# Patient Record
Sex: FEMALE | Race: WHITE | NOT HISPANIC OR LATINO | Employment: OTHER | URBAN - METROPOLITAN AREA
[De-identification: names, ages, dates, MRNs, and addresses within clinical notes are randomized per-mention and may not be internally consistent; named-entity substitution may affect disease eponyms.]

---

## 2017-01-10 ENCOUNTER — OFFICE VISIT (OUTPATIENT)
Dept: UROLOGY | Facility: CLINIC | Age: 77
End: 2017-01-10
Payer: MEDICARE

## 2017-01-10 VITALS
HEIGHT: 68 IN | HEART RATE: 59 BPM | SYSTOLIC BLOOD PRESSURE: 133 MMHG | BODY MASS INDEX: 22.86 KG/M2 | WEIGHT: 150.81 LBS | DIASTOLIC BLOOD PRESSURE: 64 MMHG

## 2017-01-10 DIAGNOSIS — I49.3 PVC'S (PREMATURE VENTRICULAR CONTRACTIONS): ICD-10-CM

## 2017-01-10 DIAGNOSIS — I34.0 MITRAL VALVE INSUFFICIENCY, UNSPECIFIED ETIOLOGY: ICD-10-CM

## 2017-01-10 DIAGNOSIS — N95.2 ATROPHIC VAGINITIS: ICD-10-CM

## 2017-01-10 DIAGNOSIS — N39.0 RECURRENT UTI: Primary | ICD-10-CM

## 2017-01-10 PROCEDURE — 3075F SYST BP GE 130 - 139MM HG: CPT | Mod: S$GLB,,, | Performed by: UROLOGY

## 2017-01-10 PROCEDURE — 87186 SC STD MICRODIL/AGAR DIL: CPT

## 2017-01-10 PROCEDURE — 1159F MED LIST DOCD IN RCRD: CPT | Mod: S$GLB,,, | Performed by: UROLOGY

## 2017-01-10 PROCEDURE — 1126F AMNT PAIN NOTED NONE PRSNT: CPT | Mod: S$GLB,,, | Performed by: UROLOGY

## 2017-01-10 PROCEDURE — 87086 URINE CULTURE/COLONY COUNT: CPT

## 2017-01-10 PROCEDURE — 99999 PR PBB SHADOW E&M-EST. PATIENT-LVL III: CPT | Mod: PBBFAC,,, | Performed by: UROLOGY

## 2017-01-10 PROCEDURE — 87088 URINE BACTERIA CULTURE: CPT

## 2017-01-10 PROCEDURE — 1157F ADVNC CARE PLAN IN RCRD: CPT | Mod: S$GLB,,, | Performed by: UROLOGY

## 2017-01-10 PROCEDURE — 1160F RVW MEDS BY RX/DR IN RCRD: CPT | Mod: S$GLB,,, | Performed by: UROLOGY

## 2017-01-10 PROCEDURE — 99204 OFFICE O/P NEW MOD 45 MIN: CPT | Mod: S$GLB,,, | Performed by: UROLOGY

## 2017-01-10 PROCEDURE — 87077 CULTURE AEROBIC IDENTIFY: CPT

## 2017-01-10 PROCEDURE — 3078F DIAST BP <80 MM HG: CPT | Mod: S$GLB,,, | Performed by: UROLOGY

## 2017-01-10 RX ORDER — ESTRADIOL 0.1 MG/G
CREAM VAGINAL
Qty: 42.5 G | Refills: 6 | Status: SHIPPED | OUTPATIENT
Start: 2017-01-10 | End: 2018-01-30 | Stop reason: SDUPTHER

## 2017-01-10 NOTE — PROGRESS NOTES
CHIEF COMPLAINT:    Mrs. Pepe Bell is a 76 y.o. female presenting for a consultation at the request of Dr. Flower. Patient presents with recurrent UTI.    PRESENTING ILLNESS:    Madan Bell is a 76 y.o. female  by  s/p laparoscopic hysterectomy and BSO  with HTN, paroxysmal V tach, s/p thyroidectomy for multinodular goiter 2013, chronic low back pain, who presents following recent UTI.      Patient states that she had a positive clean catch urine culture in 2016, although she had no symptoms at that time-- no fevers, chills, dysuria, hematuria, flank pain.    Prior to this, she had not had a UTI in years.    She saw her PCP again in 2016, complaining of dysuria, but no fevers, hematuria.  She states that she had flank pain slightly worse than usual at that time, although it is hard to tell the difference from her chronic back pain.  UCx  grew klebsiella resistant to nitrofurantoin, otherwise sensitive.  She was treated with bactrim.  Her symptoms never resolved completely, and UCx  grew E. Coli resistant to unasyn, ampicillin, ciprofloxacin, gent, levofloxacin, tetracycline, tobramycin, and bactrim.  She was treated, and has since had resolution of her symptoms.  Today she denies fever, chills, N/V, dysuria, hematuria, flank pain.      She endorses nocturia x 2, daytime frequency q 2 hours, occasional urgency.  She states that she empties well.  No obstructive symptoms.    Patient endorses leaking urine with cough, laugh, sneeze, and activity.  She notes occasional smaller volume leakage with urgency, but this is rare.  She wears 6-7 pads per day that are not soaked through.       REVIEW OF SYSTEMS:        PATIENT HISTORY:    Past Medical History   Diagnosis Date    Anxiety     Arrhythmia     Arthritis      knees    Heart murmur     Hyperlipidemia     Hypertension     Mitral valve disorder     Mitral valve prolapse     Thyroid cancer 2013     Thyroid disease     Vasovagal near syncope 11/12/2012       Past Surgical History   Procedure Laterality Date    Breast surgery      Thyroid surgery      Hysterectomy       tahbso       Family History   Problem Relation Age of Onset    Arrhythmia Son     Heart disease Mother     Hyperlipidemia Maternal Grandmother        Social History     Social History    Marital status:      Spouse name: N/A    Number of children: N/A    Years of education: N/A     Occupational History    Not on file.     Social History Main Topics    Smoking status: Never Smoker    Smokeless tobacco: Never Used    Alcohol use No    Drug use: No    Sexual activity: Not on file     Other Topics Concern    Not on file     Social History Narrative       Allergies:  Fluoride preparations and Fluoride    Medications:  Outpatient Encounter Prescriptions as of 1/10/2017   Medication Sig Dispense Refill    ascorbic acid (VITAMIN C) 500 MG tablet Take 500 mg by mouth once daily.      aspirin 81 mg Tab Take 1 tablet by mouth Daily.      atorvastatin (LIPITOR) 20 MG tablet TAKE 1 TABLET ONE TIME DAILY 90 tablet 3    bisoprolol-hydrochlorothiazide 2.5-6.25 mg (ZIAC) 2.5-6.25 mg Tab Half tablet daily 45 tablet 3    cholecalciferol, vitamin D3, (VITAMIN D3) 2,000 unit Cap Take by mouth once daily.        levothyroxine (SYNTHROID) 75 MCG tablet TAKE 1 TABLET ONE TIME DAILY 90 tablet 1    omega-3 fatty acids-vitamin E (FISH OIL) 1,000 mg Cap Take 1 capsule by mouth once daily.       FLUZONE HIGH-DOSE 2016-17, PF, 180 mcg/0.5 mL Syrg       naproxen sodium (ANAPROX) 220 MG tablet Take 220 mg by mouth 2 (two) times daily with meals.       No facility-administered encounter medications on file as of 1/10/2017.          PHYSICAL EXAMINATION:    The patient generally appears in good health, is appropriately interactive, and is in no apparent distress.    Skin: No lesions.    Mental: Cooperative with normal affect.    Neuro: Grossly  intact.    HEENT: Normal. No evidence of lymphadenopathy.    Chest:  normal inspiratory effort.    Heart: Regular rhythm.  No murmurs    Abdomen: BS active. Soft, non-tender. No masses or organomegaly. Bladder is not palpable. No evidence of flank discomfort.  No CVAT.     Extremities: No clubbing, cyanosis, or edema    : atrophic vaginitis.   The external genitalia were normal. No rash. Atrophic vaginitis was present. The urethral showed no evidence of a urethral diverticulum.  And in and out cath was performed under sterile conditions immediately after voiding.  The PVR was 30 ml.  Normal perineum.  No external hemorrhoids noted.    LABS:    Urine dipstick today positive for trace leukocytes, trace RBC    IMPRESSION:    Encounter Diagnoses   Name Primary?    Recurrent UTI Yes       PLAN:    1.   Probiotic daily  2.   Topical estrogen every day x 2 weeks, then 3x week. Pea sized amount  3.   Cysto, renal ultrasound  4.   Avoid constipation, stay well hydrated.    I would like to thank Sheila Mcgee MD for referral of this patient.

## 2017-01-10 NOTE — LETTER
January 10, 2017      Sheila Mcgee MD  [address]  [facility] [address]           [facility] - Urology 4th Floor  [address]  [facility] [address]  Phone: [phone]          Patient: Madan Bell   MR Number: [id number]   YOB: 1940   Date of Visit: 1/10/2017       Dear Dr. Sheila Mcgee:    Thank you for referring Madan Bell to me for evaluation. Attached you will find relevant portions of my assessment and plan of care.    If you have questions, please do not hesitate to call me. I look forward to following Madan Bell along with you.    Sincerely,    Makayla Bello MD    Enclosure  CC:  No Recipients    If you would like to receive this communication electronically, please contact [email or web address] or [phone] to request more information on [email or web address] Link access.    For providers and/or their staff who would like to refer a patient to [facility], please contact us through our one-stop-shop provider referral line, [facility], at [phone].    If you feel you have received this communication in error or would no longer like to receive these types of communications, please e-mail [email or web address]

## 2017-01-12 ENCOUNTER — PATIENT MESSAGE (OUTPATIENT)
Dept: UROLOGY | Facility: CLINIC | Age: 77
End: 2017-01-12

## 2017-01-12 LAB — BACTERIA UR CULT: NORMAL

## 2017-01-12 RX ORDER — AMOXICILLIN AND CLAVULANATE POTASSIUM 875; 125 MG/1; MG/1
1 TABLET, FILM COATED ORAL 2 TIMES DAILY
Qty: 20 TABLET | Refills: 0 | Status: SHIPPED | OUTPATIENT
Start: 2017-01-12 | End: 2017-01-22

## 2017-01-13 ENCOUNTER — TELEPHONE (OUTPATIENT)
Dept: UROLOGY | Facility: CLINIC | Age: 77
End: 2017-01-13

## 2017-01-13 NOTE — TELEPHONE ENCOUNTER
----- Message from Ignacia Yo MA sent at 1/13/2017  1:42 PM CST -----  Contact: self  [phone]  States she is still waiting for you to call the antibiotic to her local pharmacy.    Call when done.

## 2017-01-13 NOTE — TELEPHONE ENCOUNTER
Pt notified that her antibiotic was called into [facility] per her request and Dilcai Barrett NP verbal order .

## 2017-01-18 ENCOUNTER — PATIENT MESSAGE (OUTPATIENT)
Dept: UROLOGY | Facility: CLINIC | Age: 77
End: 2017-01-18

## 2017-01-22 RX ORDER — LEVOTHYROXINE SODIUM 75 UG/1
TABLET ORAL
Qty: 90 TABLET | Refills: 3 | Status: SHIPPED | OUTPATIENT
Start: 2017-01-22 | End: 2018-03-23 | Stop reason: SDUPTHER

## 2017-01-25 ENCOUNTER — TELEPHONE (OUTPATIENT)
Dept: UROLOGY | Facility: CLINIC | Age: 77
End: 2017-01-25

## 2017-01-25 ENCOUNTER — HOSPITAL ENCOUNTER (OUTPATIENT)
Dept: RADIOLOGY | Facility: HOSPITAL | Age: 77
Discharge: HOME | End: 2017-01-25
Attending: UROLOGY
Payer: MEDICARE

## 2017-01-25 DIAGNOSIS — N39.0 RECURRENT UTI: ICD-10-CM

## 2017-01-25 DIAGNOSIS — N20.0 KIDNEY STONES: Primary | ICD-10-CM

## 2017-01-25 PROCEDURE — 76770 US EXAM ABDO BACK WALL COMP: CPT | Mod: TC

## 2017-01-25 PROCEDURE — 76770 US EXAM ABDO BACK WALL COMP: CPT | Mod: 26,,, | Performed by: RADIOLOGY

## 2017-01-27 ENCOUNTER — HOSPITAL ENCOUNTER (OUTPATIENT)
Dept: UROLOGY | Facility: HOSPITAL | Age: 77
Discharge: HOME | End: 2017-01-27
Attending: UROLOGY
Payer: MEDICARE

## 2017-01-27 ENCOUNTER — HOSPITAL ENCOUNTER (OUTPATIENT)
Dept: RADIOLOGY | Facility: HOSPITAL | Age: 77
Discharge: HOME | End: 2017-01-27
Attending: UROLOGY
Payer: MEDICARE

## 2017-01-27 DIAGNOSIS — N20.0 KIDNEY STONES: ICD-10-CM

## 2017-01-27 DIAGNOSIS — N39.0 RECURRENT UTI: ICD-10-CM

## 2017-01-27 PROCEDURE — 74000 XR ABDOMEN AP 1 VIEW: CPT | Mod: 26,,, | Performed by: RADIOLOGY

## 2017-01-27 PROCEDURE — 74000 XR ABDOMEN AP 1 VIEW: CPT | Mod: TC

## 2017-01-27 RX ORDER — CEPHALEXIN 500 MG/1
500 CAPSULE ORAL EVERY 12 HOURS
Qty: 20 CAPSULE | Refills: 0 | Status: SHIPPED | OUTPATIENT
Start: 2017-01-27 | End: 2017-02-06

## 2017-01-27 RX ORDER — CIPROFLOXACIN 500 MG/1
500 TABLET ORAL ONCE
Status: DISCONTINUED | OUTPATIENT
Start: 2017-01-27 | End: 2017-03-08

## 2017-01-27 RX ORDER — LIDOCAINE HYDROCHLORIDE 20 MG/ML
10 JELLY TOPICAL
Status: COMPLETED | OUTPATIENT
Start: 2017-01-27 | End: 2017-02-01

## 2017-01-29 ENCOUNTER — PATIENT MESSAGE (OUTPATIENT)
Dept: UROLOGY | Facility: CLINIC | Age: 77
End: 2017-01-29

## 2017-02-01 ENCOUNTER — HOSPITAL ENCOUNTER (OUTPATIENT)
Dept: RADIOLOGY | Facility: HOSPITAL | Age: 77
Discharge: HOME | End: 2017-02-01
Attending: UROLOGY
Payer: MEDICARE

## 2017-02-01 ENCOUNTER — PATIENT MESSAGE (OUTPATIENT)
Dept: UROLOGY | Facility: CLINIC | Age: 77
End: 2017-02-01

## 2017-02-01 ENCOUNTER — PATIENT MESSAGE (OUTPATIENT)
Dept: INTERNAL MEDICINE | Facility: CLINIC | Age: 77
End: 2017-02-01

## 2017-02-01 ENCOUNTER — HOSPITAL ENCOUNTER (OUTPATIENT)
Dept: UROLOGY | Facility: HOSPITAL | Age: 77
Discharge: HOME | End: 2017-02-01
Attending: UROLOGY
Payer: MEDICARE

## 2017-02-01 VITALS
BODY MASS INDEX: 21.91 KG/M2 | HEART RATE: 79 BPM | WEIGHT: 147.94 LBS | DIASTOLIC BLOOD PRESSURE: 77 MMHG | TEMPERATURE: 98 F | HEIGHT: 69 IN | SYSTOLIC BLOOD PRESSURE: 171 MMHG

## 2017-02-01 DIAGNOSIS — N39.0 RECURRENT UTI: ICD-10-CM

## 2017-02-01 DIAGNOSIS — N20.0 KIDNEY STONES: Primary | ICD-10-CM

## 2017-02-01 PROCEDURE — 74176 CT ABD & PELVIS W/O CONTRAST: CPT | Mod: 26,,, | Performed by: RADIOLOGY

## 2017-02-01 PROCEDURE — 52000 CYSTOURETHROSCOPY: CPT

## 2017-02-01 PROCEDURE — 52000 CYSTOURETHROSCOPY: CPT | Mod: ,,, | Performed by: UROLOGY

## 2017-02-01 PROCEDURE — 74176 CT ABD & PELVIS W/O CONTRAST: CPT | Mod: TC

## 2017-02-01 RX ORDER — LIDOCAINE HYDROCHLORIDE 20 MG/ML
10 JELLY TOPICAL
Status: DISCONTINUED | OUTPATIENT
Start: 2017-02-01 | End: 2018-08-06 | Stop reason: HOSPADM

## 2017-02-01 RX ADMIN — LIDOCAINE HYDROCHLORIDE 10 ML: 20 JELLY TOPICAL at 10:02

## 2017-02-01 NOTE — PATIENT INSTRUCTIONS
What to Expect After a Cystoscopy  For the next 24-48 hours, you may feel a mild burning when you urinate. This burning is normal and expected. Drink plenty of water to dilute the urine to help relieve the burning sensation. You may also see a small amount of blood in your urine after the procedure.    Unless you are already taking antibiotics, you may be given an antibiotic after the test to prevent infection.    Signs and Symptoms to Report  Call the [facility] at [phone] if you develop any of the following:  · Fever of 101 degrees or higher  · Chills or persistent bleeding  · Inability to urinate

## 2017-02-01 NOTE — IP AVS SNAPSHOT
[facility]  [address]  [facility] [address]  Phone: [phone]  Fax: [phone]                  Madan Bell   2017 10:30 AM   Cystoscopy    Description:  Female : 1940   Provider:  [facility]   Department:  [facility]           Visit Information     Date & Time Provider Department    2017 10:30 AM [facility]      Recent Lab Values        2016 2016 1/10/2017 2017                  7:45 PM  9:35 AM 11:30 AM 10:39 AM        Urine Culture ESCHERICHIA COLI  &gt;100,000 cfu/ml   No growth ESCHERICHIA COLI  &gt;100,000 cfu/ml   KLEBSIELLA PNEUMONIAE  &gt;100,000 cfu/ml          Color - Yellow - -        Specific Gravity - 1.005 - -        pH - 6.0 - -        Nitrite - Negative - -        Ketones - Negative - -        Urobilinogen - Negative - -                 Reason for Visit     Urinary Tract Infection           Diagnoses this Visit        Comments    Recurrent UTI                To Do List           Follow-Up and Disposition     Return in about 3 months (around 2017) for FOLLOW UP IN CLINIC.      Goals (5 Years of Data)     None           Medications                ** Verify the list of medication(s) below is accurate and up to date. Carry this with you in case of emergency. If your medications have changed, please notify your healthcare provider.             Medication List      TAKE these medications        Additional Info                      aspirin 81 mg Tab   Refills:  0   Dose:  1 tablet    Instructions:  Take 1 tablet by mouth Daily.     Begin Date    AM    Noon    PM    Bedtime       atorvastatin 20 MG tablet   Commonly known as:  LIPITOR   Quantity:  90 tablet   Refills:  3    Instructions:  TAKE 1 TABLET ONE TIME DAILY     Begin Date    AM    Noon    PM    Bedtime       bisoprolol-hydrochlorothiazide 2.5-6.25 mg 2.5-6.25 mg Tab   Commonly known as:  ZIAC  "  Quantity:  45 tablet   Refills:  3    Instructions:  Half tablet daily     Begin Date    AM    Noon    PM    Bedtime       cephALEXin 500 MG capsule   Commonly known as:  KEFLEX   Quantity:  20 capsule   Refills:  0   Dose:  500 mg    Instructions:  Take 1 capsule (500 mg total) by mouth every 12 (twelve) hours.     Begin Date    AM    Noon    PM    Bedtime       estradiol 0.01 % (0.1 mg/gram) vaginal cream   Commonly known as:  ESTRACE   Quantity:  42.5 g   Refills:  6    Instructions:  Apply a pea sized amount every day for 2 weeks, then 3x/week.     Begin Date    AM    Noon    PM    Bedtime       FISH OIL 1,000 mg Cap   Refills:  0   Dose:  1 capsule   Indications:  1400 mcg qd   Generic drug:  omega-3 fatty acids-vitamin E    Instructions:  Take 1 capsule by mouth once daily.     Begin Date    AM    Noon    PM    Bedtime       FLUZONE HIGH-DOSE 2016-17 (PF) 180 mcg/0.5 mL Syrg   Refills:  0   Generic drug:  flu vacc [id number] 65yr up(PF)      Begin Date    AM    Noon    PM    Bedtime       levothyroxine 75 MCG tablet   Commonly known as:  SYNTHROID   Quantity:  90 tablet   Refills:  3    Instructions:  TAKE 1 TABLET ONE TIME DAILY     Begin Date    AM    Noon    PM    Bedtime       naproxen sodium 220 MG tablet   Commonly known as:  ANAPROX   Refills:  0   Dose:  220 mg    Instructions:  Take 220 mg by mouth 2 (two) times daily with meals.     Begin Date    AM    Noon    PM    Bedtime       VITAMIN C 500 MG tablet   Refills:  0   Dose:  500 mg   Generic drug:  ascorbic acid (vitamin C)    Instructions:  Take 500 mg by mouth once daily.     Begin Date    AM    Noon    PM    Bedtime       VITAMIN D3 2,000 unit Cap   Refills:  0   Generic drug:  cholecalciferol (vitamin D3)    Instructions:  Take by mouth once daily.     Begin Date    AM    Noon    PM    Bedtime               Your Vitals Were     Temp Height Weight BMI       97.7 °F (36.5 °C) (Oral) 5' 8.5" (1.74 m) 67.1 kg (147 lb 14.9 oz) 22.17 kg/m2     "   Allergies as of 2/1/2017     Fluoride Preparations    Fluoride      Immunizations Administered on Date of Encounter - 2/1/2017     None      Orders Placed During Today's Visit      Normal Orders This Visit    Cystoscopy       Administrations This Visit     lidocaine HCl 2% urojet 10 mL     Admin Date Action Dose Route Administered By             02/01/2017 Given 10 mL Urethral Adrian Back LPN                      Instructions    What to Expect After a Cystoscopy  For the next 24-48 hours, you may feel a mild burning when you urinate. This burning is normal and expected. Drink plenty of water to dilute the urine to help relieve the burning sensation. You may also see a small amount of blood in your urine after the procedure.    Unless you are already taking antibiotics, you may be given an antibiotic after the test to prevent infection.    Signs and Symptoms to Report  Call the [facility] at [phone] if you develop any of the following:  · Fever of 101 degrees or higher  · Chills or persistent bleeding  · Inability to urinate       Advance Directives     An advance directive is a document which, in the event you are no longer able to make decisions for yourself, tells your healthcare team what kind of treatment you do or do not want to receive, or who you would like to make those decisions for you.  If you do not currently have an advance directive, [facility] encourages you to create one.  For more information call:  [phone]), [phone] ([phone]),  or log on to [email or web address].        [facility] On Call     [facility] On Call Nurse Care Line - 24/7 Assistance  Registered nurses in the [facility] Center provide clinical advisement, health education, appointment booking, and other advisory services.  Call for this free service at [phone].        Language Assistance Services     ATTENTION: Language assistance services are available, free of charge. Please  call [phone].      ATENCIÓN: Si habla español, tiene a barker disposición servicios gratuitos de asistencia lingüística. Llame al [phone].     CHÚ Ý: N?u b?n nói Ti?ng Vi?t, có các d?ch v? h? tr? ngôn ng? mi?n phí dành cho b?n. G?i s? [phone].         [facility] complies with applicable Federal civil rights laws and does not discriminate on the basis of race, color, national origin, age, disability, or sex.

## 2017-02-01 NOTE — PROCEDURES
Procedure: Flexible cysto-uretheroscopy    Pre Procedure Diagnosis:recurrent UTI    Post Procedure Diagnosis:cystitis cystica    Surgeon: Makayla Bello MD    Anesthesia: 2% uro-jet lidocaine jelly for local analgesia    Flexible cysto-urethroscopy was performed after consent was obtained.  The risks and benefits were explained.    2% lidocaine urojet was used for local analgesia.  The genitalia was prepped and draped in the sterile fashion with betadine.    The flexible scope was advanced into the urethra and into the bladder.  Bilateral ureteral orifice were evaluated and noted to be normal with clear efflux.  The bladder was completely surveyed in a systematic fashion.   No bladder tumors or lesions were seen.  No strictures were noted.    The patient tolerated the procedure well without complication.    They will follow up in 3 months in clinic.  Continue vaginal estrogen.  Urine culture for future UTI.

## 2017-02-16 ENCOUNTER — TELEPHONE (OUTPATIENT)
Dept: INTERNAL MEDICINE | Facility: CLINIC | Age: 77
End: 2017-02-16

## 2017-02-16 NOTE — TELEPHONE ENCOUNTER
----- Message from Lita Connolly sent at 2/16/2017 10:23 AM CST -----  Contact: Daughter/Cris/[phone]  Type: Sooner appointment than  is able to schedule    When is the first available appointment? 05/04/2017    What is the nature of the appointment?Follow Up - Per Urology     What appointment type:EP    Comments:Patient's daughter is calling to request access to an earlier appointment. Please call and advise.    Thank you!

## 2017-03-02 ENCOUNTER — LAB VISIT (OUTPATIENT)
Dept: LAB | Facility: HOSPITAL | Age: 77
End: 2017-03-02
Attending: UROLOGY
Payer: MEDICARE

## 2017-03-02 DIAGNOSIS — N39.0 RECURRENT UTI: ICD-10-CM

## 2017-03-02 PROCEDURE — 87077 CULTURE AEROBIC IDENTIFY: CPT

## 2017-03-02 PROCEDURE — 87086 URINE CULTURE/COLONY COUNT: CPT

## 2017-03-02 PROCEDURE — 87186 SC STD MICRODIL/AGAR DIL: CPT

## 2017-03-02 PROCEDURE — 87088 URINE BACTERIA CULTURE: CPT

## 2017-03-05 LAB — BACTERIA UR CULT: NORMAL

## 2017-03-07 ENCOUNTER — PATIENT MESSAGE (OUTPATIENT)
Dept: UROLOGY | Facility: CLINIC | Age: 77
End: 2017-03-07

## 2017-03-08 ENCOUNTER — TELEPHONE (OUTPATIENT)
Dept: UROLOGY | Facility: CLINIC | Age: 77
End: 2017-03-08

## 2017-03-08 ENCOUNTER — PATIENT MESSAGE (OUTPATIENT)
Dept: UROLOGY | Facility: CLINIC | Age: 77
End: 2017-03-08

## 2017-03-08 RX ORDER — NITROFURANTOIN (MACROCRYSTALS) 100 MG/1
100 CAPSULE ORAL 2 TIMES DAILY
Qty: 14 CAPSULE | Refills: 0 | Status: SHIPPED | OUTPATIENT
Start: 2017-03-08 | End: 2017-03-15

## 2017-03-13 ENCOUNTER — OFFICE VISIT (OUTPATIENT)
Dept: INTERNAL MEDICINE | Facility: CLINIC | Age: 77
End: 2017-03-13
Payer: MEDICARE

## 2017-03-13 VITALS
DIASTOLIC BLOOD PRESSURE: 63 MMHG | HEART RATE: 49 BPM | WEIGHT: 151 LBS | HEIGHT: 68 IN | SYSTOLIC BLOOD PRESSURE: 132 MMHG | BODY MASS INDEX: 22.88 KG/M2

## 2017-03-13 DIAGNOSIS — Z12.31 ENCOUNTER FOR SCREENING MAMMOGRAM FOR MALIGNANT NEOPLASM OF BREAST: ICD-10-CM

## 2017-03-13 DIAGNOSIS — I34.1 MVP (MITRAL VALVE PROLAPSE): Primary | ICD-10-CM

## 2017-03-13 DIAGNOSIS — E78.2 HYPERLIPIDEMIA, MIXED: ICD-10-CM

## 2017-03-13 DIAGNOSIS — R73.9 HYPERGLYCEMIA: ICD-10-CM

## 2017-03-13 DIAGNOSIS — I10 HTN (HYPERTENSION), BENIGN: ICD-10-CM

## 2017-03-13 DIAGNOSIS — E06.3 HASHIMOTO'S DISEASE: ICD-10-CM

## 2017-03-13 DIAGNOSIS — Z92.89 HX OF SCREENING MAMMOGRAPHY: ICD-10-CM

## 2017-03-13 DIAGNOSIS — E55.9 MILD VITAMIN D DEFICIENCY: ICD-10-CM

## 2017-03-13 DIAGNOSIS — I70.1 RENAL ARTERY ARTERIOSCLEROSIS: ICD-10-CM

## 2017-03-13 DIAGNOSIS — Z13.9 SCREENING: ICD-10-CM

## 2017-03-13 DIAGNOSIS — M81.0 AGE-RELATED OSTEOPOROSIS WITHOUT CURRENT PATHOLOGICAL FRACTURE: ICD-10-CM

## 2017-03-13 PROCEDURE — 3078F DIAST BP <80 MM HG: CPT | Mod: S$GLB,,, | Performed by: INTERNAL MEDICINE

## 2017-03-13 PROCEDURE — 3075F SYST BP GE 130 - 139MM HG: CPT | Mod: S$GLB,,, | Performed by: INTERNAL MEDICINE

## 2017-03-13 PROCEDURE — 1159F MED LIST DOCD IN RCRD: CPT | Mod: S$GLB,,, | Performed by: INTERNAL MEDICINE

## 2017-03-13 PROCEDURE — 99999 PR PBB SHADOW E&M-EST. PATIENT-LVL IV: CPT | Mod: PBBFAC,,, | Performed by: INTERNAL MEDICINE

## 2017-03-13 PROCEDURE — 1157F ADVNC CARE PLAN IN RCRD: CPT | Mod: S$GLB,,, | Performed by: INTERNAL MEDICINE

## 2017-03-13 PROCEDURE — 1160F RVW MEDS BY RX/DR IN RCRD: CPT | Mod: S$GLB,,, | Performed by: INTERNAL MEDICINE

## 2017-03-13 PROCEDURE — 99214 OFFICE O/P EST MOD 30 MIN: CPT | Mod: S$GLB,,, | Performed by: INTERNAL MEDICINE

## 2017-03-13 PROCEDURE — 99499 UNLISTED E&M SERVICE: CPT | Mod: S$GLB,,, | Performed by: INTERNAL MEDICINE

## 2017-03-13 PROCEDURE — 1126F AMNT PAIN NOTED NONE PRSNT: CPT | Mod: S$GLB,,, | Performed by: INTERNAL MEDICINE

## 2017-03-13 NOTE — MR AVS SNAPSHOT
[facility] [address]  [address]  [address]  [facility] [address]  Phone: [phone]                  Madan Strangeaut   3/13/2017 1:00 PM   Office Visit    Description:  Female : 1940   Provider:  Sheila Mcgee MD   Department:  [facility] [address]           Reason for Visit     Follow-up           Diagnoses this Visit        Comments    MVP (mitral valve prolapse)    -  Primary     HTN (hypertension), benign         Hashimoto's disease         Renal artery arteriosclerosis         Hyperlipidemia, mixed         Hyperglycemia         Mild vitamin D deficiency         Hx of screening mammography         Screening         Encounter for screening mammogram for malignant neoplasm of breast         Age-related osteoporosis without current pathological fracture                To Do List           Future Appointments        Provider Department Dept Phone    3/23/2017 7:10 AM LAB, APPOINTMENT [place] [facility] [phone]    3/23/2017 7:40 AM NOMC, DEXA1 [facility] [phone]    3/23/2017 8:00 AM ECHO, [facility] - Echo/Stress Lab [phone]    3/23/2017 9:00 AM VASCULAR, CARDIOLOGY [facility] - Vascular Cardiology [phone]    2017 9:00 AM NOM MAMMO2 SCREEN [facility] [phone]      Goals (5 Years of Data)     None      Follow-Up and Disposition     Return in about 6 months (around 2017).       These Medications        Disp Refills Start End    Bifidobacterium infantis (ALIGN) 4 mg Cap 30 capsule 6 3/13/2017     One daily    Pharmacy: [facility] Mail Delivery - [place] [address] [facility] Ph #: [phone]         [facility] On Call     [facility] On Call Nurse Care Line -  Assistance  Registered nurses in the [facility] provide clinical advisement, health education, appointment booking, and other advisory services.  Call for  "this free service at [phone].             Medications           Message regarding Medications     Verify the changes and/or additions to your medication regime listed below are the same as discussed with your clinician today.  If any of these changes or additions are incorrect, please notify your healthcare provider.        START taking these NEW medications        Refills    Bifidobacterium infantis (ALIGN) 4 mg Cap 6    Sig: One daily    Class: No Print           Verify that the below list of medications is an accurate representation of the medications you are currently taking.  If none reported, the list may be blank. If incorrect, please contact your healthcare provider. Carry this list with you in case of emergency.           Current Medications     ascorbic acid (VITAMIN C) 500 MG tablet Take 500 mg by mouth once daily.    aspirin 81 mg Tab Take 1 tablet by mouth Daily.    atorvastatin (LIPITOR) 20 MG tablet TAKE 1 TABLET ONE TIME DAILY    Bifidobacterium infantis (ALIGN) 4 mg Cap One daily    bisoprolol-hydrochlorothiazide 2.5-6.25 mg (ZIAC) 2.5-6.25 mg Tab Half tablet daily    cholecalciferol, vitamin D3, (VITAMIN D3) 2,000 unit Cap Take by mouth once daily.      estradiol (ESTRACE) 0.01 % (0.1 mg/gram) vaginal cream Apply a pea sized amount every day for 2 weeks, then 3x/week.    FLUZONE HIGH-DOSE 2016-17, PF, 180 mcg/0.5 mL Syrg     levothyroxine (SYNTHROID) 75 MCG tablet TAKE 1 TABLET ONE TIME DAILY    naproxen sodium (ANAPROX) 220 MG tablet Take 220 mg by mouth 2 (two) times daily with meals.    nitrofurantoin (MACRODANTIN) 100 MG capsule Take 1 capsule (100 mg total) by mouth 2 (two) times daily.    omega-3 fatty acids-vitamin E (FISH OIL) 1,000 mg Cap Take 1 capsule by mouth once daily.            Clinical Reference Information           Your Vitals Were     BP Pulse Height Weight BMI    132/63 49 5' 8" (1.727 m) 68.5 kg (151 lb) 22.96 kg/m2      Blood Pressure          Most Recent Value    BP  " 132/63      Allergies as of 3/13/2017     Fluoride Preparations    Fluoride      Immunizations Administered on Date of Encounter - 3/13/2017     Name Date Dose VIS Date Route    Zoster  Incomplete 0.65 mL 10/6/2009 Subcutaneous      Orders Placed During Today's Visit      Normal Orders This Visit    Assign HDMP Onboarding Questionnaire Series     Hypertension Digital Medicine (HDMP)  Enrollment Order     Zoster Vaccine - Live     Future Labs/Procedures Expected by Expires    CBC auto differential  3/13/2017 5/12/2018    Comprehensive metabolic panel  3/13/2017 3/13/2018    DXA Bone Density Spine And Hip_Axial Skeleton  3/13/2017 6/11/2017    Hemoglobin A1c  3/13/2017 3/13/2018    Lipid panel  3/13/2017 3/13/2018    Mammo Digital Screening Bilat with CAD  3/13/2017 5/14/2018    TSH  3/13/2017 3/13/2018    Vitamin D  3/13/2017 3/13/2018    2D echo with color flow doppler  As directed 6/11/2017    Cardiology Lab US Renal Artery Scan Bilateral  As directed 3/13/2018      Hypertension Digital Medicine Program Information              As discussed, you could benefit from enrolling in the Hypertension Digital Medicine Program. The goal of the program is to help you effectively manage your high blood pressure through an appropriate balance of medication and lifestyle changes, all from the comfort of your own home. Effectively managed blood pressure reduces your risk of having a heart attack or a stroke in the future, so you can enjoy a long and healthy life. We want to make blood pressure control your goal.        What is the Hypertension Digital Medicine Program?  Finding the right balance in managing high blood pressure is different for every person, and we will tailor our treatment approach based on your individual needs using the most current evidence-based guidelines.  As a participant, you will be able to send home blood pressure readings, on your schedule, directly into your medical record at [facility]. I, along with a  "team of pharmacists, will monitor this data, help you adjust your medication(s) and/or make lifestyle recommendations to better manage your hypertension.       What are the requirements of the program?   Participating in the program is as easy as 1 - 2 - 3.   1. Smartphone - You must have your own smartphone to participate (either an iPhone or an Android phone such as Ortho-tag, Solar Notion, HTC, virtual tweens ltd, Sparkfly, or Click Security).    2. [facility] account - [facility] offers a great way to connect through the online patient portal, [facility], which is free and provides you access to your [facility] medical record.  3. Digital blood pressure cuff - Using this blood pressure cuff that hooks up to your smartphone, you will be able to send in your home blood pressure readings to the Hypertension Digital Medicine Team. We have made arrangements to offer blood pressure cuffs at a discount for our programs participants.           What can I do to get started?   Complete the Hypertension Digital Medicine Patient Consent questionnaire already available in your [facility] account. To access and complete this questionnaire, either  - Use the [facility] website on a computer and select My Medical Record, then Questionnaires.  - Use the Drive bronwyn on your smartphone and select "Questionnaires".    Once you have given consent, additional onboarding questionnaires will be assigned to you to complete prior to starting the program. You must return to the "Questionnaires" page of your [facility] account to start the additional questionnaires.     How do I purchase a digital blood pressure cuff and obtain a discount?  [facility] has negotiated a discounted price from industry leading healthcare technology companies. Patients using an Apple iPhone can purchase an iHealth Ease Blood Pressure cuff for $33 (originally $39.99). While supplies last, Android users can purchase the eJamming Blood Pressure Monitor for $45 (originally $129.95).  Purchase " locations will be sent once all onboarding questionnaires have been completed (see above).    If you have any questions regarding this program or would like more information, please visit our Hypertension Digital Medicine website ([email or web address]) or call Digital Medicine Patient Support at [phone].          Language Assistance Services     ATTENTION: Language assistance services are available, free of charge. Please call [phone].      ATENCIÓN: Si habla scott, tiene a barker disposición servicios gratuitos de asistencia lingüística. Llame al [phone].     CHÚ Ý: N?u b?n nói Ti?ng Vi?t, có các d?ch v? h? tr? ngôn ng? mi?n phí dành cho b?n. G?i s? [phone].         [facility] [address] complies with applicable Federal civil rights laws and does not discriminate on the basis of race, color, national origin, age, disability, or sex.

## 2017-03-14 ENCOUNTER — PATIENT MESSAGE (OUTPATIENT)
Dept: ADMINISTRATIVE | Facility: OTHER | Age: 77
End: 2017-03-14

## 2017-03-15 ENCOUNTER — PATIENT MESSAGE (OUTPATIENT)
Dept: ADMINISTRATIVE | Facility: OTHER | Age: 77
End: 2017-03-15

## 2017-03-23 ENCOUNTER — HOSPITAL ENCOUNTER (OUTPATIENT)
Dept: CARDIOLOGY | Facility: CLINIC | Age: 77
Discharge: HOME | End: 2017-03-23
Payer: MEDICARE

## 2017-03-23 ENCOUNTER — HOSPITAL ENCOUNTER (OUTPATIENT)
Dept: RADIOLOGY | Facility: CLINIC | Age: 77
Discharge: HOME | End: 2017-03-23
Attending: INTERNAL MEDICINE
Payer: MEDICARE

## 2017-03-23 ENCOUNTER — CLINICAL SUPPORT (OUTPATIENT)
Dept: CARDIOLOGY | Facility: CLINIC | Age: 77
End: 2017-03-23
Payer: MEDICARE

## 2017-03-23 ENCOUNTER — PATIENT MESSAGE (OUTPATIENT)
Dept: ADMINISTRATIVE | Facility: OTHER | Age: 77
End: 2017-03-23

## 2017-03-23 DIAGNOSIS — M81.0 AGE-RELATED OSTEOPOROSIS WITHOUT CURRENT PATHOLOGICAL FRACTURE: ICD-10-CM

## 2017-03-23 DIAGNOSIS — I34.1 MVP (MITRAL VALVE PROLAPSE): ICD-10-CM

## 2017-03-23 DIAGNOSIS — I70.1 RENAL ARTERY ARTERIOSCLEROSIS: ICD-10-CM

## 2017-03-23 DIAGNOSIS — Z13.9 SCREENING: ICD-10-CM

## 2017-03-23 LAB
DIASTOLIC DYSFUNCTION: YES
ESTIMATED PA SYSTOLIC PRESSURE: 27.04
MITRAL VALVE MOBILITY: ABNORMAL
MITRAL VALVE REGURGITATION: ABNORMAL
RETIRED EF AND QEF - SEE NOTES: 55 (ref 55–65)
TRICUSPID VALVE REGURGITATION: ABNORMAL

## 2017-03-23 PROCEDURE — 93306 TTE W/DOPPLER COMPLETE: CPT | Mod: S$GLB,,, | Performed by: INTERNAL MEDICINE

## 2017-03-23 PROCEDURE — 93975 VASCULAR STUDY: CPT | Mod: S$GLB,,, | Performed by: INTERNAL MEDICINE

## 2017-03-23 PROCEDURE — 77080 DXA BONE DENSITY AXIAL: CPT | Mod: 26,,, | Performed by: INTERNAL MEDICINE

## 2017-03-26 NOTE — PROGRESS NOTES
Subjective:       Patient ID: Madan Bell is a 77 y.o. female.    Chief Complaint: Follow-up    HPIPt had CT that showed atherosclerosis of renal arteries - pt has no sx - BP easily controlled.  No hx of pulmonary edema.  Pt with asymptomatic recurrent UTI - discussed not treating as can create very resistant bacteria.  Feels well and has a birthday soon.  Review of Systems   Respiratory: Negative for shortness of breath (PND or orthopnea).    Cardiovascular: Negative for chest pain (arm pain or jaw pain).   Gastrointestinal: Negative for abdominal pain, diarrhea, nausea and vomiting.   Genitourinary: Negative for dysuria.   Neurological: Negative for seizures, syncope and headaches.       Objective:      Physical Exam   Constitutional: She is oriented to person, place, and time. She appears well-developed and well-nourished. No distress.   HENT:   Head: Normocephalic.   Mouth/Throat: Oropharynx is clear and moist.   Neck: Neck supple. No JVD present. No thyromegaly present.   Cardiovascular: Normal rate, regular rhythm, normal heart sounds and intact distal pulses.  Exam reveals no gallop and no friction rub.    No murmur heard.  Pulmonary/Chest: Effort normal and breath sounds normal. She has no wheezes. She has no rales.   Abdominal: Soft. Bowel sounds are normal. She exhibits no distension and no mass. There is no tenderness. There is no rebound and no guarding.   Musculoskeletal: She exhibits no edema.   Lymphadenopathy:     She has no cervical adenopathy.   Neurological: She is alert and oriented to person, place, and time.   Skin: Skin is warm and dry.   Psychiatric: She has a normal mood and affect. Her behavior is normal. Judgment and thought content normal.       Assessment:       1. MVP (mitral valve prolapse)    2. HTN (hypertension), benign    3. Hashimoto's disease    4. Renal artery arteriosclerosis    5. Hyperlipidemia, mixed    6. Hyperglycemia    7. Mild vitamin D deficiency    8. Hx of  screening mammography    9. Screening    10. Encounter for screening mammogram for malignant neoplasm of breast     11. Age-related osteoporosis without current pathological fracture         Plan:   MVP (mitral valve prolapse)  -     2D echo with color flow doppler; Future    HTN (hypertension), benign  -     CBC auto differential; Future; Expected date: 3/13/17  -     Comprehensive metabolic panel; Future; Expected date: 3/13/17  -     TSH; Future; Expected date: 3/13/17  Controlled - continue current meds    Hashimoto's disease  S/p thyroidectomy  Renal artery arteriosclerosis  -     Cardiology Lab US Renal Artery Scan Bilateral; Future  Probably not significant  Hyperlipidemia, mixed  -     Lipid panel; Future; Expected date: 3/13/17    Hyperglycemia  -     Hemoglobin A1c; Future; Expected date: 3/13/17    Mild vitamin D deficiency  -     Vitamin D; Future; Expected date: 3/13/17    Hx of screening mammography  -     Mammo Digital Screening Bilat with CAD; Future; Expected date: 3/13/17    Screening  -     DXA Bone Density Spine And Hip_Axial Skeleton; Future; Expected date: 3/13/17    Encounter for screening mammogram for malignant neoplasm of breast   -     Mammo Digital Screening Bilat with CAD; Future; Expected date: 3/13/17    Age-related osteoporosis without current pathological fracture   -     DXA Bone Density Spine And Hip_Axial Skeleton; Future; Expected date: 3/13/17    Other orders  -     Bifidobacterium infantis (ALIGN) 4 mg Cap; One daily  Dispense: 30 capsule; Refill: 6  -     Hypertension Digital Medicine (HDMP)  Enrollment Order  -     Assign HDMP Onboarding Questionnaire Series  -     Zoster Vaccine - Live

## 2017-04-04 ENCOUNTER — PATIENT MESSAGE (OUTPATIENT)
Dept: CARDIOLOGY | Facility: CLINIC | Age: 77
End: 2017-04-04

## 2017-04-10 ENCOUNTER — PATIENT MESSAGE (OUTPATIENT)
Dept: INTERNAL MEDICINE | Facility: CLINIC | Age: 77
End: 2017-04-10

## 2017-04-11 ENCOUNTER — PATIENT MESSAGE (OUTPATIENT)
Dept: CARDIOLOGY | Facility: CLINIC | Age: 77
End: 2017-04-11

## 2017-04-12 ENCOUNTER — PATIENT MESSAGE (OUTPATIENT)
Dept: CARDIOLOGY | Facility: CLINIC | Age: 77
End: 2017-04-12

## 2017-04-12 DIAGNOSIS — R00.2 PALPITATIONS: Primary | ICD-10-CM

## 2017-04-26 ENCOUNTER — OFFICE VISIT (OUTPATIENT)
Dept: UROLOGY | Facility: CLINIC | Age: 77
End: 2017-04-26
Payer: MEDICARE

## 2017-04-26 VITALS
WEIGHT: 149.25 LBS | BODY MASS INDEX: 22.11 KG/M2 | SYSTOLIC BLOOD PRESSURE: 140 MMHG | HEART RATE: 58 BPM | DIASTOLIC BLOOD PRESSURE: 64 MMHG | HEIGHT: 69 IN

## 2017-04-26 DIAGNOSIS — N39.0 RECURRENT UTI: Primary | ICD-10-CM

## 2017-04-26 DIAGNOSIS — N39.46 MIXED STRESS AND URGE URINARY INCONTINENCE: ICD-10-CM

## 2017-04-26 LAB
BILIRUB SERPL-MCNC: NORMAL MG/DL
BLOOD URINE, POC: NORMAL
COLOR, POC UA: NORMAL
GLUCOSE UR QL STRIP: NORMAL
KETONES UR QL STRIP: NORMAL
LEUKOCYTE ESTERASE URINE, POC: NORMAL
NITRITE, POC UA: NORMAL
PH, POC UA: 5
PROTEIN, POC: NORMAL
SPECIFIC GRAVITY, POC UA: 1.01
UROBILINOGEN, POC UA: NORMAL

## 2017-04-26 PROCEDURE — 3077F SYST BP >= 140 MM HG: CPT | Mod: S$GLB,,, | Performed by: UROLOGY

## 2017-04-26 PROCEDURE — 87086 URINE CULTURE/COLONY COUNT: CPT

## 2017-04-26 PROCEDURE — 99213 OFFICE O/P EST LOW 20 MIN: CPT | Mod: 25,S$GLB,, | Performed by: UROLOGY

## 2017-04-26 PROCEDURE — 1157F ADVNC CARE PLAN IN RCRD: CPT | Mod: S$GLB,,, | Performed by: UROLOGY

## 2017-04-26 PROCEDURE — 1126F AMNT PAIN NOTED NONE PRSNT: CPT | Mod: S$GLB,,, | Performed by: UROLOGY

## 2017-04-26 PROCEDURE — 81001 URINALYSIS AUTO W/SCOPE: CPT | Mod: S$GLB,,, | Performed by: UROLOGY

## 2017-04-26 PROCEDURE — 1159F MED LIST DOCD IN RCRD: CPT | Mod: S$GLB,,, | Performed by: UROLOGY

## 2017-04-26 PROCEDURE — 3078F DIAST BP <80 MM HG: CPT | Mod: S$GLB,,, | Performed by: UROLOGY

## 2017-04-26 PROCEDURE — 1160F RVW MEDS BY RX/DR IN RCRD: CPT | Mod: S$GLB,,, | Performed by: UROLOGY

## 2017-04-26 PROCEDURE — 99999 PR PBB SHADOW E&M-EST. PATIENT-LVL IV: CPT | Mod: PBBFAC,,, | Performed by: UROLOGY

## 2017-04-26 NOTE — MR AVS SNAPSHOT
[facility] - Urology 4th Floor  [address]  [facility] [address]  Phone: [phone]                  Madan Bell   2017 8:30 AM   Office Visit    Description:  Female : 1940   Provider:  Makayla Bello MD   Department:  [facility] - Urology 4th Floor           Reason for Visit     Follow-up           Diagnoses this Visit        Comments    Recurrent UTI    -  Primary     Mixed stress and urge urinary incontinence                To Do List           Future Appointments        Provider Department Dept Phone    2017 12:30 PM EKG, APPT [facility] [phone]    2017 1:00 PM Sudhir Daly MD [facility] Cardiology [phone]    2017 9:00 AM [facility] MAMMO2 SCREEN [facility] [phone]      Goals (5 Years of Data)     None      [facility] On Call     [facility] Nurse Care Line -  Assistance  Unless otherwise directed by your provider, please contact [facility], our nurse care line that is available for  assistance.     Registered nurses in the [facility] provide: appointment scheduling, clinical advisement, health education, and other advisory services.  Call: [phone] (toll free)               Medications           Message regarding Medications     Verify the changes and/or additions to your medication regime listed below are the same as discussed with your clinician today.  If any of these changes or additions are incorrect, please notify your healthcare provider.             Verify that the below list of medications is an accurate representation of the medications you are currently taking.  If none reported, the list may be blank. If incorrect, please contact your healthcare provider. Carry this list with you in case of emergency.           Current Medications     ascorbic acid (VITAMIN C) 500 MG tablet Take 500 mg by mouth once daily.    aspirin 81 mg Tab Take 1 tablet by mouth Daily.    atorvastatin  "(LIPITOR) 20 MG tablet TAKE 1 TABLET ONE TIME DAILY    Bifidobacterium infantis (ALIGN) 4 mg Cap One daily    bisoprolol-hydrochlorothiazide 2.5-6.25 mg (ZIAC) 2.5-6.25 mg Tab Half tablet daily    cholecalciferol, vitamin D3, (VITAMIN D3) 2,000 unit Cap Take by mouth once daily.      estradiol (ESTRACE) 0.01 % (0.1 mg/gram) vaginal cream Apply a pea sized amount every day for 2 weeks, then 3x/week.    levothyroxine (SYNTHROID) 75 MCG tablet TAKE 1 TABLET ONE TIME DAILY    naproxen sodium (ANAPROX) 220 MG tablet Take 220 mg by mouth 2 (two) times daily with meals.    omega-3 fatty acids-vitamin E (FISH OIL) 1,000 mg Cap Take 1 capsule by mouth once daily.     FLUZONE HIGH-DOSE 2016-17, PF, 180 mcg/0.5 mL Syrg            Clinical Reference Information           Your Vitals Were     BP Pulse Height Weight BMI    140/64 58 5' 9" (1.753 m) 67.7 kg (149 lb 4 oz) 22.04 kg/m2      Blood Pressure          Most Recent Value    BP  (!)  140/64      Allergies as of 4/26/2017     Fluoride Preparations    Fluoride      Immunizations Administered on Date of Encounter - 4/26/2017     None      Orders Placed During Today's Visit      Normal Orders This Visit    Ambulatory consult to Physical Therapy     POCT urinalysis, dipstick or tablet reag     Urine culture     Future Labs/Procedures Expected by Expires    Urine culture  4/26/2017 6/25/2018    Urine culture  4/26/2017 6/25/2018 4/26/2017  8:46 AM - Heather Carmona LPN      Component Results     Component    Color, UA    Spec Grav UA    1.015    pH, UA    5    WBC, UA    n    Nitrite, UA    n    Protein    n    Glucose, UA    n    Ketones, UA    n    Urobilinogen, UA    n    Bilirubin    n    Blood, UA    n            Instructions    Malena Love - referral to PT       Language Assistance Services     ATTENTION: Language assistance services are available, free of charge. Please call [phone].      ATENCIÓN: Si paulette chavez, tiene a barker disposición servicios " veronicaos de asistencia lingüística. Davian bolaños [phone].     LILLY Ý: N?u b?n nói Ti?ng Vi?t, có các d?ch v? h? tr? ngôn ng? mi?n phí dành cho b?n. G?i s? [phone].         [facility] - Urology 4th Floor complies with applicable Federal civil rights laws and does not discriminate on the basis of race, color, national origin, age, disability, or sex.

## 2017-04-26 NOTE — PROGRESS NOTES
Subjective:       Patient ID: Madan Bell is a 77 y.o. female.    Chief Complaint: Follow-up    HPI Comments: Madan Bell is a 77 y.o. female  by  s/p laparoscopic hysterectomy and BSO  with HTN, paroxysmal V tach, s/p thyroidectomy for multinodular goiter 2013, chronic low back pain, who presents following recent UTI.    She is on vaginal estrogen since .     3 culture proven UTI this year. Last UTI was e.coli, MDR to cipro, bactrim, amp, gent and doxy. She is on Align in February.   Cysto  showed no abnormalities  CT RSS  showed a punctate right renal stone. No other stones. No hydro. Films personally reviewed.     Nocturia x 1 (down from 3 times) since treating UTI, daytime frequency still every 2 hours. She does drink a lot of water.   She states that she empties well.  No obstructive symptoms.    Patient endorses leaking urine with cough, laugh, sneeze, and activity.  She notes occasional smaller volume leakage with urgency, but this is rare.  She wears 6-7 pads per day that are not soaked through.     She denies constipation.       Past Surgical History:   Procedure Laterality Date    BREAST SURGERY      HYSTERECTOMY      tahbso    THYROID SURGERY         Past Medical History:   Diagnosis Date    Anxiety     Arrhythmia     Arthritis     knees    Heart murmur     Hyperlipidemia     Hypertension     Mitral valve disorder     Mitral valve prolapse     Thyroid cancer 2013    Thyroid disease     Vasovagal near syncope 2012       Social History     Social History    Marital status:      Spouse name: N/A    Number of children: N/A    Years of education: N/A     Occupational History    Not on file.     Social History Main Topics    Smoking status: Never Smoker    Smokeless tobacco: Never Used    Alcohol use No    Drug use: No    Sexual activity: Not on file     Other Topics Concern    Not on file     Social History Narrative        Family History   Problem Relation Age of Onset    Arrhythmia Son     Heart disease Mother     Hyperlipidemia Maternal Grandmother        Current Outpatient Prescriptions   Medication Sig Dispense Refill    ascorbic acid (VITAMIN C) 500 MG tablet Take 500 mg by mouth once daily.      aspirin 81 mg Tab Take 1 tablet by mouth Daily.      atorvastatin (LIPITOR) 20 MG tablet TAKE 1 TABLET ONE TIME DAILY 90 tablet 3    Bifidobacterium infantis (ALIGN) 4 mg Cap One daily 30 capsule 6    bisoprolol-hydrochlorothiazide 2.5-6.25 mg (ZIAC) 2.5-6.25 mg Tab Half tablet daily 45 tablet 3    cholecalciferol, vitamin D3, (VITAMIN D3) 2,000 unit Cap Take by mouth once daily.        estradiol (ESTRACE) 0.01 % (0.1 mg/gram) vaginal cream Apply a pea sized amount every day for 2 weeks, then 3x/week. 42.5 g 6    levothyroxine (SYNTHROID) 75 MCG tablet TAKE 1 TABLET ONE TIME DAILY 90 tablet 3    naproxen sodium (ANAPROX) 220 MG tablet Take 220 mg by mouth 2 (two) times daily with meals.      omega-3 fatty acids-vitamin E (FISH OIL) 1,000 mg Cap Take 1 capsule by mouth once daily.       FLUZONE HIGH-DOSE 2016-17, PF, 180 mcg/0.5 mL Syrg        Current Facility-Administered Medications   Medication Dose Route Frequency Provider Last Rate Last Dose    lidocaine HCl 2% urojet 10 mL  10 mL Urethral 1 time in Clinic/HOD Makayla Bello MD           Review of patient's allergies indicates:   Allergen Reactions    Fluoride preparations Hives    Fluoride      Other reaction(s): Hives       Review of Systems   Constitutional: Negative for chills, fever and unexpected weight change.   HENT: Negative for nosebleeds.    Eyes: Negative for visual disturbance.   Respiratory: Negative for chest tightness.    Cardiovascular: Negative for chest pain.   Gastrointestinal: Negative for diarrhea.   Musculoskeletal: Negative for myalgias.   Skin: Negative for rash.   Neurological: Negative for seizures.   Hematological: Does not  bruise/bleed easily.   Psychiatric/Behavioral: Negative for behavioral problems.       Objective:      Physical Exam   Constitutional: She is oriented to person, place, and time. She appears well-developed and well-nourished.   HENT:   Head: Normocephalic and atraumatic.   Eyes: No scleral icterus.   Cardiovascular: Normal rate.    Pulmonary/Chest: Effort normal. No stridor.   Neurological: She is alert and oriented to person, place, and time.   Skin: Skin is warm and dry.     Psychiatric: She has a normal mood and affect.     urine dip clear  Assessment:       1. Recurrent UTI    2. Mixed stress and urge urinary incontinence        Plan:     urine culture clear.   Home collect urine culture for future UTI.  Continue vaginal estrogen.  Referral to PT to improve pelvic floor to hopefully stop using pads. (Malena Love).  Continue probiotic daily .  F/u 6 months.     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

## 2017-04-27 LAB
BACTERIA UR CULT: NORMAL
BACTERIA UR CULT: NORMAL

## 2017-04-29 PROBLEM — N39.46 MIXED STRESS AND URGE URINARY INCONTINENCE: Status: ACTIVE | Noted: 2017-04-29

## 2017-05-01 ENCOUNTER — PATIENT MESSAGE (OUTPATIENT)
Dept: UROLOGY | Facility: CLINIC | Age: 77
End: 2017-05-01

## 2017-05-15 ENCOUNTER — PATIENT MESSAGE (OUTPATIENT)
Dept: INTERNAL MEDICINE | Facility: CLINIC | Age: 77
End: 2017-05-15

## 2017-05-19 ENCOUNTER — PATIENT MESSAGE (OUTPATIENT)
Dept: INTERNAL MEDICINE | Facility: CLINIC | Age: 77
End: 2017-05-19

## 2017-05-19 ENCOUNTER — TELEPHONE (OUTPATIENT)
Dept: INTERNAL MEDICINE | Facility: CLINIC | Age: 77
End: 2017-05-19

## 2017-05-19 NOTE — TELEPHONE ENCOUNTER
----- Message from Chet Murray sent at 5/19/2017 10:45 AM CDT -----  Contact: Cris  [phone]  This message is being sent by Cris Khan on behalf of Madan Bell     Good evening,     Would like to request results from renal artery ultrasound performed in Cardiology department on 3/23/17. We are still unable to locate and review in test results section.     Thank you,   Cris Khan (Daughter)

## 2017-06-07 ENCOUNTER — CLINICAL SUPPORT (OUTPATIENT)
Dept: REHABILITATION | Facility: HOSPITAL | Age: 77
End: 2017-06-07
Attending: UROLOGY
Payer: MEDICARE

## 2017-06-07 ENCOUNTER — PATIENT MESSAGE (OUTPATIENT)
Dept: UROLOGY | Facility: CLINIC | Age: 77
End: 2017-06-07

## 2017-06-07 DIAGNOSIS — N39.46 MIXED STRESS AND URGE URINARY INCONTINENCE: Primary | ICD-10-CM

## 2017-06-07 PROCEDURE — G8990 OTHER PT/OT CURRENT STATUS: HCPCS | Mod: CJ,PO

## 2017-06-07 PROCEDURE — 97110 THERAPEUTIC EXERCISES: CPT | Mod: PO

## 2017-06-07 PROCEDURE — G8991 OTHER PT/OT GOAL STATUS: HCPCS | Mod: CI,PO

## 2017-06-07 PROCEDURE — 97161 PT EVAL LOW COMPLEX 20 MIN: CPT | Mod: PO

## 2017-06-07 NOTE — PATIENT INSTRUCTIONS
Home Program: 2017    Kegels in lying or sittin. Sit or lie down comfortably.  2. Squeeze and LIFT the muscles that stop the flow of urine and passage of gas.    3. Hold for 10 sec without holding breath.  4. Release and DROP for 10 sec.  5. Repeat 10 times, 2 sets, twice per day.      No Kegels while urinating!

## 2017-06-07 NOTE — PROGRESS NOTES
Patient: Madan Bell   [facility] #:  [id number]    Date of treatment: 06/07/2017   Time in: 11:06  Time out: 12:00    Madan is a 77 y.o. female evaluated on 6/7/2017    Physician:  Makayla Bello,*   Diagnosis:   Encounter Diagnosis   Name Primary?    Mixed stress and urge urinary incontinence Yes        Treatment ordered: PT    Medical History:   Past Medical History:   Diagnosis Date    Anxiety     Arrhythmia     Arthritis     knees    Heart murmur     Hyperlipidemia     Hypertension     Mitral valve disorder     Mitral valve prolapse     Thyroid cancer 1/29/2013    Thyroid disease     Vasovagal near syncope 11/12/2012        Surgical History:   Past Surgical History:   Procedure Laterality Date    BREAST SURGERY      HYSTERECTOMY      tahbso    THYROID SURGERY          Medications:   Current Outpatient Prescriptions   Medication Sig    ascorbic acid (VITAMIN C) 500 MG tablet Take 500 mg by mouth once daily.    aspirin 81 mg Tab Take 1 tablet by mouth Daily.    atorvastatin (LIPITOR) 20 MG tablet TAKE 1 TABLET ONE TIME DAILY    Bifidobacterium infantis (ALIGN) 4 mg Cap One daily    bisoprolol-hydrochlorothiazide 2.5-6.25 mg (ZIAC) 2.5-6.25 mg Tab Half tablet daily    cholecalciferol, vitamin D3, (VITAMIN D3) 2,000 unit Cap Take by mouth once daily.      estradiol (ESTRACE) 0.01 % (0.1 mg/gram) vaginal cream Apply a pea sized amount every day for 2 weeks, then 3x/week.    FLUZONE HIGH-DOSE 2016-17, PF, 180 mcg/0.5 mL Syrg     levothyroxine (SYNTHROID) 75 MCG tablet TAKE 1 TABLET ONE TIME DAILY    naproxen sodium (ANAPROX) 220 MG tablet Take 220 mg by mouth 2 (two) times daily with meals.    omega-3 fatty acids-vitamin E (FISH OIL) 1,000 mg Cap Take 1 capsule by mouth once daily.      Current Facility-Administered Medications   Medication    lidocaine HCl 2% urojet 10 mL       Allergies:   Review of patient's allergies indicates:   Allergen Reactions    Fluoride preparations Hives     Fluoride      Other reaction(s): Hives      Precautions: universal  OB:GYN History:childbirth vaginal delivery; ; hysterectomy      Bladder/Bowel History: recurrent bladder infections and urinary incontinence       SUBJECTIVE:   History of current complaint: pt reports stress leaking with sneeze.  She reports that she is not urge leaking- is home most of the time and can make it to the bathroom, but she does report bathroom mapping and limitation of fluid intake when out in public.  She reports nocturia x 1-2.  She denies constipation.      Date of Onset: years ago- worse in the past year  Symptoms are: increasing    Frequency of Urination: Daytime: 8-10 times           Nighttime: 1-2 times     Urine Stream: strong    Frequency of Bowel Movements: once a day    Types of Fluid Intake: water, pomegranate juice, small mug of coffee      Bladder Leakage: Yes  Frequency of incidents: daily  Amount Leaked: drops    Colon Leakage: No    Form of Protection: pantyliner  Number of Pads required in 24 hours: several times per day.      Activities that cause symptoms:with cough/sneeze/laugh/yell    Current Exercise: walks every day 30 minutes.     Pain:  Patient reports 0/10 with 0 being the lowest and 10 being the highest        OBJECTIVE:  Patient received 50 minutes of treatment which consisted of evaluation and 10 minutes of ther ex.    ORTHO SCREEN  Posture: WNL      Pelvic Alignment: no deviations noted in supine       ABDOMINALS  Scarring:  none      Diastasis: none   Abdominal strength: rectus 3/5     Transverse : good, well isolated TA contraction noted       VAGINAL PELVIC FLOOR EXAM  Introitus:  WNL  Skin Condition: WNL  Contraction Response:   visible lift  Valsalva Response bulge    External Clock  Scarring: episiotomy scar noted  Sensation:WNL  Pain:none  Prolapse Check: none       Internal Clock  Pain: none  Sensation:able to localize pressure appropriately    Vaginal Vault : WNL  Muscle  Bulk:atrophy  Muscle Power: 3    Muscle Endurance: 10 sec      Quality of Contraction:  decreased hold and slow relaxation  Specificity:WNL   Coordination: tends to hold breath during PFM contraction    SEMG Evaluation: deferred due to time constraints    Treatment Given: instructed on general anatomy/physiology of urinary/bowel system; discussed plan of care with patient; instructed in benefits/risks of treatment; instructed in alternative methods of treatment; instructed in risks of refusing treatment; patient agreed to treatment plan; instructed in Kegels per handout issued- pt verbalized understanding of all instruction.    ASSESSMENT:  History  Co-morbidities and personal factors that may impact the plan of care Examination  Body Structures and Functions, activity limitations and participation restrictions that may impact the plan of care Clinical Presentation   Decision Making/ Complexity Score   Co-morbidities:       History of multiple UTI's          Personal Factors:    Body Regions: weak pelvic floor muscles; weak abdominal muscles    Body Systems:           Activity limitations:   Pt reports bathroom mapping for social outings and when running errands    Participation Restrictions:          Stable and uncomplicated   low           Problem List:    poor knowledge of body mechanics and posture, poor trunk stability, decreased pelvic muscle strength, decreased endurance of the pelvic muscles, increased frequency of urination and poor coordination of pelvic floor muscles during ADL's leading to urinary or fecal leakage    Barriers to Learning: none    Educational/Spiritual/Cultural needs:  None  Environmental Barriers: none noted  Abuse/Neglect: no signs  Nutritional Status: WDWN WF  Fall Risk: none    FUNCTIONAL GOALS: 3 months  1. Patient able to participate in exercise with less leakage of urine.,   2. Patient able to perform basic ADL with less leaking.,   3. Cough, sneeze, or laugh with less  incontinence.,   4. Decreased need for pad use for incontinence. ,   5. Able to maintain normal breathing pattern during pelvic floor muscle contraction.   6. Pt to be I with HEP.    PATIENTS GOALS: to be able to go out without wearing a pad, and to avoid UTI's  ().  The current impairment level is 21 percent impaired (CJ) based on the (PFDI-Urinary) score of 21%.  He/she is expected to achieve a score of 19 percent impaired (-PM) after 10 therapy visits.    PLAN:  therapeutic exercises and neuromuscular re-ed    Physical Therapy Education: anatomy/physiology of pelvic floor, posture/body mechanices and diaphragmatic breathing    Frequency/Duration: once per 2 weeks for 3 months

## 2017-06-08 ENCOUNTER — LAB VISIT (OUTPATIENT)
Dept: LAB | Facility: HOSPITAL | Age: 77
End: 2017-06-08
Attending: UROLOGY
Payer: MEDICARE

## 2017-06-08 DIAGNOSIS — N39.0 RECURRENT UTI: ICD-10-CM

## 2017-06-08 PROCEDURE — 87086 URINE CULTURE/COLONY COUNT: CPT

## 2017-06-08 PROCEDURE — 87088 URINE BACTERIA CULTURE: CPT

## 2017-06-08 PROCEDURE — 87077 CULTURE AEROBIC IDENTIFY: CPT

## 2017-06-08 PROCEDURE — 87186 SC STD MICRODIL/AGAR DIL: CPT

## 2017-06-10 LAB — BACTERIA UR CULT: NORMAL

## 2017-06-12 ENCOUNTER — PATIENT OUTREACH (OUTPATIENT)
Dept: OTHER | Facility: OTHER | Age: 77
End: 2017-06-12
Payer: MEDICARE

## 2017-06-12 ENCOUNTER — PATIENT MESSAGE (OUTPATIENT)
Dept: UROLOGY | Facility: CLINIC | Age: 77
End: 2017-06-12

## 2017-06-12 ENCOUNTER — TELEPHONE (OUTPATIENT)
Dept: UROLOGY | Facility: CLINIC | Age: 77
End: 2017-06-12

## 2017-06-12 RX ORDER — SULFAMETHOXAZOLE AND TRIMETHOPRIM 800; 160 MG/1; MG/1
1 TABLET ORAL 2 TIMES DAILY
Qty: 6 TABLET | Refills: 0 | Status: SHIPPED | OUTPATIENT
Start: 2017-06-12 | End: 2017-06-15

## 2017-06-12 NOTE — PROGRESS NOTES
Last 5 Patient Entered Readings                                                               Current 30 Day Average: 120/64     Recent Readings 6/5/2017 6/2/2017 5/9/2017 5/4/2017 4/24/2017    Systolic BP (mmHg) 111 128 - - -    Diastolic BP (mmHg) 63 64 - - -    Pulse 56 55 56 58 65        Initial introduction completed with the Mrs. Madan Bell and the role of the health  was explained.   We discussed the following information:  Exercise - Patient walks every day for 30 minutes each day in her neighborhood.  Diet - patient tries to avoid salt and not add it to meals etc. She lives with her son and daughter in law and her daughter in law cooks for her. She eats fruit and veggies and we spoke about some other alternatives to help limit the sodium intake with some of the other items she mentioned. We spoke in depth about this and she will let me know if she has any other questions.     Resources on diet and exercise were sent.     Patient is aware that I am not available for emergencies and to call 911 or [facility] if one arises.  Patient is aware of the importance of medication adherence.  Patient is aware of the importance of diet and exercise.  Patient is aware that his sodium intake should be no more than 2000mg per day.  Patient is aware that the recommended physical activity each week should be about 30 minutes per day at least 5 times per week.   Patient is aware of the importance of taking BP readings at least weekly if not more and during different times each day.  Patient is aware that the health  can be used as a resource for lifestyle modifications to help reduce or maintain a healthy BP

## 2017-06-13 ENCOUNTER — HOSPITAL ENCOUNTER (OUTPATIENT)
Dept: CARDIOLOGY | Facility: CLINIC | Age: 77
Discharge: HOME | End: 2017-06-13
Payer: MEDICARE

## 2017-06-13 ENCOUNTER — OFFICE VISIT (OUTPATIENT)
Dept: CARDIOLOGY | Facility: CLINIC | Age: 77
End: 2017-06-13
Payer: MEDICARE

## 2017-06-13 VITALS
SYSTOLIC BLOOD PRESSURE: 143 MMHG | WEIGHT: 151.88 LBS | DIASTOLIC BLOOD PRESSURE: 63 MMHG | HEART RATE: 57 BPM | BODY MASS INDEX: 22.49 KG/M2 | HEIGHT: 69 IN

## 2017-06-13 DIAGNOSIS — I34.0 NON-RHEUMATIC MITRAL REGURGITATION: ICD-10-CM

## 2017-06-13 DIAGNOSIS — R55 VASOVAGAL NEAR SYNCOPE: ICD-10-CM

## 2017-06-13 DIAGNOSIS — I49.3 PVC'S (PREMATURE VENTRICULAR CONTRACTIONS): ICD-10-CM

## 2017-06-13 DIAGNOSIS — I47.29 PAROXYSMAL VENTRICULAR TACHYCARDIA: ICD-10-CM

## 2017-06-13 DIAGNOSIS — R00.2 PALPITATIONS: ICD-10-CM

## 2017-06-13 DIAGNOSIS — E78.00 HYPERCHOLESTEROLEMIA: ICD-10-CM

## 2017-06-13 DIAGNOSIS — E89.0 POST-OPERATIVE HYPOTHYROIDISM: ICD-10-CM

## 2017-06-13 DIAGNOSIS — I34.1 MVP (MITRAL VALVE PROLAPSE): Primary | ICD-10-CM

## 2017-06-13 DIAGNOSIS — E06.3 HASHIMOTO'S DISEASE: ICD-10-CM

## 2017-06-13 DIAGNOSIS — I10 HTN (HYPERTENSION), BENIGN: ICD-10-CM

## 2017-06-13 DIAGNOSIS — R93.1 AGATSTON CAC SCORE, <100: ICD-10-CM

## 2017-06-13 PROCEDURE — 93000 ELECTROCARDIOGRAM COMPLETE: CPT | Mod: S$GLB,,, | Performed by: INTERNAL MEDICINE

## 2017-06-13 PROCEDURE — 99214 OFFICE O/P EST MOD 30 MIN: CPT | Mod: S$GLB,,, | Performed by: INTERNAL MEDICINE

## 2017-06-13 PROCEDURE — 99499 UNLISTED E&M SERVICE: CPT | Mod: S$GLB,,, | Performed by: INTERNAL MEDICINE

## 2017-06-13 PROCEDURE — 1159F MED LIST DOCD IN RCRD: CPT | Mod: S$GLB,,, | Performed by: INTERNAL MEDICINE

## 2017-06-13 PROCEDURE — 1157F ADVNC CARE PLAN IN RCRD: CPT | Mod: S$GLB,,, | Performed by: INTERNAL MEDICINE

## 2017-06-13 PROCEDURE — 99999 PR PBB SHADOW E&M-EST. PATIENT-LVL IV: CPT | Mod: PBBFAC,,, | Performed by: INTERNAL MEDICINE

## 2017-06-13 NOTE — PATIENT INSTRUCTIONS
Discussed diet , achieving and maintaining ideal body weight, and exercise.   We reviewed meds in detail.  Reassured  Discussed goals  Will increase statin if CAC high and get stress if really high

## 2017-06-13 NOTE — PROGRESS NOTES
Subjective:   Patient ID:  Madan Bell is a 77 y.o. female who presents for follow-up of CVD    HPI:  Patient is here for valvular heart disease.Patient is here for evaluation and treatment of hypertension.The patient is here for CAD risk factors.  The patient has no chest pain, SOB, TIA, palpitations, syncope or pre-syncope.Patient currently exercises many times per week.BP usually 120s.        Review of Systems   Constitution: Negative for chills, decreased appetite, diaphoresis, fever, weakness, malaise/fatigue, night sweats, weight gain and weight loss.   HENT: Negative for congestion, headaches, hoarse voice, nosebleeds, sore throat and tinnitus.    Eyes: Negative for blurred vision, double vision, vision loss in left eye, vision loss in right eye, visual disturbance and visual halos.   Cardiovascular: Negative for chest pain, claudication, cyanosis, dyspnea on exertion, irregular heartbeat, leg swelling, near-syncope, orthopnea, palpitations, paroxysmal nocturnal dyspnea and syncope.   Respiratory: Negative for cough, hemoptysis, shortness of breath, sleep disturbances due to breathing, snoring, sputum production and wheezing.    Endocrine: Negative for cold intolerance, heat intolerance, polydipsia, polyphagia and polyuria.   Hematologic/Lymphatic: Negative for adenopathy and bleeding problem. Does not bruise/bleed easily.   Skin: Negative for color change, dry skin, flushing, itching, nail changes, poor wound healing, rash, skin cancer, suspicious lesions and unusual hair distribution.   Musculoskeletal: Negative for arthritis, back pain, falls, gout, joint pain, joint swelling, muscle cramps, muscle weakness, myalgias and stiffness.   Gastrointestinal: Negative for abdominal pain, anorexia, change in bowel habit, constipation, diarrhea, dysphagia, heartburn, hematemesis, hematochezia, melena and vomiting.   Genitourinary: Negative for decreased libido, dysuria, hematuria, hesitancy and urgency.    Neurological: Negative for excessive daytime sleepiness, dizziness, focal weakness, light-headedness, loss of balance, numbness, paresthesias, seizures, sensory change, tremors and vertigo.   Psychiatric/Behavioral: Negative for altered mental status, depression, hallucinations, memory loss, substance abuse and suicidal ideas. The patient does not have insomnia and is not nervous/anxious.    Allergic/Immunologic: Negative for environmental allergies and hives.       Objective: There were no vitals taken for this visit.     Physical Exam   Constitutional: She is oriented to person, place, and time. She appears well-developed and well-nourished.   HENT:   Head: Normocephalic.   Eyes: EOM are normal. Pupils are equal, round, and reactive to light.   Neck: Normal range of motion. Normal carotid pulses, no hepatojugular reflux and no JVD present. Carotid bruit is not present. No thyromegaly present.   Cardiovascular: Normal rate, regular rhythm and intact distal pulses.  Exam reveals no gallop and no friction rub.    Murmur heard.  High-pitched blowing holosystolic murmur is present with a grade of 2/6  at the apex  Pulmonary/Chest: Effort normal and breath sounds normal. No tachypnea. No respiratory distress. She has no wheezes. She has no rales. She exhibits no tenderness.   Abdominal: Soft. Bowel sounds are normal. She exhibits no distension and no mass. There is no tenderness. There is no rebound and no guarding.   Musculoskeletal: Normal range of motion. She exhibits no edema or tenderness.   Lymphadenopathy:     She has no cervical adenopathy.   Neurological: She is alert and oriented to person, place, and time. No cranial nerve deficit. Coordination normal.   Skin: Skin is warm. No rash noted. No erythema.   Psychiatric: She has a normal mood and affect. Her behavior is normal. Judgment and thought content normal.       Assessment:     1. MVP (mitral valve prolapse)    2. Paroxysmal ventricular tachycardia    3.  HTN (hypertension), benign    4. Agatston CAC score, <100    5. Hashimoto's disease    6. Post-operative hypothyroidism    7. PVC's (premature ventricular contractions)    8. Vasovagal near syncope    9. Non-rheumatic mitral regurgitation        Plan:   Discussed diet , achieving and maintaining ideal body weight, and exercise.   We reviewed meds in detail.  Reassured  Discussed goals  Will increase statin if CAC high and get stress if really high    Diagnoses and all orders for this visit:    MVP (mitral valve prolapse)    Paroxysmal ventricular tachycardia    HTN (hypertension), benign    Agatston CAC score, <100    Hashimoto's disease    Post-operative hypothyroidism    PVC's (premature ventricular contractions)    Vasovagal near syncope    Non-rheumatic mitral regurgitation            No Follow-up on file.

## 2017-06-20 ENCOUNTER — PATIENT OUTREACH (OUTPATIENT)
Dept: OTHER | Facility: OTHER | Age: 77
End: 2017-06-20
Payer: MEDICARE

## 2017-06-20 RX ORDER — NAPROXEN SODIUM 220 MG
220 TABLET ORAL 2 TIMES DAILY PRN
Status: ON HOLD | COMMUNITY
End: 2018-08-13

## 2017-06-20 NOTE — TELEPHONE ENCOUNTER
HTN Digital Medicine Program Medication Reconciliation Outreach    Called patient to introduce him/her into the HDMP. Reviewed cuff placement, body positioning, medication compliance and daily home regimen.    *Pt would like us to be aware that she has been struggling with recurrent UTIs lately. Is not currently on antibiotics but does wear panty liners and changes them regularly.  Going to PT to try to learn Kegel exercises to assist with urinary issues.    Verified the following information with the patient:  1. Medication list  Current Outpatient Prescriptions on File Prior to Visit   Medication Sig Dispense Refill    ascorbic acid (VITAMIN C) 500 MG tablet Take 500 mg by mouth once daily.      aspirin 81 mg Tab Take 1 tablet by mouth Daily.      atorvastatin (LIPITOR) 20 MG tablet TAKE 1 TABLET ONE TIME DAILY 90 tablet 3    Bifidobacterium infantis (ALIGN) 4 mg Cap One daily 30 capsule 6    bisoprolol-hydrochlorothiazide 2.5-6.25 mg (ZIAC) 2.5-6.25 mg Tab Half tablet daily 45 tablet 3    cholecalciferol, vitamin D3, (VITAMIN D3) 2,000 unit Cap Take by mouth once daily.        estradiol (ESTRACE) 0.01 % (0.1 mg/gram) vaginal cream Apply a pea sized amount every day for 2 weeks, then 3x/week. 42.5 g 6    levothyroxine (SYNTHROID) 75 MCG tablet TAKE 1 TABLET ONE TIME DAILY 90 tablet 3    FLUZONE HIGH-DOSE 2016-17, PF, 180 mcg/0.5 mL Syrg       omega-3 fatty acids-vitamin E (FISH OIL) 1,000 mg Cap Take 1 capsule by mouth once daily.       [DISCONTINUED] naproxen sodium (ANAPROX) 220 MG tablet Take 220 mg by mouth 2 (two) times daily with meals.       Current Facility-Administered Medications on File Prior to Visit   Medication Dose Route Frequency Provider Last Rate Last Dose    lidocaine HCl 2% urojet 10 mL  10 mL Urethral 1 time in Clinic/HOD Makayla Bello MD         Pt has been taking her BP first thing in AM but does not take her BP med until around 11 am. Will start taking readings at  different times of the day to assist in getting a good range of BP.    Previous ADRs    2. Medication compliance: has been compliant with the medicaiton regimen  3. Medication Allergies   Review of patient's allergies indicates:   Allergen Reactions    Fluoride preparations Hives    Fluoride      Other reaction(s): Hives       Screening Questionnaire Review:  1. Depression- not indicated  2. Sleep apnea- yes     IF RIZWANA screen positive    RIZWANA screening results for this patient suggest a high likelihood of sleep apnea, which can contribute to hypertension. Patient has not been previously diagnosed with sleep apnea and is not interested in referral at this time.  Feels that her sleep inconsistencies began when she was caring for her  with Alzheimer's who has since passed away. States that she got in the habit of waking up in the night to check on him and has continued to do this even after his passing. Also wakes in the middle of the night at least 2x to use restroom. Does not have any trouble falling back to sleep after waking. No periods of apnea to her knowledge, but does endorse snoring.  Does not feel that she needs referral to sleep clinic at this time. Will follow up with PCP if symptoms worsen or any new cause for concern arises.    Mrs. Madan Bell  Reports never having used CPAP in past or been through sleep study.      Referring physician would like to see patient / have patient  referred to a specialist for further evaluation.      Explained that our goal is to get her BP consistently below 140/90mmHg.  Patient denies having further questions, concerns. BP is at goal.         Last 5 Patient Entered Readings                                                               Current 30 Day Average: 106/60     Recent Readings 6/17/2017 6/17/2017 6/17/2017 6/16/2017 6/14/2017    Systolic BP (mmHg) 103 96 98 112 106    Diastolic BP (mmHg) 56 57 63 61 61    Pulse 84 82 86 52 57

## 2017-06-20 NOTE — LETTER
"   Daniella Helton PharmD  [address] [place] [address]     Dear Madan Bell,    Welcome to the [facility]!         My name is Daniella Helton PharmD and I am your dedicated Digital Medicine clinician.  As an expert in medication management, I will help ensure that the medications you are taking continue to provide you with the intended benefits.      This is Daniella Quiroz and she will be your health  for the duration of the program.  Her job is to help you identify lifestyle changes to improve your blood pressure control.  You will talk about nutrition, exercise, and other ways that you may be able to adjust your current habits to better your health. Together, we will work to improve your overall health and encourage you to meet your goals for a healthier lifestyle.    What we expect from YOU:    You will need to take blood pressure readings multiple times a week and no less than one reading per week.   It is important that you take your measurements at different times during the day, when possible.     What you should expect from your Digital Medicine Care Team:   We will provide you with education about high blood pressure, including lifestyle changes that could help you to control your blood pressure.   We will review your weekly readings and provide you with monthly blood pressure progress reports after you have been in the program for more than 30 days.   We will send monthly progress reports on your blood pressure control to your physician so they can follow along with your progress as well.    You will be able to reach me by phone at [phone] or through your [facility] account by clicking "Send a message to your doctor's office" on the home screen then selecting my name in the "To the office of:" field.     I look forward to working with you to achieve your blood pressure goals!    Sincerely,    Daniella Helton PharmD  Your personal " clinician    Please visit [email or web address] to learn more about high blood pressure and what you can do lower your blood pressure.                                                                                         Madan Bell  [address] [place] [address]

## 2017-06-21 ENCOUNTER — CLINICAL SUPPORT (OUTPATIENT)
Dept: REHABILITATION | Facility: HOSPITAL | Age: 77
End: 2017-06-21
Attending: UROLOGY
Payer: MEDICARE

## 2017-06-21 ENCOUNTER — LAB VISIT (OUTPATIENT)
Dept: LAB | Facility: HOSPITAL | Age: 77
End: 2017-06-21
Attending: UROLOGY
Payer: MEDICARE

## 2017-06-21 DIAGNOSIS — N39.46 MIXED STRESS AND URGE URINARY INCONTINENCE: ICD-10-CM

## 2017-06-21 DIAGNOSIS — N39.0 RECURRENT UTI: ICD-10-CM

## 2017-06-21 PROCEDURE — 97110 THERAPEUTIC EXERCISES: CPT | Mod: PO

## 2017-06-21 PROCEDURE — 87086 URINE CULTURE/COLONY COUNT: CPT

## 2017-06-21 NOTE — PROGRESS NOTES
Patient: Madan HODGSON [facility] #: [id number]   Diagnosis:   Encounter Diagnosis   Name Primary?    Mixed stress and urge urinary incontinence       Date of Start of care: 6/7/2017  Date of treatment: 06/21/2017   Time in: 11:05  Time out: 12:00  Total Treatment time: 55 minutes    Subjective: pt reports that she just finished a 3 day Bactrim series.  She finished last week and dropped off another sample for culture prior to coming here.  She has trouble breathing and holding Kegels in sitting; she has been doing them in supine and having a much easier time.  She has some abdominal soreness.      Madan reported 0/10 pain today.    Objective:      Treatment:    Patient was instructed in and performed therapeutic exercises to develop strength and coordination for 55 minutes including: Kegels with assist of SEMG and abdominal sets. We worked in supine and sitting with external lead wires.  She demonstrated normal baseline resting, fair WR rise, and fair holding in 10 sec Kegels.  Her derecruitment was delayed in supine, but improved in sitting.  She also recruited gluts and quads initially with Kegels but corrected this with verbal cues. In TA sets, she demonstrated good holding and WR rise.  In seated Kegels, she demonstrated low WR rise and fair/poor holding ability.  Please refer to pt. Instructions for revised home program.   Madan verbalized understanding of all instruction and  was provided with a handout.      Assessment: pt performed well- will hopefully start reporting less leakage and no more UTI's fairly soon- will continue to pay attn to both lift and drop of the pelvic floor.       Will the patient continue to benefit from skilled PT intervention? Yes  Medical Necessity is demonstrated by:  Requires skilled supervision to complete and progress HEP  Progress towards goals: fair    New/Revised Goals:  none    Plan:  Continue with established Plan of Care toward PT goals.

## 2017-06-21 NOTE — PATIENT INSTRUCTIONS
Home Program: 2017    Kegels in sittin. Sit comfortably.  2. Squeeze and LIFT the muscles that stop the flow of urine and passage of gas.    3. Hold for 10 sec without holding breath.  4. Release and DROP for 10 sec.  5. Repeat 10 times, 2 sets, twice per day.      No Kegels while urinating!    Remember to pay attn to maintaining lift, and dropping completely!

## 2017-06-22 LAB
BACTERIA UR CULT: NORMAL
BACTERIA UR CULT: NORMAL

## 2017-06-23 ENCOUNTER — PATIENT MESSAGE (OUTPATIENT)
Dept: CARDIOLOGY | Facility: CLINIC | Age: 77
End: 2017-06-23

## 2017-06-23 ENCOUNTER — TELEPHONE (OUTPATIENT)
Dept: CARDIOLOGY | Facility: CLINIC | Age: 77
End: 2017-06-23

## 2017-06-23 ENCOUNTER — HOSPITAL ENCOUNTER (OUTPATIENT)
Dept: RADIOLOGY | Facility: HOSPITAL | Age: 77
Discharge: HOME | End: 2017-06-23
Attending: INTERNAL MEDICINE
Payer: MEDICARE

## 2017-06-23 VITALS — BODY MASS INDEX: 22.49 KG/M2 | HEIGHT: 69 IN | WEIGHT: 151.88 LBS

## 2017-06-23 DIAGNOSIS — Z92.89 HX OF SCREENING MAMMOGRAPHY: ICD-10-CM

## 2017-06-23 DIAGNOSIS — Z12.31 ENCOUNTER FOR SCREENING MAMMOGRAM FOR MALIGNANT NEOPLASM OF BREAST: ICD-10-CM

## 2017-06-23 DIAGNOSIS — I10 HTN (HYPERTENSION), BENIGN: ICD-10-CM

## 2017-06-23 DIAGNOSIS — E78.00 HYPERCHOLESTEROLEMIA: ICD-10-CM

## 2017-06-23 PROCEDURE — 77063 BREAST TOMOSYNTHESIS BI: CPT | Mod: 26,,, | Performed by: RADIOLOGY

## 2017-06-23 PROCEDURE — 77067 SCR MAMMO BI INCL CAD: CPT | Mod: 26,,, | Performed by: RADIOLOGY

## 2017-06-23 PROCEDURE — 75571 CT HRT W/O DYE W/CA TEST: CPT | Mod: TC

## 2017-06-23 PROCEDURE — 77067 SCR MAMMO BI INCL CAD: CPT | Mod: TC

## 2017-06-23 PROCEDURE — 75571 CT HRT W/O DYE W/CA TEST: CPT | Mod: 26,,, | Performed by: RADIOLOGY

## 2017-06-24 NOTE — TELEPHONE ENCOUNTER
Results given to patient and she verbalized understanding. She will let us know if she wants to be set up for Pulmonary appt. at a later time.      Notes Recorded by Sudihr Daly MD on 6/23/2017 at 1:43 PM CDT  CAC is not so high but far from 0 at 96.So best to double dose of her statin and repeat Comp and lipids in 4 months. They also report emphysema , which would be unusual in non-smoker. They brought up doing a CT scan to further evaluate , but without being SOB or wheezing etc, we normally do not traet this so hate to order a CT scan if  We are not otherwise addressing this. Probably best to see Pulmonary, but make sure she knows this is nothing at all urgent-it can be done months from now if she wanted to wait.

## 2017-06-26 ENCOUNTER — PATIENT MESSAGE (OUTPATIENT)
Dept: UROLOGY | Facility: CLINIC | Age: 77
End: 2017-06-26

## 2017-06-27 ENCOUNTER — PATIENT MESSAGE (OUTPATIENT)
Dept: CARDIOLOGY | Facility: CLINIC | Age: 77
End: 2017-06-27

## 2017-07-06 ENCOUNTER — CLINICAL SUPPORT (OUTPATIENT)
Dept: REHABILITATION | Facility: HOSPITAL | Age: 77
End: 2017-07-06
Attending: UROLOGY
Payer: MEDICARE

## 2017-07-06 DIAGNOSIS — N39.46 MIXED STRESS AND URGE URINARY INCONTINENCE: ICD-10-CM

## 2017-07-06 PROCEDURE — 97110 THERAPEUTIC EXERCISES: CPT | Mod: PO

## 2017-07-06 NOTE — PROGRESS NOTES
Patient: Madan HODGSON [facility] #: [id number]   Diagnosis:   Encounter Diagnosis   Name Primary?    Mixed stress and urge urinary incontinence       Date of Start of care: 6/7/2017  Date of treatment: 07/06/2017   Time in: 11:05  Time out:   Total Treatment time:  minutes    Subjective: pt reports that she has been compliant with her HEP-     Madan reported 0/10 pain today.    Objective:      Treatment:    Patient was instructed in and performed therapeutic exercises to develop strength and coordination for 55 minutes including: Kegels with assist of SEMG and abdominal sets. We worked in supine and standing with external lead wires.  She demonstrated normal baseline resting, fair WR rise, and fair holding in 10 sec Kegels.  Her holding ability and derecruitment improved with reps.  In standing, we worked with her leaning back on the treatment table- this played into her slouched posture.  We then adjusted her support (placed a table in front of her for her to steady herself on) and this improved her ribcage on pelvis alignment and her WR rise and holding improved.  She was also allowed to activate her TA with Kegel (pelvic bracing).    Please refer to pt. Instructions for revised home program.   Madan verbalized understanding of all instruction and  was provided with a handout.      Assessment: active core=improved pelvic floor recruitment.  Will reassess leakage next session.      Will the patient continue to benefit from skilled PT intervention? Yes  Medical Necessity is demonstrated by:  Requires skilled supervision to complete and progress HEP  Progress towards goals: fair/good    New/Revised Goals:  none    Plan:  Continue with established Plan of Care toward PT goals.

## 2017-07-06 NOTE — PATIENT INSTRUCTIONS
Home Program: 2017    Kegels in standin. Stand, with good posture and a sturdy object in front of you.    2. Squeeze and LIFT the muscles that stop the flow of urine and passage of gas, while drawing in your lower belly.    3. Hold for 10 sec without holding breath.  4. Release and DROP for 10 sec.  5. Repeat 10 times, 2 sets, twice per day.      * 3 breaths for lift, 4 breaths for drop    No Kegels while urinating!    Remember to pay attn to maintaining lift, and dropping completely!

## 2017-07-10 ENCOUNTER — OFFICE VISIT (OUTPATIENT)
Dept: DERMATOLOGY | Facility: CLINIC | Age: 77
End: 2017-07-10
Payer: MEDICARE

## 2017-07-10 DIAGNOSIS — D48.9 NEOPLASM OF UNCERTAIN BEHAVIOR: Primary | ICD-10-CM

## 2017-07-10 DIAGNOSIS — L72.0 EPIDERMAL CYST: ICD-10-CM

## 2017-07-10 DIAGNOSIS — L21.9 SEBORRHEIC DERMATITIS, UNSPECIFIED: ICD-10-CM

## 2017-07-10 DIAGNOSIS — D22.9 NEVUS: ICD-10-CM

## 2017-07-10 PROCEDURE — 1159F MED LIST DOCD IN RCRD: CPT | Mod: S$GLB,,, | Performed by: DERMATOLOGY

## 2017-07-10 PROCEDURE — 11100 PR BIOPSY OF SKIN LESION: CPT | Mod: S$GLB,,, | Performed by: DERMATOLOGY

## 2017-07-10 PROCEDURE — 88305 TISSUE EXAM BY PATHOLOGIST: CPT | Performed by: PATHOLOGY

## 2017-07-10 PROCEDURE — 99213 OFFICE O/P EST LOW 20 MIN: CPT | Mod: 25,S$GLB,, | Performed by: DERMATOLOGY

## 2017-07-10 PROCEDURE — 99999 PR PBB SHADOW E&M-EST. PATIENT-LVL II: CPT | Mod: PBBFAC,,, | Performed by: DERMATOLOGY

## 2017-07-10 PROCEDURE — 1157F ADVNC CARE PLAN IN RCRD: CPT | Mod: S$GLB,,, | Performed by: DERMATOLOGY

## 2017-07-10 NOTE — PROGRESS NOTES
Subjective:       Patient ID:  Madan Bell is a 77 y.o. female who presents for   Chief Complaint   Patient presents with    Lesion     Pt c/o bump on neck x a few years. No bleeding or pain. No prev tx.   Pt c/o small red lesion upper right forehead x 1 year. No prev tx.         Review of Systems   Skin: Positive for daily sunscreen use.   Hematologic/Lymphatic: Bruises/bleeds easily.        Objective:    Physical Exam   Constitutional: She appears well-developed and well-nourished. No distress.   Neurological: She is alert and oriented to person, place, and time. She is not disoriented.   Psychiatric: She has a normal mood and affect.   Skin:   Areas Examined (abnormalities noted in diagram):   Head / Face Inspection Performed  Neck Inspection Performed  Chest / Axilla Inspection Performed                  Diagram Legend     Erythematous scaling macule/papule c/w actinic keratosis       Vascular papule c/w angioma      Pigmented verrucoid papule/plaque c/w seborrheic keratosis      Yellow umbilicated papule c/w sebaceous hyperplasia      Irregularly shaped tan macule c/w lentigo     1-2 mm smooth white papules consistent with Milia      Movable subcutaneous cyst with punctum c/w epidermal inclusion cyst      Subcutaneous movable cyst c/w pilar cyst      Firm pink to brown papule c/w dermatofibroma      Pedunculated fleshy papule(s) c/w skin tag(s)      Evenly pigmented macule c/w junctional nevus     Mildly variegated pigmented, slightly irregular-bordered macule c/w mildly atypical nevus      Flesh colored to evenly pigmented papule c/w intradermal nevus       Pink pearly papule/plaque c/w basal cell carcinoma      Erythematous hyperkeratotic cursted plaque c/w SCC      Surgical scar with no sign of skin cancer recurrence      Open and closed comedones      Inflammatory papules and pustules      Verrucoid papule consistent consistent with wart     Erythematous eczematous patches and plaques     Dystrophic  onycholytic nail with subungual debris c/w onychomycosis     Umbilicated papule    Erythematous-base heme-crusted tan verrucoid plaque consistent with inflamed seborrheic keratosis     Erythematous Silvery Scaling Plaque c/w Psoriasis     See annotation      Assessment / Plan:      Pathology Orders:      Normal Orders This Visit    Tissue Specimen To Pathology, Dermatology     Questions:    Directional Terms:  Other(comment)    Clinical information:  r/o BCC    Specific Site:  right upper forehead        Neoplasm of uncertain behavior  Shave biopsy procedure note:    Shave biopsy performed after verbal consent including risk of infection, scar, recurrence, need for additional treatment of site. Area prepped with alcohol, anesthetized with approximately 1.0cc of 1% lidocaine with epinephrine. Lesional tissue shaved with razor blade. Hemostasis achieved with application of aluminum chloride followed by hyfrecation. No complications. Dressing applied. Wound care explained.    If biopsy positive, schedule procedure for electrodessication and curretage v aldara v mohs    -     Tissue Specimen To Pathology, Dermatology    Epidermal cyst  Reassurance given to patient. No treatment is necessary.   Treatment of benign, asymptomatic lesions may be considered cosmetic.      Nevus  Discussed ABCDE's of nevi.  Monitor for new mole or moles that are becoming bigger, darker, irritated, or developing irregular borders. Brochure provided.    Seborrheic dermatitis, unspecified  otc hydrocortisone cream then vaseline prn flare           Return for prn bx report.

## 2017-07-17 ENCOUNTER — PATIENT OUTREACH (OUTPATIENT)
Dept: OTHER | Facility: OTHER | Age: 77
End: 2017-07-17

## 2017-07-17 NOTE — PROGRESS NOTES
Last 5 Patient Entered Redings Current 30 Day Average: 112/61     Recent Readings 7/17/2017 7/16/2017 7/16/2017 7/15/2017 7/14/2017    Systolic BP (mmHg) 104 107 123 113 118    Diastolic BP (mmHg) 60 64 61 55 57    Pulse 57 78 86 59 61        Called Ms. Bell to introduce her into the Hypertension Digital Medicine Program.     She has minimal LH with low BP and also has fatigue. It took multiple different trials of medications to determine a dose that she can tolerate and that is effective. Will not make any changes today, but will determine if she can take bisoprolol without the HCTZ component.     Reviewed patient's medications and verified allergies on file.   Hypertension Medications             bisoprolol-hydrochlorothiazide 2.5-6.25 mg (ZIAC) 2.5-6.25 mg Tab Half tablet daily          Explained that we expect her to obtain several blood pressures/week at random times of day. Also asked that the BP be taken at least 1 hour after taking BP medications.     Explained that our goal is to get her BP to consistently below 140/90mmHg.     Patient and I agreed that the patient will take her BP daily to every other day at varying times of the day.     Emailed patient link to [facility] HTN webpage as well as my direct phone number in case she has in questions.

## 2017-07-20 ENCOUNTER — LAB VISIT (OUTPATIENT)
Dept: LAB | Facility: HOSPITAL | Age: 77
End: 2017-07-20
Attending: UROLOGY
Payer: MEDICARE

## 2017-07-20 DIAGNOSIS — R30.0 DYSURIA: Primary | ICD-10-CM

## 2017-07-20 DIAGNOSIS — R30.0 DYSURIA: ICD-10-CM

## 2017-07-20 PROCEDURE — 87186 SC STD MICRODIL/AGAR DIL: CPT

## 2017-07-20 PROCEDURE — 87088 URINE BACTERIA CULTURE: CPT

## 2017-07-20 PROCEDURE — 87077 CULTURE AEROBIC IDENTIFY: CPT

## 2017-07-20 PROCEDURE — 87086 URINE CULTURE/COLONY COUNT: CPT

## 2017-07-21 ENCOUNTER — CLINICAL SUPPORT (OUTPATIENT)
Dept: REHABILITATION | Facility: HOSPITAL | Age: 77
End: 2017-07-21
Attending: UROLOGY
Payer: MEDICARE

## 2017-07-21 DIAGNOSIS — N39.46 MIXED STRESS AND URGE URINARY INCONTINENCE: ICD-10-CM

## 2017-07-21 PROCEDURE — 97110 THERAPEUTIC EXERCISES: CPT | Mod: PO

## 2017-07-21 NOTE — PROGRESS NOTES
Patient: Madan HODGSON [facility] #: [id number]   Diagnosis:   Encounter Diagnosis   Name Primary?    Mixed stress and urge urinary incontinence       Date of Start of care: 6/7/2017  Date of treatment: 07/21/2017   Time in: 12:27  Time out: 1:20  Total Treatment time: 45 minutes    Subjective: pt reports that she is having dysuria, mainly at night.  Nocturia x 3.  Only changes her pantyliner 2-3 times, compared with 5-6 times (she tends to change for cleanliness.)  Feels like she can activate her pelvic floor and core better at times, not so well at others.      Madan reported 0/10 pain today.    Objective:      Treatment:    Patient was instructed in and performed therapeutic exercises to develop strength and coordination for 45 minutes including: Kegels with assist of SEMG and abdominal sets. (pelvic bracing).  We worked in supine and standing with external lead wires.  She demonstrated normal baseline resting, fair WR rise, and good holding in 10 sec Kegels.  She tended to breathe deeply in efforts to refrain from breath holding.    In standing, we worked with her leaning forward on the treatment table.  She required tactile cues to fully activate her TA in pelvic bracing.   Please refer to pt. Instructions for revised home program.   Madan verbalized understanding of all instruction and  was provided with a handout.      Assessment: leakage is improving- I asked her to try going a couple of hours without a pad to assess her leakage amount.   Still not consistently engaging her brace fully.      Will the patient continue to benefit from skilled PT intervention? Yes  Medical Necessity is demonstrated by:  Requires skilled supervision to complete and progress HEP  Progress towards goals: fair/good    New/Revised Goals:  none    Plan:  Continue with established Plan of Care toward PT goals.

## 2017-07-21 NOTE — PATIENT INSTRUCTIONS
Home Program: 2017    Kenneth in standing while walkin. Stand with one hand on your lower belly to feel the abdominals contract    2. Walk slowly forward, and squeeze and LIFT the muscles that stop the flow of urine and passage of gas, while drawing in your lower belly.    3. Hold for 10 sec without holding breath.  4. Release and DROP for 10 sec.  5. Repeat 10 times, 2 sets, twice per day.      * 3 breaths for lift, 4 breaths for drop    Try going for a limited period of time (2-3 hours) at home without a pad to assess your amount of leakage and need for pad.      Remember to pay attn to maintaining lift, and dropping completely!

## 2017-07-24 ENCOUNTER — PATIENT MESSAGE (OUTPATIENT)
Dept: UROLOGY | Facility: CLINIC | Age: 77
End: 2017-07-24

## 2017-07-24 ENCOUNTER — PATIENT OUTREACH (OUTPATIENT)
Dept: OTHER | Facility: OTHER | Age: 77
End: 2017-07-24

## 2017-07-24 LAB — BACTERIA UR CULT: NORMAL

## 2017-07-24 RX ORDER — DOXYCYCLINE 100 MG/1
100 CAPSULE ORAL 2 TIMES DAILY
Qty: 14 CAPSULE | Refills: 0 | Status: SHIPPED | OUTPATIENT
Start: 2017-07-24 | End: 2017-07-24

## 2017-07-24 RX ORDER — DOXYCYCLINE 100 MG/1
100 CAPSULE ORAL 2 TIMES DAILY
Qty: 14 CAPSULE | Refills: 0 | Status: SHIPPED | OUTPATIENT
Start: 2017-07-24 | End: 2017-07-31

## 2017-07-24 NOTE — PROGRESS NOTES
Last 5 Patient Entered Redings Current 30 Day Average: 112/61     Recent Readings 7/25/2017 7/24/2017 7/24/2017 7/23/2017 7/22/2017    Systolic BP (mmHg) 116 123 125 108 116    Diastolic BP (mmHg) 60 65 63 57 66    Pulse 53 50 49 51 57          Ms. Bell's BP is at goal. She does have LH and fatigue with low BP and low HR. Discussed discontinuing HCTZ component of Ziac and have her take only bisoprolol. There is no formulation available to give her only the 1.25 mg of bisoprolol that she is currently taking, so will leave medications as is for now and she will discuss this with Dr. Disla at next appointment. The smallest available table to bisoprolol is 5 mg.     Current HTN regimen:  Hypertension Medications             bisoprolol-hydrochlorothiazide 2.5-6.25 mg (ZIAC) 2.5-6.25 mg Tab Half tablet daily        Will continue to monitor regularly. Will follow up in 1-2 months, sooner if BP begins to trend upward or downward.    Patient has my contact information and knows to call with any concerns or clinical changes.

## 2017-07-25 DIAGNOSIS — J43.2 CENTRILOBULAR EMPHYSEMA: Primary | ICD-10-CM

## 2017-07-26 ENCOUNTER — OFFICE VISIT (OUTPATIENT)
Dept: PULMONOLOGY | Facility: CLINIC | Age: 77
End: 2017-07-26
Payer: MEDICARE

## 2017-07-26 ENCOUNTER — HOSPITAL ENCOUNTER (OUTPATIENT)
Dept: RADIOLOGY | Facility: HOSPITAL | Age: 77
Discharge: HOME | End: 2017-07-26
Attending: UROLOGY
Payer: MEDICARE

## 2017-07-26 ENCOUNTER — OFFICE VISIT (OUTPATIENT)
Dept: UROLOGY | Facility: CLINIC | Age: 77
End: 2017-07-26
Payer: MEDICARE

## 2017-07-26 ENCOUNTER — HOSPITAL ENCOUNTER (OUTPATIENT)
Dept: PULMONOLOGY | Facility: CLINIC | Age: 77
Discharge: HOME | End: 2017-07-26
Payer: MEDICARE

## 2017-07-26 VITALS
DIASTOLIC BLOOD PRESSURE: 61 MMHG | HEIGHT: 69 IN | BODY MASS INDEX: 22.04 KG/M2 | HEART RATE: 67 BPM | SYSTOLIC BLOOD PRESSURE: 123 MMHG | WEIGHT: 148.81 LBS

## 2017-07-26 VITALS
HEART RATE: 62 BPM | BODY MASS INDEX: 22.08 KG/M2 | OXYGEN SATURATION: 98 % | WEIGHT: 149.06 LBS | HEIGHT: 69 IN | SYSTOLIC BLOOD PRESSURE: 124 MMHG | DIASTOLIC BLOOD PRESSURE: 66 MMHG

## 2017-07-26 DIAGNOSIS — R93.89 ABNORMAL CXR: Primary | ICD-10-CM

## 2017-07-26 DIAGNOSIS — I10 HTN (HYPERTENSION), BENIGN: ICD-10-CM

## 2017-07-26 DIAGNOSIS — J43.2 CENTRILOBULAR EMPHYSEMA: ICD-10-CM

## 2017-07-26 DIAGNOSIS — N39.0 RECURRENT UTI: Primary | ICD-10-CM

## 2017-07-26 DIAGNOSIS — N20.0 KIDNEY STONES: ICD-10-CM

## 2017-07-26 LAB
BILIRUB SERPL-MCNC: NORMAL MG/DL
BLOOD URINE, POC: NORMAL
COLOR, POC UA: NORMAL
GLUCOSE UR QL STRIP: NORMAL
KETONES UR QL STRIP: NORMAL
LEUKOCYTE ESTERASE URINE, POC: NORMAL
NITRITE, POC UA: NORMAL
PH, POC UA: 5
PRE FEV1 FVC: 73
PRE FEV1: 2.49
PRE FVC: 3.39
PREDICTED FEV1 FVC: 75
PREDICTED FEV1: 2.35
PREDICTED FVC: 3.09
PROTEIN, POC: NORMAL
SPECIFIC GRAVITY, POC UA: 1.01
UROBILINOGEN, POC UA: NORMAL

## 2017-07-26 PROCEDURE — 99499 UNLISTED E&M SERVICE: CPT | Mod: S$GLB,,, | Performed by: UROLOGY

## 2017-07-26 PROCEDURE — 94729 DIFFUSING CAPACITY: CPT | Mod: S$GLB,,, | Performed by: INTERNAL MEDICINE

## 2017-07-26 PROCEDURE — 99999 PR PBB SHADOW E&M-EST. PATIENT-LVL III: CPT | Mod: PBBFAC,,, | Performed by: INTERNAL MEDICINE

## 2017-07-26 PROCEDURE — 1159F MED LIST DOCD IN RCRD: CPT | Mod: S$GLB,,, | Performed by: UROLOGY

## 2017-07-26 PROCEDURE — 1159F MED LIST DOCD IN RCRD: CPT | Mod: S$GLB,,, | Performed by: INTERNAL MEDICINE

## 2017-07-26 PROCEDURE — 1157F ADVNC CARE PLAN IN RCRD: CPT | Mod: S$GLB,,, | Performed by: INTERNAL MEDICINE

## 2017-07-26 PROCEDURE — 99204 OFFICE O/P NEW MOD 45 MIN: CPT | Mod: 25,S$GLB,, | Performed by: INTERNAL MEDICINE

## 2017-07-26 PROCEDURE — 94010 BREATHING CAPACITY TEST: CPT | Mod: S$GLB,,, | Performed by: INTERNAL MEDICINE

## 2017-07-26 PROCEDURE — 1157F ADVNC CARE PLAN IN RCRD: CPT | Mod: S$GLB,,, | Performed by: UROLOGY

## 2017-07-26 PROCEDURE — 76770 US EXAM ABDO BACK WALL COMP: CPT | Mod: 26,,, | Performed by: RADIOLOGY

## 2017-07-26 PROCEDURE — 81001 URINALYSIS AUTO W/SCOPE: CPT | Mod: S$GLB,,, | Performed by: UROLOGY

## 2017-07-26 PROCEDURE — 1126F AMNT PAIN NOTED NONE PRSNT: CPT | Mod: S$GLB,,, | Performed by: UROLOGY

## 2017-07-26 PROCEDURE — 99999 PR PBB SHADOW E&M-EST. PATIENT-LVL III: CPT | Mod: PBBFAC,,, | Performed by: UROLOGY

## 2017-07-26 PROCEDURE — 99214 OFFICE O/P EST MOD 30 MIN: CPT | Mod: 25,S$GLB,, | Performed by: UROLOGY

## 2017-07-26 RX ORDER — ATORVASTATIN CALCIUM 20 MG/1
TABLET, FILM COATED ORAL
Qty: 90 TABLET | Refills: 3 | Status: CANCELLED | OUTPATIENT
Start: 2017-07-26

## 2017-07-26 RX ORDER — ATORVASTATIN CALCIUM 20 MG/1
TABLET, FILM COATED ORAL
Qty: 180 TABLET | Refills: 3 | Status: SHIPPED | OUTPATIENT
Start: 2017-07-26 | End: 2017-07-28 | Stop reason: SDUPTHER

## 2017-07-26 NOTE — PROGRESS NOTES
"Subjective:       Patient ID: Madan Bell is a 77 y.o. female.    Chief Complaint: Emphysema    HPI   Madna Bell 77 y.o. female    has a past medical history of Anxiety; Arrhythmia; Arthritis; Breast cyst; Fibrocystic breast (1980 - ??); Heart murmur; Hyperlipidemia; Hypertension; Mitral valve disorder; Mitral valve prolapse; Thyroid cancer (1/29/2013); Thyroid disease; and Vasovagal near syncope (11/12/2012).    has a past surgical history that includes Thyroid surgery; Hysterectomy; Breast cyst aspiration (1980's - ??); Oophorectomy (hysterectomy - 2000 - ??); Breast cyst excision (2008 - ??); Breast biopsy (Left); and Breast biopsy (Left).   reports that she has never smoked. She has never used smokeless tobacco. She reports that she does not drink alcohol or use drugs.  Referred by: Aaareferral Self  Who had concerns including Emphysema.  The patient's last visit with me was on Visit date not found.    Patient referred for evaluation of possible emphysema  ASX, LTNS, had cardiac ct   "Although the study was not optimized for evaluation of fine parenchymal detail, there appears to be bilateral centrilobular emphysematous change. If warranted, further evaluation could be pursued with dedicated CT chest noncontrast."  Patient is asx, has no complaints, walks 2 miles per day  No fever chills, ns, wt changes, nausea, vomiting, diarrhea, constipation, chest pain, tightness, pressure    Review of Systems   All other systems reviewed and are negative.      Objective:      Physical Exam   Constitutional: She is oriented to person, place, and time. She appears well-developed and well-nourished. She appears not cachectic. No distress. She is not obese.   HENT:   Head: Normocephalic.   Nose: Nose normal. No mucosal edema.   Mouth/Throat: Normal dentition. No oropharyngeal exudate.   Neck: Normal range of motion. Neck supple. No tracheal deviation present.   Cardiovascular: Normal rate, regular rhythm, normal " heart sounds and intact distal pulses.  Exam reveals no gallop and no friction rub.    No murmur heard.  Pulmonary/Chest: Normal expansion, symmetric chest wall expansion, effort normal and breath sounds normal. No stridor.   Abdominal: Soft. Bowel sounds are normal.   Musculoskeletal: Normal range of motion. She exhibits no edema, tenderness or deformity.   Lymphadenopathy: No supraclavicular adenopathy is present.     She has no cervical adenopathy.   Neurological: She is alert and oriented to person, place, and time. No cranial nerve deficit. Gait normal.   Skin: Skin is warm and dry. No rash noted. She is not diaphoretic. No cyanosis or erythema. No pallor. Nails show no clubbing.   Psychiatric: She has a normal mood and affect. Her behavior is normal. Judgment and thought content normal.     Personal Diagnostic Review    No flowsheet data found.      Assessment:       1. Abnormal CXR        Outpatient Encounter Prescriptions as of 7/26/2017   Medication Sig Dispense Refill    ascorbic acid (VITAMIN C) 500 MG tablet Take 500 mg by mouth once daily.      aspirin 81 mg Tab Take 1 tablet by mouth Daily.      atorvastatin (LIPITOR) 20 MG tablet TAKE 1 TABLET ONE TIME DAILY (Patient taking differently: TAKE 2 TABLET ONE TIME DAILY -- pt taking 2 tabs for a total of 40mg) 90 tablet 3    Bifidobacterium infantis (ALIGN) 4 mg Cap One daily 30 capsule 6    bisoprolol-hydrochlorothiazide 2.5-6.25 mg (ZIAC) 2.5-6.25 mg Tab Half tablet daily 45 tablet 3    cholecalciferol, vitamin D3, (VITAMIN D3) 2,000 unit Cap Take by mouth once daily.        doxycycline (MONODOX) 100 MG capsule Take 1 capsule (100 mg total) by mouth 2 (two) times daily. 14 capsule 0    estradiol (ESTRACE) 0.01 % (0.1 mg/gram) vaginal cream Apply a pea sized amount every day for 2 weeks, then 3x/week. 42.5 g 6    FLUZONE HIGH-DOSE 2016-17, PF, 180 mcg/0.5 mL Syrg       levothyroxine (SYNTHROID) 75 MCG tablet TAKE 1 TABLET ONE TIME DAILY 90  tablet 3    naproxen sodium (ANAPROX) 220 MG tablet Take 220 mg by mouth 2 (two) times daily as needed.      omega-3 fatty acids-vitamin E (FISH OIL) 1,000 mg Cap Take 1 capsule by mouth once daily.        Facility-Administered Encounter Medications as of 7/26/2017   Medication Dose Route Frequency Provider Last Rate Last Dose    lidocaine HCl 2% urojet 10 mL  10 mL Urethral 1 time in Clinic/HOD Makayla Bello MD         No orders of the defined types were placed in this encounter.    Plan:             I personally reviewed the      1. Echo report   2. PFT pending  3. CXR   4. CXR report   5. CT chest   6. CT chest report   Assessment:  Madan was seen today for emphysema.    Diagnoses and all orders for this visit:    Abnormal CXR        Plan:  Reviewed with patient  No evidence of emphysema  Pfts pending  Call/message patient with results    Return if symptoms worsen or fail to improve.    There are no Patient Instructions on file for this visit.    Immunization History   Administered Date(s) Administered    Influenza - High Dose 10/15/2013, 10/13/2016    Pneumococcal Conjugate - 13 Valent 07/15/2016    Zoster 03/23/2017

## 2017-07-27 ENCOUNTER — PATIENT MESSAGE (OUTPATIENT)
Dept: PULMONOLOGY | Facility: CLINIC | Age: 77
End: 2017-07-27

## 2017-07-27 NOTE — PROGRESS NOTES
Subjective:       Patient ID: Madan Bell is a 77 y.o. female.    Chief Complaint: Recurrent UTI    Madan Bell is a 77 y.o. female  by  s/p laparoscopic hysterectomy and BSO  with HTN, paroxysmal V tach, s/p thyroidectomy for multinodular goiter 2013, chronic low back pain, who presents for follow up of recurrent UTI.    She is on vaginal estrogen since .     She is on day 3 of doxycycline for UTI. Symptoms were cloudy urine and dysuria.      Klebsiella  3/17 E. Coli   Klebsiella   Klebsiella.     3 culture proven UTI this year. Last UTI was e.coli, MDR to cipro, bactrim, amp, gent and doxy. She is on Align in February.   Cysto  showed no abnormalities  CT RSS  showed a punctate right renal stone. No other stones. No hydro. Films personally reviewed.   Ultrasound 17 showed stable 3mm right renal stone.     Nocturia x 1. Some frequency and urgency. She does drink a lot of water.   She states that she empties well.  No obstructive symptoms.    Patient endorses leaking urine with cough, laugh, sneeze, and activity.  She notes occasional smaller volume leakage with urgency, but this is rare.  She wears 6-7 pads per day that are not soaked through.   No urge incontinence.   She denies constipation.         Past Surgical History:   Procedure Laterality Date    BREAST BIOPSY Left     excisional    BREAST BIOPSY Left     core needle biopsy    BREAST CYST ASPIRATION   - ??    BREAST CYST EXCISION   - ??    HYSTERECTOMY      tahbso    OOPHORECTOMY  hysterectomy -  - ??    THYROID SURGERY         Past Medical History:   Diagnosis Date    Anxiety     Arrhythmia     Arthritis     knees    Breast cyst     Fibrocystic breast  - ??    Heart murmur     Hyperlipidemia     Hypertension     Mitral valve disorder     Mitral valve prolapse     Thyroid cancer 2013    Thyroid disease     Vasovagal near syncope 2012       Social  History     Social History    Marital status:      Spouse name: N/A    Number of children: N/A    Years of education: N/A     Occupational History    Not on file.     Social History Main Topics    Smoking status: Never Smoker    Smokeless tobacco: Never Used    Alcohol use No    Drug use: No    Sexual activity: Not on file     Other Topics Concern    Not on file     Social History Narrative    No narrative on file       Family History   Problem Relation Age of Onset    Arrhythmia Son     Heart disease Mother     Hyperlipidemia Maternal Grandmother     Breast cancer Maternal Uncle        Current Outpatient Prescriptions   Medication Sig Dispense Refill    ascorbic acid (VITAMIN C) 500 MG tablet Take 500 mg by mouth once daily.      aspirin 81 mg Tab Take 1 tablet by mouth Daily.      Bifidobacterium infantis (ALIGN) 4 mg Cap One daily 30 capsule 6    bisoprolol-hydrochlorothiazide 2.5-6.25 mg (ZIAC) 2.5-6.25 mg Tab Half tablet daily 45 tablet 3    cholecalciferol, vitamin D3, (VITAMIN D3) 2,000 unit Cap Take by mouth once daily.        doxycycline (MONODOX) 100 MG capsule Take 1 capsule (100 mg total) by mouth 2 (two) times daily. 14 capsule 0    estradiol (ESTRACE) 0.01 % (0.1 mg/gram) vaginal cream Apply a pea sized amount every day for 2 weeks, then 3x/week. 42.5 g 6    FLUZONE HIGH-DOSE 2016-17, PF, 180 mcg/0.5 mL Syrg       levothyroxine (SYNTHROID) 75 MCG tablet TAKE 1 TABLET ONE TIME DAILY 90 tablet 3    naproxen sodium (ANAPROX) 220 MG tablet Take 220 mg by mouth 2 (two) times daily as needed.      omega-3 fatty acids-vitamin E (FISH OIL) 1,000 mg Cap Take 1 capsule by mouth once daily.       atorvastatin (LIPITOR) 20 MG tablet TAKE 2 TABLET ONE TIME DAILY -- pt taking 2 tabs for a total of 40mg 180 tablet 3     Current Facility-Administered Medications   Medication Dose Route Frequency Provider Last Rate Last Dose    lidocaine HCl 2% urojet 10 mL  10 mL Urethral 1 time in  Clinic/HOD Makayla Bello MD           Review of patient's allergies indicates:   Allergen Reactions    Fluoride preparations Hives    Fluoride      Other reaction(s): Hives       Review of Systems   Constitutional: Negative for chills, fever and unexpected weight change.   HENT: Negative for nosebleeds.    Eyes: Negative for visual disturbance.   Respiratory: Negative for chest tightness.    Cardiovascular: Negative for chest pain.   Gastrointestinal: Negative for diarrhea.   Musculoskeletal: Negative for myalgias.   Skin: Negative for rash.   Neurological: Negative for seizures.   Hematological: Does not bruise/bleed easily.   Psychiatric/Behavioral: Negative for behavioral problems.       Objective:      Physical Exam   Constitutional: She is oriented to person, place, and time. She appears well-developed and well-nourished.   HENT:   Head: Normocephalic and atraumatic.   Eyes: No scleral icterus.   Cardiovascular: Normal rate.    Pulmonary/Chest: Effort normal. No stridor.   Neurological: She is alert and oriented to person, place, and time.   Skin: Skin is warm and dry.     Psychiatric: She has a normal mood and affect.     urine dip clear  Assessment:       1. Recurrent UTI    2. HTN (hypertension), benign    3. Kidney stones        Plan:   Home collect urine culture for future UTI.  Continue vaginal estrogen.  Continue PT to improve pelvic floor to hopefully stop using pads. (Malena Love).  Continue probiotic daily .  Try to stop wearing pads.   D-Mannose and Cranberry if e.coli UTI  F/u 6 months with renal ultrasound/kub for follow up of right kidney stone.     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     I spent 25 minutes with the patient of which more than half was spent in direct consultation with the patient in regards to our treatment and plan.

## 2017-07-28 RX ORDER — ATORVASTATIN CALCIUM 40 MG/1
TABLET, FILM COATED ORAL
Qty: 90 TABLET | Refills: 3 | Status: SHIPPED | OUTPATIENT
Start: 2017-07-28 | End: 2018-05-14 | Stop reason: SDUPTHER

## 2017-08-01 ENCOUNTER — PATIENT MESSAGE (OUTPATIENT)
Dept: CARDIOLOGY | Facility: CLINIC | Age: 77
End: 2017-08-01

## 2017-08-09 ENCOUNTER — CLINICAL SUPPORT (OUTPATIENT)
Dept: REHABILITATION | Facility: HOSPITAL | Age: 77
End: 2017-08-09
Attending: UROLOGY
Payer: MEDICARE

## 2017-08-09 ENCOUNTER — OFFICE VISIT (OUTPATIENT)
Dept: INTERNAL MEDICINE | Facility: CLINIC | Age: 77
End: 2017-08-09
Payer: MEDICARE

## 2017-08-09 ENCOUNTER — TELEPHONE (OUTPATIENT)
Dept: INTERNAL MEDICINE | Facility: CLINIC | Age: 77
End: 2017-08-09

## 2017-08-09 VITALS
SYSTOLIC BLOOD PRESSURE: 134 MMHG | BODY MASS INDEX: 22.3 KG/M2 | TEMPERATURE: 98 F | WEIGHT: 150.56 LBS | HEART RATE: 73 BPM | DIASTOLIC BLOOD PRESSURE: 73 MMHG | HEIGHT: 69 IN

## 2017-08-09 DIAGNOSIS — N39.46 MIXED STRESS AND URGE URINARY INCONTINENCE: ICD-10-CM

## 2017-08-09 DIAGNOSIS — L08.9 INFECTED CYST OF SKIN: Primary | ICD-10-CM

## 2017-08-09 DIAGNOSIS — L72.9 INFECTED CYST OF SKIN: Primary | ICD-10-CM

## 2017-08-09 PROCEDURE — 97110 THERAPEUTIC EXERCISES: CPT | Mod: PO

## 2017-08-09 PROCEDURE — 1159F MED LIST DOCD IN RCRD: CPT | Mod: S$GLB,,, | Performed by: INTERNAL MEDICINE

## 2017-08-09 PROCEDURE — 3078F DIAST BP <80 MM HG: CPT | Mod: S$GLB,,, | Performed by: INTERNAL MEDICINE

## 2017-08-09 PROCEDURE — 3008F BODY MASS INDEX DOCD: CPT | Mod: S$GLB,,, | Performed by: INTERNAL MEDICINE

## 2017-08-09 PROCEDURE — 99214 OFFICE O/P EST MOD 30 MIN: CPT | Mod: S$GLB,,, | Performed by: INTERNAL MEDICINE

## 2017-08-09 PROCEDURE — 99999 PR PBB SHADOW E&M-EST. PATIENT-LVL III: CPT | Mod: PBBFAC,,, | Performed by: INTERNAL MEDICINE

## 2017-08-09 PROCEDURE — 1157F ADVNC CARE PLAN IN RCRD: CPT | Mod: S$GLB,,, | Performed by: INTERNAL MEDICINE

## 2017-08-09 PROCEDURE — 3075F SYST BP GE 130 - 139MM HG: CPT | Mod: S$GLB,,, | Performed by: INTERNAL MEDICINE

## 2017-08-09 PROCEDURE — G8991 OTHER PT/OT GOAL STATUS: HCPCS | Mod: CI,PO

## 2017-08-09 PROCEDURE — 1125F AMNT PAIN NOTED PAIN PRSNT: CPT | Mod: S$GLB,,, | Performed by: INTERNAL MEDICINE

## 2017-08-09 PROCEDURE — G8992 OTHER PT/OT  D/C STATUS: HCPCS | Mod: CH,PO

## 2017-08-09 RX ORDER — CLINDAMYCIN HYDROCHLORIDE 300 MG/1
300 CAPSULE ORAL 3 TIMES DAILY
Qty: 30 CAPSULE | Refills: 0 | Status: SHIPPED | OUTPATIENT
Start: 2017-08-09 | End: 2017-08-19

## 2017-08-09 NOTE — PROGRESS NOTES
Patient: Madan HODGSON [facility] #: [id number]   Diagnosis:   Encounter Diagnosis   Name Primary?    Mixed stress and urge urinary incontinence       Date of Start of care: 6/7/2017  Date of treatment: 08/09/2017   Time in: 2:02  Time out: 3:00  Total Treatment time: 55 minutes    Subjective: pt reports that she is not as compliant with her home program as she would like.  She has been amazed at how long she can go without a pad.  Nocturia x 1.  Voids every 2 hours or more.  Happy with her progress.    Madan reported 0/10 pain today.    Objective:      Treatment:    Patient was instructed in and performed therapeutic exercises to develop strength and coordination for 55 minutes including: Kegels with assist of SEMG and abdominal sets. (pelvic bracing).  We worked in supine and standing with external lead wires.  She demonstrated normal baseline resting, fair/good WR rise, and good holding in 10 sec Kegels.  She tended to breathe deeply in efforts to refrain from breath holding.    In standing, we worked with her leaning forward on the treatment table.  She required tactile cues to fully activate her TA in pelvic bracing.   Please refer to pt. Instructions for revised home program.   Madan verbalized understanding of all instruction and  was provided with a handout.      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  I with HEP- no further need for PT services at this time.       Goals:  1. Patient able to participate in exercise with less leakage of urine.,   2. Patient able to perform basic ADL with less leaking.,   3. Cough, sneeze, or laugh with less incontinence.,   4. Decreased need for pad use for incontinence. ,   5. Able to maintain normal breathing pattern during pelvic floor muscle contraction.   6. Pt to be I with HEP  ALL MET    Patient reports that he/she is much more dry().  The current impairment level is 0 percent  impaired (CH) based on the (PFDI-Urinary) score of 0%.  Patients therapy goal has been achieved and is 0 percent impaired () at discharge.      Plan:  D/C PT

## 2017-08-09 NOTE — TELEPHONE ENCOUNTER
----- Message from Tray Alvarado sent at 8/8/2017  7:23 PM CDT -----  Contact: Patient  Patient is calling requesting a new patient appointment with Dr. Templeton. Patient has a rising cyst that needs attention. Patient is looking to schedule an appointment soon with Dr. Templeton. Please give patient a call at [phone].

## 2017-08-09 NOTE — PROGRESS NOTES
Subjective:       Patient ID: Madan Bell is a 77 y.o. female.    Chief Complaint: Cyst (back of the neck)    HPI     77-year-old female here for evaluation of a cyst.  It is better today.  She noticed this over the last 2-3 days.  It has been present for 2-3 yrs.  In the last 2-3 days, it was swelling and becoming red.  It is a little painful.  It is not continuous pain and does not keep her awake at night.    Review of Systems    Objective:      Physical Exam   Constitutional: She is oriented to person, place, and time. She appears well-developed and well-nourished.   HENT:   Head: Normocephalic and atraumatic.   Mouth/Throat: No oropharyngeal exudate.   Eyes: EOM are normal. Pupils are equal, round, and reactive to light. Right eye exhibits no discharge. Left eye exhibits no discharge. No scleral icterus.   Neck: Normal range of motion. Neck supple. No tracheal deviation present. No thyromegaly present.   Cardiovascular: Normal rate, regular rhythm and normal heart sounds.  Exam reveals no gallop and no friction rub.    No murmur heard.  Pulmonary/Chest: Effort normal and breath sounds normal. No respiratory distress. She has no wheezes. She has no rales. She exhibits no tenderness.   Abdominal: Soft. Bowel sounds are normal. She exhibits no distension and no mass. There is no tenderness. There is no rebound and no guarding.   Musculoskeletal: Normal range of motion. She exhibits no edema or tenderness.   Neurological: She is alert and oriented to person, place, and time.   Skin: Skin is warm and dry. No rash noted. No erythema. No pallor.   Psychiatric: She has a normal mood and affect. Her behavior is normal.   Vitals reviewed.      Assessment:       1. Infected cyst of skin        Plan:       1.  Clindamycin 300 mg 3 times a day ×10 days.  Advised patient to return to clinic should this worsen or fail to improve with antibiotic.  Recommend probiotics to go along with antibiotics.

## 2017-08-09 NOTE — PATIENT INSTRUCTIONS
Home Program: 2017    Kenneth in standing while walkin. Stand with one hand on your lower belly to feel the abdominals contract    2. Walk slowly forward, and squeeze and LIFT the muscles that stop the flow of urine and passage of gas, while drawing in your lower belly.    3. Hold for 10 sec without holding breath.  4. Release and DROP for 10 sec.  5. Repeat 10 times, 2 sets, twice per day.      * 3 breaths for lift, 4 breaths for drop    Try going for a limited period of time (2-3 hours) at home without a pad to assess your amount of leakage and need for pad.      Remember to pay attn to maintaining lift, and dropping completely!    Do the above for another month or so, then decrease to 1 set of 10 per day for maintenance.

## 2017-08-11 ENCOUNTER — PATIENT OUTREACH (OUTPATIENT)
Dept: OTHER | Facility: OTHER | Age: 77
End: 2017-08-11

## 2017-08-11 NOTE — PROGRESS NOTES
Last 5 Patient Entered Redings Current 30 Day Average: 116/62     Recent Readings 8/11/2017 8/10/2017 8/9/2017 8/8/2017 8/7/2017    Systolic BP (mmHg) 131 119 105 112 119    Diastolic BP (mmHg) 73 62 58 64 61    Pulse 47 55 68 50 58          LVM to follow up with Mrs. Madan Bell. Inquired about how her low sodium diet and exercise is going. Advised pt to call or message back if she has any questions or concerns.    Patients BP readings are overall controlled? yes    Patient takes weekly BP readings? yes    Reminded patient about what to do incase of a medical emergency (call 911, call [facility] on call or go to the ER).     Provided patient with my contact information.

## 2017-08-14 ENCOUNTER — PATIENT MESSAGE (OUTPATIENT)
Dept: INTERNAL MEDICINE | Facility: CLINIC | Age: 77
End: 2017-08-14

## 2017-08-14 DIAGNOSIS — L08.9 INFECTED CYST OF SKIN: Primary | ICD-10-CM

## 2017-08-14 DIAGNOSIS — L72.9 INFECTED CYST OF SKIN: Primary | ICD-10-CM

## 2017-08-19 RX ORDER — BISOPROLOL FUMARATE AND HYDROCHLOROTHIAZIDE 2.5; 6.25 MG/1; MG/1
TABLET ORAL
Qty: 45 TABLET | Refills: 3 | Status: SHIPPED | OUTPATIENT
Start: 2017-08-19 | End: 2018-06-04 | Stop reason: SDUPTHER

## 2017-08-23 ENCOUNTER — OFFICE VISIT (OUTPATIENT)
Dept: SURGERY | Facility: CLINIC | Age: 77
End: 2017-08-23
Payer: MEDICARE

## 2017-08-23 ENCOUNTER — LAB VISIT (OUTPATIENT)
Dept: LAB | Facility: HOSPITAL | Age: 77
End: 2017-08-23
Attending: UROLOGY
Payer: MEDICARE

## 2017-08-23 ENCOUNTER — TELEPHONE (OUTPATIENT)
Dept: ELECTROPHYSIOLOGY | Facility: CLINIC | Age: 77
End: 2017-08-23

## 2017-08-23 VITALS
DIASTOLIC BLOOD PRESSURE: 72 MMHG | HEART RATE: 62 BPM | TEMPERATURE: 98 F | BODY MASS INDEX: 22.4 KG/M2 | SYSTOLIC BLOOD PRESSURE: 142 MMHG | WEIGHT: 151.25 LBS | HEIGHT: 69 IN

## 2017-08-23 DIAGNOSIS — L08.9 INFECTED CYST OF SKIN: ICD-10-CM

## 2017-08-23 DIAGNOSIS — N39.0 RECURRENT UTI: ICD-10-CM

## 2017-08-23 DIAGNOSIS — L72.9 INFECTED CYST OF SKIN: ICD-10-CM

## 2017-08-23 DIAGNOSIS — L02.11 NECK ABSCESS: Primary | ICD-10-CM

## 2017-08-23 PROCEDURE — 1159F MED LIST DOCD IN RCRD: CPT | Mod: S$GLB,,, | Performed by: SURGERY

## 2017-08-23 PROCEDURE — 10061 I&D ABSCESS COMP/MULTIPLE: CPT | Mod: S$GLB,,, | Performed by: SURGERY

## 2017-08-23 PROCEDURE — 1126F AMNT PAIN NOTED NONE PRSNT: CPT | Mod: S$GLB,,, | Performed by: SURGERY

## 2017-08-23 PROCEDURE — 99204 OFFICE O/P NEW MOD 45 MIN: CPT | Mod: 25,S$GLB,, | Performed by: SURGERY

## 2017-08-23 PROCEDURE — 87088 URINE BACTERIA CULTURE: CPT

## 2017-08-23 PROCEDURE — 3077F SYST BP >= 140 MM HG: CPT | Mod: S$GLB,,, | Performed by: SURGERY

## 2017-08-23 PROCEDURE — 3078F DIAST BP <80 MM HG: CPT | Mod: S$GLB,,, | Performed by: SURGERY

## 2017-08-23 PROCEDURE — 87077 CULTURE AEROBIC IDENTIFY: CPT

## 2017-08-23 PROCEDURE — 87186 SC STD MICRODIL/AGAR DIL: CPT

## 2017-08-23 PROCEDURE — 3008F BODY MASS INDEX DOCD: CPT | Mod: S$GLB,,, | Performed by: SURGERY

## 2017-08-23 PROCEDURE — 1157F ADVNC CARE PLAN IN RCRD: CPT | Mod: S$GLB,,, | Performed by: SURGERY

## 2017-08-23 PROCEDURE — 99999 PR PBB SHADOW E&M-EST. PATIENT-LVL III: CPT | Mod: PBBFAC,,, | Performed by: SURGERY

## 2017-08-23 PROCEDURE — 87086 URINE CULTURE/COLONY COUNT: CPT

## 2017-08-23 NOTE — PROGRESS NOTES
[facility] - General Surgery  General Surgery  History & Physical    Patient Name: Madan Bell  MRN: [id number]  Admission Date: (Not on file)  Primary Care Provider: Sheila Mcgee MD    Patient information was obtained from patient and past medical records.     Subjective:     Chief Complaint/Reason for Admission: infected skin cyst    History of Present Illness:  Patient is a 77 y.o. female with a PMH of mitral valve prolapse, paroxysmal ventricular tachycardia, HTN, HLD, recurrent UTIs, and multinodular goiter s/p total thyroidectomy who presents with an infected skin cyst. She states that she has had the skin nodule for over 2 years and has never had any issues with it until recently. She saw her PCP around 8/9/17 when she noted several days prior to presenting to clinic. She was started on oral clindamycin during which time she noted significant decrease in size, tenderness and increase drainage of purulent material. She has since completed her antibiotics. She presents for evaluation for surgical intervention.    Current Outpatient Prescriptions on File Prior to Visit   Medication Sig    ascorbic acid (VITAMIN C) 500 MG tablet Take 500 mg by mouth once daily.    aspirin 81 mg Tab Take 1 tablet by mouth Daily.    atorvastatin (LIPITOR) 40 MG tablet Take one 40 mg tablet daily.    Bifidobacterium infantis (ALIGN) 4 mg Cap One daily    bisoprolol-hydrochlorothiazide 2.5-6.25 mg (ZIAC) 2.5-6.25 mg Tab TAKE 1/2 TABLET EVERY DAY    cholecalciferol, vitamin D3, (VITAMIN D3) 2,000 unit Cap Take by mouth once daily.      estradiol (ESTRACE) 0.01 % (0.1 mg/gram) vaginal cream Apply a pea sized amount every day for 2 weeks, then 3x/week.    levothyroxine (SYNTHROID) 75 MCG tablet TAKE 1 TABLET ONE TIME DAILY    naproxen sodium (ANAPROX) 220 MG tablet Take 220 mg by mouth 2 (two) times daily as needed.    omega-3 fatty acids-vitamin E (FISH OIL) 1,000 mg Cap Take 1 capsule by mouth once daily.     FLUZONE  HIGH-DOSE 2016-17, PF, 180 mcg/0.5 mL Syrg      Current Facility-Administered Medications on File Prior to Visit   Medication    lidocaine HCl 2% urojet 10 mL       Review of patient's allergies indicates:   Allergen Reactions    Fluoride preparations Hives    Fluoride      Other reaction(s): Hives       Past Medical History:   Diagnosis Date    Anxiety     Arrhythmia     Arthritis     knees    Breast cyst     Fibrocystic breast 1980 - ??    Heart murmur     Hyperlipidemia     Hypertension     Mitral valve disorder     Mitral valve prolapse     Thyroid cancer 1/29/2013    Thyroid disease     Vasovagal near syncope 11/12/2012     Past Surgical History:   Procedure Laterality Date    BREAST BIOPSY Left     excisional    BREAST BIOPSY Left     core needle biopsy    BREAST CYST ASPIRATION  1980's - ??    BREAST CYST EXCISION  2008 - ??    HYSTERECTOMY      tahbso    OOPHORECTOMY  hysterectomy - 2000 - ??    THYROID SURGERY       Family History     Problem Relation (Age of Onset)    Arrhythmia Son    Breast cancer Maternal Uncle    Heart disease Mother    Hyperlipidemia Maternal Grandmother        Social History Main Topics    Smoking status: Never Smoker    Smokeless tobacco: Never Used    Alcohol use No    Drug use: No    Sexual activity: Not on file     Review of Systems   Constitutional: Negative for activity change, chills and fever.   HENT: Negative for sore throat, trouble swallowing and voice change.    Respiratory: Negative for apnea, cough and shortness of breath.    Cardiovascular: Negative for chest pain and palpitations.        History of arrythmias   Gastrointestinal: Negative for abdominal distention and abdominal pain.   Genitourinary:        Recurrent UTIs   Musculoskeletal: Negative for arthralgias and back pain.   Skin: Positive for wound.   Neurological: Negative for syncope and weakness.   Hematological: Negative for adenopathy.     Objective:     Vital Signs (Most  Recent):  Temp: 97.9 °F (36.6 °C) (08/23/17 1004)  Pulse: 62 (08/23/17 1004)  BP: (!) 142/72 (08/23/17 1004) Vital Signs (24h Range):  [unfilled]     Weight: 68.6 kg (151 lb 3.8 oz)  Body mass index is 22.33 kg/m².    Physical Exam   Constitutional: She is oriented to person, place, and time. She appears well-developed and well-nourished.   HENT:   Head: Normocephalic and atraumatic.   Eyes: EOM are normal.   Neck: Normal range of motion. Neck supple.   Cardiovascular: Normal rate and regular rhythm.    Pulmonary/Chest: Effort normal and breath sounds normal.           Abdominal: Soft. Bowel sounds are normal.   Neurological: She is alert and oriented to person, place, and time.   Skin: Skin is warm and dry. There is erythema.   Psychiatric: She has a normal mood and affect. Her behavior is normal.       Significant Labs:  None    Significant Diagnostics:  None    Procedure:  After consent was obtained, the patient was positioned appropriately. The posterior neck/upper back was prepped with chlorhexidine. The skin surrounding the lesion was then anesthestized with 1% lidocaine with epinephrine. A #11 scalpel was then used to make a cruciate incision. A wound culture was obtained and then sent for analysis. The cavity was then debrided with a hemostat and gauze. It was then gently irrigated with 20mL of normal saline. Then we loosely packed it with a corner of gauze. It was then dressed. The patient tolerated the procedure well.    Assessment/Plan:   76 yo W presenting with an infected left posterior neck cyst    -Incision and drainage performed in clinic; ok to take OTC pain meds for analgesia  -Follow up on wound culture; will initiate antibiotics based on results  -Follow up in one week to re-assess area; will need definitive excision of cyst once infection has cleared    Sandi Kim MD  General Surgery  [facility] - General Surgery

## 2017-08-23 NOTE — TELEPHONE ENCOUNTER
Returned pt's call to change her appt from an afternoon to a morning appt.  Lm for pt to call me back.

## 2017-08-23 NOTE — LETTER
August 28, 2017      Dangelo Templeton MD  [address] [facility] [place] [address]           [place] - General Surgery  [address] [facility] [address]  Phone: [phone]          Patient: Madan Bell   MR Number: [id number]   YOB: 1940   Date of Visit: 8/23/2017       Dear Dr. Dangelo Templeton:    Thank you for referring Madan Bell to me for evaluation. Attached you will find relevant portions of my assessment and plan of care.    If you have questions, please do not hesitate to call me. I look forward to following Madan Bell along with you.    Sincerely,    Paula Lozano  CC:  No Recipients    If you would like to receive this communication electronically, please contact [email or web address] or [phone] to request more information on [employer] Link access.    For providers and/or their staff who would like to refer a patient to [facility], please contact us through our one-stop-shop provider referral line, David Bray, at [phone].    If you feel you have received this communication in error or would no longer like to receive these types of communications, please e-mail [email or web address]

## 2017-08-24 LAB
GRAM STN SPEC: NORMAL
GRAM STN SPEC: NORMAL

## 2017-08-25 LAB — BACTERIA SPEC AEROBE CULT: NORMAL

## 2017-08-26 LAB — BACTERIA UR CULT: NORMAL

## 2017-08-27 ENCOUNTER — PATIENT MESSAGE (OUTPATIENT)
Dept: UROLOGY | Facility: CLINIC | Age: 77
End: 2017-08-27

## 2017-08-27 RX ORDER — CEPHALEXIN 500 MG/1
500 CAPSULE ORAL EVERY 12 HOURS
Qty: 20 CAPSULE | Refills: 0 | Status: SHIPPED | OUTPATIENT
Start: 2017-08-27 | End: 2017-08-27 | Stop reason: SDUPTHER

## 2017-08-27 RX ORDER — CEPHALEXIN 500 MG/1
500 CAPSULE ORAL EVERY 12 HOURS
Qty: 20 CAPSULE | Refills: 0 | Status: SHIPPED | OUTPATIENT
Start: 2017-08-27 | End: 2017-09-06 | Stop reason: SDUPTHER

## 2017-08-28 LAB — BACTERIA SPEC ANAEROBE CULT: NORMAL

## 2017-08-29 ENCOUNTER — PATIENT MESSAGE (OUTPATIENT)
Dept: UROLOGY | Facility: CLINIC | Age: 77
End: 2017-08-29

## 2017-08-29 ENCOUNTER — OFFICE VISIT (OUTPATIENT)
Dept: SURGERY | Facility: CLINIC | Age: 77
End: 2017-08-29
Payer: MEDICARE

## 2017-08-29 VITALS
TEMPERATURE: 97 F | WEIGHT: 150.88 LBS | SYSTOLIC BLOOD PRESSURE: 121 MMHG | BODY MASS INDEX: 22.35 KG/M2 | DIASTOLIC BLOOD PRESSURE: 77 MMHG | HEART RATE: 75 BPM | HEIGHT: 69 IN

## 2017-08-29 DIAGNOSIS — L08.9 INFECTED CYST OF SKIN: Primary | ICD-10-CM

## 2017-08-29 DIAGNOSIS — L72.9 INFECTED CYST OF SKIN: Primary | ICD-10-CM

## 2017-08-29 PROCEDURE — 99024 POSTOP FOLLOW-UP VISIT: CPT | Mod: S$GLB,,, | Performed by: SURGERY

## 2017-08-29 PROCEDURE — 99999 PR PBB SHADOW E&M-EST. PATIENT-LVL III: CPT | Mod: PBBFAC,,, | Performed by: SURGERY

## 2017-08-29 NOTE — PROGRESS NOTES
"HPI:  The patient is status post  I&d neck abscess  Just started keflex yesterday  Noted some purulent drainage yesterday minimal  No pain         /77 (BP Location: Left arm, Patient Position: Sitting)   Pulse 75   Temp 97.1 °F (36.2 °C) (Oral)   Ht 5' 9" (1.753 m)   Wt 68.5 kg (150 lb 14.5 oz)   LMP  (LMP Unknown)   BMI 22.28 kg/m²       Physical Exam  Induration present no purulence noted      ASSESSMENT:  The patient is doing well after surgery.         PLAN:  We discussed cyst excision  She is on abx for uti that will cover our wound  Risks of surgery discussed including bleeding, infection, pain, scarring, wound complications, injury to local structures, and need for further surgery.  The patient demonstrated understanding of risks and consent signed.       "

## 2017-08-30 RX ORDER — CEPHALEXIN 250 MG/1
250 CAPSULE ORAL DAILY
Qty: 30 CAPSULE | Refills: 2 | Status: SHIPPED | OUTPATIENT
Start: 2017-08-30 | End: 2017-09-29

## 2017-09-06 ENCOUNTER — OFFICE VISIT (OUTPATIENT)
Dept: SURGERY | Facility: CLINIC | Age: 77
End: 2017-09-06
Payer: MEDICARE

## 2017-09-06 ENCOUNTER — TELEPHONE (OUTPATIENT)
Dept: UROLOGY | Facility: CLINIC | Age: 77
End: 2017-09-06

## 2017-09-06 ENCOUNTER — PATIENT MESSAGE (OUTPATIENT)
Dept: UROLOGY | Facility: CLINIC | Age: 77
End: 2017-09-06

## 2017-09-06 ENCOUNTER — APPOINTMENT (OUTPATIENT)
Dept: LAB | Facility: HOSPITAL | Age: 77
End: 2017-09-06
Attending: SURGERY
Payer: MEDICARE

## 2017-09-06 VITALS
WEIGHT: 152.88 LBS | HEIGHT: 69 IN | DIASTOLIC BLOOD PRESSURE: 69 MMHG | HEART RATE: 53 BPM | TEMPERATURE: 97 F | SYSTOLIC BLOOD PRESSURE: 124 MMHG | BODY MASS INDEX: 22.64 KG/M2

## 2017-09-06 DIAGNOSIS — L08.9 INFECTED CYST OF SKIN: Primary | ICD-10-CM

## 2017-09-06 DIAGNOSIS — L72.9 INFECTED CYST OF SKIN: Primary | ICD-10-CM

## 2017-09-06 PROCEDURE — 99499 UNLISTED E&M SERVICE: CPT | Mod: S$GLB,,, | Performed by: SURGERY

## 2017-09-06 PROCEDURE — 99999 PR PBB SHADOW E&M-EST. PATIENT-LVL III: CPT | Mod: PBBFAC,,, | Performed by: SURGERY

## 2017-09-06 PROCEDURE — 88304 TISSUE EXAM BY PATHOLOGIST: CPT | Mod: 26,,, | Performed by: PATHOLOGY

## 2017-09-06 PROCEDURE — 88304 TISSUE EXAM BY PATHOLOGIST: CPT | Performed by: PATHOLOGY

## 2017-09-06 PROCEDURE — 11422 EXC H-F-NK-SP B9+MARG 1.1-2: CPT | Mod: S$GLB,,, | Performed by: SURGERY

## 2017-09-06 RX ORDER — CEPHALEXIN 500 MG/1
CAPSULE ORAL
Qty: 20 CAPSULE | Refills: 0 | Status: SHIPPED | OUTPATIENT
Start: 2017-09-06 | End: 2017-09-13 | Stop reason: ALTCHOICE

## 2017-09-06 NOTE — PROCEDURES
"Exc, Tumor Soft Tissue  Date/Time: 9/6/2017 4:06 PM  Performed by: YOLA SIMPSON  Authorized by: YOLA SIMPSON     Consent Done?:  Yes (Written)  Time out: Immediately prior to procedure a "time out" was called to verify the correct patient, procedure, equipment, support staff and site/side marked as required.      STAFF:  Assistants?: No    I was present for the entire procedure.  INDICATIONS:cyst  LOCATION:  Body area:  Neck / anterior thorax    PREP:  Patient was prepped and draped in the normal sterile fashion.    ANESTHESIA:  Anesthesia:  Local infiltration  Local anesthetic:  Lidocaine 1% with epinephrine    PROCEDURE DETAILS:  Excision type:  Tumor  Excision depth:  Subcutaneous  Excision size (cm):  1.5  Scalpel size:  15  Incision type:  Elliptical  Specimens?: Yes    Specimens submitted to pathology.  Hemostasis was obtained.  Estimated blood loss (cc):  1  Wound closure:  Simple  Wound repair size (cm):  1.5  Sterile dressings:  Gauze and Tegaderm  Post-op diagnosis: Same as pre-op diagnosis.  Needle, instrument, and sponge counts were correct.  Patient tolerated the procedure well with no immediate complications.  Post-operative instructions were provided for the patient.  Patient was discharged and will follow up for wound check and pathology results.      "

## 2017-09-08 ENCOUNTER — PATIENT OUTREACH (OUTPATIENT)
Dept: OTHER | Facility: OTHER | Age: 77
End: 2017-09-08

## 2017-09-08 NOTE — PROGRESS NOTES
Last 5 Patient Entered Redings Current 30 Day Average: 117/64     Recent Readings 9/8/2017 9/7/2017 9/6/2017 9/5/2017 9/4/2017    Systolic BP (mmHg) 107 109 113 110 112    Diastolic BP (mmHg) 67 61 62 64 69    Pulse 52 48 53 60 52        Hypertension Digital Medicine Program (HDMP): Health  Follow Up    Lifestyle Modifications:    1. Low sodium diet: yes    2.Physical activity: yes     3.Hypotension/Hypertension symptoms: no   Frequency/Alleviating factors/Precipitating factors, etc.     4. Patient has been compliant with the medicaiton regimen     Patient informed me that she has been struggling with chronic UTI's and has been in and out of appointments trying to get it all straightened out. She has had 14 UTI's since July. She just finished a 10 day regime of Keflex 500mg and will be start Keflex 250mg daily for the next 3 months.     Follow up with  Mavinayakjoanie GOKUL Bell completed. No further questions or concerns. I will follow up in a few weeks to assess progress.

## 2017-09-13 ENCOUNTER — LAB VISIT (OUTPATIENT)
Dept: LAB | Facility: HOSPITAL | Age: 77
End: 2017-09-13
Attending: UROLOGY
Payer: MEDICARE

## 2017-09-13 ENCOUNTER — OFFICE VISIT (OUTPATIENT)
Dept: DERMATOLOGY | Facility: CLINIC | Age: 77
End: 2017-09-13
Payer: MEDICARE

## 2017-09-13 DIAGNOSIS — L21.9 SEBORRHEIC DERMATITIS, UNSPECIFIED: Primary | ICD-10-CM

## 2017-09-13 DIAGNOSIS — N39.0 RECURRENT UTI: ICD-10-CM

## 2017-09-13 DIAGNOSIS — D04.30 SQUAMOUS CELL CARCINOMA IN SITU OF SKIN OF FACE: ICD-10-CM

## 2017-09-13 PROCEDURE — 17281 DSTR MAL LS F/E/E/N/L/M .6-1: CPT | Mod: S$GLB,,, | Performed by: DERMATOLOGY

## 2017-09-13 PROCEDURE — 87086 URINE CULTURE/COLONY COUNT: CPT

## 2017-09-13 PROCEDURE — 99213 OFFICE O/P EST LOW 20 MIN: CPT | Mod: 25,S$GLB,, | Performed by: DERMATOLOGY

## 2017-09-13 PROCEDURE — 1157F ADVNC CARE PLAN IN RCRD: CPT | Mod: S$GLB,,, | Performed by: DERMATOLOGY

## 2017-09-13 PROCEDURE — 99999 PR PBB SHADOW E&M-EST. PATIENT-LVL I: CPT | Mod: PBBFAC,,, | Performed by: DERMATOLOGY

## 2017-09-13 PROCEDURE — 3008F BODY MASS INDEX DOCD: CPT | Mod: S$GLB,,, | Performed by: DERMATOLOGY

## 2017-09-13 PROCEDURE — 1159F MED LIST DOCD IN RCRD: CPT | Mod: S$GLB,,, | Performed by: DERMATOLOGY

## 2017-09-13 RX ORDER — KETOCONAZOLE 20 MG/G
CREAM TOPICAL
Qty: 30 G | Refills: 3 | Status: ON HOLD | OUTPATIENT
Start: 2017-09-13 | End: 2018-08-13

## 2017-09-13 NOTE — PROGRESS NOTES
Subjective:       Patient ID:  Madan Bell is a 77 y.o. female who presents for   Chief Complaint   Patient presents with    Squamous Cell Carcinoma     Pt here for tx options for scc in situ rigvht upper forehead.  Lesion is well healed. bx 7/10/2017  Also complains of scaling in left brow for years.  Uses vaseline and cortisone cream . This helps  But recurs.          Review of Systems   Skin: Negative for tendency to form keloidal scars.   Hematologic/Lymphatic: Bruises/bleeds easily.        Objective:    Physical Exam   Constitutional: She appears well-developed and well-nourished. No distress.   Neurological: She is alert and oriented to person, place, and time. She is not disoriented.   Psychiatric: She has a normal mood and affect.   Skin:   Areas Examined (abnormalities noted in diagram):   Head / Face Inspection Performed              Diagram Legend     Erythematous scaling macule/papule c/w actinic keratosis       Vascular papule c/w angioma      Pigmented verrucoid papule/plaque c/w seborrheic keratosis      Yellow umbilicated papule c/w sebaceous hyperplasia      Irregularly shaped tan macule c/w lentigo     1-2 mm smooth white papules consistent with Milia      Movable subcutaneous cyst with punctum c/w epidermal inclusion cyst      Subcutaneous movable cyst c/w pilar cyst      Firm pink to brown papule c/w dermatofibroma      Pedunculated fleshy papule(s) c/w skin tag(s)      Evenly pigmented macule c/w junctional nevus     Mildly variegated pigmented, slightly irregular-bordered macule c/w mildly atypical nevus      Flesh colored to evenly pigmented papule c/w intradermal nevus       Pink pearly papule/plaque c/w basal cell carcinoma      Erythematous hyperkeratotic cursted plaque c/w SCC      Surgical scar with no sign of skin cancer recurrence      Open and closed comedones      Inflammatory papules and pustules      Verrucoid papule consistent consistent with wart     Erythematous  eczematous patches and plaques     Dystrophic onycholytic nail with subungual debris c/w onychomycosis     Umbilicated papule    Erythematous-base heme-crusted tan verrucoid plaque consistent with inflamed seborrheic keratosis     Erythematous Silvery Scaling Plaque c/w Psoriasis     See annotation      Assessment / Plan:        Seborrheic dermatitis, unspecified  -     ketoconazole (NIZORAL) 2 % cream; AAA qd- bid for eyebrow prn flare  Dispense: 30 g; Refill: 3    Squamous cell carcinoma in situ of skin of face  Here for electrodesiccation and curettage of Superficial SCC in situ  on the right  Upper forehead. bx done on 7/10/17:      Electrodessication and Curettage Procedure note:    Verbal consent obtained. Lesional tissue marked and prepped with alcohol. Lesion anesthetized with 1% lidocaine with epinephrine. Curettage and Desiccation x 3 cycles to base. Aluminum chloride for hemostasis. Lesion size after primary curettage: 0.7 cm    Area bandaged and wound care explained.    F/u 6 months             Return in about 6 months (around 3/13/2018).

## 2017-09-14 LAB — BACTERIA UR CULT: NO GROWTH

## 2017-09-19 ENCOUNTER — PATIENT OUTREACH (OUTPATIENT)
Dept: OTHER | Facility: OTHER | Age: 77
End: 2017-09-19

## 2017-09-19 ENCOUNTER — PATIENT MESSAGE (OUTPATIENT)
Dept: INTERNAL MEDICINE | Facility: CLINIC | Age: 77
End: 2017-09-19

## 2017-09-19 NOTE — PROGRESS NOTES
Last 5 Patient Entered Redings Current 30 Day Average: 117/65     Recent Readings 9/18/2017 9/17/2017 9/16/2017 9/15/2017 9/13/2017    Systolic BP (mmHg) 116 131 132 103 114    Diastolic BP (mmHg) 67 71 70 57 65    Pulse 48 55 51 51 45        Ms. Sanders BP remains well controlled. She does have drops in her HR that she will discuss with Dr. Disla at her next appointment in 2 weeks. She is also still experiencing occasional LH/dizziness which could be due to the ziac she is taking. She is on the 1/2 of the lowest available dose. She will also discuss this at her clinic visit. Will review clinic notes from her appointment with Dr. Disla.     Current HTN regimen:  Hypertension Medications             bisoprolol-hydrochlorothiazide 2.5-6.25 mg (ZIAC) 2.5-6.25 mg Tab TAKE 1/2 TABLET EVERY DAY          Will continue to monitor regularly. Will follow up in 3 months, sooner if BP begins to trend upward or downward.    Patient has my contact information and knows to call with any concerns or clinical changes.

## 2017-09-20 ENCOUNTER — OFFICE VISIT (OUTPATIENT)
Dept: SURGERY | Facility: CLINIC | Age: 77
End: 2017-09-20
Payer: MEDICARE

## 2017-09-20 VITALS
HEART RATE: 60 BPM | TEMPERATURE: 98 F | WEIGHT: 152.13 LBS | DIASTOLIC BLOOD PRESSURE: 69 MMHG | SYSTOLIC BLOOD PRESSURE: 144 MMHG | HEIGHT: 69 IN | BODY MASS INDEX: 22.53 KG/M2

## 2017-09-20 DIAGNOSIS — L08.9 INFECTED CYST OF SKIN: Primary | ICD-10-CM

## 2017-09-20 DIAGNOSIS — L72.9 INFECTED CYST OF SKIN: Primary | ICD-10-CM

## 2017-09-20 PROCEDURE — 99999 PR PBB SHADOW E&M-EST. PATIENT-LVL III: CPT | Mod: PBBFAC,,, | Performed by: SURGERY

## 2017-09-20 PROCEDURE — 99024 POSTOP FOLLOW-UP VISIT: CPT | Mod: S$GLB,,, | Performed by: SURGERY

## 2017-09-21 PROBLEM — L72.9 INFECTED CYST OF SKIN: Status: RESOLVED | Noted: 2017-08-29 | Resolved: 2017-09-21

## 2017-09-21 PROBLEM — L08.9 INFECTED CYST OF SKIN: Status: RESOLVED | Noted: 2017-08-29 | Resolved: 2017-09-21

## 2017-10-03 ENCOUNTER — PATIENT MESSAGE (OUTPATIENT)
Dept: ELECTROPHYSIOLOGY | Facility: CLINIC | Age: 77
End: 2017-10-03

## 2017-10-04 ENCOUNTER — PATIENT MESSAGE (OUTPATIENT)
Dept: DERMATOLOGY | Facility: CLINIC | Age: 77
End: 2017-10-04

## 2017-10-05 ENCOUNTER — HOSPITAL ENCOUNTER (OUTPATIENT)
Dept: CARDIOLOGY | Facility: CLINIC | Age: 77
Discharge: HOME | End: 2017-10-05
Payer: MEDICARE

## 2017-10-05 ENCOUNTER — OFFICE VISIT (OUTPATIENT)
Dept: ELECTROPHYSIOLOGY | Facility: CLINIC | Age: 77
End: 2017-10-05
Payer: MEDICARE

## 2017-10-05 VITALS
BODY MASS INDEX: 22.49 KG/M2 | HEART RATE: 64 BPM | DIASTOLIC BLOOD PRESSURE: 84 MMHG | HEIGHT: 69 IN | SYSTOLIC BLOOD PRESSURE: 136 MMHG | WEIGHT: 151.88 LBS

## 2017-10-05 DIAGNOSIS — I47.29 PAROXYSMAL VENTRICULAR TACHYCARDIA: ICD-10-CM

## 2017-10-05 DIAGNOSIS — I10 HTN (HYPERTENSION), BENIGN: ICD-10-CM

## 2017-10-05 DIAGNOSIS — I34.0 NON-RHEUMATIC MITRAL REGURGITATION: ICD-10-CM

## 2017-10-05 DIAGNOSIS — R55 VASOVAGAL NEAR SYNCOPE: ICD-10-CM

## 2017-10-05 DIAGNOSIS — J43.2 CENTRILOBULAR EMPHYSEMA: ICD-10-CM

## 2017-10-05 DIAGNOSIS — E78.00 HYPERCHOLESTEROLEMIA: ICD-10-CM

## 2017-10-05 DIAGNOSIS — E06.3 HASHIMOTO'S DISEASE: ICD-10-CM

## 2017-10-05 DIAGNOSIS — I49.3 PVC'S (PREMATURE VENTRICULAR CONTRACTIONS): Primary | ICD-10-CM

## 2017-10-05 DIAGNOSIS — I34.1 MVP (MITRAL VALVE PROLAPSE): ICD-10-CM

## 2017-10-05 DIAGNOSIS — I49.3 PVC'S (PREMATURE VENTRICULAR CONTRACTIONS): ICD-10-CM

## 2017-10-05 PROCEDURE — 93000 ELECTROCARDIOGRAM COMPLETE: CPT | Mod: S$GLB,,, | Performed by: INTERNAL MEDICINE

## 2017-10-05 PROCEDURE — 99999 PR PBB SHADOW E&M-EST. PATIENT-LVL III: CPT | Mod: PBBFAC,,, | Performed by: NURSE PRACTITIONER

## 2017-10-05 PROCEDURE — 99499 UNLISTED E&M SERVICE: CPT | Mod: S$GLB,,, | Performed by: NURSE PRACTITIONER

## 2017-10-05 PROCEDURE — 99214 OFFICE O/P EST MOD 30 MIN: CPT | Mod: S$GLB,,, | Performed by: NURSE PRACTITIONER

## 2017-10-05 RX ORDER — CEPHALEXIN 250 MG/1
250 CAPSULE ORAL 4 TIMES DAILY
COMMUNITY
End: 2018-01-30

## 2017-10-05 NOTE — PROGRESS NOTES
"Subjective:    Patient ID:  Madan Bell is a 77 y.o. female who presents for follow-up of PVCs.     Madan Bell is a patient of Dr. Yola Sigala.    HPI     Ms. Bell is a 77 year old female with frequent monomorphic PVCs, a hx of non-sustained VT, moderate MR and MVP (normal LVEF).   In January of 2013, she reported experiencing dizzy-spells, which had increased in frequency, occurring with positional changes (standing up from lying position, bending over, lifting head, etc.) Fleeting duration ("less than 2 seconds"). She denied symptoms at rest. Toprol-XL was changed to Ziac and she was referred to [place] for positional/lower body training.   Ziac was later decreased to a half tab, w/symptomatic improvement. She did not start her exercises at [place]. Later that month she underwent a thyroidectomy; she did have some VTNS post op  >>. BB > suppressed. At her last office visit (06/05/15) she reported feeling well without symptoms. Her only issue at the time was sleep deprivation 2/2 caring for her  w/dementia.     Since her last office visit, Ms. Bell reports feeling well overall with occasional, mild, positional dizziness. She denies chest pain, SOB/MCKINLEY, palpitations, or syncope. Energy mostly stable; occasional fatigue 2/2 broken sleep; her son and his 5 children are currently living w/her, and her sleep schedule has changed.     Recent cardiac studies:  Echo (03/23/17) CONCLUSIONS:   CONCLUSIONS     1 - Normal left ventricular systolic function (EF 55-60%).     2 - Normal right ventricular systolic function .     3 - Left ventricular diastolic dysfunction.     4 - Moderate left atrial enlargement.     5 - The estimated PA systolic pressure is greater than 27 mmHg.     6 - The mitral valve is moderately sclerotic with evidence of bileaflet prolapse.     7 - Mild mitral regurgitation.     8 - Trivial to mild tricuspid regurgitation.     I reviewed today's ECG which demonstrated SR w/occasional " PVCs at 64 bpm; , , and QTc 408.    Review of Systems   Constitution: Negative for diaphoresis and malaise/fatigue.   HENT: Negative for nosebleeds.    Eyes: Negative for double vision.   Cardiovascular: Negative for chest pain, dyspnea on exertion, irregular heartbeat, near-syncope, palpitations and syncope.   Respiratory: Negative for shortness of breath.    Skin: Negative.    Musculoskeletal: Negative.    Gastrointestinal: Negative for hematemesis and hematochezia.   Genitourinary: Negative for hematuria.   Neurological: Negative for dizziness and light-headedness.   Psychiatric/Behavioral: Negative for altered mental status.        Objective:    Physical Exam   Constitutional: She is oriented to person, place, and time. She appears well-developed and well-nourished.   HENT:   Head: Normocephalic and atraumatic.   Eyes: Pupils are equal, round, and reactive to light.   Cardiovascular: Normal rate, regular rhythm and normal heart sounds.    Pulmonary/Chest: Effort normal and breath sounds normal.   Musculoskeletal: Normal range of motion.   Neurological: She is alert and oriented to person, place, and time.   Skin: She is not diaphoretic.   Vitals reviewed.        Assessment:       1. PVC's (premature ventricular contractions)    2. Paroxysmal ventricular tachycardia    3. Vasovagal near syncope    4. Non-rheumatic mitral regurgitation    5. Hypercholesterolemia    6. HTN (hypertension), benign    7. Centrilobular emphysema    8. Hashimoto's disease         Plan:       Ms. Bell is doing well from a rhythm perspective.    Continue current medication regimen.   Follow up in EP clinic in 1 year, sooner as needed.     Goldie Soriano, [place], APRN, FNP-C          Case discussed w/Dr. Yola Sigala who agrees with the aforementioned plan.   (A copy of today's note was sent to Dr. Yola Sigala.)

## 2017-10-11 ENCOUNTER — PATIENT OUTREACH (OUTPATIENT)
Dept: OTHER | Facility: OTHER | Age: 77
End: 2017-10-11

## 2017-10-11 NOTE — PROGRESS NOTES
Last 5 Patient Entered Redings Current 30 Day Average: 120/66     Recent Readings 10/11/2017 10/11/2017 10/10/2017 10/9/2017 10/8/2017    Systolic BP (mmHg) 119 134 124 110 115    Diastolic BP (mmHg) 62 73 67 62 58    Pulse 62 52 56 51 49          Hypertension Digital Medicine Program (HDMP): Health  Follow Up    Lifestyle Modifications:    1.Low sodium diet: yes : Patient has been watching her sodium intake even more, recently, then before. She is reading the food labels and finding lower sodium options.     2.Physical activity: Deferred    3.Hypotension/Hypertension symptoms: no   Frequency/Alleviating factors/Precipitating factors, etc.     4.Patient has been compliant with the medication regimen.     Patient informed me that Dr. Sigala decided to keep her current BP medication regimen the same. PharmD MARIE Helton was informed of this.   Patient also mentioned that she started taking her BP medication in the morning instead of at lunch time so since she is doing this, I recommended that she wait at least one hour after taking her BP medication before taking her BP reading. She was taking her reading first. This is likely why she had some higher readings here and there.     Follow up with Mrs. Madan HODGSON Arabella completed. No further questions or concerns. I will follow up in a few weeks to assess progress.

## 2017-10-26 ENCOUNTER — TELEPHONE (OUTPATIENT)
Dept: UROLOGY | Facility: CLINIC | Age: 77
End: 2017-10-26

## 2017-10-26 ENCOUNTER — LAB VISIT (OUTPATIENT)
Dept: LAB | Facility: HOSPITAL | Age: 77
End: 2017-10-26
Attending: UROLOGY
Payer: MEDICARE

## 2017-10-26 DIAGNOSIS — N39.0 RECURRENT UTI: Primary | ICD-10-CM

## 2017-10-26 DIAGNOSIS — N39.0 RECURRENT UTI: ICD-10-CM

## 2017-10-26 PROCEDURE — 87086 URINE CULTURE/COLONY COUNT: CPT

## 2017-10-26 NOTE — TELEPHONE ENCOUNTER
----- Message from Donna Loza sent at 10/26/2017 11:27 AM CDT -----  Contact: Pt [phone]  Pt would like a call back from the nurse regarding being able to drop off a urine sample to the lab.

## 2017-10-27 LAB
BACTERIA UR CULT: NORMAL
BACTERIA UR CULT: NORMAL

## 2017-10-30 ENCOUNTER — OFFICE VISIT (OUTPATIENT)
Dept: INTERNAL MEDICINE | Facility: CLINIC | Age: 77
End: 2017-10-30
Payer: MEDICARE

## 2017-10-30 ENCOUNTER — PATIENT MESSAGE (OUTPATIENT)
Dept: UROLOGY | Facility: CLINIC | Age: 77
End: 2017-10-30

## 2017-10-30 VITALS
HEIGHT: 69 IN | HEART RATE: 54 BPM | DIASTOLIC BLOOD PRESSURE: 62 MMHG | RESPIRATION RATE: 15 BRPM | TEMPERATURE: 97 F | OXYGEN SATURATION: 98 % | WEIGHT: 152.13 LBS | BODY MASS INDEX: 22.53 KG/M2 | SYSTOLIC BLOOD PRESSURE: 130 MMHG

## 2017-10-30 DIAGNOSIS — F41.9 ANXIETY: ICD-10-CM

## 2017-10-30 DIAGNOSIS — R55 VASOVAGAL NEAR SYNCOPE: ICD-10-CM

## 2017-10-30 DIAGNOSIS — N39.0 RECURRENT UTI: ICD-10-CM

## 2017-10-30 DIAGNOSIS — I34.1 MVP (MITRAL VALVE PROLAPSE): ICD-10-CM

## 2017-10-30 DIAGNOSIS — E78.00 HYPERCHOLESTEROLEMIA: ICD-10-CM

## 2017-10-30 DIAGNOSIS — I49.3 PVC'S (PREMATURE VENTRICULAR CONTRACTIONS): ICD-10-CM

## 2017-10-30 DIAGNOSIS — E06.3 HASHIMOTO'S DISEASE: ICD-10-CM

## 2017-10-30 DIAGNOSIS — I10 HTN (HYPERTENSION), BENIGN: ICD-10-CM

## 2017-10-30 DIAGNOSIS — Z00.00 ENCOUNTER FOR PREVENTIVE HEALTH EXAMINATION: Primary | ICD-10-CM

## 2017-10-30 DIAGNOSIS — N39.46 MIXED STRESS AND URGE URINARY INCONTINENCE: ICD-10-CM

## 2017-10-30 DIAGNOSIS — I70.0 ATHEROSCLEROSIS OF AORTA: ICD-10-CM

## 2017-10-30 DIAGNOSIS — I47.29 PAROXYSMAL VENTRICULAR TACHYCARDIA: ICD-10-CM

## 2017-10-30 PROCEDURE — 99999 PR PBB SHADOW E&M-EST. PATIENT-LVL V: CPT | Mod: PBBFAC,,, | Performed by: NURSE PRACTITIONER

## 2017-10-30 PROCEDURE — 99499 UNLISTED E&M SERVICE: CPT | Mod: S$GLB,,, | Performed by: NURSE PRACTITIONER

## 2017-10-30 PROCEDURE — G0439 PPPS, SUBSEQ VISIT: HCPCS | Mod: S$GLB,,, | Performed by: NURSE PRACTITIONER

## 2017-10-30 NOTE — Clinical Note
Primary Care Providers: Sheila Mcgee MD, MD (General)  Your patient was seen today for a HRA visit. No Gap(s) in care (HEDIS gaps) have been identified during this visit that require additional testing and possible follow up.  No orders of the defined types were placed in this encounter.    I have included a copy of my visit note; please review the note and feel free to contact me with any questions.   Thank you for allowing me to participate in the care of your patients. Sadie Figueroa NP

## 2017-10-30 NOTE — PROGRESS NOTES
"Madan Bell presented for a  Medicare AWV and comprehensive Health Risk Assessment today. The following components were reviewed and updated:    · Medical history  · Family History  · Social history  · Allergies and Current Medications  · Health Risk Assessment  · Health Maintenance  · Care Team     ** See Completed Assessments for Annual Wellness Visit within the encounter summary.**       The following assessments were completed:  · Living Situation  · CAGE  · Depression Screening  · Timed Get Up and Go  · Whisper Test  · Cognitive Function Screening  · Nutrition Screening  · ADL Screening  · PAQ Screening    Vitals:    10/30/17 1201   BP: 130/62   Pulse: (!) 54   Resp: 15   Temp: 97.4 °F (36.3 °C)   TempSrc: Oral   SpO2: 98%   Weight: 69 kg (152 lb 1.9 oz)   Height: 5' 8.5" (1.74 m)     Body mass index is 22.79 kg/m².  Physical Exam   Constitutional: She is oriented to person, place, and time. She appears well-developed and well-nourished.   HENT:   Head: Normocephalic and atraumatic.   Cardiovascular: Normal rate, regular rhythm and normal heart sounds.    No murmur heard.  Pulmonary/Chest: Effort normal and breath sounds normal.   Abdominal: Soft. Bowel sounds are normal. There is no tenderness.   Musculoskeletal: She exhibits no edema.   Neurological: She is alert and oriented to person, place, and time.   Skin: Skin is warm and dry.   Psychiatric: She has a normal mood and affect. Her behavior is normal. Judgment and thought content normal.   Nursing note and vitals reviewed.        Diagnoses and health risks identified today and associated recommendations/orders:    1. PVC's (premature ventricular contractions)  Stable- followed by cardiology    2. MVP (mitral valve prolapse)  Stable- followed by cardiology      3. Anxiety  Stable- followed by PCP    4. Vasovagal near syncope  Stable- followed by cardiology      5. Paroxysmal ventricular tachycardia  Stable- followed by cardiology      6. Atherosclerosis " of aorta  Stable- followed by cardiology      7. Hashimoto's disease  Stable- followed by PCP    8. HTN (hypertension), benign  Stable- followed by cardiology      9. Mixed stress and urge urinary incontinence  Stable- followed by urology      10. Hypercholesterolemia  Stable- followed by cardiology      11. Encounter for preventive health examination  Assessments completed  Preventative health recommendations reviewed     12. Recurrent UTI  Stable- followed by urology    Provided Madan with a 5-10 year written screening schedule and personal prevention plan. Recommendations were developed using the USPSTF age appropriate recommendations. Education, counseling, and referrals were provided as needed. After Visit Summary printed and given to patient which includes a list of additional screenings\tests needed.    Return in about 1 year (around 10/30/2018) for HRA.    Sadie Figueroa NP

## 2017-10-30 NOTE — PATIENT INSTRUCTIONS
Counseling and Referral of Other Preventative  (Italic type indicates deductible and co-insurance are waived)    Patient Name: Madan Bell  Today's Date: 10/30/2017      SERVICE LIMITATIONS RECOMMENDATION    Vaccines    · Pneumococcal (once after 65)    · Influenza (annually)    · Hepatitis B (if medium/high risk)    · Prevnar 13      Hepatitis B medium/high risk factors:       - End-stage renal disease       - Hemophiliacs who received Factor VII or         IX concentrates       - Clients of institutions for the mentally             retarded       - Persons who live in the same house as          a HepB carrier       - Homosexual men       - Illicit injectable drug abusers     Pneumococcal: current     Influenza: current     Hepatitis B: n/a     Prevnar 13: current    Mammogram (biennial age 50-74)  Annually (age 40 or over)  6/23/17    Pap (up to age 70 and after 70 if unknown history or abnormal study last 10 years)      n/a       Colorectal cancer screening (to age 75)    · Fecal occult blood test (annual)  · Flexible sigmoidoscopy (5y)  · Screening colonoscopy (10y)  · Barium enema   9/18/09- due in 2019    Diabetes self-management training (no USPSTF recommendations)  Requires referral by treating physician for patient with diabetes or renal disease. 10 hours of initial DSMT sessions of no less than 30 minutes each in a continuous 12-month period. 2 hours of follow-up DSMT in subsequent years.  n/a    Bone mass measurements (age 65 & older, biennial)  Requires diagnosis related to osteoporosis or estrogen deficiency. Biennial benefit unless patient has history of long-term glucocorticoid  3/23/17- due in 2019    Glaucoma screening (no USPSTF recommendation)  Diabetes mellitus, family history   , age 50 or over    American, age 65 or over  current    Medical nutrition therapy for diabetes or renal disease (no recommended schedule)  Requires referral by treating physician for patient  with diabetes or renal disease or kidney transplant within the past 3 years.  Can be provided in same year as diabetes self-management training (DSMT), and CMS recommends medical nutrition therapy take place after DSMT. Up to 3 hours for initial year and 2 hours in subsequent years.  n/a    Cardiovascular screening blood tests (every 5 years)  · Fasting lipid panel  Order as a panel if possible  current    Diabetes screening tests (at least every 3 years, Medicare covers annually or at 6-month intervals for prediabetic patients)  · Fasting blood sugar (FBS) or glucose tolerance test (GTT)  Patient must be diagnosed with one of the following:       - Hypertension       - Dyslipidemia       - Obesity (BMI 30kg/m2)       - Previous elevated impaired FBS or GTT       ... or any two of the following:       - Overweight (BMI 25 but <30)       - Family history of diabetes       - Age 65 or older       - History of gestational diabetes or birth of baby weighing more than 9 pounds  current    HIV screening (annually for increased risk patients)  · HIV-1 and HIV-2 by EIA, or AMANUEL, rapid antibody test or oral mucosa transudate  Patients must be at increased risk for HIV infection per USPSTF guidelines or pregnant. Tests covered annually for patient at increased risk or as requested by the patient. Pregnant patients may receive up to 3 tests during pregnancy.  Risks discussed, screening is declined    Smoking cessation counseling (up to 8 sessions per year)  Patients must be asymptomatic of tobacco-related conditions to receive as a preventative service.  n/a    Subsequent annual wellness visit  At least 12 months since last AWV  Return in one year     The following information is provided to all patients.  This information is to help you find resources for any of the problems found today that may be affecting your health:                Living healthy guide: www.[facility].[place].gov      Understanding Diabetes: www.diabetes.org       Eating healthy: www.cdc.gov/healthyweight      [facility] home safety checklist: www.cdc.gov/steadi/patient.html      Agency on Aging: www.goea.[place].gov      Alcoholics anonymous (AA): www.aa.org      Physical Activity: www.richa.nih.gov/sz3joer      Tobacco use: www.quitwithusla.org

## 2017-11-10 ENCOUNTER — PATIENT OUTREACH (OUTPATIENT)
Dept: OTHER | Facility: OTHER | Age: 77
End: 2017-11-10

## 2017-11-10 NOTE — PROGRESS NOTES
Last 5 Patient Entered Readings Current 30 Day Average: 117/63     Recent Readings 11/9/2017 11/8/2017 11/7/2017 11/6/2017 11/5/2017    Systolic BP (mmHg) 132 102 103 101 121    Diastolic BP (mmHg) 62 67 55 65 69    Pulse 68 57 49 62 56          Hypertension Digital Medicine Program (HDMP): Health  Follow Up    Lifestyle Modifications:    1.Low sodium diet: yes : Patient continues monitoring her sodium intake. She reads food labels and does not add salt to meals. She will continue making small changes to keep her sodium intake controlled.    2.Physical activity: yes : Patient continues walking daily. She is very active during the day and is never sitting around except for the evening times when she is relaxing before bed.     3.Hypotension/Hypertension symptoms: no   Frequency/Alleviating factors/Precipitating factors, etc.     4.Patient has been compliant with the medication regimen.     Follow up with Mrs. Madan Bell completed. No further questions or concerns. I will follow up in a few weeks to assess progress.

## 2017-12-07 ENCOUNTER — PATIENT OUTREACH (OUTPATIENT)
Dept: OTHER | Facility: OTHER | Age: 77
End: 2017-12-07

## 2017-12-07 NOTE — PROGRESS NOTES
Last 5 Patient Entered Readings Current 30 Day Average: 120/65     Recent Readings 12/6/2017 12/5/2017 12/4/2017 12/3/2017 11/30/2017    Systolic BP (mmHg) 131 116 122 110 121    Diastolic BP (mmHg) 67 63 65 64 62    Pulse 56 73 53 58 60          Hypertension Digital Medicine (HDMP) Health  Follow Up    LVM to follow up with  Madan GOKUL Bell.    Per 30 day average, blood pressure is well controlled 120/65 mmHg. Encouraged adherence to low sodium diet and physical activity guidelines. Advised patient to call or message with questions or concerns. Informed her of the new BP guidelines.

## 2017-12-14 RX ORDER — LEVOTHYROXINE SODIUM 75 UG/1
TABLET ORAL
Qty: 90 TABLET | Refills: 3 | OUTPATIENT
Start: 2017-12-14

## 2018-01-09 ENCOUNTER — PATIENT MESSAGE (OUTPATIENT)
Dept: UROLOGY | Facility: CLINIC | Age: 78
End: 2018-01-09

## 2018-01-09 ENCOUNTER — PATIENT OUTREACH (OUTPATIENT)
Dept: OTHER | Facility: OTHER | Age: 78
End: 2018-01-09

## 2018-01-09 DIAGNOSIS — N39.0 URINARY TRACT INFECTION WITHOUT HEMATURIA, SITE UNSPECIFIED: Primary | ICD-10-CM

## 2018-01-09 NOTE — PROGRESS NOTES
Last 5 Patient Entered Readings Current 30 Day Average: 122/65     Recent Readings 1/8/2018 1/7/2018 1/6/2018 1/5/2018 1/4/2018    SBP (mmHg) 127 110 126 129 121    DBP (mmHg) 66 63 63 70 70    Pulse 57 56 62 61 60        Patient's BP average is controlled based on 2017 ACC/AHA HTN guidelines of goal BP <130/80.      Patient denies s/s of hypotension (lightheadedness, dizziness, nausea, fatigue) associated with low readings. Instructed patient to inform me if this occurs, patient confirms understanding.      Ms. Bell's BP is well controlled. She is concerned that her average is above 120 currently. She has not been walking as often as she normally does, so she will try to start this again soon. Explained that her BP average is meeting new guideline goal and encouraged her to keep up the good work.     Patient's health , Daniella Quiroz, will be following up every 3-4 weeks. I will continue to monitor regularly and will follow up in 3-4 months, sooner if BP begins to trend upward or downward.    Patient has my contact information and knows to call with any concerns or clinical changes.      Current HTN regimen:  Hypertension Medications             bisoprolol-hydrochlorothiazide 2.5-6.25 mg (ZIAC) 2.5-6.25 mg Tab TAKE 1/2 TABLET EVERY DAY

## 2018-01-11 ENCOUNTER — LAB VISIT (OUTPATIENT)
Dept: LAB | Facility: HOSPITAL | Age: 78
End: 2018-01-11
Attending: UROLOGY
Payer: MEDICARE

## 2018-01-11 DIAGNOSIS — N39.0 URINARY TRACT INFECTION WITHOUT HEMATURIA, SITE UNSPECIFIED: ICD-10-CM

## 2018-01-11 PROCEDURE — 87086 URINE CULTURE/COLONY COUNT: CPT

## 2018-01-13 LAB — BACTERIA UR CULT: NO GROWTH

## 2018-01-16 ENCOUNTER — PATIENT MESSAGE (OUTPATIENT)
Dept: UROLOGY | Facility: CLINIC | Age: 78
End: 2018-01-16

## 2018-01-22 ENCOUNTER — TELEPHONE (OUTPATIENT)
Dept: INTERNAL MEDICINE | Facility: CLINIC | Age: 78
End: 2018-01-22

## 2018-01-22 NOTE — TELEPHONE ENCOUNTER
----- Message from Shannan Cottrell sent at 1/22/2018  8:25 AM CST -----  Contact: patient [phone]  Patient is scheduled to come in tomorrow for her annual physical and is concerned about being exposed to flu. Please call her asap to advise whether you think she should cx and reschedule .

## 2018-01-30 ENCOUNTER — OFFICE VISIT (OUTPATIENT)
Dept: UROLOGY | Facility: CLINIC | Age: 78
End: 2018-01-30
Payer: MEDICARE

## 2018-01-30 ENCOUNTER — HOSPITAL ENCOUNTER (OUTPATIENT)
Dept: RADIOLOGY | Facility: HOSPITAL | Age: 78
Discharge: HOME | End: 2018-01-30
Attending: UROLOGY
Payer: MEDICARE

## 2018-01-30 VITALS
HEART RATE: 61 BPM | WEIGHT: 150.38 LBS | SYSTOLIC BLOOD PRESSURE: 140 MMHG | BODY MASS INDEX: 22.27 KG/M2 | HEIGHT: 69 IN | DIASTOLIC BLOOD PRESSURE: 62 MMHG

## 2018-01-30 DIAGNOSIS — N39.46 MIXED STRESS AND URGE URINARY INCONTINENCE: ICD-10-CM

## 2018-01-30 DIAGNOSIS — N20.0 KIDNEY STONES: ICD-10-CM

## 2018-01-30 DIAGNOSIS — N39.0 RECURRENT UTI: Primary | ICD-10-CM

## 2018-01-30 DIAGNOSIS — I10 HTN (HYPERTENSION), BENIGN: ICD-10-CM

## 2018-01-30 DIAGNOSIS — N95.2 ATROPHIC VAGINITIS: ICD-10-CM

## 2018-01-30 LAB
BILIRUB SERPL-MCNC: NORMAL MG/DL
BLOOD URINE, POC: NORMAL
COLOR, POC UA: NORMAL
GLUCOSE UR QL STRIP: NORMAL
KETONES UR QL STRIP: NORMAL
LEUKOCYTE ESTERASE URINE, POC: NORMAL
NITRITE, POC UA: NORMAL
PH, POC UA: 6
PROTEIN, POC: NORMAL
SPECIFIC GRAVITY, POC UA: 1
UROBILINOGEN, POC UA: NORMAL

## 2018-01-30 PROCEDURE — 74018 RADEX ABDOMEN 1 VIEW: CPT | Mod: TC

## 2018-01-30 PROCEDURE — 1159F MED LIST DOCD IN RCRD: CPT | Mod: S$GLB,,, | Performed by: UROLOGY

## 2018-01-30 PROCEDURE — 99214 OFFICE O/P EST MOD 30 MIN: CPT | Mod: 25,S$GLB,, | Performed by: UROLOGY

## 2018-01-30 PROCEDURE — 81001 URINALYSIS AUTO W/SCOPE: CPT | Mod: S$GLB,,, | Performed by: UROLOGY

## 2018-01-30 PROCEDURE — 99999 PR PBB SHADOW E&M-EST. PATIENT-LVL III: CPT | Mod: PBBFAC,,, | Performed by: UROLOGY

## 2018-01-30 PROCEDURE — 1126F AMNT PAIN NOTED NONE PRSNT: CPT | Mod: S$GLB,,, | Performed by: UROLOGY

## 2018-01-30 PROCEDURE — 76770 US EXAM ABDO BACK WALL COMP: CPT | Mod: 26,,, | Performed by: RADIOLOGY

## 2018-01-30 PROCEDURE — 74018 RADEX ABDOMEN 1 VIEW: CPT | Mod: 26,,, | Performed by: RADIOLOGY

## 2018-01-30 PROCEDURE — 76770 US EXAM ABDO BACK WALL COMP: CPT | Mod: TC

## 2018-01-30 PROCEDURE — 3008F BODY MASS INDEX DOCD: CPT | Mod: S$GLB,,, | Performed by: UROLOGY

## 2018-01-30 PROCEDURE — 99499 UNLISTED E&M SERVICE: CPT | Mod: S$GLB,,, | Performed by: UROLOGY

## 2018-01-30 RX ORDER — ESTRADIOL 0.1 MG/G
CREAM VAGINAL
Qty: 42.5 G | Refills: 4 | Status: SHIPPED | OUTPATIENT
Start: 2018-01-30 | End: 2019-08-23 | Stop reason: SDUPTHER

## 2018-01-30 NOTE — PROGRESS NOTES
Subjective:       Patient ID: Madan Bell is a 77 y.o. female.    Chief Complaint: Follow-up    Madan Bell is a 77 y.o. female  by  s/p laparoscopic hysterectomy and BSO  with HTN, paroxysmal V tach, s/p thyroidectomy for multinodular goiter 2013, chronic low back pain, who presents for follow up of recurrent UTI.    She is on vaginal estrogen since  twice a week.      Klebsiella  3/17 E. Coli   Klebsiella   Klebsiella.    E.coli    , 10/17,  culture negative.      Last UTI was e.coli, MDR to cipro, bactrim, amp, gent and doxy. She is on Align in February.   Cysto  showed no abnormalities  CT RSS  showed a punctate right renal stone. No other stones. No hydro. Films personally reviewed.   Ultrasound 17 showed stable 3mm right renal stone.     Nocturia x 1.  She does drink a lot of water.   She states that she empties well.  No obstructive symptoms.    Patient endorses leaking urine with cough, laugh, sneeze, and activity.  She notes occasional smaller volume leakage with urgency, but this is rare.  2 liners a day (previous 6-7 times a day).   No urge incontinence.   She denies constipation.     She is on Align probiotic daily.  Drinking cranberry juice.   No D-mannose.   Eats yogurt daily 4 times a week.         Past Surgical History:   Procedure Laterality Date    BREAST BIOPSY Left     excisional    BREAST BIOPSY Left     core needle biopsy    BREAST CYST ASPIRATION   - ??    BREAST CYST EXCISION   - ??    HYSTERECTOMY      tahbso    OOPHORECTOMY  hysterectomy -  - ??    THYROID SURGERY         Past Medical History:   Diagnosis Date    Anxiety     Arrhythmia     Arthritis     knees    Breast cyst     Fibrocystic breast  - ??    Heart murmur     Hyperlipidemia     Hypertension     Mitral valve disorder     Mitral valve prolapse     Squamous cell carcinoma bx -    right upper forehead    Thyroid cancer  1/29/2013    Thyroid disease     Vasovagal near syncope 11/12/2012       Social History     Social History    Marital status:      Spouse name: N/A    Number of children: N/A    Years of education: N/A     Occupational History    Not on file.     Social History Main Topics    Smoking status: Never Smoker    Smokeless tobacco: Never Used    Alcohol use No    Drug use: No    Sexual activity: Not on file     Other Topics Concern    Not on file     Social History Narrative    No narrative on file       Family History   Problem Relation Age of Onset    Arrhythmia Son     Mitral valve prolapse Son     Heart disease Mother     Hyperlipidemia Maternal Grandmother     Breast cancer Maternal Uncle     No Known Problems Daughter     No Known Problems Son     No Known Problems Son        Current Outpatient Prescriptions   Medication Sig Dispense Refill    ascorbic acid (VITAMIN C) 500 MG tablet Take 500 mg by mouth once daily.      aspirin 81 mg Tab Take 1 tablet by mouth Daily.      atorvastatin (LIPITOR) 40 MG tablet Take one 40 mg tablet daily. 90 tablet 3    Bifidobacterium infantis (ALIGN) 4 mg Cap One daily 30 capsule 6    bisoprolol-hydrochlorothiazide 2.5-6.25 mg (ZIAC) 2.5-6.25 mg Tab TAKE 1/2 TABLET EVERY DAY 45 tablet 3    cephALEXin (KEFLEX) 250 MG capsule Take 250 mg by mouth 4 (four) times daily.      cholecalciferol, vitamin D3, (VITAMIN D3) 2,000 unit Cap Take by mouth once daily.        estradiol (ESTRACE) 0.01 % (0.1 mg/gram) vaginal cream Apply a pea sized amount every day for 2 weeks, then 3x/week. 42.5 g 6    ketoconazole (NIZORAL) 2 % cream AAA qd- bid for eyebrow prn flare 30 g 3    levothyroxine (SYNTHROID) 75 MCG tablet TAKE 1 TABLET ONE TIME DAILY 90 tablet 3    naproxen sodium (ANAPROX) 220 MG tablet Take 220 mg by mouth 2 (two) times daily as needed.      omega-3 fatty acids-vitamin E (FISH OIL) 1,000 mg Cap Take 1 capsule by mouth once daily.        Current  Facility-Administered Medications   Medication Dose Route Frequency Provider Last Rate Last Dose    lidocaine HCl 2% urojet 10 mL  10 mL Urethral 1 time in Clinic/HOD Makayla Bello MD           Review of patient's allergies indicates:   Allergen Reactions    Fluoride preparations Hives    Fluoride      Other reaction(s): Hives       Review of Systems   Constitutional: Negative for chills, fever and unexpected weight change.   HENT: Negative for nosebleeds.    Eyes: Negative for visual disturbance.   Respiratory: Negative for chest tightness.    Cardiovascular: Negative for chest pain.   Gastrointestinal: Negative for diarrhea.   Musculoskeletal: Negative for myalgias.   Skin: Negative for rash.   Neurological: Negative for seizures.   Hematological: Does not bruise/bleed easily.   Psychiatric/Behavioral: Negative for behavioral problems.       Objective:      Physical Exam   Constitutional: She is oriented to person, place, and time. She appears well-developed and well-nourished.   HENT:   Head: Normocephalic and atraumatic.   Eyes: No scleral icterus.   Cardiovascular: Normal rate.    Pulmonary/Chest: Effort normal. No stridor.   Neurological: She is alert and oriented to person, place, and time.   Skin: Skin is warm and dry.     Psychiatric: She has a normal mood and affect.     urine dip clear  Assessment:       1. Recurrent UTI    2. Mixed stress and urge urinary incontinence    3. HTN (hypertension), benign        Plan:   Home collect urine culture for future UTI.  Continue vaginal estrogen.  She did do PT. Patient found it stressful. Some improvement.  Continue probiotic daily .  Try to stop wearing pads.     F/u 1 year with renal ultrasound for follow up of kidney stone.    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   Films personally reviewed with patient.   I spent 25 minutes with the patient of which more than half was spent in direct consultation  with the patient in regards to our treatment and plan.

## 2018-01-31 ENCOUNTER — PATIENT OUTREACH (OUTPATIENT)
Dept: OTHER | Facility: OTHER | Age: 78
End: 2018-01-31

## 2018-01-31 NOTE — PROGRESS NOTES
Last 5 Patient Entered Readings                                      Current 30 Day Average: 120/65     Recent Readings 1/30/2018 1/29/2018 1/28/2018 1/27/2018 1/26/2018    SBP (mmHg) 129 117 136 122 128    DBP (mmHg) 65 67 66 59 63    Pulse 54 59 54 60 60          Hypertension Digital Medicine (HDMP) Health  Follow Up    LVM to follow up with Rafael Madan HODGSON Arabella.    Per 30 day average, blood pressure is well controlled 120/65 mmHg. Encouraged adherence to low sodium diet and physical activity guidelines. Advised patient to call or message with questions or concerns.

## 2018-02-05 ENCOUNTER — PATIENT MESSAGE (OUTPATIENT)
Dept: UROLOGY | Facility: CLINIC | Age: 78
End: 2018-02-05

## 2018-02-27 ENCOUNTER — PATIENT OUTREACH (OUTPATIENT)
Dept: OTHER | Facility: OTHER | Age: 78
End: 2018-02-27

## 2018-02-27 NOTE — PROGRESS NOTES
Last 5 Patient Entered Readings                                      Current 30 Day Average: 120/65     Recent Readings 2/26/2018 2/25/2018 2/24/2018 2/24/2018 2/23/2018    SBP (mmHg) 117 117 117 153 125    DBP (mmHg) 70 62 64 121 61    Pulse 56 63 61 34 76          Hypertension Digital Medicine Program (HDMP): Health  Follow Up    Lifestyle Modifications:    1.Low sodium diet: yes : Patient continues maintaining a low sodium diet by reading food labels (she was in the store when I called and she was working on finding lower sodium options) and avoiding highly salted foods.     2.Physical activity: yes : Patient continues walking 30 minutes a day a few times per week.     3.Hypotension/Hypertension symptoms: no   Frequency/Alleviating factors/Precipitating factors, etc.     4.Patient has been compliant with the medication regimen.     She attributes her higher reading on 2/24 to a very stressful situation which ended up going down later when she retook it.     Follow up with  Mavinayakjoanie GOKUL Bell completed. No further questions or concerns. I will follow up in a few weeks to assess progress.

## 2018-03-23 ENCOUNTER — PATIENT OUTREACH (OUTPATIENT)
Dept: OTHER | Facility: OTHER | Age: 78
End: 2018-03-23

## 2018-03-23 NOTE — PROGRESS NOTES
Last 5 Patient Entered Readings                                      Current 30 Day Average: 118/64     Recent Readings 3/22/2018 3/22/2018 3/21/2018 3/20/2018 3/19/2018    SBP (mmHg) 105 106 124 109 114    DBP (mmHg) 57 70 67 65 65    Pulse 56 55 55 54 55          Hypertension Digital Medicine Program (HDMP): Health  Follow Up    Lifestyle Modifications:    1.Low sodium diet: yes : She stated that she has been doing well with maintaining a low sodium diet. She is in the habit of reading all food labels and brand swapping. She believes she is doing well with this.     2.Physical activity: yes : Patient continues walking a few days a week and is staying very active.     3.Hypotension/Hypertension symptoms: no   Frequency/Alleviating factors/Precipitating factors, etc.     4.Patient has been compliant with the medication regimen.     Follow up with Mrs. Madan Bell completed. No further questions or concerns. I will follow up in a few weeks to assess progress.

## 2018-03-24 RX ORDER — LEVOTHYROXINE SODIUM 75 UG/1
TABLET ORAL
Qty: 90 TABLET | Refills: 0 | Status: SHIPPED | OUTPATIENT
Start: 2018-03-24 | End: 2018-06-11 | Stop reason: SDUPTHER

## 2018-04-24 ENCOUNTER — PATIENT OUTREACH (OUTPATIENT)
Dept: OTHER | Facility: OTHER | Age: 78
End: 2018-04-24

## 2018-04-24 NOTE — PROGRESS NOTES
Last 5 Patient Entered Readings                                      Current 30 Day Average: 119/64     Recent Readings 4/23/2018 4/23/2018 4/22/2018 4/21/2018 4/21/2018    SBP (mmHg) 127 146 129 111 121    DBP (mmHg) 69 72 70 63 60    Pulse 61 56 56 57 53        Patient's BP average is controlled based on 2017 ACC/AHA HTN guidelines of goal BP <130/80.      Ms. Bell is doing well. She is pleased with her BP readings. She enjoys the follow up from her [facility] care team.     Patient denies s/s of hypotension (lightheadedness, dizziness, nausea, fatigue) associated with low readings. Instructed patient to inform me if this occurs, patient confirms understanding.      Patient's health , Daniella Quiroz, will be following up every 3-4 weeks. I will continue to monitor regularly and will follow up in 3-4 months, sooner if BP begins to trend upward or downward.    Patient has my contact information and knows to call with any concerns or clinical changes.     Current HTN regimen:  Hypertension Medications             bisoprolol-hydrochlorothiazide 2.5-6.25 mg (ZIAC) 2.5-6.25 mg Tab TAKE 1/2 TABLET EVERY DAY

## 2018-05-11 ENCOUNTER — PATIENT MESSAGE (OUTPATIENT)
Dept: UROLOGY | Facility: CLINIC | Age: 78
End: 2018-05-11

## 2018-05-11 ENCOUNTER — LAB VISIT (OUTPATIENT)
Dept: LAB | Facility: HOSPITAL | Age: 78
End: 2018-05-11
Attending: UROLOGY
Payer: MEDICARE

## 2018-05-11 DIAGNOSIS — N39.0 RECURRENT UTI: ICD-10-CM

## 2018-05-11 PROCEDURE — 87186 SC STD MICRODIL/AGAR DIL: CPT

## 2018-05-11 PROCEDURE — 87088 URINE BACTERIA CULTURE: CPT

## 2018-05-11 PROCEDURE — 87077 CULTURE AEROBIC IDENTIFY: CPT

## 2018-05-11 PROCEDURE — 87086 URINE CULTURE/COLONY COUNT: CPT

## 2018-05-14 ENCOUNTER — PATIENT MESSAGE (OUTPATIENT)
Dept: UROLOGY | Facility: CLINIC | Age: 78
End: 2018-05-14

## 2018-05-14 LAB — BACTERIA UR CULT: NORMAL

## 2018-05-14 RX ORDER — SULFAMETHOXAZOLE AND TRIMETHOPRIM 800; 160 MG/1; MG/1
1 TABLET ORAL 2 TIMES DAILY
Qty: 6 TABLET | Refills: 0 | Status: SHIPPED | OUTPATIENT
Start: 2018-05-14 | End: 2018-05-17

## 2018-05-14 RX ORDER — ATORVASTATIN CALCIUM 40 MG/1
TABLET, FILM COATED ORAL
Qty: 90 TABLET | Refills: 3 | Status: SHIPPED | OUTPATIENT
Start: 2018-05-14 | End: 2019-04-02 | Stop reason: SDUPTHER

## 2018-05-23 ENCOUNTER — LAB VISIT (OUTPATIENT)
Dept: LAB | Facility: HOSPITAL | Age: 78
End: 2018-05-23
Attending: UROLOGY
Payer: MEDICARE

## 2018-05-23 ENCOUNTER — TELEPHONE (OUTPATIENT)
Dept: UROLOGY | Facility: CLINIC | Age: 78
End: 2018-05-23

## 2018-05-23 DIAGNOSIS — N39.0 RECURRENT UTI: ICD-10-CM

## 2018-05-23 DIAGNOSIS — N39.0 RECURRENT UTI: Primary | ICD-10-CM

## 2018-05-23 PROCEDURE — 87086 URINE CULTURE/COLONY COUNT: CPT

## 2018-05-23 NOTE — TELEPHONE ENCOUNTER
----- Message from Ignacia Yo MA sent at 5/23/2018 10:46 AM CDT -----  Contact: self  [phone]  States she needs to bring an urine to the [place] lab around 4:00pm due to completing the antibiotic.      Please put an order in epic and call patient.

## 2018-05-24 ENCOUNTER — PES CALL (OUTPATIENT)
Dept: ADMINISTRATIVE | Facility: CLINIC | Age: 78
End: 2018-05-24

## 2018-05-24 LAB
BACTERIA UR CULT: NORMAL
BACTERIA UR CULT: NORMAL

## 2018-05-28 ENCOUNTER — PATIENT MESSAGE (OUTPATIENT)
Dept: UROLOGY | Facility: CLINIC | Age: 78
End: 2018-05-28

## 2018-05-28 DIAGNOSIS — I10 HYPERTENSION, UNSPECIFIED TYPE: Primary | ICD-10-CM

## 2018-05-29 ENCOUNTER — OFFICE VISIT (OUTPATIENT)
Dept: ORTHOPEDICS | Facility: CLINIC | Age: 78
End: 2018-05-29
Payer: MEDICARE

## 2018-05-29 ENCOUNTER — PATIENT OUTREACH (OUTPATIENT)
Dept: OTHER | Facility: OTHER | Age: 78
End: 2018-05-29

## 2018-05-29 VITALS
BODY MASS INDEX: 22.79 KG/M2 | HEIGHT: 68 IN | DIASTOLIC BLOOD PRESSURE: 72 MMHG | HEART RATE: 73 BPM | WEIGHT: 150.38 LBS | SYSTOLIC BLOOD PRESSURE: 127 MMHG

## 2018-05-29 DIAGNOSIS — M17.0 PRIMARY OSTEOARTHRITIS OF BOTH KNEES: Primary | ICD-10-CM

## 2018-05-29 PROCEDURE — 99499 UNLISTED E&M SERVICE: CPT | Mod: S$GLB,,, | Performed by: NURSE PRACTITIONER

## 2018-05-29 PROCEDURE — 99999 PR PBB SHADOW E&M-EST. PATIENT-LVL III: CPT | Mod: PBBFAC,,, | Performed by: NURSE PRACTITIONER

## 2018-05-29 PROCEDURE — 20610 DRAIN/INJ JOINT/BURSA W/O US: CPT | Mod: 50,S$GLB,, | Performed by: NURSE PRACTITIONER

## 2018-05-29 RX ADMIN — TRIAMCINOLONE ACETONIDE 80 MG: 40 INJECTION, SUSPENSION INTRA-ARTICULAR; INTRAMUSCULAR at 03:05

## 2018-05-29 NOTE — PROGRESS NOTES
Last 5 Patient Entered Readings                                      Current 30 Day Average: 111/66     Recent Readings 5/29/2018 5/28/2018 5/27/2018 5/26/2018 5/25/2018    SBP (mmHg) 117 114 115 101 100    DBP (mmHg) 75 71 71 61 58    Pulse 62 70 58 53 73          Digital Medicine: Health  Follow Up    Lifestyle Modifications:    1.Dietary Modifications (Sodium intake <2,000mg/day, food labels, dining out): Deferred    2.Physical Activity: Deferred    3.Medication Therapy: Patient has been compliant with the medication regimen.    4.Patient has the following medication side effects/concerns:   (Frequency/Alleviating factors/Precipitating factors, etc.)     Patient is doing well and feeling good for the most part. She did mentioned a few episodes of dizziness and also a few days when her heart rate was in the 40s. She was a little concerned about this but denies other symptoms related to that. She is also not sure if the days she was dizzy were during the days when her BP was low. She is going to start taking a log of this so we can see if her dizziness is on days her BP is lower. She stated that she is not interested in adjusting her medication at this time but I explained that if her BP continues to remain low and she is having symptoms that maybe the dosage will have to be adjusted a little. She expressed understanding and will call or message me in the meantime if her symptoms worsen or start happening more often.      Follow up with Mrs. Madan HODGSON Arabella completed. No further questions or concerns. Will continue follow up to achieve health goals.

## 2018-05-30 RX ORDER — TRIAMCINOLONE ACETONIDE 40 MG/ML
80 INJECTION, SUSPENSION INTRA-ARTICULAR; INTRAMUSCULAR
Status: COMPLETED | OUTPATIENT
Start: 2018-05-29 | End: 2018-05-29

## 2018-05-31 NOTE — PROGRESS NOTES
Pt with known djd of bilateral knees. She returns today for cortisone injections for pain relief. Her last injections were in December 2016.     Knee Injection Procedure Note    Pre-operative Diagnosis: bilateral knee degenerative arthritis    Post-operative Diagnosis: same    Indications: bilateral knee pain    Anesthesia: none    Procedure Details     Verbal consent was obtained for the procedure. The injection site was identified and the skin was prepared with alcohol. The bilateral knee was injected from an anterolateral approach with 1 ml of Kenalog and 5 ml Lidocaine under sterile technique using a 22 gauge needle. The needle was removed and the area cleansed and dressed.    Complications:  None; patient tolerated the procedure well.    she was advised to rest the knee today, using ice and elevation as needed for comfort and swelling. she did receive immediate relief of the knee pain. she was told this would be short lived and is secondary to the lidocaine. she may have an increase in discomfort tonight followed by steady improvement over the next several days. It may take 1-3 weeks following the injection to get the full benefit of the medication.

## 2018-06-05 RX ORDER — BISOPROLOL FUMARATE AND HYDROCHLOROTHIAZIDE 2.5; 6.25 MG/1; MG/1
TABLET ORAL
Qty: 45 TABLET | Refills: 3 | Status: SHIPPED | OUTPATIENT
Start: 2018-06-05 | End: 2019-06-24 | Stop reason: SDUPTHER

## 2018-06-12 RX ORDER — LEVOTHYROXINE SODIUM 75 UG/1
TABLET ORAL
Qty: 90 TABLET | Refills: 0 | Status: SHIPPED | OUTPATIENT
Start: 2018-06-12 | End: 2018-08-15 | Stop reason: SDUPTHER

## 2018-06-25 ENCOUNTER — TELEPHONE (OUTPATIENT)
Dept: INTERNAL MEDICINE | Facility: CLINIC | Age: 78
End: 2018-06-25

## 2018-06-25 NOTE — TELEPHONE ENCOUNTER
----- Message from Viky Soto sent at 6/25/2018 10:06 AM CDT -----  Contact: Self/[phone]  Caller is requesting to schedule their annual screening mammogram appointment. Order is not listed in Epic.  Please enter order and contact patient to schedule.    Where would they like the mammogram performed?:  [facility]   Additional information:  Not making apt until August

## 2018-06-27 ENCOUNTER — PATIENT OUTREACH (OUTPATIENT)
Dept: OTHER | Facility: OTHER | Age: 78
End: 2018-06-27

## 2018-06-27 NOTE — PROGRESS NOTES
Last 5 Patient Entered Readings                                      Current 30 Day Average: 116/70     Recent Readings 6/26/2018 6/25/2018 6/24/2018 6/22/2018 6/21/2018    SBP (mmHg) 110 128 121 108 115    DBP (mmHg) 67 67 75 69 71    Pulse 77 71 77 74 75          Digital Medicine: Health  Follow Up    Lifestyle Modifications:    1.Dietary Modifications (Sodium intake <2,000mg/day, food labels, dining out): Patient continues maintaining a low sodium diet. No major changes in her diet that would cause an increase in salt intake.     2.Physical Activity: Deferred    3.Medication Therapy: Patient has been compliant with the medication regimen.    4.Patient has the following medication side effects/concerns:   (Frequency/Alleviating factors/Precipitating factors, etc.)     Followed up with patient about her dizzy spells. She has been keeping a log and stated that it only happened twice in the past 3-4 weeks. Once was while she was at [employer] helping with an event and another was one night while she was at home. Her readings were normal during these events so she is unsure what is causing it. She drinks a lot of water (about 95 oz each day) so she does not believe she is dehydrated. She has had less dizzy spells since the last time we spoke but she will continue to keep a log and she will let us know if symptoms worsen.     Follow up with . Madan HODGSON Arabella completed. No further questions or concerns. Will continue follow up to achieve health goals.

## 2018-07-03 ENCOUNTER — TELEPHONE (OUTPATIENT)
Dept: INTERNAL MEDICINE | Facility: CLINIC | Age: 78
End: 2018-07-03

## 2018-07-03 NOTE — TELEPHONE ENCOUNTER
----- Message from Ese Verduzco sent at 7/3/2018 10:54 AM CDT -----  Contact: PT  PT is calling regarding the need of a mammogram order to be placed.   There is a recall available however it shows no radiology  order attached.       Callback: [phone]

## 2018-07-11 ENCOUNTER — TELEPHONE (OUTPATIENT)
Dept: INTERNAL MEDICINE | Facility: CLINIC | Age: 78
End: 2018-07-11

## 2018-07-11 DIAGNOSIS — Z12.31 SCREENING MAMMOGRAM, ENCOUNTER FOR: ICD-10-CM

## 2018-07-11 DIAGNOSIS — I10 HTN (HYPERTENSION), BENIGN: ICD-10-CM

## 2018-07-11 DIAGNOSIS — E06.3 HASHIMOTO'S DISEASE: ICD-10-CM

## 2018-07-11 DIAGNOSIS — E55.9 MILD VITAMIN D DEFICIENCY: ICD-10-CM

## 2018-07-11 DIAGNOSIS — I47.29 PAROXYSMAL VENTRICULAR TACHYCARDIA: Primary | ICD-10-CM

## 2018-07-11 DIAGNOSIS — R73.9 HYPERGLYCEMIA: ICD-10-CM

## 2018-07-11 DIAGNOSIS — E78.00 HYPERCHOLESTEROLEMIA: ICD-10-CM

## 2018-07-11 NOTE — TELEPHONE ENCOUNTER
----- Message from Chet Murray sent at 7/11/2018  9:26 AM CDT -----  Contact: self [phone]  Patient is calling to check status of mammogram orders . Please debbie dayna advise, Thanks

## 2018-07-17 ENCOUNTER — OFFICE VISIT (OUTPATIENT)
Dept: DERMATOLOGY | Facility: CLINIC | Age: 78
End: 2018-07-17
Payer: MEDICARE

## 2018-07-17 DIAGNOSIS — L82.1 SEBORRHEIC KERATOSES: ICD-10-CM

## 2018-07-17 DIAGNOSIS — L90.5 SCAR: Primary | ICD-10-CM

## 2018-07-17 DIAGNOSIS — L81.4 LENTIGO: ICD-10-CM

## 2018-07-17 DIAGNOSIS — D18.00 ANGIOMA: ICD-10-CM

## 2018-07-17 DIAGNOSIS — Z85.828 PERSONAL HISTORY OF SKIN CANCER: ICD-10-CM

## 2018-07-17 DIAGNOSIS — L57.0 AK (ACTINIC KERATOSIS): ICD-10-CM

## 2018-07-17 PROCEDURE — 99999 PR PBB SHADOW E&M-EST. PATIENT-LVL III: CPT | Mod: PBBFAC,,, | Performed by: DERMATOLOGY

## 2018-07-17 PROCEDURE — 17000 DESTRUCT PREMALG LESION: CPT | Mod: S$GLB,,, | Performed by: DERMATOLOGY

## 2018-07-17 PROCEDURE — 17003 DESTRUCT PREMALG LES 2-14: CPT | Mod: S$GLB,,, | Performed by: DERMATOLOGY

## 2018-07-17 PROCEDURE — 99213 OFFICE O/P EST LOW 20 MIN: CPT | Mod: 25,S$GLB,, | Performed by: DERMATOLOGY

## 2018-07-17 NOTE — PROGRESS NOTES
Subjective:       Patient ID:  Madan Bell is a 78 y.o. female who presents for   Chief Complaint   Patient presents with    Skin Check    Lesion     Pt here today for a UBSE. Pt c/o lesion on left upper lip x several months. No bleeding, pain or prev tx.  This is a high risk patient here to check for the development of new lesions.        Lesion         Review of Systems   Skin: Positive for daily sunscreen use and activity-related sunscreen use. Negative for tendency to form keloidal scars and recent sunburn.   Hematologic/Lymphatic: Does not bruise/bleed easily.        Objective:    Physical Exam   Constitutional: She appears well-developed and well-nourished. No distress.   Neurological: She is alert and oriented to person, place, and time. She is not disoriented.   Psychiatric: She has a normal mood and affect.   Skin:   Areas Examined (abnormalities noted in diagram):   Scalp / Hair Palpated and Inspected  Head / Face Inspection Performed  Neck Inspection Performed  Chest / Axilla Inspection Performed  Abdomen Inspection Performed  Back Inspection Performed  RUE Inspected  LUE Inspection Performed  Nails and Digits Inspection Performed                   Diagram Legend     Erythematous scaling macule/papule c/w actinic keratosis       Vascular papule c/w angioma      Pigmented verrucoid papule/plaque c/w seborrheic keratosis      Yellow umbilicated papule c/w sebaceous hyperplasia      Irregularly shaped tan macule c/w lentigo     1-2 mm smooth white papules consistent with Milia      Movable subcutaneous cyst with punctum c/w epidermal inclusion cyst      Subcutaneous movable cyst c/w pilar cyst      Firm pink to brown papule c/w dermatofibroma      Pedunculated fleshy papule(s) c/w skin tag(s)      Evenly pigmented macule c/w junctional nevus     Mildly variegated pigmented, slightly irregular-bordered macule c/w mildly atypical nevus      Flesh colored to evenly pigmented papule c/w intradermal  nevus       Pink pearly papule/plaque c/w basal cell carcinoma      Erythematous hyperkeratotic cursted plaque c/w SCC      Surgical scar with no sign of skin cancer recurrence      Open and closed comedones      Inflammatory papules and pustules      Verrucoid papule consistent consistent with wart     Erythematous eczematous patches and plaques     Dystrophic onycholytic nail with subungual debris c/w onychomycosis     Umbilicated papule    Erythematous-base heme-crusted tan verrucoid plaque consistent with inflamed seborrheic keratosis     Erythematous Silvery Scaling Plaque c/w Psoriasis     See annotation      Assessment / Plan:        Scar  Personal history of skin cancer  Area(s) of previous NMSC evaluated with no signs of recurrence.    Upper body skin examination performed today including at least 6 points as noted in physical examination. No lesions suspicious for malignancy noted.    AK (actinic keratosis)  Cryosurgery Procedure Note    Verbal consent from the patient is obtained including, but not limited to, risk of hypopigmentation/hyperpigmentation, scar, recurrence of lesion. The patient is aware of the precancerous quality and need for treatment of these lesions. Liquid nitrogen cryosurgery is applied to the 2 actinic keratoses, as detailed in the physical exam, to produce a freeze injury. The patient is aware that blisters may form and is instructed on wound care with gentle cleansing and use of vaseline ointment to keep moist until healed. The patient is supplied a handout on cryosurgery and is instructed to call if lesions do not completely resolve.    Lentigo  This is a benign hyperpigmented sun induced lesion. Daily sun protection will reduce the number of new lesions. Treatment of these benign lesions are considered cosmetic.  The nature of sun-induced photo-aging and skin cancers is discussed.  Sun avoidance, protective clothing, and the use of 30-SPF sunscreens is advised. Observe closely  for skin damage/changes, and call if such occurs.    Seborrheic keratoses  These are benign inherited growths without a malignant potential. Reassurance given to patient. No treatment is necessary.     Angioma  These are benign vascular lesions that are inherited.  Treatment is not necessary.             Follow-up in about 1 year (around 7/17/2019).

## 2018-07-23 ENCOUNTER — PATIENT MESSAGE (OUTPATIENT)
Dept: UROLOGY | Facility: CLINIC | Age: 78
End: 2018-07-23

## 2018-07-23 DIAGNOSIS — R35.0 URINARY FREQUENCY: ICD-10-CM

## 2018-07-23 DIAGNOSIS — R30.0 DYSURIA: Primary | ICD-10-CM

## 2018-07-24 ENCOUNTER — LAB VISIT (OUTPATIENT)
Dept: LAB | Facility: HOSPITAL | Age: 78
End: 2018-07-24
Attending: INTERNAL MEDICINE
Payer: MEDICARE

## 2018-07-24 DIAGNOSIS — R35.0 URINARY FREQUENCY: ICD-10-CM

## 2018-07-24 DIAGNOSIS — R30.0 DYSURIA: ICD-10-CM

## 2018-07-24 PROCEDURE — 87086 URINE CULTURE/COLONY COUNT: CPT

## 2018-07-24 PROCEDURE — 87186 SC STD MICRODIL/AGAR DIL: CPT

## 2018-07-24 PROCEDURE — 87077 CULTURE AEROBIC IDENTIFY: CPT

## 2018-07-24 PROCEDURE — 87088 URINE BACTERIA CULTURE: CPT

## 2018-07-27 ENCOUNTER — PATIENT MESSAGE (OUTPATIENT)
Dept: UROLOGY | Facility: CLINIC | Age: 78
End: 2018-07-27

## 2018-07-27 DIAGNOSIS — N39.0 BACTERIAL UTI: Primary | ICD-10-CM

## 2018-07-27 DIAGNOSIS — A49.9 BACTERIAL UTI: Primary | ICD-10-CM

## 2018-07-27 LAB — BACTERIA UR CULT: NORMAL

## 2018-07-27 RX ORDER — CEPHALEXIN 500 MG/1
500 CAPSULE ORAL EVERY 12 HOURS
Qty: 14 CAPSULE | Refills: 0 | Status: SHIPPED | OUTPATIENT
Start: 2018-07-27 | End: 2018-08-03

## 2018-07-27 NOTE — PROGRESS NOTES
Complained of uti  Home collect urine showing bacterial uti  Urine Culture, Routine KLEBSIELLA PNEUMONIAE   >100,000 cfu/ml        Keflex 500mg bid sent to local pharmacy

## 2018-08-02 ENCOUNTER — PES CALL (OUTPATIENT)
Dept: ADMINISTRATIVE | Facility: CLINIC | Age: 78
End: 2018-08-02

## 2018-08-02 ENCOUNTER — PATIENT OUTREACH (OUTPATIENT)
Dept: OTHER | Facility: OTHER | Age: 78
End: 2018-08-02

## 2018-08-02 NOTE — PROGRESS NOTES
Last 5 Patient Entered Readings                                      Current 30 Day Average: 114/69     Recent Readings 8/1/2018 7/31/2018 7/30/2018 7/29/2018 7/28/2018    SBP (mmHg) 115 118 107 131 115    DBP (mmHg) 73 81 69 80 66    Pulse 56 102 71 80 73            Digital Medicine: Health  Follow Up    Left voicemail to follow up with Mrs. Madan Bell.  Current BP average 114/69 mmHg is at goal, <130/80.  Want to see how her dizzy spells have been.     Patient called back and LVM to let me know she is doing well. She was busy when I called. She does not have any questions or concerns but will reach out again if any issues arise.

## 2018-08-05 ENCOUNTER — HOSPITAL ENCOUNTER (OUTPATIENT)
Facility: HOSPITAL | Age: 78
Discharge: HOME | End: 2018-08-06
Attending: EMERGENCY MEDICINE | Admitting: HOSPITALIST
Payer: MEDICARE

## 2018-08-05 DIAGNOSIS — I48.91 ATRIAL FIBRILLATION, NEW ONSET: Primary | ICD-10-CM

## 2018-08-05 DIAGNOSIS — I49.3 PVC'S (PREMATURE VENTRICULAR CONTRACTIONS): ICD-10-CM

## 2018-08-05 DIAGNOSIS — E89.0 POST-OPERATIVE HYPOTHYROIDISM: ICD-10-CM

## 2018-08-05 DIAGNOSIS — E78.00 HYPERCHOLESTEROLEMIA: ICD-10-CM

## 2018-08-05 DIAGNOSIS — R42 DIZZY: ICD-10-CM

## 2018-08-05 DIAGNOSIS — R42 LIGHTHEADEDNESS: ICD-10-CM

## 2018-08-05 DIAGNOSIS — I34.1 MVP (MITRAL VALVE PROLAPSE): ICD-10-CM

## 2018-08-05 DIAGNOSIS — I47.29 PAROXYSMAL VENTRICULAR TACHYCARDIA: ICD-10-CM

## 2018-08-05 DIAGNOSIS — I10 HTN (HYPERTENSION), BENIGN: ICD-10-CM

## 2018-08-05 DIAGNOSIS — I48.91 NEW ONSET ATRIAL FIBRILLATION: ICD-10-CM

## 2018-08-05 DIAGNOSIS — F41.9 ANXIETY: ICD-10-CM

## 2018-08-05 LAB
ALBUMIN SERPL BCP-MCNC: 4.2 G/DL
ALP SERPL-CCNC: 64 U/L
ALT SERPL W/O P-5'-P-CCNC: 25 U/L
ANION GAP SERPL CALC-SCNC: 7 MMOL/L
AST SERPL-CCNC: 23 U/L
BACTERIA #/AREA URNS AUTO: ABNORMAL /HPF
BASOPHILS # BLD AUTO: 0.04 K/UL
BASOPHILS NFR BLD: 0.5 %
BILIRUB SERPL-MCNC: 1.5 MG/DL
BILIRUB UR QL STRIP: NEGATIVE
BNP SERPL-MCNC: 313 PG/ML
BUN SERPL-MCNC: 8 MG/DL
CALCIUM SERPL-MCNC: 9.8 MG/DL
CHLORIDE SERPL-SCNC: 102 MMOL/L
CLARITY UR REFRACT.AUTO: CLEAR
CO2 SERPL-SCNC: 27 MMOL/L
COLOR UR AUTO: ABNORMAL
CREAT SERPL-MCNC: 0.8 MG/DL
DIFFERENTIAL METHOD: ABNORMAL
EOSINOPHIL # BLD AUTO: 0 K/UL
EOSINOPHIL NFR BLD: 0.2 %
ERYTHROCYTE [DISTWIDTH] IN BLOOD BY AUTOMATED COUNT: 13 %
EST. GFR  (AFRICAN AMERICAN): >60 ML/MIN/1.73 M^2
EST. GFR  (NON AFRICAN AMERICAN): >60 ML/MIN/1.73 M^2
GLUCOSE SERPL-MCNC: 106 MG/DL
GLUCOSE UR QL STRIP: NEGATIVE
HCT VFR BLD AUTO: 43.1 %
HGB BLD-MCNC: 14.5 G/DL
HGB UR QL STRIP: ABNORMAL
IMM GRANULOCYTES # BLD AUTO: 0.04 K/UL
IMM GRANULOCYTES NFR BLD AUTO: 0.5 %
KETONES UR QL STRIP: NEGATIVE
LEUKOCYTE ESTERASE UR QL STRIP: NEGATIVE
LYMPHOCYTES # BLD AUTO: 1.6 K/UL
LYMPHOCYTES NFR BLD: 19 %
MAGNESIUM SERPL-MCNC: 2.3 MG/DL
MCH RBC QN AUTO: 32 PG
MCHC RBC AUTO-ENTMCNC: 33.6 G/DL
MCV RBC AUTO: 95 FL
MICROSCOPIC COMMENT: ABNORMAL
MONOCYTES # BLD AUTO: 0.6 K/UL
MONOCYTES NFR BLD: 7.1 %
NEUTROPHILS # BLD AUTO: 6.1 K/UL
NEUTROPHILS NFR BLD: 72.7 %
NITRITE UR QL STRIP: NEGATIVE
NON-SQ EPI CELLS #/AREA URNS AUTO: 1 /HPF
NRBC BLD-RTO: 0 /100 WBC
PH UR STRIP: 8 [PH] (ref 5–8)
PLATELET # BLD AUTO: 176 K/UL
PMV BLD AUTO: 10.5 FL
POCT GLUCOSE: 102 MG/DL (ref 70–110)
POTASSIUM SERPL-SCNC: 4 MMOL/L
PROT SERPL-MCNC: 7.1 G/DL
PROT UR QL STRIP: NEGATIVE
RBC # BLD AUTO: 4.53 M/UL
RBC #/AREA URNS AUTO: 1 /HPF (ref 0–4)
SODIUM SERPL-SCNC: 136 MMOL/L
SP GR UR STRIP: 1 (ref 1–1.03)
SQUAMOUS #/AREA URNS AUTO: 0 /HPF
TROPONIN I SERPL DL<=0.01 NG/ML-MCNC: 0.01 NG/ML
TSH SERPL DL<=0.005 MIU/L-ACNC: 2.37 UIU/ML
URN SPEC COLLECT METH UR: ABNORMAL
UROBILINOGEN UR STRIP-ACNC: NEGATIVE EU/DL
WBC # BLD AUTO: 8.43 K/UL
WBC #/AREA URNS AUTO: 1 /HPF (ref 0–5)

## 2018-08-05 PROCEDURE — 99285 EMERGENCY DEPT VISIT HI MDM: CPT | Mod: ,,, | Performed by: EMERGENCY MEDICINE

## 2018-08-05 PROCEDURE — 99219 PR INITIAL OBSERVATION CARE,LEVL II: CPT | Mod: ,,, | Performed by: HOSPITALIST

## 2018-08-05 PROCEDURE — G0378 HOSPITAL OBSERVATION PER HR: HCPCS

## 2018-08-05 PROCEDURE — 84443 ASSAY THYROID STIM HORMONE: CPT

## 2018-08-05 PROCEDURE — 85025 COMPLETE CBC W/AUTO DIFF WBC: CPT

## 2018-08-05 PROCEDURE — 84484 ASSAY OF TROPONIN QUANT: CPT

## 2018-08-05 PROCEDURE — 82962 GLUCOSE BLOOD TEST: CPT

## 2018-08-05 PROCEDURE — 81001 URINALYSIS AUTO W/SCOPE: CPT

## 2018-08-05 PROCEDURE — 80053 COMPREHEN METABOLIC PANEL: CPT

## 2018-08-05 PROCEDURE — 25000003 PHARM REV CODE 250: Performed by: HOSPITALIST

## 2018-08-05 PROCEDURE — 99285 EMERGENCY DEPT VISIT HI MDM: CPT | Mod: LT

## 2018-08-05 PROCEDURE — 83880 ASSAY OF NATRIURETIC PEPTIDE: CPT

## 2018-08-05 PROCEDURE — 93010 ELECTROCARDIOGRAM REPORT: CPT | Mod: ,,, | Performed by: INTERNAL MEDICINE

## 2018-08-05 PROCEDURE — 93005 ELECTROCARDIOGRAM TRACING: CPT

## 2018-08-05 PROCEDURE — 93010 ELECTROCARDIOGRAM REPORT: CPT | Mod: 76,,, | Performed by: INTERNAL MEDICINE

## 2018-08-05 PROCEDURE — 83735 ASSAY OF MAGNESIUM: CPT

## 2018-08-05 RX ORDER — PANTOPRAZOLE SODIUM 40 MG/1
40 TABLET, DELAYED RELEASE ORAL DAILY
Status: DISCONTINUED | OUTPATIENT
Start: 2018-08-05 | End: 2018-08-06 | Stop reason: HOSPADM

## 2018-08-05 RX ORDER — ATORVASTATIN CALCIUM 20 MG/1
40 TABLET, FILM COATED ORAL DAILY
Status: DISCONTINUED | OUTPATIENT
Start: 2018-08-05 | End: 2018-08-06 | Stop reason: HOSPADM

## 2018-08-05 RX ORDER — LEVOTHYROXINE SODIUM 75 UG/1
75 TABLET ORAL
Status: DISCONTINUED | OUTPATIENT
Start: 2018-08-06 | End: 2018-08-06 | Stop reason: HOSPADM

## 2018-08-05 RX ORDER — IBUPROFEN 200 MG
24 TABLET ORAL
Status: DISCONTINUED | OUTPATIENT
Start: 2018-08-05 | End: 2018-08-06 | Stop reason: HOSPADM

## 2018-08-05 RX ORDER — METOPROLOL TARTRATE 1 MG/ML
5 INJECTION, SOLUTION INTRAVENOUS EVERY 8 HOURS PRN
Status: DISCONTINUED | OUTPATIENT
Start: 2018-08-05 | End: 2018-08-06 | Stop reason: HOSPADM

## 2018-08-05 RX ORDER — NAPROXEN SODIUM 220 MG/1
81 TABLET, FILM COATED ORAL DAILY
Status: DISCONTINUED | OUTPATIENT
Start: 2018-08-05 | End: 2018-08-06 | Stop reason: HOSPADM

## 2018-08-05 RX ORDER — ACETAMINOPHEN 325 MG/1
650 TABLET ORAL EVERY 4 HOURS PRN
Status: DISCONTINUED | OUTPATIENT
Start: 2018-08-05 | End: 2018-08-06 | Stop reason: HOSPADM

## 2018-08-05 RX ORDER — RAMELTEON 8 MG/1
8 TABLET ORAL NIGHTLY PRN
Status: DISCONTINUED | OUTPATIENT
Start: 2018-08-05 | End: 2018-08-06 | Stop reason: HOSPADM

## 2018-08-05 RX ORDER — SODIUM CHLORIDE 0.9 % (FLUSH) 0.9 %
5 SYRINGE (ML) INJECTION
Status: DISCONTINUED | OUTPATIENT
Start: 2018-08-05 | End: 2018-08-06 | Stop reason: HOSPADM

## 2018-08-05 RX ORDER — GLUCAGON 1 MG
1 KIT INJECTION
Status: DISCONTINUED | OUTPATIENT
Start: 2018-08-05 | End: 2018-08-06 | Stop reason: HOSPADM

## 2018-08-05 RX ORDER — IBUPROFEN 200 MG
16 TABLET ORAL
Status: DISCONTINUED | OUTPATIENT
Start: 2018-08-05 | End: 2018-08-06 | Stop reason: HOSPADM

## 2018-08-05 RX ADMIN — ASPIRIN 81 MG CHEWABLE TABLET 81 MG: 81 TABLET CHEWABLE at 04:08

## 2018-08-05 RX ADMIN — ATORVASTATIN CALCIUM 40 MG: 20 TABLET, FILM COATED ORAL at 04:08

## 2018-08-05 RX ADMIN — PANTOPRAZOLE SODIUM 40 MG: 40 TABLET, DELAYED RELEASE ORAL at 04:08

## 2018-08-05 RX ADMIN — APIXABAN 5 MG: 5 TABLET, FILM COATED ORAL at 03:08

## 2018-08-05 NOTE — H&P
History and Physical   Hospital Medicine       Patient Name: Madan Bell  MRN:  [id number]  Hospital Medicine Team: [facility]OS[facility] MED D Yue Marrufo MD  Date of Admission:  8/5/2018     Principal Problem:  Atrial fibrillation, new onset   Primary Care Physician: Sheila Mcgee MD      History of Present Illness:     Ms. Madan Bell is a 78 y.o. female with PMH Of  HFpEF (55% EF), moderate MVP/ mitral sclerosis, PVCs/ non-sustained VT (sees Dr Yola Sigala in cards clinic), prior hx of thyroid cancer s/p thyroidectomy, now with hypothyroidism, presented to ED on 8/5 complaining of lightheadedness for the past 2 days, associated with worsening fatigue. She stated she had similar episodes intermittently over the past couple of days and woke up on the day of admit at 5:00am with dizziness and lightheadedness that has been constant. Her diastolic BP was 98-99 this morning that is generally 130-140s/70s. She is currently on Ziac (very low dose) for HTN and arrhythmia control. Compliant with at home meds. Patient denied chest pain, palpitations, dyspnea, headaches, syncope, vertigo, cough, nausea, vomiting. No hx of CAD, PE. Had a VTE 50 years ago post partum (unsure if superficial or DVT, but did not received anticoag). No peripheral deficits, falls, facial drool. No bleeding. Denied dysuria, suprapubic pain, or urinary changes.     In the ED, pt was anxious with HR of 130-120s, and BP of 160-200s. Resolved once pt calmed down with no intervention. EKG showed Coarse Afib with likely atypical flutter with PVCs.     Review of Systems   Constitutional: Negative for chills, fever. +fatigue  HENT: Negative for sore throat, trouble swallowing.    Eyes: Negative for visual disturbance.   Respiratory: Negative for cough, shortness of breath.    Cardiovascular: Negative for chest pain, leg swelling. as per HPI  Gastrointestinal: Negative for abdominal pain, constipation, diarrhea, nausea, vomiting.   Endocrine:  Negative for cold intolerance, heat intolerance. on synthroid, post thyroidectomy   Genitourinary: Negative for dysuria, frequency.   Musculoskeletal: Negative for arthralgias, myalgias.   Skin: Negative for rash, wound, erythema   Neurological: Negative for dizziness, syncope, weakness. +  light-headedness.   Psychiatric/Behavioral: Negative for confusion  All other systems reviewed and are negative.      Past Medical History:   Past Medical History:   Diagnosis Date    Anxiety     Arrhythmia     Arthritis     knees    Breast cyst     Fibrocystic breast 1980 - ??    Heart murmur     Hyperlipidemia     Hypertension     Mitral valve disorder     Mitral valve prolapse     PVC's (premature ventricular contractions)     Squamous cell carcinoma bx 7-2017    right upper forehead    Thyroid cancer 1/29/2013    Thyroid disease     Vasovagal near syncope 11/12/2012    VTE (venous thromboembolism)     in 1960s, post partum, pt unsure of details        Past Surgical History:   Past Surgical History:   Procedure Laterality Date    BREAST BIOPSY Left     excisional    BREAST BIOPSY Left     core needle biopsy    BREAST CYST ASPIRATION  1980's - ??    BREAST CYST EXCISION  2008 - ??    HYSTERECTOMY      tahbso    OOPHORECTOMY  hysterectomy - 2000 - ??    THYROID SURGERY         Social History:   Social History   Substance Use Topics    Smoking status: Never Smoker    Smokeless tobacco: Never Used    Alcohol use No       Family History:   Family History   Problem Relation Age of Onset    Arrhythmia Son     Mitral valve prolapse Son     Sudden death Son         sudden cardiac death    Heart disease Mother     Hyperlipidemia Maternal Grandmother     Breast cancer Maternal Uncle     No Known Problems Daughter     No Known Problems Son     No Known Problems Son          Medications: Scheduled Meds:   apixaban  5 mg Oral BID    aspirin  81 mg Oral Daily    atorvastatin  40 mg Oral Daily    [START  ON 8/6/2018] bisoprolol  2.5 mg Oral Daily    [START ON 8/6/2018] levothyroxine  75 mcg Oral Before breakfast    pantoprazole  40 mg Oral Daily     Continuous Infusions:  PRN Meds:.acetaminophen, dextrose 50%, dextrose 50%, glucagon (human recombinant), glucose, glucose, metoprolol, ramelteon, sodium chloride 0.9%    Allergies: Patient is allergic to fluoride preparations and fluoride.    Physical Exam:     Vital Signs (Most Recent):  Temp: 98 °F (36.7 °C) (08/05/18 1538)  Pulse: 68 (08/05/18 1538)  Resp: 18 (08/05/18 1538)  BP: 139/75 (08/05/18 1538)  SpO2: (!) 94 % (08/05/18 1502) Vital Signs Range (Last 24H):  Temp:  [98 °F (36.7 °C)-98.2 °F (36.8 °C)]   Pulse:  []   Resp:  [14-26]   BP: (139-204)/()   SpO2:  [94 %-100 %]    Body mass index is 22.56 kg/m².     Physical Exam:  Constitutional: Appears well-developed and well-nourished. NAD  Head: Normocephalic and atraumatic.   Mouth/Throat: Oropharynx is clear and moist.   Eyes: EOM are normal. Pupils are equal, round, and reactive to light. No scleral icterus.   Neck: Normal range of motion. Neck supple. no bruits appreciated   Cardiovascular: irreg iregular, normal rate, garde 2/6 systolic mild murmur heard at apex  Pulmonary/Chest: Effort normal and breath sounds normal. No respiratory distress. No wheezes, rales, or rhonchi  Abdominal: Soft. Bowel sounds are normal.  No distension or tenderness  Musculoskeletal: Normal range of motion. No edema.   Neurological: Alert and oriented to person, place, and time.   Skin: Skin is warm and dry.   Vitals reviewed.    Labs:    Cardiac Enzymes: Ejection Fractions:    Recent Labs      08/05/18   1133   TROPONINI  0.007    EF   Date Value Ref Range Status   03/23/2017 55 55 - 65    02/24/2015 65 55 - 65           Chemistries:     Recent Labs  Lab 08/05/18  1133      K 4.0      CO2 27   BUN 8   CREATININE 0.8   CALCIUM 9.8   PROT 7.1   BILITOT 1.5*   ALKPHOS 64   ALT 25   AST 23   MG 2.3         CBC/Anemia Labs: Coags:      Recent Labs  Lab 08/05/18  1133   WBC 8.43   HGB 14.5   HCT 43.1      MCV 95   RDW 13.0    No results for input(s): PT, INR, APTT in the last 168 hours.     Diagnostic Results:    EKG 8/5 - personally review  Vent rate , no discrete P waves , , QTc 440, PVCs noted, no TWI or ST elevation   Coarse Afib with likely atypical flutter    Echo 3/2017  CONCLUSIONS     1 - Normal left ventricular systolic function (EF 55-60%).     2 - Normal right ventricular systolic function .     3 - Left ventricular diastolic dysfunction.     4 - Moderate left atrial enlargement.     5 - The estimated PA systolic pressure is greater than 27 mmHg.     6 - The mitral valve is moderately sclerotic with evidence of bileaflet prolapse.     7 - Mild mitral regurgitation.     8 - Trivial to mild tricuspid regurgitation.       Assessment and Plan:      78 y.o. female with PMH Of  HFpEF (55% EF), moderate MVP/ mitral sclerosis, PVCs/ non-sustained VT (sees Dr Yola Sigala in cards clinic), prior hx of thyroid cancer s/p thyroidectomy, now with hypothyroidism, presented to ED on 8/5 complaining of lightheadedness for the past 2 days, associated with worsening fatigue, who [facility] with new onset Afib/ Aflutter, likely related to her Moderate left atrial enlargement and HFpEF with EF of 55%. Rate controlled on admit, started on apixaban.     Active Hospital Problems    Diagnosis  POA    *Atrial fibrillation, new onset [I48.91]  Yes    Hypercholesterolemia [E78.00]  Yes    HTN (hypertension), benign [I10]  Yes    Post-operative hypothyroidism [E89.0]  Yes    PVC's (premature ventricular contractions) [I49.3]  Yes    MVP (mitral valve prolapse) [I34.1]  Yes    Anxiety [F41.9]  Yes      Resolved Hospital Problems    Diagnosis Date Resolved POA   No resolved problems to display.         New Onset AFib  · Likely related to underlying moderate MVP and associated Left  ventricular diastolic dysfunction and Moderate left atrial enlargement, as per echo. Do not believe that is valvular Afib at this time, but related to dilated L atrium   · [id number] score: 4  · Continue tele  · Start apixaban  · Pt counseled on anticoagulation precautions  · Start PPI given ASA and apixaban use  · Continue home bisoprolol, as is rate controlled  · Prn IV metoprolol for HR >130  · Check 2D echo      HTN  PVCs  · Cont home bisoprolol . Hold HCTZ component  · tele    MVP  · Check 2D echo    Post operative hypothyroidism  · Cont home dose synthroid, TSH wnl    HLD  · Cont home statin, ASA 81    Anxiety   · Chronic issue, self resolved      Diet:  cardiac  GI PPx:  PPI  DVT PPx:  On apixaban  Goals of Care:  Full code    High Risk Conditions:  Patient has a condition that poses threat to life and bodily function: arrhythmia/ new onset Afib     Disposition:  Admitted to IM D. Monitor on tele. Will check with phar for DOAC coverage, plan for apixaban now. Plan for d/c on 8/6 with cardiology clinic follow up    Signing Physician:     Yue Marrufo MD  Hospital Medicine Department  [facility]  Pager [phone]  8/5/2018

## 2018-08-05 NOTE — ED PROVIDER NOTES
EKG timed 11:06 a.m.  Rate 90 beats per minute  AFib with rate controlled  Intermittent PVCs  Normal axis  Normal intervals  Nonspecific changes    This is a change compared to her prior EKG which was sinus rhythm in October 2017     Clare Muse MD  08/05/18 [id number]

## 2018-08-05 NOTE — ED NOTES
Rounding on the patient has been done. The patient has been updated on the plan of care and current status. Pain was assessed and is currently a  010. Comfort positioning and restroom needs were addressed. Necessary items were placed with in reach and was advised when a reassessment would take place. The call bell remains at the bedside for any additional patient needs. The patient is resting comfortably on the stretcher, respirations are even and unlabored, skin warm and dry. Will continue to monitor.Family at bedside.

## 2018-08-05 NOTE — ED NOTES
Patient identifiers for Madan Bell 78 y.o. female checked and correct.  Chief Complaint   Patient presents with    Multiple Complaints     hx cardia arrhythmia, dizzy, chronic uti, just finished antibiotic     Past Medical History:   Diagnosis Date    Anxiety     Arrhythmia     Arthritis     knees    Breast cyst     Fibrocystic breast 1980 - ??    Heart murmur     Hyperlipidemia     Hypertension     Mitral valve disorder     Mitral valve prolapse     Squamous cell carcinoma bx 7-2017    right upper forehead    Thyroid cancer 1/29/2013    Thyroid disease     Vasovagal near syncope 11/12/2012     Allergies reported:   Review of patient's allergies indicates:   Allergen Reactions    Fluoride preparations Hives    Fluoride      Other reaction(s): Hives         LOC: Patient is awake, alert, and aware of environment with an appropriate affect. Patient is oriented x 3 and speaking appropriately.  APPEARANCE: Patient resting comfortably and in no acute distress. Patient is clean and well groomed, patient's clothing is properly fastened.  HEENT: WNL  SKIN: The skin is warm and dry. Patient has normal skin turgor and moist mucus membranes. Skin is intact; no bruising or breakdown noted.  MUSKULOSKELETAL: Patient is moving all extremities well, no obvious deformities noted. Pulses intact.   RESPIRATORY: Airway is open and patent. Respirations are spontaneous and non-labored with normal effort and rate, BBS=clear  CARDIAC: Patient has a normal rate and rhythm. Afib on cardiac monitor,No peripheral edema noted.   ABDOMEN: No distention noted. Soft and non-tender upon palpation.  NEUROLOGICAL: Facial expression is symmetrical. Hand grasps are equal bilaterally. Normal sensation in all extremities when touched with finger.

## 2018-08-05 NOTE — ED PROVIDER NOTES
Encounter Date: 8/5/2018       History     Chief Complaint   Patient presents with    Multiple Complaints     hx cardia arrhythmia, dizzy, chronic uti, just finished antibiotic     79 y/o female with significant pmhx of MVP, HTN, thyroid cancer with thyroidectomy presents to ED complaining of dizziness and lightheadedness. She states she had similar episodes intermittently over the past couple of days and woke up this morning at 5:00am with dizziness and lightheadedness that has been constant. Her diastolic BP was 98-99 this morning that is generally 130-140s/70s. Patient denies chest pain, palpitations, dyspnea, headaches, syncope, vertigo, cough, nausea, vomiting. No hx of cardiac stents, DVT, PE. She is currently on bystolic for HTN and arrhythmia control. Compliant with at home meds. Recently treated for UTI with Keflex that patient completed yesterday. Denies dysuria, suprapubic pain, or urinary changes.      The history is provided by the patient and a relative.     Review of patient's allergies indicates:   Allergen Reactions    Fluoride preparations Hives    Fluoride      Other reaction(s): Hives     Past Medical History:   Diagnosis Date    Anxiety     Arrhythmia     Arthritis     knees    Breast cyst     Fibrocystic breast 1980 - ??    Heart murmur     Hyperlipidemia     Hypertension     Mitral valve disorder     Mitral valve prolapse     Squamous cell carcinoma bx 7-2017    right upper forehead    Thyroid cancer 1/29/2013    Thyroid disease     Vasovagal near syncope 11/12/2012     Past Surgical History:   Procedure Laterality Date    BREAST BIOPSY Left     excisional    BREAST BIOPSY Left     core needle biopsy    BREAST CYST ASPIRATION  1980's - ??    BREAST CYST EXCISION  2008 - ??    HYSTERECTOMY      tahbso    OOPHORECTOMY  hysterectomy - 2000 - ??    THYROID SURGERY       Family History   Problem Relation Age of Onset    Arrhythmia Son     Mitral valve prolapse Son      Heart disease Mother     Hyperlipidemia Maternal Grandmother     Breast cancer Maternal Uncle     No Known Problems Daughter     No Known Problems Son     No Known Problems Son      Social History   Substance Use Topics    Smoking status: Never Smoker    Smokeless tobacco: Never Used    Alcohol use No     Review of Systems   Constitutional: Negative for chills and fever.   HENT: Negative for ear pain, postnasal drip, rhinorrhea, sinus pain, sinus pressure and trouble swallowing.    Eyes: Negative for photophobia, pain and visual disturbance.   Respiratory: Negative for cough, shortness of breath and wheezing.    Cardiovascular: Negative for chest pain, palpitations and leg swelling.   Gastrointestinal: Negative for abdominal distention, abdominal pain, diarrhea, nausea and vomiting.   Genitourinary: Negative for difficulty urinating, dysuria, frequency, hematuria and pelvic pain.   Musculoskeletal: Negative for myalgias.        Chronic hx   Skin: Negative for color change and wound.   Allergic/Immunologic: Negative for immunocompromised state.   Neurological: Positive for dizziness and light-headedness. Negative for syncope, weakness, numbness and headaches.   Hematological: Does not bruise/bleed easily.   Psychiatric/Behavioral: Negative for confusion.       Physical Exam     Initial Vitals [08/05/18 1056]   BP Pulse Resp Temp SpO2   (!) 157/98 80 18 98.2 °F (36.8 °C) 99 %      MAP       --         Physical Exam    Constitutional: She appears well-developed and well-nourished. She is not diaphoretic. No distress.   HENT:   Head: Normocephalic and atraumatic.   Right Ear: Tympanic membrane and ear canal normal.   Left Ear: Tympanic membrane and ear canal normal.   Nose: Nose normal.   Mouth/Throat: Uvula is midline, oropharynx is clear and moist and mucous membranes are normal.   Eyes: Conjunctivae and EOM are normal. Pupils are equal, round, and reactive to light.   Neck: Normal range of motion. Neck  supple.   Cardiovascular: Normal heart sounds, intact distal pulses and normal pulses. An irregularly irregular rhythm present. Tachycardia present.    Hx of MVP   Pulmonary/Chest: Breath sounds normal. No respiratory distress. She has no wheezes. She has no rhonchi. She has no rales.   Abdominal: Soft. Bowel sounds are normal. She exhibits no distension. There is no tenderness.   Musculoskeletal: She exhibits no edema or tenderness.   Neurological: She is alert and oriented to person, place, and time. She has normal strength. Gait normal. GCS eye subscore is 4. GCS verbal subscore is 5. GCS motor subscore is 6.   FNF normal  HKS normal  No pronator drift   Skin: Skin is warm and dry.         ED Course   Procedures  Labs Reviewed   CBC W/ AUTO DIFFERENTIAL - Abnormal; Notable for the following:        Result Value    MCH 32.0 (*)     All other components within normal limits   COMPREHENSIVE METABOLIC PANEL - Abnormal; Notable for the following:     Total Bilirubin 1.5 (*)     Anion Gap 7 (*)     All other components within normal limits   URINALYSIS, REFLEX TO URINE CULTURE - Abnormal; Notable for the following:     Occult Blood UA 1+ (*)     All other components within normal limits    Narrative:     Preferred Collection Type->Urine, Clean Catch   B-TYPE NATRIURETIC PEPTIDE - Abnormal; Notable for the following:      (*)     All other components within normal limits   URINALYSIS MICROSCOPIC - Abnormal; Notable for the following:     Non-Squam Epith 1 (*)     All other components within normal limits    Narrative:     Preferred Collection Type->Urine, Clean Catch   MAGNESIUM   TROPONIN I   TSH          Imaging Results          X-Ray Chest PA And Lateral (Final result)  Result time 08/05/18 11:58:32    Final result by Felipe Villaseñor MD (08/05/18 11:58:32)                 Impression:      See above      Electronically signed by: Felipe Villaseñor MD  Date:    08/05/2018  Time:    11:58             Narrative:     EXAMINATION:  XR CHEST PA AND LATERAL    CLINICAL HISTORY:  Dizziness and giddiness    TECHNIQUE:  PA and lateral views of the chest were performed.    COMPARISON:  None    FINDINGS:  Mild cardiomegaly.  The lungs are clear.  No pleural effusion.  Thoracic scoliosis.                                       APC / Resident Notes:   Patient was seen in the ER promptly upon arrival.  She is afebrile, no acute distress.  EKG shows atrial fibrillation at a rate of 98 beats per minute. She is placed on the cardiac monitor.  IV access was established, labs ordered.    Laboratory studies show normal white count of 8.4.  Hemoglobin is stable. Chemistries were found to be unremarkable. Cardiac workup unremarkable.  BNP was slightly elevated at 3 1 3.  Thyroid functions within normal limits.  X-ray of the chest reveals mild cardiomegaly otherwise unremarkable.    During stay in the ED patient did have intermittent episodes of atrial fibrillation RVR up to 120 to 130s.  She continued to stay stable during these episodes.  Upon reassessment she is resting comfortably.  Heart rate has improved without medication administration.  This does appear to be new onset atrial fibrillation.  Will admit to Hospital Medicine for further workup.     The care of this patient was overseen by attending physician who agrees with treatment, plan, and disposition.                   Clinical Impression:   The primary encounter diagnosis was Atrial fibrillation, new onset. Diagnoses of Dizzy and Lightheadedness were also pertinent to this visit.      Disposition:   Disposition: Placed in Observation  Condition: Stable                        Christel Henderson PA-C  08/05/18 1326       Christel Henderson PA-C  08/05/18 1342

## 2018-08-05 NOTE — ED TRIAGE NOTES
C/O dizziness and generalized weakness x few days, denies urinary at this time, hx of frequent UTI, pt completed Keflex yesterday.

## 2018-08-05 NOTE — ED NOTES
Placed on tele box # [id number] from 3rd floor- monitoring confirmed w/ Tara, tele tech rate 76 rhythm afib/a flutter.

## 2018-08-06 ENCOUNTER — PATIENT MESSAGE (OUTPATIENT)
Dept: ELECTROPHYSIOLOGY | Facility: CLINIC | Age: 78
End: 2018-08-06

## 2018-08-06 ENCOUNTER — PATIENT MESSAGE (OUTPATIENT)
Dept: CARDIOLOGY | Facility: CLINIC | Age: 78
End: 2018-08-06

## 2018-08-06 VITALS
RESPIRATION RATE: 18 BRPM | HEART RATE: 81 BPM | WEIGHT: 151.44 LBS | DIASTOLIC BLOOD PRESSURE: 72 MMHG | SYSTOLIC BLOOD PRESSURE: 158 MMHG | HEIGHT: 68 IN | OXYGEN SATURATION: 95 % | BODY MASS INDEX: 22.95 KG/M2 | TEMPERATURE: 98 F

## 2018-08-06 LAB
ESTIMATED PA SYSTOLIC PRESSURE: 28.81
MITRAL VALVE MOBILITY: NORMAL
MITRAL VALVE REGURGITATION: NORMAL
RETIRED EF AND QEF - SEE NOTES: 58 (ref 55–65)
TRICUSPID VALVE REGURGITATION: NORMAL

## 2018-08-06 PROCEDURE — 93306 TTE W/DOPPLER COMPLETE: CPT | Mod: 26,,, | Performed by: INTERNAL MEDICINE

## 2018-08-06 PROCEDURE — 99217 PR OBSERVATION CARE DISCHARGE: CPT | Mod: ,,, | Performed by: HOSPITALIST

## 2018-08-06 PROCEDURE — 25000003 PHARM REV CODE 250: Performed by: HOSPITALIST

## 2018-08-06 PROCEDURE — G0378 HOSPITAL OBSERVATION PER HR: HCPCS

## 2018-08-06 PROCEDURE — 93306 TTE W/DOPPLER COMPLETE: CPT

## 2018-08-06 RX ORDER — PANTOPRAZOLE SODIUM 40 MG/1
40 TABLET, DELAYED RELEASE ORAL DAILY
Qty: 30 TABLET | Refills: 3 | Status: SHIPPED | OUTPATIENT
Start: 2018-08-06 | End: 2018-08-27 | Stop reason: SDUPTHER

## 2018-08-06 RX ORDER — FLUCONAZOLE 200 MG/1
400 TABLET ORAL ONCE
Status: DISCONTINUED | OUTPATIENT
Start: 2018-08-06 | End: 2018-08-06

## 2018-08-06 RX ORDER — FLUCONAZOLE 150 MG/1
150 TABLET ORAL ONCE
Status: COMPLETED | OUTPATIENT
Start: 2018-08-06 | End: 2018-08-06

## 2018-08-06 RX ADMIN — FLUCONAZOLE 150 MG: 150 TABLET ORAL at 11:08

## 2018-08-06 RX ADMIN — APIXABAN 5 MG: 5 TABLET, FILM COATED ORAL at 08:08

## 2018-08-06 RX ADMIN — ASPIRIN 81 MG CHEWABLE TABLET 81 MG: 81 TABLET CHEWABLE at 08:08

## 2018-08-06 RX ADMIN — BISOPROLOL FUMARATE 2.5 MG: 5 TABLET ORAL at 08:08

## 2018-08-06 RX ADMIN — PANTOPRAZOLE SODIUM 40 MG: 40 TABLET, DELAYED RELEASE ORAL at 08:08

## 2018-08-06 RX ADMIN — ATORVASTATIN CALCIUM 40 MG: 20 TABLET, FILM COATED ORAL at 08:08

## 2018-08-06 RX ADMIN — LEVOTHYROXINE SODIUM 75 MCG: 75 TABLET ORAL at 06:08

## 2018-08-06 NOTE — PLAN OF CARE
Problem: Patient Care Overview  Goal: Plan of Care Review  Outcome: Revised  Plan of care discussed with patient. Patient is free of fall/trauma/injury. Denies CP, SOB, or pain/discomfort. Monitor remains AFIb with controlled rate. 21 beat VT noted. Pt asymptomatic. MD notified. All questions addressed. Will continue to monitor

## 2018-08-06 NOTE — CONSULTS
Hospital Medicine Pharmacy Consult Note     Madan Bell is a 77 y/o F who has been consulted to determine medication affordability. Medication, apixaban (Eliquis) 5 mg BID, was evaluated for cost effectiveness and the copay for a 30-day supply is $47.00. The patient is eligible for a free 30-day trial which will be provided. Please let us know if you have any questions.      Thank you for the consult,  Zuleyma Rolle, PharmD, BCPS  [phone]

## 2018-08-06 NOTE — DISCHARGE SUMMARY
DISCHARGE SUMMARY  Hospital Medicine    [place] [facility]    Patient Name: Madan Bell  YOB: 1940    Admit Date: 8/5/2018    Discharge Date: 08/06/2018    Discharge Attending Physician: OTILIA Marrufo MD    Chief Complaint: Atrial fibrillation, new onset     Princilpal Diagnoses:  Active Hospital Problems    Diagnosis  POA    *Atrial fibrillation, new onset [I48.91]  Yes    Hypercholesterolemia [E78.00]  Yes    HTN (hypertension), benign [I10]  Yes    Post-operative hypothyroidism [E89.0]  Yes    PVC's (premature ventricular contractions) [I49.3]  Yes    MVP (mitral valve prolapse) [I34.1]  Yes    Anxiety [F41.9]  Yes      Resolved Hospital Problems    Diagnosis Date Resolved POA   No resolved problems to display.       Discharged Condition: Admit problems have stabilized     HOSPITAL COURSE:      Initial Presentation:    78 y.o. female with PMH Of  HFpEF (55% EF), moderate MVP/ mitral sclerosis, PVCs/ non-sustained VT (sees Dr Yola Sigala in cards clinic), prior hx of thyroid cancer s/p thyroidectomy, now with hypothyroidism, presented to ED on 8/5 complaining of lightheadedness for the past 2 days, associated with worsening fatigue. She stated she had similar episodes intermittently over the past couple of days and woke up on the day of admit at 5:00am with dizziness and lightheadedness that has been constant. Her diastolic BP was 98-99 this morning that is generally 130-140s/70s. She is currently on Ziac (very low dose) for HTN and arrhythmia control. Compliant with at home meds. Patient denied chest pain, palpitations, dyspnea, headaches, syncope, vertigo, cough, nausea, vomiting. No hx of CAD, PE. Had a VTE 50 years ago post partum (unsure if superficial or DVT, but did not received anticoag). No peripheral deficits, falls, facial drool. No bleeding. Denied dysuria, suprapubic pain, or urinary changes.      In the ED, pt was anxious with HR of 130-120s, and BP of 160-200s. Resolved once  "pt calmed down with no intervention. EKG showed Coarse Afib with likely atypical flutter with PVCs.     Course of Principle Problem for Admission:    Hospital course   78 y.o. female with PMH Of  HFpEF (55% EF), moderate MVP/ mitral sclerosis, PVCs/ non-sustained VT (sees Dr Yola Sigala in cards clinic), prior hx of thyroid cancer s/p thyroidectomy, now with hypothyroidism, presented to ED on 8/5 complaining of lightheadedness for the past 2 days, associated with worsening fatigue, who presented to [facility] on 8/5/2018 with new onset Afib/ Aflutter, likely related to her Moderate left atrial enlargement (as per prior echo). EKG with no TWI or ST elevation Coarse Afib with likely atypical flutter. Troponin negative. No chest pain, no exertional chest pain, lightheadedness resolved. Telemetry with Aflutter with intermittent PVCs. Heart was Rate controlled, with her low dose of home bisoprolil . started on apixaban given CHADsVASC of 4. Echo showed EF of 55% with  biatrial enlargement and Mild-moderate to moderate (2+) mitral regurgitation.     On the day of discharge, patient was in good condition. Apixaban/ DOAC precautions were discharged. Patient advised to follow up with cardiology clinic in 5-7 days    Physical Exam: 8/6  BP (!) 158/72   Pulse 81   Temp 97.5 °F (36.4 °C)   Resp 18   Ht 5' 8" (1.727 m)   Wt 68.7 kg (151 lb 7.3 oz)   LMP  (LMP Unknown)   SpO2 95%   Breastfeeding? No   BMI 23.03 kg/m²     Constitutional: Appears well-developed and well-nourished. NAD  Head: Normocephalic and atraumatic.   Mouth/Throat: Oropharynx is clear and moist.   Eyes: EOM are normal. Pupils are equal, round, and reactive to light. No scleral icterus.   Neck: Normal range of motion. Neck supple. no bruits appreciated   Cardiovascular: irreg iregular, normal rate, garde 2/6 systolic mild murmur heard at apex  Pulmonary/Chest: Effort normal and breath sounds normal. No respiratory distress. No wheezes, rales, or " rhonchi  Abdominal: Soft. Bowel sounds are normal.  No distension or tenderness  Musculoskeletal: Normal range of motion. No edema.   Neurological: Alert and oriented to person, place, and time.   Skin: Skin is warm and dry.   Vitals reviewed.    CONSULTS: none    PROCEDURES: none    Labs:    Cardiac Enzymes: Ejection Fractions:    Recent Labs      08/05/18   1133   TROPONINI  0.007    EF   Date Value Ref Range Status   08/06/2018 58 55 - 65    03/23/2017 55 55 - 65         Chemistries:     Recent Labs  Lab 08/05/18  1133      K 4.0      CO2 27   BUN 8   CREATININE 0.8   CALCIUM 9.8   PROT 7.1   BILITOT 1.5*   ALKPHOS 64   ALT 25   AST 23   MG 2.3        CBC/Anemia Labs: Coags:      Recent Labs  Lab 08/05/18  1133   WBC 8.43   HGB 14.5   HCT 43.1      MCV 95   RDW 13.0    No results for input(s): PT, INR, APTT in the last 168 hours.     POCT Glucose: HbA1c:      Recent Labs  Lab 08/05/18  1353   POCTGLUCOSE 102    Hemoglobin A1C   Date Value Ref Range Status   03/23/2017 6.0 4.5 - 6.2 % Final     Comment:   06/21/2016 6.0 4.5 - 6.2 % Final   03/16/2012 5.9 4.0 - 6.2 % Final        EKG showed Coarse Afib with likely atypical flutter with PVCs.     Diagnostic Results:    2D echo 8/6  CONCLUSIONS     1 - Upper limit of normal left ventricular enlargement.     2 - Normal left ventricular systolic function (EF 55-60%).     3 - Wall motion abnormalities.     4 - Indeterminate LV diastolic function.     5 - Right ventricular enlargement with normal systolic function.     6 - Biatrial enlargement.     7 - Mild-moderate to moderate (2+) mitral regurgitation.     8 - Mild tricuspid regurgitation.     9 - Trivial to mild pulmonic regurgitation.     10 - The estimated PA systolic pressure is 29 mmHg.       Disposition:  Home       Future Scheduled Appointments:  Future Appointments  Date Time Provider Department Center   8/15/2018 10:30 AM [facility]   8/15/2018  11:00 AM [id number]   8/16/2018 1:00 PM Sheila Mcgee MD [facility] JOSE YISEL Gomes   8/22/2018 1:00 PM EKG, APPT [facility] EKG Roberto Holm   8/22/2018 1:30 PM Sudhir Daly MD [facility] CARDIO Roberto reynold       Follow-up Plans from This Hospitalization:  Cardiology clinic     Discharge Medication List:       Arabella Mamaddi HODGSON   Home Medication Instructions JASE:[id number]    Printed on:08/06/18 5519   Medication Information                      apixaban 5 mg Tab  Take 1 tablet (5 mg total) by mouth 2 (two) times daily.             ascorbic acid (VITAMIN C) 500 MG tablet  Take 500 mg by mouth once daily.             aspirin 81 mg Tab  Take 1 tablet by mouth Daily.             atorvastatin (LIPITOR) 40 MG tablet  TAKE 1 TABLET EVERY DAY             Bifidobacterium infantis (ALIGN) 4 mg Cap  One daily             bisoprolol-hydrochlorothiazide 2.5-6.25 mg (ZIAC) 2.5-6.25 mg Tab  TAKE 1/2 TABLET EVERY DAY             cholecalciferol, vitamin D3, (VITAMIN D3) 2,000 unit Cap  Take by mouth once daily.               estradiol (ESTRACE) 0.01 % (0.1 mg/gram) vaginal cream  Apply a pea sized amount every day for 2 weeks, then 3x/week. 90 day supply             ketoconazole (NIZORAL) 2 % cream  AAA qd- bid for eyebrow prn flare             levothyroxine (SYNTHROID) 75 MCG tablet  TAKE 1 TABLET EVERY DAY - MAKE APPT             naproxen sodium (ANAPROX) 220 MG tablet  Take 220 mg by mouth 2 (two) times daily as needed.             omega-3 fatty acids-vitamin E (FISH OIL) 1,000 mg Cap  Take 1 capsule by mouth once daily.              pantoprazole (PROTONIX) 40 MG tablet  Take 1 tablet (40 mg total) by mouth once daily.                 Patient Instructions:    Discharge Procedure Orders  Diet Cardiac     Notify your health care provider if you experience any of the following:  temperature >100.4     Notify your health care provider if you experience any of the following:  persistent nausea and vomiting or  diarrhea     Notify your health care provider if you experience any of the following:  severe uncontrolled pain     Notify your health care provider if you experience any of the following:  redness, tenderness, or signs of infection (pain, swelling, redness, odor or green/yellow discharge around incision site)     Notify your health care provider if you experience any of the following:  difficulty breathing or increased cough     Notify your health care provider if you experience any of the following:  severe persistent headache     Notify your health care provider if you experience any of the following:  worsening rash     Notify your health care provider if you experience any of the following:  persistent dizziness, light-headedness, or visual disturbances     Notify your health care provider if you experience any of the following:  increased confusion or weakness     Notify your health care provider if you experience any of the following:   Scheduling Instructions: Worsening / persistent palpitations     Activity as tolerated         At the time of discharge patient was told to take all medications as prescribed, to keep all followup appointments, and to call their primary care physician or return to the emergency room if they have any worsening or concerning symptoms.    Signing Physician:  Yue Marrufo MD

## 2018-08-06 NOTE — NURSING
Pt d/c home per MD order. IV removed x1. Tele removed. Pt received printed copy of AVS. Follow up appointments reviewed, medication reviewed. Pt verbalizes understanding.

## 2018-08-07 ENCOUNTER — PATIENT MESSAGE (OUTPATIENT)
Dept: UROLOGY | Facility: CLINIC | Age: 78
End: 2018-08-07

## 2018-08-08 ENCOUNTER — OFFICE VISIT (OUTPATIENT)
Dept: ELECTROPHYSIOLOGY | Facility: CLINIC | Age: 78
End: 2018-08-08
Payer: MEDICARE

## 2018-08-08 ENCOUNTER — HOSPITAL ENCOUNTER (OUTPATIENT)
Dept: CARDIOLOGY | Facility: CLINIC | Age: 78
Discharge: HOME | End: 2018-08-08
Payer: MEDICARE

## 2018-08-08 ENCOUNTER — PATIENT MESSAGE (OUTPATIENT)
Dept: ELECTROPHYSIOLOGY | Facility: CLINIC | Age: 78
End: 2018-08-08

## 2018-08-08 ENCOUNTER — PATIENT MESSAGE (OUTPATIENT)
Dept: CARDIOLOGY | Facility: CLINIC | Age: 78
End: 2018-08-08

## 2018-08-08 ENCOUNTER — TELEPHONE (OUTPATIENT)
Dept: ELECTROPHYSIOLOGY | Facility: CLINIC | Age: 78
End: 2018-08-08

## 2018-08-08 VITALS
HEIGHT: 69 IN | DIASTOLIC BLOOD PRESSURE: 62 MMHG | BODY MASS INDEX: 22.43 KG/M2 | SYSTOLIC BLOOD PRESSURE: 118 MMHG | WEIGHT: 151.44 LBS | HEART RATE: 70 BPM

## 2018-08-08 DIAGNOSIS — I48.91 ATRIAL FIBRILLATION, NEW ONSET: Primary | ICD-10-CM

## 2018-08-08 DIAGNOSIS — I47.29 PVT (PAROXYSMAL VENTRICULAR TACHYCARDIA): ICD-10-CM

## 2018-08-08 DIAGNOSIS — E06.3 HASHIMOTO'S DISEASE: ICD-10-CM

## 2018-08-08 DIAGNOSIS — Z85.828 PERSONAL HISTORY OF SKIN CANCER: ICD-10-CM

## 2018-08-08 DIAGNOSIS — I47.29 PAROXYSMAL VENTRICULAR TACHYCARDIA: ICD-10-CM

## 2018-08-08 DIAGNOSIS — N39.0 RECURRENT UTI: ICD-10-CM

## 2018-08-08 DIAGNOSIS — N39.46 MIXED STRESS AND URGE URINARY INCONTINENCE: ICD-10-CM

## 2018-08-08 DIAGNOSIS — I34.1 MVP (MITRAL VALVE PROLAPSE): ICD-10-CM

## 2018-08-08 DIAGNOSIS — I49.3 PVC'S (PREMATURE VENTRICULAR CONTRACTIONS): ICD-10-CM

## 2018-08-08 DIAGNOSIS — I10 HTN (HYPERTENSION), BENIGN: ICD-10-CM

## 2018-08-08 DIAGNOSIS — E89.0 POST-OPERATIVE HYPOTHYROIDISM: ICD-10-CM

## 2018-08-08 DIAGNOSIS — R55 VASOVAGAL NEAR SYNCOPE: ICD-10-CM

## 2018-08-08 DIAGNOSIS — I47.29 PVT (PAROXYSMAL VENTRICULAR TACHYCARDIA): Primary | ICD-10-CM

## 2018-08-08 DIAGNOSIS — I34.0 NON-RHEUMATIC MITRAL REGURGITATION: ICD-10-CM

## 2018-08-08 PROCEDURE — 99205 OFFICE O/P NEW HI 60 MIN: CPT | Mod: S$GLB,,, | Performed by: INTERNAL MEDICINE

## 2018-08-08 PROCEDURE — 99999 PR PBB SHADOW E&M-EST. PATIENT-LVL III: CPT | Mod: PBBFAC,,, | Performed by: INTERNAL MEDICINE

## 2018-08-08 PROCEDURE — 99499 UNLISTED E&M SERVICE: CPT | Mod: HCNC,S$GLB,, | Performed by: INTERNAL MEDICINE

## 2018-08-08 PROCEDURE — 93000 ELECTROCARDIOGRAM COMPLETE: CPT | Mod: S$GLB,,, | Performed by: INTERNAL MEDICINE

## 2018-08-08 PROCEDURE — 3078F DIAST BP <80 MM HG: CPT | Mod: CPTII,S$GLB,, | Performed by: INTERNAL MEDICINE

## 2018-08-08 PROCEDURE — 3074F SYST BP LT 130 MM HG: CPT | Mod: CPTII,S$GLB,, | Performed by: INTERNAL MEDICINE

## 2018-08-08 NOTE — TELEPHONE ENCOUNTER
Patient, called patient she's schedule today with Dr. Yola Sigala at 1:00 with a ekg. adw        Call patient no answer, left message on voicemail that Dr. Yola Sigala would be able to   see her or the NP Ewa Farr today in the afternoon and if she would be able to come in to give me a call to put her on the schedule. adw

## 2018-08-08 NOTE — PROGRESS NOTES
Subjective:   Patient ID:  Madan Bell is a 78 y.o. female     Chief complaint:PVCs      HPI  Background :  78 y.o. female with PMH Of  HFpEF (55% EF), moderate MVP/ mitral sclerosis, PVCs/ non-sustained VT,prior hx of thyroid cancer s/p thyroidectomy, now with hypothyroidism.  Presented to ED on 8/5 complaining of lightheadedness for the preceding 2 days, associated with worsening fatigue. She stated she had similar episodes intermittently over the prior few days and woke up on the day of ER admit at 5:00am with constant dizziness and lightheadedness.  Her associated SBP was 98-99 (BP generally 130-140s/70s). She was on Ziac (very low dose) for HTN and arrhythmia control. Compliant with at home meds.  In the ED, she was noted to be in AF with RVR.  n the ED, pt was anxious with HR of 130-120s, and BP of 160-200s. Resolved once pt calmed down with no intervention.  Patient denied chest pain, palpitations, dyspnea, headaches, syncope, vertigo, cough, nausea, vomiting.   No hx of CAD, PE. Had a VTE 50 years ago post partum (unsure if superficial or DVT, but did not received anticoag). No peripheral deficits, falls, facial drool. No bleeding. Denied dysuria, suprapubic pain, or urinary changes.      She was admitted and had anACS w/up with neg troponins, EKG with no TWI or ST elevation.   Heart was Rate controlled, with her low dose of home bisoprolo. She was started on apixaban given CHADsVASC of 4. Echo showed EF of 55% with  biatrial enlargement and Mild-moderate to moderate (2+) mitral regurgitation.      On the day of discharge, patient was in good condition ut in persistent AF.     Update since then:  She is now here with her DTR to see me a day after DC. Still feels fatigued and with some L/H.  I have reviewed the actual image of the ECG tracing obtained today and it shows AF with calculated average VR of 70  bpm, measured RR of 400- 1500  msec with o/w normal intervals.    Current Outpatient  Prescriptions   Medication Sig    apixaban 5 mg Tab Take 1 tablet (5 mg total) by mouth 2 (two) times daily.    ascorbic acid (VITAMIN C) 500 MG tablet Take 500 mg by mouth once daily.    aspirin 81 mg Tab Take 1 tablet by mouth Daily.    atorvastatin (LIPITOR) 40 MG tablet TAKE 1 TABLET EVERY DAY    Bifidobacterium infantis (ALIGN) 4 mg Cap One daily    bisoprolol-hydrochlorothiazide 2.5-6.25 mg (ZIAC) 2.5-6.25 mg Tab TAKE 1/2 TABLET EVERY DAY    cholecalciferol, vitamin D3, (VITAMIN D3) 2,000 unit Cap Take by mouth once daily.      estradiol (ESTRACE) 0.01 % (0.1 mg/gram) vaginal cream Apply a pea sized amount every day for 2 weeks, then 3x/week. 90 day supply    ketoconazole (NIZORAL) 2 % cream AAA qd- bid for eyebrow prn flare    levothyroxine (SYNTHROID) 75 MCG tablet TAKE 1 TABLET EVERY DAY - MAKE APPT    naproxen sodium (ANAPROX) 220 MG tablet Take 220 mg by mouth 2 (two) times daily as needed.    omega-3 fatty acids-vitamin E (FISH OIL) 1,000 mg Cap Take 1 capsule by mouth once daily.     pantoprazole (PROTONIX) 40 MG tablet Take 1 tablet (40 mg total) by mouth once daily.     No current facility-administered medications for this visit.      Review of Systems   Constitution: Positive for malaise/fatigue. Negative for decreased appetite, weakness, weight gain and weight loss.   HENT: Negative for nosebleeds.    Eyes: Negative for blurred vision and visual disturbance.   Cardiovascular: Negative for chest pain, claudication, cyanosis, dyspnea on exertion, irregular heartbeat, leg swelling, near-syncope, orthopnea, palpitations, paroxysmal nocturnal dyspnea and syncope.   Respiratory: Negative for cough, shortness of breath and wheezing.    Endocrine: Negative for heat intolerance.   Skin: Negative for rash.   Musculoskeletal: Positive for muscle weakness. Negative for myalgias.   Gastrointestinal: Negative for abdominal pain, anorexia, melena, nausea and vomiting.   Genitourinary: Negative for  menorrhagia.   Neurological: Positive for dizziness, focal weakness and loss of balance. Negative for excessive daytime sleepiness, headaches, seizures and vertigo.   Psychiatric/Behavioral: Negative for altered mental status and depression. The patient is not nervous/anxious.    All other systems reviewed and are negative.      Objective:   Physical Exam   Constitutional: She is oriented to person, place, and time. She appears well-developed and well-nourished.   HENT:   Head: Normocephalic and atraumatic.   Right Ear: External ear normal.   Left Ear: External ear normal.   Neck: Normal range of motion. Neck supple. No thyromegaly present.   Cardiovascular: Normal rate and intact distal pulses.  An irregularly irregular rhythm present. Exam reveals no gallop, no S3, no S4, no friction rub, no midsystolic click and no opening snap.    Murmur heard.  High-pitched blowing holosystolic murmur is present with a grade of 2/6  at the apex  Pulses:       Carotid pulses are 2+ on the right side, and 2+ on the left side.       Radial pulses are 2+ on the right side, and 2+ on the left side.        Dorsalis pedis pulses are 2+ on the right side, and 2+ on the left side.        Posterior tibial pulses are 2+ on the right side, and 2+ on the left side.   Pulmonary/Chest: Effort normal and breath sounds normal.   Abdominal: Soft. She exhibits no distension. There is no tenderness.   Musculoskeletal:        Right ankle: She exhibits no swelling.        Left ankle: She exhibits no swelling.        Right lower leg: She exhibits no swelling.        Left lower leg: She exhibits no swelling.   Neurological: She is alert and oriented to person, place, and time. She has normal strength. No cranial nerve deficit. Gait normal.   Skin: Skin is warm. No rash noted. No cyanosis. Nails show no clubbing.   Psychiatric: She has a normal mood and affect. Her speech is normal and behavior is normal. Thought content normal. Cognition and memory  "are normal.   Nursing note and vitals reviewed.    /62   Pulse 70   Ht 5' 9" (1.753 m)   Wt 68.7 kg (151 lb 7.3 oz)   LMP  (LMP Unknown)   BMI 22.37 kg/m²      Assessment:      1. Atrial fibrillation, new onset    2. HTN (hypertension), benign    3. MVP (mitral valve prolapse)    4. PVC's (premature ventricular contractions)    5. Paroxysmal ventricular tachycardia    6. Vasovagal near syncope    7. Non-rheumatic mitral regurgitation    8. Recurrent UTI    9. Mixed stress and urge urinary incontinence    10. Personal history of skin cancer    11. Hashimoto's disease    12. Post-operative hypothyroidism        Plan:      The patient and I have had a long talk about A Fib, its causes, its clinical manifestations, the electrical and hemodynamic reasons underlying the symptoms it causes, its prognostic implications, its prevalence, its temporal manifestations and its currently available treatment options.     As to causes,         we talked about the triad of obesity, sleep apnea and hypertension as being the most common underpinnings of non valvular AF. To this one should add alcohol consumption, hyperthyroidism and major stressors such as acute illnesses and post operative states as potential significant but usually reversible triggers.    Other important associated factors include aging, SSS and at times a familial/genetic tendency (the latter is invoked in younger people).  Of course, patients with reduced heart function and/or valvular disease (especially mitral regurgitation) are more prone to developing atrial fibrillation. Furthermore, atrial fibrillation and these conditions seem to promote each other in a vicious cycle like pattern (AFib worsens heart failure and MR which in turn promote more A Fib etc).     As to clinical manifestations,        we discussed the fact that some patients can present with severe symptoms including palpitations (fast and/or irregular), SOB, MCKINLEY and tami heart failure, " dizziness, near syncope and rarely syncope. Others may have milder Sx such as mild intermittent palpitations, fatigue, generalized malaise, sleep disturbances, and simply a poorly defined decrease in well being (including a depressed mood, a decrease in mental agility and maybe an increase in irritability etc). Others yet are apparently completely asymptomatic and are often surprised to note that their pulse is irregular when they self check it or to be told that their ECG shows A Fib. This group is said to comprise approximately 27% of all patients with A Fib ([place] study data), but upon closer scrutiny many so called asymptomatic patients find that indeed they had some A Fib related ill defined Sx that were affecting their lifestyles a lot more than they had suspected (generally, these Sx would be ascribed to being deconditioned, getting old etc but they seem to be reversed once AFib is reverted to normal sinus rhythm (NSR)).    As to why A Fib cause symptoms,       we discussed how the normal heart beat is engineered. To wit, the heart is a contractile pump that cycles through a filling (rest) period followed by a squeezing (active) period. These two consecutive periods form one cardiac cycle that gets continuously repeated. To start a cycle, the pump needs to be electrically supplied/charged. This happens with the help of the sinus node that sets the heart rhythm that is designed to coordinate the smooth transfer of blood flow between the various heart chambers and their attached vessels.  The heart has 4 cardiac chambers: two upper chambers called the right and left atria and two lower chambers called the right and left ventricles. The upper chambers are connected to large veins that carry blood back for cardiac filling. The right upper chamber (right atrium) is connected to two veins called vena cava. They carry the de-oxygenated blood back from the lower and upper body to the right atrium (the superior  "vena cava receives blood back from the head and neck and upper torso including the arms; the inferior vena cava receives blood from the lower body). The L atrium receives re-oxygenated blood from the lungs via four pulmonary veins(PV)  (two R and two L). As we will see later, these PVs are integrally responsible for the development of most A fib events.   The sinus node is located in the area near the attachment of the superior vena cava (SVC) to the right atrium (RA). It is an electrically automatic structure that builds up a charge and releases it after reaching a certain voltage (not unlike a capacitor). This electrical impulse will then be carried by electrical wires to the R and L atria. The signal will then be distributed through a myriad of small connections to the individual muscle cells within the atria. This is a rapid process that will cause the muscles of the upper chambers to contract and therefore squeeze down their internal volumes. As the size of the chambers gets reduced, the internal pressure within their cavity is increased. This is in turn will open the valves that separate the atria from the ventricles (the AV valves: tricuspid valve on the right, mitral valve on the left). Thus starts the filling of the ventricles. This is mostly a passive "rushing" process. When filling of the ventricles becomes substantial enough the pressure jesús within their cavities and the atria then actively squeeze to empty the rest of the blood they contain (this active "atrial kick" is generally responsible for 15 to 25% of ventricular filling but could become a much more important contributor in certain pathological conditions such as diastolic heart failure etc). Once the atrial kick is complete and the atria are virtually empty, the pressure in the ventricles becomes high enough that the AV valves close up thus sealing the atria from the subsequent ventricular contraction.  As one can suspect, all of this " mechanical activity takes a much longer time than the electrical impulse that set it off. Since the ventricular contraction also requires similar electrical activation there needs to be a system of electro-mechanical coupling. This is insured by the AV node which acts as a relay station, a sort of filter that holds on to the electrical impulse just long enough for the atrial contraction to be complete. The electrical impulse is then released into electrical cables (the R and L bundle branches) that in turn deliver the signals to the individual myocardial cells. This sets up the ventricular squeeze and the R ventricle (RV) pushes the blood through the pulmonary artery (PA) into the lungs for re-oxygenation while the L ventricle (LV) pushes the blood into the aorta to be distributed to the rest of the body where the oxygen gets used by the various organs. Once the ventricular systole is complete, the ventricles then start the process of relaxation and their internal pressure drops setting the stage  For a new full cardiac cycle.  This orderly process of filling and emptying, relaxation and contraction is intimately related to the function of the sinus node. In turn the sinus node is controlled by nerve impulses that allow it to speed up or slow down its repetition rate depending on needs (faster heart rates when a person is active, slower rates when sleeping etc).       When A fib occurs, it shuts off the function of the sinus node. Unfortunately, in the presence of A Fib, there are very many (up to 400) small irregular impulse that flow around, bump and compete for conduction across the AV node. Fortunately, only some of these impulse will make through to the ventricles. Nevertheless, this results in an irregular ventricular heart beat that is at times very fast. The fast heart beat does not allow the heart enough time to relax and fill and therefore the blood distribution to the body is impaired and the patient may  "feel palpitations, dizziness, weakness and SOB among other symptoms. The irregular heart beat further impairs the mechanics of the heart squeeze and may increase leaks across the AV valves. Finally, because the atrial electrical signals are many, there is no organized atrial contraction so that filling of the lower chambers is only a passive "sucking" process as the atrial kick cannot occur anymore. As A Fib sets in, the atrial muscle tires, and eventually weakens causing the atrial chambers to gradually enlarge and the blood within them to become more and more stagnant especially in some dependent areas (such as the left atrial appendage- CELINA).     The stagnation of blood tends to form blood clots in the CELINA (a finger like extension of the LA that has muscular areas and is located in a dependent position). If and when these blood clots get detached from the CELINA, they travel into the circulation and may plug up some important arterial blood vessel resulting in organ damage (such as strokes, heart attacks, limb clots etc).    As to prognosis,       due to excess stroke risk and cardiovascular stresses,AF  has been implicated in an overall reduction of expected life span by an estimated average two to three years.  Nevertheless, many individuals can live long, happy and productive lives while in persistent atrial fibrillation. There are in fact an estimated five million people in the [place] alone who have some form of atrial fibrillation.    As to its types and temporal manifestations,       A Fib, is labeled as either paroxysmal (PAF) or persistent Pers AF) or permanent (perm AF).          PAF starts suddenly and stops on its own, usually within less than a week.          Pers AF stays longer than a week but is reversible by medical intervention.             Of course both PAF and Pers AF can be recurrent and may require repetitive medical interventions.         Perm AF is AF that cannot be converted back to NSR for any " meaningful period of time despite any and all medical interventions.             Of course, this definition changes with time as medical advances keep adding newer and more powerful tools designed to keep AF at bay.    As to treatment, there are two related but somewhat independent pathways that need to be followed simultaneously.      First and foremost, one needs to manage and attenuate the risks of stroke that are attendant to AF. This is done largely through the use of blood thinning medications (anticoagulants) namely warfarin (coumadin) or the newer, easier to use, generally more effective and safer (but more expensive) agents. These are Dabigatran (Pradaxa), Rivaroxiban (Xarelto), Apixaban (Eliquis) and Edoxaban (Savaysa). Unfortunately Aspirin alone is ineffective while the combination of Aspirin and Clopidogrel (Plavix) provides at most mild protection (less than warfarin) and does not significantly decrease the risk of associated bleeding.  There are also mechanical solutions that involve occlusion or ligation of the CELINA but these are reserved to special cases.   The anticoagulation decisions are made as soon as one is diagnosed with AFib whether it reverts back to NSR or not. In general,, once a decision to give long term anticoagulation is made it is not modified by the specifics of the rhythm treatment of AFib.  The decisions re: risk benefit of oral anticoagulation are made based on a system of classification whereby a risk score is calculated. The higher the risk score the higher the annual risk of stroke. The scoring system is called the [id number] score ( a point is attributed to each of age>65, age>75, F sex, presence of hypertension, diabetes, heart failure and/or vascular disease. Two points are attributed to a history of prior stroke or TIA). The maximum score is 9. The lowest would be zero. No chronic oral anticoagulation is recommended for patients with a score of zero. Patients with scores  of 2 or more are generally advised to start anticoagulants. Patients with a score of 1  a gray zone as to risk/benefit ratio for anticoagulants.         Second, one needs to minimize the impact of AF on cardiovascular hemodynamics and function.      There are two approaches to doing that:          One approach, called the rate control strategy, is designed to simply increasing the filtering across the AV node thus slowing the heart rate. This strategy, when combined with effective anticoagulation, is statistically effective in preserving patients' overall health and longevity but is not satisfactory to everyone. Generally, the following medications are used: Beta Blockers (there are many), Diltiazem and Verapamil. In addition , Digoxin (Lanoxin) can sometimes be useful.   With this strategy, the patient will remain in AF and although the heart rate is slower it remains irregular (so palpitations may persist although more serious symptoms such as SOB, MCKINLEY and CHF may kely).  When rate control is not effective despite optimal medical treatment, one can resort to placing a pacemaker and following up with ablation of the AV node. This effectively stops the flow of all electrical activity from the atria to the ventricles.         The second approach, called the rhythm control strategy is designed to restore the NSR. This is theoretically ideal. Unfortunately it can at times be a more difficult task and often relies on more intensive medical and procedural interventions including more powerful (and potentially toxic) medications than the ones used for rate control alone.              The most benign and helpful immediate intervention to terminate AF is electrical cardioversion (DCCV). When well prepared for (appropriate anticoagulation) and well executed (appropriate sedation) this is a very easy, safe and effective procedure that restores sinus rhythm in more than 90% of instances. The weakness of DCCV is that  it provides temporary relief and other factors and or treatment measures have to be conjured to keep AF at bay for long periods of time. On the positive side, DCCV can be used repeatedly with seemingly little downside except for the practical disruptions to one's schedule and perhaps eventual futility.             To maintain NSR once initial DCCV fails to provide long term relief, antiarrhythmic drugs (AA Rx) are prescribed. These include type 1A drugs (Quinidine and disopyramide), type 1Cdrugs (flecainide and propafenone) and type III drugs (amiodarone, sotalol, amiodarone, dronedarone and dofetilide). In addition, Ranozalin, a drug that is approved for angina is sometimes used as an adjunct with amiodarone or dronedarone. Usually, the type 1A agents as well as dofetilide and perhaps sotalol require in hospital initiation to monitor their effects and/or side effects. The other agents are generally started on an outpatient basis. These medications are powerful and require follow up by a cardiology specialist, preferably an electrophysiologist.              When drugs are not effective, poorly tolerated or simply not wanted, ablative procedures can be used. The general goal of ablation is to electrically isolate the pulmonary veins (PVs). In A Fib, the PVs harbor abnormal triggering signals that are responsible for initiation of AF. PV isolation silences or isolates these triggers.                    This can be done either surgically (the MAZE procedure) or via catheter ablation whereby catheters are placed through the veins in the groin and across the interatrial membrane. These are then guided to the pulmonary veins and cauterizing lesions are placed circumferentially outside the PV orifices. Two types of energy are commonly used: radiofrequency energy (hot cautery)  is generally applied point by point via a 3.5 mm tipped catheter while cryoablation (cold cautery) is generally applied by insuflating cold nitrogen  into a  balloon expanded against the PV orifice.                    Both catheter mediated techniques have similar risks and success rates. The complication rate is rather low but the potential hazards can lead to serious injury (including but not limited to death, cardiac or great vessel perforation possibly requiring chest surgery, esophageal injury, PV narrowing, injury to the phrenic nerves - the nerves that innervate the diaphragms- , complications anesthesia, kidney injury, heart attack, heart failure and stroke).                   The MAZE procedure has similar complication and success profiles. Of course, one obvious downside is the fact that chest surgery is required (somtimes a minimal thoracotomy or thoracoscopic approach can be used). Accordingly, we recommend a stand alone MAZE procedure rather infrequently and/or as a late resort procedure. The MAZE is however frequently performed as an adjunct procedure when other open heart surgery is required (especially mitral valve repair). One upside of the surgical approach is that the CELINA can be easily resected/sutured shut, thus potentially reducing the risk of future strokes and perhaps also helping increase the long term effectiveness of the PVI (it is thought that the CELINA is at times a major contributor to the persistence of AF and ligating it causes it to shrink and become it electrically silent).                    Attempts at reproducing the MAZE procedure using transcutaneous techniques are currently underway under FDA human experimentation guidance. The aMAZE protocol is a randomized FDA approved/monitored randomized study that is evaluating the value of CELINA ligation when added to PVI in the control of persistent AF. The CELINA ligation technique under study is a transcutaneous technique using the Lariat tool. The Lariat is a suture delivery system that allows us to cinch the CELINA at its base once properly positioned. To position the Lariat, one needs to  "access the pericardial space (the double membrane that surrounds the heart) with a fine needle introduced from just below the sternum. A thin wire is then advanced over which gradually larger dilators and sheaths are advanced. Eventually a magnet tipped guidewire is advanced in the pericardial space and placed just in front of the tip of the CELINA. Next, another magnet tipped guidewire is introduced through the femoral vein, across the interatrial septal membrane and placed into the tip of the CELINA. The external (pericardial) and internal (trans-septal) wires are then maneuvered to stick together with tip of the CELINA between the two magnets. This creates a ramp over which the Lariat "noose" is advanced and maneuvered over the tip and body of the CELINA to reach its base where the noose is then closed and after a series of confirmatory steps revealing appropriate closure, the permanent suture is delivered thus walling off the CELINA from the L atrium and thus from the arterial circulation. This has similar effects as the surgical CELINA resection (cavity closure and electrical silence). The Lariat and the femoral catheters are then removed.                   The aMAZE protocol randomizes patients with long standing persistent AF in a 2:1 fashion to PVI (using RFA) alone vs Lariat followed by PVI.     In addition to these downstream (reactive) treatment regimens, one needs to also offer "upstream"(preventive) treatments.        These include steps taken to achieve strict control of the conditions that we listed above as contributory causes to AF.       Thus, weight control (including bariatric surgery), sleep apnea management (CPAP etc), abstinence from alcohol intake, BP control, treatment of hyperthyroidism (tapazole, radio-iodine ablation, thyroidectomy), management of heart failure and valvular disease (including surgical correction) are all essential to enhance the effectiveness of AF treatment and control of recurrences.  "       At this point, we will simply proceed with DCCV after ANAHI survey. She understands that this may not be a long lasting solution but it'll be helpful to relieve Sx quickly and perhaps establish a pattern of clinical progress of her AF.  No orders of the defined types were placed in this encounter.    Follow-up post DCCV.  There are no discontinued medications.  New Prescriptions    No medications on file     Modified Medications    No medications on file

## 2018-08-08 NOTE — PROGRESS NOTES
TRANSESOPHAGEAL ECHO (ANAHI) INSTRUCTIONS    You have been scheduled for a procedure in the echo lab on 8-13-18.      Please report to the Cardiology Waiting Area on the Third floor of the hospital and check in at @ 8 AM.     You will then be taken to the SSCU (Short Stay Cardiac Unit) and prepared for your procedure. Please be aware that this is not the time of your procedure but the time you are to arrive. The procedures are scheduled on an hourly basis; however, emergency cases take precedence over all other cases.      You may not have anything to eat or drink after midnight the night before your test.     Medications:  You may take your regular morning medications with water.     The procedure will take 1-2 hours to perform. After the procedure, you will return to SSCU on the third floor of the hospital. Your family may remain in the room with you during this time.      You will be discharged home that same afternoon. You must have someone to drive you home.  Your doctor will determine, based on your progress, the time of your discharge. The results of your procedure will be discussed with you before you are discharged.      You will be contacted by the echo department prior to your procedure for a  pre-procedure questionnaire.     Any need to reschedule or cancel procedures, or any questions regarding your procedures should be addressed directly with the Arrhythmia Department Nurses at the following phone number: [phone].    Directions to Cardiology Waiting Area:  If you park in the Parking Garage:  Take elevators to the 2nd floor  Walk up ramp and turn right by Gold Elevators  Take elevator to the 3rd floor  Upon exiting the elevator, turn away from the clinic areas  Walk long marie around to front of hospital to area with windows overlooking [facility]  Check in at Reception Desk  OR  If family is dropping you off:  Have them drop you off at the front of the Hospital  (Near the ER, where all the  flags are hung).  Take the E elevators to the 3rd floor.  Check in at the Reception Desk in the waiting room.

## 2018-08-09 ENCOUNTER — PATIENT MESSAGE (OUTPATIENT)
Dept: INTERNAL MEDICINE | Facility: CLINIC | Age: 78
End: 2018-08-09

## 2018-08-09 ENCOUNTER — TELEPHONE (OUTPATIENT)
Dept: INTERNAL MEDICINE | Facility: CLINIC | Age: 78
End: 2018-08-09

## 2018-08-09 ENCOUNTER — PATIENT MESSAGE (OUTPATIENT)
Dept: MEDSURG UNIT | Facility: HOSPITAL | Age: 78
End: 2018-08-09

## 2018-08-09 NOTE — TELEPHONE ENCOUNTER
Madan Mcgee MD 8 minutes ago (11:28 AM)         Good morning Sade,     My mom was hospitalized this past weekend for new onset afib and saw Dr Suárez in Electrophysiology yesterday. He is planning to do a ANAHI on Monday, 8/13 , and if no clots, will immediately proceed with Cardioversion. She is experiencing dizziness and fatigue.     She is scheduled to see Dr Mcgee  on Thurs 8/16 at 100pm for yearly wellness. We would like to reschedule please, but were hoping to still get to see her  within the next month- late morning or afternoon, if possible.     Please call me with any questions or concerns. We appreciate your help.   Thank you,   Cris Khan (daughter) [phone]

## 2018-08-10 ENCOUNTER — TELEPHONE (OUTPATIENT)
Dept: ELECTROPHYSIOLOGY | Facility: CLINIC | Age: 78
End: 2018-08-10

## 2018-08-10 NOTE — TELEPHONE ENCOUNTER
Spoke with patient, went over pre-procedure instructions, to include: Pre-Op Labs, How to take medications prior to procedure, Procedure date/time, Where to report to on day of procedure, Expected time frame of procedure, Expected Discharge date. Patient verbalizes understanding and is in agreement to adhere to above.   Chrissy BOYKIN

## 2018-08-12 ENCOUNTER — ANESTHESIA EVENT (OUTPATIENT)
Dept: MEDSURG UNIT | Facility: HOSPITAL | Age: 78
End: 2018-08-12
Payer: MEDICARE

## 2018-08-13 ENCOUNTER — PATIENT MESSAGE (OUTPATIENT)
Dept: CARDIOLOGY | Facility: CLINIC | Age: 78
End: 2018-08-13

## 2018-08-13 ENCOUNTER — HOSPITAL ENCOUNTER (OUTPATIENT)
Dept: CARDIOLOGY | Facility: CLINIC | Age: 78
Discharge: HOME | End: 2018-08-13
Attending: INTERNAL MEDICINE | Admitting: INTERNAL MEDICINE
Payer: MEDICARE

## 2018-08-13 ENCOUNTER — ANESTHESIA (OUTPATIENT)
Dept: MEDSURG UNIT | Facility: HOSPITAL | Age: 78
End: 2018-08-13
Payer: MEDICARE

## 2018-08-13 ENCOUNTER — HOSPITAL ENCOUNTER (OUTPATIENT)
Facility: HOSPITAL | Age: 78
Discharge: HOME | End: 2018-08-13
Attending: INTERNAL MEDICINE | Admitting: INTERNAL MEDICINE
Payer: MEDICARE

## 2018-08-13 VITALS
RESPIRATION RATE: 18 BRPM | SYSTOLIC BLOOD PRESSURE: 120 MMHG | OXYGEN SATURATION: 100 % | HEIGHT: 69 IN | TEMPERATURE: 98 F | HEART RATE: 49 BPM | WEIGHT: 152 LBS | BODY MASS INDEX: 22.51 KG/M2 | DIASTOLIC BLOOD PRESSURE: 56 MMHG

## 2018-08-13 DIAGNOSIS — I48.91 AF (ATRIAL FIBRILLATION): ICD-10-CM

## 2018-08-13 DIAGNOSIS — I48.91 ATRIAL FIBRILLATION, NEW ONSET: Primary | ICD-10-CM

## 2018-08-13 LAB
MITRAL VALVE MOBILITY: ABNORMAL
MITRAL VALVE REGURGITATION: ABNORMAL
RETIRED EF AND QEF - SEE NOTES: 60 (ref 55–65)
TRICUSPID VALVE REGURGITATION: ABNORMAL

## 2018-08-13 PROCEDURE — 93320 DOPPLER ECHO COMPLETE: CPT | Mod: 26,,, | Performed by: INTERNAL MEDICINE

## 2018-08-13 PROCEDURE — 99499 UNLISTED E&M SERVICE: CPT | Mod: GC,,, | Performed by: INTERNAL MEDICINE

## 2018-08-13 PROCEDURE — 93312 ECHO TRANSESOPHAGEAL: CPT | Mod: 26,,, | Performed by: INTERNAL MEDICINE

## 2018-08-13 PROCEDURE — D9220A PRA ANESTHESIA: Mod: CRNA,,, | Performed by: NURSE ANESTHETIST, CERTIFIED REGISTERED

## 2018-08-13 PROCEDURE — 93010 ELECTROCARDIOGRAM REPORT: CPT | Mod: ,,, | Performed by: INTERNAL MEDICINE

## 2018-08-13 PROCEDURE — 37000008 HC ANESTHESIA 1ST 15 MINUTES: Performed by: INTERNAL MEDICINE

## 2018-08-13 PROCEDURE — 63600175 PHARM REV CODE 636 W HCPCS: Performed by: NURSE ANESTHETIST, CERTIFIED REGISTERED

## 2018-08-13 PROCEDURE — 93325 DOPPLER ECHO COLOR FLOW MAPG: CPT | Mod: 26,,, | Performed by: INTERNAL MEDICINE

## 2018-08-13 PROCEDURE — 93312 ECHO TRANSESOPHAGEAL: CPT

## 2018-08-13 PROCEDURE — D9220A PRA ANESTHESIA: Mod: ANES,,, | Performed by: ANESTHESIOLOGY

## 2018-08-13 PROCEDURE — 92960 CARDIOVERSION ELECTRIC EXT: CPT | Mod: ,,, | Performed by: INTERNAL MEDICINE

## 2018-08-13 PROCEDURE — 25000003 PHARM REV CODE 250: Performed by: NURSE PRACTITIONER

## 2018-08-13 PROCEDURE — 93005 ELECTROCARDIOGRAM TRACING: CPT

## 2018-08-13 PROCEDURE — 25000003 PHARM REV CODE 250

## 2018-08-13 PROCEDURE — 92960 CARDIOVERSION ELECTRIC EXT: CPT

## 2018-08-13 PROCEDURE — 37000009 HC ANESTHESIA EA ADD 15 MINS: Performed by: INTERNAL MEDICINE

## 2018-08-13 RX ORDER — PROPOFOL 10 MG/ML
VIAL (ML) INTRAVENOUS
Status: DISCONTINUED | OUTPATIENT
Start: 2018-08-13 | End: 2018-08-13

## 2018-08-13 RX ORDER — MIDAZOLAM HYDROCHLORIDE 1 MG/ML
INJECTION, SOLUTION INTRAMUSCULAR; INTRAVENOUS
Status: DISCONTINUED | OUTPATIENT
Start: 2018-08-13 | End: 2018-08-13

## 2018-08-13 RX ORDER — FENTANYL CITRATE 50 UG/ML
25 INJECTION, SOLUTION INTRAMUSCULAR; INTRAVENOUS EVERY 5 MIN PRN
Status: DISCONTINUED | OUTPATIENT
Start: 2018-08-13 | End: 2018-08-13 | Stop reason: HOSPADM

## 2018-08-13 RX ORDER — PROPOFOL 10 MG/ML
VIAL (ML) INTRAVENOUS CONTINUOUS PRN
Status: DISCONTINUED | OUTPATIENT
Start: 2018-08-13 | End: 2018-08-13

## 2018-08-13 RX ORDER — LIDOCAINE HCL/PF 100 MG/5ML
SYRINGE (ML) INTRAVENOUS
Status: DISCONTINUED | OUTPATIENT
Start: 2018-08-13 | End: 2018-08-13

## 2018-08-13 RX ORDER — SODIUM CHLORIDE 0.9 % (FLUSH) 0.9 %
3 SYRINGE (ML) INJECTION
Status: DISCONTINUED | OUTPATIENT
Start: 2018-08-13 | End: 2018-08-13 | Stop reason: HOSPADM

## 2018-08-13 RX ORDER — SODIUM CHLORIDE 9 MG/ML
INJECTION, SOLUTION INTRAVENOUS CONTINUOUS
Status: DISCONTINUED | OUTPATIENT
Start: 2018-08-13 | End: 2018-08-13

## 2018-08-13 RX ORDER — ONDANSETRON 2 MG/ML
4 INJECTION INTRAMUSCULAR; INTRAVENOUS ONCE AS NEEDED
Status: DISCONTINUED | OUTPATIENT
Start: 2018-08-13 | End: 2018-08-13 | Stop reason: HOSPADM

## 2018-08-13 RX ORDER — ONDANSETRON 2 MG/ML
INJECTION INTRAMUSCULAR; INTRAVENOUS
Status: DISCONTINUED | OUTPATIENT
Start: 2018-08-13 | End: 2018-08-13

## 2018-08-13 RX ADMIN — SODIUM CHLORIDE: 0.9 INJECTION, SOLUTION INTRAVENOUS at 10:08

## 2018-08-13 RX ADMIN — ONDANSETRON 4 MG: 2 INJECTION INTRAMUSCULAR; INTRAVENOUS at 10:08

## 2018-08-13 RX ADMIN — PROPOFOL 125 MCG/KG/MIN: 10 INJECTION, EMULSION INTRAVENOUS at 10:08

## 2018-08-13 RX ADMIN — MIDAZOLAM 2 MG: 1 INJECTION INTRAMUSCULAR; INTRAVENOUS at 10:08

## 2018-08-13 RX ADMIN — PROPOFOL 30 MG: 10 INJECTION, EMULSION INTRAVENOUS at 10:08

## 2018-08-13 RX ADMIN — LIDOCAINE HYDROCHLORIDE 100 MG: 20 INJECTION, SOLUTION INTRAVENOUS at 10:08

## 2018-08-13 NOTE — CONSULTS
TRANSESOPHAGEAL ECHOCARDIOGRAPHY   PRE-PROCEDURE NOTE    08/13/2018    HPI:     Madan Bell is a 78 y.o. woman with PMHx of HFpEF, NSVT/PVCs, thyroid cancer s/p thyroidectomy, AF since 8/5, and post-partum VTE 50 years ago (did not receive AC) who presents for ANAHI prior to DCCV to r/o CELINA thrombus. The patient is on apixaban and ASA.     TTE    1 - Upper limit of normal left ventricular enlargement.     2 - Normal left ventricular systolic function (EF 55-60%).     3 - Wall motion abnormalities.     4 - Indeterminate LV diastolic function.     5 - Right ventricular enlargement with normal systolic function.     6 - Biatrial enlargement.     7 - Mild-moderate to moderate (2+) mitral regurgitation.     8 - Mild tricuspid regurgitation.     9 - Trivial to mild pulmonic regurgitation.     10 - The estimated PA systolic pressure is 29 mmHg.     Dysphagia or odynophagia:  No  Liver Disease, esophageal disease, or known varices:  No  Upper GI Bleeding: No  Snoring:  Yes  Sleep Apnea:  No  Prior neck surgery or radiation:  Yes  History of anesthetic difficulties:  No  Family history of anesthetic difficulties:  No  Last oral intake:  12 hours ago  Able to move neck in all directions:  Yes      Meds:     Scheduled Meds:  PRN Meds:  Continuous Infusions:   sodium chloride 0.9%         Physical Exam:     Vitals:  Temp:  [96.6 °F (35.9 °C)]   Pulse:  [64]   Resp:  [18]   BP: (131-138)/(71-79)   SpO2:  [98 %]        Constitutional: NAD, conversant  HEENT:   Sclera anicteric, Uvula midline, EOMI, OP clear  Neck:               No JVD, moves to all direction without any limitations  CV:               Irregularly, irregular, no murmurs / rubs / gallops, normal S1/S2  Pulm:               CTAB, no wheezes, rales, or ronchi  GI:               Abdomen soft, NTND, +BS  Extremities:              No LE edema, warm with palpable pulses  Neuro:   AAOX3, no focal motor deficits    Mallampati: 3  ASA: 2      Labs:     Recent Results  (from the past 336 hour(s))   CBC auto differential    Collection Time: 18  2:30 PM   Result Value Ref Range    WBC 7.04 3.90 - 12.70 K/uL    Hemoglobin 14.6 12.0 - 16.0 g/dL    Hematocrit 44.4 37.0 - 48.5 %    Platelets 188 150 - 350 K/uL   CBC auto differential    Collection Time: 18 11:33 AM   Result Value Ref Range    WBC 8.43 3.90 - 12.70 K/uL    Hemoglobin 14.5 12.0 - 16.0 g/dL    Hematocrit 43.1 37.0 - 48.5 %    Platelets 176 150 - 350 K/uL       Recent Results (from the past 336 hour(s))   Basic metabolic panel    Collection Time: 18  2:30 PM   Result Value Ref Range    Sodium 132 (L) 136 - 145 mmol/L    Potassium 4.2 3.5 - 5.1 mmol/L    Chloride 97 95 - 110 mmol/L    CO2 27 23 - 29 mmol/L    BUN, Bld 8 8 - 23 mg/dL    Creatinine 0.7 0.5 - 1.4 mg/dL    Calcium 9.9 8.7 - 10.5 mg/dL    Anion Gap 8 8 - 16 mmol/L       Estimated Creatinine Clearance: 70.3 mL/min (based on SCr of 0.7 mg/dL).    Imaging:      [id number]  AF with HR 67 BPM          Assessment & Plan:     PLAN:  1. ANAHI for evaluation of CELINA thrombus.    -The risks, benefits & alternatives of the procedure were explained to the patient.    -The risks of transesophageal echo include but are not limited to:  Dental trauma, esophageal trauma/perforation, bleeding, laryngospasm/brochospasm, aspiration, sore throat/hoarseness, & dislodgement of the endotracheal tube/nasogastric tube (where applicable).    -The risks of moderate sedation include hypotension, respiratory depression, arrhythmias, bronchospasm, & death.    -Informed consent was obtained & the patient is agreeable to proceed with the procedure.    I will discuss with the attending physician. Attending addendum is to follow.     Further recommendations per attending addendum    Scottie Cross MD

## 2018-08-13 NOTE — DISCHARGE SUMMARY
[facility]  Cardiac Electrophysiology  Discharge Summary      Patient Name: Madan Bell  MRN: [id number]  Admission Date: 8/13/2018  Hospital Length of Stay: 0 days  Discharge Date and Time:  08/13/2018 11:23 AM  Attending Physician: Fernando Disla MD    Discharging Provider: Giancarlo Trejo MD  Primary Care Physician: Sheila Mcgee MD    HPI:   Madan Bell is a 78 y.o. woman with PMHx of HFpEF, NSVT/PVCs, thyroid cancer s/p thyroidectomy, AF since 8/5, and post-partum VTE 50 years ago (did not receive AC) who presents for ANAHI prior to DCCV to r/o CELINA thrombus. The patient is on apixaban and ASA.      TTE    1 - Upper limit of normal left ventricular enlargement.     2 - Normal left ventricular systolic function (EF 55-60%).     3 - Wall motion abnormalities.     4 - Indeterminate LV diastolic function.     5 - Right ventricular enlargement with normal systolic function.     6 - Biatrial enlargement.     7 - Mild-moderate to moderate (2+) mitral regurgitation.     8 - Mild tricuspid regurgitation.     9 - Trivial to mild pulmonic regurgitation.     10 - The estimated PA systolic pressure is 29 mmHg.         Procedure(s) (LRB):  CARDIOVERSION (N/A)  ECHOCARDIOGRAM,TRANSESOPHAGEAL (N/A)     Indwelling Lines/Drains at time of discharge:  Lines/Drains/Airways          None          Hospital Course:  Presented in atrial fibrillation. ANAHI negative for thrombus. DCCV x1 @ 200J with restoration of NSR. Tolerated procedure well, no complications. Discharged home in stable condition. EKG in 1 week. Follow up in 1 month with Dr. Disla    Consults:     Significant Diagnostic Studies: Labs:   CMP No results for input(s): NA, K, CL, CO2, GLU, BUN, CREATININE, CALCIUM, PROT, ALBUMIN, BILITOT, ALKPHOS, AST, ALT, ANIONGAP, ESTGFRAFRICA, EGFRNONAA in the last 48 hours., CBC No results for input(s): WBC, HGB, HCT, PLT in the last 48 hours. and INR   Lab Results   Component Value Date    INR  1.1 08/08/2018    INR 1.1 04/10/2013    INR 1.1 06/11/2008     Radiology: X-Ray: CXR: X-Ray Chest 1 View (CXR): No results found for this visit on 08/13/18.  Cardiac Graphics: ECG: atrial fibrillation --> NSR and Echocardiogram:   2D echo with color flow doppler:   Results for orders placed or performed during the hospital encounter of 08/05/18   2D echo with color flow doppler   Result Value Ref Range    EF 58 55 - 65    Mitral Valve Regurgitation MILD TO MODERATE     Est. PA Systolic Pressure 28.81     Mitral Valve Mobility CLASSIC PROLAPSE     Tricuspid Valve Regurgitation MILD        Pending Diagnostic Studies:     Procedure Component Value Units Date/Time    EKG 12-LEAD [[id number]]     Order Status:  Sent Lab Status:  No result           Final Active Diagnoses:    Diagnosis Date Noted POA    PRINCIPAL PROBLEM:  AF (atrial fibrillation) [I48.91] 08/13/2018 Yes      Problems Resolved During this Admission:     No new Assessment & Plan notes have been filed under this hospital service since the last note was generated.  Service: Arrhythmia      Discharged Condition: good    Disposition: Home or Self Care    Follow Up:  Follow-up Information     Fernando Disla MD In 1 month.    Specialties:  Electrophysiology, Cardiology  Contact information:  [address] [place] [address] [id number] [phone]                 Patient Instructions:      Notify your health care provider if you experience any of the following:  increased confusion or weakness     Notify your health care provider if you experience any of the following:  persistent dizziness, light-headedness, or visual disturbances     Notify your health care provider if you experience any of the following:  worsening rash     Notify your health care provider if you experience any of the following:  severe persistent headache     Notify your health care provider if you experience any of the following:  difficulty breathing or increased cough     Notify your  health care provider if you experience any of the following:  redness, tenderness, or signs of infection (pain, swelling, redness, odor or green/yellow discharge around incision site)     Notify your health care provider if you experience any of the following:  severe uncontrolled pain     Notify your health care provider if you experience any of the following:  persistent nausea and vomiting or diarrhea     Notify your health care provider if you experience any of the following:  temperature >100.4     No dressing needed     EKG 12-lead   Standing Status: Future Standing Exp. Date: 08/13/19     Order Specific Question Answer Comments   Diagnosis Atrial fibrillation [427.31.ICD-9-CM]      Activity as tolerated     Medications:  Reconciled Home Medications:      Medication List      CONTINUE taking these medications    aspirin 81 mg Tab  Take 1 tablet by mouth Daily.     atorvastatin 40 MG tablet  Commonly known as:  LIPITOR  TAKE 1 TABLET EVERY DAY     Bifidobacterium infantis 4 mg Cap  Commonly known as:  ALIGN  One daily     bisoprolol-hydrochlorothiazide 2.5-6.25 mg 2.5-6.25 mg Tab  Commonly known as:  ZIAC  TAKE 1/2 TABLET EVERY DAY     ELIQUIS 5 mg Tab  Generic drug:  apixaban  Take 1 tablet (5 mg total) by mouth 2 (two) times daily.     estradiol 0.01 % (0.1 mg/gram) vaginal cream  Commonly known as:  ESTRACE  Apply a pea sized amount every day for 2 weeks, then 3x/week. 90 day supply     FISH OIL 1,000 mg Cap  Generic drug:  omega-3 fatty acids-vitamin E  Take 1 capsule by mouth once daily.     levothyroxine 75 MCG tablet  Commonly known as:  SYNTHROID  TAKE 1 TABLET EVERY DAY - MAKE APPT     pantoprazole 40 MG tablet  Commonly known as:  PROTONIX  Take 1 tablet (40 mg total) by mouth once daily.     VITAMIN C 500 MG tablet  Generic drug:  ascorbic acid (vitamin C)  Take 500 mg by mouth once daily.     VITAMIN D3 2,000 unit Cap  Generic drug:  cholecalciferol (vitamin D3)  Take by mouth once daily.         STOP taking these medications    ketoconazole 2 % cream  Commonly known as:  NIZORAL     naproxen sodium 220 MG tablet  Commonly known as:  ANAPROX            Time spent on the discharge of patient: 5 minutes  EKG in 1 week  3 months with Dr. Naif Trejo MD  Cardiac Electrophysiology  [facility]

## 2018-08-13 NOTE — ANESTHESIA PREPROCEDURE EVALUATION
08/12/2018  Madan Bell is a 78 y.o., female with atrial fibrillation here for ANAHI and possible cardioversion.    Pre-operative evaluation for Procedure(s) (LRB):  CARDIOVERSION (N/A)  ECHOCARDIOGRAM,TRANSESOPHAGEAL (N/A)    Madan Bell is a 78 y.o. female     Patient Active Problem List   Diagnosis    PVC's (premature ventricular contractions)    MVP (mitral valve prolapse)    Non-rheumatic mitral regurgitation    Anxiety    Vasovagal near syncope    Paroxysmal ventricular tachycardia    Atherosclerosis of aorta    Hashimoto's disease    Post-operative hypothyroidism    HTN (hypertension), benign    Recurrent UTI    Mixed stress and urge urinary incontinence    Hypercholesterolemia    Personal history of skin cancer    Atrial fibrillation, new onset       Review of patient's allergies indicates:   Allergen Reactions    Fluoride preparations Hives    Fluoride      Other reaction(s): Hives       No current facility-administered medications on file prior to encounter.      Current Outpatient Medications on File Prior to Encounter   Medication Sig Dispense Refill    apixaban 5 mg Tab Take 1 tablet (5 mg total) by mouth 2 (two) times daily. 60 tablet 3    ascorbic acid (VITAMIN C) 500 MG tablet Take 500 mg by mouth once daily.      aspirin 81 mg Tab Take 1 tablet by mouth Daily.      atorvastatin (LIPITOR) 40 MG tablet TAKE 1 TABLET EVERY DAY 90 tablet 3    Bifidobacterium infantis (ALIGN) 4 mg Cap One daily 30 capsule 6    bisoprolol-hydrochlorothiazide 2.5-6.25 mg (ZIAC) 2.5-6.25 mg Tab TAKE 1/2 TABLET EVERY DAY 45 tablet 3    cholecalciferol, vitamin D3, (VITAMIN D3) 2,000 unit Cap Take by mouth once daily.        estradiol (ESTRACE) 0.01 % (0.1 mg/gram) vaginal cream Apply a pea sized amount every day for 2 weeks, then 3x/week. 90 day supply 42.5 g 4    ketoconazole  (NIZORAL) 2 % cream AAA qd- bid for eyebrow prn flare 30 g 3    levothyroxine (SYNTHROID) 75 MCG tablet TAKE 1 TABLET EVERY DAY - MAKE APPT 90 tablet 0    naproxen sodium (ANAPROX) 220 MG tablet Take 220 mg by mouth 2 (two) times daily as needed.      omega-3 fatty acids-vitamin E (FISH OIL) 1,000 mg Cap Take 1 capsule by mouth once daily.       pantoprazole (PROTONIX) 40 MG tablet Take 1 tablet (40 mg total) by mouth once daily. 30 tablet 3       Past Surgical History:   Procedure Laterality Date    BREAST BIOPSY Left     excisional    BREAST BIOPSY Left     core needle biopsy    BREAST CYST ASPIRATION   - ??    BREAST CYST EXCISION   - ??    HYSTERECTOMY      tahbso    OOPHORECTOMY  hysterectomy -  - ??    THYROID SURGERY         Social History     Socioeconomic History    Marital status:      Spouse name: Not on file    Number of children: Not on file    Years of education: Not on file    Highest education level: Not on file   Social Needs    Financial resource strain: Not on file    Food insecurity - worry: Not on file    Food insecurity - inability: Not on file    Transportation needs - medical: Not on file    Transportation needs - non-medical: Not on file   Occupational History    Not on file   Tobacco Use    Smoking status: Never Smoker    Smokeless tobacco: Never Used   Substance and Sexual Activity    Alcohol use: No    Drug use: No    Sexual activity: Not on file   Other Topics Concern    Are you pregnant or think you may be? Not Asked    Breast-feeding Not Asked   Social History Narrative    Not on file         CBC: No results for input(s): WBC, RBC, HGB, HCT, PLT, MCV, MCH, MCHC in the last 72 hours.    CMP: No results for input(s): NA, K, CL, CO2, BUN, CREATININE, GLU, MG, PHOS, CALCIUM, ALBUMIN, PROT, ALKPHOS, ALT, AST, BILITOT in the last 72 hours.    INR  No results for input(s): PT, INR, PROTIME, APTT in the last 72 hours.            EKD  Echo:  Results for orders placed or performed during the hospital encounter of 08/05/18   2D echo with color flow doppler   Result Value Ref Range    EF 58 55 - 65    Mitral Valve Regurgitation MILD TO MODERATE     Est. PA Systolic Pressure 28.81     Mitral Valve Mobility CLASSIC PROLAPSE     Tricuspid Valve Regurgitation MILD          Anesthesia Evaluation    I have reviewed the Patient Summary Reports.    I have reviewed the Nursing Notes.   I have reviewed the Medications.     Review of Systems  Anesthesia Hx:  Hx of Anesthetic complications PONV   Cardiovascular:   Hypertension Dysrhythmias atrial fibrillation    Pulmonary:  Pulmonary Normal    Renal/:  Renal/ Normal     Hepatic/GI:  Hepatic/GI Normal    Neurological:  Neurology Normal    Endocrine:   Hypothyroidism        Physical Exam  General:  Well nourished    Airway/Jaw/Neck:  Airway Findings: Mouth Opening: Normal Tongue: Normal  General Airway Assessment: Adult  Mallampati: II  TM Distance: Normal, at least 6 cm       Chest/Lungs:  Chest/Lungs Findings: Clear to auscultation, Normal Respiratory Rate     Heart/Vascular:  Heart Findings: Rhythm: Irregularly Irregular             Anesthesia Plan  Type of Anesthesia, risks & benefits discussed:  Anesthesia Type:  general, MAC  Patient's Preference:   Intra-op Monitoring Plan: standard ASA monitors  Intra-op Monitoring Plan Comments:   Post Op Pain Control Plan: multimodal analgesia and IV/PO Opioids PRN  Post Op Pain Control Plan Comments:   Induction:   IV  Beta Blocker:  Patient is not currently on a Beta-Blocker (No further documentation required).       Informed Consent: Patient understands risks and agrees with Anesthesia plan.  Questions answered. Anesthesia consent signed with patient.  ASA Score: 3     Day of Surgery Review of History & Physical:            Ready For Surgery From Anesthesia Perspective.

## 2018-08-13 NOTE — HPI
Madan Bell is a 78 y.o. woman with PMHx of HFpEF, NSVT/PVCs, thyroid cancer s/p thyroidectomy, AF since 8/5, and post-partum VTE 50 years ago (did not receive AC) who presents for ANAHI prior to DCCV to r/o CELINA thrombus. The patient is on apixaban and ASA.      TTE    1 - Upper limit of normal left ventricular enlargement.     2 - Normal left ventricular systolic function (EF 55-60%).     3 - Wall motion abnormalities.     4 - Indeterminate LV diastolic function.     5 - Right ventricular enlargement with normal systolic function.     6 - Biatrial enlargement.     7 - Mild-moderate to moderate (2+) mitral regurgitation.     8 - Mild tricuspid regurgitation.     9 - Trivial to mild pulmonic regurgitation.     10 - The estimated PA systolic pressure is 29 mmHg.

## 2018-08-13 NOTE — NURSING TRANSFER
Nursing Transfer Note      8/13/2018     Transfer To: short stay 11    Transfer via stretcher    Transfer with nurse    Transported by karel ybarra    Medicines sent: silvadene cream    Chart send with patient: Yes    Notified: reported to jovan ybarra and enrico to ride in patient's chart.    Patient reassessed at: see epic (date, time)    Upon arrival to floor: to room no complaints no distress noted.

## 2018-08-13 NOTE — PLAN OF CARE
Received report from ASHUTOSH Jerez. Patient s/p DCCV, AAOx3. VSS, no c/o pain or discomfort at this time, resp even and unlabored. Post procedure protocol reviewed with patient and patient's family. Understanding verbalized. Family members at bedside. Nurse call bell within reach. Will continue to monitor per post procedure protocol.

## 2018-08-13 NOTE — PROGRESS NOTES
Patient admitted to recovery see [facility] for complete assessment pacu bcg's maintained safety measures verified patient instructed on pain scale patient is very sleepy sedated at this time notified patient's daughter of patient location and called for patient's ekg.

## 2018-08-13 NOTE — DISCHARGE INSTRUCTIONS
Medications:  -Take your medications as listed on your medication list after you  are discharged.  -If you have problems or side effects caused by your medications, call your  Physician.    Side effects:  -You may be drowsy for the remainder of the day.    Diet  -You may resume oral intake 2 hours after you are discharged, as long you  have no swallowing difficulties.    Because you have received sedation for this procedure:  -Limit activity for the remainder of the day.  -Do not drive or operate any equipment for the remainder of the day.  -Do not smoke for at least 6 hours and until you are fully awake and alert.  -Do not drink alcoholic beverage for 24 hours.  -Defer important decision making until the following day.    Go to the Emergency Department if you develop:  -Bleeding  -Weakness or numbness  -Visual, gait or speech disturbance  -New chest pain, palpitations, shortness of breath, or fainting  -Fever    Follow up:  -EKG in 1 week   -Dr. Fernando Disla  in 1 month.

## 2018-08-13 NOTE — PROGRESS NOTES
Called dr. Trejo to let him know patient is out of cardioversion and he stated that he will put in orders.

## 2018-08-13 NOTE — PLAN OF CARE
Patient discharged per MD orders. Instructions given on medications, wound care, activity, signs of infection, when to call MD, and follow up appointments. Pt verbalized understanding. PIV removed. Patient and family used wc off unit.

## 2018-08-13 NOTE — HOSPITAL COURSE
Presented in atrial fibrillation. ANAHI negative for thrombus. DCCV x1 @ 200J with restoration of NSR. Tolerated procedure well, no complications. Discharged home in stable condition. EKG in 1 week. Follow up in 1 month with Dr. Disla

## 2018-08-13 NOTE — TRANSFER OF CARE
"Anesthesia Transfer of Care Note    Patient: Madan Bell    Procedure(s) Performed: Procedure(s) (LRB):  CARDIOVERSION (N/A)  ECHOCARDIOGRAM,TRANSESOPHAGEAL (N/A)    Patient location: PACU    Anesthesia Type: general    Transport from OR: Transported from OR on 2-3 L/min O2 by NC with adequate spontaneous ventilation    Post pain: adequate analgesia    Post assessment: no apparent anesthetic complications    Post vital signs: stable    Level of consciousness: sedated    Nausea/Vomiting: no nausea/vomiting    Complications: none    Transfer of care protocol was followed      Last vitals:   Visit Vitals  /71 (BP Location: Left arm, Patient Position: Lying)   Pulse 64   Temp 35.9 °C (96.6 °F) (Oral)   Resp 18   Ht 5' 8.5" (1.74 m)   Wt 68.9 kg (152 lb)   LMP  (LMP Unknown)   SpO2 98%   Breastfeeding? No   BMI 22.78 kg/m²     "

## 2018-08-15 RX ORDER — LEVOTHYROXINE SODIUM 75 UG/1
TABLET ORAL
Qty: 90 TABLET | Refills: 0 | Status: SHIPPED | OUTPATIENT
Start: 2018-08-15 | End: 2018-10-18 | Stop reason: SDUPTHER

## 2018-08-17 DIAGNOSIS — I49.3 PVC (PREMATURE VENTRICULAR CONTRACTION): Primary | ICD-10-CM

## 2018-08-20 ENCOUNTER — PATIENT OUTREACH (OUTPATIENT)
Dept: OTHER | Facility: OTHER | Age: 78
End: 2018-08-20

## 2018-08-20 NOTE — PROGRESS NOTES
Subjective:   Patient ID:  Madan Bell is a 78 y.o. female who presents for follow-up of CVD    HPI: The patient is here for valvular heart disease, CVD,CAD risk and recent AF and DCCV with FAS.   The patient has no chest pain, SOB, TIA, palpitations, syncope or pre-syncope.Patient does not exercise a lot.        Review of Systems   Constitution: Negative for chills, decreased appetite, diaphoresis, fever, weakness, malaise/fatigue, night sweats, weight gain and weight loss.   HENT: Negative for congestion, hoarse voice, nosebleeds, sore throat and tinnitus.    Eyes: Negative for blurred vision, double vision, vision loss in left eye, vision loss in right eye, visual disturbance and visual halos.   Cardiovascular: Negative for chest pain, claudication, cyanosis, dyspnea on exertion, irregular heartbeat, leg swelling, near-syncope, orthopnea, palpitations, paroxysmal nocturnal dyspnea and syncope.   Respiratory: Negative for cough, hemoptysis, shortness of breath, sleep disturbances due to breathing, snoring, sputum production and wheezing.    Endocrine: Negative for cold intolerance, heat intolerance, polydipsia, polyphagia and polyuria.   Hematologic/Lymphatic: Negative for adenopathy and bleeding problem. Does not bruise/bleed easily.   Skin: Negative for color change, dry skin, flushing, itching, nail changes, poor wound healing, rash, skin cancer, suspicious lesions and unusual hair distribution.   Musculoskeletal: Negative for arthritis, back pain, falls, gout, joint pain, joint swelling, muscle cramps, muscle weakness, myalgias and stiffness.   Gastrointestinal: Negative for abdominal pain, anorexia, change in bowel habit, constipation, diarrhea, dysphagia, heartburn, hematemesis, hematochezia, melena and vomiting.   Genitourinary: Negative for decreased libido, dysuria, hematuria, hesitancy and urgency.   Neurological: Negative for excessive daytime sleepiness, dizziness, focal weakness, headaches,  "light-headedness, loss of balance, numbness, paresthesias, seizures, sensory change, tremors and vertigo.   Psychiatric/Behavioral: Negative for altered mental status, depression, hallucinations, memory loss, substance abuse and suicidal ideas. The patient does not have insomnia and is not nervous/anxious.    Allergic/Immunologic: Negative for environmental allergies and hives.       Objective: BP (!) 159/70 (BP Location: Left arm, Patient Position: Sitting, BP Method: Medium (Automatic))   Pulse 64   Ht 5' 8.5" (1.74 m)   Wt 69.1 kg (152 lb 5.4 oz)   LMP  (LMP Unknown)   BMI 22.83 kg/m²      Physical Exam   Constitutional: She is oriented to person, place, and time. She appears well-developed and well-nourished.   HENT:   Head: Normocephalic.   Eyes: EOM are normal. Pupils are equal, round, and reactive to light.   Neck: Normal range of motion. Normal carotid pulses, no hepatojugular reflux and no JVD present. Carotid bruit is not present. No thyromegaly present.   Cardiovascular: Normal rate, regular rhythm and intact distal pulses. Exam reveals no gallop and no friction rub.   Murmur heard.   Systolic murmur is present with a grade of 2/6.  Pulmonary/Chest: Effort normal and breath sounds normal. No tachypnea. No respiratory distress. She has no wheezes. She has no rales. She exhibits no tenderness.   Abdominal: Soft. Bowel sounds are normal. She exhibits no distension and no mass. There is no tenderness. There is no rebound and no guarding.   Musculoskeletal: Normal range of motion. She exhibits no edema or tenderness.   Lymphadenopathy:     She has no cervical adenopathy.   Neurological: She is alert and oriented to person, place, and time. No cranial nerve deficit. Coordination normal.   Skin: Skin is warm. No rash noted. No erythema.   Psychiatric: She has a normal mood and affect. Her behavior is normal. Judgment and thought content normal.       Assessment:     1. Atrial fibrillation, new onset    2. " HTN (hypertension), benign    3. Hypercholesterolemia    4. MVP (mitral valve prolapse)    5. Non-rheumatic mitral regurgitation    6. PVC's (premature ventricular contractions)    7. Atherosclerosis of aorta    8. Hashimoto's disease        Plan:   Discussed diet , achieving and maintaining ideal body weight, and exercise.   We reviewed meds in detail.  Reassured-discussed goals, options , plan  Reduce ASA to every other day  Metoprolol half of 50 for acute palpitations    Madan was seen today for atrial fibrillation, dizziness, fatigue and mitral valve prolapse.    Diagnoses and all orders for this visit:    Atrial fibrillation, new onset  -     metoprolol tartrate (LOPRESSOR) 50 MG tablet; Take 1 tablet (50 mg total) by mouth 2 (two) times daily. Use half to one prn  -     Lipid panel; Future; Expected date: 07/22/2019  -     Comprehensive metabolic panel; Future; Expected date: 07/22/2019  -     TSH; Future; Expected date: 07/22/2019  -     EKG 12-lead; Future; Expected date: 07/22/2019  -     2D echo with color flow doppler; Future; Expected date: 07/22/2019    HTN (hypertension), benign  -     metoprolol tartrate (LOPRESSOR) 50 MG tablet; Take 1 tablet (50 mg total) by mouth 2 (two) times daily. Use half to one prn  -     EKG 12-lead; Future; Expected date: 07/22/2019  -     2D echo with color flow doppler; Future; Expected date: 07/22/2019    Hypercholesterolemia  -     Lipid panel; Future; Expected date: 07/22/2019  -     Comprehensive metabolic panel; Future; Expected date: 07/22/2019    MVP (mitral valve prolapse)  -     Comprehensive metabolic panel; Future; Expected date: 07/22/2019  -     EKG 12-lead; Future; Expected date: 07/22/2019  -     2D echo with color flow doppler; Future; Expected date: 07/22/2019    Non-rheumatic mitral regurgitation  -     EKG 12-lead; Future; Expected date: 07/22/2019  -     2D echo with color flow doppler; Future; Expected date: 07/22/2019    PVC's (premature ventricular  contractions)  -     Comprehensive metabolic panel; Future; Expected date: 07/22/2019  -     EKG 12-lead; Future; Expected date: 07/22/2019  -     2D echo with color flow doppler; Future; Expected date: 07/22/2019    Atherosclerosis of aorta    Hashimoto's disease  -     Comprehensive metabolic panel; Future; Expected date: 07/22/2019  -     TSH; Future; Expected date: 07/22/2019  -     T4, free; Future; Expected date: 07/22/2019            Follow-up in about 1 year (around 8/22/2019) for with ECG and CFD and labs.

## 2018-08-20 NOTE — PROGRESS NOTES
Last 5 Patient Entered Readings                                      Current 30 Day Average: 119/72     Recent Readings 8/20/2018 8/20/2018 8/19/2018 8/18/2018 8/17/2018    SBP (mmHg) 125 114 103 132 129    DBP (mmHg) 66 52 73 75 67    Pulse 59 59 70 79 56        Ms. Bell was recently hospitalized for new onset A Fib. She had a ANAHI and successful cardioversion. She has follow up with Dr. Daly on Wednesday. BP is actively monitored.     Patient's BP average is controlled based on 2017 ACC/AHA HTN guidelines of goal BP <130/80.      Patient's health , Daniella Quiroz, will be following up every 3-4 weeks. I will continue to monitor regularly and will follow up in 6 months, sooner if BP begins to trend upward or downward.    Patient has my contact information and knows to call with any concerns or clinical changes.     Current HTN regimen:  Hypertension Medications             bisoprolol-hydrochlorothiazide 2.5-6.25 mg (ZIAC) 2.5-6.25 mg Tab TAKE 1/2 TABLET EVERY DAY

## 2018-08-22 ENCOUNTER — HOSPITAL ENCOUNTER (OUTPATIENT)
Dept: CARDIOLOGY | Facility: CLINIC | Age: 78
Discharge: HOME | End: 2018-08-22
Payer: MEDICARE

## 2018-08-22 ENCOUNTER — OFFICE VISIT (OUTPATIENT)
Dept: CARDIOLOGY | Facility: CLINIC | Age: 78
End: 2018-08-22
Payer: MEDICARE

## 2018-08-22 VITALS
HEIGHT: 69 IN | BODY MASS INDEX: 22.56 KG/M2 | DIASTOLIC BLOOD PRESSURE: 70 MMHG | WEIGHT: 152.31 LBS | SYSTOLIC BLOOD PRESSURE: 159 MMHG | HEART RATE: 64 BPM

## 2018-08-22 DIAGNOSIS — I70.0 ATHEROSCLEROSIS OF AORTA: ICD-10-CM

## 2018-08-22 DIAGNOSIS — I49.3 PVC'S (PREMATURE VENTRICULAR CONTRACTIONS): ICD-10-CM

## 2018-08-22 DIAGNOSIS — I10 HYPERTENSION, UNSPECIFIED TYPE: ICD-10-CM

## 2018-08-22 DIAGNOSIS — I34.0 NON-RHEUMATIC MITRAL REGURGITATION: ICD-10-CM

## 2018-08-22 DIAGNOSIS — I34.1 MVP (MITRAL VALVE PROLAPSE): ICD-10-CM

## 2018-08-22 DIAGNOSIS — E06.3 HASHIMOTO'S DISEASE: ICD-10-CM

## 2018-08-22 DIAGNOSIS — I49.3 PVC (PREMATURE VENTRICULAR CONTRACTION): ICD-10-CM

## 2018-08-22 DIAGNOSIS — I10 HTN (HYPERTENSION), BENIGN: ICD-10-CM

## 2018-08-22 DIAGNOSIS — E78.00 HYPERCHOLESTEROLEMIA: ICD-10-CM

## 2018-08-22 DIAGNOSIS — I48.91 ATRIAL FIBRILLATION, NEW ONSET: Primary | ICD-10-CM

## 2018-08-22 PROCEDURE — 99499 UNLISTED E&M SERVICE: CPT | Mod: HCNC,S$GLB,, | Performed by: INTERNAL MEDICINE

## 2018-08-22 PROCEDURE — 93000 ELECTROCARDIOGRAM COMPLETE: CPT | Mod: S$GLB,,, | Performed by: INTERNAL MEDICINE

## 2018-08-22 PROCEDURE — 99215 OFFICE O/P EST HI 40 MIN: CPT | Mod: S$GLB,,, | Performed by: INTERNAL MEDICINE

## 2018-08-22 PROCEDURE — 99999 PR PBB SHADOW E&M-EST. PATIENT-LVL IV: CPT | Mod: PBBFAC,,, | Performed by: INTERNAL MEDICINE

## 2018-08-22 PROCEDURE — 3077F SYST BP >= 140 MM HG: CPT | Mod: CPTII,S$GLB,, | Performed by: INTERNAL MEDICINE

## 2018-08-22 PROCEDURE — 3078F DIAST BP <80 MM HG: CPT | Mod: CPTII,S$GLB,, | Performed by: INTERNAL MEDICINE

## 2018-08-22 RX ORDER — METOPROLOL TARTRATE 50 MG/1
50 TABLET ORAL 2 TIMES DAILY
Qty: 30 TABLET | Refills: 1 | Status: SHIPPED | OUTPATIENT
Start: 2018-08-22 | End: 2018-08-24 | Stop reason: SDUPTHER

## 2018-08-22 NOTE — PATIENT INSTRUCTIONS
Discussed diet , achieving and maintaining ideal body weight, and exercise.   We reviewed meds in detail.  Reassured-discussed goals, options , plan  Reduce ASA to every other day  Metoprolol half of 50 for acute palpitations

## 2018-08-24 ENCOUNTER — TELEPHONE (OUTPATIENT)
Dept: UROLOGY | Facility: CLINIC | Age: 78
End: 2018-08-24

## 2018-08-24 ENCOUNTER — LAB VISIT (OUTPATIENT)
Dept: LAB | Facility: HOSPITAL | Age: 78
End: 2018-08-24
Attending: NURSE PRACTITIONER
Payer: MEDICARE

## 2018-08-24 DIAGNOSIS — R39.9 UTI SYMPTOMS: ICD-10-CM

## 2018-08-24 DIAGNOSIS — A49.9 BACTERIAL UTI: Primary | ICD-10-CM

## 2018-08-24 DIAGNOSIS — N39.0 BACTERIAL UTI: Primary | ICD-10-CM

## 2018-08-24 DIAGNOSIS — I10 HTN (HYPERTENSION), BENIGN: ICD-10-CM

## 2018-08-24 DIAGNOSIS — I48.91 ATRIAL FIBRILLATION, NEW ONSET: ICD-10-CM

## 2018-08-24 DIAGNOSIS — R39.9 UTI SYMPTOMS: Primary | ICD-10-CM

## 2018-08-24 LAB
BACTERIA #/AREA URNS AUTO: ABNORMAL /HPF
BILIRUB UR QL STRIP: NEGATIVE
CLARITY UR REFRACT.AUTO: CLEAR
COLOR UR AUTO: YELLOW
GLUCOSE UR QL STRIP: NEGATIVE
HGB UR QL STRIP: NEGATIVE
KETONES UR QL STRIP: NEGATIVE
LEUKOCYTE ESTERASE UR QL STRIP: ABNORMAL
MICROSCOPIC COMMENT: ABNORMAL
NITRITE UR QL STRIP: NEGATIVE
PH UR STRIP: 5 [PH] (ref 5–8)
PROT UR QL STRIP: NEGATIVE
RBC #/AREA URNS AUTO: 2 /HPF (ref 0–4)
SP GR UR STRIP: 1 (ref 1–1.03)
SQUAMOUS #/AREA URNS AUTO: 0 /HPF
URN SPEC COLLECT METH UR: ABNORMAL
UROBILINOGEN UR STRIP-ACNC: NEGATIVE EU/DL
WBC #/AREA URNS AUTO: 86 /HPF (ref 0–5)
WBC CLUMPS UR QL AUTO: ABNORMAL

## 2018-08-24 PROCEDURE — 81001 URINALYSIS AUTO W/SCOPE: CPT

## 2018-08-24 PROCEDURE — 87086 URINE CULTURE/COLONY COUNT: CPT

## 2018-08-24 PROCEDURE — 87088 URINE BACTERIA CULTURE: CPT

## 2018-08-24 PROCEDURE — 87186 SC STD MICRODIL/AGAR DIL: CPT

## 2018-08-24 PROCEDURE — 87077 CULTURE AEROBIC IDENTIFY: CPT

## 2018-08-24 NOTE — PROGRESS NOTES
Reporting UTI symptoms  Home collect urine; drop.  This last one; needs to come in for next uti symptoms

## 2018-08-24 NOTE — TELEPHONE ENCOUNTER
----- Message from Ignacia Yo MA sent at 8/24/2018  3:42 PM CDT -----  Contact: self  [phone]  Needs Advice    Reason for call:    I am at the [facility] now trying to drop off an urine specimen and no order is in epic.  Please put an order in asap  Communication Preference:  Phone# above  Additional Information:  Call when done

## 2018-08-26 RX ORDER — METOPROLOL TARTRATE 50 MG/1
TABLET ORAL
Qty: 90 TABLET | Refills: 1 | Status: SHIPPED | OUTPATIENT
Start: 2018-08-26 | End: 2018-08-29 | Stop reason: SDUPTHER

## 2018-08-27 ENCOUNTER — PATIENT MESSAGE (OUTPATIENT)
Dept: UROLOGY | Facility: CLINIC | Age: 78
End: 2018-08-27

## 2018-08-27 DIAGNOSIS — N39.0 RECURRENT UTI: Primary | ICD-10-CM

## 2018-08-27 LAB — BACTERIA UR CULT: NORMAL

## 2018-08-27 RX ORDER — CEPHALEXIN 500 MG/1
500 CAPSULE ORAL EVERY 12 HOURS
Qty: 14 CAPSULE | Refills: 0 | Status: SHIPPED | OUTPATIENT
Start: 2018-08-27 | End: 2018-09-04

## 2018-08-27 RX ORDER — PANTOPRAZOLE SODIUM 40 MG/1
40 TABLET, DELAYED RELEASE ORAL DAILY
Qty: 30 TABLET | Refills: 3 | Status: SHIPPED | OUTPATIENT
Start: 2018-08-27 | End: 2018-10-29 | Stop reason: SDUPTHER

## 2018-08-27 NOTE — PROGRESS NOTES
Urine Culture, Routine KLEBSIELLA PNEUMONIAE   >100,000 cfu/ml        Pansentive (Intermediate with nitrofurantoin)   Keflex 500mg bid sent to pharmacy

## 2018-08-27 NOTE — TELEPHONE ENCOUNTER
"----- Message from Bre Scott sent at 8/27/2018  1:04 PM CDT -----  Contact: call pt at  [phone]  RX request - refill or new RX.  Is this a refill or new RX:  refill  RX name and strength: apixaban 5 mg Tab  Directions:   Is this a 30 day or 90 day RX:  90 day   Local pharmacy or mail order pharmacy:    Pharmacy name and phone # (DON'T enter "on file" or "in chart"): [facility] mail order pharmacy  Comments:  Needs prior authorization  Pt is requesting a phone call     RX request - refill or new RX.  Is this a refill or new RX:  refill  RX name and strength: pantoprazole (PROTONIX) 40 MG tablet  Directions:   Is this a 30 day or 90 day RX:  90 day  Local pharmacy or mail order pharmacy:    Pharmacy name and phone # (DON'T enter "on file" or "in chart"): [facility] mail order pharmacy  Comments:          "

## 2018-08-28 ENCOUNTER — TELEPHONE (OUTPATIENT)
Dept: CARDIOLOGY | Facility: CLINIC | Age: 78
End: 2018-08-28

## 2018-08-28 NOTE — TELEPHONE ENCOUNTER
,          LOV:8/22/18.The pt 's [facility] pharmacy said that the pt is also taking bisoprolol/HCTZ 2.5-6.25 mg ( Rx'd by  in May) along with the Metoprolol Tartrate 50 mg take 1 tab QD.Please advise.Thanks,Shannan

## 2018-08-29 DIAGNOSIS — I10 HTN (HYPERTENSION), BENIGN: ICD-10-CM

## 2018-08-29 DIAGNOSIS — I48.91 ATRIAL FIBRILLATION, NEW ONSET: ICD-10-CM

## 2018-08-29 RX ORDER — METOPROLOL TARTRATE 50 MG/1
TABLET ORAL
Qty: 90 TABLET | Refills: 1 | Status: SHIPPED | OUTPATIENT
Start: 2018-08-29 | End: 2021-01-29

## 2018-08-31 ENCOUNTER — PATIENT OUTREACH (OUTPATIENT)
Dept: OTHER | Facility: OTHER | Age: 78
End: 2018-08-31

## 2018-08-31 NOTE — PROGRESS NOTES
Last 5 Patient Entered Readings                                      Current 30 Day Average: 119/70     Recent Readings 8/31/2018 8/31/2018 8/30/2018 8/30/2018 8/29/2018    SBP (mmHg) 118 100 119 122 112    DBP (mmHg) 82 58 69 60 62    Pulse 63 65 56 56 61          Digital Medicine: Health  Follow Up    Lifestyle Modifications:    1.Dietary Modifications (Sodium intake <2,000mg/day, food labels, dining out): Deferred    2.Physical Activity: Deferred    3.Medication Therapy: Patient has been compliant with the medication regimen. Patient is doing well on her current BP medication.     4.Patient has the following medication side effects/concerns:   (Frequency/Alleviating factors/Precipitating factors, etc.)     Patient and I discussed her new diagnosis of Afib. She told me that she had a cardio version a few weeks ago and so far, she has not had the symptoms of Afib so far.   We discuss cuff placement and the best way to sit, etc when it comes to taking her BP readings.   Patient did have symptoms of lightheaded/dizziness this morning when her BP was lower then normal (100/58). She felt better after eating lunch. I instructed her to eat a small salty snack whenever she feels this way and has lower readings to help bring her BP up and she expressed understanding.  She will let us know if these symptoms worsen or start happening more often.   She has an appointment with Dr. Mcgee on 9/4.     Follow up with Rafael Madan GOKUL Bell completed. No further questions or concerns. Will continue follow up to achieve health goals.

## 2018-08-31 NOTE — PROGRESS NOTES
Last 5 Patient Entered Readings                                      Current 30 Day Average: 119/70     Recent Readings 8/31/2018 8/31/2018 8/30/2018 8/30/2018 8/29/2018    SBP (mmHg) 118 100 119 122 112    DBP (mmHg) 82 58 69 60 62    Pulse 63 65 56 56 61            Digital Medicine: Health  Follow Up    Left voicemail to follow up with Mrs. Madan Bell.  Current BP average 119/70 mmHg is at goal, <130/80.

## 2018-09-03 ENCOUNTER — PATIENT MESSAGE (OUTPATIENT)
Dept: CARDIOLOGY | Facility: CLINIC | Age: 78
End: 2018-09-03

## 2018-09-04 ENCOUNTER — OFFICE VISIT (OUTPATIENT)
Dept: INTERNAL MEDICINE | Facility: CLINIC | Age: 78
End: 2018-09-04
Payer: MEDICARE

## 2018-09-04 ENCOUNTER — CLINICAL SUPPORT (OUTPATIENT)
Dept: ELECTROPHYSIOLOGY | Facility: CLINIC | Age: 78
End: 2018-09-04
Attending: INTERNAL MEDICINE
Payer: MEDICARE

## 2018-09-04 ENCOUNTER — HOSPITAL ENCOUNTER (OUTPATIENT)
Dept: RADIOLOGY | Facility: HOSPITAL | Age: 78
Discharge: HOME | End: 2018-09-04
Attending: INTERNAL MEDICINE
Payer: MEDICARE

## 2018-09-04 VITALS
SYSTOLIC BLOOD PRESSURE: 136 MMHG | WEIGHT: 152.13 LBS | HEIGHT: 68 IN | BODY MASS INDEX: 23.05 KG/M2 | OXYGEN SATURATION: 99 % | TEMPERATURE: 98 F | HEART RATE: 63 BPM | DIASTOLIC BLOOD PRESSURE: 86 MMHG

## 2018-09-04 DIAGNOSIS — I10 HTN (HYPERTENSION), BENIGN: ICD-10-CM

## 2018-09-04 DIAGNOSIS — I49.9 CARDIAC ARRHYTHMIA, UNSPECIFIED CARDIAC ARRHYTHMIA TYPE: ICD-10-CM

## 2018-09-04 DIAGNOSIS — E78.00 HYPERCHOLESTEROLEMIA: ICD-10-CM

## 2018-09-04 DIAGNOSIS — R42 VERTIGO: Primary | ICD-10-CM

## 2018-09-04 DIAGNOSIS — R00.1 BRADYCARDIA: ICD-10-CM

## 2018-09-04 DIAGNOSIS — R42 VERTIGO: ICD-10-CM

## 2018-09-04 DIAGNOSIS — I48.0 PAROXYSMAL ATRIAL FIBRILLATION: ICD-10-CM

## 2018-09-04 DIAGNOSIS — I34.0 NON-RHEUMATIC MITRAL REGURGITATION: ICD-10-CM

## 2018-09-04 DIAGNOSIS — E89.0 POST-OPERATIVE HYPOTHYROIDISM: ICD-10-CM

## 2018-09-04 DIAGNOSIS — I49.3 PVC'S (PREMATURE VENTRICULAR CONTRACTIONS): ICD-10-CM

## 2018-09-04 PROCEDURE — 3079F DIAST BP 80-89 MM HG: CPT | Mod: CPTII,,, | Performed by: INTERNAL MEDICINE

## 2018-09-04 PROCEDURE — 93271 ECG/MONITORING AND ANALYSIS: CPT | Mod: PBBFAC | Performed by: INTERNAL MEDICINE

## 2018-09-04 PROCEDURE — 93005 ELECTROCARDIOGRAM TRACING: CPT | Mod: PBBFAC,PO | Performed by: INTERNAL MEDICINE

## 2018-09-04 PROCEDURE — 70450 CT HEAD/BRAIN W/O DYE: CPT | Mod: TC

## 2018-09-04 PROCEDURE — 93010 ELECTROCARDIOGRAM REPORT: CPT | Mod: ,,, | Performed by: INTERNAL MEDICINE

## 2018-09-04 PROCEDURE — 99999 PR PBB SHADOW E&M-EST. PATIENT-LVL IV: CPT | Mod: PBBFAC,,, | Performed by: INTERNAL MEDICINE

## 2018-09-04 PROCEDURE — 99214 OFFICE O/P EST MOD 30 MIN: CPT | Mod: PBBFAC,25,PO | Performed by: INTERNAL MEDICINE

## 2018-09-04 PROCEDURE — 70450 CT HEAD/BRAIN W/O DYE: CPT | Mod: 26,,, | Performed by: RADIOLOGY

## 2018-09-04 PROCEDURE — 3075F SYST BP GE 130 - 139MM HG: CPT | Mod: CPTII,,, | Performed by: INTERNAL MEDICINE

## 2018-09-04 PROCEDURE — 99214 OFFICE O/P EST MOD 30 MIN: CPT | Mod: S$PBB,,, | Performed by: INTERNAL MEDICINE

## 2018-09-04 PROCEDURE — 1101F PT FALLS ASSESS-DOCD LE1/YR: CPT | Mod: CPTII,,, | Performed by: INTERNAL MEDICINE

## 2018-09-04 RX ORDER — MECLIZINE HCL 12.5 MG 12.5 MG/1
TABLET ORAL
Qty: 30 TABLET | Refills: 0 | Status: ON HOLD | OUTPATIENT
Start: 2018-09-04 | End: 2018-12-12

## 2018-09-05 NOTE — PROGRESS NOTES
Subjective:       Patient ID: Madan Bell is a 78 y.o. female.    Chief Complaint: Annual Exam    HPIPt not feeling well for about one month.  Has a hx of vertigo - cannot lie flat for long without dizziness.  Started having worsening dizziness (spinning sensation) and light headedness that prompted ED eval in which Afib was seen.  She was started on apixaban then was cardioverted by Dr. Suárez 8/13/18.  She has continued to have dizziness but today has been the worst.  Despite these symptoms she walks on her block for about 20 minutes (as long as she keeps her head straight she feels okay).  She denies HA.  She denies double vision or  slurred speech. No N/V.  Just got over a recent UTI.  HTN monitor shows some HRs in the 40's but no specific symptoms to correlate.  Has not used metoprolol.    Review of Systems   Constitutional: Positive for activity change. Negative for unexpected weight change.   HENT: Negative for hearing loss, rhinorrhea and trouble swallowing.    Eyes: Negative for discharge and visual disturbance.   Respiratory: Negative for chest tightness, shortness of breath (PND or orthopnea) and wheezing.    Cardiovascular: Positive for palpitations. Negative for chest pain.   Gastrointestinal: Negative for abdominal pain, blood in stool, constipation, diarrhea, nausea and vomiting.   Endocrine: Negative for polydipsia and polyuria.   Genitourinary: Negative for difficulty urinating, dysuria, hematuria and menstrual problem.   Musculoskeletal: Positive for arthralgias. Negative for joint swelling and neck pain.   Neurological: Positive for dizziness and light-headedness. Negative for seizures, syncope, weakness and headaches.   Psychiatric/Behavioral: Negative for confusion and dysphoric mood.       Objective:    BP sitting 126/60 HR 60, standing 136/60 HR 60 - no orthostasis  Physical Exam   Constitutional: She is oriented to person, place, and time. She appears well-developed and well-nourished.  No distress.   HENT:   Head: Normocephalic.   Mouth/Throat: Oropharynx is clear and moist.   Eyes: EOM are normal. Pupils are equal, round, and reactive to light.   No nystagmus seen on exam   Neck: Neck supple. No JVD present. No thyromegaly present.   Cardiovascular: Normal rate, normal heart sounds and intact distal pulses. Exam reveals no gallop and no friction rub.   No murmur heard.  Some ectopy   Pulmonary/Chest: Effort normal and breath sounds normal. She has no wheezes. She has no rales.   Abdominal: Soft. Bowel sounds are normal. She exhibits no distension and no mass. There is no tenderness. There is no rebound and no guarding.   Musculoskeletal: She exhibits no edema.   Lymphadenopathy:     She has no cervical adenopathy.   Neurological: She is alert and oriented to person, place, and time. She has normal reflexes. No cranial nerve deficit.   Equal strength and symmetric strength in UE and LE robinson.  Lower extremities limited by knee pain.  CNII-XII intact, normal finger to nose, able to perform Romberg,  Due to DJD difficult to do heel-toe.  Normal RICA.    Skin: Skin is warm and dry.   Psychiatric: She has a normal mood and affect. Her behavior is normal. Judgment and thought content normal.     EKG shows sinus with PACs and PVCs similar to 2 weeks ago when she saw Dr. Daly  Holds onto table when has to turn her head quickly  Assessment:       1. Vertigo    2. Bradycardia    3. Cardiac arrhythmia, unspecified cardiac arrhythmia type    4. Paroxysmal atrial fibrillation    5. HTN (hypertension), benign    6. Hypercholesterolemia    7. Non-rheumatic mitral regurgitation    8. Post-operative hypothyroidism    9. PVC's (premature ventricular contractions)        Plan:   Vertigo  -     CBC auto differential; Future; Expected date: 09/04/2018  -     Comprehensive metabolic panel; Future; Expected date: 09/04/2018  -     C-reactive protein; Future; Expected date: 09/04/2018  -     Sedimentation rate; Future;  Expected date: 09/04/2018  -     CT Head Without Contrast; Future; Expected date: 09/04/2018  Offered ED for MRI/MRA brain pt declined - will do CT to rule out any bleeding, check lab and place on cardiobeeper (she can have it placed today) so she can activate when feeling poorly, consider Holter at later date  Pt lives with son, daughter - in law and 5 grandchildren - they will help her today and bring to ED for worsening sx.  Bradycardia  -     Cardiac event monitor (30 day); Future    Cardiac arrhythmia, unspecified cardiac arrhythmia type  -     EKG 12-lead    Paroxysmal atrial fibrillation  No afib seen - reassured her she is protected against stroke   HTN (hypertension), benign  Controlled - continue current meds    Hypercholesterolemia  Controlled - continue current meds    Non-rheumatic mitral regurgitation  Per cardiology  Post-operative hypothyroidism  Controlled - continue current meds    PVC's (premature ventricular contractions)    Other orders  -     meclizine (ANTIVERT) 12.5 mg tablet; Half- one tablet three times daily as needed for vertigo  Dispense: 30 tablet; Refill: 0

## 2018-09-07 ENCOUNTER — LAB VISIT (OUTPATIENT)
Dept: LAB | Facility: HOSPITAL | Age: 78
End: 2018-09-07
Attending: UROLOGY
Payer: MEDICARE

## 2018-09-07 DIAGNOSIS — N39.0 RECURRENT UTI: ICD-10-CM

## 2018-09-07 PROCEDURE — 87086 URINE CULTURE/COLONY COUNT: CPT

## 2018-09-07 PROCEDURE — 87088 URINE BACTERIA CULTURE: CPT

## 2018-09-07 PROCEDURE — 87186 SC STD MICRODIL/AGAR DIL: CPT

## 2018-09-07 PROCEDURE — 87077 CULTURE AEROBIC IDENTIFY: CPT

## 2018-09-10 ENCOUNTER — PATIENT MESSAGE (OUTPATIENT)
Dept: UROLOGY | Facility: CLINIC | Age: 78
End: 2018-09-10

## 2018-09-10 LAB — BACTERIA UR CULT: NORMAL

## 2018-09-11 ENCOUNTER — PATIENT MESSAGE (OUTPATIENT)
Dept: UROLOGY | Facility: CLINIC | Age: 78
End: 2018-09-11

## 2018-09-11 RX ORDER — NITROFURANTOIN (MACROCRYSTALS) 100 MG/1
100 CAPSULE ORAL 2 TIMES DAILY
Qty: 10 CAPSULE | Refills: 0 | Status: SHIPPED | OUTPATIENT
Start: 2018-09-11 | End: 2018-09-16

## 2018-09-13 ENCOUNTER — HOSPITAL ENCOUNTER (OUTPATIENT)
Dept: RADIOLOGY | Facility: HOSPITAL | Age: 78
Discharge: HOME | End: 2018-09-13
Attending: INTERNAL MEDICINE
Payer: MEDICARE

## 2018-09-13 VITALS — WEIGHT: 152 LBS | HEIGHT: 68 IN | BODY MASS INDEX: 23.04 KG/M2

## 2018-09-13 DIAGNOSIS — Z12.31 SCREENING MAMMOGRAM, ENCOUNTER FOR: ICD-10-CM

## 2018-09-13 PROCEDURE — 77067 SCR MAMMO BI INCL CAD: CPT | Mod: 26,,, | Performed by: RADIOLOGY

## 2018-09-13 PROCEDURE — 77063 BREAST TOMOSYNTHESIS BI: CPT | Mod: TC,PO

## 2018-09-13 PROCEDURE — 77063 BREAST TOMOSYNTHESIS BI: CPT | Mod: 26,,, | Performed by: RADIOLOGY

## 2018-09-16 ENCOUNTER — PATIENT MESSAGE (OUTPATIENT)
Dept: ELECTROPHYSIOLOGY | Facility: CLINIC | Age: 78
End: 2018-09-16

## 2018-09-21 ENCOUNTER — DOCUMENTATION ONLY (OUTPATIENT)
Dept: ELECTROPHYSIOLOGY | Facility: CLINIC | Age: 78
End: 2018-09-21

## 2018-09-21 NOTE — PROGRESS NOTES
9-19-18: Discussed case with , with regards to message from patient's daughter reviewing patients current S/S: MD would like to wait to review pateint's 30 day monitor results and then he will meet to discuss next steps/treatment options. Appt set for 10-11-18.      Chrissy BOYKIN [facility]      9-21-18: Message sent to patient with MDs response.      Chrissy BOYKIN CCM

## 2018-09-24 ENCOUNTER — PATIENT MESSAGE (OUTPATIENT)
Dept: ADMINISTRATIVE | Facility: OTHER | Age: 78
End: 2018-09-24

## 2018-09-24 ENCOUNTER — LAB VISIT (OUTPATIENT)
Dept: LAB | Facility: HOSPITAL | Age: 78
End: 2018-09-24
Attending: UROLOGY
Payer: MEDICARE

## 2018-09-24 DIAGNOSIS — N39.0 RECURRENT UTI: ICD-10-CM

## 2018-09-24 PROCEDURE — 87088 URINE BACTERIA CULTURE: CPT

## 2018-09-24 PROCEDURE — 87077 CULTURE AEROBIC IDENTIFY: CPT

## 2018-09-24 PROCEDURE — 87186 SC STD MICRODIL/AGAR DIL: CPT

## 2018-09-24 PROCEDURE — 87086 URINE CULTURE/COLONY COUNT: CPT

## 2018-09-27 ENCOUNTER — TELEPHONE (OUTPATIENT)
Dept: UROLOGY | Facility: CLINIC | Age: 78
End: 2018-09-27

## 2018-09-27 ENCOUNTER — PATIENT MESSAGE (OUTPATIENT)
Dept: UROLOGY | Facility: CLINIC | Age: 78
End: 2018-09-27

## 2018-09-27 LAB — BACTERIA UR CULT: NORMAL

## 2018-09-27 RX ORDER — DOXYCYCLINE 100 MG/1
100 CAPSULE ORAL 2 TIMES DAILY
Qty: 14 CAPSULE | Refills: 0 | Status: SHIPPED | OUTPATIENT
Start: 2018-09-27 | End: 2018-09-27

## 2018-09-27 RX ORDER — DOXYCYCLINE 100 MG/1
100 CAPSULE ORAL 2 TIMES DAILY
Qty: 14 CAPSULE | Refills: 0 | Status: SHIPPED | OUTPATIENT
Start: 2018-09-27 | End: 2018-10-04

## 2018-09-27 RX ORDER — AMOXICILLIN AND CLAVULANATE POTASSIUM 875; 125 MG/1; MG/1
1 TABLET, FILM COATED ORAL 2 TIMES DAILY
Qty: 10 TABLET | Refills: 0 | Status: SHIPPED | OUTPATIENT
Start: 2018-09-27 | End: 2018-10-02

## 2018-10-09 DIAGNOSIS — I48.0 PAF (PAROXYSMAL ATRIAL FIBRILLATION): Primary | ICD-10-CM

## 2018-10-11 ENCOUNTER — OFFICE VISIT (OUTPATIENT)
Dept: ELECTROPHYSIOLOGY | Facility: CLINIC | Age: 78
End: 2018-10-11
Payer: MEDICARE

## 2018-10-11 ENCOUNTER — HOSPITAL ENCOUNTER (OUTPATIENT)
Dept: CARDIOLOGY | Facility: CLINIC | Age: 78
Discharge: HOME | End: 2018-10-11
Payer: MEDICARE

## 2018-10-11 VITALS
HEART RATE: 56 BPM | HEIGHT: 69 IN | SYSTOLIC BLOOD PRESSURE: 128 MMHG | BODY MASS INDEX: 22.7 KG/M2 | WEIGHT: 153.25 LBS | DIASTOLIC BLOOD PRESSURE: 74 MMHG

## 2018-10-11 DIAGNOSIS — N39.46 MIXED STRESS AND URGE URINARY INCONTINENCE: ICD-10-CM

## 2018-10-11 DIAGNOSIS — I10 HTN (HYPERTENSION), BENIGN: ICD-10-CM

## 2018-10-11 DIAGNOSIS — E89.0 POST-OPERATIVE HYPOTHYROIDISM: ICD-10-CM

## 2018-10-11 DIAGNOSIS — I34.1 MVP (MITRAL VALVE PROLAPSE): ICD-10-CM

## 2018-10-11 DIAGNOSIS — E78.00 HYPERCHOLESTEROLEMIA: ICD-10-CM

## 2018-10-11 DIAGNOSIS — E06.3 HASHIMOTO'S DISEASE: ICD-10-CM

## 2018-10-11 DIAGNOSIS — F41.9 ANXIETY: ICD-10-CM

## 2018-10-11 DIAGNOSIS — I48.0 PAROXYSMAL ATRIAL FIBRILLATION: Primary | ICD-10-CM

## 2018-10-11 DIAGNOSIS — I49.3 PVC'S (PREMATURE VENTRICULAR CONTRACTIONS): ICD-10-CM

## 2018-10-11 DIAGNOSIS — N39.0 RECURRENT UTI: ICD-10-CM

## 2018-10-11 DIAGNOSIS — R55 VASOVAGAL NEAR SYNCOPE: ICD-10-CM

## 2018-10-11 DIAGNOSIS — I48.0 PAF (PAROXYSMAL ATRIAL FIBRILLATION): ICD-10-CM

## 2018-10-11 DIAGNOSIS — Z85.828 PERSONAL HISTORY OF SKIN CANCER: ICD-10-CM

## 2018-10-11 DIAGNOSIS — I34.0 NON-RHEUMATIC MITRAL REGURGITATION: ICD-10-CM

## 2018-10-11 DIAGNOSIS — I47.29 PAROXYSMAL VENTRICULAR TACHYCARDIA: ICD-10-CM

## 2018-10-11 PROCEDURE — 3074F SYST BP LT 130 MM HG: CPT | Mod: CPTII,,, | Performed by: INTERNAL MEDICINE

## 2018-10-11 PROCEDURE — 1101F PT FALLS ASSESS-DOCD LE1/YR: CPT | Mod: CPTII,,, | Performed by: INTERNAL MEDICINE

## 2018-10-11 PROCEDURE — 99215 OFFICE O/P EST HI 40 MIN: CPT | Mod: S$PBB,,, | Performed by: INTERNAL MEDICINE

## 2018-10-11 PROCEDURE — 99213 OFFICE O/P EST LOW 20 MIN: CPT | Mod: PBBFAC,25 | Performed by: INTERNAL MEDICINE

## 2018-10-11 PROCEDURE — 3078F DIAST BP <80 MM HG: CPT | Mod: CPTII,,, | Performed by: INTERNAL MEDICINE

## 2018-10-11 PROCEDURE — 93010 ELECTROCARDIOGRAM REPORT: CPT | Mod: S$PBB,,, | Performed by: INTERNAL MEDICINE

## 2018-10-11 PROCEDURE — 99999 PR PBB SHADOW E&M-EST. PATIENT-LVL III: CPT | Mod: PBBFAC,,, | Performed by: INTERNAL MEDICINE

## 2018-10-11 PROCEDURE — 93005 ELECTROCARDIOGRAM TRACING: CPT | Mod: PBBFAC | Performed by: INTERNAL MEDICINE

## 2018-10-11 NOTE — PROGRESS NOTES
Subjective:   Patient ID:  Madan Bell is a 78 y.o. female     Chief complaint:Atrial Fibrillation      HPI    Background :  78 y.o. female with PMH Of  HFpEF (55% EF), moderate MVP/ mitral sclerosis, PVCs/ non-sustained VT,prior hx of thyroid cancer s/p thyroidectomy, now with hypothyroidism.  Presented to ED on 8/5 complaining of lightheadedness for the preceding 2 days, associated with worsening fatigue. She stated she had similar episodes intermittently over the prior few days and woke up on the day of ER admit at 5:00am with constant dizziness and lightheadedness.  Her associated SBP was 98-99 (BP generally 130-140s/70s). She was on Ziac (very low dose) for HTN and arrhythmia control. Compliant with at home meds.  In the ED, she was noted to be in AF with RVR.  n the ED, pt was anxious with HR of 130-120s, and BP of 160-200s. Resolved once pt calmed down with no intervention.  Patient denied chest pain, palpitations, dyspnea, headaches, syncope, vertigo, cough, nausea, vomiting.   No hx of CAD, PE. Had a VTE 50 years ago post partum (unsure if superficial or DVT, but did not received anticoag). No peripheral deficits, falls, facial drool. No bleeding. Denied dysuria, suprapubic pain, or urinary changes.      She was admitted and had anACS w/up with neg troponins, EKG with no TWI or ST elevation.   Heart was Rate controlled, with her low dose of home bisoprolo. She was started on apixaban given CHADsVASC of 4. Echo showed EF of 55% with  biatrial enlargement and Mild-moderate to moderate (2+) mitral regurgitation.      On the day of discharge, patient was in good condition ut in persistent AF.     Update 8/8/18:  She is now here with her DTR to see me a day after DC. Still feels fatigued and with some L/H.  I have reviewed the actual image of the ECG tracing obtained today and it shows AF with calculated average VR of 70  bpm, measured RR of 400- 1500  msec with o/w normal intervals.     Update since  then:  My A and P at the time were as follows:  At this point, we will simply proceed with DCCV after ANAHI survey. She understands that this may not be a long lasting solution but it'll be helpful to relieve Sx quickly and perhaps establish a pattern of clinical progress of her AF.  DCCV was done on 8/13 and she has remained in NSR   For a while thereafter she felt poorly as she was struggling with UTI Rx-- she felt dizzy a lot   She was seen by Dr Flower who gave her an event monitor  >> Many transmissions due to dizziness >> SR with PVCs but also same findings when no Sx reported  I have reviewed the actual image of the ECG tracing obtained today and it shows NSR with frequent PVCs and o/w normal intervals       Current Outpatient Medications   Medication Sig    apixaban 5 mg Tab Take 1 tablet (5 mg total) by mouth 2 (two) times daily.    ascorbic acid (VITAMIN C) 500 MG tablet Take 500 mg by mouth once daily.    aspirin 81 mg Tab Take 1 tablet by mouth Daily.    atorvastatin (LIPITOR) 40 MG tablet TAKE 1 TABLET EVERY DAY    Bifidobacterium infantis (ALIGN) 4 mg Cap One daily    bisoprolol-hydrochlorothiazide 2.5-6.25 mg (ZIAC) 2.5-6.25 mg Tab TAKE 1/2 TABLET EVERY DAY    cholecalciferol, vitamin D3, (VITAMIN D3) 2,000 unit Cap Take by mouth once daily.      D-MANNOSE ORAL Take by mouth.    estradiol (ESTRACE) 0.01 % (0.1 mg/gram) vaginal cream Apply a pea sized amount every day for 2 weeks, then 3x/week. 90 day supply    levothyroxine (SYNTHROID) 75 MCG tablet TAKE 1 TABLET EVERY DAY (NEED MD APPOINTMENT)    meclizine (ANTIVERT) 12.5 mg tablet Half- one tablet three times daily as needed for vertigo    omega-3 fatty acids-vitamin E (FISH OIL) 1,000 mg Cap Take 1 capsule by mouth once daily. 1400mg    pantoprazole (PROTONIX) 40 MG tablet Take 1 tablet (40 mg total) by mouth once daily.    metoprolol tartrate (LOPRESSOR) 50 MG tablet Take one half to one tablet daily as needed for palpitations.      No current facility-administered medications for this visit.      Review of Systems   Constitution: Negative for decreased appetite, weakness, weight gain and weight loss.   HENT: Negative for nosebleeds.    Eyes: Negative for blurred vision and visual disturbance.   Cardiovascular: Negative for chest pain, claudication, cyanosis, dyspnea on exertion, irregular heartbeat, leg swelling, near-syncope, orthopnea, palpitations, paroxysmal nocturnal dyspnea and syncope.   Respiratory: Negative for cough, shortness of breath and wheezing.    Endocrine: Negative for heat intolerance.   Skin: Negative for rash.   Musculoskeletal: Negative for muscle weakness and myalgias.   Gastrointestinal: Negative for abdominal pain, anorexia, melena, nausea and vomiting.   Genitourinary: Negative for menorrhagia.   Neurological: Negative for excessive daytime sleepiness, dizziness, headaches, loss of balance, seizures and vertigo.   Psychiatric/Behavioral: Negative for altered mental status and depression. The patient is not nervous/anxious.    All other systems reviewed and are negative.      Objective:   Physical Exam   Constitutional: She is oriented to person, place, and time. She appears well-developed and well-nourished.   HENT:   Head: Normocephalic and atraumatic.   Right Ear: External ear normal.   Left Ear: External ear normal.   Neck: Normal range of motion. Neck supple. No thyromegaly present.   Cardiovascular: Normal rate, normal heart sounds and intact distal pulses. An irregular rhythm present. Exam reveals no gallop, no S3, no S4, no friction rub, no midsystolic click and no opening snap.   No murmur heard.  Pulses:       Carotid pulses are 2+ on the right side, and 2+ on the left side.       Radial pulses are 2+ on the right side, and 2+ on the left side.        Dorsalis pedis pulses are 2+ on the right side, and 2+ on the left side.        Posterior tibial pulses are 2+ on the right side, and 2+ on the left side.  "  PVCs   Pulmonary/Chest: Effort normal and breath sounds normal.   Abdominal: Soft. She exhibits no distension. There is no tenderness.   Musculoskeletal:        Right ankle: She exhibits no swelling.        Left ankle: She exhibits no swelling.        Right lower leg: She exhibits no swelling.        Left lower leg: She exhibits no swelling.   Neurological: She is alert and oriented to person, place, and time. She has normal strength. No cranial nerve deficit. Gait normal.   Skin: Skin is warm. No rash noted. No cyanosis. Nails show no clubbing.   Psychiatric: She has a normal mood and affect. Her speech is normal and behavior is normal. Thought content normal. Cognition and memory are normal.   Nursing note and vitals reviewed.    /74   Pulse (!) 56   Ht 5' 8.5" (1.74 m)   Wt 69.5 kg (153 lb 3.5 oz)   LMP  (LMP Unknown)   BMI 22.96 kg/m²      Assessment:         Annual stroke risk as predicted by patient's CHADS2 and the [id number] scores:  CHADS2 score       Annual Risk (%)           [id number] score            Annual risk (%)              2                4.0                                        4                                       4.0    At this point, she seems to be doing well and I do not see a clinical need to Rx/suppress her PVCs         1. Paroxysmal atrial fibrillation    2. HTN (hypertension), benign    3. Hypercholesterolemia    4. MVP (mitral valve prolapse)    5. Non-rheumatic mitral regurgitation    6. Paroxysmal ventricular tachycardia    7. PVC's (premature ventricular contractions)    8. Anxiety    9. Hashimoto's disease    10. Vasovagal near syncope    11. Recurrent UTI    12. Post-operative hypothyroidism    13. Personal history of skin cancer    14. Mixed stress and urge urinary incontinence        Plan:      No orders of the defined types were placed in this encounter.    Follow-up in about 3 months (around 1/11/2019).  There are no discontinued medications.  This SmartLink " is deprecated. Use AVSMEDLIST instead to display the medication list for a patient.  This SmartLink is deprecated. Use AVSMEDLIST instead to display the medication list for a patient.

## 2018-10-18 RX ORDER — LEVOTHYROXINE SODIUM 75 UG/1
TABLET ORAL
Qty: 90 TABLET | Refills: 0 | Status: SHIPPED | OUTPATIENT
Start: 2018-10-18 | End: 2018-12-28 | Stop reason: SDUPTHER

## 2018-10-24 ENCOUNTER — PES CALL (OUTPATIENT)
Dept: ADMINISTRATIVE | Facility: CLINIC | Age: 78
End: 2018-10-24

## 2018-10-29 PROCEDURE — 93272 ECG/REVIEW INTERPRET ONLY: CPT | Mod: ,,, | Performed by: INTERNAL MEDICINE

## 2018-10-29 RX ORDER — PANTOPRAZOLE SODIUM 40 MG/1
TABLET, DELAYED RELEASE ORAL
Qty: 90 TABLET | Refills: 3 | Status: SHIPPED | OUTPATIENT
Start: 2018-10-29 | End: 2019-10-02 | Stop reason: SDUPTHER

## 2018-10-29 RX ORDER — APIXABAN 5 MG/1
TABLET, FILM COATED ORAL
Qty: 180 TABLET | Refills: 3 | Status: SHIPPED | OUTPATIENT
Start: 2018-10-29 | End: 2019-08-26 | Stop reason: SDUPTHER

## 2018-11-01 ENCOUNTER — PATIENT OUTREACH (OUTPATIENT)
Dept: OTHER | Facility: OTHER | Age: 78
End: 2018-11-01

## 2018-11-01 NOTE — PROGRESS NOTES
Last 5 Patient Entered Readings                                      Current 30 Day Average: 112/62     Recent Readings 10/31/2018 10/30/2018 10/29/2018 10/28/2018 10/27/2018    SBP (mmHg) 109 111 103 119 100    DBP (mmHg) 65 60 53 59 63    Pulse 66 59 59 57 56          Digital Medicine: Health  Follow Up    Lifestyle Modifications:    1.Dietary Modifications (Sodium intake <2,000mg/day, food labels, dining out): Deferred    2.Physical Activity: Deferred    3.Medication Therapy: Patient has been compliant with the medication regimen. Patient is doing well on her current regimen. She denies symptoms/side effects.     4.Patient has the following medication side effects/concerns:   (Frequency/Alleviating factors/Precipitating factors, etc.)     Patient was at a [employer] function when I called so she could not talk long but she stated that she is feeling well and is very pleased with her BP readings.     Follow up with Mrs. Madan Bell completed. No further questions or concerns. Will continue to follow up to achieve health goals.

## 2018-11-05 ENCOUNTER — TELEPHONE (OUTPATIENT)
Dept: INTERNAL MEDICINE | Facility: CLINIC | Age: 78
End: 2018-11-05

## 2018-11-05 ENCOUNTER — OFFICE VISIT (OUTPATIENT)
Dept: INTERNAL MEDICINE | Facility: CLINIC | Age: 78
End: 2018-11-05
Payer: MEDICARE

## 2018-11-05 ENCOUNTER — HOSPITAL ENCOUNTER (OUTPATIENT)
Dept: RADIOLOGY | Facility: HOSPITAL | Age: 78
Discharge: HOME | End: 2018-11-05
Attending: INTERNAL MEDICINE
Payer: MEDICARE

## 2018-11-05 VITALS
RESPIRATION RATE: 18 BRPM | HEART RATE: 82 BPM | SYSTOLIC BLOOD PRESSURE: 110 MMHG | BODY MASS INDEX: 22.21 KG/M2 | WEIGHT: 149.94 LBS | OXYGEN SATURATION: 95 % | DIASTOLIC BLOOD PRESSURE: 62 MMHG | TEMPERATURE: 99 F | HEIGHT: 69 IN

## 2018-11-05 DIAGNOSIS — R06.2 WHEEZING SYMPTOM: ICD-10-CM

## 2018-11-05 DIAGNOSIS — R05.9 COUGH: ICD-10-CM

## 2018-11-05 DIAGNOSIS — R09.81 SINUS CONGESTION: ICD-10-CM

## 2018-11-05 DIAGNOSIS — J06.9 UPPER RESPIRATORY TRACT INFECTION, UNSPECIFIED TYPE: Primary | ICD-10-CM

## 2018-11-05 PROCEDURE — 71046 X-RAY EXAM CHEST 2 VIEWS: CPT | Mod: 26,HCNC,, | Performed by: RADIOLOGY

## 2018-11-05 PROCEDURE — 3078F DIAST BP <80 MM HG: CPT | Mod: CPTII,HCNC,S$GLB, | Performed by: INTERNAL MEDICINE

## 2018-11-05 PROCEDURE — 3074F SYST BP LT 130 MM HG: CPT | Mod: CPTII,HCNC,S$GLB, | Performed by: INTERNAL MEDICINE

## 2018-11-05 PROCEDURE — 1101F PT FALLS ASSESS-DOCD LE1/YR: CPT | Mod: CPTII,HCNC,S$GLB, | Performed by: INTERNAL MEDICINE

## 2018-11-05 PROCEDURE — 99999 PR PBB SHADOW E&M-EST. PATIENT-LVL III: CPT | Mod: PBBFAC,HCNC,, | Performed by: INTERNAL MEDICINE

## 2018-11-05 PROCEDURE — 71046 X-RAY EXAM CHEST 2 VIEWS: CPT | Mod: TC,HCNC,PO

## 2018-11-05 PROCEDURE — 99213 OFFICE O/P EST LOW 20 MIN: CPT | Mod: HCNC,S$GLB,, | Performed by: INTERNAL MEDICINE

## 2018-11-05 RX ORDER — BENZONATATE 200 MG/1
200 CAPSULE ORAL 3 TIMES DAILY PRN
Qty: 30 CAPSULE | Refills: 0 | Status: SHIPPED | OUTPATIENT
Start: 2018-11-05 | End: 2018-11-15

## 2018-11-05 NOTE — TELEPHONE ENCOUNTER
----- Message from Mariana Daniels sent at 11/5/2018  2:38 PM CST -----  Contact: [phone]  Pt states she was seen today and spoke with doctor about coughing up pinkish mucus. Pt states she now think she is coughing up blood the size of a dime,  since the mucus is now darker. Pt would like some advice.

## 2018-11-05 NOTE — TELEPHONE ENCOUNTER
If coughing up blood may need to go to ED  Rec she try the Tessalon Perles to calm down the cough  and keep well hydrated

## 2018-11-05 NOTE — TELEPHONE ENCOUNTER
----- Message from Mary Lou Shaver MD sent at 11/5/2018 12:14 PM CST -----  Please note that your chest x-ray did not reveal any worrisome findings.  Please advise if your symptoms persist or exacerbate over the next 4-5 days.  Mary Lou Lundy

## 2018-11-05 NOTE — PROGRESS NOTES
CC: sinus congesion    78 y.o. female presents today for sinus congestion  Tx:Robitussin DM,  Assoc:No ear pain, or sore throat  Cough worse over past few days  Frequency and intensity, waking up @ night  Spent most of the day yesterday in her recliner @ home  MEDCARD: Reviewed  ROS:  + fever, 100  No shaking chills, or night sweats  No HA or dizziness  No tinnitus or hearing loss  Answers for HPI/ROS submitted by the patient on 11/5/2018   Cough  Onset: in the past 7 days  Progression since onset: gradually worsening  Frequency: hourly  Cough characteristics: productive of sputum  Some had slight pink tinge, o/w clear  chest pain: No  chills: No  ear congestion: No  ear pain: No  fever: Yes- up to 100  headaches: No  heartburn: No  hemoptysis: No  myalgias: No  nasal congestion: No  postnasal drip: Yes-   rash: No  rhinorrhea: No  shortness of breath: No  sore throat: No  sweats: No  weight loss: No  wheezing: Yes-slight , finds it hard to take a deep breath  asthma: No  bronchiectasis: No  bronchitis: No  COPD: No  emphysema: No  environmental allergies: No  pneumonia: No  Treatments tried: OTC cough suppressant  Improvement on treatment: mild    Remainder of review negative except as previously noted    PMHX: Reviewed  SHX: Reviewed  FHS: Reviewed    PE:  General: WDWN, A&O, NAD, conversant and co-operative  EYES: Conjunctiva and lids are normal, sclera anicteric, PERRL  ENT: Hearing intact, canals w/o cerumen, TM's are unremarkable;   nasal mucosa/turbinates are injected w/o exudate  o/p is injected w/o exudate; sinus are nontender  NECK: Supple w/o lymphadenopathy or thyromegaly  RESPIRATORY: Efforts are unlabored. Lungs are CTAP  CARDIOVASCULAR: Heart RRR  MSK: Gait nl, no CCE  NEURO:PERKINS, no tremor noted  SKIN: Warm and dry    IMPRESSION:   URI  Sinus congestion  Cough  Wheezing reported    PLAN:  CXR- no infection or fluid noted  Mucinex  Saline nasal spray  Rx Tessalon Perles  Hydration, water best  Call  prn

## 2018-11-13 ENCOUNTER — TELEPHONE (OUTPATIENT)
Dept: INTERNAL MEDICINE | Facility: CLINIC | Age: 78
End: 2018-11-13

## 2018-11-13 ENCOUNTER — LAB VISIT (OUTPATIENT)
Dept: LAB | Facility: HOSPITAL | Age: 78
End: 2018-11-13
Attending: UROLOGY
Payer: MEDICARE

## 2018-11-13 DIAGNOSIS — N39.0 RECURRENT UTI: ICD-10-CM

## 2018-11-13 PROCEDURE — 87077 CULTURE AEROBIC IDENTIFY: CPT | Mod: HCNC

## 2018-11-13 PROCEDURE — 87088 URINE BACTERIA CULTURE: CPT | Mod: HCNC

## 2018-11-13 PROCEDURE — 87086 URINE CULTURE/COLONY COUNT: CPT | Mod: HCNC

## 2018-11-13 PROCEDURE — 87186 SC STD MICRODIL/AGAR DIL: CPT | Mod: HCNC

## 2018-11-13 NOTE — TELEPHONE ENCOUNTER
----- Message from Mary Kaur sent at 11/13/2018  9:08 AM CST -----  Contact: Pt Mobile/Home [phone]   Patient would like a call back in regards to her wanting to come in to drop off a urine sample please.

## 2018-11-14 ENCOUNTER — PATIENT MESSAGE (OUTPATIENT)
Dept: UROLOGY | Facility: CLINIC | Age: 78
End: 2018-11-14

## 2018-11-14 ENCOUNTER — PATIENT MESSAGE (OUTPATIENT)
Dept: INTERNAL MEDICINE | Facility: CLINIC | Age: 78
End: 2018-11-14

## 2018-11-15 ENCOUNTER — TELEPHONE (OUTPATIENT)
Dept: INTERNAL MEDICINE | Facility: CLINIC | Age: 78
End: 2018-11-15

## 2018-11-15 ENCOUNTER — PES CALL (OUTPATIENT)
Dept: ADMINISTRATIVE | Facility: CLINIC | Age: 78
End: 2018-11-15

## 2018-11-15 LAB — BACTERIA UR CULT: NORMAL

## 2018-11-15 RX ORDER — LEVOFLOXACIN 500 MG/1
500 TABLET, FILM COATED ORAL DAILY
Qty: 7 TABLET | Refills: 0 | Status: ON HOLD | OUTPATIENT
Start: 2018-11-15 | End: 2018-12-12

## 2018-11-15 RX ORDER — PHENAZOPYRIDINE HYDROCHLORIDE 200 MG/1
200 TABLET, FILM COATED ORAL 3 TIMES DAILY PRN
Qty: 30 TABLET | Refills: 1 | Status: SHIPPED | OUTPATIENT
Start: 2018-11-15 | End: 2018-11-25

## 2018-11-15 NOTE — TELEPHONE ENCOUNTER
Called patient to schedule an appointment. Patient stated she spoke with Dr. Mcgee this morning and got everything taken care of.

## 2018-11-15 NOTE — TELEPHONE ENCOUNTER
----- Message from Lulu Villafana sent at 11/15/2018  9:05 AM CST -----  Contact: Pt request via [facility]    More Detail >>  Appointment Request  Maadn Bell  MRN: [id number] : 1940  Pt Home: [phone]    Entered: [phone]      Sent:   4:30 PM  To: [facility]  Message     ----- Message from [facility], System Message sent at 2018  4:30 PM CST -----    Appointment Request From: Madan Bell    [facility] Provider[facility]    Preferred Date Range: 11/15/2018 - 2018    Preferred Times: Any time    Reason for visit: persistent cough- f/u visit recommended by Dr Lundy    Comments:  This message is being sent by Cris Khan on behalf of Madan Bell.  Saw Dr Lundy on  for productive, persistent cough. Still with symptoms. Dr Lundy recommends f/u appt please.

## 2018-12-06 ENCOUNTER — PATIENT OUTREACH (OUTPATIENT)
Dept: OTHER | Facility: OTHER | Age: 78
End: 2018-12-06

## 2018-12-06 NOTE — PROGRESS NOTES
Last 5 Patient Entered Readings                                      Current 30 Day Average: 115/63     Recent Readings 12/5/2018 12/4/2018 12/3/2018 12/2/2018 12/1/2018    SBP (mmHg) 102 115 128 130 116    DBP (mmHg) 59 55 66 62 80    Pulse 52 55 56 61 62            Digital Medicine: Health  Follow Up    Left voicemail to follow up with Mrs. Madan Bell.  Current BP average 115/63 mmHg is at goal, <130/80.    Patient called back to let me know that she is doing well and feeling great. She denies symptoms/side effects. She will reach out if she has any questions or concerns.

## 2018-12-10 ENCOUNTER — TELEPHONE (OUTPATIENT)
Dept: INTERNAL MEDICINE | Facility: CLINIC | Age: 78
End: 2018-12-10

## 2018-12-10 RX ORDER — AZITHROMYCIN 500 MG/1
500 TABLET, FILM COATED ORAL DAILY
Qty: 10 TABLET | Refills: 0 | Status: ON HOLD | OUTPATIENT
Start: 2018-12-10 | End: 2018-12-14 | Stop reason: HOSPADM

## 2018-12-10 RX ORDER — BENZONATATE 100 MG/1
100 CAPSULE ORAL 3 TIMES DAILY PRN
Qty: 30 CAPSULE | Refills: 0 | Status: ON HOLD | OUTPATIENT
Start: 2018-12-10 | End: 2018-12-12

## 2018-12-10 NOTE — TELEPHONE ENCOUNTER
No fever, no sore throat, no aches. Just a cough with some light yellowish phlem. Patient is requesting an Rx sent in to her [facility]

## 2018-12-10 NOTE — TELEPHONE ENCOUNTER
"----- Message from Mariana Daniels sent at 12/10/2018  8:15 AM CST -----  Contact: [phone]  Patient would like to get medical advice.    Symptoms (please be specific):  Constant cough with phlegm    How long has patient had these symptoms:  3 days    Pharmacy name and phone # (DON'T enter "on file" or "in chart"):  [facility] ([place]) - [address] [phone] (Phone)  [phone] (Fax)    Any drug allergies:  Yes    Would you prefer a response via [facility]?:  No    Comments:    "

## 2018-12-11 ENCOUNTER — HOSPITAL ENCOUNTER (INPATIENT)
Facility: HOSPITAL | Age: 78
LOS: 3 days | Discharge: HOME | DRG: 193 | End: 2018-12-14
Attending: EMERGENCY MEDICINE | Admitting: EMERGENCY MEDICINE
Payer: MEDICARE

## 2018-12-11 DIAGNOSIS — J18.9 COMMUNITY ACQUIRED PNEUMONIA OF RIGHT LOWER LOBE OF LUNG: Primary | ICD-10-CM

## 2018-12-11 DIAGNOSIS — R53.1 WEAKNESS: ICD-10-CM

## 2018-12-11 DIAGNOSIS — E87.1 HYPONATREMIA: ICD-10-CM

## 2018-12-11 DIAGNOSIS — R50.9 FEVER, UNSPECIFIED FEVER CAUSE: ICD-10-CM

## 2018-12-11 LAB
ALBUMIN SERPL BCP-MCNC: 3.5 G/DL
ALP SERPL-CCNC: 60 U/L
ALT SERPL W/O P-5'-P-CCNC: 8 U/L
ANION GAP SERPL CALC-SCNC: 6 MMOL/L
ANION GAP SERPL CALC-SCNC: 8 MMOL/L
AST SERPL-CCNC: 15 U/L
BASOPHILS # BLD AUTO: 0.03 K/UL
BASOPHILS NFR BLD: 0.2 %
BILIRUB SERPL-MCNC: 2.7 MG/DL
BILIRUB UR QL STRIP: NEGATIVE
BNP SERPL-MCNC: 148 PG/ML
BUN SERPL-MCNC: 11 MG/DL
BUN SERPL-MCNC: 9 MG/DL
CALCIUM SERPL-MCNC: 8.6 MG/DL
CALCIUM SERPL-MCNC: 9.5 MG/DL
CHLORIDE SERPL-SCNC: 100 MMOL/L
CHLORIDE SERPL-SCNC: 93 MMOL/L
CLARITY UR REFRACT.AUTO: CLEAR
CO2 SERPL-SCNC: 24 MMOL/L
CO2 SERPL-SCNC: 25 MMOL/L
COLOR UR AUTO: YELLOW
CREAT SERPL-MCNC: 0.7 MG/DL
CREAT SERPL-MCNC: 0.8 MG/DL
CTP QC/QA: YES
DIFFERENTIAL METHOD: ABNORMAL
EOSINOPHIL # BLD AUTO: 0 K/UL
EOSINOPHIL NFR BLD: 0 %
ERYTHROCYTE [DISTWIDTH] IN BLOOD BY AUTOMATED COUNT: 12.8 %
EST. GFR  (AFRICAN AMERICAN): >60 ML/MIN/1.73 M^2
EST. GFR  (AFRICAN AMERICAN): >60 ML/MIN/1.73 M^2
EST. GFR  (NON AFRICAN AMERICAN): >60 ML/MIN/1.73 M^2
EST. GFR  (NON AFRICAN AMERICAN): >60 ML/MIN/1.73 M^2
GLUCOSE SERPL-MCNC: 117 MG/DL
GLUCOSE SERPL-MCNC: 129 MG/DL
GLUCOSE UR QL STRIP: NEGATIVE
HCT VFR BLD AUTO: 36 %
HGB BLD-MCNC: 12.1 G/DL
HGB UR QL STRIP: ABNORMAL
IMM GRANULOCYTES # BLD AUTO: 0.06 K/UL
IMM GRANULOCYTES NFR BLD AUTO: 0.4 %
INR PPP: 1.2
KETONES UR QL STRIP: ABNORMAL
LACTATE SERPL-SCNC: 0.6 MMOL/L
LACTATE SERPL-SCNC: 1 MMOL/L
LEUKOCYTE ESTERASE UR QL STRIP: NEGATIVE
LYMPHOCYTES # BLD AUTO: 1.2 K/UL
LYMPHOCYTES NFR BLD: 8.4 %
MAGNESIUM SERPL-MCNC: 2.1 MG/DL
MCH RBC QN AUTO: 31.7 PG
MCHC RBC AUTO-ENTMCNC: 33.6 G/DL
MCV RBC AUTO: 94 FL
MICROSCOPIC COMMENT: ABNORMAL
MONOCYTES # BLD AUTO: 1.1 K/UL
MONOCYTES NFR BLD: 8 %
NEUTROPHILS # BLD AUTO: 11.8 K/UL
NEUTROPHILS NFR BLD: 83 %
NITRITE UR QL STRIP: NEGATIVE
NRBC BLD-RTO: 0 /100 WBC
PH UR STRIP: 5 [PH] (ref 5–8)
PHOSPHATE SERPL-MCNC: 1.8 MG/DL
PLATELET # BLD AUTO: 165 K/UL
PMV BLD AUTO: 10.6 FL
POC MOLECULAR INFLUENZA A AGN: NEGATIVE
POC MOLECULAR INFLUENZA B AGN: NEGATIVE
POCT GLUCOSE: 127 MG/DL (ref 70–110)
POTASSIUM SERPL-SCNC: 3.5 MMOL/L
POTASSIUM SERPL-SCNC: 3.5 MMOL/L
PROT SERPL-MCNC: 6.9 G/DL
PROT UR QL STRIP: NEGATIVE
PROTHROMBIN TIME: 12.6 SEC
RBC # BLD AUTO: 3.82 M/UL
RBC #/AREA URNS AUTO: 9 /HPF (ref 0–4)
SODIUM SERPL-SCNC: 125 MMOL/L
SODIUM SERPL-SCNC: 131 MMOL/L
SP GR UR STRIP: 1.01 (ref 1–1.03)
SQUAMOUS #/AREA URNS AUTO: 0 /HPF
TROPONIN I SERPL DL<=0.01 NG/ML-MCNC: 0.01 NG/ML
TSH SERPL DL<=0.005 MIU/L-ACNC: 0.97 UIU/ML
URN SPEC COLLECT METH UR: ABNORMAL
WBC # BLD AUTO: 14.21 K/UL
WBC #/AREA URNS AUTO: 0 /HPF (ref 0–5)

## 2018-12-11 PROCEDURE — 63600175 PHARM REV CODE 636 W HCPCS: Mod: HCNC | Performed by: STUDENT IN AN ORGANIZED HEALTH CARE EDUCATION/TRAINING PROGRAM

## 2018-12-11 PROCEDURE — 99285 PR EMERGENCY DEPT VISIT,LEVEL V: ICD-10-PCS | Mod: ,,, | Performed by: EMERGENCY MEDICINE

## 2018-12-11 PROCEDURE — 84100 ASSAY OF PHOSPHORUS: CPT | Mod: HCNC

## 2018-12-11 PROCEDURE — 25000003 PHARM REV CODE 250: Mod: HCNC | Performed by: STUDENT IN AN ORGANIZED HEALTH CARE EDUCATION/TRAINING PROGRAM

## 2018-12-11 PROCEDURE — 81001 URINALYSIS AUTO W/SCOPE: CPT | Mod: HCNC

## 2018-12-11 PROCEDURE — 84484 ASSAY OF TROPONIN QUANT: CPT | Mod: HCNC

## 2018-12-11 PROCEDURE — 99285 EMERGENCY DEPT VISIT HI MDM: CPT | Mod: 25,HCNC

## 2018-12-11 PROCEDURE — 84443 ASSAY THYROID STIM HORMONE: CPT | Mod: HCNC

## 2018-12-11 PROCEDURE — 25000003 PHARM REV CODE 250: Mod: HCNC | Performed by: EMERGENCY MEDICINE

## 2018-12-11 PROCEDURE — 96365 THER/PROPH/DIAG IV INF INIT: CPT | Mod: HCNC

## 2018-12-11 PROCEDURE — 93010 EKG 12-LEAD: ICD-10-PCS | Mod: HCNC,,, | Performed by: INTERNAL MEDICINE

## 2018-12-11 PROCEDURE — 83880 ASSAY OF NATRIURETIC PEPTIDE: CPT | Mod: HCNC

## 2018-12-11 PROCEDURE — 80048 BASIC METABOLIC PNL TOTAL CA: CPT | Mod: HCNC

## 2018-12-11 PROCEDURE — 99285 EMERGENCY DEPT VISIT HI MDM: CPT | Mod: ,,, | Performed by: EMERGENCY MEDICINE

## 2018-12-11 PROCEDURE — 87449 NOS EACH ORGANISM AG IA: CPT | Mod: HCNC

## 2018-12-11 PROCEDURE — 83605 ASSAY OF LACTIC ACID: CPT | Mod: 91,HCNC

## 2018-12-11 PROCEDURE — 96361 HYDRATE IV INFUSION ADD-ON: CPT | Mod: HCNC

## 2018-12-11 PROCEDURE — 85610 PROTHROMBIN TIME: CPT | Mod: HCNC

## 2018-12-11 PROCEDURE — 80053 COMPREHEN METABOLIC PANEL: CPT | Mod: HCNC

## 2018-12-11 PROCEDURE — 83605 ASSAY OF LACTIC ACID: CPT | Mod: HCNC

## 2018-12-11 PROCEDURE — 82962 GLUCOSE BLOOD TEST: CPT | Mod: HCNC

## 2018-12-11 PROCEDURE — 87040 BLOOD CULTURE FOR BACTERIA: CPT | Mod: 59,HCNC

## 2018-12-11 PROCEDURE — 93005 ELECTROCARDIOGRAM TRACING: CPT | Mod: HCNC

## 2018-12-11 PROCEDURE — 93010 ELECTROCARDIOGRAM REPORT: CPT | Mod: HCNC,,, | Performed by: INTERNAL MEDICINE

## 2018-12-11 PROCEDURE — 85025 COMPLETE CBC W/AUTO DIFF WBC: CPT | Mod: HCNC

## 2018-12-11 PROCEDURE — 83735 ASSAY OF MAGNESIUM: CPT | Mod: HCNC

## 2018-12-11 PROCEDURE — 20600001 HC STEP DOWN PRIVATE ROOM: Mod: HCNC

## 2018-12-11 RX ORDER — CEFTRIAXONE 1 G/1
1 INJECTION, POWDER, FOR SOLUTION INTRAMUSCULAR; INTRAVENOUS
Status: DISCONTINUED | OUTPATIENT
Start: 2018-12-12 | End: 2018-12-14 | Stop reason: HOSPADM

## 2018-12-11 RX ORDER — ASCORBIC ACID 500 MG
500 TABLET ORAL DAILY
Status: DISCONTINUED | OUTPATIENT
Start: 2018-12-12 | End: 2018-12-14 | Stop reason: HOSPADM

## 2018-12-11 RX ORDER — LEVOTHYROXINE SODIUM 75 UG/1
75 TABLET ORAL
Status: DISCONTINUED | OUTPATIENT
Start: 2018-12-12 | End: 2018-12-14 | Stop reason: HOSPADM

## 2018-12-11 RX ORDER — OSELTAMIVIR PHOSPHATE 75 MG/1
75 CAPSULE ORAL
Status: COMPLETED | OUTPATIENT
Start: 2018-12-11 | End: 2018-12-11

## 2018-12-11 RX ORDER — IBUPROFEN 200 MG
16 TABLET ORAL
Status: DISCONTINUED | OUTPATIENT
Start: 2018-12-11 | End: 2018-12-14 | Stop reason: HOSPADM

## 2018-12-11 RX ORDER — PANTOPRAZOLE SODIUM 40 MG/1
40 TABLET, DELAYED RELEASE ORAL DAILY
Status: DISCONTINUED | OUTPATIENT
Start: 2018-12-12 | End: 2018-12-14 | Stop reason: HOSPADM

## 2018-12-11 RX ORDER — SODIUM CHLORIDE 0.9 % (FLUSH) 0.9 %
5 SYRINGE (ML) INJECTION
Status: DISCONTINUED | OUTPATIENT
Start: 2018-12-11 | End: 2018-12-14 | Stop reason: HOSPADM

## 2018-12-11 RX ORDER — AZITHROMYCIN 250 MG/1
250 TABLET, FILM COATED ORAL DAILY
Status: DISCONTINUED | OUTPATIENT
Start: 2018-12-12 | End: 2018-12-11

## 2018-12-11 RX ORDER — IBUPROFEN 200 MG
24 TABLET ORAL
Status: DISCONTINUED | OUTPATIENT
Start: 2018-12-11 | End: 2018-12-14 | Stop reason: HOSPADM

## 2018-12-11 RX ORDER — CHOLECALCIFEROL (VITAMIN D3) 25 MCG
2000 TABLET ORAL DAILY
Status: DISCONTINUED | OUTPATIENT
Start: 2018-12-12 | End: 2018-12-14 | Stop reason: HOSPADM

## 2018-12-11 RX ORDER — IPRATROPIUM BROMIDE AND ALBUTEROL SULFATE 2.5; .5 MG/3ML; MG/3ML
3 SOLUTION RESPIRATORY (INHALATION) EVERY 6 HOURS PRN
Status: DISCONTINUED | OUTPATIENT
Start: 2018-12-11 | End: 2018-12-14 | Stop reason: HOSPADM

## 2018-12-11 RX ORDER — AZITHROMYCIN 250 MG/1
250 TABLET, FILM COATED ORAL DAILY
Status: DISCONTINUED | OUTPATIENT
Start: 2018-12-12 | End: 2018-12-14 | Stop reason: HOSPADM

## 2018-12-11 RX ORDER — NAPROXEN SODIUM 220 MG/1
81 TABLET, FILM COATED ORAL DAILY
Status: DISCONTINUED | OUTPATIENT
Start: 2018-12-12 | End: 2018-12-14 | Stop reason: HOSPADM

## 2018-12-11 RX ORDER — GLUCAGON 1 MG
1 KIT INJECTION
Status: DISCONTINUED | OUTPATIENT
Start: 2018-12-11 | End: 2018-12-14 | Stop reason: HOSPADM

## 2018-12-11 RX ORDER — ACETAMINOPHEN 325 MG/1
650 TABLET ORAL
Status: COMPLETED | OUTPATIENT
Start: 2018-12-11 | End: 2018-12-11

## 2018-12-11 RX ORDER — AZITHROMYCIN 250 MG/1
500 TABLET, FILM COATED ORAL DAILY
Status: DISCONTINUED | OUTPATIENT
Start: 2018-12-12 | End: 2018-12-11

## 2018-12-11 RX ORDER — HYDROCHLOROTHIAZIDE 12.5 MG/1
12.5 TABLET ORAL DAILY
Status: DISCONTINUED | OUTPATIENT
Start: 2018-12-12 | End: 2018-12-12

## 2018-12-11 RX ORDER — ATORVASTATIN CALCIUM 20 MG/1
40 TABLET, FILM COATED ORAL DAILY
Status: DISCONTINUED | OUTPATIENT
Start: 2018-12-11 | End: 2018-12-14 | Stop reason: HOSPADM

## 2018-12-11 RX ADMIN — SODIUM CHLORIDE 1000 ML: 0.9 INJECTION, SOLUTION INTRAVENOUS at 06:12

## 2018-12-11 RX ADMIN — ATORVASTATIN CALCIUM 40 MG: 10 TABLET, FILM COATED ORAL at 10:12

## 2018-12-11 RX ADMIN — CEFTRIAXONE 2 G: 2 INJECTION, SOLUTION INTRAVENOUS at 08:12

## 2018-12-11 RX ADMIN — ACETAMINOPHEN 650 MG: 325 TABLET, FILM COATED ORAL at 05:12

## 2018-12-11 RX ADMIN — OSELTAMIVIR PHOSPHATE 75 MG: 75 CAPSULE ORAL at 10:12

## 2018-12-11 RX ADMIN — Medication 1 CAPSULE: at 11:12

## 2018-12-11 RX ADMIN — APIXABAN 5 MG: 5 TABLET, FILM COATED ORAL at 10:12

## 2018-12-11 NOTE — ED NOTES
Patient received; symptoms onset with a productive cough 3 days ago and have escalated to fatigue, confusion, and lethargy.     No LDA's in place on arrival to department.    Daughter is present.    Pain:  denies    Psychosocial:  Patient is calm and cooperative.  Patients insight and judgement are appropriate to situation.  Appears clean, well maintained, with clothing appropriate to environment.  No evidence of hallucinations, delusions, or psychosis.    Neuro:  Eyes open spontaneously.  Awake, alert.  Oriented x 4.  Speech clear and appropriate.  Tolerating saliva secretions well.  Able to follow commands, demonstrating ability to actively and appropriately communicate within context of current conversation.  Symmetrical facial muscles.  Adequate muscle tone present.  Movement in purposeful.  No evidence of impaired sensation.  Pupils equally round and reactive to light.  No noted drifts.    Airway:  Bilateral chest rise and fall.  RR regular and non labored.  Air entry patent and lung sounds diminished and coarse to right lower lobe; lung sounds clear to left upper and lower lobes and right upper lobe.  No crepitus or subcutaneous emphysema noted on palpation.    Circulatory:  Skin warm, dry, and pink.  Apical and radial pulses strong and regular.  Capillary refill less than 3 seconds to distal of 4 extremities.    Abdomen:  Abdomen soft and non distended.      Urinary:  Patient reports routine urination without pain.

## 2018-12-11 NOTE — ED PROVIDER NOTES
Encounter Date: 12/11/2018       History     Chief Complaint   Patient presents with    Fatigue     mildly confused per daughter , prod cough on zithromaz     Madan Bell is a 78 year old female with a medical history significant for afib on chronic anticoagulation, HLD, HTN, chronic UTI and thyroid cancer s/p ablation who presents to the ED with complaints of generalized weakness and fatigue. Pt is accompanied by family. Family states that the pt has been experiencing progressive weakness, fatigue and confusion. Pt is noted to been having progressive difficulty finding words and generalized weakness. Family states that at baseline, pt is independent and mentally intact. Family states that pt has had decreased appetite over the past week and approximately 3 days ago developed upper respiratory symptoms. Pt endorses a cough productive of clear to yellow sputum. Pt was started on a course of azithromycin by PCP and given cough suppressants. Family becaome concerned and brought pt to ED for evaluation.     Pt endorses fever, chills, productive cough, weakness and fatigue. Pt denies CP, SOB, nausea/vomiting, constipation, dysuria or other changes in urinary habits.           Review of patient's allergies indicates:   Allergen Reactions    Fluoride preparations Hives    Fluoride      Other reaction(s): Hives     Past Medical History:   Diagnosis Date    Anticoagulant long-term use     Anxiety     Arrhythmia     Arthritis     knees    Breast cyst     Fibrocystic breast 1980 - ??    Heart murmur     Hyperlipidemia     Hypertension     Mitral valve disorder     Mitral valve prolapse     PVC's (premature ventricular contractions)     Squamous cell carcinoma bx 7-2017    right upper forehead    Thyroid cancer 1/29/2013    Thyroid disease     Vasovagal near syncope 11/12/2012    VTE (venous thromboembolism)     in 1960s, post partum, pt unsure of details      Past Surgical History:   Procedure Laterality  Date    BREAST BIOPSY Left     excisional    BREAST BIOPSY Left     core needle biopsy    BREAST CYST ASPIRATION  1980's - ??    BREAST CYST EXCISION  2008 - ??    CARDIOVERSION N/A 8/13/2018    Procedure: CARDIOVERSION;  Surgeon: Fernando Disla MD;  Location: [facility] CATH LAB;  Service: Cardiology;  Laterality: N/A;  AF,DCCV,ANAHI,MAC,FAS,3 PREP    CARDIOVERSION N/A 8/13/2018    Performed by Fernando Disla MD at [facility] CATH LAB    ECHOCARDIOGRAM,TRANSESOPHAGEAL N/A 8/13/2018    Performed by Fernando Disla MD at [facility] CATH LAB    HYSTERECTOMY      tahbso    OOPHORECTOMY  hysterectomy - 2000 - ??    THYROID SURGERY      THYROIDECTOMY Bilateral 1/9/2013    Performed by Connor Santos MD at [facility] OR [address] [place] [address]     Family History   Problem Relation Age of Onset    Arrhythmia Son     Mitral valve prolapse Son     Sudden death Son         sudden cardiac death    Heart disease Mother     Hyperlipidemia Maternal Grandmother     Breast cancer Maternal Uncle     No Known Problems Daughter     No Known Problems Son     No Known Problems Son      Social History     Tobacco Use    Smoking status: Never Smoker    Smokeless tobacco: Never Used   Substance Use Topics    Alcohol use: No    Drug use: No     Review of Systems   Constitutional: Positive for activity change, appetite change, chills, fatigue and fever.   HENT: Negative for sinus pressure, sinus pain, sneezing and sore throat.    Eyes: Negative for photophobia and visual disturbance.   Respiratory: Positive for cough. Negative for chest tightness, shortness of breath and wheezing.    Cardiovascular: Negative for chest pain and leg swelling.   Gastrointestinal: Positive for diarrhea. Negative for abdominal distention, abdominal pain, constipation, nausea and vomiting.   Genitourinary: Negative for difficulty urinating, dysuria, frequency and urgency.   Musculoskeletal: Negative for neck pain and neck stiffness.   Skin: Positive for pallor.    Neurological: Positive for weakness. Negative for dizziness, tremors, light-headedness and headaches.   Psychiatric/Behavioral: Positive for confusion. Negative for agitation.       Physical Exam     Initial Vitals [12/11/18 1641]   BP Pulse Resp Temp SpO2   (!) 141/62 77 20 (!) 103 °F (39.4 °C) (!) 94 %      MAP       --         Physical Exam    Nursing note and vitals reviewed.  Constitutional: She appears well-developed and well-nourished. She is not diaphoretic. No distress.   HENT:   Head: Normocephalic and atraumatic.   Eyes: EOM are normal. Pupils are equal, round, and reactive to light.   Neck: Normal range of motion. Neck supple.   Cardiovascular: Normal rate, regular rhythm and intact distal pulses.   Pulmonary/Chest: Breath sounds normal. No respiratory distress.   Abdominal: Soft. Bowel sounds are normal. There is no tenderness.   Musculoskeletal: Normal range of motion.   Neurological: She is alert and oriented to person, place, and time. No cranial nerve deficit or sensory deficit. GCS eye subscore is 4. GCS verbal subscore is 5. GCS motor subscore is 6.   Slowed speech  No focal neurologic deficit on exam   Generalized weakness    Skin: Skin is warm and dry.   Warm to touch         ED Course   Procedures  Labs Reviewed   CBC W/ AUTO DIFFERENTIAL - Abnormal; Notable for the following components:       Result Value    WBC 14.21 (*)     RBC 3.82 (*)     Hematocrit 36.0 (*)     MCH 31.7 (*)     Gran # (ANC) 11.8 (*)     Immature Grans (Abs) 0.06 (*)     Mono # 1.1 (*)     Gran% 83.0 (*)     Lymph% 8.4 (*)     All other components within normal limits   COMPREHENSIVE METABOLIC PANEL - Abnormal; Notable for the following components:    Sodium 125 (*)     Chloride 93 (*)     Glucose 129 (*)     Total Bilirubin 2.7 (*)     ALT 8 (*)     All other components within normal limits    Narrative:     ADD ON BNP PER: CHRISTY GILMAN MD  12/11/2018  17:50   ADD-ON MG #[id number]; PHOS #[id number]; TROP  #[id number] PER CHRISTY GILMAN MD 18:01  12/11/2018    URINALYSIS, REFLEX TO URINE CULTURE - Abnormal; Notable for the following components:    Ketones, UA Trace (*)     Occult Blood UA 2+ (*)     All other components within normal limits    Narrative:     Preferred Collection Type->Urine, Clean Catch   PROTIME-INR - Abnormal; Notable for the following components:    Prothrombin Time 12.6 (*)     All other components within normal limits   B-TYPE NATRIURETIC PEPTIDE - Abnormal; Notable for the following components:     (*)     All other components within normal limits    Narrative:     ADD ON BNP PER: CHRISTY GILMAN MD  12/11/2018  17:50   ADD-ON MG #[id number]; PHOS #[id number]; TROP #[id number] PER CHRISTY GILMAN MD 18:01 12/11/2018    URINALYSIS MICROSCOPIC - Abnormal; Notable for the following components:    RBC, UA 9 (*)     All other components within normal limits    Narrative:     Preferred Collection Type->Urine, Clean Catch   PHOSPHORUS - Abnormal; Notable for the following components:    Phosphorus 1.8 (*)     All other components within normal limits    Narrative:     ADD ON BNP PER: CHRISTY GILMAN MD  12/11/2018  17:50   ADD-ON MG #[id number]; PHOS #[id number]; TROP #[id number] PER CHRISTY GILMAN MD 18:01  12/11/2018    BASIC METABOLIC PANEL - Abnormal; Notable for the following components:    Sodium 131 (*)     Glucose 117 (*)     Calcium 8.6 (*)     Anion Gap 6 (*)     All other components within normal limits   POCT GLUCOSE - Abnormal; Notable for the following components:    POCT Glucose 127 (*)     All other components within normal limits   LACTIC ACID, PLASMA    Narrative:     ADD ON BNP PER: CHRISTY GILMAN MD  12/11/2018  17:50    TSH    Narrative:     ADD ON BNP PER: CHRISTY GILMAN MD  12/11/2018  17:50   ADD-ON MG #[id number]; PHOS #[id number]; TROP #[id number] PER CHRISTY GILMAN MD 18:01  12/11/2018    B-TYPE NATRIURETIC  PEPTIDE   MAGNESIUM   PHOSPHORUS   TROPONIN I   LACTIC ACID, PLASMA   MAGNESIUM    Narrative:     ADD ON BNP PER: CHRISTY GILMAN MD  12/11/2018  17:50   ADD-ON MG #[id number]; PHOS #[id number]; TROP #[id number] PER CHRISTY GILMAN MD 18:01  12/11/2018    TROPONIN I    Narrative:     ADD ON BNP PER: CHRISTY GILMAN MD  12/11/2018  17:50   ADD-ON MG #[id number]; PHOS #[id number]; TROP #[id number] PER CHRISTY GILMAN MD 18:01  12/11/2018    LEGIONELLA ANTIGEN, URINE RANDOM   LACTIC ACID, PLASMA   LEGIONELLA ANTIGEN, URINE RANDOM   POCT INFLUENZA A/B MOLECULAR     EKG Readings: (Independently Interpreted)   Sinus rhythm with occasional Premature ventricular complexes     ECG Results          EKG 12-lead (Final result)  Result time 12/12/18 14:56:13    Final result by Interface, Lab In [facility] (12/12/18 14:56:13)                 Narrative:    Test Reason : R53.1,  Blood Pressure : 145/064 mmHG  Vent. Rate : 082 BPM     Atrial Rate : 082 BPM     P-R Int : 172 ms          QRS Dur : 114 ms      QT Int : 354 ms       P-R-T Axes : 054 014 052 degrees     QTc Int : 413 ms    Sinus rhythm with occasional Premature ventricular complexes  Septal infarct ,age undetermined  Abnormal ECG  When compared with ECG of 11-OCT-2018 13:45,  Septal infarct is now Present  T wave inversion no longer evident in Inferior leads  Confirmed by Judah GOLDMAN MD, Kane EMERSON ([id number]) on 12/12/2018 2:56:01 PM    Referred By: [email or web address]   SELF           Confirmed By:Kane So III, MD                            Imaging Results          X-Ray Chest AP Portable (Final result)  Result time 12/11/18 18:19:52    Final result by Baldev Arreguin MD (12/11/18 18:19:52)                 Impression:      No acute abnormality.      Electronically signed by: Baldev Arreguin MD  Date:    12/11/2018  Time:    18:19             Narrative:    EXAMINATION:  XR CHEST AP PORTABLE    CLINICAL HISTORY:  Sepsis;    TECHNIQUE:  Single frontal  view of the chest was performed.    COMPARISON:  11/05/2018    FINDINGS:  Prominence of interstitial markings.  Bibasilar subsegmental atelectasis.The lungs are clearof focal consolidation, with normal appearance of pulmonary vasculature and no pleural effusion or pneumothorax.    The cardiac silhouette is normal in size. The hilar and mediastinal contours are unremarkable.    Bones are intact.                                 Medical Decision Making:   History:   Old Medical Records: I decided to obtain old medical records.  Old Records Summarized: records from clinic visits, other records and records from previous admission(s).       <> Summary of Records: Pt contacted PCP two days ago with upper respiratory symptoms, was started on a course of azithromycin and given cough suppressants.   Initial Assessment:   Madan Bell is a 78 year old female with a medical history significant for afib on chronic anticoagulation, HLD, HTN, chronic UTI and thyroid cancer s/p ablation who presents to the ED with complaints of generalized weakness, confusion and fatigue.   Differential Diagnosis:   Differential diagnosis includes but is not limited to pneumonia, influenza, urinary tract infection and hypothyroidism, hypoglycemia and CVA. The pt has a fever of 103 on admission and complains of upper respiratory symptoms. Will get CXR to assess for potential pneumonia. Will also get flu swab to rule out influenza although pt has endorses receiving a flu shot this year. Pt has a history of chronic multi organism UTI however pt vazquez snot currently complain of urinary symptoms and family has noted no change in urinary habits, will get UA to assess for potential urosepsis. Pt also has a history of thyroid cancer s/p ablation, will check TSH. Blood glucose was 126, making hypoglycemia unlikely as a cause of pts symptoms. Pt is known to have atrial fibrillation and is on chronic anticoagulation, will check INR but in absence of  localizing neurologic deficits, CVA is unlikely.   Clinical Tests:   Lab Tests: Ordered and Reviewed       <> Summary of Lab: Lactic acid and UA not concerning for active infection. CBC shows elevated white count. Glucose 126. TSH within normal limits. CMP shows hyponatremia with Na of 125.  Radiological Study: Ordered  Medical Tests: Ordered  ED Management:  Pt was noted to be 103 in ED, was given PO tylenol while sepsis work up was completed with decrease in fever to 99. Lab work up was mostly unremarkable except for a sodium of 125 and mildly elevated white count. CXR showed increased interstitial markings but was read as normal. Ongoing concern for pneumonia and the combination of acute mental status change, hyponatremia and a high fever raised suspicion for ongoing infectious process, critical care medicine was consulted who felt that the patient was stable for the floor on IV antibiotics that cover for CAP and for legionella. Flu swab was negative. Pt was given IV fluids and admitted to internal medicine for further evaluation and treatment.                 Attending Attestation:   Physician Attestation Statement for Resident:  As the supervising MD  I agree with the above history. -:   As the supervising MD I agree with the above PE.  I have reviewed and agree with the residents interpretation of the following: lab data, x-rays and EKG.  I have reviewed the following: old records at this facility.                       Clinical Impression:   The primary encounter diagnosis was Weakness. Diagnoses of Hyponatremia and Fever, unspecified fever cause were also pertinent to this visit.      Disposition:   Disposition: Admitted  Condition: Serious                        Avni Calle MD  Resident  12/11/18 2613       Reji Boswell MD  12/12/18 6625

## 2018-12-12 LAB
ALBUMIN SERPL BCP-MCNC: 2.9 G/DL
ALP SERPL-CCNC: 58 U/L
ALT SERPL W/O P-5'-P-CCNC: 7 U/L
ANION GAP SERPL CALC-SCNC: 10 MMOL/L
AST SERPL-CCNC: 14 U/L
BASOPHILS # BLD AUTO: 0.03 K/UL
BASOPHILS NFR BLD: 0.2 %
BILIRUB SERPL-MCNC: 1.6 MG/DL
BUN SERPL-MCNC: 9 MG/DL
CALCIUM SERPL-MCNC: 9.1 MG/DL
CHLORIDE SERPL-SCNC: 101 MMOL/L
CO2 SERPL-SCNC: 21 MMOL/L
CREAT SERPL-MCNC: 0.7 MG/DL
DIFFERENTIAL METHOD: ABNORMAL
EOSINOPHIL # BLD AUTO: 0 K/UL
EOSINOPHIL NFR BLD: 0.2 %
ERYTHROCYTE [DISTWIDTH] IN BLOOD BY AUTOMATED COUNT: 12.9 %
EST. GFR  (AFRICAN AMERICAN): >60 ML/MIN/1.73 M^2
EST. GFR  (NON AFRICAN AMERICAN): >60 ML/MIN/1.73 M^2
FLUAV AG SPEC QL IA: NEGATIVE
FLUBV AG SPEC QL IA: NEGATIVE
GLUCOSE SERPL-MCNC: 91 MG/DL
HCT VFR BLD AUTO: 33 %
HGB BLD-MCNC: 10.7 G/DL
IMM GRANULOCYTES # BLD AUTO: 0.04 K/UL
IMM GRANULOCYTES NFR BLD AUTO: 0.3 %
LYMPHOCYTES # BLD AUTO: 1.8 K/UL
LYMPHOCYTES NFR BLD: 14.6 %
MCH RBC QN AUTO: 31.1 PG
MCHC RBC AUTO-ENTMCNC: 32.4 G/DL
MCV RBC AUTO: 96 FL
MONOCYTES # BLD AUTO: 1 K/UL
MONOCYTES NFR BLD: 8.2 %
NEUTROPHILS # BLD AUTO: 9.5 K/UL
NEUTROPHILS NFR BLD: 76.5 %
NRBC BLD-RTO: 0 /100 WBC
PLATELET # BLD AUTO: 156 K/UL
PMV BLD AUTO: 11.1 FL
POTASSIUM SERPL-SCNC: 3.5 MMOL/L
PROT SERPL-MCNC: 6 G/DL
RBC # BLD AUTO: 3.44 M/UL
SODIUM SERPL-SCNC: 132 MMOL/L
SPECIMEN SOURCE: NORMAL
WBC # BLD AUTO: 12.5 K/UL

## 2018-12-12 PROCEDURE — 99223 PR INITIAL HOSPITAL CARE,LEVL III: ICD-10-PCS | Mod: HCNC,AI,, | Performed by: HOSPITALIST

## 2018-12-12 PROCEDURE — 25000003 PHARM REV CODE 250: Mod: HCNC | Performed by: STUDENT IN AN ORGANIZED HEALTH CARE EDUCATION/TRAINING PROGRAM

## 2018-12-12 PROCEDURE — 87070 CULTURE OTHR SPECIMN AEROBIC: CPT | Mod: HCNC

## 2018-12-12 PROCEDURE — 99223 1ST HOSP IP/OBS HIGH 75: CPT | Mod: HCNC,AI,, | Performed by: HOSPITALIST

## 2018-12-12 PROCEDURE — 97165 OT EVAL LOW COMPLEX 30 MIN: CPT | Mod: HCNC

## 2018-12-12 PROCEDURE — 20600001 HC STEP DOWN PRIVATE ROOM: Mod: HCNC

## 2018-12-12 PROCEDURE — 36415 COLL VENOUS BLD VENIPUNCTURE: CPT | Mod: HCNC

## 2018-12-12 PROCEDURE — 87400 INFLUENZA A/B EACH AG IA: CPT | Mod: HCNC

## 2018-12-12 PROCEDURE — 87205 SMEAR GRAM STAIN: CPT | Mod: HCNC

## 2018-12-12 PROCEDURE — 80053 COMPREHEN METABOLIC PANEL: CPT | Mod: HCNC

## 2018-12-12 PROCEDURE — 85025 COMPLETE CBC W/AUTO DIFF WBC: CPT | Mod: HCNC

## 2018-12-12 PROCEDURE — 63600175 PHARM REV CODE 636 W HCPCS: Mod: HCNC | Performed by: STUDENT IN AN ORGANIZED HEALTH CARE EDUCATION/TRAINING PROGRAM

## 2018-12-12 RX ADMIN — APIXABAN 5 MG: 5 TABLET, FILM COATED ORAL at 08:12

## 2018-12-12 RX ADMIN — Medication 1 CAPSULE: at 09:12

## 2018-12-12 RX ADMIN — PANTOPRAZOLE SODIUM 40 MG: 40 TABLET, DELAYED RELEASE ORAL at 08:12

## 2018-12-12 RX ADMIN — Medication 1 CAPSULE: at 08:12

## 2018-12-12 RX ADMIN — ASPIRIN 81 MG CHEWABLE TABLET 81 MG: 81 TABLET CHEWABLE at 08:12

## 2018-12-12 RX ADMIN — AZITHROMYCIN MONOHYDRATE 250 MG: 250 TABLET ORAL at 11:12

## 2018-12-12 RX ADMIN — ATORVASTATIN CALCIUM 40 MG: 10 TABLET, FILM COATED ORAL at 09:12

## 2018-12-12 RX ADMIN — OXYCODONE HYDROCHLORIDE AND ACETAMINOPHEN 500 MG: 500 TABLET ORAL at 08:12

## 2018-12-12 RX ADMIN — APIXABAN 5 MG: 5 TABLET, FILM COATED ORAL at 09:12

## 2018-12-12 RX ADMIN — LEVOTHYROXINE SODIUM 75 MCG: 75 TABLET ORAL at 06:12

## 2018-12-12 RX ADMIN — CEFTRIAXONE SODIUM 1 G: 1 INJECTION, POWDER, FOR SOLUTION INTRAMUSCULAR; INTRAVENOUS at 09:12

## 2018-12-12 RX ADMIN — VITAMIN D, TAB 1000IU (100/BT) 2000 UNITS: 25 TAB at 08:12

## 2018-12-12 NOTE — H&P
[facility] Hospital Medicine  History & Physical    Patient Name: Madan Bell  MRN: [id number]  Admission Date: 12/11/2018  Attending Physician: Clare Hernandez MD   Primary Care Provider: Sheila Mcgee MD    [facility] Medicine Team: [facility] HOSP MED 5 Iftikhar Maciel MD     Patient information was obtained from patient, relative(s) and ER records.     Subjective:     Principal Problem:Community acquired pneumonia of right lower lobe of lung    Chief Complaint:   Chief Complaint   Patient presents with    Fatigue     mildly confused per daughter , prod cough on zithromaz        HPI: 79 yo F with Afib on Apixiban, HLD, HTN, chronic UTIs and thyroid CA s/p ablation presenting for fatigue, malaise, altered mental status. Pt began to feel unwell Thursday night into Friday with a fever and productive cough with brownish yellow sputum. She had company over for dinner and fell asleep at the table multiple times and the next day (12/9) she was confused, per daughter (at bedside), not knowing where she was or the year. She called into her PCP and got a prescription for azithromycin on 12/10 and had taken 1 dose before presenting to ED today with continued confusion. Pt baseline mentally intact and she is independent with all her ADLs. Pt has chronic UTIs, and has a standing abx prescription with her PCP, for which she also often gets chronic diarrhea, likely C Diff. Pt has fevers, chills, productive cough, weakness, fatigue.     Pt found to have hyponatremia, and reports decreased PO intake and diarrhea. Given 1g rocephin and 500mg azithromycin in ED as well as 1L NS.     Past Medical History:   Diagnosis Date    Anticoagulant long-term use     Anxiety     Arrhythmia     Arthritis     knees    Breast cyst     Fibrocystic breast 1980 - ??    Heart murmur     Hyperlipidemia     Hypertension     Mitral valve disorder     Mitral valve prolapse     PVC's (premature ventricular  contractions)     Squamous cell carcinoma bx 7-2017    right upper forehead    Thyroid cancer 1/29/2013    Thyroid disease     Vasovagal near syncope 11/12/2012    VTE (venous thromboembolism)     in 1960s, post partum, pt unsure of details        Past Surgical History:   Procedure Laterality Date    BREAST BIOPSY Left     excisional    BREAST BIOPSY Left     core needle biopsy    BREAST CYST ASPIRATION  1980's - ??    BREAST CYST EXCISION  2008 - ??    CARDIOVERSION N/A 8/13/2018    Procedure: CARDIOVERSION;  Surgeon: Fernando Disla MD;  Location: [facility] CATH LAB;  Service: Cardiology;  Laterality: N/A;  AF,DCCV,ANAHI,MAC,FAS,3 PREP    CARDIOVERSION N/A 8/13/2018    Performed by Fernando Disla MD at [facility] CATH LAB    ECHOCARDIOGRAM,TRANSESOPHAGEAL N/A 8/13/2018    Performed by Fernando Disla MD at [facility] CATH LAB    HYSTERECTOMY      tahbso    OOPHORECTOMY  hysterectomy - 2000 - ??    THYROID SURGERY      THYROIDECTOMY Bilateral 1/9/2013    Performed by Connor Santos MD at [facility] OR [address] [place] [address]       Review of patient's allergies indicates:   Allergen Reactions    Fluoride preparations Hives    Fluoride      Other reaction(s): Hives       No current facility-administered medications on file prior to encounter.      Current Outpatient Medications on File Prior to Encounter   Medication Sig    ascorbic acid (VITAMIN C) 500 MG tablet Take 500 mg by mouth once daily.    aspirin 81 mg Tab Take 1 tablet by mouth Daily.    atorvastatin (LIPITOR) 40 MG tablet TAKE 1 TABLET EVERY DAY    azithromycin (ZITHROMAX) 500 MG tablet Take 1 tablet (500 mg total) by mouth once daily. for 10 days    benzonatate (TESSALON) 100 MG capsule Take 1 capsule (100 mg total) by mouth 3 (three) times daily as needed.    Bifidobacterium infantis (ALIGN) 4 mg Cap One daily    bisoprolol-hydrochlorothiazide 2.5-6.25 mg (ZIAC) 2.5-6.25 mg Tab TAKE 1/2 TABLET EVERY DAY    cholecalciferol, vitamin D3, (VITAMIN D3)  2,000 unit Cap Take by mouth once daily.      D-MANNOSE ORAL Take by mouth.    ELIQUIS 5 mg Tab TAKE 1 TABLET TWICE DAILY    estradiol (ESTRACE) 0.01 % (0.1 mg/gram) vaginal cream Apply a pea sized amount every day for 2 weeks, then 3x/week. 90 day supply    levothyroxine (SYNTHROID) 75 MCG tablet TAKE 1 TABLET EVERY DAY (NEED MD APPOINTMENT)    omega-3 fatty acids-vitamin E (FISH OIL) 1,000 mg Cap Take 1 capsule by mouth once daily. 1400mg    pantoprazole (PROTONIX) 40 MG tablet TAKE 1 TABLET EVERY DAY    levoFLOXacin (LEVAQUIN) 500 MG tablet Take 1 tablet (500 mg total) by mouth once daily.    meclizine (ANTIVERT) 12.5 mg tablet Half- one tablet three times daily as needed for vertigo    metoprolol tartrate (LOPRESSOR) 50 MG tablet Take one half to one tablet daily as needed for palpitations.     Family History     Problem Relation (Age of Onset)    Arrhythmia Son    Breast cancer Maternal Uncle    Heart disease Mother    Hyperlipidemia Maternal Grandmother    Mitral valve prolapse Son    No Known Problems Daughter, Son, Son    Sudden death Son        Tobacco Use    Smoking status: Never Smoker    Smokeless tobacco: Never Used   Substance and Sexual Activity    Alcohol use: No    Drug use: No    Sexual activity: Not on file     Review of Systems   Constitutional: Positive for activity change, appetite change, chills, fatigue and fever.   HENT: Negative.    Eyes: Negative for visual disturbance.   Respiratory: Positive for cough and shortness of breath.    Cardiovascular: Negative for chest pain, palpitations and leg swelling.   Gastrointestinal: Positive for diarrhea. Negative for abdominal distention, abdominal pain, nausea and vomiting.   Genitourinary: Negative.    Musculoskeletal: Negative for arthralgias, back pain and gait problem.   Skin: Negative for pallor, rash and wound.   Psychiatric/Behavioral: Positive for confusion. Negative for agitation.     Objective:     Vital Signs (Most  Recent):  Temp: 97.9 °F (36.6 °C)(pt just drank cold water) (12/11/18 2226)  Pulse: 65 (12/11/18 2202)  Resp: (!) 21 (12/11/18 2202)  BP: (!) 118/55 (12/11/18 2202)  SpO2: 95 % (12/11/18 2202) Vital Signs (24h Range):  Temp:  [97.9 °F (36.6 °C)-103 °F (39.4 °C)] 97.9 °F (36.6 °C)  Pulse:  [64-82] 65  Resp:  [19-21] 21  SpO2:  [92 %-96 %] 95 %  BP: (117-145)/(55-64) 118/55     Weight: 65.8 kg (145 lb)  Body mass index is 21.41 kg/m².    Physical Exam   Constitutional: She is oriented to person, place, and time. She appears well-developed and well-nourished. No distress.   HENT:   Head: Normocephalic and atraumatic.   Eyes: Pupils are equal, round, and reactive to light. No scleral icterus.   Neck: Normal range of motion.   Cardiovascular: Normal rate, regular rhythm and normal heart sounds.   Pulmonary/Chest: Effort normal. No respiratory distress.   On nasal cannula, rhonchi R sided   Abdominal: Soft. She exhibits no distension. There is no tenderness.   Musculoskeletal: She exhibits no edema.   Neurological: She is alert and oriented to person, place, and time.   Skin: Capillary refill takes less than 2 seconds. She is not diaphoretic.   Psychiatric: She has a normal mood and affect. Her behavior is normal. Judgment and thought content normal.   Nursing note and vitals reviewed.        CRANIAL NERVES     CN III, IV, VI   Pupils are equal, round, and reactive to light.       Significant Labs:   CBC:   Recent Labs   Lab 12/11/18  1714   WBC 14.21*   HGB 12.1   HCT 36.0*        CMP:   Recent Labs   Lab 12/11/18  1714   *   K 3.5   CL 93*   CO2 24   *   BUN 11   CREATININE 0.8   CALCIUM 9.5   PROT 6.9   ALBUMIN 3.5   BILITOT 2.7*   ALKPHOS 60   AST 15   ALT 8*   ANIONGAP 8   EGFRNONAA >60.0     Lactic Acid:   Recent Labs   Lab 12/11/18  1714 12/11/18  2042   LACTATE 1.0 0.6       Significant Imaging: CXR: I have reviewed all pertinent results/findings within the past 24 hours and my personal  findings are:  Prominence of interstitial markings.  Bibasilar subsegmental atelectasis.The lungs are clearof focal consolidation, with normal appearance of pulmonary vasculature and no pleural effusion or pneumothorax.    Assessment/Plan:     * Community acquired pneumonia of right lower lobe of lung    Temp 103, productive cough, confusion, CURB-65: 2 (in-patient), WBC 14  Received 1L NS in ED, along with 500mg Azithromycin and 1g ceftriaxone  Rapid Flu negative, Legionella antigen PENDING  Blood cultures PENDING  Lactic was 1.0 and 0.6 on repeat  On 2L NC, duonebs PRN  Continue on azithromycin for 3 more days and rochephin        Hyponatremia    Na 125 at admission, baseline at low 130s  Hypochloremia as well  Suspected from decreased PO intake and diarrhea  Given 1 L NS  Repeat BMP and reassess fluid status       AF (atrial fibrillation)    Home apixaban 5mg BD  Bisoprolol, home dose.   Has metoprolol PRN at home, never used       HTN (hypertension), benign    Home combination bisoprolol-hctz non-formulary, giving separately        Hypercholesterolemia    Continue home atorvastatin         VTE Risk Mitigation (From admission, onward)        Ordered     IP VTE HIGH RISK PATIENT  Once      12/12/18 0000     apixaban tablet 5 mg  2 times daily      12/11/18 4593             Iftikhar Maciel MD  Department of Hospital Medicine   [facility]

## 2018-12-12 NOTE — PLAN OF CARE
Problem: Occupational Therapy Goal  Goal: Occupational Therapy Goal  Pt is not currently displaying a need for acute OT services. D/C acute OT services and recommend pt D/C Home  D/C acute OT services     Comments: Ulices Valenzuela OTR/L  12/12/2018

## 2018-12-12 NOTE — CONSULTS
[facility]  Critical Care Medicine  Consult Note    Patient Name: Madan Bell  MRN: [id number]  Admission Date: 12/11/2018  Hospital Length of Stay: 0 days  Code Status: Prior  Attending Physician: Clare Hernandez MD   Primary Care Provider: Sheila Mcgee MD   Principal Problem: Community acquired pneumonia of right lower lobe of lung    Inpatient consult to Critical Care Medicine  Consult performed by: Behram Khan, MD  Consult ordered by: Avni Calle MD  Reason for consult: Hyponatremia and acute hypoxic respiratory failure        Subjective:     HPI:  Ms Bell is a 79 yo F with PMHx significant for recurrent UTI's, HFpEF (EF 55%), moderate MVP/mitral sclerosis, AFib on AC, HLD, HTN, and SCC of the thyroid s/p ablation now hypothyroid.     She presents with three day history of worsening fatigue and malaise with one day history of accompanying cough productive of thick, brownish-yellow sputum. She has recently had out-of-town company visit, some of whom have been dealing with upper respiratory viral illness. She also has a very close friend being treated for urosepsis, whom she has been visiting over the past week. She contacted her PCP who prescribed azithromycin (pt took one dose yesterday and one today). Prior to coming into the emergency department she also complained of some shortness of breath. She also recently finished a one week course of levaquin for UTI. Family brought her in to the ED because of new-onset confusion that is very different from her baseline.     Since coming to the ED, her sputum has become clearer. She has no history of underlying lung issues, no radiation for treatment of her thyroid cancer. Does report some diarrhea when receiving antibiotic treatment.     She has received her influenza shot this year.     Hospital/ICU Course:  No notes on file    Past Medical History:   Diagnosis Date    Anticoagulant long-term use     Anxiety     Arrhythmia      Arthritis     knees    Breast cyst     Fibrocystic breast 1980 - ??    Heart murmur     Hyperlipidemia     Hypertension     Mitral valve disorder     Mitral valve prolapse     PVC's (premature ventricular contractions)     Squamous cell carcinoma bx 7-2017    right upper forehead    Thyroid cancer 1/29/2013    Thyroid disease     Vasovagal near syncope 11/12/2012    VTE (venous thromboembolism)     in 1960s, post partum, pt unsure of details        Past Surgical History:   Procedure Laterality Date    BREAST BIOPSY Left     excisional    BREAST BIOPSY Left     core needle biopsy    BREAST CYST ASPIRATION  1980's - ??    BREAST CYST EXCISION  2008 - ??    CARDIOVERSION N/A 8/13/2018    Procedure: CARDIOVERSION;  Surgeon: Fernando Disla MD;  Location: [facility] CATH LAB;  Service: Cardiology;  Laterality: N/A;  AF,DCCV,ANAHI,MAC,FAS,3 PREP    CARDIOVERSION N/A 8/13/2018    Performed by [facility]la MD at [facility] CATH LAB    ECHOCARDIOGRAM,TRANSESOPHAGEAL N/A 8/13/2018    Performed by [facility]la MD at [facility] CATH LAB    HYSTERECTOMY      tahbso    OOPHORECTOMY  hysterectomy - 2000 - ??    THYROID SURGERY      THYROIDECTOMY Bilateral 1/9/2013    Performed by Connor Santos MD at [facility] OR [address] [place] [address]       Review of patient's allergies indicates:   Allergen Reactions    Fluoride preparations Hives    Fluoride      Other reaction(s): Hives       Family History     Problem Relation (Age of Onset)    Arrhythmia Son    Breast cancer Maternal Uncle    Heart disease Mother    Hyperlipidemia Maternal Grandmother    Mitral valve prolapse Son    No Known Problems Daughter, Son, Son    Sudden death Son        Tobacco Use    Smoking status: Never Smoker    Smokeless tobacco: Never Used   Substance and Sexual Activity    Alcohol use: No    Drug use: No    Sexual activity: Not on file      Review of Systems   Constitutional: Positive for chills and fever. Negative for unexpected weight  change.   HENT: Negative for ear pain, sinus pressure, sneezing and sore throat.    Eyes: Negative for pain and visual disturbance.   Respiratory: Positive for cough and shortness of breath. Negative for chest tightness and wheezing.    Cardiovascular: Negative for chest pain, palpitations and leg swelling.   Gastrointestinal: Positive for diarrhea. Negative for abdominal pain, constipation, nausea and vomiting.   Endocrine: Negative for polydipsia and polyuria.   Genitourinary: Negative for decreased urine volume, difficulty urinating, dysuria and urgency.   Musculoskeletal: Negative for arthralgias and myalgias.   Skin: Negative for color change and rash.   Allergic/Immunologic: Negative for environmental allergies and food allergies.   Neurological: Negative for dizziness, syncope, weakness, light-headedness and headaches.   Psychiatric/Behavioral: Negative for confusion and dysphoric mood. The patient is not nervous/anxious.      Objective:     Vital Signs (Most Recent):  Temp: 98.1 °F (36.7 °C) (12/11/18 2059)  Pulse: 64 (12/11/18 1902)  Resp: 19 (12/11/18 1902)  BP: (!) 117/56 (12/11/18 1902)  SpO2: 96 % (12/11/18 1902) Vital Signs (24h Range):  Temp:  [98.1 °F (36.7 °C)-103 °F (39.4 °C)] 98.1 °F (36.7 °C)  Pulse:  [64-82] 64  Resp:  [19-21] 19  SpO2:  [92 %-96 %] 96 %  BP: (117-145)/(56-64) 117/56   Weight: 65.8 kg (145 lb)  Body mass index is 21.41 kg/m².      Intake/Output Summary (Last 24 hours) at 12/11/2018 2140  Last data filed at 12/11/2018 1944  Gross per 24 hour   Intake 1000 ml   Output --   Net 1000 ml       Physical Exam   Constitutional: She is oriented to person, place, and time. She appears well-developed and well-nourished.   HENT:   Head: Normocephalic and atraumatic.   Eyes: EOM are normal. Pupils are equal, round, and reactive to light. No scleral icterus.   Neck: Normal range of motion. Neck supple.   Cardiovascular: Normal rate, regular rhythm, normal heart sounds and intact distal  pulses.   No murmur heard.  Pulmonary/Chest: Effort normal. No respiratory distress. She has no wheezes. She has rhonchi in the right middle field and the right lower field.   Abdominal: Soft. Bowel sounds are normal. She exhibits no distension. There is no tenderness.   Neurological: She is alert and oriented to person, place, and time.   Skin: Skin is warm and dry.   Psychiatric: She has a normal mood and affect. Her behavior is normal.   Vitals reviewed.      Vents:     Lines/Drains/Airways     Peripheral Intravenous Line                 Peripheral IV - Single Lumen 08/13/18 0834 Anterior;Right Wrist 120 days              Significant Labs:    CBC/Anemia Profile:  Recent Labs   Lab 12/11/18  1714   WBC 14.21*   HGB 12.1   HCT 36.0*      MCV 94   RDW 12.8        Chemistries:  Recent Labs   Lab 12/11/18  1714   *   K 3.5   CL 93*   CO2 24   BUN 11   CREATININE 0.8   CALCIUM 9.5   ALBUMIN 3.5   PROT 6.9   BILITOT 2.7*   ALKPHOS 60   ALT 8*   AST 15   MG 2.1   PHOS 1.8*       All pertinent labs within the past 24 hours have been reviewed.    Significant Imaging: I have reviewed all pertinent imaging results/findings within the past 24 hours.    Assessment/Plan:     Pulmonary   * Community acquired pneumonia of right lower lobe of lung    79 yo F presents with confusion, productive cough, and shortness of breath. She has had recent sick contacts from different parts of the country and has spent time with an ill friend at [facility]. She also has new fine infiltrates in her RLL as well as rhonchi noted in her RLF on physical exam.     - Guideline-based sepsis therapy: lactate (with possible trend) and fluid resuscitation (judicious given heart failure history)  - Sputum cultures  - Blood cultures x2   - Azithromycin 500 mg (starting tomorrow as she received a dose earlier today) and ceftriaxone 1g once daily  - UAT for legionella (given low sodium)   - Respiratory viral panel on sputum  - Empiric  oseltamivir 75 mg BID d/t exposure from other relatives; respiratory droplet precautions  - CURB-65 of 2; inpatient treatment warranted  - Do not feel she has ICU needs at this time as confusion is resolving, she does not require pressors, she is not hemodynamically unstable and she is requiring minimal oxygen support with nasal cannula.         Thank you for your consult. I will sign off. Please contact us if you have any additional questions.     Behram Khan, MD  Critical Care Medicine  [facility]

## 2018-12-12 NOTE — PLAN OF CARE
12/12/18 1224   Discharge Assessment   Assessment Type Discharge Planning Assessment   Confirmed/corrected address and phone number on facesheet? Yes   Assessment information obtained from? Patient   Expected Length of Stay (days) 3   Communicated expected length of stay with patient/caregiver yes   Prior to hospitilization cognitive status: Alert/Oriented   Prior to hospitalization functional status: Independent   Current Functional Status: Independent  (pt drives, very independent)   Facility Arrived From: [facility]   Lives With (pt states that her son and his wife with 5 children, live with her)   Able to Return to Prior Arrangements yes   Is patient able to care for self after discharge? Yes   Who are your caregiver(s) and their phone number(s)? pt's daughterMichelle, [phone], cell, home [phone]   Patient's perception of discharge disposition home or selfcare   Readmission Within the Last 30 Days no previous admission in last 30 days   Patient currently being followed by outpatient case management? No   Patient currently receives any other outside agency services? No   Equipment Currently Used at Home none   Do you have any problems affording any of your prescribed medications? No   Is the patient taking medications as prescribed? yes   Does the patient have transportation home? Yes   Transportation Anticipated family or friend will provide   Does the patient receive services at the [facility]? No   Discharge Plan A Home   Discharge Plan B Home;Home Health   Patient/Family in Agreement with Plan yes   Does the patient have transportation to healthcare appointments? Yes

## 2018-12-12 NOTE — ASSESSMENT & PLAN NOTE
Na 125 at admission, baseline at low 130s  Hypochloremia as well  Suspected from decreased PO intake and diarrhea  Given 1 L NS  Repeat BMP and reassess fluid status

## 2018-12-12 NOTE — ED NOTES
Pt. Resting in stretcher; no acute distress noted; respirations even and unlabored; family at bedside.

## 2018-12-12 NOTE — PT/OT/SLP EVAL
Occupational Therapy   Evaluation and Discharge Note    Name: Madan Bell  MRN: [id number]  Admitting Diagnosis:  Community acquired pneumonia of right lower lobe of lung      Recommendations:     Discharge Recommendations: (Home)  Discharge Equipment Recommendations:  none  Barriers to discharge:  None    History:     Occupational Profile:  Living Environment: Pt lives w/ family in a 2sh but lives on 1st floor.   Previous level of function: Indep  Roles and Routines: N/A   Equipment Used at home:  none  Assistance upon Discharge: Pt has assistance upon D/C.     Past Medical History:   Diagnosis Date    Anticoagulant long-term use     Anxiety     Arrhythmia     Arthritis     knees    Breast cyst     Fibrocystic breast 1980 - ??    Heart murmur     Hyperlipidemia     Hypertension     Mitral valve disorder     Mitral valve prolapse     PVC's (premature ventricular contractions)     Squamous cell carcinoma bx 7-2017    right upper forehead    Thyroid cancer 1/29/2013    Thyroid disease     Vasovagal near syncope 11/12/2012    VTE (venous thromboembolism)     in 1960s, post partum, pt unsure of details        Past Surgical History:   Procedure Laterality Date    BREAST BIOPSY Left     excisional    BREAST BIOPSY Left     core needle biopsy    BREAST CYST ASPIRATION  1980's - ??    BREAST CYST EXCISION  2008 - ??    CARDIOVERSION N/A 8/13/2018    Procedure: CARDIOVERSION;  Surgeon: Fernando Disla MD;  Location: [facility] CATH LAB;  Service: Cardiology;  Laterality: N/A;  AF,DCCV,ANAHI,MAC,FAS,3 PREP    CARDIOVERSION N/A 8/13/2018    Performed by [facility]la MD at [facility] CATH LAB    ECHOCARDIOGRAM,TRANSESOPHAGEAL N/A 8/13/2018    Performed by [facility] MD at [facility] CATH LAB    HYSTERECTOMY      tahbso    OOPHORECTOMY  hysterectomy - 2000 - ??    THYROID SURGERY      THYROIDECTOMY Bilateral 1/9/2013    Performed by Connor Santos MD at [facility] OR [address] [place] [address]       Subjective      Chief Complaint: No complaints   Patient/Family Comments/goals: return home    Pain/Comfort:  · Pain Rating 1: 0/10  · Pain Rating Post-Intervention 1: 0/10    Patients cultural, spiritual, Jehovah's witness conflicts given the current situation:      Objective:     Communicated with: RN prior to session.  Patient found dtr present and   upon OT entry to room.    General Precautions: Standard, fall   Orthopedic Precautions:N/A   Braces: N/A     Occupational Performance:    Pt received UIC.    Functional Mobility/Transfers:  · Patient completed Sit <> Stand Transfer with supervision  with  no assistive device   · Patient completed Toilet Transfer Step Transfer technique with modified independence and grab bar with  no AD and grab bars  · Functional Mobility: Pt ambulated in hallway indep.    Activities of Daily Living:  · Lower Body Dressing: independence donned/doffed socks seated EOB    Cognitive/Visual Perceptual:  Cognitive/Psychosocial Skills:     -       Oriented to: Person, Place, Time and Situation   -       Follows Commands/attention:Follows multistep  commands  -       Communication: clear/fluent  -       Memory: No Deficits noted  -       Safety awareness/insight to disability: intact   -       Mood/Affect/Coping skills/emotional control: Appropriate to situation  Visual/Perceptual:      -Intact      Physical Exam:  Balance:    -       No noted deficits   Postural examination/scapula alignment:    -       Rounded shoulders  Skin integrity: Visible skin intact  Upper Extremity Range of Motion:     -       Right Upper Extremity: WFL  -       Left Upper Extremity: WFL  Upper Extremity Strength:    -       Right Upper Extremity: WFL  -       Left Upper Extremity: WFL   Strength:    -       Right Upper Extremity: WFL  -       Left Upper Extremity: WFL  Fine Motor Coordination:    -       Intact  Gross motor coordination:   WFL    AMPAC 6 Click ADL:  AMPAC Total Score: 24    Treatment & Education:  Pt educated  "on POC.   Education:    Patient left up in chair with call button in reach    Assessment:     Madan Bell is a 78 y.o. female with a medical diagnosis of Community acquired pneumonia of right lower lobe of lung. At this time, patient is functioning at their prior level of function and does not require further acute OT services.     Clinical Decision Makin.  OT Low:  "Pt evaluation falls under low complexity for evaluation coding due to performance deficits noted in 1-3 areas as stated above and 0 co-morbities affecting current functional status. Data obtained from problem focused assessments. No modifications or assistance was required for completion of evaluation. Only brief occupational profile and history review completed."     Plan:     During this hospitalization, patient does not require further acute OT services.  Please re-consult if situation changes.    · Plan of Care Reviewed with: patient    This Plan of care has been discussed with the patient who was involved in its development and understands and is in agreement with the identified goals and treatment plan    GOALS:   Multidisciplinary Problems     Occupational Therapy Goals     Not on file          Multidisciplinary Problems (Resolved)        Problem: Occupational Therapy Goal    Goal Priority Disciplines Outcome Interventions   Occupational Therapy Goal   (Resolved)     OT, PT/OT Outcome(s) achieved    Description:  Pt is not currently displaying a need for acute OT services. D/C acute OT services and recommend pt D/C Home                    Time Tracking:     OT Date of Treatment: 18  OT Start Time: 942  OT Stop Time: 953  OT Total Time (min): 11 min    Billable Minutes:Evaluation 11 minutes    Ulices Valenzuela OT  2018    "

## 2018-12-12 NOTE — HPI
Ms Bell is a 79 yo F with PMHx significant for recurrent UTI's, HFpEF (EF 55%), moderate MVP/mitral sclerosis, AFib on AC, HLD, HTN, and SCC of the thyroid s/p ablation now hypothyroid.     She presents with three day history of worsening fatigue and malaise with one day history of accompanying cough productive of thick, brownish-yellow sputum. She has recently had out-of-town company visit, some of whom have been dealing with upper respiratory viral illness. She also has a very close friend being treated for urosepsis, whom she has been visiting over the past week. She contacted her PCP who prescribed azithromycin (pt took one dose yesterday and one today). Prior to coming into the emergency department she also complained of some shortness of breath. She also recently finished a one week course of levaquin for UTI. Family brought her in to the ED because of new-onset confusion that is very different from her baseline.     Since coming to the ED, her sputum has become clearer. She has no history of underlying lung issues, no radiation for treatment of her thyroid cancer. Does report some diarrhea when receiving antibiotic treatment.     She has received her influenza shot this year.

## 2018-12-12 NOTE — PHARMACY MED REC
"Admission Medication Reconciliation - Pharmacy Consult Note    The home medication history was taken by Letty Amin, Pharmacy Tech.  Based on information gathered and subsequent review by the clinical pharmacist, the items below may need attention.     You may go to "Admission" then "Reconcile Home Medications" tabs to review and/or act upon these items.     Potentially problematic discrepancies with current MAR  o Patient is taking a drug DIFFERENTLY than how ordered upon admit  o ASA 81 mg PO M/W/F (please address with patient how she will take this upon discharge)    Please address this information as you see fit.  Feel free to contact us if you have any questions or require assistance.    Brenda Hidalgo, PharmD  [id number]                    .    .            "

## 2018-12-12 NOTE — ED NOTES
Pt. Returned to room from ambulating to rest room; no acute distress noted; respirations even and unlabored; family at bedside.

## 2018-12-12 NOTE — SUBJECTIVE & OBJECTIVE
Past Medical History:   Diagnosis Date    Anticoagulant long-term use     Anxiety     Arrhythmia     Arthritis     knees    Breast cyst     Fibrocystic breast 1980 - ??    Heart murmur     Hyperlipidemia     Hypertension     Mitral valve disorder     Mitral valve prolapse     PVC's (premature ventricular contractions)     Squamous cell carcinoma bx 7-2017    right upper forehead    Thyroid cancer 1/29/2013    Thyroid disease     Vasovagal near syncope 11/12/2012    VTE (venous thromboembolism)     in 1960s, post partum, pt unsure of details        Past Surgical History:   Procedure Laterality Date    BREAST BIOPSY Left     excisional    BREAST BIOPSY Left     core needle biopsy    BREAST CYST ASPIRATION  1980's - ??    BREAST CYST EXCISION  2008 - ??    CARDIOVERSION N/A 8/13/2018    Procedure: CARDIOVERSION;  Surgeon: Fernando Disla MD;  Location: [facility] CATH LAB;  Service: Cardiology;  Laterality: N/A;  AF,DCCV,ANAHI,MAC,FAS,3 PREP    CARDIOVERSION N/A 8/13/2018    Performed by Fernando [facility]la MD at [facility] CATH LAB    ECHOCARDIOGRAM,TRANSESOPHAGEAL N/A 8/13/2018    Performed by Fernando Disla MD at [facility] CATH LAB    HYSTERECTOMY      tahbso    OOPHORECTOMY  hysterectomy - 2000 - ??    THYROID SURGERY      THYROIDECTOMY Bilateral 1/9/2013    Performed by Connor Santos MD at [facility] OR [address] [place] [address]       Review of patient's allergies indicates:   Allergen Reactions    Fluoride preparations Hives    Fluoride      Other reaction(s): Hives       Family History     Problem Relation (Age of Onset)    Arrhythmia Son    Breast cancer Maternal Uncle    Heart disease Mother    Hyperlipidemia Maternal Grandmother    Mitral valve prolapse Son    No Known Problems Daughter, Son, Son    Sudden death Son        Tobacco Use    Smoking status: Never Smoker    Smokeless tobacco: Never Used   Substance and Sexual Activity    Alcohol use: No    Drug use: No    Sexual activity: Not on file       Review of Systems   Constitutional: Positive for chills and fever. Negative for unexpected weight change.   HENT: Negative for ear pain, sinus pressure, sneezing and sore throat.    Eyes: Negative for pain and visual disturbance.   Respiratory: Positive for cough and shortness of breath. Negative for chest tightness and wheezing.    Cardiovascular: Negative for chest pain, palpitations and leg swelling.   Gastrointestinal: Positive for diarrhea. Negative for abdominal pain, constipation, nausea and vomiting.   Endocrine: Negative for polydipsia and polyuria.   Genitourinary: Negative for decreased urine volume, difficulty urinating, dysuria and urgency.   Musculoskeletal: Negative for arthralgias and myalgias.   Skin: Negative for color change and rash.   Allergic/Immunologic: Negative for environmental allergies and food allergies.   Neurological: Negative for dizziness, syncope, weakness, light-headedness and headaches.   Psychiatric/Behavioral: Negative for confusion and dysphoric mood. The patient is not nervous/anxious.      Objective:     Vital Signs (Most Recent):  Temp: 98.1 °F (36.7 °C) (12/11/18 2059)  Pulse: 64 (12/11/18 1902)  Resp: 19 (12/11/18 1902)  BP: (!) 117/56 (12/11/18 1902)  SpO2: 96 % (12/11/18 1902) Vital Signs (24h Range):  Temp:  [98.1 °F (36.7 °C)-103 °F (39.4 °C)] 98.1 °F (36.7 °C)  Pulse:  [64-82] 64  Resp:  [19-21] 19  SpO2:  [92 %-96 %] 96 %  BP: (117-145)/(56-64) 117/56   Weight: 65.8 kg (145 lb)  Body mass index is 21.41 kg/m².      Intake/Output Summary (Last 24 hours) at 12/11/2018 2140  Last data filed at 12/11/2018 1944  Gross per 24 hour   Intake 1000 ml   Output --   Net 1000 ml       Physical Exam   Constitutional: She is oriented to person, place, and time. She appears well-developed and well-nourished.   HENT:   Head: Normocephalic and atraumatic.   Eyes: EOM are normal. Pupils are equal, round, and reactive to light. No scleral icterus.   Neck: Normal range of motion. Neck  supple.   Cardiovascular: Normal rate, regular rhythm, normal heart sounds and intact distal pulses.   No murmur heard.  Pulmonary/Chest: Effort normal. No respiratory distress. She has no wheezes. She has rhonchi in the right middle field and the right lower field.   Abdominal: Soft. Bowel sounds are normal. She exhibits no distension. There is no tenderness.   Neurological: She is alert and oriented to person, place, and time.   Skin: Skin is warm and dry.   Psychiatric: She has a normal mood and affect. Her behavior is normal.   Vitals reviewed.      Vents:     Lines/Drains/Airways     Peripheral Intravenous Line                 Peripheral IV - Single Lumen 08/13/18 0834 Anterior;Right Wrist 120 days              Significant Labs:    CBC/Anemia Profile:  Recent Labs   Lab 12/11/18  1714   WBC 14.21*   HGB 12.1   HCT 36.0*      MCV 94   RDW 12.8        Chemistries:  Recent Labs   Lab 12/11/18  1714   *   K 3.5   CL 93*   CO2 24   BUN 11   CREATININE 0.8   CALCIUM 9.5   ALBUMIN 3.5   PROT 6.9   BILITOT 2.7*   ALKPHOS 60   ALT 8*   AST 15   MG 2.1   PHOS 1.8*       All pertinent labs within the past 24 hours have been reviewed.    Significant Imaging: I have reviewed all pertinent imaging results/findings within the past 24 hours.

## 2018-12-12 NOTE — PLAN OF CARE
Problem: Adult Inpatient Plan of Care  Goal: Plan of Care Review  Outcome: Ongoing (interventions implemented as appropriate)  No significant events or changes overnight, has had minimal needs expressed, vitals have been stable, no changes in mentation or orientation since arrival to the floor, no prn meds or breathing treatments required at this time, has slept intermittently throughout the night, will continue to monitor

## 2018-12-12 NOTE — ASSESSMENT & PLAN NOTE
77 yo F presents with confusion, productive cough, and shortness of breath. She has had recent sick contacts from different parts of the country and has spent time with an ill friend at [facility]. She also has new fine infiltrates in her RLL as well as rhonchi noted in her RLF on physical exam.     - Guideline-based sepsis therapy: lactate (with possible trend) and fluid resuscitation (judicious given heart failure history)  - Sputum cultures  - Blood cultures x2   - Azithromycin 500 mg (starting tomorrow as she received a dose earlier today) and ceftriaxone 1g once daily  - UAT for legionella (given low sodium)   - Respiratory viral panel on sputum  - Empiric oseltamivir 75 mg BID d/t exposure from other relatives; respiratory droplet precautions  - CURB-65 of 2; inpatient treatment warranted  - Do not feel she has ICU needs at this time as confusion is resolving, she does not require pressors, she is not hemodynamically unstable and she is requiring minimal oxygen support with nasal cannula.

## 2018-12-12 NOTE — NURSING
Bisprolol and HCTZ dosing discussed with IM 5 resident per pt request. HCTZ not given due to hyponatremia (currently 132), and bisprolol held, but HR, BP monitored closely. Pt on tele. BP q4. Medication regimen discussed with pt and son, both agreeable to current course of treatment. WCTM bp and hr.

## 2018-12-12 NOTE — SUBJECTIVE & OBJECTIVE
Past Medical History:   Diagnosis Date    Anticoagulant long-term use     Anxiety     Arrhythmia     Arthritis     knees    Breast cyst     Fibrocystic breast 1980 - ??    Heart murmur     Hyperlipidemia     Hypertension     Mitral valve disorder     Mitral valve prolapse     PVC's (premature ventricular contractions)     Squamous cell carcinoma bx 7-2017    right upper forehead    Thyroid cancer 1/29/2013    Thyroid disease     Vasovagal near syncope 11/12/2012    VTE (venous thromboembolism)     in 1960s, post partum, pt unsure of details        Past Surgical History:   Procedure Laterality Date    BREAST BIOPSY Left     excisional    BREAST BIOPSY Left     core needle biopsy    BREAST CYST ASPIRATION  1980's - ??    BREAST CYST EXCISION  2008 - ??    CARDIOVERSION N/A 8/13/2018    Procedure: CARDIOVERSION;  Surgeon: Fernando Disla MD;  Location: [facility] CATH LAB;  Service: Cardiology;  Laterality: N/A;  AF,DCCV,ANAHI,MAC,FAS,3 PREP    CARDIOVERSION N/A 8/13/2018    Performed by Fernando Disla MD at [facility] CATH LAB    ECHOCARDIOGRAM,TRANSESOPHAGEAL N/A 8/13/2018    Performed by Fernando Disla MD at [facility] CATH LAB    HYSTERECTOMY      tahbso    OOPHORECTOMY  hysterectomy - 2000 - ??    THYROID SURGERY      THYROIDECTOMY Bilateral 1/9/2013    Performed by Connor Santos MD at [facility] OR [address] [place] [address]       Review of patient's allergies indicates:   Allergen Reactions    Fluoride preparations Hives    Fluoride      Other reaction(s): Hives       No current facility-administered medications on file prior to encounter.      Current Outpatient Medications on File Prior to Encounter   Medication Sig    ascorbic acid (VITAMIN C) 500 MG tablet Take 500 mg by mouth once daily.    aspirin 81 mg Tab Take 1 tablet by mouth Daily.    atorvastatin (LIPITOR) 40 MG tablet TAKE 1 TABLET EVERY DAY    azithromycin (ZITHROMAX) 500 MG tablet Take 1 tablet (500 mg total) by mouth once daily. for 10  days    benzonatate (TESSALON) 100 MG capsule Take 1 capsule (100 mg total) by mouth 3 (three) times daily as needed.    Bifidobacterium infantis (ALIGN) 4 mg Cap One daily    bisoprolol-hydrochlorothiazide 2.5-6.25 mg (ZIAC) 2.5-6.25 mg Tab TAKE 1/2 TABLET EVERY DAY    cholecalciferol, vitamin D3, (VITAMIN D3) 2,000 unit Cap Take by mouth once daily.      D-MANNOSE ORAL Take by mouth.    ELIQUIS 5 mg Tab TAKE 1 TABLET TWICE DAILY    estradiol (ESTRACE) 0.01 % (0.1 mg/gram) vaginal cream Apply a pea sized amount every day for 2 weeks, then 3x/week. 90 day supply    levothyroxine (SYNTHROID) 75 MCG tablet TAKE 1 TABLET EVERY DAY (NEED MD APPOINTMENT)    omega-3 fatty acids-vitamin E (FISH OIL) 1,000 mg Cap Take 1 capsule by mouth once daily. 1400mg    pantoprazole (PROTONIX) 40 MG tablet TAKE 1 TABLET EVERY DAY    levoFLOXacin (LEVAQUIN) 500 MG tablet Take 1 tablet (500 mg total) by mouth once daily.    meclizine (ANTIVERT) 12.5 mg tablet Half- one tablet three times daily as needed for vertigo    metoprolol tartrate (LOPRESSOR) 50 MG tablet Take one half to one tablet daily as needed for palpitations.     Family History     Problem Relation (Age of Onset)    Arrhythmia Son    Breast cancer Maternal Uncle    Heart disease Mother    Hyperlipidemia Maternal Grandmother    Mitral valve prolapse Son    No Known Problems Daughter, Son, Son    Sudden death Son        Tobacco Use    Smoking status: Never Smoker    Smokeless tobacco: Never Used   Substance and Sexual Activity    Alcohol use: No    Drug use: No    Sexual activity: Not on file     Review of Systems   Constitutional: Positive for activity change, appetite change, chills, fatigue and fever.   HENT: Negative.    Eyes: Negative for visual disturbance.   Respiratory: Positive for cough and shortness of breath.    Cardiovascular: Negative for chest pain, palpitations and leg swelling.   Gastrointestinal: Positive for diarrhea. Negative for  abdominal distention, abdominal pain, nausea and vomiting.   Genitourinary: Negative.    Musculoskeletal: Negative for arthralgias, back pain and gait problem.   Skin: Negative for pallor, rash and wound.   Psychiatric/Behavioral: Positive for confusion. Negative for agitation.     Objective:     Vital Signs (Most Recent):  Temp: 97.9 °F (36.6 °C)(pt just drank cold water) (12/11/18 2226)  Pulse: 65 (12/11/18 2202)  Resp: (!) 21 (12/11/18 2202)  BP: (!) 118/55 (12/11/18 2202)  SpO2: 95 % (12/11/18 2202) Vital Signs (24h Range):  Temp:  [97.9 °F (36.6 °C)-103 °F (39.4 °C)] 97.9 °F (36.6 °C)  Pulse:  [64-82] 65  Resp:  [19-21] 21  SpO2:  [92 %-96 %] 95 %  BP: (117-145)/(55-64) 118/55     Weight: 65.8 kg (145 lb)  Body mass index is 21.41 kg/m².    Physical Exam   Constitutional: She is oriented to person, place, and time. She appears well-developed and well-nourished. No distress.   HENT:   Head: Normocephalic and atraumatic.   Eyes: Pupils are equal, round, and reactive to light. No scleral icterus.   Neck: Normal range of motion.   Cardiovascular: Normal rate, regular rhythm and normal heart sounds.   Pulmonary/Chest: Effort normal. No respiratory distress.   On nasal cannula, rhonchi R sided   Abdominal: Soft. She exhibits no distension. There is no tenderness.   Musculoskeletal: She exhibits no edema.   Neurological: She is alert and oriented to person, place, and time.   Skin: Capillary refill takes less than 2 seconds. She is not diaphoretic.   Psychiatric: She has a normal mood and affect. Her behavior is normal. Judgment and thought content normal.   Nursing note and vitals reviewed.        CRANIAL NERVES     CN III, IV, VI   Pupils are equal, round, and reactive to light.       Significant Labs:   CBC:   Recent Labs   Lab 12/11/18  1714   WBC 14.21*   HGB 12.1   HCT 36.0*        CMP:   Recent Labs   Lab 12/11/18  1714   *   K 3.5   CL 93*   CO2 24   *   BUN 11   CREATININE 0.8   CALCIUM  9.5   PROT 6.9   ALBUMIN 3.5   BILITOT 2.7*   ALKPHOS 60   AST 15   ALT 8*   ANIONGAP 8   EGFRNONAA >60.0     Lactic Acid:   Recent Labs   Lab 12/11/18  1714 12/11/18  2042   LACTATE 1.0 0.6       Significant Imaging: CXR: I have reviewed all pertinent results/findings within the past 24 hours and my personal findings are:  Prominence of interstitial markings.  Bibasilar subsegmental atelectasis.The lungs are clearof focal consolidation, with normal appearance of pulmonary vasculature and no pleural effusion or pneumothorax.

## 2018-12-12 NOTE — HPI
This is a 78 year old female with PMH of Afib (on Apixiban), HLD, HTN, chronic UTIs (secondary to pan-sensitive Klebsiella pneumoniae) and thyroid CA s/p ablation presenting for fatigue, malaise, and altered mental status. She began to feel unwell Thursday night into Friday with a fever and productive cough with brownish yellow sputum. She had company over for dinner and fell asleep at the table multiple times and the next day (12/9) she was confused, per daughter (at bedside), not knowing where she was or the year. She called and reported symptoms to her PCP and got a prescription for Azithromycin on 12/10 and had taken 1 dose before presenting to ED today with continued confusion. At baseline mentally intact and she is independent with all her ADLs. She has chronic UTIs, and has a standing antibiotic prescription with her PCP, for which she also often gets chronic diarrhea, likely C Diff. She has fevers, chills, productive cough, weakness, fatigue. Denies CP, SOB, nausea/vomiting, constipation, dysuria or other changes in urinary habits.     ED course: On arrival, vital signs significant for fever 103. Exam notable for slowed speech and generalized weakness. Labs notable for leukocytosis (14) with left shift and hyponatremia (125). Flu negative. UA unremarkable. CXR without consolidation. Given 1L NS, Azithromycin, and Ceftriaxone; received one dose Tamiflu. ICU was consulted for hyponatremia with mental status changes, but deemed stable enough for floor since AMS resolved after interventions in ED. Admitted to 5.

## 2018-12-13 LAB
ALBUMIN SERPL BCP-MCNC: 2.8 G/DL
ALP SERPL-CCNC: 59 U/L
ALT SERPL W/O P-5'-P-CCNC: 7 U/L
ANION GAP SERPL CALC-SCNC: 9 MMOL/L
AST SERPL-CCNC: 16 U/L
BASOPHILS # BLD AUTO: 0.04 K/UL
BASOPHILS NFR BLD: 0.4 %
BILIRUB SERPL-MCNC: 1 MG/DL
BUN SERPL-MCNC: 7 MG/DL
CALCIUM SERPL-MCNC: 8.8 MG/DL
CHLORIDE SERPL-SCNC: 97 MMOL/L
CO2 SERPL-SCNC: 24 MMOL/L
CREAT SERPL-MCNC: 0.7 MG/DL
DIFFERENTIAL METHOD: ABNORMAL
EOSINOPHIL # BLD AUTO: 0.1 K/UL
EOSINOPHIL NFR BLD: 0.7 %
ERYTHROCYTE [DISTWIDTH] IN BLOOD BY AUTOMATED COUNT: 12.6 %
EST. GFR  (AFRICAN AMERICAN): >60 ML/MIN/1.73 M^2
EST. GFR  (NON AFRICAN AMERICAN): >60 ML/MIN/1.73 M^2
GLUCOSE SERPL-MCNC: 111 MG/DL
HCT VFR BLD AUTO: 32.2 %
HGB BLD-MCNC: 10.7 G/DL
IMM GRANULOCYTES # BLD AUTO: 0.04 K/UL
IMM GRANULOCYTES NFR BLD AUTO: 0.4 %
LYMPHOCYTES # BLD AUTO: 1.8 K/UL
LYMPHOCYTES NFR BLD: 16.8 %
MCH RBC QN AUTO: 31.2 PG
MCHC RBC AUTO-ENTMCNC: 33.2 G/DL
MCV RBC AUTO: 94 FL
MONOCYTES # BLD AUTO: 1.1 K/UL
MONOCYTES NFR BLD: 10.3 %
NEUTROPHILS # BLD AUTO: 7.6 K/UL
NEUTROPHILS NFR BLD: 71.4 %
NRBC BLD-RTO: 0 /100 WBC
PLATELET # BLD AUTO: 157 K/UL
PMV BLD AUTO: 11.2 FL
POTASSIUM SERPL-SCNC: 3.6 MMOL/L
PROT SERPL-MCNC: 6 G/DL
RBC # BLD AUTO: 3.43 M/UL
SODIUM SERPL-SCNC: 130 MMOL/L
WBC # BLD AUTO: 10.67 K/UL

## 2018-12-13 PROCEDURE — 97161 PT EVAL LOW COMPLEX 20 MIN: CPT | Mod: HCNC

## 2018-12-13 PROCEDURE — 36415 COLL VENOUS BLD VENIPUNCTURE: CPT | Mod: HCNC

## 2018-12-13 PROCEDURE — 99232 PR SUBSEQUENT HOSPITAL CARE,LEVL II: ICD-10-PCS | Mod: HCNC,GC,, | Performed by: HOSPITALIST

## 2018-12-13 PROCEDURE — 80053 COMPREHEN METABOLIC PANEL: CPT | Mod: HCNC

## 2018-12-13 PROCEDURE — G8978 MOBILITY CURRENT STATUS: HCPCS | Mod: CH,HCNC

## 2018-12-13 PROCEDURE — 63600175 PHARM REV CODE 636 W HCPCS: Mod: HCNC | Performed by: STUDENT IN AN ORGANIZED HEALTH CARE EDUCATION/TRAINING PROGRAM

## 2018-12-13 PROCEDURE — 20600001 HC STEP DOWN PRIVATE ROOM: Mod: HCNC

## 2018-12-13 PROCEDURE — 99232 SBSQ HOSP IP/OBS MODERATE 35: CPT | Mod: HCNC,GC,, | Performed by: HOSPITALIST

## 2018-12-13 PROCEDURE — G8979 MOBILITY GOAL STATUS: HCPCS | Mod: CH,HCNC

## 2018-12-13 PROCEDURE — 25000003 PHARM REV CODE 250: Mod: HCNC | Performed by: STUDENT IN AN ORGANIZED HEALTH CARE EDUCATION/TRAINING PROGRAM

## 2018-12-13 PROCEDURE — 85025 COMPLETE CBC W/AUTO DIFF WBC: CPT | Mod: HCNC

## 2018-12-13 PROCEDURE — G8980 MOBILITY D/C STATUS: HCPCS | Mod: CH,HCNC

## 2018-12-13 RX ORDER — GUAIFENESIN 600 MG/1
600 TABLET, EXTENDED RELEASE ORAL 2 TIMES DAILY
Status: DISCONTINUED | OUTPATIENT
Start: 2018-12-13 | End: 2018-12-14 | Stop reason: HOSPADM

## 2018-12-13 RX ORDER — ACETAMINOPHEN 325 MG/1
650 TABLET ORAL EVERY 6 HOURS PRN
Status: DISCONTINUED | OUTPATIENT
Start: 2018-12-13 | End: 2018-12-14 | Stop reason: HOSPADM

## 2018-12-13 RX ADMIN — ATORVASTATIN CALCIUM 40 MG: 10 TABLET, FILM COATED ORAL at 10:12

## 2018-12-13 RX ADMIN — GUAIFENESIN 600 MG: 600 TABLET, EXTENDED RELEASE ORAL at 09:12

## 2018-12-13 RX ADMIN — CEFTRIAXONE SODIUM 1 G: 1 INJECTION, POWDER, FOR SOLUTION INTRAMUSCULAR; INTRAVENOUS at 09:12

## 2018-12-13 RX ADMIN — OXYCODONE HYDROCHLORIDE AND ACETAMINOPHEN 500 MG: 500 TABLET ORAL at 10:12

## 2018-12-13 RX ADMIN — Medication 1 CAPSULE: at 10:12

## 2018-12-13 RX ADMIN — AZITHROMYCIN MONOHYDRATE 250 MG: 250 TABLET ORAL at 10:12

## 2018-12-13 RX ADMIN — ASPIRIN 81 MG CHEWABLE TABLET 81 MG: 81 TABLET CHEWABLE at 10:12

## 2018-12-13 RX ADMIN — Medication 1 CAPSULE: at 09:12

## 2018-12-13 RX ADMIN — PANTOPRAZOLE SODIUM 40 MG: 40 TABLET, DELAYED RELEASE ORAL at 10:12

## 2018-12-13 RX ADMIN — ACETAMINOPHEN 650 MG: 325 TABLET ORAL at 11:12

## 2018-12-13 RX ADMIN — VITAMIN D, TAB 1000IU (100/BT) 2000 UNITS: 25 TAB at 10:12

## 2018-12-13 RX ADMIN — APIXABAN 5 MG: 5 TABLET, FILM COATED ORAL at 10:12

## 2018-12-13 RX ADMIN — APIXABAN 5 MG: 5 TABLET, FILM COATED ORAL at 09:12

## 2018-12-13 RX ADMIN — GUAIFENESIN 600 MG: 600 TABLET, EXTENDED RELEASE ORAL at 10:12

## 2018-12-13 RX ADMIN — LEVOTHYROXINE SODIUM 75 MCG: 75 TABLET ORAL at 05:12

## 2018-12-13 NOTE — ASSESSMENT & PLAN NOTE
· Improved.   · Presentation notable for fever (103), productive cough, and increasing confusion, with leukocytosis to 14, and hyponatremia (125).   · PSI risk class IV on admission.   · Status post 1L NS in ED, along with 500 mg Azithromycin and 1g Ceftriaxone; continuing.   · Rapid Flu negative, Legionella antigen PENDING. Blood cultures NGTD.   · Lactic was 1.0, and 0.6 on repeat  · Initially on 2L NC, but saturating >93% on room air now without tachypnea.   · Exam without diminished breath sounds.   · Day #3 of Azithromycin and Ceftriaxone; anticipating coverage for 2 more days.   · Added Mucinex BID today for attempted cough relief to help address fatigue from diminished sleep.   · Anticipating watching patient for one more night as she continues to improve clinically.

## 2018-12-13 NOTE — ASSESSMENT & PLAN NOTE
· Improved.   · Possibly secondary to decreased PO intake and few days of diarrhea.   · Na 125 at admission with Cl of 93, but baseline at low 130s; daughter reports patient drinks approximately 3L fluid daily to diminish recurrent UTI risk.   · Status post 1 L NS in ED. Hydrating well PO.   · BMP ordered daily.   · Na 132 today.

## 2018-12-13 NOTE — ASSESSMENT & PLAN NOTE
· S/P DCCV done on 8/13 and she has remained in NSR.   · Currently rate controlled with regular rhythm.   · CHADVASC score of 4.   · Continuing home Apixaban 5mg BD  · Has metoprolol PRN at home, but never used.

## 2018-12-13 NOTE — ASSESSMENT & PLAN NOTE
· Based on ANAHI from 08/2018: The mitral valve is normal in structure with evidence of bileaflet prolapse. There is moderate to severe mitral regurgitation. Following cardioversion, there is still significant but slightly less mitral regurgitation.   · No murmur on exam.   · No chest pain, palpitations, or SOB.

## 2018-12-13 NOTE — PT/OT/SLP EVAL
Physical Therapy Evaluation and Discharge Note    Patient Name:  Madan Bell   MRN:  [id number]    Recommendations:     Discharge Recommendations:  (home)   Discharge Equipment Recommendations: none   Barriers to discharge: None    Assessment:     Madan Bell is a 78 y.o. female admitted with a medical diagnosis of Community acquired pneumonia of right lower lobe of lung. .  At this time, patient is functioning at their prior level of function and does not require further acute PT services.     Recent Surgery: * No surgery found *      Plan:     During this hospitalization, patient does not require further acute PT services.  Please re-consult if situation changes.      Subjective     Chief Complaint: pt states she does not want to get anyone sick ( pt washed hands prior to session and twice in marie )   Patient/Family Comments/goals: to return home  Pain/Comfort:  · Pain Rating 1: 0/10  · Pain Rating Post-Intervention 1: 0/10    Patients cultural, spiritual, Worship conflicts given the current situation: no    Living Environment:  Obtained from OT chart:   Living Environment: Pt lives w/ family in a 2sh but lives on 1st floor.   Previous level of function: Indep  Roles and Routines: N/A   Equipment Used at home:  none  Assistance upon Discharge: Pt has assistance upon D/C.         Objective:     Communicated with RN  prior to session.  Patient found supine upon PT entry to room found with: (no active lines )     General Precautions: Standard, fall   Orthopedic Precautions:N/A   Braces: N/A     Exams:  · Cognitive Exam:  Patient is oriented to Person, Place, Time and Situation  · Fine Motor Coordination: -       Intact  · Gross Motor Coordination:  WFL  · Postural Exam:  Patient presented with the following abnormalities: -       No postural abnormalities identified  · Sensation: -       Intact  · Skin Integrity/Edema:  -       Skin integrity: Visible skin intact  · RLE ROM: WFL  · RLE Strength: WFL  · LLE  ROM: WFL  · LLE Strength: WFL    Functional Mobility:  · Bed Mobility:  Rolling Left:  modified independence  · Supine to Sit: modified independence  · Sit to Supine: modified independence  · Transfers:  Sit to Stand:  modified independence with no AD  · Gait: x 160 feet with no AD   · Balance: ( I) with all gait and EOB balance    AM-PAC 6 CLICK MOBILITY  Total Score:24       Therapeutic Activities and Exercises:       Patient education  · Patient educated on the role of PT and POC  · Patient educated on importance  activity while in the hosptial per tolerance for improved endurance and to limit deconditioning   · Patient educated on safe transfers with nursing as appropriate  · Patient educated on energy conservation, pursed lip breathing  · Patient educated on proper transfer mechanics and safety  · All of patients questions were answered within the scope of PT        AM-PAC 6 CLICK MOBILITY  Total Score:24     Patient left HOB elevated with all lines intact, call button in reach and family  present.    GOALS:   Multidisciplinary Problems     Physical Therapy Goals     Not on file                History:     Past Medical History:   Diagnosis Date    Anticoagulant long-term use     Anxiety     Arrhythmia     Arthritis     knees    Breast cyst     Fibrocystic breast 1980 - ??    Heart murmur     Hyperlipidemia     Hypertension     Mitral valve disorder     Mitral valve prolapse     PVC's (premature ventricular contractions)     Squamous cell carcinoma bx 7-2017    right upper forehead    Thyroid cancer 1/29/2013    Thyroid disease     Vasovagal near syncope 11/12/2012    VTE (venous thromboembolism)     in 1960s, post partum, pt unsure of details        Past Surgical History:   Procedure Laterality Date    BREAST BIOPSY Left     excisional    BREAST BIOPSY Left     core needle biopsy    BREAST CYST ASPIRATION  1980's - ??    BREAST CYST EXCISION  2008 - ??    CARDIOVERSION N/A 8/13/2018     Procedure: CARDIOVERSION;  Surgeon: Fernando Disla MD;  Location: [facility] CATH LAB;  Service: Cardiology;  Laterality: N/A;  AF,DCCV,ANAHI,MAC,FAS,3 PREP    CARDIOVERSION N/A 8/13/2018    Performed by Fernando Disla MD at [facility] CATH LAB    ECHOCARDIOGRAM,TRANSESOPHAGEAL N/A 8/13/2018    Performed by Fernando Disla MD at [facility] CATH LAB    HYSTERECTOMY      tahbso    OOPHORECTOMY  hysterectomy - 2000 - ??    THYROID SURGERY      THYROIDECTOMY Bilateral 1/9/2013    Performed by Connor Santos MD at [facility] OR [address] [place] [address]       Clinical Decision Making:     History  Co-morbidities and personal factors that may impact the plan of care Examination  Body Structures and Functions, activity limitations and participation restrictions that may impact the plan of care Clinical Presentation   Decision Making/ Complexity Score   Co-morbidities:   [] Time since onset of injury / illness / exacerbation  [] Status of current condition  []Patient's cognitive status and safety concerns    [x] Multiple Medical Problems (see med hx)  Personal Factors:   [] Patient's age  [] Prior Level of function   [] Patient's home situation (environment and family support)  [] Patient's level of motivation  [] Expected progression of patient      HISTORY:(criteria)    [] 59445 - no personal factors/history    [x] 20330 - has 1-2 personal factor/comorbidity     [] 99968 - has >3 personal factor/comorbidity     Body Regions:  [x] Objective examination findings  [] Head     []  Neck  [] Trunk   [] Upper Extremity  [] Lower Extremity    Body Systems:  [] For communication ability, affect, cognition, language, and learning style: the assessment of the ability to make needs known, consciousness, orientation (person, place, and time), expected emotional /behavioral responses, and learning preferences (eg, learning barriers, education  needs)  [] For the neuromuscular system: a general assessment of gross coordinated movement (eg, balance,  gait, locomotion, transfers, and transitions) and motor function  (motor control and motor learning)  [] For the musculoskeletal system: the assessment of gross symmetry, gross range of motion, gross strength, height, and weight  [] For the integumentary system: the assessment of pliability(texture), presence of scar formation, skin color, and skin integrity  [] For cardiovascular/pulmonary system: the assessment of heart rate, respiratory rate, blood pressure, and edema     Activity limitations:    [] Patient's cognitive status and saf ety concerns          [] Status of current condition      [] Weight bearing restriction  [] Cardiopulmunary Restriction    Participation Restrictions:   [] Goals and goal agreement with the patient     [] Rehab potential (prognosis) and probable outcome      Examination of Body System: (criteria)    [x] 90293 - addressing 1-2 elements    [] 77653 - addressing a total of 3 or more elements     [] 09620 -  Addressing a total of 4 or more elements         Clinical Presentation: (criteria)  Stable - 83856     On examination of body system using standardized tests and measures patient presents with 1-2 elements from any of the following: body structures and functions, activity limitations, and/or participation restrictions.  Leading to a clinical presentation that is considered stable and/or uncomplicated                              Clinical Decision Making  (Eval Complexity):  Low- 43960     Time Tracking:     PT Received On: 12/13/18  PT Start Time: 0954     PT Stop Time: 1003  PT Total Time (min): 9 min     Billable Minutes: Evaluation 9 min      Edgard Lubin, PT  12/13/2018

## 2018-12-13 NOTE — PLAN OF CARE
Problem: Adult Inpatient Plan of Care  Goal: Plan of Care Review  Outcome: Ongoing (interventions implemented as appropriate)  POC reviewed with patient and family. AAOx4. VSS. No complaints of pain. Pt reports some SOB when ambulating, no difficulty breathing. Educated on use of IS, pt verbalized understanding. No acute events overnight. Safety maintained throughout shift, bed locked in lowest position and call bell within reach. Pt remains free of falls/injury. WCTM.

## 2018-12-13 NOTE — PROGRESS NOTES
[facility] Hospital Medicine  Progress Note    Patient Name: Madan Bell  MRN: [id number]  Patient Class: IP- Inpatient   Admission Date: 12/11/2018  Length of Stay: 2 days  Attending Physician: Clare Hernandez MD  Primary Care Provider: Sheila Mcgee MD    [facility] Medicine Team: [facility] MED 5 Emory Johnson MD    Subjective:     Principal Problem:Community acquired pneumonia of right lower lobe of lung    HPI:  This is a 78 year old female with PMH of Afib (on Apixiban), HLD, HTN, chronic UTIs (secondary to pan-sensitive Klebsiella pneumoniae) and thyroid CA s/p ablation presenting for fatigue, malaise, and altered mental status. She began to feel unwell Thursday night into Friday with a fever and productive cough with brownish yellow sputum. She had company over for dinner and fell asleep at the table multiple times and the next day (12/9) she was confused, per daughter (at bedside), not knowing where she was or the year. She called and reported symptoms to her PCP and got a prescription for Azithromycin on 12/10 and had taken 1 dose before presenting to ED today with continued confusion. At baseline mentally intact and she is independent with all her ADLs. She has chronic UTIs, and has a standing antibiotic prescription with her PCP, for which she also often gets chronic diarrhea, likely C Diff. She has fevers, chills, productive cough, weakness, fatigue. Denies CP, SOB, nausea/vomiting, constipation, dysuria or other changes in urinary habits.     ED course: On arrival, vital signs significant for fever 103. Exam notable for slowed speech and generalized weakness. Labs notable for leukocytosis (14) with left shift and hyponatremia (125). Flu negative. UA unremarkable. CXR without consolidation. Given 1L NS, Azithromycin, and Ceftriaxone; received one dose Tamiflu. ICU was consulted for hyponatremia with mental status changes, but deemed stable enough for floor since AMS  resolved after interventions in ED. Admitted to [facility].     Hospital Course:  12/12: Afebrile. Saturating >94% on RA. Leukocytosis resolved (12). No acute issues. Day #2 Azithromycin and Ceftriaxone.   12/13: No acute issues OVN. Afebrile. WBC count down to 10.7. Na improved to 132. Day #3 Azithromycin and Ceftriaxone. Patient reporting new onset of fatigue secondary to persistent cough hindering sleep; Mucinex added.     Interval History: Patient seen at bedside this AM. She reports new onset of fatigue secondary to persistent cough that is productive of clear to light brown sputum with intermittent specks of blood. Cough is interfering with normal sleep. She is tolerating PO, urinating normally, and ambulating without difficulty. She denies fevers and chills.     Review of Systems   Constitutional: Negative for appetite change, chills, diaphoresis, fatigue and fever.   HENT: Negative for congestion, sinus pain, sore throat and trouble swallowing.    Eyes: Negative for photophobia, pain and redness.   Respiratory: Positive for cough. Negative for chest tightness and shortness of breath.    Cardiovascular: Negative for chest pain, palpitations and leg swelling.   Gastrointestinal: Negative for abdominal distention, abdominal pain, constipation, diarrhea, nausea and vomiting.   Genitourinary: Negative for difficulty urinating, dysuria and urgency.   Musculoskeletal: Negative for back pain and neck pain.   Skin: Negative for pallor and rash.   Neurological: Negative for dizziness, weakness, light-headedness, numbness and headaches.     Objective:     Vital Signs (Most Recent):  Temp: 98.7 °F (37.1 °C) (12/13/18 0836)  Pulse: 88 (12/13/18 0836)  Resp: 16 (12/13/18 0836)  BP: 124/60 (12/13/18 0836)  SpO2: (!) 93 % (12/13/18 0836) Vital Signs (24h Range):  Temp:  [97.8 °F (36.6 °C)-99.9 °F (37.7 °C)] 98.7 °F (37.1 °C)  Pulse:  [] 88  Resp:  [16-18] 16  SpO2:  [93 %-94 %] 93 %  BP: (111-131)/(54-64) 124/60     Weight:  71.3 kg (157 lb 3.2 oz)  Body mass index is 23.21 kg/m².    Intake/Output Summary (Last 24 hours) at 12/13/2018 0837  Last data filed at 12/13/2018 0700  Gross per 24 hour   Intake 2075 ml   Output 1350 ml   Net 725 ml      Physical Exam   Constitutional: She is oriented to person, place, and time. She appears well-developed and well-nourished. No distress.   HENT:   Head: Normocephalic and atraumatic.   Mouth/Throat: Uvula is midline, oropharynx is clear and moist and mucous membranes are normal. Normal dentition.   Eyes: Conjunctivae are normal. Pupils are equal, round, and reactive to light.   Neck: No JVD present.   Cardiovascular: Normal rate, regular rhythm, normal heart sounds and normal pulses.   No murmur heard.  Pulmonary/Chest: Effort normal and breath sounds normal. No respiratory distress. She has no decreased breath sounds. She has no wheezes. She has no rales.   Abdominal: Soft. Normal appearance and bowel sounds are normal. She exhibits no distension. There is no tenderness.   Musculoskeletal:   Varicose veins. No swelling of the lower extremities.    Neurological: She is alert and oriented to person, place, and time.   Skin: Skin is warm and dry. No bruising and no rash noted. She is not diaphoretic.     Significant Labs:   Blood Culture:   Recent Labs   Lab 12/11/18  1710 12/11/18  1805   LABBLOO No Growth to date  No Growth to date No Growth to date  No Growth to date     BMP:   Recent Labs   Lab 12/11/18  1714  12/13/18  0426   *   < > 111*   *   < > 130*   K 3.5   < > 3.6   CL 93*   < > 97   CO2 24   < > 24   BUN 11   < > 7*   CREATININE 0.8   < > 0.7   CALCIUM 9.5   < > 8.8   MG 2.1  --   --     < > = values in this interval not displayed.     CBC:   Recent Labs   Lab 12/11/18  1714 12/12/18  0505 12/13/18  0426   WBC 14.21* 12.50 10.67   HGB 12.1 10.7* 10.7*   HCT 36.0* 33.0* 32.2*    156 157     Lactic Acid:   Recent Labs   Lab 12/11/18  1714 12/11/18  2042   LACTATE 1.0  0.6     Respiratory Culture:   Recent Labs   Lab 12/12/18  1113   GSRESP <10 epithelial cells per low power field.  Moderate WBC's  No organisms seen     Urine Culture: No results for input(s): LABURIN in the last 48 hours.     Urine Studies:   Recent Labs   Lab 12/11/18  1741   COLORU Yellow   APPEARANCEUA Clear   PHUR 5.0   SPECGRAV 1.010   PROTEINUA Negative   GLUCUA Negative   KETONESU Trace*   BILIRUBINUA Negative   OCCULTUA 2+*   NITRITE Negative   LEUKOCYTESUR Negative   RBCUA 9*   WBCUA 0   SQUAMEPITHEL 0       Significant Imaging: I have reviewed all pertinent imaging results/findings within the past 24 hours.    Assessment/Plan:      * Community acquired pneumonia of right lower lobe of lung    · Improved.   · Presentation notable for fever (103), productive cough, and increasing confusion, with leukocytosis to 14, and hyponatremia (125).   · PSI risk class IV on admission.   · Status post 1L NS in ED, along with 500 mg Azithromycin and 1g Ceftriaxone; continuing.   · Rapid Flu negative, Legionella antigen PENDING. Blood cultures NGTD.   · Lactic was 1.0, and 0.6 on repeat  · Initially on 2L NC, but saturating >93% on room air now without tachypnea.   · Exam without diminished breath sounds.   · Day #3 of Azithromycin and Ceftriaxone; anticipating coverage for 2 more days.   · Added Mucinex BID today for attempted cough relief to help address fatigue from diminished sleep.   · Anticipating watching patient for one more night as she continues to improve clinically.        Hyponatremia    · Improved.   · Possibly secondary to decreased PO intake and few days of diarrhea.   · Na 125 at admission with Cl of 93, but baseline at low 130s; daughter reports patient drinks approximately 3L fluid daily to diminish recurrent UTI risk.   · Status post 1 L NS in ED. Hydrating well PO.   · BMP ordered daily.   · Na 132 today.        Weakness           AF (atrial fibrillation)    · S/P DCCV done on 8/13 and she has  remained in NSR.   · Currently rate controlled with regular rhythm.   · CHADVASC score of 4.   · Continuing home Apixaban 5mg BD  · Has metoprolol PRN at home, but never used.       Hypercholesterolemia    Continuing home Atorvastatin.       HTN (hypertension), benign    · Controlled.   · Home combination Bisoprolol-HCTZ non-formulary, giving separately        Paroxysmal ventricular tachycardia    · Asymptomatic.   · Cardiac monitoring ordered.        MVP (mitral valve prolapse)    · Based on ANAHI from 08/2018: The mitral valve is normal in structure with evidence of bileaflet prolapse. There is moderate to severe mitral regurgitation. Following cardioversion, there is still significant but slightly less mitral regurgitation.   · No murmur on exam.   · No chest pain, palpitations, or SOB.          VTE Risk Mitigation (From admission, onward)        Ordered     IP VTE HIGH RISK PATIENT  Once      12/12/18 0000     apixaban tablet 5 mg  2 times daily      12/11/18 1983              Emory oJhnson MD  Department of Hospital Medicine   [facility]

## 2018-12-13 NOTE — HOSPITAL COURSE
Patient was admitted to [facility] for viral/bacterial pneumonia was started ib azithromycin and ceftriaxone, by the second day she felt great and felt comfortable going home. Leukocytosis resolved no fevers and was discharged with a completed course of azithromycin. Recommended that she stop her HCTZ for hyponatremia but patient wanted to address that with her cardiologist     Vitals:    12/14/18 0940   BP: (!) 115/59   Pulse: 74   Resp: 18   Temp: 98.1 °F (36.7 °C)       General:Not distressed,  well developed female, oriented to person, place, and time.     HEENT: Normocephalic and atraumatic.  External nose appears normal. External ear appears normal. Conjunctivae appear normal. No scleral icterus. Neck has normal range of motion. Neck supple.     Cardiovascular: RRR, no murmurs rubs or gallops. DP pulses 2+    Pulmonary: Effort normal. Improved rales  No respiratory distress. No wheezes, , no rhonchi.    Abdominal: Normoactive bowel sounds. Exhibits no distension. There is no tenderness. There is no rebound and no guarding.     Extremities: No clubbing, cyanosis, or edema    Skin: Warm and dry, No bruising, rash, ulceration, or jaundice    Neurological: No obvious focal motor or sensory defects, normal muscle tone.      Psychiatric: Normal mood and affect. behavior is normal. Thought content normal.

## 2018-12-13 NOTE — SUBJECTIVE & OBJECTIVE
Interval History: Patient seen at bedside this AM. She reports new onset of fatigue secondary to persistent cough that is productive of clear to light brown sputum with intermittent specks of blood. Cough is interfering with normal sleep. She is tolerating PO, urinating normally, and ambulating without difficulty. She denies fevers and chills.     Review of Systems   Constitutional: Negative for appetite change, chills, diaphoresis, fatigue and fever.   HENT: Negative for congestion, sinus pain, sore throat and trouble swallowing.    Eyes: Negative for photophobia, pain and redness.   Respiratory: Positive for cough. Negative for chest tightness and shortness of breath.    Cardiovascular: Negative for chest pain, palpitations and leg swelling.   Gastrointestinal: Negative for abdominal distention, abdominal pain, constipation, diarrhea, nausea and vomiting.   Genitourinary: Negative for difficulty urinating, dysuria and urgency.   Musculoskeletal: Negative for back pain and neck pain.   Skin: Negative for pallor and rash.   Neurological: Negative for dizziness, weakness, light-headedness, numbness and headaches.     Objective:     Vital Signs (Most Recent):  Temp: 98.7 °F (37.1 °C) (12/13/18 0836)  Pulse: 88 (12/13/18 0836)  Resp: 16 (12/13/18 0836)  BP: 124/60 (12/13/18 0836)  SpO2: (!) 93 % (12/13/18 0836) Vital Signs (24h Range):  Temp:  [97.8 °F (36.6 °C)-99.9 °F (37.7 °C)] 98.7 °F (37.1 °C)  Pulse:  [] 88  Resp:  [16-18] 16  SpO2:  [93 %-94 %] 93 %  BP: (111-131)/(54-64) 124/60     Weight: 71.3 kg (157 lb 3.2 oz)  Body mass index is 23.21 kg/m².    Intake/Output Summary (Last 24 hours) at 12/13/2018 0837  Last data filed at 12/13/2018 0700  Gross per 24 hour   Intake 2075 ml   Output 1350 ml   Net 725 ml      Physical Exam   Constitutional: She is oriented to person, place, and time. She appears well-developed and well-nourished. No distress.   HENT:   Head: Normocephalic and atraumatic.   Mouth/Throat:  Uvula is midline, oropharynx is clear and moist and mucous membranes are normal. Normal dentition.   Eyes: Conjunctivae are normal. Pupils are equal, round, and reactive to light.   Neck: No JVD present.   Cardiovascular: Normal rate, regular rhythm, normal heart sounds and normal pulses.   No murmur heard.  Pulmonary/Chest: Effort normal and breath sounds normal. No respiratory distress. She has no decreased breath sounds. She has no wheezes. She has no rales.   Abdominal: Soft. Normal appearance and bowel sounds are normal. She exhibits no distension. There is no tenderness.   Musculoskeletal:   Varicose veins. No swelling of the lower extremities.    Neurological: She is alert and oriented to person, place, and time.   Skin: Skin is warm and dry. No bruising and no rash noted. She is not diaphoretic.     Significant Labs:   Blood Culture:   Recent Labs   Lab 12/11/18  1710 12/11/18  1805   LABBLOO No Growth to date  No Growth to date No Growth to date  No Growth to date     BMP:   Recent Labs   Lab 12/11/18  1714  12/13/18  0426   *   < > 111*   *   < > 130*   K 3.5   < > 3.6   CL 93*   < > 97   CO2 24   < > 24   BUN 11   < > 7*   CREATININE 0.8   < > 0.7   CALCIUM 9.5   < > 8.8   MG 2.1  --   --     < > = values in this interval not displayed.     CBC:   Recent Labs   Lab 12/11/18  1714 12/12/18  0505 12/13/18  0426   WBC 14.21* 12.50 10.67   HGB 12.1 10.7* 10.7*   HCT 36.0* 33.0* 32.2*    156 157     Lactic Acid:   Recent Labs   Lab 12/11/18  1714 12/11/18  2042   LACTATE 1.0 0.6     Respiratory Culture:   Recent Labs   Lab 12/12/18  1113   GSRESP <10 epithelial cells per low power field.  Moderate WBC's  No organisms seen     Urine Culture: No results for input(s): LABURIN in the last 48 hours.     Urine Studies:   Recent Labs   Lab 12/11/18  1741   COLORU Yellow   APPEARANCEUA Clear   PHUR 5.0   SPECGRAV 1.010   PROTEINUA Negative   GLUCUA Negative   KETONESU Trace*   BILIRUBINUA  Negative   OCCULTUA 2+*   NITRITE Negative   LEUKOCYTESUR Negative   RBCUA 9*   WBCUA 0   SQUAMEPITHEL 0       Significant Imaging: I have reviewed all pertinent imaging results/findings within the past 24 hours.

## 2018-12-14 VITALS
SYSTOLIC BLOOD PRESSURE: 115 MMHG | WEIGHT: 157.19 LBS | DIASTOLIC BLOOD PRESSURE: 59 MMHG | BODY MASS INDEX: 23.28 KG/M2 | TEMPERATURE: 98 F | HEIGHT: 69 IN | RESPIRATION RATE: 18 BRPM | HEART RATE: 74 BPM | OXYGEN SATURATION: 93 %

## 2018-12-14 LAB
ALBUMIN SERPL BCP-MCNC: 2.6 G/DL
ALP SERPL-CCNC: 59 U/L
ALT SERPL W/O P-5'-P-CCNC: 10 U/L
ANION GAP SERPL CALC-SCNC: 9 MMOL/L
AST SERPL-CCNC: 15 U/L
BACTERIA SPEC AEROBE CULT: NORMAL
BASOPHILS # BLD AUTO: 0.04 K/UL
BASOPHILS NFR BLD: 0.5 %
BILIRUB SERPL-MCNC: 0.9 MG/DL
BUN SERPL-MCNC: 5 MG/DL
CALCIUM SERPL-MCNC: 8.9 MG/DL
CHLORIDE SERPL-SCNC: 99 MMOL/L
CO2 SERPL-SCNC: 24 MMOL/L
CREAT SERPL-MCNC: 0.6 MG/DL
DIFFERENTIAL METHOD: ABNORMAL
EOSINOPHIL # BLD AUTO: 0.1 K/UL
EOSINOPHIL NFR BLD: 1.4 %
ERYTHROCYTE [DISTWIDTH] IN BLOOD BY AUTOMATED COUNT: 12.6 %
EST. GFR  (AFRICAN AMERICAN): >60 ML/MIN/1.73 M^2
EST. GFR  (NON AFRICAN AMERICAN): >60 ML/MIN/1.73 M^2
GLUCOSE SERPL-MCNC: 99 MG/DL
GRAM STN SPEC: NORMAL
HCT VFR BLD AUTO: 32.1 %
HGB BLD-MCNC: 10.8 G/DL
IMM GRANULOCYTES # BLD AUTO: 0.03 K/UL
IMM GRANULOCYTES NFR BLD AUTO: 0.4 %
L PNEUMO AG UR QL IA: NOT DETECTED
LYMPHOCYTES # BLD AUTO: 1.9 K/UL
LYMPHOCYTES NFR BLD: 23.4 %
MCH RBC QN AUTO: 31.9 PG
MCHC RBC AUTO-ENTMCNC: 33.6 G/DL
MCV RBC AUTO: 95 FL
MONOCYTES # BLD AUTO: 0.8 K/UL
MONOCYTES NFR BLD: 10.4 %
NEUTROPHILS # BLD AUTO: 5.1 K/UL
NEUTROPHILS NFR BLD: 63.9 %
NRBC BLD-RTO: 0 /100 WBC
PLATELET # BLD AUTO: 175 K/UL
PMV BLD AUTO: 10.9 FL
POTASSIUM SERPL-SCNC: 3.4 MMOL/L
PROT SERPL-MCNC: 5.7 G/DL
RBC # BLD AUTO: 3.39 M/UL
SODIUM SERPL-SCNC: 132 MMOL/L
WBC # BLD AUTO: 7.9 K/UL

## 2018-12-14 PROCEDURE — 25000003 PHARM REV CODE 250: Mod: HCNC | Performed by: STUDENT IN AN ORGANIZED HEALTH CARE EDUCATION/TRAINING PROGRAM

## 2018-12-14 PROCEDURE — 99239 PR HOSPITAL DISCHARGE DAY,>30 MIN: ICD-10-PCS | Mod: HCNC,GC,, | Performed by: HOSPITALIST

## 2018-12-14 PROCEDURE — 80053 COMPREHEN METABOLIC PANEL: CPT | Mod: HCNC

## 2018-12-14 PROCEDURE — 85025 COMPLETE CBC W/AUTO DIFF WBC: CPT | Mod: HCNC

## 2018-12-14 PROCEDURE — 36415 COLL VENOUS BLD VENIPUNCTURE: CPT | Mod: HCNC

## 2018-12-14 PROCEDURE — 99239 HOSP IP/OBS DSCHRG MGMT >30: CPT | Mod: HCNC,GC,, | Performed by: HOSPITALIST

## 2018-12-14 RX ORDER — AZITHROMYCIN 250 MG/1
250 TABLET, FILM COATED ORAL DAILY
Qty: 2 TABLET | Refills: 0 | Status: SHIPPED | OUTPATIENT
Start: 2018-12-14 | End: 2018-12-14

## 2018-12-14 RX ORDER — AZITHROMYCIN 500 MG/1
500 TABLET, FILM COATED ORAL DAILY
Qty: 2 TABLET | Refills: 0 | Status: SHIPPED | OUTPATIENT
Start: 2018-12-14 | End: 2018-12-16

## 2018-12-14 RX ORDER — POTASSIUM CHLORIDE 20 MEQ/1
20 TABLET, EXTENDED RELEASE ORAL ONCE
Status: COMPLETED | OUTPATIENT
Start: 2018-12-14 | End: 2018-12-14

## 2018-12-14 RX ADMIN — VITAMIN D, TAB 1000IU (100/BT) 2000 UNITS: 25 TAB at 09:12

## 2018-12-14 RX ADMIN — GUAIFENESIN 600 MG: 600 TABLET, EXTENDED RELEASE ORAL at 09:12

## 2018-12-14 RX ADMIN — POTASSIUM CHLORIDE 20 MEQ: 1500 TABLET, EXTENDED RELEASE ORAL at 09:12

## 2018-12-14 RX ADMIN — APIXABAN 5 MG: 5 TABLET, FILM COATED ORAL at 09:12

## 2018-12-14 RX ADMIN — LEVOTHYROXINE SODIUM 75 MCG: 75 TABLET ORAL at 06:12

## 2018-12-14 RX ADMIN — OXYCODONE HYDROCHLORIDE AND ACETAMINOPHEN 500 MG: 500 TABLET ORAL at 09:12

## 2018-12-14 RX ADMIN — Medication 1 CAPSULE: at 09:12

## 2018-12-14 RX ADMIN — ATORVASTATIN CALCIUM 40 MG: 10 TABLET, FILM COATED ORAL at 09:12

## 2018-12-14 RX ADMIN — AZITHROMYCIN MONOHYDRATE 250 MG: 250 TABLET ORAL at 09:12

## 2018-12-14 RX ADMIN — ASPIRIN 81 MG CHEWABLE TABLET 81 MG: 81 TABLET CHEWABLE at 09:12

## 2018-12-14 RX ADMIN — PANTOPRAZOLE SODIUM 40 MG: 40 TABLET, DELAYED RELEASE ORAL at 09:12

## 2018-12-14 NOTE — PHYSICIAN QUERY
PT Name: Madan Bell  MR #: [id number]     CDS/: Rona Nunes RN CDI         Contact information: [email or web address]  This form is a permanent document in the medical record.     Query Date: December 14, 2018    Physician Query - Neurological Condition Clarification    By submitting this query, we are merely seeking further clarification of documentation to reflect thehospital medicine severity of illness of your patient. Please utilize your independent clinical judgment when addressing the question(s) below.    The Medical record reflects the following:     Indicators   Supporting Clinical Findings Location in Medical Record   X AMS, Confusion,  LOC, etc.  78 y.o. female who presents to the ED with family because of acute encephalopathy.  They endorse a rapid decline in function over the last 3 days - from being completely independent to having difficulty finding words and worsening malaise and lethargy.     H&P  Hospital medicine  12/12 1205 pm   X Acute / Chronic Illness Community acquired pneumonia  Hyponatremia  Atrial fibrillation  HTN  Hypercholesterolemia H&P  Hospital medicine  12/12 1205 pm    Radiology Findings     X Electrolyte Imbalance Serum Sodium  12/11 - 125  12/12 - 132  12/13 - 130  12/14 - 132 Lab results    Medication      Treatment              Other       Encephalopathy- is a general term for any diffuse disease of the brain that alters brain function or structure. Treatment of the cognitive dysfunction varies but is ultimately dependent on the treatment of the underlying condition.    Major Symptoms of Encephalopathy - Decreased level of consciousness, fluctuating alertness/concentration, confusion, agitation, lethargy, somnolence, drowsiness, obtundation, stupor, or coma.         References: National Institutes of Healths (NIH) National [place] of Neurological Disorders and Strokes;  HCPro 2016; Advisory Board     Clinical Guidelines:   These guidelines will set system  standards to assist providers in managing, documentation, and coding of encephalopathy. The intent of this document is to serve as a system guideline, not replace the providers clinical judgment:  Provider, please spe  [ X ] Metabolic Encephalopathy - Due to electrolye imbalance, metabolic derangements, or infections processes, includes Septic Encephalopathy     [   ] Other Encephalopathy - Includes uremic encephalopathy     [   ] Unspecified Encephalopathy        [   ] Other Neurological Condition-  Includes Post-ictal altered mental status. (please specify condition): _________     [  ]  Clinically Undetermined     Please document in your progress notes daily for the duration of treatment until resolved, and include in your discharge summary.

## 2018-12-14 NOTE — PHYSICIAN QUERY
PT Name: Madan Bell  MR #: [id number]    Physician Query Form - Atrial Fibrillation Specificity     CDS/: Rona Nunes RN CDI             Contact information: [email or web address]     This form is a permanent document in the medical record.     Query Date: December 14, 2018    By submitting this query, we are merely seeking further clarification of documentation. Please utilize your independent clinical judgment when addressing the question(s) below.    The medical record contains the following:   Indicators     Supporting Clinical Findings Location in Medical Record   X Atrial Fibrillation AF (atrial fibrillation)    Home apixaban 5mg BD  Bisoprolol, home dose.   Has metoprolol PRN at home, never used   H&P [facility] medicine  12/12 1205 am   X EKG results Sinus rhythm with occasional Premature ventricular complexes  Septal infarct ,age undetermined  Abnormal ECG EKG 12/11   X Medication Apaxiban 5 mg po 2 times daily 12/11 2245 Medication sheets    Treatment      Other         Provider, please further specify the Atrial Fibrillation diagnosis.    [ X ] Chronic   [  ] Paroxysmal   [  ] Permanent   [  ] Persistent   [  ] Other (please specify):   [  ] Clinically Undetermined       Please document in your progress notes daily for the duration of treatment until resolved, and include in your discharge summary.

## 2018-12-14 NOTE — ASSESSMENT & PLAN NOTE
· Improved.   · Presentation notable for fever (103), productive cough, and increasing confusion, with leukocytosis to 14, and hyponatremia (125).   · PSI risk class IV on admission.   · Status post 1L NS in ED, along with 500 mg Azithromycin and 1g Ceftriaxone; continuing.   · Rapid Flu negative, Legionella antigen PENDING. Blood cultures NGTD.   · Lactic was 1.0, and 0.6 on repeat  · Initially on 2L NC, but saturating >93% on room air now without tachypnea.   · Exam without diminished breath sounds.   · Day #4 of Azithromycin and Ceftriaxone; anticipating coverage for 1 more days.   · Added Mucinex BID

## 2018-12-14 NOTE — DISCHARGE SUMMARY
[facility]  Discharge Summary      Patient Name: Madan Bell  MRN: [id number]  Admission Date: 12/11/2018  Hospital Length of Stay: 3 days  Discharge Date and Time:  12/14/2018 2:28 PM  Attending Physician: No att. providers found   Discharging Provider: Anna Barajas MD  Primary Care Provider: Sheila Mcgee MD  Hospital Medicine Team: [facility] HOSP MED 5 Anna Barajas MD    HPI:   This is a 78 year old female with PMH of Afib (on Apixiban), HLD, HTN, chronic UTIs (secondary to pan-sensitive Klebsiella pneumoniae) and thyroid CA s/p ablation presenting for fatigue, malaise, and altered mental status. She began to feel unwell Thursday night into Friday with a fever and productive cough with brownish yellow sputum. She had company over for dinner and fell asleep at the table multiple times and the next day (12/9) she was confused, per daughter (at bedside), not knowing where she was or the year. She called and reported symptoms to her PCP and got a prescription for Azithromycin on 12/10 and had taken 1 dose before presenting to ED today with continued confusion. At baseline mentally intact and she is independent with all her ADLs. She has chronic UTIs, and has a standing antibiotic prescription with her PCP, for which she also often gets chronic diarrhea, likely C Diff. She has fevers, chills, productive cough, weakness, fatigue. Denies CP, SOB, nausea/vomiting, constipation, dysuria or other changes in urinary habits.     ED course: On arrival, vital signs significant for fever 103. Exam notable for slowed speech and generalized weakness. Labs notable for leukocytosis (14) with left shift and hyponatremia (125). Flu negative. UA unremarkable. CXR without consolidation. Given 1L NS, Azithromycin, and Ceftriaxone; received one dose Tamiflu. ICU was consulted for hyponatremia with mental status changes, but deemed stable enough for floor since AMS resolved after  interventions in ED. Admitted to [facility] the [facility].     * No surgery found *      Hospital Course:   Patient was admitted to [facility] the [facility] for viral/bacterial pneumonia was started ib azithromycin and ceftriaxone, by the second day she felt great and felt comfortable going home. Leukocytosis resolved no fevers and was discharged with a completed course of azithromycin. Recommended that she stop her HCTZ for hyponatremia but patient wanted to address that with her cardiologist     Vitals:    12/14/18 0940   BP: (!) 115/59   Pulse: 74   Resp: 18   Temp: 98.1 °F (36.7 °C)       General:Not distressed,  well developed female, oriented to person, place, and time.     HEENT: Normocephalic and atraumatic.  External nose appears normal. External ear appears normal. Conjunctivae appear normal. No scleral icterus. Neck has normal range of motion. Neck supple.     Cardiovascular: RRR, no murmurs rubs or gallops. DP pulses 2+    Pulmonary: Effort normal. Improved rales  No respiratory distress. No wheezes, , no rhonchi.    Abdominal: Normoactive bowel sounds. Exhibits no distension. There is no tenderness. There is no rebound and no guarding.     Extremities: No clubbing, cyanosis, or edema    Skin: Warm and dry, No bruising, rash, ulceration, or jaundice    Neurological: No obvious focal motor or sensory defects, normal muscle tone.      Psychiatric: Normal mood and affect. behavior is normal. Thought content normal.       Consults:   Consults (From admission, onward)        Status Ordering Provider     Inpatient consult to Critical Care Medicine  Once     Provider:  (Not yet assigned)    Completed POPEYE SONI          * Community acquired pneumonia of right lower lobe of lung    · Improved.   · Presentation notable for fever (103), productive cough, and increasing confusion, with leukocytosis to 14, and hyponatremia (125).   · PSI risk class IV on admission.   · Status post 1L NS in ED, along with 500 mg Azithromycin and 1g Ceftriaxone;  continuing.   · Rapid Flu negative, Legionella antigen PENDING. Blood cultures NGTD.   · Lactic was 1.0, and 0.6 on repeat  · Initially on 2L NC, but saturating >93% on room air now without tachypnea.   · Exam without diminished breath sounds.   · Day #4 of Azithromycin and Ceftriaxone; anticipating coverage for 1 more days.   · Added Mucinex BID          Hyponatremia    · Improved.   · Possibly secondary to decreased PO intake and few days of diarrhea.   · Na 125 at admission with Cl of 93, but baseline at low 130s; daughter reports patient drinks approximately 3L fluid daily to diminish recurrent UTI risk.   · Status post 1 L NS in ED. Hydrating well PO.   ·        Weakness           AF (atrial fibrillation)    · S/P DCCV done on 8/13 and she has remained in NSR.   · Currently rate controlled with regular rhythm.   · CHADVASC score of 4.   · Continuing home Apixaban 5mg BD  · Has metoprolol PRN at home, but never used.       Hypercholesterolemia    Continuing home Atorvastatin.       HTN (hypertension), benign    · Controlled.          Paroxysmal ventricular tachycardia    · Asymptomatic.   · Cardiac monitoring ordered.        MVP (mitral valve prolapse)    · Based on ANAHI from 08/2018: The mitral valve is normal in structure with evidence of bileaflet prolapse. There is moderate to severe mitral regurgitation. Following cardioversion, there is still significant but slightly less mitral regurgitation.   · No murmur on exam.   · No chest pain, palpitations, or SOB.          Final Active Diagnoses:    Diagnosis Date Noted POA    PRINCIPAL PROBLEM:  Community acquired pneumonia of right lower lobe of lung [J18.1] 12/11/2018 Yes    Weakness [R53.1] 12/11/2018 Yes    Hyponatremia [E87.1] 12/11/2018 Yes    AF (atrial fibrillation) [I48.91] 08/13/2018 Yes    Hypercholesterolemia [E78.00] 06/13/2017 Yes    HTN (hypertension), benign [I10] 10/14/2013 Yes    Paroxysmal ventricular tachycardia [I47.2] 02/25/2013 Yes     MVP (mitral valve prolapse) [I34.1] 07/06/2012 Yes      Problems Resolved During this Admission:       Discharged Condition: good    Disposition: Home or Self Care    Follow Up:    Patient Instructions:      Diet Adult Regular     Diet Cardiac     Notify your health care provider if you experience any of the following:  temperature >100.4     Notify your health care provider if you experience any of the following:  difficulty breathing or increased cough     Notify your health care provider if you experience any of the following:  increased confusion or weakness     Activity as tolerated       Significant Diagnostic Studies: Labs:   CMP   Recent Labs   Lab 12/13/18 0426 12/14/18  0509   * 132*   K 3.6 3.4*   CL 97 99   CO2 24 24   * 99   BUN 7* 5*   CREATININE 0.7 0.6   CALCIUM 8.8 8.9   PROT 6.0 5.7*   ALBUMIN 2.8* 2.6*   BILITOT 1.0 0.9   ALKPHOS 59 59   AST 16 15   ALT 7* 10   ANIONGAP 9 9   ESTGFRAFRICA >60.0 >60.0   EGFRNONAA >60.0 >60.0   , CBC   Recent Labs   Lab 12/13/18 0426 12/14/18  0509   WBC 10.67 7.90   HGB 10.7* 10.8*   HCT 32.2* 32.1*    175    and All labs within the past 24 hours have been reviewed    Pending Diagnostic Studies:     Procedure Component Value Units Date/Time    Lactic acid, plasma #2 [[id number]] Collected:  12/11/18 1714    Order Status:  Sent Lab Status:  In process Updated:  12/11/18 1715    Specimen:  Blood          Medications:  Reconciled Home Medications:      Medication List      CHANGE how you take these medications    Bifidobacterium infantis 4 mg Cap  Commonly known as:  ALIGN  One daily  What changed:  additional instructions        CONTINUE taking these medications    aspirin 81 mg Tab  Take 1 tablet by mouth every Mon, Wed, Fri.     atorvastatin 40 MG tablet  Commonly known as:  LIPITOR  TAKE 1 TABLET EVERY DAY     azithromycin 500 MG tablet  Commonly known as:  ZITHROMAX  Take 1 tablet (500 mg total) by mouth once daily. for 2 days      bisoprolol-hydrochlorothiazide 2.5-6.25 mg 2.5-6.25 mg Tab  Commonly known as:  ZIAC  TAKE 1/2 TABLET EVERY DAY     D-MANNOSE ORAL  Take 6 tablets by mouth once daily.     ELIQUIS 5 mg Tab  Generic drug:  apixaban  TAKE 1 TABLET TWICE DAILY     estradiol 0.01 % (0.1 mg/gram) vaginal cream  Commonly known as:  ESTRACE  Apply a pea sized amount every day for 2 weeks, then 3x/week. 90 day supply     FISH OIL 1,000 mg Cap  Generic drug:  omega-3 fatty acids-vitamin E  Take 1 capsule by mouth once daily. 1400mg     levothyroxine 75 MCG tablet  Commonly known as:  SYNTHROID  TAKE 1 TABLET EVERY DAY (NEED MD APPOINTMENT)     metoprolol tartrate 50 MG tablet  Commonly known as:  LOPRESSOR  Take one half to one tablet daily as needed for palpitations.     pantoprazole 40 MG tablet  Commonly known as:  PROTONIX  TAKE 1 TABLET EVERY DAY     ROBITUSSIN-DM ORAL  Take by mouth as needed (cough).     VITAMIN C 500 MG tablet  Generic drug:  ascorbic acid (vitamin C)  Take 500 mg by mouth once daily.     VITAMIN D3 2,000 unit Cap  Generic drug:  cholecalciferol (vitamin D3)  Take by mouth once daily.            Indwelling Lines/Drains at time of discharge:   Lines/Drains/Airways          None          Time spent on the discharge of patient: 50 minutes  Patient was seen and examined on the date of discharge and determined to be suitable for discharge.     Patient seen and examined this morning. Discussed with Dr. Hernandez  - staff attestation to follow.        Anna Barajas MD  Department of [facility] Medicine  [facility]

## 2018-12-14 NOTE — SUBJECTIVE & OBJECTIVE
Interval History:NAEON    Review of Systems  Objective:     Vital Signs (Most Recent):  Temp: 98.1 °F (36.7 °C) (12/14/18 0940)  Pulse: 74 (12/14/18 0940)  Resp: 18 (12/14/18 0940)  BP: (!) 115/59 (12/14/18 0940)  SpO2: (!) 93 % (12/14/18 0940) Vital Signs (24h Range):  Temp:  [97.4 °F (36.3 °C)-98.9 °F (37.2 °C)] 98.1 °F (36.7 °C)  Pulse:  [64-88] 74  Resp:  [16-18] 18  SpO2:  [92 %-97 %] 93 %  BP: (112-133)/(56-62) 115/59     Weight: 71.3 kg (157 lb 3.2 oz)  Body mass index is 23.21 kg/m².    Intake/Output Summary (Last 24 hours) at 12/14/2018 1417  Last data filed at 12/14/2018 0100  Gross per 24 hour   Intake 3000 ml   Output 3600 ml   Net -600 ml      Physical Exam    Significant Labs:   CBC:   Recent Labs   Lab 12/13/18  0426 12/14/18  0509   WBC 10.67 7.90   HGB 10.7* 10.8*   HCT 32.2* 32.1*    175     CMP:   Recent Labs   Lab 12/13/18  0426 12/14/18  0509   * 132*   K 3.6 3.4*   CL 97 99   CO2 24 24   * 99   BUN 7* 5*   CREATININE 0.7 0.6   CALCIUM 8.8 8.9   PROT 6.0 5.7*   ALBUMIN 2.8* 2.6*   BILITOT 1.0 0.9   ALKPHOS 59 59   AST 16 15   ALT 7* 10   ANIONGAP 9 9   EGFRNONAA >60.0 >60.0       Significant Imaging: I have reviewed all pertinent imaging results/findings within the past 24 hours.  I have reviewed and interpreted all pertinent imaging results/findings within the past 24 hours.

## 2018-12-14 NOTE — ASSESSMENT & PLAN NOTE
· Improved.   · Possibly secondary to decreased PO intake and few days of diarrhea.   · Na 125 at admission with Cl of 93, but baseline at low 130s; daughter reports patient drinks approximately 3L fluid daily to diminish recurrent UTI risk.   · Status post 1 L NS in ED. Hydrating well PO.   ·

## 2018-12-14 NOTE — PLAN OF CARE
"Problem: Adult Inpatient Plan of Care  Goal: Plan of Care Review  Outcome: Ongoing (interventions implemented as appropriate)  Pt is alert and oriented x4 and verbally responsive to care. VSS. No c/o pain or discomfort. This AM stating feeling "run down", feeling much better this evening. Cont IV and oral abx. Cough still present, infrequent, productive. Pt is resting in bed at this time, son at bedside and call light in reach. Cont to monitor.       "

## 2018-12-16 LAB
BACTERIA BLD CULT: NORMAL
BACTERIA BLD CULT: NORMAL

## 2018-12-17 ENCOUNTER — PES CALL (OUTPATIENT)
Dept: ADMINISTRATIVE | Facility: CLINIC | Age: 78
End: 2018-12-17

## 2018-12-18 ENCOUNTER — PATIENT MESSAGE (OUTPATIENT)
Dept: INTERNAL MEDICINE | Facility: CLINIC | Age: 78
End: 2018-12-18

## 2018-12-20 ENCOUNTER — TELEPHONE (OUTPATIENT)
Dept: INTERNAL MEDICINE | Facility: CLINIC | Age: 78
End: 2018-12-20

## 2018-12-20 ENCOUNTER — PATIENT MESSAGE (OUTPATIENT)
Dept: ADMINISTRATIVE | Facility: OTHER | Age: 78
End: 2018-12-20

## 2018-12-21 NOTE — TELEPHONE ENCOUNTER
----- Message from Cata Norwood sent at 12/21/2018 12:08 PM CST -----  Contact: [phone]  Patient is requesting a call from the office. Patient stated the office has called her and asked if she could change her appointment time from 11am to 2pm and patient stated she can come in for 2pm. But her appointment is still for 11am. Patient is confused about her appointment.    Please advise, thanks

## 2018-12-25 RX ORDER — LEVOTHYROXINE SODIUM 75 UG/1
TABLET ORAL
Qty: 90 TABLET | Refills: 0 | OUTPATIENT
Start: 2018-12-25

## 2018-12-28 ENCOUNTER — OFFICE VISIT (OUTPATIENT)
Dept: INTERNAL MEDICINE | Facility: CLINIC | Age: 78
End: 2018-12-28
Payer: MEDICARE

## 2018-12-28 ENCOUNTER — LAB VISIT (OUTPATIENT)
Dept: LAB | Facility: HOSPITAL | Age: 78
End: 2018-12-28
Attending: INTERNAL MEDICINE
Payer: MEDICARE

## 2018-12-28 VITALS
BODY MASS INDEX: 21.87 KG/M2 | HEART RATE: 60 BPM | SYSTOLIC BLOOD PRESSURE: 120 MMHG | OXYGEN SATURATION: 95 % | DIASTOLIC BLOOD PRESSURE: 70 MMHG | WEIGHT: 147.69 LBS | TEMPERATURE: 98 F | HEIGHT: 69 IN

## 2018-12-28 DIAGNOSIS — N39.0 URINARY TRACT INFECTION WITHOUT HEMATURIA, SITE UNSPECIFIED: ICD-10-CM

## 2018-12-28 DIAGNOSIS — J18.9 PNEUMONIA DUE TO INFECTIOUS ORGANISM, UNSPECIFIED LATERALITY, UNSPECIFIED PART OF LUNG: ICD-10-CM

## 2018-12-28 DIAGNOSIS — D64.9 ANEMIA, UNSPECIFIED TYPE: ICD-10-CM

## 2018-12-28 DIAGNOSIS — D64.9 ANEMIA, UNSPECIFIED TYPE: Primary | ICD-10-CM

## 2018-12-28 LAB
ALBUMIN SERPL BCP-MCNC: 3.7 G/DL
ALP SERPL-CCNC: 62 U/L
ALT SERPL W/O P-5'-P-CCNC: 12 U/L
ANION GAP SERPL CALC-SCNC: 7 MMOL/L
AST SERPL-CCNC: 19 U/L
BASOPHILS # BLD AUTO: 0.09 K/UL
BASOPHILS NFR BLD: 1.3 %
BILIRUB SERPL-MCNC: 0.8 MG/DL
BILIRUB UR QL STRIP: NEGATIVE
BUN SERPL-MCNC: 7 MG/DL
CALCIUM SERPL-MCNC: 9.5 MG/DL
CHLORIDE SERPL-SCNC: 101 MMOL/L
CLARITY UR REFRACT.AUTO: CLEAR
CO2 SERPL-SCNC: 29 MMOL/L
COLOR UR AUTO: ABNORMAL
CREAT SERPL-MCNC: 0.7 MG/DL
DIFFERENTIAL METHOD: ABNORMAL
EOSINOPHIL # BLD AUTO: 0.1 K/UL
EOSINOPHIL NFR BLD: 1 %
ERYTHROCYTE [DISTWIDTH] IN BLOOD BY AUTOMATED COUNT: 12.9 %
EST. GFR  (AFRICAN AMERICAN): >60 ML/MIN/1.73 M^2
EST. GFR  (NON AFRICAN AMERICAN): >60 ML/MIN/1.73 M^2
FERRITIN SERPL-MCNC: 178 NG/ML
GLUCOSE SERPL-MCNC: 101 MG/DL
GLUCOSE UR QL STRIP: NEGATIVE
HCT VFR BLD AUTO: 39.1 %
HGB BLD-MCNC: 12.8 G/DL
HGB UR QL STRIP: NEGATIVE
IMM GRANULOCYTES # BLD AUTO: 0.02 K/UL
IMM GRANULOCYTES NFR BLD AUTO: 0.3 %
IRON SERPL-MCNC: 82 UG/DL
KETONES UR QL STRIP: NEGATIVE
LEUKOCYTE ESTERASE UR QL STRIP: ABNORMAL
LYMPHOCYTES # BLD AUTO: 2.2 K/UL
LYMPHOCYTES NFR BLD: 31.8 %
MCH RBC QN AUTO: 32 PG
MCHC RBC AUTO-ENTMCNC: 32.7 G/DL
MCV RBC AUTO: 98 FL
MICROSCOPIC COMMENT: ABNORMAL
MONOCYTES # BLD AUTO: 0.5 K/UL
MONOCYTES NFR BLD: 6.8 %
NEUTROPHILS # BLD AUTO: 4.1 K/UL
NEUTROPHILS NFR BLD: 58.8 %
NITRITE UR QL STRIP: NEGATIVE
NRBC BLD-RTO: 0 /100 WBC
PH UR STRIP: 7 [PH] (ref 5–8)
PLATELET # BLD AUTO: 278 K/UL
PMV BLD AUTO: 10.1 FL
POTASSIUM SERPL-SCNC: 4.2 MMOL/L
PROT SERPL-MCNC: 7.1 G/DL
PROT UR QL STRIP: NEGATIVE
RBC # BLD AUTO: 4 M/UL
RBC #/AREA URNS AUTO: 6 /HPF (ref 0–4)
SATURATED IRON: 21 %
SODIUM SERPL-SCNC: 137 MMOL/L
SP GR UR STRIP: 1 (ref 1–1.03)
SQUAMOUS #/AREA URNS AUTO: 1 /HPF
TOTAL IRON BINDING CAPACITY: 394 UG/DL
TRANSFERRIN SERPL-MCNC: 266 MG/DL
URN SPEC COLLECT METH UR: ABNORMAL
WBC # BLD AUTO: 7.02 K/UL
WBC #/AREA URNS AUTO: 11 /HPF (ref 0–5)

## 2018-12-28 PROCEDURE — 82728 ASSAY OF FERRITIN: CPT | Mod: HCNC

## 2018-12-28 PROCEDURE — 3078F DIAST BP <80 MM HG: CPT | Mod: CPTII,HCNC,S$GLB, | Performed by: INTERNAL MEDICINE

## 2018-12-28 PROCEDURE — 36415 COLL VENOUS BLD VENIPUNCTURE: CPT | Mod: HCNC,PO

## 2018-12-28 PROCEDURE — 84466 ASSAY OF TRANSFERRIN: CPT | Mod: HCNC

## 2018-12-28 PROCEDURE — 83540 ASSAY OF IRON: CPT | Mod: HCNC

## 2018-12-28 PROCEDURE — 99214 OFFICE O/P EST MOD 30 MIN: CPT | Mod: HCNC,25,S$GLB, | Performed by: INTERNAL MEDICINE

## 2018-12-28 PROCEDURE — 87077 CULTURE AEROBIC IDENTIFY: CPT | Mod: HCNC

## 2018-12-28 PROCEDURE — G0009 ADMIN PNEUMOCOCCAL VACCINE: HCPCS | Mod: HCNC,S$GLB,, | Performed by: INTERNAL MEDICINE

## 2018-12-28 PROCEDURE — 87086 URINE CULTURE/COLONY COUNT: CPT | Mod: HCNC

## 2018-12-28 PROCEDURE — 1101F PT FALLS ASSESS-DOCD LE1/YR: CPT | Mod: CPTII,HCNC,S$GLB, | Performed by: INTERNAL MEDICINE

## 2018-12-28 PROCEDURE — 81001 URINALYSIS AUTO W/SCOPE: CPT | Mod: HCNC

## 2018-12-28 PROCEDURE — 87088 URINE BACTERIA CULTURE: CPT | Mod: HCNC

## 2018-12-28 PROCEDURE — 87186 SC STD MICRODIL/AGAR DIL: CPT | Mod: HCNC

## 2018-12-28 PROCEDURE — 85025 COMPLETE CBC W/AUTO DIFF WBC: CPT | Mod: HCNC

## 2018-12-28 PROCEDURE — 99999 PR PBB SHADOW E&M-EST. PATIENT-LVL IV: CPT | Mod: PBBFAC,HCNC,, | Performed by: INTERNAL MEDICINE

## 2018-12-28 PROCEDURE — 90732 PPSV23 VACC 2 YRS+ SUBQ/IM: CPT | Mod: HCNC,S$GLB,, | Performed by: INTERNAL MEDICINE

## 2018-12-28 PROCEDURE — 3074F SYST BP LT 130 MM HG: CPT | Mod: CPTII,HCNC,S$GLB, | Performed by: INTERNAL MEDICINE

## 2018-12-28 PROCEDURE — 80053 COMPREHEN METABOLIC PANEL: CPT | Mod: HCNC

## 2018-12-28 RX ORDER — LEVOTHYROXINE SODIUM 75 UG/1
TABLET ORAL
Qty: 90 TABLET | Refills: 3 | Status: SHIPPED | OUTPATIENT
Start: 2018-12-28 | End: 2019-11-25 | Stop reason: SDUPTHER

## 2018-12-28 NOTE — PROGRESS NOTES
Administered pneumovax 23 to the right deltoid, tolerated well, no c/o pain or redness to site, no adverse reaction noted within wait period.

## 2018-12-30 NOTE — PROGRESS NOTES
Subjective:       Patient ID: Madan Bell is a 78 y.o. female.    Chief Complaint: Hospital Follow Up and Medication Refill (synthroid)    HPIPt with pneumonia - turned around quickly and completed antibiotics.  Denies any further fever, no weakness, improved appetite,  Some cough but no SOB.    Review of Systems   Respiratory: Negative for shortness of breath (PND or orthopnea).    Cardiovascular: Negative for chest pain (arm pain or jaw pain).   Gastrointestinal: Negative for abdominal pain, diarrhea, nausea and vomiting.   Genitourinary: Negative for dysuria.   Neurological: Negative for seizures, syncope and headaches.       Objective:      Physical Exam   Constitutional: She is oriented to person, place, and time. She appears well-developed and well-nourished. No distress.   HENT:   Head: Normocephalic.   Mouth/Throat: Oropharynx is clear and moist.   Neck: Neck supple. No JVD present. No thyromegaly present.   Cardiovascular: Normal rate, regular rhythm, normal heart sounds and intact distal pulses. Exam reveals no gallop and no friction rub.   No murmur heard.  Pulmonary/Chest: Effort normal and breath sounds normal. She has no wheezes. She has no rales.   Abdominal: Soft. Bowel sounds are normal. She exhibits no distension and no mass. There is no tenderness. There is no rebound and no guarding.   Musculoskeletal: She exhibits no edema.   Lymphadenopathy:     She has no cervical adenopathy.   Neurological: She is alert and oriented to person, place, and time. She has normal reflexes.   Skin: Skin is warm and dry.   Psychiatric: She has a normal mood and affect. Her behavior is normal. Judgment and thought content normal.       Assessment:       1. Anemia, unspecified type    2. Pneumonia due to infectious organism, unspecified laterality, unspecified part of lung    3. Urinary tract infection without hematuria, site unspecified        Plan:   Anemia, unspecified type  -     CBC auto differential;  Future; Expected date: 12/28/2018  -     Comprehensive metabolic panel; Future; Expected date: 12/28/2018  -     Iron and TIBC; Future; Expected date: 12/28/2018  -     Ferritin; Future; Expected date: 12/28/2018    Pneumonia due to infectious organism, unspecified laterality, unspecified part of lung  -     X-Ray Chest PA And Lateral; Future; Expected date: 12/28/2018    Urinary tract infection without hematuria, site unspecified  -     Urinalysis  -     Urine culture    Other orders  -     levothyroxine (SYNTHROID) 75 MCG tablet; TAKE 1 TABLET EVERY DAY (NEED MD APPOINTMENT)  Dispense: 90 tablet; Refill: 3  -     Pneumococcal Polysaccharide Vaccine (23 Valent) (SQ/IM)  -     pneumococcal vaccine (PNU-IMMUNE 23) injection 0.5 mL  -     Urinalysis Microscopic    Much improved - check lab today and Xray in 4 weeks - pneumovax today

## 2018-12-31 ENCOUNTER — TELEPHONE (OUTPATIENT)
Dept: INTERNAL MEDICINE | Facility: CLINIC | Age: 78
End: 2018-12-31

## 2018-12-31 DIAGNOSIS — N39.0 RECURRENT UTI: Primary | ICD-10-CM

## 2018-12-31 LAB — BACTERIA UR CULT: NORMAL

## 2018-12-31 RX ORDER — NITROFURANTOIN 25; 75 MG/1; MG/1
100 CAPSULE ORAL 2 TIMES DAILY
Qty: 14 CAPSULE | Refills: 0 | Status: SHIPPED | OUTPATIENT
Start: 2018-12-31 | End: 2019-07-15

## 2018-12-31 NOTE — TELEPHONE ENCOUNTER
----- Message from Sheila Mcgee MD sent at 12/31/2018  7:40 AM CST -----  Please inform pt of UTI and that antibiotics were sent in.  Will schedule you in Urology again with Dr. Bello.

## 2019-01-09 ENCOUNTER — HOSPITAL ENCOUNTER (EMERGENCY)
Facility: HOSPITAL | Age: 79
Discharge: HOME | End: 2019-01-09
Attending: EMERGENCY MEDICINE
Payer: MEDICARE

## 2019-01-09 VITALS
OXYGEN SATURATION: 97 % | SYSTOLIC BLOOD PRESSURE: 134 MMHG | HEIGHT: 68 IN | HEART RATE: 57 BPM | TEMPERATURE: 99 F | WEIGHT: 146 LBS | DIASTOLIC BLOOD PRESSURE: 69 MMHG | RESPIRATION RATE: 19 BRPM | BODY MASS INDEX: 22.13 KG/M2

## 2019-01-09 DIAGNOSIS — R53.1 WEAKNESS: ICD-10-CM

## 2019-01-09 DIAGNOSIS — R55 VASOVAGAL NEAR SYNCOPE: Primary | ICD-10-CM

## 2019-01-09 LAB
ALBUMIN SERPL BCP-MCNC: 3.4 G/DL
ALP SERPL-CCNC: 153 U/L
ALT SERPL W/O P-5'-P-CCNC: 52 U/L
ANION GAP SERPL CALC-SCNC: 10 MMOL/L
AST SERPL-CCNC: 24 U/L
BASOPHILS # BLD AUTO: 0.06 K/UL
BASOPHILS NFR BLD: 0.8 %
BILIRUB SERPL-MCNC: 0.8 MG/DL
BILIRUB UR QL STRIP: NEGATIVE
BUN SERPL-MCNC: 7 MG/DL
CALCIUM SERPL-MCNC: 9.7 MG/DL
CHLORIDE SERPL-SCNC: 104 MMOL/L
CLARITY UR REFRACT.AUTO: CLEAR
CO2 SERPL-SCNC: 23 MMOL/L
COLOR UR AUTO: COLORLESS
CREAT SERPL-MCNC: 0.7 MG/DL
DIFFERENTIAL METHOD: ABNORMAL
EOSINOPHIL # BLD AUTO: 0 K/UL
EOSINOPHIL NFR BLD: 0.5 %
ERYTHROCYTE [DISTWIDTH] IN BLOOD BY AUTOMATED COUNT: 13 %
EST. GFR  (AFRICAN AMERICAN): >60 ML/MIN/1.73 M^2
EST. GFR  (NON AFRICAN AMERICAN): >60 ML/MIN/1.73 M^2
GLUCOSE SERPL-MCNC: 108 MG/DL
GLUCOSE UR QL STRIP: NEGATIVE
HCT VFR BLD AUTO: 39 %
HGB BLD-MCNC: 12.8 G/DL
HGB UR QL STRIP: ABNORMAL
IMM GRANULOCYTES # BLD AUTO: 0.02 K/UL
IMM GRANULOCYTES NFR BLD AUTO: 0.3 %
KETONES UR QL STRIP: NEGATIVE
LEUKOCYTE ESTERASE UR QL STRIP: NEGATIVE
LYMPHOCYTES # BLD AUTO: 1.9 K/UL
LYMPHOCYTES NFR BLD: 23.8 %
MCH RBC QN AUTO: 31.4 PG
MCHC RBC AUTO-ENTMCNC: 32.8 G/DL
MCV RBC AUTO: 96 FL
MICROSCOPIC COMMENT: NORMAL
MONOCYTES # BLD AUTO: 0.5 K/UL
MONOCYTES NFR BLD: 6.5 %
NEUTROPHILS # BLD AUTO: 5.4 K/UL
NEUTROPHILS NFR BLD: 68.1 %
NITRITE UR QL STRIP: NEGATIVE
NRBC BLD-RTO: 0 /100 WBC
PH UR STRIP: 8 [PH] (ref 5–8)
PLATELET # BLD AUTO: 262 K/UL
PMV BLD AUTO: 10.4 FL
POTASSIUM SERPL-SCNC: 3.9 MMOL/L
PROT SERPL-MCNC: 7.2 G/DL
PROT UR QL STRIP: NEGATIVE
RBC # BLD AUTO: 4.08 M/UL
RBC #/AREA URNS AUTO: 1 /HPF (ref 0–4)
SODIUM SERPL-SCNC: 137 MMOL/L
SP GR UR STRIP: 1 (ref 1–1.03)
SQUAMOUS #/AREA URNS AUTO: 0 /HPF
URN SPEC COLLECT METH UR: ABNORMAL
WBC # BLD AUTO: 7.94 K/UL

## 2019-01-09 PROCEDURE — 99284 EMERGENCY DEPT VISIT MOD MDM: CPT | Mod: 25,HCNC

## 2019-01-09 PROCEDURE — 99285 PR EMERGENCY DEPT VISIT,LEVEL V: ICD-10-PCS | Mod: HCNC,,, | Performed by: EMERGENCY MEDICINE

## 2019-01-09 PROCEDURE — 80053 COMPREHEN METABOLIC PANEL: CPT | Mod: HCNC

## 2019-01-09 PROCEDURE — 99285 EMERGENCY DEPT VISIT HI MDM: CPT | Mod: HCNC,,, | Performed by: EMERGENCY MEDICINE

## 2019-01-09 PROCEDURE — 25000003 PHARM REV CODE 250: Mod: HCNC | Performed by: PHYSICIAN ASSISTANT

## 2019-01-09 PROCEDURE — 96360 HYDRATION IV INFUSION INIT: CPT | Mod: HCNC

## 2019-01-09 PROCEDURE — 81001 URINALYSIS AUTO W/SCOPE: CPT | Mod: HCNC

## 2019-01-09 PROCEDURE — 85025 COMPLETE CBC W/AUTO DIFF WBC: CPT | Mod: HCNC

## 2019-01-09 RX ADMIN — SODIUM CHLORIDE 1000 ML: 0.9 INJECTION, SOLUTION INTRAVENOUS at 01:01

## 2019-01-09 NOTE — ED NOTES
Patient resting well on stretcher able to voice all needs, no acute distress noted, cardiac monitoring in progress ,will continue to monitor

## 2019-01-09 NOTE — ED NOTES
LOC: The patient is awake, alert and aware of environment with an appropriate affect, the patient is oriented x 3 and speaking appropriately.  APPEARANCE: Patient resting comfortably and in no acute distress, patient is clean and well groomed, patient's clothing is properly fastened.  SKIN: The skin is warm and dry, color consistent with ethnicity, patient has normal skin turgor and moist mucus membranes, skin intact, no breakdown or bruising noted.  MUSCULOSKELETAL: Patient moving all extremities spontaneously, no obvious swelling or deformities noted.  RESPIRATORY: Airway is open and patent, respirations are spontaneous, patient has a normal effort and rate, no accessory muscle use noted.  ABDOMEN: Soft and non tender to palpation, no distention noted  NEUROLOGIC:  facial expression is symmetrical, patient moving all extremities spontaneously, normal sensation in all extremities when touched with a finger.  Follows all commands appropriately.    Patient states she felt weak and dizzy while using the bathroom this am, states her face was very flushed, denies hitting her head, patient states her blood pressure was a little elevated right after being dizzy and states she felt her right jaw line tingle and become numb for a few seconds, has recently finished antibiotics for a uti and pneumonia before that, no acute distress noted, patient resting on stretcher able to voice all needs, family at bedside, cardiac monitoring in progress, vss, side rails up x2, call light within reach, will continue to monitor

## 2019-01-09 NOTE — ED NOTES
Patient able to ambulate to restroom without assistance and without the feeling of dizziness, made physician aware

## 2019-01-09 NOTE — DISCHARGE INSTRUCTIONS
Continue home medication regimen as directed.  Drink plenty of fluids.   Follow-up with your primary care physician.   Return to the ED for any concerning symptoms.

## 2019-01-09 NOTE — ED PROVIDER NOTES
Encounter Date: 1/9/2019       History     Chief Complaint   Patient presents with    Fatigue     Pt presents with c/o weakness after urinating.      Patient is a 78-year-old white female with a PMH of HLD, thyroid cancer S/P of lesion, the atrial fibrillation s/p cardioversion on Eliquis, chronic UTIs, and frequent PVCs who presents the ED for urgent evaluation of weakness. Patient reports at around 10:00 a.m. this morning she was sitting down on the toilet to urinate when she suddenly felt weak and dizzy with associated blurry vision and transient right facial numbness. She reports flailing her arms around in an attempt to move circulation and was able to stand to wash her hands and walk to the sofa. She called her daughter shortly thereafter and EMS was called. She reports a second episode of transient weakness and dizziness but no LOC. She denies fever/chills, chest pain, SOB, abdominal pain, nausea, vomiting, diarrhea, or focal weakness. She recently finished a course of Macrobid 3 days ago for UTI and states during the week of use she had a decreased appetite and increased frequency of soft stools. She currently follows with Dr. Disla in cardiology and Dr. Bello for chronic UTIs.       The history is provided by the patient.     Review of patient's allergies indicates:   Allergen Reactions    Fluoride Hives     Other reaction(s): Hives    Fluoride preparations Hives     Past Medical History:   Diagnosis Date    Anticoagulant long-term use     Anxiety     Arrhythmia     Arthritis     knees    Breast cyst     Fibrocystic breast 1980 - ??    Heart murmur     Hyperlipidemia     Hypertension     Mitral valve disorder     Mitral valve prolapse     PVC's (premature ventricular contractions)     Squamous cell carcinoma  7-2017    right upper forehead    Thyroid cancer 1/29/2013    Thyroid disease     Vasovagal near syncope 11/12/2012    VTE (venous thromboembolism)     in 1960s, post  partum, pt unsure of details      Past Surgical History:   Procedure Laterality Date    BREAST BIOPSY Left     excisional    BREAST BIOPSY Left     core needle biopsy    BREAST CYST ASPIRATION  1980's - ??    BREAST CYST EXCISION  2008 - ??    CARDIOVERSION N/A 8/13/2018    Performed [facility] C[facility]    ECHOCARDIOGRAM,TRANSESOPHAGEAL N/A 8/13/2018    Performed by [facility] CATH LAB    HYSTERECTOMY      tahbso    OOPHORECTOMY  hysterectomy - 2000 - ??    THYROID SURGERY      THYROIDECTOMY Bilateral 1/9/2013    Performed by Connor Santos [facility] at [facility] OR [facility]     Family History   Problem Relation Age of Onset    Arrhythmia Son     Mitral valve prolapse Son     Sudden death Son         sudden cardiac death    Heart disease Mother     Hyperlipidemia Maternal Grandmother     Breast cancer Maternal Uncle     No Known Problems Daughter     No Known Problems Son     No Known Problems Son      Social History     Tobacco Use    Smoking status: Never Smoker    Smokeless tobacco: Never Used   Substance Use Topics    Alcohol use: No    Drug use: No     Review of Systems   Constitutional: Positive for fatigue. Negative for chills and fever.   HENT: Negative for sore throat.    Eyes: Negative for pain and visual disturbance.   Respiratory: Negative for shortness of breath.    Cardiovascular: Negative for chest pain.   Gastrointestinal: Positive for nausea. Negative for abdominal pain, constipation, diarrhea and vomiting.   Genitourinary: Negative for dysuria, flank pain and frequency.   Musculoskeletal: Negative for arthralgias and myalgias.   Skin: Negative for wound.   Neurological: Positive for dizziness, weakness and numbness. Negative for syncope and headaches.   Psychiatric/Behavioral: Negative for dysphoric mood.       Physical Exam     Initial Vitals [01/09/19 1137]   BP Pulse Resp Temp SpO2   (!) 151/85 103 16 98.5 °F (36.9 °C) 99 %      MAP        --         Physical Exam    Nursing note and vitals reviewed.  Constitutional: She appears well-developed and well-nourished. She is not diaphoretic. No distress.   HENT:   Head: Normocephalic and atraumatic.   Mouth/Throat: Oropharynx is clear and moist. No oropharyngeal exudate.   Eyes: Conjunctivae and EOM are normal. Pupils are equal, round, and reactive to light.   Neck: Normal range of motion. Neck supple.   Cardiovascular: Normal rate, regular rhythm, normal heart sounds and intact distal pulses.   Pulmonary/Chest: No respiratory distress. She has rales in the right lower field and the left lower field.   Abdominal: Soft. Bowel sounds are normal. There is no tenderness.   Musculoskeletal: Normal range of motion. She exhibits no edema or tenderness.   Neurological: She is alert and oriented to person, place, and time. GCS score is 15. GCS eye subscore is 4. GCS verbal subscore is 5. GCS motor subscore is 6.   CN's grossly intact.   Slightly decreased  strength (4/5) noted to the RUE; 5/5 LUE.  No pronator drift.  Normal finger-to-nose testing.  Normal strength.   Did not assess gait 2/2 weakness.      Skin: Skin is warm and dry.   Psychiatric: She has a normal mood and affect. Thought content normal.         ED Course   Procedures  Labs Reviewed   URINALYSIS, REFLEX TO URINE CULTURE - Abnormal; Notable for the following components:       Result Value    Color, UA Colorless (*)     Specific [place], UA 1.000 (*)     Occult Blood UA 1+ (*)     All other components within normal limits    Narrative:     Preferred Collection Type->Urine, Clean Catch   COMPREHENSIVE METABOLIC PANEL - Abnormal; Notable for the following components:    BUN, Bld 7 (*)     Albumin 3.4 (*)     Alkaline Phosphatase 153 (*)     ALT 52 (*)     All other components within normal limits   CBC W/ AUTO DIFFERENTIAL - Abnormal; Notable for the following components:    MCH 31.4 (*)     All other components within normal limits   URINALYSIS  MICROSCOPIC    Narrative:     Preferred Collection Type->Urine, Clean Catch          Imaging Results          X-Ray Chest PA And Lateral (Final result)  Result time 01/09/19 13:51:41    Final result by Yayo Noble Jr., MD (01/09/19 13:51:41)                 Impression:      No acute abnormality.      Electronically signed by: Yayo Noble MD  Date:    01/09/2019  Time:    13:51             Narrative:    EXAMINATION:  XR CHEST PA AND LATERAL    CLINICAL HISTORY:  Other specified symptoms and signs involving the circulatory and respiratory systems    TECHNIQUE:  PA and lateral views of the chest were performed.    COMPARISON:  December 11, 2018.    FINDINGS:  Heart size pulmonary vessels are similar.  The lungs are fairly well aerated.  Few increased markings at the bases though improved.  No confluent consolidation.  Bones are osteopenic but intact.  No significant pleural fluid.                                 Medical Decision Making:   History:   Old Medical Records: I decided to obtain old medical records.  Old Records Summarized: records from previous admission(s).       <> Summary of Records: Patient recently admitted from 12/11-12/16 for viral/bacterial pneumonia and treated with IV Azithromycin and Ceftriaxone who was discharged after leukocytosis was resolved and clinical improvement. Given Rx of PO Azithromycin which was completed. HCTZ was discontinued because of hyponatremia.           Initial Assessment:   78WF with PMHx of Afib s/p cardioversion on Eliquis, thyroid CA s/p ablation, frequent PVCs, and chronic UTIs who presents to the ED for urgent evaluation of weakness and dizziness while sitting down to urinate around 10 AM this morning (3 hr PTA).   PE reveals a non-toxic, afebrile, elderly-appearing female in no acute distress. Slightly tachycardic at 103. /85. O2 sats 99% on RA.   Differential Diagnosis:   DDx includes but is not limited to: micturition near-syncope, anemia, electrolyte  derangement, dehydration, pneumonia.  Clinical Tests:   Lab Tests: Ordered and Reviewed  Radiological Study: Ordered and Reviewed  ED Management:  Will obtain basic labs, UA, EKG, CXR. Will give 1L IVFs and closely monitor.     Labs notable for no leukocytosis, normal H&H, normal electrolytes, BUN slightly low at 7, Cr normal, elevated AlkPhos to 153 and ALT to 52---suspect interval change secondary to increase in Tylenol use for the past week for pain relief (after receiving Pneumovac). UA remarkable for 1+ occult blood; neg nitrites/leuks. CXR without acute abnormality.    Patient reassessed by Dr. Conklin; there were no abnormal neurological findings on repeat neuro exam. Patient reports complete resolution of symptoms with ED management. I suspect weakness secondary to micturition syncope (daughter does mention patient tends to strain when urinating to completely empty bladder to prevent UTI) secondary to increase in BMs from antibiotics. EKG remarkable only for PVCs (patient's baseline). No signs of infection on labs or UA. Patient ambulated around ED without recurrence of weakness or dizziness. She is stable for discharge home. She was advised to drink plenty of fluids and follow-up with her PCP. ED return precautions given. Patient verbalized understanding and is agreeable. I have discussed patient case with my supervisory physician, who is in agreement with my assessment and plan.              Attending Attestation:     Physician Attestation Statement for NP/PA:   I have conducted a face to face encounter with this patient in addition to the NP/PA, due to NP/PA Request    Other NP/PA Attestation Additions:    History of Present Illness: Micturition/vasovagal near syncope case.  PA questioned possilbe R side weak. Asked me to examine.  Also slight bump in LFTs   Physical Exam: 5/5 bilateral motor and normal facial muscle strength.  Symmetrical   Medical Decision Making: Increased tylenol lately.  Nomal motor  exam bilaterally                    Clinical Impression:   The primary encounter diagnosis was Vasovagal near syncope. A diagnosis of Weakness was also pertinent to this visit.      Disposition:   Disposition: Discharged  Condition: Stable                        Jerri Martinez PA-C  01/09/19 2028

## 2019-01-17 ENCOUNTER — HOSPITAL ENCOUNTER (OUTPATIENT)
Dept: CARDIOLOGY | Facility: CLINIC | Age: 79
Discharge: HOME | End: 2019-01-17
Payer: MEDICARE

## 2019-01-17 ENCOUNTER — OFFICE VISIT (OUTPATIENT)
Dept: ELECTROPHYSIOLOGY | Facility: CLINIC | Age: 79
End: 2019-01-17
Payer: MEDICARE

## 2019-01-17 VITALS
WEIGHT: 147 LBS | SYSTOLIC BLOOD PRESSURE: 118 MMHG | HEART RATE: 51 BPM | DIASTOLIC BLOOD PRESSURE: 70 MMHG | BODY MASS INDEX: 22.28 KG/M2 | HEIGHT: 68 IN

## 2019-01-17 DIAGNOSIS — I34.0 NON-RHEUMATIC MITRAL REGURGITATION: ICD-10-CM

## 2019-01-17 DIAGNOSIS — I34.1 MVP (MITRAL VALVE PROLAPSE): ICD-10-CM

## 2019-01-17 DIAGNOSIS — I48.0 PAROXYSMAL ATRIAL FIBRILLATION: ICD-10-CM

## 2019-01-17 DIAGNOSIS — I10 HTN (HYPERTENSION), BENIGN: ICD-10-CM

## 2019-01-17 DIAGNOSIS — R55 VASOVAGAL NEAR SYNCOPE: Primary | ICD-10-CM

## 2019-01-17 DIAGNOSIS — I49.3 PVC'S (PREMATURE VENTRICULAR CONTRACTIONS): ICD-10-CM

## 2019-01-17 DIAGNOSIS — I48.0 PAF (PAROXYSMAL ATRIAL FIBRILLATION): ICD-10-CM

## 2019-01-17 DIAGNOSIS — E78.00 HYPERCHOLESTEROLEMIA: ICD-10-CM

## 2019-01-17 DIAGNOSIS — E06.3 HASHIMOTO'S DISEASE: ICD-10-CM

## 2019-01-17 PROCEDURE — 99999 PR PBB SHADOW E&M-EST. PATIENT-LVL III: CPT | Mod: PBBFAC,HCNC,, | Performed by: INTERNAL MEDICINE

## 2019-01-17 PROCEDURE — 93010 RHYTHM STRIP: ICD-10-PCS | Mod: HCNC,S$GLB,, | Performed by: INTERNAL MEDICINE

## 2019-01-17 PROCEDURE — 1101F PT FALLS ASSESS-DOCD LE1/YR: CPT | Mod: CPTII,HCNC,S$GLB, | Performed by: INTERNAL MEDICINE

## 2019-01-17 PROCEDURE — 99999 PR PBB SHADOW E&M-EST. PATIENT-LVL III: ICD-10-PCS | Mod: PBBFAC,HCNC,, | Performed by: INTERNAL MEDICINE

## 2019-01-17 PROCEDURE — 99214 OFFICE O/P EST MOD 30 MIN: CPT | Mod: HCNC,S$GLB,, | Performed by: INTERNAL MEDICINE

## 2019-01-17 PROCEDURE — 3078F PR MOST RECENT DIASTOLIC BLOOD PRESSURE < 80 MM HG: ICD-10-PCS | Mod: CPTII,HCNC,S$GLB, | Performed by: INTERNAL MEDICINE

## 2019-01-17 PROCEDURE — 93010 ELECTROCARDIOGRAM REPORT: CPT | Mod: HCNC,S$GLB,, | Performed by: INTERNAL MEDICINE

## 2019-01-17 PROCEDURE — 3074F SYST BP LT 130 MM HG: CPT | Mod: CPTII,HCNC,S$GLB, | Performed by: INTERNAL MEDICINE

## 2019-01-17 PROCEDURE — 3074F PR MOST RECENT SYSTOLIC BLOOD PRESSURE < 130 MM HG: ICD-10-PCS | Mod: CPTII,HCNC,S$GLB, | Performed by: INTERNAL MEDICINE

## 2019-01-17 PROCEDURE — 3078F DIAST BP <80 MM HG: CPT | Mod: CPTII,HCNC,S$GLB, | Performed by: INTERNAL MEDICINE

## 2019-01-17 PROCEDURE — 99214 PR OFFICE/OUTPT VISIT, EST, LEVL IV, 30-39 MIN: ICD-10-PCS | Mod: HCNC,S$GLB,, | Performed by: INTERNAL MEDICINE

## 2019-01-17 PROCEDURE — 93005 RHYTHM STRIP: ICD-10-PCS | Mod: HCNC,S$GLB,, | Performed by: INTERNAL MEDICINE

## 2019-01-17 PROCEDURE — 93005 ELECTROCARDIOGRAM TRACING: CPT | Mod: HCNC,S$GLB,, | Performed by: INTERNAL MEDICINE

## 2019-01-17 PROCEDURE — 1101F PR PT FALLS ASSESS DOC 0-1 FALLS W/OUT INJ PAST YR: ICD-10-PCS | Mod: CPTII,HCNC,S$GLB, | Performed by: INTERNAL MEDICINE

## 2019-01-17 PROCEDURE — 99499 UNLISTED E&M SERVICE: CPT | Mod: HCNC,S$GLB,, | Performed by: INTERNAL MEDICINE

## 2019-01-17 PROCEDURE — 99499 RISK ADDL DX/OHS AUDIT: ICD-10-PCS | Mod: HCNC,S$GLB,, | Performed by: INTERNAL MEDICINE

## 2019-01-17 NOTE — PROGRESS NOTES
Subjective:   Patient ID:  Madan Bell is a 78 y.o. female     Chief complaint:Atrial Fibrillation      HPI  Background :  78 y.o. female with PMH Of  HFpEF (55% EF), moderate MVP/ mitral sclerosis, PVCs/ non-sustained VT,prior hx of thyroid cancer s/p thyroidectomy, now with hypothyroidism.  Presented to ED on 8/5 complaining of lightheadedness for the preceding 2 days, associated with worsening fatigue. She stated she had similar episodes intermittently over the prior few days and woke up on the day of ER admit at 5:00am with constant dizziness and lightheadedness.  Her associated SBP was 98-99 (BP generally 130-140s/70s). She was on Ziac (very low dose) for HTN and arrhythmia control. Compliant with at home meds.  In the ED, she was noted to be in AF with RVR.  n the ED, pt was anxious with HR of 130-120s, and BP of 160-200s. Resolved once pt calmed down with no intervention.  Patient denied chest pain, palpitations, dyspnea, headaches, syncope, vertigo, cough, nausea, vomiting.   No hx of CAD, PE. Had a VTE 50 years ago post partum (unsure if superficial or DVT, but did not received anticoag). No peripheral deficits, falls, facial drool. No bleeding. Denied dysuria, suprapubic pain, or urinary changes.      She was admitted and had anACS w/up with neg troponins, EKG with no TWI or ST elevation.   Heart was Rate controlled, with her low dose of home bisoprolo. She was started on apixaban given CHADsVASC of 4. Echo showed EF of 55% with  biatrial enlargement and Mild-moderate to moderate (2+) mitral regurgitation.      On the day of discharge, patient was in good condition ut in persistent AF.     Update 8/8/18:  She is now here with her DTR to see me a day after DC. Still feels fatigued and with some L/H.  I have reviewed the actual image of the ECG tracing obtained today and it shows AF with calculated average VR of 70  bpm, measured RR of 400- 1500  msec with o/w normal intervals.     Update  10/11/18:  My A and P at the time were as follows:  At this point, we will simply proceed with DCCV after ANAHI survey. She understands that this may not be a long lasting solution but it'll be helpful to relieve Sx quickly and perhaps establish a pattern of clinical progress of her AF.  DCCV was done on 8/13 and she has remained in NSR   For a while thereafter she felt poorly as she was struggling with UTI Rx-- she felt dizzy a lot   She was seen by Dr Flower who gave her an event monitor  >> Many transmissions due to dizziness >> SR with PVCs but also same findings when no Sx reported  I have reviewed the actual image of the ECG tracing obtained today and it shows NSR with frequent PVCs and o/w normal intervals     Update since then:  Had DCCV 8/13   She had an episode of near syncope that started when she was urinating on 1/9 in the evening-- went to ER -- no issues   She had been on antibiotics for UTI -- she has been a lot of these -- almost every 6 weeks    I have reviewed the actual image of the ECG tracing obtained today and it shows NSR with V quadrigeminy on the RS (she did not feel the PVCs)-- o/w  normal intervals    Current Outpatient Medications   Medication Sig    ascorbic acid (VITAMIN C) 500 MG tablet Take 500 mg by mouth once daily.    aspirin 81 mg Tab Take 1 tablet by mouth every Mon, Wed, Fri.     atorvastatin (LIPITOR) 40 MG tablet TAKE 1 TABLET EVERY DAY    Bifidobacterium infantis (ALIGN) 4 mg Cap One daily (Patient taking differently: Take one capsule by mouth once daily.Take additional dose while on antibiotics)    bisoprolol-hydrochlorothiazide 2.5-6.25 mg (ZIAC) 2.5-6.25 mg Tab TAKE 1/2 TABLET EVERY DAY    cholecalciferol, vitamin D3, (VITAMIN D3) 2,000 unit Cap Take by mouth once daily.      D-MANNOSE ORAL Take 6 tablets by mouth once daily.     ELIQUIS 5 mg Tab TAKE 1 TABLET TWICE DAILY    estradiol (ESTRACE) 0.01 % (0.1 mg/gram) vaginal cream Apply a pea sized amount every  day for 2 weeks, then 3x/week. 90 day supply    guaifenesin/dextromethorphan (ROBITUSSIN-DM ORAL) Take by mouth as needed (cough).    levothyroxine (SYNTHROID) 75 MCG tablet TAKE 1 TABLET EVERY DAY (NEED MD APPOINTMENT)    metoprolol tartrate (LOPRESSOR) 50 MG tablet Take one half to one tablet daily as needed for palpitations.    nitrofurantoin, macrocrystal-monohydrate, (MACROBID) 100 MG capsule Take 1 capsule (100 mg total) by mouth 2 (two) times daily.    omega-3 fatty acids-vitamin E (FISH OIL) 1,000 mg Cap Take 1 capsule by mouth once daily. 1400mg    pantoprazole (PROTONIX) 40 MG tablet TAKE 1 TABLET EVERY DAY     No current facility-administered medications for this visit.      Review of Systems   Constitution: Negative for decreased appetite, weakness, weight gain and weight loss.   HENT: Negative for nosebleeds.    Eyes: Negative for blurred vision and visual disturbance.   Cardiovascular: Negative for chest pain, claudication, cyanosis, dyspnea on exertion, irregular heartbeat, leg swelling, near-syncope, orthopnea, palpitations, paroxysmal nocturnal dyspnea and syncope.   Respiratory: Negative for cough, shortness of breath and wheezing.    Endocrine: Negative for heat intolerance.   Skin: Negative for rash.   Musculoskeletal: Negative for muscle weakness and myalgias.   Gastrointestinal: Negative for abdominal pain, anorexia, melena, nausea and vomiting.   Genitourinary: Negative for menorrhagia.   Neurological: Negative for excessive daytime sleepiness, dizziness, headaches, loss of balance, seizures and vertigo.   Psychiatric/Behavioral: Negative for altered mental status and depression. The patient is not nervous/anxious.        Objective:   Physical Exam   Constitutional: She is oriented to person, place, and time. She appears well-developed and well-nourished.   HENT:   Head: Normocephalic and atraumatic.   Right Ear: External ear normal.   Left Ear: External ear normal.   Neck: Normal range  "of motion. Neck supple. No thyromegaly present.   Cardiovascular: Normal rate, regular rhythm, normal heart sounds and intact distal pulses. Exam reveals no gallop, no S3, no S4, no friction rub, no midsystolic click and no opening snap.   No murmur heard.  Pulses:       Carotid pulses are 2+ on the right side, and 2+ on the left side.       Radial pulses are 2+ on the right side, and 2+ on the left side.        Dorsalis pedis pulses are 2+ on the right side, and 2+ on the left side.        Posterior tibial pulses are 2+ on the right side, and 2+ on the left side.   Pulmonary/Chest: Effort normal and breath sounds normal.   Abdominal: Soft. She exhibits no distension. There is no tenderness.   Musculoskeletal:        Right ankle: She exhibits no swelling.        Left ankle: She exhibits no swelling.        Right lower leg: She exhibits no swelling.        Left lower leg: She exhibits no swelling.   Neurological: She is alert and oriented to person, place, and time. She has normal strength. No cranial nerve deficit. Gait normal.   Skin: Skin is warm. No rash noted. No cyanosis. Nails show no clubbing.   Psychiatric: She has a normal mood and affect. Her speech is normal and behavior is normal. Thought content normal. Cognition and memory are normal.   Nursing note and vitals reviewed.    /70   Pulse (!) 51   Ht 5' 8" (1.727 m)   Wt 66.7 kg (147 lb)   LMP  (LMP Unknown)   BMI 22.35 kg/m²      Assessment:      1. Vasovagal near syncope    2. Paroxysmal atrial fibrillation    3. HTN (hypertension), benign    4. MVP (mitral valve prolapse)    5. PVC's (premature ventricular contractions)    6. Non-rheumatic mitral regurgitation    7. Hypercholesterolemia    8. Hashimoto's disease        Plan:    Hold Ziac for 3 days then restart -- later resume as usual with the option to skip one day a week as needed  No orders of the defined types were placed in this encounter.    Follow-up in about 6 months (around " 7/17/2019), or if symptoms worsen or fail to improve.  There are no discontinued medications.     This SmartLink is deprecated. Use AVPeer.imEDLIST instead to display the medication list for a patient.

## 2019-01-21 ENCOUNTER — PATIENT OUTREACH (OUTPATIENT)
Dept: OTHER | Facility: OTHER | Age: 79
End: 2019-01-21

## 2019-01-21 ENCOUNTER — OFFICE VISIT (OUTPATIENT)
Dept: DERMATOLOGY | Facility: CLINIC | Age: 79
End: 2019-01-21
Payer: MEDICARE

## 2019-01-21 DIAGNOSIS — B35.3 TINEA PEDIS OF LEFT FOOT: Primary | ICD-10-CM

## 2019-01-21 DIAGNOSIS — L30.9 DERMATITIS: ICD-10-CM

## 2019-01-21 PROCEDURE — 1101F PT FALLS ASSESS-DOCD LE1/YR: CPT | Mod: CPTII,HCNC,S$GLB, | Performed by: DERMATOLOGY

## 2019-01-21 PROCEDURE — 99999 PR PBB SHADOW E&M-EST. PATIENT-LVL II: ICD-10-PCS | Mod: PBBFAC,HCNC,, | Performed by: DERMATOLOGY

## 2019-01-21 PROCEDURE — 99213 OFFICE O/P EST LOW 20 MIN: CPT | Mod: HCNC,S$GLB,, | Performed by: DERMATOLOGY

## 2019-01-21 PROCEDURE — 99213 PR OFFICE/OUTPT VISIT, EST, LEVL III, 20-29 MIN: ICD-10-PCS | Mod: HCNC,S$GLB,, | Performed by: DERMATOLOGY

## 2019-01-21 PROCEDURE — 1101F PR PT FALLS ASSESS DOC 0-1 FALLS W/OUT INJ PAST YR: ICD-10-PCS | Mod: CPTII,HCNC,S$GLB, | Performed by: DERMATOLOGY

## 2019-01-21 PROCEDURE — 99999 PR PBB SHADOW E&M-EST. PATIENT-LVL II: CPT | Mod: PBBFAC,HCNC,, | Performed by: DERMATOLOGY

## 2019-01-21 RX ORDER — KETOCONAZOLE 20 MG/G
CREAM TOPICAL
Qty: 60 G | Refills: 3 | Status: SHIPPED | OUTPATIENT
Start: 2019-01-21 | End: 2019-03-15

## 2019-01-21 NOTE — PROGRESS NOTES
Subjective:       Patient ID:  Madan Bell is a 78 y.o. female who presents for   Chief Complaint   Patient presents with    Lesion     left foot     High risk pt with hx NMSC here for new lesion left foot. X years.  Recently has changed appearace.  Denies pian or bleeding. No tx.  Defers check at this time due to not being due for one yet  C/o itchy red area on forehead for 1 week. Was small and has spread. Has only used soap and water on it.  Getting worse.         Review of Systems   Constitutional: Negative for fever, chills, fatigue and malaise.   Skin: Positive for itching, rash and activity-related sunscreen use. Negative for daily sunscreen use.   Hematologic/Lymphatic: Bruises/bleeds easily.        Objective:    Physical Exam   Constitutional: She appears well-developed and well-nourished. No distress.   Neurological: She is alert and oriented to person, place, and time. She is not disoriented.   Psychiatric: She has a normal mood and affect.   Skin:   Areas Examined (abnormalities noted in diagram):   Head / Face Inspection Performed  LLE Inspection Performed                  Diagram Legend     Erythematous scaling macule/papule c/w actinic keratosis       Vascular papule c/w angioma      Pigmented verrucoid papule/plaque c/w seborrheic keratosis      Yellow umbilicated papule c/w sebaceous hyperplasia      Irregularly shaped tan macule c/w lentigo     1-2 mm smooth white papules consistent with Milia      Movable subcutaneous cyst with punctum c/w epidermal inclusion cyst      Subcutaneous movable cyst c/w pilar cyst      Firm pink to brown papule c/w dermatofibroma      Pedunculated fleshy papule(s) c/w skin tag(s)      Evenly pigmented macule c/w junctional nevus     Mildly variegated pigmented, slightly irregular-bordered macule c/w mildly atypical nevus      Flesh colored to evenly pigmented papule c/w intradermal nevus       Pink pearly papule/plaque c/w basal cell carcinoma      Erythematous  hyperkeratotic cursted plaque c/w SCC      Surgical scar with no sign of skin cancer recurrence      Open and closed comedones      Inflammatory papules and pustules      Verrucoid papule consistent consistent with wart     Erythematous eczematous patches and plaques     Dystrophic onycholytic nail with subungual debris c/w onychomycosis     Umbilicated papule    Erythematous-base heme-crusted tan verrucoid plaque consistent with inflamed seborrheic keratosis     Erythematous Silvery Scaling Plaque c/w Psoriasis     See annotation      Assessment / Plan:        Tinea pedis of left foot  -     ketoconazole (NIZORAL) 2 % cream; AAA bid x 3 weeks to foot  Dispense: 60 g; Refill: 3  Sal acid /lactic acid lotion for maintenance    Dermatitis  Cordran lotion prn flare  Call if progresses or does not resolve           Follow-up if symptoms worsen or fail to improve.

## 2019-01-21 NOTE — PROGRESS NOTES
Last 5 Patient Entered Readings                                      Current 30 Day Average: 120/63     Recent Readings 1/20/2019 1/19/2019 1/18/2019 1/17/2019 1/16/2019    SBP (mmHg) 102 121 115 111 109    DBP (mmHg) 56 56 52 62 61    Pulse 58 43 58 52 55            Digital Medicine: Health  Follow Up    Left voicemail to follow up with Mrs. Madan Bell.  Current BP average 120/63 mmHg is at goal, <130/80.

## 2019-01-23 DIAGNOSIS — B02.9 HERPES ZOSTER WITHOUT COMPLICATION: Primary | ICD-10-CM

## 2019-01-23 RX ORDER — VALACYCLOVIR HYDROCHLORIDE 1 G/1
1000 TABLET, FILM COATED ORAL 3 TIMES DAILY
Qty: 21 TABLET | Refills: 0 | Status: SHIPPED | OUTPATIENT
Start: 2019-01-23 | End: 2019-03-15

## 2019-01-23 NOTE — PROGRESS NOTES
Last 5 Patient Entered Readings                                      Current 30 Day Average: 119/63     Recent Readings 1/22/2019 1/21/2019 1/20/2019 1/19/2019 1/18/2019    SBP (mmHg) 113 111 102 121 115    DBP (mmHg) 66 57 56 56 52    Pulse 52 53 58 43 58          Digital Medicine: Health  Follow Up    Lifestyle Modifications:    1.Dietary Modifications (Sodium intake <2,000mg/day, food labels, dining out): Patient denies any changes that would cause an increase in salt intake.     2.Physical Activity: Deferred    3.Medication Therapy: Patient has been compliant with the medication regimen. Patient is doing well on her current regimen. She did have conversations with her physician about stopping the bisoprolol-hctz 3 days out of the week in order to help prevent the lower readings and dizzy symptoms. Patient stated that she has not do that yet but she is open to doing so if need be. She stated that she does experienece dizzy symptoms from time to time but that it is not daily and nothing that she prevents her from living her day to day life. She will be sure to let us know if anything changes.     4.Patient has the following medication side effects/concerns:   (Frequency/Alleviating factors/Precipitating factors, etc.)     Follow up with . Madan GOKUL Bell completed. No further questions or concerns. Will continue to follow up to achieve health goals.

## 2019-01-24 ENCOUNTER — OFFICE VISIT (OUTPATIENT)
Dept: DERMATOLOGY | Facility: CLINIC | Age: 79
End: 2019-01-24
Payer: MEDICARE

## 2019-01-24 DIAGNOSIS — B02.9 HERPES ZOSTER WITHOUT COMPLICATION: Primary | ICD-10-CM

## 2019-01-24 PROCEDURE — 99213 OFFICE O/P EST LOW 20 MIN: CPT | Mod: HCNC,S$GLB,, | Performed by: DERMATOLOGY

## 2019-01-24 PROCEDURE — 1101F PR PT FALLS ASSESS DOC 0-1 FALLS W/OUT INJ PAST YR: ICD-10-PCS | Mod: HCNC,CPTII,S$GLB, | Performed by: DERMATOLOGY

## 2019-01-24 PROCEDURE — 99999 PR PBB SHADOW E&M-EST. PATIENT-LVL II: CPT | Mod: PBBFAC,HCNC,, | Performed by: DERMATOLOGY

## 2019-01-24 PROCEDURE — 1101F PT FALLS ASSESS-DOCD LE1/YR: CPT | Mod: HCNC,CPTII,S$GLB, | Performed by: DERMATOLOGY

## 2019-01-24 PROCEDURE — 99999 PR PBB SHADOW E&M-EST. PATIENT-LVL II: ICD-10-PCS | Mod: PBBFAC,HCNC,, | Performed by: DERMATOLOGY

## 2019-01-24 PROCEDURE — 99213 PR OFFICE/OUTPT VISIT, EST, LEVL III, 20-29 MIN: ICD-10-PCS | Mod: HCNC,S$GLB,, | Performed by: DERMATOLOGY

## 2019-01-24 RX ORDER — GABAPENTIN 100 MG/1
CAPSULE ORAL
Qty: 30 CAPSULE | Refills: 1 | Status: SHIPPED | OUTPATIENT
Start: 2019-01-24 | End: 2020-02-04

## 2019-01-24 NOTE — PROGRESS NOTES
Subjective:       Patient ID:  Madan Bell is a 78 y.o. female who presents for   Chief Complaint   Patient presents with    Rash     forehead     Patient presents today to follow up on a rash to her forehead with a duration of 1 week, area tingles and burns at times, treated with Valtrex started 1/23/19.      Rash         Review of Systems   Constitutional: Negative for fever, chills, weight loss, weight gain, fatigue, night sweats and malaise.   Skin: Positive for daily sunscreen use. Negative for rash and recent sunburn.        Objective:    Physical Exam   Constitutional: She appears well-developed and well-nourished. No distress.   Neurological: She is alert and oriented to person, place, and time. She is not disoriented.   Psychiatric: She has a normal mood and affect.   Skin:   Areas Examined (abnormalities noted in diagram):   Scalp / Hair Palpated and Inspected  Head / Face Inspection Performed              Diagram Legend     Erythematous scaling macule/papule c/w actinic keratosis       Vascular papule c/w angioma      Pigmented verrucoid papule/plaque c/w seborrheic keratosis      Yellow umbilicated papule c/w sebaceous hyperplasia      Irregularly shaped tan macule c/w lentigo     1-2 mm smooth white papules consistent with Milia      Movable subcutaneous cyst with punctum c/w epidermal inclusion cyst      Subcutaneous movable cyst c/w pilar cyst      Firm pink to brown papule c/w dermatofibroma      Pedunculated fleshy papule(s) c/w skin tag(s)      Evenly pigmented macule c/w junctional nevus     Mildly variegated pigmented, slightly irregular-bordered macule c/w mildly atypical nevus      Flesh colored to evenly pigmented papule c/w intradermal nevus       Pink pearly papule/plaque c/w basal cell carcinoma      Erythematous hyperkeratotic cursted plaque c/w SCC      Surgical scar with no sign of skin cancer recurrence      Open and closed comedones      Inflammatory papules and pustules       Verrucoid papule consistent consistent with wart     Erythematous eczematous patches and plaques     Dystrophic onycholytic nail with subungual debris c/w onychomycosis     Umbilicated papule    Erythematous-base heme-crusted tan verrucoid plaque consistent with inflamed seborrheic keratosis     Erythematous Silvery Scaling Plaque c/w Psoriasis     See annotation      Assessment / Plan:        Herpes zoster without complication  Continue valtrex 1g tid x 10 day course  Recommended she see ophtho as zoster is in distribution of V1  -     gabapentin (NEURONTIN) 100 MG capsule; Sig 1 po qhs,  Can increase to taking tid as tolerated if needed  Dispense: 30 capsule; Refill: 1  Pt minimally symptomatic, discussed need to prevent post herpetic neuralgia           Follow-up if symptoms worsen or fail to improve.

## 2019-01-29 ENCOUNTER — PATIENT MESSAGE (OUTPATIENT)
Dept: INTERNAL MEDICINE | Facility: CLINIC | Age: 79
End: 2019-01-29

## 2019-02-08 ENCOUNTER — HOSPITAL ENCOUNTER (OUTPATIENT)
Dept: RADIOLOGY | Facility: HOSPITAL | Age: 79
Discharge: HOME | End: 2019-02-08
Attending: UROLOGY
Payer: MEDICARE

## 2019-02-08 DIAGNOSIS — N20.0 KIDNEY STONES: ICD-10-CM

## 2019-02-08 PROCEDURE — 76770 US EXAM ABDO BACK WALL COMP: CPT | Mod: TC,HCNC

## 2019-02-08 PROCEDURE — 76770 US KIDNEY ONLY: ICD-10-PCS | Mod: 26,HCNC,, | Performed by: RADIOLOGY

## 2019-02-08 PROCEDURE — 76770 US EXAM ABDO BACK WALL COMP: CPT | Mod: 26,HCNC,, | Performed by: RADIOLOGY

## 2019-02-15 ENCOUNTER — LAB VISIT (OUTPATIENT)
Dept: LAB | Facility: HOSPITAL | Age: 79
End: 2019-02-15
Attending: UROLOGY
Payer: MEDICARE

## 2019-02-15 DIAGNOSIS — N39.0 RECURRENT UTI: ICD-10-CM

## 2019-02-15 PROCEDURE — 87086 URINE CULTURE/COLONY COUNT: CPT | Mod: HCNC

## 2019-02-17 LAB — BACTERIA UR CULT: NORMAL

## 2019-02-18 ENCOUNTER — PATIENT MESSAGE (OUTPATIENT)
Dept: UROLOGY | Facility: CLINIC | Age: 79
End: 2019-02-18

## 2019-02-21 ENCOUNTER — PATIENT OUTREACH (OUTPATIENT)
Dept: OTHER | Facility: OTHER | Age: 79
End: 2019-02-21

## 2019-02-21 NOTE — PROGRESS NOTES
Last 5 Patient Entered Readings                                      Current 30 Day Average: 119/65     Recent Readings 2/20/2019 2/20/2019 2/20/2019 2/19/2019 2/19/2019    SBP (mmHg) 144 137 134 123 128    DBP (mmHg) 73 73 64 60 61    Pulse 52 60 53 51 62            Digital Medicine: Health  Follow Up    Left voicemail to follow up with Mrs. Madan Bell.  Current BP average 119/65 mmHg is at goal, <130/80.

## 2019-02-22 NOTE — PROGRESS NOTES
Last 5 Patient Entered Readings                                      Current 30 Day Average: 119/65     Recent Readings 2/22/2019 2/21/2019 2/20/2019 2/20/2019 2/20/2019    SBP (mmHg) 125 102 144 137 134    DBP (mmHg) 71 60 73 73 64    Pulse 49 51 52 60 53          Digital Medicine: Health  Follow Up    Lifestyle Modifications:    1.Dietary Modifications (Sodium intake <2,000mg/day, food labels, dining out): Patient is doing well with maintaining a low sodium diet. She continues to read food labels and avoids highly salted foods. She denies any changes that would cause an increase in salt intake.     2.Physical Activity: Patient continues walking a few times per week. She has had some episodes of dizziness while walking but has been working with Dr. Disla about this.     3.Medication Therapy: Patient has been compliant with the medication regimen. Patient is doing well on her current regimen. She denies symptoms/side effects.     4.Patient has the following medication side effects/concerns:   (Frequency/Alleviating factors/Precipitating factors, etc.)     Follow up with Mrs. Madan Bell completed. No further questions or concerns. Will continue to follow up to achieve health goals.

## 2019-02-26 ENCOUNTER — OFFICE VISIT (OUTPATIENT)
Dept: UROLOGY | Facility: CLINIC | Age: 79
End: 2019-02-26
Payer: MEDICARE

## 2019-02-26 ENCOUNTER — PATIENT MESSAGE (OUTPATIENT)
Dept: UROLOGY | Facility: CLINIC | Age: 79
End: 2019-02-26

## 2019-02-26 VITALS — WEIGHT: 147.94 LBS | BODY MASS INDEX: 21.91 KG/M2 | HEIGHT: 69 IN

## 2019-02-26 DIAGNOSIS — I10 HTN (HYPERTENSION), BENIGN: ICD-10-CM

## 2019-02-26 DIAGNOSIS — N39.0 RECURRENT UTI: Primary | ICD-10-CM

## 2019-02-26 DIAGNOSIS — R79.89 ELEVATED LFTS: ICD-10-CM

## 2019-02-26 DIAGNOSIS — N39.46 MIXED STRESS AND URGE URINARY INCONTINENCE: ICD-10-CM

## 2019-02-26 PROCEDURE — 99214 OFFICE O/P EST MOD 30 MIN: CPT | Mod: HCNC,S$GLB,, | Performed by: UROLOGY

## 2019-02-26 PROCEDURE — 1101F PR PT FALLS ASSESS DOC 0-1 FALLS W/OUT INJ PAST YR: ICD-10-PCS | Mod: HCNC,CPTII,S$GLB, | Performed by: UROLOGY

## 2019-02-26 PROCEDURE — 1101F PT FALLS ASSESS-DOCD LE1/YR: CPT | Mod: HCNC,CPTII,S$GLB, | Performed by: UROLOGY

## 2019-02-26 PROCEDURE — 99999 PR PBB SHADOW E&M-EST. PATIENT-LVL III: ICD-10-PCS | Mod: PBBFAC,HCNC,, | Performed by: UROLOGY

## 2019-02-26 PROCEDURE — 99214 PR OFFICE/OUTPT VISIT, EST, LEVL IV, 30-39 MIN: ICD-10-PCS | Mod: HCNC,S$GLB,, | Performed by: UROLOGY

## 2019-02-26 PROCEDURE — 99999 PR PBB SHADOW E&M-EST. PATIENT-LVL III: CPT | Mod: PBBFAC,HCNC,, | Performed by: UROLOGY

## 2019-02-26 PROCEDURE — 99499 UNLISTED E&M SERVICE: CPT | Mod: HCNC,S$GLB,, | Performed by: UROLOGY

## 2019-02-26 PROCEDURE — 99499 RISK ADDL DX/OHS AUDIT: ICD-10-PCS | Mod: HCNC,S$GLB,, | Performed by: UROLOGY

## 2019-02-26 NOTE — LETTER
February 26, 2019      Sheila Mcgee MD  [address]  [facility] [address]           [facility] 4th Floor  [address]  [facility] [address]  Phone: [phone]          Patient: Madan Bell   MR Number: [id number]   YOB: 1940   Date of Visit: 2/26/2019       Dear Dr. Sheila Mcgee:    Thank you for referring Madan Bell to me for evaluation. Attached you will find relevant portions of my assessment and plan of care.    If you have questions, please do not hesitate to call me. I look forward to following Madan Bell along with you.    Sincerely,    Makayla Bello MD    Enclosure  CC:  No Recipients    If you would like to receive this communication electronically, please contact [email or web address] or [phone] to request more information on [employer] Link access.    For providers and/or their staff who would like to refer a patient to [facility], please contact us through our one-stop-shop provider referral line, [facility] [place], at [phone].    If you feel you have received this communication in error or would no longer like to receive these types of communications, please e-mail [email or web address]

## 2019-02-28 ENCOUNTER — PATIENT OUTREACH (OUTPATIENT)
Dept: OTHER | Facility: OTHER | Age: 79
End: 2019-02-28

## 2019-02-28 NOTE — PROGRESS NOTES
Last 5 Patient Entered Readings                                      Current 30 Day Average: 120/66     Recent Readings 2/27/2019 2/26/2019 2/25/2019 2/23/2019 2/23/2019    SBP (mmHg) 130 120 104 125 113    DBP (mmHg) 68 71 60 67 50    Pulse 49 57 54 62 60        Patient's BP average is controlled based on 2017 ACC/AHA HTN guidelines of goal BP <130/80.      Ms. Bell's BP remains at goal. She was diagnosed with A. Fib and is now on Eliquis and has metoprolol to use PRN. She has not had to use the metoprolol. She continues to have some LH/dizziness but this is stable and she follows with cardiology for it.     Patient's health , Daniella Quiroz, will be following up. I will continue to monitor regularly and will follow up in 4-6 months, sooner if BP begins to trend upward or downward.    Patient has my contact information and knows to call with any concerns or clinical changes.     Current HTN regimen:  Hypertension Medications             bisoprolol-hydrochlorothiazide 2.5-6.25 mg (ZIAC) 2.5-6.25 mg Tab TAKE 1/2 TABLET EVERY DAY    metoprolol tartrate (LOPRESSOR) 50 MG tablet Take one half to one tablet daily as needed for palpitations.

## 2019-03-15 ENCOUNTER — OFFICE VISIT (OUTPATIENT)
Dept: ORTHOPEDICS | Facility: CLINIC | Age: 79
End: 2019-03-15
Payer: MEDICARE

## 2019-03-15 VITALS — WEIGHT: 150.56 LBS | BODY MASS INDEX: 22.3 KG/M2 | HEIGHT: 69 IN

## 2019-03-15 DIAGNOSIS — M17.0 PRIMARY OSTEOARTHRITIS OF BOTH KNEES: Primary | ICD-10-CM

## 2019-03-15 PROCEDURE — 99999 PR PBB SHADOW E&M-EST. PATIENT-LVL III: CPT | Mod: PBBFAC,HCNC,, | Performed by: NURSE PRACTITIONER

## 2019-03-15 PROCEDURE — 20610 DRAIN/INJ JOINT/BURSA W/O US: CPT | Mod: HCNC,50,S$GLB, | Performed by: NURSE PRACTITIONER

## 2019-03-15 PROCEDURE — 99499 UNLISTED E&M SERVICE: CPT | Mod: HCNC,S$GLB,, | Performed by: NURSE PRACTITIONER

## 2019-03-15 PROCEDURE — 99499 NO LOS: ICD-10-PCS | Mod: HCNC,S$GLB,, | Performed by: NURSE PRACTITIONER

## 2019-03-15 PROCEDURE — 20610 PR DRAIN/INJECT LARGE JOINT/BURSA: ICD-10-PCS | Mod: HCNC,50,S$GLB, | Performed by: NURSE PRACTITIONER

## 2019-03-15 PROCEDURE — 99999 PR PBB SHADOW E&M-EST. PATIENT-LVL III: ICD-10-PCS | Mod: PBBFAC,HCNC,, | Performed by: NURSE PRACTITIONER

## 2019-03-15 RX ADMIN — TRIAMCINOLONE ACETONIDE 80 MG: 40 INJECTION, SUSPENSION INTRA-ARTICULAR; INTRAMUSCULAR at 03:03

## 2019-03-16 RX ORDER — TRIAMCINOLONE ACETONIDE 40 MG/ML
80 INJECTION, SUSPENSION INTRA-ARTICULAR; INTRAMUSCULAR
Status: COMPLETED | OUTPATIENT
Start: 2019-03-15 | End: 2019-03-15

## 2019-03-17 NOTE — PROGRESS NOTES
Pt with known bilateral knee djd for which she has had cortisone injections in the past with good relief. She comes in today to repeat those injections. Her last injections were in May 2018.     Knee Injection Procedure Note    Diagnosis: bilateral knee degenerative arthritis  Indications: bilateral knee pain  Procedure Details: Verbal consent was obtained for the procedure. The injection site was identified and the skin was prepared with alcohol. The bilateral knee was injected from an anterolateral approach with 1 ml of Kenalog and 4 ml Lidocaine each under sterile technique using a 22 gauge needle. The needle was removed and the area cleansed and dressed.  Complications:  Patient tolerated the procedure well.    she was advised to rest the knee today, using ice and elevation as needed for comfort and swelling.Immediate relief of the knee pain may be short lived and secondary to the lidocaine. she may have an increase in discomfort tonight followed by steady improvement over the next several days. It may take 1-2 weeks following the injection to get the full benefit of the medication.

## 2019-04-02 RX ORDER — ATORVASTATIN CALCIUM 40 MG/1
TABLET, FILM COATED ORAL
Qty: 90 TABLET | Refills: 3 | Status: SHIPPED | OUTPATIENT
Start: 2019-04-02 | End: 2020-03-12 | Stop reason: SDUPTHER

## 2019-04-24 ENCOUNTER — PATIENT OUTREACH (OUTPATIENT)
Dept: OTHER | Facility: OTHER | Age: 79
End: 2019-04-24

## 2019-04-24 NOTE — PROGRESS NOTES
Last 5 Patient Entered Readings                                      Current 30 Day Average: 120/64     Recent Readings 4/24/2019 4/24/2019 4/23/2019 4/22/2019 4/21/2019    SBP (mmHg) 128 147 92 126 102    DBP (mmHg) 62 61 58 70 56    Pulse 55 29 59 58 58          Digital Medicine: Health  Follow Up    Lifestyle Modifications:    1.Dietary Modifications (Sodium intake <2,000mg/day, food labels, dining out): Patient continues monitoring her sodium intake. She avoids canned foods especially things like baked beans, etc. She is reading food labels and cooking most of her meals at home.     2.Physical Activity: Patient has been walking 5 days per week for 25 minutes per day.     3.Medication Therapy: Patient has been compliant with the medication regimen. Patient is doing well on her current BP medication regimen. She denies symptoms/side effects.     4.Patient has the following medication side effects/concerns:   (Frequency/Alleviating factors/Precipitating factors, etc.)     Patient believes her fluctuating readings have to do with her afib. She denies symptoms directly related to her BP.     Follow up with  Mavinayakjoanie GOKUL Bell completed. No further questions or concerns. Will continue to follow up to achieve health goals.

## 2019-05-01 ENCOUNTER — PATIENT MESSAGE (OUTPATIENT)
Dept: CARDIOLOGY | Facility: CLINIC | Age: 79
End: 2019-05-01

## 2019-05-17 ENCOUNTER — IMMUNIZATION (OUTPATIENT)
Dept: PHARMACY | Facility: CLINIC | Age: 79
End: 2019-05-17
Payer: MEDICARE

## 2019-05-22 ENCOUNTER — PATIENT OUTREACH (OUTPATIENT)
Dept: OTHER | Facility: OTHER | Age: 79
End: 2019-05-22

## 2019-05-22 NOTE — PROGRESS NOTES
Last 5 Patient Entered Readings                                      Current 30 Day Average: 124/66     Recent Readings 5/22/2019 5/21/2019 5/20/2019 5/19/2019 5/18/2019    SBP (mmHg) 128 138 130 121 112    DBP (mmHg) 64 73 64 56 61    Pulse 49 51 48 59 70            Digital Medicine: Health  Follow Up    Left voicemail to follow up with Mrs. Madan Bell.  Current BP average 124/66 mmHg is at goal, <130/80.

## 2019-05-28 ENCOUNTER — TELEPHONE (OUTPATIENT)
Dept: DERMATOLOGY | Facility: CLINIC | Age: 79
End: 2019-05-28

## 2019-05-28 NOTE — TELEPHONE ENCOUNTER
----- Message from Jerri Mcclure sent at 5/28/2019 11:59 AM CDT -----  Contact: Patient   Patient Requesting Sooner Appointment.     Reason for sooner appt.: Patient states that she has a rash that needs to be examined. States that she has been trying to treat the rash herself but, it keeps coming back. Please contact pt to schedule sooner appt.     When is the first available appointment? 06/20    Communication Preference: [phone]

## 2019-06-20 ENCOUNTER — OFFICE VISIT (OUTPATIENT)
Dept: DERMATOLOGY | Facility: CLINIC | Age: 79
End: 2019-06-20
Payer: MEDICARE

## 2019-06-20 DIAGNOSIS — D18.00 ANGIOMA: ICD-10-CM

## 2019-06-20 DIAGNOSIS — L81.7 SCHAMBERG'S CAPILLARITIS: ICD-10-CM

## 2019-06-20 DIAGNOSIS — D48.5 NEOPLASM OF UNCERTAIN BEHAVIOR OF SKIN: Primary | ICD-10-CM

## 2019-06-20 DIAGNOSIS — D22.9 NEVUS: ICD-10-CM

## 2019-06-20 DIAGNOSIS — L81.4 LENTIGO: ICD-10-CM

## 2019-06-20 DIAGNOSIS — L82.1 SK (SEBORRHEIC KERATOSIS): ICD-10-CM

## 2019-06-20 PROCEDURE — 88305 TISSUE EXAM BY PATHOLOGIST: CPT | Mod: HCNC | Performed by: PATHOLOGY

## 2019-06-20 PROCEDURE — 1101F PT FALLS ASSESS-DOCD LE1/YR: CPT | Mod: HCNC,CPTII,S$GLB, | Performed by: DERMATOLOGY

## 2019-06-20 PROCEDURE — 99999 PR PBB SHADOW E&M-EST. PATIENT-LVL II: CPT | Mod: PBBFAC,HCNC,, | Performed by: DERMATOLOGY

## 2019-06-20 PROCEDURE — 99213 OFFICE O/P EST LOW 20 MIN: CPT | Mod: 25,HCNC,S$GLB, | Performed by: DERMATOLOGY

## 2019-06-20 PROCEDURE — 88305 TISSUE SPECIMEN TO PATHOLOGY, DERMATOLOGY: ICD-10-PCS | Mod: 26,HCNC,, | Performed by: PATHOLOGY

## 2019-06-20 PROCEDURE — 99999 PR PBB SHADOW E&M-EST. PATIENT-LVL II: ICD-10-PCS | Mod: PBBFAC,HCNC,, | Performed by: DERMATOLOGY

## 2019-06-20 PROCEDURE — 11102 PR TANGENTIAL BIOPSY, SKIN, SINGLE LESION: ICD-10-PCS | Mod: HCNC,S$GLB,, | Performed by: DERMATOLOGY

## 2019-06-20 PROCEDURE — 11102 TANGNTL BX SKIN SINGLE LES: CPT | Mod: HCNC,S$GLB,, | Performed by: DERMATOLOGY

## 2019-06-20 PROCEDURE — 1101F PR PT FALLS ASSESS DOC 0-1 FALLS W/OUT INJ PAST YR: ICD-10-PCS | Mod: HCNC,CPTII,S$GLB, | Performed by: DERMATOLOGY

## 2019-06-20 PROCEDURE — 99213 PR OFFICE/OUTPT VISIT, EST, LEVL III, 20-29 MIN: ICD-10-PCS | Mod: 25,HCNC,S$GLB, | Performed by: DERMATOLOGY

## 2019-06-20 RX ORDER — TRIAMCINOLONE ACETONIDE 1 MG/G
CREAM TOPICAL
Qty: 45 G | Refills: 2 | Status: ON HOLD | OUTPATIENT
Start: 2019-06-20 | End: 2021-04-22

## 2019-06-20 NOTE — PROGRESS NOTES
Subjective:       Patient ID:  Madan Bell is a 79 y.o. female who presents for   Chief Complaint   Patient presents with    Skin Check    Lesion     Pt here today for a UBSE. Pt c/o small lesion on upper lip x 1 year. No bleeding, pain or prev tx.   C/o rash on left ankle.  Comes and goes.  Has not resolved. Used HC cream otc which helped a little.        Review of Systems   Skin: Positive for daily sunscreen use and activity-related sunscreen use. Negative for tendency to form keloidal scars and recent sunburn.   Hematologic/Lymphatic: Bruises/bleeds easily.        Objective:    Physical Exam   Constitutional: She appears well-developed and well-nourished. No distress.   Neurological: She is alert and oriented to person, place, and time. She is not disoriented.   Psychiatric: She has a normal mood and affect.   Skin:   Areas Examined (abnormalities noted in diagram):   Scalp / Hair Palpated and Inspected  Head / Face Inspection Performed  Neck Inspection Performed  Chest / Axilla Inspection Performed  Abdomen Inspection Performed  Back Inspection Performed  RUE Inspected  LUE Inspection Performed  Nails and Digits Inspection Performed                       Diagram Legend     Erythematous scaling macule/papule c/w actinic keratosis       Vascular papule c/w angioma      Pigmented verrucoid papule/plaque c/w seborrheic keratosis      Yellow umbilicated papule c/w sebaceous hyperplasia      Irregularly shaped tan macule c/w lentigo     1-2 mm smooth white papules consistent with Milia      Movable subcutaneous cyst with punctum c/w epidermal inclusion cyst      Subcutaneous movable cyst c/w pilar cyst      Firm pink to brown papule c/w dermatofibroma      Pedunculated fleshy papule(s) c/w skin tag(s)      Evenly pigmented macule c/w junctional nevus     Mildly variegated pigmented, slightly irregular-bordered macule c/w mildly atypical nevus      Flesh colored to evenly pigmented papule c/w intradermal nevus        Pink pearly papule/plaque c/w basal cell carcinoma      Erythematous hyperkeratotic cursted plaque c/w SCC      Surgical scar with no sign of skin cancer recurrence      Open and closed comedones      Inflammatory papules and pustules      Verrucoid papule consistent consistent with wart     Erythematous eczematous patches and plaques     Dystrophic onycholytic nail with subungual debris c/w onychomycosis     Umbilicated papule    Erythematous-base heme-crusted tan verrucoid plaque consistent with inflamed seborrheic keratosis     Erythematous Silvery Scaling Plaque c/w Psoriasis     See annotation      Assessment / Plan:      Pathology Orders:     Normal Orders This Visit    Tissue Specimen To Pathology, Dermatology     Questions:    Directional Terms:  Other(comment)    Clinical Information:  r/o bcc    Specific Site:  right lower neck        Neoplasm of uncertain behavior of skin  Shave biopsy procedure note:    Shave biopsy performed after verbal consent including risk of infection, scar, recurrence, need for additional treatment of site. Area prepped with alcohol, anesthetized with approximately 1.0cc of 1% lidocaine with epinephrine. Lesional tissue shaved with razor blade. Hemostasis achieved with application of aluminum chloride followed by hyfrecation. No complications. Dressing applied. Wound care explained.    If biopsy positive for malignancy, refer to Dr. Evans for Mohs surgery consultation. V ed and c  V aldara  -     Tissue Specimen To Pathology, Dermatology    Nevus  Discussed ABCDE's of nevi.  Monitor for new mole or moles that are becoming bigger, darker, irritated, or developing irregular borders. Brochure provided.    SK (seborrheic keratosis)  These are benign inherited growths without a malignant potential. Reassurance given to patient. No treatment is necessary.     Angioma  These are benign vascular lesions that are inherited.  Treatment is not necessary.    Lentigo  This is a benign  hyperpigmented sun induced lesion. Daily sun protection will reduce the number of new lesions. Treatment of these benign lesions are considered cosmetic.  The nature of sun-induced photo-aging and skin cancers is discussed.  Sun avoidance, protective clothing, and the use of 30-SPF sunscreens is advised. Observe closely for skin damage/changes, and call if such occurs.    Schamberg's capillaritis  Secondary to swelling , being on feet, capillary inflammation   -     triamcinolone acetonide 0.1% (KENALOG) 0.1 % cream; AAA qd to leg prn flare; not more than 2 weeks straight in same location, avoid use on face and groin  Dispense: 45 g; Refill: 2             Follow up for prn bx report.

## 2019-06-24 RX ORDER — BISOPROLOL FUMARATE AND HYDROCHLOROTHIAZIDE 2.5; 6.25 MG/1; MG/1
TABLET ORAL
Qty: 45 TABLET | Refills: 3 | Status: SHIPPED | OUTPATIENT
Start: 2019-06-24 | End: 2020-05-31

## 2019-07-01 ENCOUNTER — PATIENT MESSAGE (OUTPATIENT)
Dept: DERMATOLOGY | Facility: CLINIC | Age: 79
End: 2019-07-01

## 2019-07-11 ENCOUNTER — LAB VISIT (OUTPATIENT)
Dept: LAB | Facility: HOSPITAL | Age: 79
End: 2019-07-11
Attending: UROLOGY
Payer: MEDICARE

## 2019-07-11 DIAGNOSIS — N39.0 RECURRENT UTI: ICD-10-CM

## 2019-07-11 PROCEDURE — 87186 SC STD MICRODIL/AGAR DIL: CPT | Mod: HCNC

## 2019-07-11 PROCEDURE — 87077 CULTURE AEROBIC IDENTIFY: CPT | Mod: HCNC

## 2019-07-11 PROCEDURE — 87088 URINE BACTERIA CULTURE: CPT | Mod: HCNC

## 2019-07-11 PROCEDURE — 87086 URINE CULTURE/COLONY COUNT: CPT | Mod: HCNC

## 2019-07-12 ENCOUNTER — PATIENT MESSAGE (OUTPATIENT)
Dept: UROLOGY | Facility: CLINIC | Age: 79
End: 2019-07-12

## 2019-07-14 LAB — BACTERIA UR CULT: ABNORMAL

## 2019-07-15 ENCOUNTER — PATIENT MESSAGE (OUTPATIENT)
Dept: UROLOGY | Facility: CLINIC | Age: 79
End: 2019-07-15

## 2019-07-15 ENCOUNTER — PATIENT MESSAGE (OUTPATIENT)
Dept: INTERNAL MEDICINE | Facility: CLINIC | Age: 79
End: 2019-07-15

## 2019-07-15 RX ORDER — NITROFURANTOIN (MACROCRYSTALS) 100 MG/1
100 CAPSULE ORAL 2 TIMES DAILY
Qty: 10 CAPSULE | Refills: 0 | Status: SHIPPED | OUTPATIENT
Start: 2019-07-15 | End: 2019-07-20

## 2019-07-16 ENCOUNTER — PATIENT MESSAGE (OUTPATIENT)
Dept: UROLOGY | Facility: CLINIC | Age: 79
End: 2019-07-16

## 2019-07-17 ENCOUNTER — PATIENT MESSAGE (OUTPATIENT)
Dept: UROLOGY | Facility: CLINIC | Age: 79
End: 2019-07-17

## 2019-07-17 RX ORDER — DOXYCYCLINE 100 MG/1
100 CAPSULE ORAL 2 TIMES DAILY
Qty: 10 CAPSULE | Refills: 0 | Status: SHIPPED | OUTPATIENT
Start: 2019-07-17 | End: 2019-07-22

## 2019-07-30 ENCOUNTER — TELEPHONE (OUTPATIENT)
Dept: DERMATOLOGY | Facility: CLINIC | Age: 79
End: 2019-07-30

## 2019-07-30 NOTE — TELEPHONE ENCOUNTER
Contacted Ms. Bell , she would like to have a consult with Dr. Evans and still has all of my info she will call next week to schedule her consult.

## 2019-08-01 ENCOUNTER — PATIENT OUTREACH (OUTPATIENT)
Dept: OTHER | Facility: OTHER | Age: 79
End: 2019-08-01

## 2019-08-01 NOTE — PROGRESS NOTES
Last 5 Patient Entered Readings                                      Current 30 Day Average: 116/63     Recent Readings 8/1/2019 7/31/2019 7/30/2019 7/29/2019 7/28/2019    SBP (mmHg) 115 109 111 121 116    DBP (mmHg) 52 65 56 63 74    Pulse 64 55 46 49 68            Digital Medicine: Health  Follow Up    Left voicemail to follow up with Mrs. Madan Bell.  Current BP average 116/63 mmHg is at goal, <130/80.

## 2019-08-05 NOTE — PROGRESS NOTES
Last 5 Patient Entered Readings                                      Current 30 Day Average: 117/62     Recent Readings 8/5/2019 8/4/2019 8/3/2019 8/2/2019 8/1/2019    SBP (mmHg) 139 127 118 108 115    DBP (mmHg) 65 55 60 62 52    Pulse 54 66 51 50 64        Digital Medicine: Health  Follow Up    Lifestyle Modifications:    1.Dietary Modifications (Sodium intake <2,000mg/day, food labels, dining out): Patient remains consistent with maintaining a low sodium diet. She readings food labels and avoids adding extra salt. She denies any changes in her diet over the past few weeks that would cause an increase in salt intake (not eating out, etc).     2.Physical Activity: As of two weeks ago, the patient was very consistent with her daily walking routine (6,000 - 9,000 steps per day). She did stop walking two weeks ago due to another UTI and the rain/heat. She hopes to get back on track with this soon.     3.Medication Therapy: Patient has been compliant with the medication regimen. Patient is doing well on her current BP medication regimen. She denies symptoms/side effects.     4.Patient has the following medication side effects/concerns: None  (Frequency/Alleviating factors/Precipitating factors, etc.)     Patient informed me that she will be having her second round of the shingles vaccine tomorrow. She has experienced side effects from the first round that were fairly intense but her physician believes the benefits out weigh the risks so the patient will proceed with the vaccine.     She will also being scheduling the removal of the basal cell carcinoma in the upcoming weeks.     Patient also mentioned that she has been having some slight swelling in her left ankle (a little puffiness and a sock shruti when she removes her socks). She denies added sodium to her diet and she does not believe that she has injured her ankle. This has been consistently occurring for the past 4-5 days. She will discuss this further with  Dr. Mcgee.     Follow up with  Madan HODGSON Arabella completed. No further questions or concerns. Will continue to follow up to achieve health goals.

## 2019-08-06 ENCOUNTER — IMMUNIZATION (OUTPATIENT)
Dept: PHARMACY | Facility: CLINIC | Age: 79
End: 2019-08-06
Payer: MEDICARE

## 2019-08-08 ENCOUNTER — PATIENT MESSAGE (OUTPATIENT)
Dept: ADMINISTRATIVE | Facility: OTHER | Age: 79
End: 2019-08-08

## 2019-08-08 ENCOUNTER — LAB VISIT (OUTPATIENT)
Dept: LAB | Facility: HOSPITAL | Age: 79
End: 2019-08-08
Attending: UROLOGY
Payer: MEDICARE

## 2019-08-08 DIAGNOSIS — N39.0 RECURRENT UTI: ICD-10-CM

## 2019-08-08 PROCEDURE — 87088 URINE BACTERIA CULTURE: CPT | Mod: HCNC

## 2019-08-08 PROCEDURE — 87186 SC STD MICRODIL/AGAR DIL: CPT | Mod: HCNC

## 2019-08-08 PROCEDURE — 87086 URINE CULTURE/COLONY COUNT: CPT | Mod: HCNC

## 2019-08-08 PROCEDURE — 87077 CULTURE AEROBIC IDENTIFY: CPT | Mod: HCNC

## 2019-08-10 LAB — BACTERIA UR CULT: ABNORMAL

## 2019-08-13 ENCOUNTER — PATIENT MESSAGE (OUTPATIENT)
Dept: UROLOGY | Facility: CLINIC | Age: 79
End: 2019-08-13

## 2019-08-13 ENCOUNTER — TELEPHONE (OUTPATIENT)
Dept: UROLOGY | Facility: CLINIC | Age: 79
End: 2019-08-13

## 2019-08-13 RX ORDER — AMOXICILLIN AND CLAVULANATE POTASSIUM 875; 125 MG/1; MG/1
1 TABLET, FILM COATED ORAL 2 TIMES DAILY
Qty: 10 TABLET | Refills: 0 | Status: SHIPPED | OUTPATIENT
Start: 2019-08-13 | End: 2019-08-13

## 2019-08-13 RX ORDER — DOXYCYCLINE 100 MG/1
100 CAPSULE ORAL 2 TIMES DAILY
Qty: 14 CAPSULE | Refills: 0 | Status: SHIPPED | OUTPATIENT
Start: 2019-08-13 | End: 2019-08-20

## 2019-08-14 ENCOUNTER — PATIENT MESSAGE (OUTPATIENT)
Dept: UROLOGY | Facility: CLINIC | Age: 79
End: 2019-08-14

## 2019-08-15 ENCOUNTER — TELEPHONE (OUTPATIENT)
Dept: INTERNAL MEDICINE | Facility: CLINIC | Age: 79
End: 2019-08-15

## 2019-08-15 NOTE — TELEPHONE ENCOUNTER
I spoke with patient, she is going to fax over paper work over for her to be appart of a program that helps with the cost of her Eliquis

## 2019-08-15 NOTE — TELEPHONE ENCOUNTER
----- Message from Evelyn Vuong sent at 8/15/2019  2:32 PM CDT -----  Contact: Patient/ [phone]  Pt states that she is calling to speak with someone in regards to a form that she needs filled out regarding an assistance program for her ELIQUIS 5 mg Tab. She states that it gets rather expensive and she needs this form filled out in order to qualify. Please call back and advise.      Thanks

## 2019-08-19 ENCOUNTER — TELEPHONE (OUTPATIENT)
Dept: DERMATOLOGY | Facility: CLINIC | Age: 79
End: 2019-08-19

## 2019-08-19 ENCOUNTER — PATIENT OUTREACH (OUTPATIENT)
Dept: OTHER | Facility: OTHER | Age: 79
End: 2019-08-19

## 2019-08-19 NOTE — TELEPHONE ENCOUNTER
Contacted Ms. Bell, she is a new pt who was referred over to Mohs from Dr. Molina. She would like to have same day sx. I let her know once Dr. Evans reviews path report and photo that we can get her scheduled for the procedure.

## 2019-08-19 NOTE — PROGRESS NOTES
Last 5 Patient Entered Readings                                      Current 30 Day Average: 116/61     Recent Readings 8/18/2019 8/17/2019 8/16/2019 8/15/2019 8/14/2019    SBP (mmHg) 121 127 117 117 98    DBP (mmHg) 69 67 66 65 53    Pulse 59 57 59 53 55        Ms. Bell is doing well. She is pleased with BP. She has no concerns or questions at this time. She thanks me for checking in.     Per 30 day average, 116/61 mmHg, patient's BP is at goal.     Patient's health , Daniella Quiroz, will be following up. I will continue to monitor regularly and will follow up in 4-6 months, sooner if BP begins to trend upward or downward.    Asked patient to call or message with questions or concerns.     Current HTN regimen:  Hypertension Medications             bisoprolol-hydrochlorothiazide 2.5-6.25 mg (ZIAC) 2.5-6.25 mg Tab TAKE 1/2 TABLET EVERY DAY    metoprolol tartrate (LOPRESSOR) 50 MG tablet Take one half to one tablet daily as needed for palpitations.

## 2019-08-19 NOTE — TELEPHONE ENCOUNTER
----- Message from Georgina Giordano sent at 8/19/2019 11:08 AM CDT -----  Contact: patient   Please call pt at [phone]    Patient would like to schedule a new appt on 09/17 or 09/24 (late appt time request)    Thank you

## 2019-08-23 DIAGNOSIS — N39.0 RECURRENT UTI: ICD-10-CM

## 2019-08-23 DIAGNOSIS — N95.2 ATROPHIC VAGINITIS: ICD-10-CM

## 2019-08-23 RX ORDER — ESTRADIOL 0.1 MG/G
CREAM VAGINAL
Qty: 1785 G | Refills: 6 | Status: SHIPPED | OUTPATIENT
Start: 2019-08-23 | End: 2020-04-23 | Stop reason: SDUPTHER

## 2019-08-26 ENCOUNTER — TELEPHONE (OUTPATIENT)
Dept: INTERNAL MEDICINE | Facility: CLINIC | Age: 79
End: 2019-08-26

## 2019-08-30 ENCOUNTER — TELEPHONE (OUTPATIENT)
Dept: INTERNAL MEDICINE | Facility: CLINIC | Age: 79
End: 2019-08-30

## 2019-08-30 NOTE — TELEPHONE ENCOUNTER
I let patient know she has to send in her part of [facility] paperwork, she understands and will get this to them

## 2019-09-03 ENCOUNTER — LAB VISIT (OUTPATIENT)
Dept: LAB | Facility: HOSPITAL | Age: 79
End: 2019-09-03
Attending: UROLOGY
Payer: MEDICARE

## 2019-09-03 ENCOUNTER — PATIENT MESSAGE (OUTPATIENT)
Dept: UROLOGY | Facility: CLINIC | Age: 79
End: 2019-09-03

## 2019-09-03 DIAGNOSIS — N39.0 RECURRENT UTI: ICD-10-CM

## 2019-09-03 PROCEDURE — 87086 URINE CULTURE/COLONY COUNT: CPT | Mod: HCNC

## 2019-09-03 PROCEDURE — 87088 URINE BACTERIA CULTURE: CPT | Mod: HCNC

## 2019-09-03 PROCEDURE — 87186 SC STD MICRODIL/AGAR DIL: CPT | Mod: HCNC

## 2019-09-03 PROCEDURE — 87077 CULTURE AEROBIC IDENTIFY: CPT | Mod: HCNC

## 2019-09-04 ENCOUNTER — TELEPHONE (OUTPATIENT)
Dept: ORTHOPEDICS | Facility: CLINIC | Age: 79
End: 2019-09-04

## 2019-09-04 NOTE — TELEPHONE ENCOUNTER
Spoke with Ms.Arabella and the pt was scheduled for next Thursday 9/12 at 1pm to receive injections. Ms.Arabella stated that she would call back if the time did not work for her.

## 2019-09-04 NOTE — TELEPHONE ENCOUNTER
----- Message from Dorothea Cordova sent at 9/4/2019 12:37 PM CDT -----  Contact: Patient   Patient is needing to know if she can come in for any further injections this year, as she would like to if possible    [phone]     Please contact the patient to consult her on this matter

## 2019-09-05 ENCOUNTER — PATIENT OUTREACH (OUTPATIENT)
Dept: OTHER | Facility: OTHER | Age: 79
End: 2019-09-05

## 2019-09-05 ENCOUNTER — PATIENT MESSAGE (OUTPATIENT)
Dept: UROLOGY | Facility: CLINIC | Age: 79
End: 2019-09-05

## 2019-09-05 LAB — BACTERIA UR CULT: ABNORMAL

## 2019-09-05 RX ORDER — DOXYCYCLINE 100 MG/1
100 CAPSULE ORAL EVERY 12 HOURS
Qty: 20 CAPSULE | Refills: 0 | Status: SHIPPED | OUTPATIENT
Start: 2019-09-05 | End: 2019-12-06 | Stop reason: ALTCHOICE

## 2019-09-05 NOTE — PROGRESS NOTES
Last 5 Patient Entered Readings                                      Current 30 Day Average: 115/62     Recent Readings 9/4/2019 9/3/2019 9/3/2019 9/2/2019 9/2/2019    SBP (mmHg) 123 110 110 113 114    DBP (mmHg) 59 61 62 59 50    Pulse 49 62 64 63 68          Digital Medicine: Health  Follow Up    Lifestyle Modifications:    1.Dietary Modifications (Sodium intake <2,000mg/day, food labels, dining out): Patient continues monitoring her sodium intake.  She cooks most of her meals at home and she does her best to avoid highly salted foods such as canned/processed foods.     2.Physical Activity: Patient continues walking in her neighborhood (as often as she can tolerate due to knee issues which are getting worse - she will be following up with her doctor about this soon).     3.Medication Therapy: Patient has been compliant with the medication regimen. Patient is doing well on her current BP medication regimen. She does mentioned dizzy spells (which she states are not severe) just about everyday. She is going to see her cardiologist in about month so she will talk to him about this then.    4.Patient has the following medication side effects/concerns: None  (Frequency/Alleviating factors/Precipitating factors, etc.)     Patient is dealing with a recurrent UTI right now which is causing her discomfort.     Follow up with Mrs. Madan Bell completed. No further questions or concerns. Will continue to follow up to achieve health goals.

## 2019-09-10 ENCOUNTER — TELEPHONE (OUTPATIENT)
Dept: INTERNAL MEDICINE | Facility: CLINIC | Age: 79
End: 2019-09-10

## 2019-09-10 DIAGNOSIS — Z12.31 SCREENING MAMMOGRAM, ENCOUNTER FOR: Primary | ICD-10-CM

## 2019-09-11 NOTE — TELEPHONE ENCOUNTER
I spoke with patient and scheduled Mammogram appointment   [phone] authorize sending Medication to patient home from [facility].

## 2019-09-12 ENCOUNTER — HOSPITAL ENCOUNTER (OUTPATIENT)
Dept: RADIOLOGY | Facility: HOSPITAL | Age: 79
Discharge: HOME | End: 2019-09-12
Attending: NURSE PRACTITIONER
Payer: MEDICARE

## 2019-09-12 ENCOUNTER — OFFICE VISIT (OUTPATIENT)
Dept: ORTHOPEDICS | Facility: CLINIC | Age: 79
End: 2019-09-12
Payer: MEDICARE

## 2019-09-12 VITALS
WEIGHT: 145.38 LBS | BODY MASS INDEX: 21.53 KG/M2 | HEIGHT: 69 IN | HEART RATE: 46 BPM | SYSTOLIC BLOOD PRESSURE: 104 MMHG | DIASTOLIC BLOOD PRESSURE: 61 MMHG

## 2019-09-12 DIAGNOSIS — M17.0 PRIMARY OSTEOARTHRITIS OF BOTH KNEES: Primary | ICD-10-CM

## 2019-09-12 DIAGNOSIS — M17.0 PRIMARY OSTEOARTHRITIS OF BOTH KNEES: ICD-10-CM

## 2019-09-12 PROCEDURE — 1101F PR PT FALLS ASSESS DOC 0-1 FALLS W/OUT INJ PAST YR: ICD-10-PCS | Mod: HCNC,CPTII,S$GLB, | Performed by: NURSE PRACTITIONER

## 2019-09-12 PROCEDURE — 3078F DIAST BP <80 MM HG: CPT | Mod: HCNC,CPTII,S$GLB, | Performed by: NURSE PRACTITIONER

## 2019-09-12 PROCEDURE — 20610 PR DRAIN/INJECT LARGE JOINT/BURSA: ICD-10-PCS | Mod: 50,HCNC,S$GLB, | Performed by: NURSE PRACTITIONER

## 2019-09-12 PROCEDURE — 73564 X-RAY EXAM KNEE 4 OR MORE: CPT | Mod: TC,50,HCNC

## 2019-09-12 PROCEDURE — 99213 PR OFFICE/OUTPT VISIT, EST, LEVL III, 20-29 MIN: ICD-10-PCS | Mod: HCNC,25,S$GLB, | Performed by: NURSE PRACTITIONER

## 2019-09-12 PROCEDURE — 99213 OFFICE O/P EST LOW 20 MIN: CPT | Mod: HCNC,25,S$GLB, | Performed by: NURSE PRACTITIONER

## 2019-09-12 PROCEDURE — 3078F PR MOST RECENT DIASTOLIC BLOOD PRESSURE < 80 MM HG: ICD-10-PCS | Mod: HCNC,CPTII,S$GLB, | Performed by: NURSE PRACTITIONER

## 2019-09-12 PROCEDURE — 3074F PR MOST RECENT SYSTOLIC BLOOD PRESSURE < 130 MM HG: ICD-10-PCS | Mod: HCNC,CPTII,S$GLB, | Performed by: NURSE PRACTITIONER

## 2019-09-12 PROCEDURE — 99999 PR PBB SHADOW E&M-EST. PATIENT-LVL III: CPT | Mod: PBBFAC,HCNC,, | Performed by: NURSE PRACTITIONER

## 2019-09-12 PROCEDURE — 73564 X-RAY EXAM KNEE 4 OR MORE: CPT | Mod: 26,50,HCNC, | Performed by: RADIOLOGY

## 2019-09-12 PROCEDURE — 99999 PR PBB SHADOW E&M-EST. PATIENT-LVL III: ICD-10-PCS | Mod: PBBFAC,HCNC,, | Performed by: NURSE PRACTITIONER

## 2019-09-12 PROCEDURE — 20610 DRAIN/INJ JOINT/BURSA W/O US: CPT | Mod: 50,HCNC,S$GLB, | Performed by: NURSE PRACTITIONER

## 2019-09-12 PROCEDURE — 73564 XR KNEE ORTHO BILAT WITH FLEXION: ICD-10-PCS | Mod: 26,50,HCNC, | Performed by: RADIOLOGY

## 2019-09-12 PROCEDURE — 3074F SYST BP LT 130 MM HG: CPT | Mod: HCNC,CPTII,S$GLB, | Performed by: NURSE PRACTITIONER

## 2019-09-12 PROCEDURE — 1101F PT FALLS ASSESS-DOCD LE1/YR: CPT | Mod: HCNC,CPTII,S$GLB, | Performed by: NURSE PRACTITIONER

## 2019-09-12 RX ADMIN — TRIAMCINOLONE ACETONIDE 80 MG: 40 INJECTION, SUSPENSION INTRA-ARTICULAR; INTRAMUSCULAR at 02:09

## 2019-09-13 RX ORDER — TRIAMCINOLONE ACETONIDE 40 MG/ML
80 INJECTION, SUSPENSION INTRA-ARTICULAR; INTRAMUSCULAR
Status: COMPLETED | OUTPATIENT
Start: 2019-09-12 | End: 2019-09-12

## 2019-09-13 NOTE — PROGRESS NOTES
CC: Pain of the Left Knee and Pain of the Right Knee      HPI: Pt with c/o bilateral knee pain for the past few weeks. She is known to me and has been seen in the past with knee pain. Xray showed djd of both knees and she had good relief with cortisone injections. The pain is aching and medial and worse with activity. She is unable to take nsaids due to eliquis for a-fib. She is very active and walks daily. Once she gets moving, the pain is better, but when she first stands up the pain is a 10. Her daughter accompanies her to this appt and reports that the patient does not often complain, so she knows that the knees must be hurting a lot. She has one of her granddaughters getting  in November and she wants to be able to walk for all of the upcoming events. She would like to repeat cortisone injections today. She is ambulating without assistive device and there is not a limp.    ROS  General: denies fever and chills  Resp: no c/o sob  CVS: no c/o cp  MSK: c/o bilateral knee pain worse when standing up    PE  General: AAOx3, pleasant and cooperative  Resp: respirations even and unlabored  MSK: bilateral knee exam  0 degrees extension  120 degrees flexion  No warmth or erythema   - effusion  + crepitus  5/5 quad strength  - varus and valgus instability    Xray:  Reviewed by me with the patient and her daughter: Right: No fracture or dislocation.  Small effusion.  Severe loss of tibiofemoral and moderate loss of patellofemoral cartilage space accompanied by osteophyte production.  Chondrocalcinosis noted.    Left: No fracture or dislocation.  Small effusion.  Moderate loss of patellofemoral cartilage space accompanied by osteophyte production.  Chondrocalcinosis noted.    Assessment:  Bilateral knee djd, worse since last reviewed    Plan:  Cortisone injections bilateral knees today  RICE  Tylenol prn  Consider gel injections after wedding  F/u in November for the wedding    Knee Injection Procedure  Note    Diagnosis: bilateral knee degenerative arthritis  Indications: bilateral knee pain  Procedure Details: Verbal consent was obtained for the procedure. The injection site was identified and the skin was prepared with alcohol. The bilateral knee was injected from an anterolateral approach with 1 ml of Kenalog and 4 ml Lidocaine each under sterile technique using a 22 gauge needle. The needle was removed and the area cleansed and dressed.  Complications:  Patient tolerated the procedure well.    she was advised to rest the knee today, using ice and elevation as needed for comfort and swelling.Immediate relief of the knee pain may be short lived and secondary to the lidocaine. she may have an increase in discomfort tonight followed by steady improvement over the next several days. It may take 1-2 weeks following the injection to get the full benefit of the medication.

## 2019-09-16 ENCOUNTER — TELEPHONE (OUTPATIENT)
Dept: INTERNAL MEDICINE | Facility: CLINIC | Age: 79
End: 2019-09-16

## 2019-09-16 NOTE — TELEPHONE ENCOUNTER
Left a message for the patient, letting her know her medication is ready for  at the office ([facility] office)      Please Advise  Thank You

## 2019-09-18 ENCOUNTER — TELEPHONE (OUTPATIENT)
Dept: INTERNAL MEDICINE | Facility: CLINIC | Age: 79
End: 2019-09-18

## 2019-09-18 NOTE — TELEPHONE ENCOUNTER
I let patient know her Eliquis should be delivered straight to her home the next time she needs a refill. And she will come  current medication here at office.

## 2019-09-21 ENCOUNTER — PATIENT OUTREACH (OUTPATIENT)
Dept: ADMINISTRATIVE | Facility: OTHER | Age: 79
End: 2019-09-21

## 2019-09-23 ENCOUNTER — TELEPHONE (OUTPATIENT)
Dept: DERMATOLOGY | Facility: CLINIC | Age: 79
End: 2019-09-23

## 2019-09-23 NOTE — TELEPHONE ENCOUNTER
Spoke with patient this morning and answered all of her questions regarding taking her blood thinners. Advised to stay on all normal meds. Pt d/c fish oil and vitamin e 4 days ago which I reassured her it would be fine. Pt expressed verbal understanding.

## 2019-09-24 ENCOUNTER — PROCEDURE VISIT (OUTPATIENT)
Dept: DERMATOLOGY | Facility: CLINIC | Age: 79
End: 2019-09-24
Payer: MEDICARE

## 2019-09-24 VITALS
HEIGHT: 69 IN | DIASTOLIC BLOOD PRESSURE: 72 MMHG | BODY MASS INDEX: 21.48 KG/M2 | WEIGHT: 145 LBS | SYSTOLIC BLOOD PRESSURE: 158 MMHG | HEART RATE: 64 BPM

## 2019-09-24 DIAGNOSIS — C44.41 BASAL CELL CARCINOMA, SCALP/NECK: Primary | ICD-10-CM

## 2019-09-24 PROCEDURE — 17311: ICD-10-PCS | Mod: HCNC,S$GLB,, | Performed by: DERMATOLOGY

## 2019-09-24 PROCEDURE — 17312 MOHS ADDL STAGE: CPT | Mod: HCNC,S$GLB,, | Performed by: DERMATOLOGY

## 2019-09-24 PROCEDURE — 99499 UNLISTED E&M SERVICE: CPT | Mod: HCNC,S$GLB,, | Performed by: DERMATOLOGY

## 2019-09-24 PROCEDURE — 99499 NO LOS: ICD-10-PCS | Mod: HCNC,S$GLB,, | Performed by: DERMATOLOGY

## 2019-09-24 PROCEDURE — 17311 MOHS 1 STAGE H/N/HF/G: CPT | Mod: HCNC,S$GLB,, | Performed by: DERMATOLOGY

## 2019-09-24 PROCEDURE — 13132 CMPLX RPR F/C/C/M/N/AX/G/H/F: CPT | Mod: HCNC,59,S$GLB, | Performed by: DERMATOLOGY

## 2019-09-24 PROCEDURE — 17312: ICD-10-PCS | Mod: HCNC,S$GLB,, | Performed by: DERMATOLOGY

## 2019-09-24 PROCEDURE — 13132 PR RECMPL WND HEAD,FAC,HAND 2.6-7.5 CM: ICD-10-PCS | Mod: HCNC,59,S$GLB, | Performed by: DERMATOLOGY

## 2019-09-24 NOTE — PROGRESS NOTES
PROCEDURE: Mohs' Micrographic Surgery    INDICATION: Biopsy-proven skin cancer of cosmetically and functionally important areas, including head, neck, genital, hand, foot, or areas known for having difficulty in healing, such as the lower anterior legs. Tumor with ill-defined borders.    REFERRING MD: Sindhu Molina M.D.    CASE NUMBER:     ANESTHETIC: 3 cc 0.5% Lidocaine with Epi 1:200,000 mixed 1:1 with 0.5% Bupivacaine    SURGICAL PREP: Hibiclens    SURGEON: Jada Evans MD    ASSISTANTS: Jocelin Cordova PA-C, Viky Huertas MA and Ravi Templeton, Surg Tech    PREOPERATIVE DIAGNOSIS: basal cell carcinoma    POSTOPERATIVE DIAGNOSIS: basal cell carcinoma    PATHOLOGIC DIAGNOSIS: basal cell carcinoma- superficial, nodular    HISTOLOGY OF SPECIMENS IN FIRST STAGE:   Tumor Type: Tumor seen. Superficial basal cell carcinoma: Foci of basaloid cells with peripheral palisading and focal retraction artifact arising along the dermoepidermal junction and extending into the papillary dermis.   Depth of Invasion: epidermis and dermis  Perineural Invasion: No    HISTOLOGY OF SPECIMENS IN SUBSEQUENT STAGES:  · Tumor Type: No tumor seen.    STAGES OF MOHS' SURGERY PERFORMED: 2    TUMOR-FREE PLANE ACHIEVED: Yes    HEMOSTASIS: electrocoagulation     SPECIMENS: 3 (2 in stage A and 1 in stage B)    LOCATION: right lower neck. Patient verified location with hand held mirror.    INITIAL LESION SIZE: 0.8 x 0.9 cm    FINAL DEFECT SIZE: 1.4 x 1.8 cm    WOUND REPAIR/DISPOSITION: The patient tolerated Mohs' Micrographic Surgery for a basal cell carcinoma very well. When the tumor was completely removed, a repair of the surgical defect was undertaken.      PROCEDURE: Complex Linear Repair    INDICATION: Status post Mohs' Micrographic Surgery for basal cell carcinoma.    CASE NUMBER:     SURGEON: Jada Evans MD    ASSISTANTS: Jocelin Cordova PA-C and Ravi Templeton, Surg Tech    ANESTHETIC: 1.5 cc 1% Lidocaine with Epinephrine  "1:100,000    SURGICAL PREP: Hibiclens, prepped by Sondra Tang    LOCATION: right lower neck    DEFECT SIZE: 1.4 x 1.8 cm    WOUND REPAIR/DISPOSITION:  After the patient's carcinoma had been completely removed with Mohs' Micrographic Surgery, a repair of the surgical defect was undertaken. The patient was returned to the operating suite where the area of right lower neck was prepped, draped, and anesthetized in the usual sterile fashion. The wound was widely undermined in all directions. Then, electrocoagulation was used to obtain meticulous hemostasis. 4-0 Vicryl buried vertical mattress sutures were placed into the subcutaneous and dermal plane to close the wound and vaughn the cutaneous wound edge. Bilateral dog ears were identified and were removed by a standard Burow's triangle technique. The cutaneous wound edges were closed using interrupted 4-0 Prolene suture.    The patient tolerated the procedure well.    The area was cleaned and dressed appropriately and the patient was given wound care instructions, as well as appointment for follow-up evaluation. Patient declined pain medication.    LENGTH OF REPAIR: 3.2 cm    Vitals:    09/24/19 1254 09/24/19 1510   BP:  (!) 158/72   Pulse:  64   Weight: 65.8 kg (145 lb)    Height: 5' 9" (1.753 m)          "

## 2019-09-30 ENCOUNTER — LAB VISIT (OUTPATIENT)
Dept: LAB | Facility: HOSPITAL | Age: 79
End: 2019-09-30
Attending: UROLOGY
Payer: MEDICARE

## 2019-09-30 ENCOUNTER — PATIENT MESSAGE (OUTPATIENT)
Dept: INTERNAL MEDICINE | Facility: CLINIC | Age: 79
End: 2019-09-30

## 2019-09-30 DIAGNOSIS — N39.0 RECURRENT UTI: ICD-10-CM

## 2019-09-30 PROCEDURE — 87086 URINE CULTURE/COLONY COUNT: CPT | Mod: HCNC

## 2019-09-30 PROCEDURE — 87186 SC STD MICRODIL/AGAR DIL: CPT | Mod: HCNC

## 2019-09-30 PROCEDURE — 87077 CULTURE AEROBIC IDENTIFY: CPT | Mod: HCNC

## 2019-09-30 PROCEDURE — 87088 URINE BACTERIA CULTURE: CPT | Mod: HCNC

## 2019-10-01 ENCOUNTER — OFFICE VISIT (OUTPATIENT)
Dept: DERMATOLOGY | Facility: CLINIC | Age: 79
End: 2019-10-01
Payer: MEDICARE

## 2019-10-01 ENCOUNTER — PATIENT MESSAGE (OUTPATIENT)
Dept: UROLOGY | Facility: CLINIC | Age: 79
End: 2019-10-01

## 2019-10-01 DIAGNOSIS — Z09 POSTOP CHECK: Primary | ICD-10-CM

## 2019-10-01 PROCEDURE — 99999 PR PBB SHADOW E&M-EST. PATIENT-LVL III: CPT | Mod: PBBFAC,HCNC,, | Performed by: DERMATOLOGY

## 2019-10-01 PROCEDURE — 99024 PR POST-OP FOLLOW-UP VISIT: ICD-10-PCS | Mod: HCNC,S$GLB,, | Performed by: DERMATOLOGY

## 2019-10-01 PROCEDURE — 99999 PR PBB SHADOW E&M-EST. PATIENT-LVL III: ICD-10-PCS | Mod: PBBFAC,HCNC,, | Performed by: DERMATOLOGY

## 2019-10-01 PROCEDURE — 99024 POSTOP FOLLOW-UP VISIT: CPT | Mod: HCNC,S$GLB,, | Performed by: DERMATOLOGY

## 2019-10-01 NOTE — PROGRESS NOTES
79 y.o. female patient is here for suture removal following Mohs' surgery.    Patient reports no problems r lower neck     WOUND PE:  The right lower neck sutures intact. Wound healing well. Good skin edges. No signs or symptoms of infection.    IMPRESSION:  Healing operative site from Mohs' surgery BCC, r lower neck s/p Mohs with CLC postop day # 7.    PLAN:  Sutures removed today. Steri-strips applied.  Discontinue wound care.  Keep moist with Aquaphor.    RTC:  In 3-6 months with Sindhu Molina M.D. for skin check or sooner if new concern arises.

## 2019-10-02 LAB — BACTERIA UR CULT: ABNORMAL

## 2019-10-03 DIAGNOSIS — A49.9 BACTERIAL UTI: Primary | ICD-10-CM

## 2019-10-03 DIAGNOSIS — N39.0 BACTERIAL UTI: Primary | ICD-10-CM

## 2019-10-03 RX ORDER — PANTOPRAZOLE SODIUM 40 MG/1
TABLET, DELAYED RELEASE ORAL
Qty: 90 TABLET | Refills: 3 | Status: SHIPPED | OUTPATIENT
Start: 2019-10-03 | End: 2020-07-31

## 2019-10-03 RX ORDER — DOXYCYCLINE 100 MG/1
100 CAPSULE ORAL EVERY 12 HOURS
Qty: 14 CAPSULE | Refills: 0 | Status: SHIPPED | OUTPATIENT
Start: 2019-10-03 | End: 2019-10-10

## 2019-10-08 ENCOUNTER — TELEPHONE (OUTPATIENT)
Dept: DERMATOLOGY | Facility: CLINIC | Age: 79
End: 2019-10-08

## 2019-10-08 NOTE — TELEPHONE ENCOUNTER
Contacted Ms. Bell spoke with her about dying her hair I told her that she was good to go and that she would be able to take those steri strips off now.

## 2019-10-08 NOTE — TELEPHONE ENCOUNTER
----- Message from Hanny Carey sent at 10/8/2019  9:30 AM CDT -----  Contact: pt   Type:  Needs Medical Advice    Who Called: pt called--- she want to know if she can dye her hair. She had a procedure on 9/24/19 to remove a skin cancer. She stated the  Steri strips is still there  Would the patient rather a call back or a response via [facility]?   Best Call Back Number: [phone]  Additional Information:

## 2019-10-09 ENCOUNTER — HOSPITAL ENCOUNTER (OUTPATIENT)
Dept: RADIOLOGY | Facility: HOSPITAL | Age: 79
Discharge: HOME | End: 2019-10-09
Attending: INTERNAL MEDICINE
Payer: MEDICARE

## 2019-10-09 DIAGNOSIS — Z12.31 SCREENING MAMMOGRAM, ENCOUNTER FOR: ICD-10-CM

## 2019-10-09 PROCEDURE — 77067 SCR MAMMO BI INCL CAD: CPT | Mod: TC,HCNC

## 2019-10-09 PROCEDURE — 77063 MAMMO DIGITAL SCREENING BILAT WITH TOMOSYNTHESIS_CAD: ICD-10-PCS | Mod: 26,HCNC,, | Performed by: RADIOLOGY

## 2019-10-09 PROCEDURE — 77067 SCR MAMMO BI INCL CAD: CPT | Mod: 26,HCNC,, | Performed by: RADIOLOGY

## 2019-10-09 PROCEDURE — 77067 MAMMO DIGITAL SCREENING BILAT WITH TOMOSYNTHESIS_CAD: ICD-10-PCS | Mod: 26,HCNC,, | Performed by: RADIOLOGY

## 2019-10-09 PROCEDURE — 77063 BREAST TOMOSYNTHESIS BI: CPT | Mod: 26,HCNC,, | Performed by: RADIOLOGY

## 2019-10-18 ENCOUNTER — PATIENT OUTREACH (OUTPATIENT)
Dept: OTHER | Facility: OTHER | Age: 79
End: 2019-10-18

## 2019-10-22 NOTE — PROGRESS NOTES
Digital Medicine: Health  Follow-Up    The history is provided by the patient.     Follow Up  Follow-up reason(s): routine education      Routine Education Topics: physical activity  Granddaughter getting  on November 16th. She is looking forward to this very much.           Physical Activity:   When asked if exercising, patient responded: yes5 day(s) a week.  Her level of intensity when exercising is moderate.    Patient participates in the following activities: walking    Patient walks about 5 times per week in her neighborhood or in her backyard. She averages about 7,000-8,000 steps per day.     Medication Adherence:   She misses doses: never      Patient identified the following reasons for non-compliance: none    Patient is doing well on her current BP medication regimen. She denies symptoms/side effects.       [facility]    Intervention/Plan    There are no preventive care reminders to display for this patient.    Last 5 Patient Entered Readings                                      Current 30 Day Average: 118/63     Recent Readings 10/21/2019 10/20/2019 10/19/2019 10/18/2019 10/16/2019    SBP (mmHg) 128 112 110 125 94    DBP (mmHg) 60 65 63 70 62    Pulse 58 59 59 46 58

## 2019-11-26 RX ORDER — LEVOTHYROXINE SODIUM 75 UG/1
TABLET ORAL
Qty: 90 TABLET | Refills: 0 | Status: SHIPPED | OUTPATIENT
Start: 2019-11-26 | End: 2020-02-03

## 2019-12-03 ENCOUNTER — LAB VISIT (OUTPATIENT)
Dept: LAB | Facility: HOSPITAL | Age: 79
End: 2019-12-03
Attending: UROLOGY
Payer: MEDICARE

## 2019-12-03 ENCOUNTER — TELEPHONE (OUTPATIENT)
Dept: INTERNAL MEDICINE | Facility: CLINIC | Age: 79
End: 2019-12-03

## 2019-12-03 DIAGNOSIS — N39.0 RECURRENT UTI: ICD-10-CM

## 2019-12-03 PROCEDURE — 87088 URINE BACTERIA CULTURE: CPT | Mod: 59,HCNC

## 2019-12-03 PROCEDURE — 87186 SC STD MICRODIL/AGAR DIL: CPT | Mod: HCNC

## 2019-12-03 PROCEDURE — 87077 CULTURE AEROBIC IDENTIFY: CPT | Mod: HCNC

## 2019-12-03 PROCEDURE — 87086 URINE CULTURE/COLONY COUNT: CPT | Mod: HCNC

## 2019-12-03 NOTE — TELEPHONE ENCOUNTER
----- Message from Daylin Lawrence sent at 12/3/2019 10:52 AM CST -----  Contact: Self [phone]  Patient is calling to check to see if the nurse  forms for patient assistant programs that was fax over to the office, please advise. Fax number will be [phone]

## 2019-12-03 NOTE — TELEPHONE ENCOUNTER
----- Message from Daylin Lawrence sent at 12/3/2019 10:52 AM CST -----  Contact: Self [phone]  Patient is calling to check to see if the nurse  forms for patient assistant programs that was fax over to the office, please advise. Fax number will be [phone]

## 2019-12-05 ENCOUNTER — PATIENT MESSAGE (OUTPATIENT)
Dept: ORTHOPEDICS | Facility: CLINIC | Age: 79
End: 2019-12-05

## 2019-12-06 ENCOUNTER — PATIENT OUTREACH (OUTPATIENT)
Dept: OTHER | Facility: OTHER | Age: 79
End: 2019-12-06

## 2019-12-06 ENCOUNTER — TELEPHONE (OUTPATIENT)
Dept: UROLOGY | Facility: CLINIC | Age: 79
End: 2019-12-06

## 2019-12-06 DIAGNOSIS — A49.9 BACTERIAL UTI: Primary | ICD-10-CM

## 2019-12-06 DIAGNOSIS — N39.0 BACTERIAL UTI: Primary | ICD-10-CM

## 2019-12-06 LAB
BACTERIA UR CULT: ABNORMAL
BACTERIA UR CULT: ABNORMAL

## 2019-12-06 RX ORDER — CEPHALEXIN 500 MG/1
500 CAPSULE ORAL EVERY 12 HOURS
Qty: 14 CAPSULE | Refills: 0 | Status: SHIPPED | OUTPATIENT
Start: 2019-12-06 | End: 2019-12-13

## 2019-12-06 NOTE — TELEPHONE ENCOUNTER
----- Message from Luci Felix MA sent at 12/6/2019 11:02 AM CST -----  Contact: pt  Can you please review the patient's latest urine culture? It's positive for e coli.  ----- Message -----  From: Hanny Carey  Sent: 12/6/2019  10:57 AM CST  To: [facility] Staff    Pt calling for test result from 12/03/19.  Call back # [phone]    [facility] - [place] - [address]  [address] [place] [address]  Phone: [phone] Fax: [phone]

## 2019-12-06 NOTE — PROGRESS NOTES
Digital Medicine: Health  Follow-Up        Follow Up  Follow-up reason(s): reading review    Brief follow up completed with the patient. She was grocery shopping when I called. She stated that she is doing well and pleased with her BP readings. Will continue to check in.       Intervention/Plan    There are no preventive care reminders to display for this patient.    Last 5 Patient Entered Readings                                      Current 30 Day Average: 118/66     Recent Readings 12/1/2019 11/29/2019 11/25/2019 11/19/2019 11/17/2019    SBP (mmHg) 111 115 136 121 110    DBP (mmHg) 71 61 71 59 65    Pulse 64 53 58 56 53                      Diet Screening   No change to diet.      Physical Activity Screening   No change to exercise routine.        Medication Adherence Screening   She misses doses: never      Patient identified the following reasons for non-compliance: none      SDOH

## 2019-12-06 NOTE — TELEPHONE ENCOUNTER
Patient of Dr. Bello's.  Hx of recurrent UTI    12/03/20109 Urine cx:  ESCHERICHIA COLI 50,000 - 99,999 cfu/ml   pansensitive    Keflex 500mg BID due to coverage, normal cr; not allergic

## 2019-12-11 ENCOUNTER — TELEPHONE (OUTPATIENT)
Dept: CARDIOLOGY | Facility: CLINIC | Age: 79
End: 2019-12-11

## 2019-12-11 DIAGNOSIS — I48.20 CHRONIC ATRIAL FIBRILLATION: Primary | ICD-10-CM

## 2019-12-11 NOTE — TELEPHONE ENCOUNTER
"Returned call to notify "yes" prior to visit in February    ----- Message from Jazmín Knott sent at 12/11/2019 11:56 AM CST -----  Contact: Jorge/son  Patient would like a call back at [phone], Regards to if patient needs to have a EKG done if so she need orders.    thanks  Td      "

## 2020-01-15 ENCOUNTER — PATIENT MESSAGE (OUTPATIENT)
Dept: INTERNAL MEDICINE | Facility: CLINIC | Age: 80
End: 2020-01-15

## 2020-01-27 ENCOUNTER — TELEPHONE (OUTPATIENT)
Dept: INTERNAL MEDICINE | Facility: CLINIC | Age: 80
End: 2020-01-27

## 2020-01-27 DIAGNOSIS — I10 HTN (HYPERTENSION), BENIGN: Primary | ICD-10-CM

## 2020-01-27 DIAGNOSIS — E55.9 MILD VITAMIN D DEFICIENCY: ICD-10-CM

## 2020-01-27 DIAGNOSIS — E78.5 HYPERLIPIDEMIA, UNSPECIFIED HYPERLIPIDEMIA TYPE: ICD-10-CM

## 2020-01-27 DIAGNOSIS — R73.9 HYPERGLYCEMIA: ICD-10-CM

## 2020-01-27 NOTE — TELEPHONE ENCOUNTER
----- Message from Storm Cummings sent at 1/27/2020  2:50 PM CST -----  Contact: Patient   Doctor appointment and lab have been scheduled.  Please link lab orders to the lab appointment.  Date of doctor appointment:    Physical or EP:  5/12/20  Date of lab appointment:    Comments:     Thank you

## 2020-02-03 ENCOUNTER — OFFICE VISIT (OUTPATIENT)
Dept: DERMATOLOGY | Facility: CLINIC | Age: 80
End: 2020-02-03
Payer: MEDICARE

## 2020-02-03 ENCOUNTER — PATIENT OUTREACH (OUTPATIENT)
Dept: ADMINISTRATIVE | Facility: OTHER | Age: 80
End: 2020-02-03

## 2020-02-03 DIAGNOSIS — D22.9 NEVUS: ICD-10-CM

## 2020-02-03 DIAGNOSIS — L82.1 SK (SEBORRHEIC KERATOSIS): ICD-10-CM

## 2020-02-03 DIAGNOSIS — Z85.828 PERSONAL HISTORY OF SKIN CANCER: ICD-10-CM

## 2020-02-03 DIAGNOSIS — L30.9 DERMATITIS: ICD-10-CM

## 2020-02-03 DIAGNOSIS — L90.5 SCAR: Primary | ICD-10-CM

## 2020-02-03 DIAGNOSIS — L81.4 LENTIGO: ICD-10-CM

## 2020-02-03 DIAGNOSIS — D18.01 CHERRY ANGIOMA: ICD-10-CM

## 2020-02-03 DIAGNOSIS — L57.0 AK (ACTINIC KERATOSIS): ICD-10-CM

## 2020-02-03 PROCEDURE — 1159F MED LIST DOCD IN RCRD: CPT | Mod: HCNC,S$GLB,, | Performed by: DERMATOLOGY

## 2020-02-03 PROCEDURE — 17000 PR DESTRUCTION(LASER SURGERY,CRYOSURGERY,CHEMOSURGERY),PREMALIGNANT LESIONS,FIRST LESION: ICD-10-PCS | Mod: HCNC,S$GLB,, | Performed by: DERMATOLOGY

## 2020-02-03 PROCEDURE — 1126F AMNT PAIN NOTED NONE PRSNT: CPT | Mod: HCNC,S$GLB,, | Performed by: DERMATOLOGY

## 2020-02-03 PROCEDURE — 17000 DESTRUCT PREMALG LESION: CPT | Mod: HCNC,S$GLB,, | Performed by: DERMATOLOGY

## 2020-02-03 PROCEDURE — 1101F PR PT FALLS ASSESS DOC 0-1 FALLS W/OUT INJ PAST YR: ICD-10-PCS | Mod: HCNC,CPTII,S$GLB, | Performed by: DERMATOLOGY

## 2020-02-03 PROCEDURE — 1101F PT FALLS ASSESS-DOCD LE1/YR: CPT | Mod: HCNC,CPTII,S$GLB, | Performed by: DERMATOLOGY

## 2020-02-03 PROCEDURE — 1126F PR PAIN SEVERITY QUANTIFIED, NO PAIN PRESENT: ICD-10-PCS | Mod: HCNC,S$GLB,, | Performed by: DERMATOLOGY

## 2020-02-03 PROCEDURE — 1159F PR MEDICATION LIST DOCUMENTED IN MEDICAL RECORD: ICD-10-PCS | Mod: HCNC,S$GLB,, | Performed by: DERMATOLOGY

## 2020-02-03 PROCEDURE — 99213 PR OFFICE/OUTPT VISIT, EST, LEVL III, 20-29 MIN: ICD-10-PCS | Mod: 25,HCNC,S$GLB, | Performed by: DERMATOLOGY

## 2020-02-03 PROCEDURE — 99999 PR PBB SHADOW E&M-EST. PATIENT-LVL III: CPT | Mod: PBBFAC,HCNC,, | Performed by: DERMATOLOGY

## 2020-02-03 PROCEDURE — 17003 DESTRUCT PREMALG LES 2-14: CPT | Mod: HCNC,S$GLB,, | Performed by: DERMATOLOGY

## 2020-02-03 PROCEDURE — 99213 OFFICE O/P EST LOW 20 MIN: CPT | Mod: 25,HCNC,S$GLB, | Performed by: DERMATOLOGY

## 2020-02-03 PROCEDURE — 99999 PR PBB SHADOW E&M-EST. PATIENT-LVL III: ICD-10-PCS | Mod: PBBFAC,HCNC,, | Performed by: DERMATOLOGY

## 2020-02-03 PROCEDURE — 17003 DESTRUCTION, PREMALIGNANT LESIONS; SECOND THROUGH 14 LESIONS: ICD-10-PCS | Mod: HCNC,S$GLB,, | Performed by: DERMATOLOGY

## 2020-02-03 RX ORDER — MOMETASONE FUROATE 1 MG/ML
SOLUTION TOPICAL
Qty: 60 ML | Refills: 3 | Status: ON HOLD | OUTPATIENT
Start: 2020-02-03 | End: 2021-04-22

## 2020-02-03 RX ORDER — LEVOTHYROXINE SODIUM 75 UG/1
TABLET ORAL
Qty: 90 TABLET | Refills: 0 | Status: SHIPPED | OUTPATIENT
Start: 2020-02-03 | End: 2020-03-12 | Stop reason: SDUPTHER

## 2020-02-03 NOTE — PROGRESS NOTES
Subjective:       Patient ID:  Madan Bell is a 79 y.o. female who presents for   Chief Complaint   Patient presents with    Skin Check    Rash     Pt here today for a UBSE. This is a high risk patient here to check for the development of new lesions.     Pt c/o itchy rash under both breast x a few weeks. Tx with otc cortisone cream. Tx helped. Rash comes and goes.  When it flares , it is itchy.        Review of Systems   Skin: Positive for itching, rash, daily sunscreen use and activity-related sunscreen use. Negative for recent sunburn.   Hematologic/Lymphatic: Does not bruise/bleed easily.        Objective:    Physical Exam   Constitutional: She appears well-developed and well-nourished. No distress.   Neurological: She is alert and oriented to person, place, and time. She is not disoriented.   Psychiatric: She has a normal mood and affect.   Skin:   Areas Examined (abnormalities noted in diagram):   Scalp / Hair Palpated and Inspected  Head / Face Inspection Performed  Neck Inspection Performed  Chest / Axilla Inspection Performed  Abdomen Inspection Performed  Back Inspection Performed  RUE Inspected  LUE Inspection Performed  Nails and Digits Inspection Performed                   Diagram Legend     Erythematous scaling macule/papule c/w actinic keratosis       Vascular papule c/w angioma      Pigmented verrucoid papule/plaque c/w seborrheic keratosis      Yellow umbilicated papule c/w sebaceous hyperplasia      Irregularly shaped tan macule c/w lentigo     1-2 mm smooth white papules consistent with Milia      Movable subcutaneous cyst with punctum c/w epidermal inclusion cyst      Subcutaneous movable cyst c/w pilar cyst      Firm pink to brown papule c/w dermatofibroma      Pedunculated fleshy papule(s) c/w skin tag(s)      Evenly pigmented macule c/w junctional nevus     Mildly variegated pigmented, slightly irregular-bordered macule c/w mildly atypical nevus      Flesh colored to evenly pigmented  papule c/w intradermal nevus       Pink pearly papule/plaque c/w basal cell carcinoma      Erythematous hyperkeratotic cursted plaque c/w SCC      Surgical scar with no sign of skin cancer recurrence      Open and closed comedones      Inflammatory papules and pustules      Verrucoid papule consistent consistent with wart     Erythematous eczematous patches and plaques     Dystrophic onycholytic nail with subungual debris c/w onychomycosis     Umbilicated papule    Erythematous-base heme-crusted tan verrucoid plaque consistent with inflamed seborrheic keratosis     Erythematous Silvery Scaling Plaque c/w Psoriasis     See annotation      Assessment / Plan:        Scar  Personal history of skin cancer  Area(s) of previous NMSC evaluated with no signs of recurrence.    Upper body skin examination performed today including at least 6 points as noted in physical examination. No lesions suspicious for malignancy noted.    AK (actinic keratosis)  Cryosurgery Procedure Note    Verbal consent from the patient is obtained including, but not limited to, risk of hypopigmentation/hyperpigmentation, scar, recurrence of lesion. The patient is aware of the precancerous quality and need for treatment of these lesions. Liquid nitrogen cryosurgery is applied to the 2 actinic keratoses, as detailed in the physical exam, to produce a freeze injury. The patient is aware that blisters may form and is instructed on wound care with gentle cleansing and use of vaseline ointment to keep moist until healed. The patient is supplied a handout on cryosurgery and is instructed to call if lesions do not completely resolve.    Cherry angioma  These are benign vascular lesions that are inherited.  Treatment is not necessary.    Nevus  Discussed ABCDE's of nevi.  Monitor for new mole or moles that are becoming bigger, darker, irritated, or developing irregular borders. Brochure provided.    Lentigo  This is a benign hyperpigmented sun induced lesion.  Daily sun protection will reduce the number of new lesions. Treatment of these benign lesions are considered cosmetic.  The nature of sun-induced photo-aging and skin cancers is discussed.  Sun avoidance, protective clothing, and the use of 30-SPF sunscreens is advised. Observe closely for skin damage/changes, and call if such occurs.    SK (seborrheic keratosis)  These are benign inherited growths without a malignant potential. Reassurance given to patient. No treatment is necessary.     Dermatitis  Discussed with patient good skin care regimen including avoiding fragranced products and very hot showers.  Recommended dove sensitive skin bar soap or cerave hydrating cleanser or bar.  Recommend Cerave cream  For moisturization daily -2x daily.     -     mometasone (ELOCON) 0.1 % solution; Mix entire bottle in jar of cerave cream and aaa qd- bid prn itching  Dispense: 60 mL; Refill: 3             Follow up in about 6 months (around 8/3/2020).

## 2020-02-04 ENCOUNTER — PATIENT MESSAGE (OUTPATIENT)
Dept: ADMINISTRATIVE | Facility: OTHER | Age: 80
End: 2020-02-04

## 2020-02-04 ENCOUNTER — PATIENT OUTREACH (OUTPATIENT)
Dept: OTHER | Facility: OTHER | Age: 80
End: 2020-02-04

## 2020-02-04 NOTE — PROGRESS NOTES
Digital Medicine: Clinician Introduction    Madan Bell is a 79 y.o. female who is newly enrolled in the Digital Medicine Clinic.    The following information was reviewed and updated:  Preferred pharmacy   [facility] - [place], [place] - [address]  [address] [facility] [address]  Phone: [phone] Fax: [phone]    [facility] - [place], [place] - [address] [place] [address]  [address] [place] [address]  [place] [address]  Phone: [phone] Fax: [phone]      Patient prefers a 90 days supply.     Review of patient's allergies indicates:   Allergen Reactions    Fluoride Hives     Other reaction(s): Hives    Fluoride preparations Hives       I spoke with the patient today. She was previous managed by Clinician Daniella Helton.   She reports occasional lightheadedness, but  Says that her cardiologist does not want to d/c her HCTZ        The history is provided by the patient. No  was used.     Follow Up  Follow-up reason(s): reading review                INTERVENTION(S)  reviewed appropriate dose schedule and encouragement/support    PLAN  patient verbalizes understanding, additional monitoring needed and continue monitoring    Avr BP at goal 120/63    No change to med. Pt Has upcoming cardiology appointment in 2 week      There are no preventive care reminders to display for this patient.    Current Medication Regimen:  Hypertension Medications             bisoprolol-hydrochlorothiazide 2.5-6.25 mg (ZIAC) 2.5-6.25 mg Tab TAKE 1/2 TABLET EVERY DAY    metoprolol tartrate (LOPRESSOR) 50 MG tablet Take one half to one tablet daily as needed for palpitations.            Reviewed the importance of self-monitoring, medication adherence, and that the health  can be used as a resource for lifestyle modifications to help reduce or maintain a healthy lifestyle.    Sent link to [facility] webpages and my contact information via [employer] for future  questions. Follow up scheduled.                     Medication Adherence Screening   She misses doses: never

## 2020-02-11 ENCOUNTER — PATIENT MESSAGE (OUTPATIENT)
Dept: ADMINISTRATIVE | Facility: OTHER | Age: 80
End: 2020-02-11

## 2020-03-01 NOTE — PROGRESS NOTES
Last 5 Patient Entered Readings                                      Current 30 Day Average: 124/66     Recent Readings 5/22/2019 5/21/2019 5/20/2019 5/19/2019 5/18/2019    SBP (mmHg) 128 138 130 121 112    DBP (mmHg) 64 73 64 56 61    Pulse 49 51 48 59 70          Digital Medicine: Health  Follow Up    Lifestyle Modifications:    1.Dietary Modifications (Sodium intake <2,000mg/day, food labels, dining out): Deferred    2.Physical Activity: Patient is walking 4-5 days per week in her backyard (has a large backyard) or around her neighborhood. She averages 8,000 - 11,000 per day.    3.Medication Therapy: Patient has been compliant with the medication regimen. Patient is doing well on her current BP medication regimen. She denies symptoms/side effects.     4.Patient has the following medication side effects/concerns: None  (Frequency/Alleviating factors/Precipitating factors, etc.)     Follow up with Ms. Madan Bell completed. No further questions or concerns. Will continue to follow up to achieve health goals.   01-Mar-2020 22:37

## 2020-03-05 ENCOUNTER — PATIENT OUTREACH (OUTPATIENT)
Dept: OTHER | Facility: OTHER | Age: 80
End: 2020-03-05

## 2020-03-06 NOTE — PROGRESS NOTES
Digital Medicine: Health  Follow-Up    The history is provided by the patient.     Follow Up  Follow-up reason(s): routine education    Brief follow up completed with the patient. She stated that she is doing well and she pleased with her readings. She denies any major changes to her diet/exercise routine. She also denies issues related to her pressure or medications. She will reach out if any arise.       Intervention/Plan    There are no preventive care reminders to display for this patient.    Last 5 Patient Entered Readings                                      Current 30 Day Average: 115/67     Recent Readings 3/5/2020 3/4/2020 3/3/2020 3/2/2020 2/28/2020    SBP (mmHg) 116 122 131 110 110    DBP (mmHg) 74 64 77 65 78    Pulse 65 65 67 45 86                  Screenings    SDOH

## 2020-03-09 ENCOUNTER — PATIENT OUTREACH (OUTPATIENT)
Dept: ADMINISTRATIVE | Facility: OTHER | Age: 80
End: 2020-03-09

## 2020-03-09 ENCOUNTER — HOSPITAL ENCOUNTER (OUTPATIENT)
Dept: RADIOLOGY | Facility: HOSPITAL | Age: 80
Discharge: HOME | End: 2020-03-09
Attending: UROLOGY
Payer: MEDICARE

## 2020-03-09 DIAGNOSIS — N39.0 RECURRENT UTI: ICD-10-CM

## 2020-03-09 PROCEDURE — 76770 US EXAM ABDO BACK WALL COMP: CPT | Mod: TC,HCNC

## 2020-03-09 PROCEDURE — 76770 US EXAM ABDO BACK WALL COMP: CPT | Mod: 26,HCNC,, | Performed by: RADIOLOGY

## 2020-03-09 PROCEDURE — 76770 US KIDNEY: ICD-10-PCS | Mod: 26,HCNC,, | Performed by: RADIOLOGY

## 2020-03-10 ENCOUNTER — PATIENT MESSAGE (OUTPATIENT)
Dept: UROLOGY | Facility: CLINIC | Age: 80
End: 2020-03-10

## 2020-03-12 ENCOUNTER — PATIENT MESSAGE (OUTPATIENT)
Dept: UROLOGY | Facility: CLINIC | Age: 80
End: 2020-03-12

## 2020-03-12 RX ORDER — ATORVASTATIN CALCIUM 40 MG/1
40 TABLET, FILM COATED ORAL DAILY
Qty: 90 TABLET | Refills: 3 | Status: SHIPPED | OUTPATIENT
Start: 2020-03-12 | End: 2021-04-26

## 2020-03-12 RX ORDER — LEVOTHYROXINE SODIUM 75 UG/1
TABLET ORAL
Qty: 90 TABLET | Refills: 3 | Status: SHIPPED | OUTPATIENT
Start: 2020-03-12 | End: 2021-03-28

## 2020-03-12 NOTE — TELEPHONE ENCOUNTER
----- Message from Daylin Lawrence sent at 3/12/2020  9:45 AM CDT -----  Contact: Self [phone]  Patient will like to call in a refill for levothyroxine (SYNTHROID) 75 MCG tablet and atorvastatin (LIPITOR) 40 MG tablet due to the COVID_19? Patient will like to know can she get a sooner refill to make sure she have enough medication, please advise.

## 2020-04-23 ENCOUNTER — TELEPHONE (OUTPATIENT)
Dept: UROLOGY | Facility: CLINIC | Age: 80
End: 2020-04-23

## 2020-04-23 DIAGNOSIS — N39.0 RECURRENT UTI: ICD-10-CM

## 2020-04-23 DIAGNOSIS — N95.2 ATROPHIC VAGINITIS: ICD-10-CM

## 2020-04-23 RX ORDER — ESTRADIOL 0.1 MG/G
CREAM VAGINAL
Qty: 1785 G | Refills: 0 | Status: SHIPPED | OUTPATIENT
Start: 2020-04-23 | End: 2022-07-05

## 2020-04-23 NOTE — TELEPHONE ENCOUNTER
----- Message from Taylor Klein sent at 4/23/2020 12:07 PM CDT -----  Contact: [facility] at  [phone]//whoever answers  Rx Refill/Request     Is this a Refill or New Rx:  New rx  Rx Name and Strength:  Estradiol   0.01% vaginal cream  90 days supply  Preferred Pharmacy with phone number:   [facility] at [phone]  Communication Preference:  Additional Information: Requested on April 14. Please call with update.

## 2020-04-23 NOTE — TELEPHONE ENCOUNTER
See if patient wants to do a virtual visit. Seen over 1 year ago. Cancelled appointment b/c of covid. Refilled med x 1.

## 2020-04-27 ENCOUNTER — PATIENT OUTREACH (OUTPATIENT)
Dept: OTHER | Facility: OTHER | Age: 80
End: 2020-04-27

## 2020-04-27 ENCOUNTER — PATIENT OUTREACH (OUTPATIENT)
Dept: ADMINISTRATIVE | Facility: OTHER | Age: 80
End: 2020-04-27

## 2020-04-27 NOTE — PROGRESS NOTES
Digital Medicine: Health  Follow-Up    The history is provided by the patient.     Follow Up  Follow-up reason(s): routine education    Patient stated that she is doing well overall. She is staying home and her children are bringing her groceries so she does not have to leave her house. She is also pleased with her BP readings overall.       Intervention/Plan    There are no preventive care reminders to display for this patient.    Last 5 Patient Entered Readings                                      Current 30 Day Average: 120/71     Recent Readings 4/27/2020 4/26/2020 4/25/2020 4/23/2020 4/22/2020    SBP (mmHg) 130 128 134 115 115    DBP (mmHg) 81 77 71 75 73    Pulse 76 86 62 70 81                      Diet Screening   No change to diet.  She has the following dietary restrictions: low sodium dietShe cooks for self.    Patient does the shopping for groceries and lets family grocery shop.  She gets groceries from the grocery store.      Medication Adherence Screening   She did not miss a dose this month.    Patient identified the following reasons for non-compliance: None      SDOH

## 2020-04-28 ENCOUNTER — OFFICE VISIT (OUTPATIENT)
Dept: UROLOGY | Facility: CLINIC | Age: 80
End: 2020-04-28
Payer: MEDICARE

## 2020-04-28 DIAGNOSIS — N39.0 RECURRENT UTI: ICD-10-CM

## 2020-04-28 DIAGNOSIS — I48.0 PAROXYSMAL ATRIAL FIBRILLATION: Primary | ICD-10-CM

## 2020-04-28 DIAGNOSIS — I10 HTN (HYPERTENSION), BENIGN: ICD-10-CM

## 2020-04-28 DIAGNOSIS — N39.46 MIXED STRESS AND URGE URINARY INCONTINENCE: ICD-10-CM

## 2020-04-28 PROCEDURE — 99441 PR PHYSICIAN TELEPHONE EVALUATION 5-10 MIN: ICD-10-PCS | Mod: HCNC,95,, | Performed by: UROLOGY

## 2020-04-28 PROCEDURE — 99441 PR PHYSICIAN TELEPHONE EVALUATION 5-10 MIN: CPT | Mod: HCNC,95,, | Performed by: UROLOGY

## 2020-04-28 NOTE — PROGRESS NOTES
Established Patient - Audio Only Telehealth Visit     The patient location is: [place]  The chief complaint leading to consultation is: follow up  Visit type: Virtual visit with audio only (telephone)     The reason for the audio only service rather than synchronous audio and video virtual visit was related to technical difficulties or patient preference/necessity.     Each patient to whom I provide medical services by telemedicine is:  (1) informed of the relationship between the physician and patient and the respective role of any other health care provider with respect to management of the patient; and (2) notified that they may decline to receive medical services by telemedicine and may withdraw from such care at any time. Patient verbally consented to receive this service via voice-only telephone call.    HPI:    Madan Bell is a 80 y.o. female  by  s/p laparoscopic hysterectomy and BSO  with HTN, paroxysmal V tach, s/p thyroidectomy for multinodular goiter 2013, chronic low back pain, who presents for follow up of recurrent UTI.    Last positive urine culture was 2019.   She is on vaginal estrogen since  twice a week.   She is doing well right now.   She has a pcp.   No COVID symptoms.     3/9/20 ultrasound - films personally reviewed.     1. Both kidneys are normal in size with no evidence of nephrolithiasis or renal mass.  2. Right-sided extrarenal pelvis is noted.     Cysto  showed no abnormalities  CT RSS  showed a punctate right renal stone. No other stones. No hydro. Films personally reviewed.   Ultrasound 17 showed stable 3mm right renal stone.      Nocturia x 1-2 times.  She does drink a lot of water.   She states that she empties well.  No obstructive symptoms.    Patient endorses leaking urine with cough, laugh, sneeze, and activity.  She notes occasional smaller volume leakage with urgency, but this is rare.  2 liners a day (previous 6-7 times a day).    No urge incontinence.   She denies constipation.      She is on Align probiotic daily.  Drinking cranberry juice.   No D-mannose.   Eats yogurt daily 4 times a week.              Past Surgical History:   Procedure Laterality Date    BREAST BIOPSY Left       excisional    BREAST BIOPSY Left       core needle biopsy    BREAST CYST ASPIRATION   1980's - ??    BREAST CYST EXCISION   2008 - ??    CARDIOVERSION N/A 8/13/2018     Performed by Fernando Disla MD at [facility]    ECHOCARDIOGRAM,TRANSESOPHAGEAL N/A 8/13/2018     Performed by Fernando [facility]la MD at [facility]    HYSTERECTOMY         tahbso    OOPHORECTOMY   hysterectomy - 2000 - ??    THYROID SURGERY        THYROIDECTOMY Bilateral 1/9/2013     Performed by Connor Santos MD at [facility] OR [address] [place] [address]         Assessment and plan:     home collect urine culture for future UTI symptoms.   Can do antibiotics.   Push fluids.   Continue d-mannose, probiotics.     This service was not originating from a related E/M service provided within the previous 7 days nor will  to an E/M service or procedure within the next 24 hours or my soonest available appointment.  Prevailing standard of care was able to be met in this audio-only visit.    I spent 7 minutes on the phone with the patient.

## 2020-05-01 ENCOUNTER — PATIENT MESSAGE (OUTPATIENT)
Dept: ORTHOPEDICS | Facility: CLINIC | Age: 80
End: 2020-05-01

## 2020-05-14 ENCOUNTER — PATIENT MESSAGE (OUTPATIENT)
Dept: UROLOGY | Facility: CLINIC | Age: 80
End: 2020-05-14

## 2020-05-18 ENCOUNTER — LAB VISIT (OUTPATIENT)
Dept: LAB | Facility: HOSPITAL | Age: 80
End: 2020-05-18
Attending: UROLOGY
Payer: MEDICARE

## 2020-05-18 DIAGNOSIS — N39.0 RECURRENT UTI: ICD-10-CM

## 2020-05-18 PROCEDURE — 87186 SC STD MICRODIL/AGAR DIL: CPT | Mod: HCNC

## 2020-05-18 PROCEDURE — 87086 URINE CULTURE/COLONY COUNT: CPT | Mod: HCNC

## 2020-05-18 PROCEDURE — 87077 CULTURE AEROBIC IDENTIFY: CPT | Mod: HCNC

## 2020-05-18 PROCEDURE — 87088 URINE BACTERIA CULTURE: CPT | Mod: HCNC

## 2020-05-20 ENCOUNTER — PATIENT MESSAGE (OUTPATIENT)
Dept: UROLOGY | Facility: CLINIC | Age: 80
End: 2020-05-20

## 2020-05-20 ENCOUNTER — TELEPHONE (OUTPATIENT)
Dept: UROLOGY | Facility: CLINIC | Age: 80
End: 2020-05-20

## 2020-05-20 LAB — BACTERIA UR CULT: ABNORMAL

## 2020-05-20 RX ORDER — DOXYCYCLINE 100 MG/1
100 CAPSULE ORAL 2 TIMES DAILY
Qty: 14 CAPSULE | Refills: 0 | Status: SHIPPED | OUTPATIENT
Start: 2020-05-20 | End: 2020-05-27

## 2020-05-24 ENCOUNTER — PATIENT MESSAGE (OUTPATIENT)
Dept: DERMATOLOGY | Facility: CLINIC | Age: 80
End: 2020-05-24

## 2020-05-31 PROCEDURE — 99454 PR REMOTE MNTR, PHYS PARAM, INITIAL, EA 30 DAYS: ICD-10-PCS | Mod: S$GLB,,, | Performed by: INTERNAL MEDICINE

## 2020-05-31 PROCEDURE — 99454 REM MNTR PHYSIOL PARAM 16-30: CPT | Mod: S$GLB,,, | Performed by: INTERNAL MEDICINE

## 2020-05-31 RX ORDER — BISOPROLOL FUMARATE AND HYDROCHLOROTHIAZIDE 2.5; 6.25 MG/1; MG/1
TABLET ORAL
Qty: 45 TABLET | Refills: 3 | Status: SHIPPED | OUTPATIENT
Start: 2020-05-31 | End: 2021-03-11

## 2020-06-09 ENCOUNTER — PATIENT OUTREACH (OUTPATIENT)
Dept: OTHER | Facility: OTHER | Age: 80
End: 2020-06-09

## 2020-06-09 NOTE — PROGRESS NOTES
Digital Medicine: Health  Follow-Up    The history is provided by the patient.   Follow Up  Follow-up reason(s): routine education    Patient stated that she is doing well overall. She denies any symptoms even when her pressure is running a little lower then usual. She is pleased with her readings overall and she reach out if any questions or concerns arise.       Intervention/Plan    There are no preventive care reminders to display for this patient.    Last 5 Patient Entered Readings                                      Current 30 Day Average: 110/65     Recent Readings 6/9/2020 6/8/2020 6/7/2020 6/6/2020 6/5/2020    SBP (mmHg) 109 118 114 101 107    DBP (mmHg) 63 71 44 62 65    Pulse 72 79 65 75 62                      Medication Adherence Screening   She did not miss a dose this month.    Patient identified the following reasons for non-compliance: None      SDOH

## 2020-06-22 ENCOUNTER — TELEPHONE (OUTPATIENT)
Dept: INTERNAL MEDICINE | Facility: CLINIC | Age: 80
End: 2020-06-22

## 2020-06-22 ENCOUNTER — LAB VISIT (OUTPATIENT)
Dept: LAB | Facility: HOSPITAL | Age: 80
End: 2020-06-22
Attending: INTERNAL MEDICINE
Payer: MEDICARE

## 2020-06-22 DIAGNOSIS — N39.0 URINARY TRACT INFECTION WITHOUT HEMATURIA, SITE UNSPECIFIED: ICD-10-CM

## 2020-06-22 DIAGNOSIS — N39.0 URINARY TRACT INFECTION WITHOUT HEMATURIA, SITE UNSPECIFIED: Primary | ICD-10-CM

## 2020-06-22 LAB
BACTERIA #/AREA URNS AUTO: ABNORMAL /HPF
BILIRUB UR QL STRIP: NEGATIVE
CLARITY UR REFRACT.AUTO: ABNORMAL
COLOR UR AUTO: YELLOW
GLUCOSE UR QL STRIP: NEGATIVE
HGB UR QL STRIP: ABNORMAL
KETONES UR QL STRIP: NEGATIVE
LEUKOCYTE ESTERASE UR QL STRIP: ABNORMAL
MICROSCOPIC COMMENT: ABNORMAL
NITRITE UR QL STRIP: NEGATIVE
PH UR STRIP: 6 [PH] (ref 5–8)
PROT UR QL STRIP: NEGATIVE
RBC #/AREA URNS AUTO: 2 /HPF (ref 0–4)
SP GR UR STRIP: 1 (ref 1–1.03)
SQUAMOUS #/AREA URNS AUTO: 1 /HPF
URN SPEC COLLECT METH UR: ABNORMAL
WBC #/AREA URNS AUTO: 50 /HPF (ref 0–5)
WBC CLUMPS UR QL AUTO: ABNORMAL

## 2020-06-22 PROCEDURE — 87186 SC STD MICRODIL/AGAR DIL: CPT | Mod: HCNC

## 2020-06-22 PROCEDURE — 87086 URINE CULTURE/COLONY COUNT: CPT | Mod: HCNC

## 2020-06-22 PROCEDURE — 81001 URINALYSIS AUTO W/SCOPE: CPT | Mod: HCNC

## 2020-06-22 PROCEDURE — 87088 URINE BACTERIA CULTURE: CPT | Mod: HCNC

## 2020-06-22 PROCEDURE — 87077 CULTURE AEROBIC IDENTIFY: CPT | Mod: HCNC

## 2020-06-24 LAB — BACTERIA UR CULT: ABNORMAL

## 2020-06-26 ENCOUNTER — TELEPHONE (OUTPATIENT)
Dept: INTERNAL MEDICINE | Facility: CLINIC | Age: 80
End: 2020-06-26

## 2020-07-09 ENCOUNTER — TELEPHONE (OUTPATIENT)
Dept: INTERNAL MEDICINE | Facility: CLINIC | Age: 80
End: 2020-07-09

## 2020-07-09 DIAGNOSIS — N39.0 URINARY TRACT INFECTION WITHOUT HEMATURIA, SITE UNSPECIFIED: Primary | ICD-10-CM

## 2020-07-10 ENCOUNTER — LAB VISIT (OUTPATIENT)
Dept: LAB | Facility: HOSPITAL | Age: 80
End: 2020-07-10
Attending: INTERNAL MEDICINE
Payer: MEDICARE

## 2020-07-10 DIAGNOSIS — N39.0 URINARY TRACT INFECTION WITHOUT HEMATURIA, SITE UNSPECIFIED: ICD-10-CM

## 2020-07-10 LAB
BILIRUB UR QL STRIP: NEGATIVE
CLARITY UR REFRACT.AUTO: CLEAR
COLOR UR AUTO: YELLOW
GLUCOSE UR QL STRIP: NEGATIVE
HGB UR QL STRIP: NEGATIVE
KETONES UR QL STRIP: NEGATIVE
LEUKOCYTE ESTERASE UR QL STRIP: NEGATIVE
MICROSCOPIC COMMENT: NORMAL
NITRITE UR QL STRIP: NEGATIVE
PH UR STRIP: 6 [PH] (ref 5–8)
PROT UR QL STRIP: NEGATIVE
RBC #/AREA URNS AUTO: 1 /HPF (ref 0–4)
SP GR UR STRIP: 1 (ref 1–1.03)
SQUAMOUS #/AREA URNS AUTO: 0 /HPF
URN SPEC COLLECT METH UR: NORMAL

## 2020-07-10 PROCEDURE — 81001 URINALYSIS AUTO W/SCOPE: CPT | Mod: HCNC

## 2020-07-10 PROCEDURE — 87086 URINE CULTURE/COLONY COUNT: CPT | Mod: HCNC

## 2020-07-11 LAB — BACTERIA UR CULT: NORMAL

## 2020-07-17 ENCOUNTER — PATIENT OUTREACH (OUTPATIENT)
Dept: ADMINISTRATIVE | Facility: OTHER | Age: 80
End: 2020-07-17

## 2020-07-20 ENCOUNTER — HOSPITAL ENCOUNTER (OUTPATIENT)
Dept: CARDIOLOGY | Facility: HOSPITAL | Age: 80
Discharge: HOME | End: 2020-07-20
Attending: INTERNAL MEDICINE
Payer: MEDICARE

## 2020-07-20 ENCOUNTER — OFFICE VISIT (OUTPATIENT)
Dept: CARDIOLOGY | Facility: CLINIC | Age: 80
End: 2020-07-20
Payer: MEDICARE

## 2020-07-20 VITALS
HEIGHT: 69 IN | WEIGHT: 149 LBS | SYSTOLIC BLOOD PRESSURE: 130 MMHG | HEART RATE: 87 BPM | DIASTOLIC BLOOD PRESSURE: 74 MMHG | BODY MASS INDEX: 22.07 KG/M2

## 2020-07-20 DIAGNOSIS — F41.9 ANXIETY: ICD-10-CM

## 2020-07-20 DIAGNOSIS — E78.00 HYPERCHOLESTEROLEMIA: ICD-10-CM

## 2020-07-20 DIAGNOSIS — I48.20 CHRONIC ATRIAL FIBRILLATION: ICD-10-CM

## 2020-07-20 DIAGNOSIS — I10 HTN (HYPERTENSION), BENIGN: ICD-10-CM

## 2020-07-20 DIAGNOSIS — R55 VASOVAGAL NEAR SYNCOPE: ICD-10-CM

## 2020-07-20 DIAGNOSIS — E06.3 HASHIMOTO'S DISEASE: ICD-10-CM

## 2020-07-20 DIAGNOSIS — I48.20 CHRONIC ATRIAL FIBRILLATION: Primary | ICD-10-CM

## 2020-07-20 DIAGNOSIS — I34.1 MVP (MITRAL VALVE PROLAPSE): ICD-10-CM

## 2020-07-20 DIAGNOSIS — E89.0 POST-OPERATIVE HYPOTHYROIDISM: ICD-10-CM

## 2020-07-20 DIAGNOSIS — I49.3 PVC'S (PREMATURE VENTRICULAR CONTRACTIONS): ICD-10-CM

## 2020-07-20 DIAGNOSIS — I34.0 NONRHEUMATIC MITRAL VALVE REGURGITATION: ICD-10-CM

## 2020-07-20 DIAGNOSIS — I47.29 PAROXYSMAL VENTRICULAR TACHYCARDIA: ICD-10-CM

## 2020-07-20 PROBLEM — I48.91 ATRIAL FIBRILLATION, NEW ONSET: Status: RESOLVED | Noted: 2018-08-05 | Resolved: 2020-07-20

## 2020-07-20 PROBLEM — I48.19 OTHER PERSISTENT ATRIAL FIBRILLATION: Status: ACTIVE | Noted: 2018-08-13

## 2020-07-20 PROCEDURE — 3078F DIAST BP <80 MM HG: CPT | Mod: HCNC,CPTII,S$GLB, | Performed by: INTERNAL MEDICINE

## 2020-07-20 PROCEDURE — 1101F PR PT FALLS ASSESS DOC 0-1 FALLS W/OUT INJ PAST YR: ICD-10-PCS | Mod: HCNC,CPTII,S$GLB, | Performed by: INTERNAL MEDICINE

## 2020-07-20 PROCEDURE — 99215 PR OFFICE/OUTPT VISIT, EST, LEVL V, 40-54 MIN: ICD-10-PCS | Mod: HCNC,S$GLB,, | Performed by: INTERNAL MEDICINE

## 2020-07-20 PROCEDURE — 3078F PR MOST RECENT DIASTOLIC BLOOD PRESSURE < 80 MM HG: ICD-10-PCS | Mod: HCNC,CPTII,S$GLB, | Performed by: INTERNAL MEDICINE

## 2020-07-20 PROCEDURE — 93010 EKG 12-LEAD: ICD-10-PCS | Mod: HCNC,,, | Performed by: INTERNAL MEDICINE

## 2020-07-20 PROCEDURE — 1159F PR MEDICATION LIST DOCUMENTED IN MEDICAL RECORD: ICD-10-PCS | Mod: HCNC,S$GLB,, | Performed by: INTERNAL MEDICINE

## 2020-07-20 PROCEDURE — 3075F PR MOST RECENT SYSTOLIC BLOOD PRESS GE 130-139MM HG: ICD-10-PCS | Mod: HCNC,CPTII,S$GLB, | Performed by: INTERNAL MEDICINE

## 2020-07-20 PROCEDURE — 99499 RISK ADDL DX/OHS AUDIT: ICD-10-PCS | Mod: HCNC,S$GLB,, | Performed by: INTERNAL MEDICINE

## 2020-07-20 PROCEDURE — 1101F PT FALLS ASSESS-DOCD LE1/YR: CPT | Mod: HCNC,CPTII,S$GLB, | Performed by: INTERNAL MEDICINE

## 2020-07-20 PROCEDURE — 93005 ELECTROCARDIOGRAM TRACING: CPT | Mod: HCNC

## 2020-07-20 PROCEDURE — 99999 PR PBB SHADOW E&M-EST. PATIENT-LVL IV: ICD-10-PCS | Mod: PBBFAC,HCNC,, | Performed by: INTERNAL MEDICINE

## 2020-07-20 PROCEDURE — 93010 ELECTROCARDIOGRAM REPORT: CPT | Mod: HCNC,,, | Performed by: INTERNAL MEDICINE

## 2020-07-20 PROCEDURE — 99215 OFFICE O/P EST HI 40 MIN: CPT | Mod: HCNC,S$GLB,, | Performed by: INTERNAL MEDICINE

## 2020-07-20 PROCEDURE — 99999 PR PBB SHADOW E&M-EST. PATIENT-LVL IV: CPT | Mod: PBBFAC,HCNC,, | Performed by: INTERNAL MEDICINE

## 2020-07-20 PROCEDURE — 3075F SYST BP GE 130 - 139MM HG: CPT | Mod: HCNC,CPTII,S$GLB, | Performed by: INTERNAL MEDICINE

## 2020-07-20 PROCEDURE — 1159F MED LIST DOCD IN RCRD: CPT | Mod: HCNC,S$GLB,, | Performed by: INTERNAL MEDICINE

## 2020-07-20 PROCEDURE — 99499 UNLISTED E&M SERVICE: CPT | Mod: HCNC,S$GLB,, | Performed by: INTERNAL MEDICINE

## 2020-07-20 NOTE — PROGRESS NOTES
Subjective:   Patient ID:  Madan Bell is a 80 y.o. female     Chief complaint:Atrial Fibrillation      HPI  80 y.o. female   Background :  PMH of HFpEF (55% EF), moderate MVP/ mitral sclerosis, PVCs/ non-sustained VT,prior hx of thyroid cancer s/p thyroidectomy, now with hypothyroidism.    Presented to ED on 8/5/2018 complaining of lightheadedness for the preceding 2 days, associated with worsening fatigue. She stated she had similar episodes intermittently over the prior few days and woke up on the day of ER admit at 5:00am with constant dizziness and lightheadedness.  Her associated SBP was 98-99 (BP generally 130-140s/70s). She was on Ziac (very low dose) for HTN and arrhythmia control. Compliant with at home meds.  In the ED, she was noted to be in AF with RVR.  In the ED, pt was anxious with HR of 130-120s, and BP of 160-200s. Resolved once pt calmed down with no intervention.  Patient denied chest pain, palpitations, dyspnea, headaches, syncope, vertigo, cough, nausea, vomiting.     No hx of CAD, PE. Had a VTE 50 years ago post partum (unsure if superficial or DVT, but did not received anticoag). No peripheral deficits, falls, facial drool. No bleeding. Denied dysuria, suprapubic pain, or urinary changes.      She was admitted and had an ACS w/up with neg troponins, EKG with no TWI or ST elevation.   Heart rate was controlled, with her low dose of home bisoprolol. She was started on apixaban given CHADsVASC of 4. Echo showed EF of 55% with  biatrial enlargement and Mild-moderate to moderate (2+) mitral regurgitation.      On the day of discharge, patient was in good condition ut in persistent AF.     Update 8/8/18:  She is now here with her DTR to see me a day after DC. Still feels fatigued and with some L/H.  I have reviewed the actual image of the ECG tracing obtained today and it shows AF with calculated average VR of 70  bpm, measured RR of 400- 1500  msec with o/w normal intervals.     Update  10/11/18:  My A and P at the time were as follows:  At this point, we will simply proceed with DCCV after ANAHI survey. She understands that this may not be a long lasting solution but it'll be helpful to relieve Sx quickly and perhaps establish a pattern of clinical progress of her AF.  DCCV was done on 8/13 and she has remained in NSR   For a while thereafter she felt poorly as she was struggling with UTI Rx-- she felt dizzy a lot   She was seen by Dr Flower who gave her an event monitor  >> Many transmissions due to dizziness >> SR with PVCs but also same findings when no Sx reported  I have reviewed the actual image of the ECG tracing obtained today and it shows NSR with frequent PVCs and o/w normal intervals     Update 01/17/2019:  Had DCCV 8/13   She had an episode of near syncope that started when she was urinating on 1/9 in the evening-- went to ER -- no issues   She had been on antibiotics for UTI -- she has been a lot of these -- almost every 6 weeks    I have reviewed the actual image of the ECG tracing obtained today and it shows NSR with V quadrigeminy on the RS (she did not feel the PVCs)-- o/w  normal intervals     Update since then:  She has been doing well from a cardiovascular point of view.  She continued to walk without dyspnea on exertion but her knees now are really bothering her and she really needs to have knee surgery (TKR).  She is asking whether this is feasible from a cardiovascular point of view.  After several years of taking care of her  who had developed a dementia, she now lives with the son of hers with the whole family living in the floor above her.  This disturbs her sleep as the day starts quite early for the son's family.  She does have some nocturia.  She has occasional palpitations but only at rest not when she is active.  She is complaining of some more pronounced memory loss.  This could be due to poor sleep hygiene.    Current Outpatient Medications   Medication  Sig    apixaban (ELIQUIS) 5 mg Tab Take 1 tablet (5 mg total) by mouth 2 (two) times daily.    ascorbic acid (VITAMIN C) 500 MG tablet Take 500 mg by mouth once daily.    aspirin 81 mg Tab Take 1 tablet by mouth every Mon, Wed, Fri.     atorvastatin (LIPITOR) 40 MG tablet Take 1 tablet (40 mg total) by mouth once daily.    Bifidobacterium infantis (ALIGN) 4 mg Cap One daily (Patient taking differently: Take one capsule by mouth once daily.Take additional dose while on antibiotics)    bisoprolol-hydrochlorothiazide 2.5-6.25 mg (ZIAC) 2.5-6.25 mg Tab TAKE 1/2 TABLET EVERY DAY    cholecalciferol, vitamin D3, (VITAMIN D3) 2,000 unit Cap Take by mouth once daily.      D-MANNOSE ORAL Take 4 tablets by mouth once daily.     estradioL (ESTRACE) 0.01 % (0.1 mg/gram) vaginal cream APPLY A PEA SIZED AMOUNT DAILY FOR TWO WEEKS THEN THREE TIMES A WEEK    levothyroxine (SYNTHROID) 75 MCG tablet TAKE 1 TABLET EVERY DAY (NEED MD APPOINTMENT)    omega-3 fatty acids-vitamin E (FISH OIL) 1,000 mg Cap Take 1 capsule by mouth once daily. 1400mg    pantoprazole (PROTONIX) 40 MG tablet TAKE 1 TABLET EVERY DAY    metoprolol tartrate (LOPRESSOR) 50 MG tablet Take one half to one tablet daily as needed for palpitations.    mometasone (ELOCON) 0.1 % solution Mix entire bottle in jar of cerave cream and aaa qd- bid prn itching    triamcinolone acetonide 0.1% (KENALOG) 0.1 % cream AAA qd to leg prn flare; not more than 2 weeks straight in same location, avoid use on face and groin     No current facility-administered medications for this visit.      Review of Systems   Constitution: Negative for decreased appetite, weight gain and weight loss.   HENT: Negative for nosebleeds.    Eyes: Negative for blurred vision and visual disturbance.   Cardiovascular: Positive for palpitations. Negative for chest pain, claudication, cyanosis, dyspnea on exertion, irregular heartbeat, leg swelling, near-syncope, orthopnea, paroxysmal nocturnal  dyspnea and syncope.   Respiratory: Negative for cough, shortness of breath and wheezing.    Endocrine: Negative for heat intolerance.   Skin: Negative for rash.   Musculoskeletal: Positive for arthritis and joint pain. Negative for muscle weakness and myalgias.   Gastrointestinal: Negative for abdominal pain, anorexia, melena, nausea and vomiting.   Genitourinary: Negative for menorrhagia.   Neurological: Negative for excessive daytime sleepiness, dizziness, headaches, loss of balance, seizures, vertigo and weakness.   Psychiatric/Behavioral: Positive for memory loss. Negative for altered mental status and depression. The patient is not nervous/anxious.     Specifically, except of notes made above, negative CV ROS including lack of , chest pain, dyspnea on exertion, shortness of breath, orthopnea, PND, leg edema, syncope, near-syncope, symptoms of TIA and or peripheral emboli and claudication.    Objective:   Physical Exam   Constitutional: She is oriented to person, place, and time. She appears well-developed and well-nourished.   HENT:   Head: Normocephalic and atraumatic.   Right Ear: External ear normal.   Left Ear: External ear normal.   Neck: Normal range of motion. Neck supple. No thyromegaly present.   Cardiovascular: Normal rate and intact distal pulses. An irregularly irregular rhythm present. Exam reveals no gallop, no S3, no S4, no friction rub, no midsystolic click and no opening snap.   Murmur heard.  High-pitched blowing holosystolic murmur is present with a grade of 3/6 at the apex.  Pulses:       Carotid pulses are 2+ on the right side and 2+ on the left side.       Radial pulses are 2+ on the right side and 2+ on the left side.        Dorsalis pedis pulses are 2+ on the right side and 2+ on the left side.        Posterior tibial pulses are 2+ on the right side and 2+ on the left side.   Pulmonary/Chest: Effort normal and breath sounds normal.   Abdominal: Soft. She exhibits no distension. There is  "no abdominal tenderness.   Musculoskeletal:      Right ankle: She exhibits no swelling.      Left ankle: She exhibits no swelling.      Right lower leg: She exhibits no swelling.      Left lower leg: She exhibits no swelling.   Neurological: She is alert and oriented to person, place, and time. She has normal strength. No cranial nerve deficit. Gait normal.   Skin: Skin is warm. No rash noted. No cyanosis. Nails show no clubbing.   Psychiatric: She has a normal mood and affect. Her speech is normal and behavior is normal. Thought content normal. Cognition and memory are normal.   Nursing note and vitals reviewed.    /74 (BP Location: Left arm, Patient Position: Sitting)   Pulse 87   Ht 5' 9" (1.753 m)   Wt 67.6 kg (149 lb)   LMP  (LMP Unknown)   BMI 22.00 kg/m²      Assessment:    Her MR murmur seems louder than usual.  However, there was no exam evidence of severe MR.  1. Chronic atrial fibrillation    2. Nonrheumatic mitral valve regurgitation    3. MVP (mitral valve prolapse)    4. HTN (hypertension), benign    5. PVC's (premature ventricular contractions)    6. Paroxysmal ventricular tachycardia    7. Hypercholesterolemia    8. Anxiety    9. Hashimoto's disease    10. Post-operative hypothyroidism    11. Vasovagal near syncope        Plan:      Orders Placed This Encounter   Procedures    Brain Natriuretic Peptide     Standing Status:   Future     Number of Occurrences:   1     Standing Expiration Date:   9/18/2021    Basic metabolic panel     Standing Status:   Future     Number of Occurrences:   1     Standing Expiration Date:   9/18/2021    Holter monitor - 48 hour     Standing Status:   Future     Standing Expiration Date:   7/20/2021    Echo Color Flow Doppler? Yes     Standing Status:   Future     Standing Expiration Date:   7/20/2021     Order Specific Question:   Color Flow Doppler?     Answer:   Yes     Follow up in about 6 months (around 1/20/2021), or if symptoms worsen or fail to " improve.  There are no discontinued medications.     Medication List with Changes/Refills   Current Medications    APIXABAN (ELIQUIS) 5 MG TAB    Take 1 tablet (5 mg total) by mouth 2 (two) times daily.    ASCORBIC ACID (VITAMIN C) 500 MG TABLET    Take 500 mg by mouth once daily.    ASPIRIN 81 MG TAB    Take 1 tablet by mouth every Mon, Wed, Fri.     ATORVASTATIN (LIPITOR) 40 MG TABLET    Take 1 tablet (40 mg total) by mouth once daily.    BIFIDOBACTERIUM INFANTIS (ALIGN) 4 MG CAP    One daily    BISOPROLOL-HYDROCHLOROTHIAZIDE 2.5-6.25 MG (ZIAC) 2.5-6.25 MG TAB    TAKE 1/2 TABLET EVERY DAY    CHOLECALCIFEROL, VITAMIN D3, (VITAMIN D3) 2,000 UNIT CAP    Take by mouth once daily.      D-MANNOSE ORAL    Take 4 tablets by mouth once daily.     ESTRADIOL (ESTRACE) 0.01 % (0.1 MG/GRAM) VAGINAL CREAM    APPLY A PEA SIZED AMOUNT DAILY FOR TWO WEEKS THEN THREE TIMES A WEEK    LEVOTHYROXINE (SYNTHROID) 75 MCG TABLET    TAKE 1 TABLET EVERY DAY (NEED MD APPOINTMENT)    METOPROLOL TARTRATE (LOPRESSOR) 50 MG TABLET    Take one half to one tablet daily as needed for palpitations.    MOMETASONE (ELOCON) 0.1 % SOLUTION    Mix entire bottle in jar of cerave cream and aaa qd- bid prn itching    OMEGA-3 FATTY ACIDS-VITAMIN E (FISH OIL) 1,000 MG CAP    Take 1 capsule by mouth once daily. 1400mg    PANTOPRAZOLE (PROTONIX) 40 MG TABLET    TAKE 1 TABLET EVERY DAY    TRIAMCINOLONE ACETONIDE 0.1% (KENALOG) 0.1 % CREAM    AAA qd to leg prn flare; not more than 2 weeks straight in same location, avoid use on face and groin

## 2020-07-29 ENCOUNTER — PATIENT OUTREACH (OUTPATIENT)
Dept: OTHER | Facility: OTHER | Age: 80
End: 2020-07-29

## 2020-07-29 NOTE — PROGRESS NOTES
Digital Medicine: Health  Follow-Up    The history is provided by the patient.             Reason for review: Blood pressure at goal      Additional Follow-up details: Brief follow up completed with the patient. She stated that she is doing well overall and she is very pleased with her recently BP average since it remains controlled. She denies any issues at this time and she will reach out if any questions arise.         Diet-no change to diet    No change to diet.      Additional diet details:    Physical Activity-Not assessed    Medication Adherence-Medication adherence was assessed.      Substance, Sleep, Stress-Not assessed    PLAN  Continue current diet/physical activity routine:    Patient did not express questions or concerns and patient has contact information if needed.    Explained the importance of self-monitoring and medication adherence. Encouraged the patient to communicate with their health  for lifestyle modifications to help improve or maintain a healthy lifestyle.        There are no preventive care reminders to display for this patient.    Last 5 Patient Entered Readings                                      Current 30 Day Average: 112/66     Recent Readings 7/29/2020 7/28/2020 7/27/2020 7/26/2020 7/25/2020    SBP (mmHg) 115 117 114 129 95    DBP (mmHg) 66 68 74 70 57    Pulse 74 71 71 73 58

## 2020-07-31 PROCEDURE — 99454 PR REMOTE MNTR, PHYS PARAM, INITIAL, EA 30 DAYS: ICD-10-PCS | Mod: S$GLB,,, | Performed by: INTERNAL MEDICINE

## 2020-07-31 PROCEDURE — 99454 REM MNTR PHYSIOL PARAM 16-30: CPT | Mod: S$GLB,,, | Performed by: INTERNAL MEDICINE

## 2020-08-03 ENCOUNTER — CLINICAL SUPPORT (OUTPATIENT)
Dept: CARDIOLOGY | Facility: HOSPITAL | Age: 80
End: 2020-08-03
Attending: INTERNAL MEDICINE
Payer: MEDICARE

## 2020-08-03 ENCOUNTER — HOSPITAL ENCOUNTER (OUTPATIENT)
Dept: CARDIOLOGY | Facility: HOSPITAL | Age: 80
Discharge: HOME | End: 2020-08-03
Attending: INTERNAL MEDICINE
Payer: MEDICARE

## 2020-08-03 VITALS
HEIGHT: 69 IN | BODY MASS INDEX: 22.07 KG/M2 | WEIGHT: 149 LBS | SYSTOLIC BLOOD PRESSURE: 136 MMHG | HEART RATE: 90 BPM | DIASTOLIC BLOOD PRESSURE: 80 MMHG

## 2020-08-03 DIAGNOSIS — I48.20 CHRONIC ATRIAL FIBRILLATION: ICD-10-CM

## 2020-08-03 LAB
ASCENDING AORTA: 2.28 CM
AV INDEX (PROSTH): 0.61
AV MEAN GRADIENT: 3 MMHG
AV PEAK GRADIENT: 5 MMHG
AV VALVE AREA: 1.89 CM2
AV VELOCITY RATIO: 0.61
BSA FOR ECHO PROCEDURE: 1.81 M2
CV ECHO LV RWT: 0.38 CM
DOP CALC AO PEAK VEL: 1.1 M/S
DOP CALC AO VTI: 21.57 CM
DOP CALC LVOT AREA: 3.1 CM2
DOP CALC LVOT DIAMETER: 1.99 CM
DOP CALC LVOT PEAK VEL: 0.67 M/S
DOP CALC LVOT STROKE VOLUME: 40.66 CM3
DOP CALCLVOT PEAK VEL VTI: 13.08 CM
E WAVE DECELERATION TIME: 204.13 MSEC
E/A RATIO: 1.76
E/E' RATIO: 12.13 M/S
ECHO LV POSTERIOR WALL: 0.86 CM (ref 0.6–1.1)
FRACTIONAL SHORTENING: 38 % (ref 28–44)
INTERVENTRICULAR SEPTUM: 0.85 CM (ref 0.6–1.1)
LA MAJOR: 5.39 CM
LA MINOR: 5.54 CM
LA WIDTH: 5.25 CM
LEFT ATRIUM SIZE: 4.12 CM
LEFT ATRIUM VOLUME INDEX: 55.1 ML/M2
LEFT ATRIUM VOLUME: 100.46 CM3
LEFT INTERNAL DIMENSION IN SYSTOLE: 2.79 CM (ref 2.1–4)
LEFT VENTRICLE DIASTOLIC VOLUME INDEX: 50.72 ML/M2
LEFT VENTRICLE DIASTOLIC VOLUME: 92.44 ML
LEFT VENTRICLE MASS INDEX: 68 G/M2
LEFT VENTRICLE SYSTOLIC VOLUME INDEX: 16 ML/M2
LEFT VENTRICLE SYSTOLIC VOLUME: 29.19 ML
LEFT VENTRICULAR INTERNAL DIMENSION IN DIASTOLE: 4.5 CM (ref 3.5–6)
LEFT VENTRICULAR MASS: 124.03 G
LV LATERAL E/E' RATIO: 10.78 M/S
LV SEPTAL E/E' RATIO: 13.86 M/S
MV PEAK A VEL: 0.55 M/S
MV PEAK E VEL: 0.97 M/S
MV STENOSIS PRESSURE HALF TIME: 59.2 MS
MV VALVE AREA P 1/2 METHOD: 3.72 CM2
PISA MRMAX VEL: 0.05 M/S
PISA RADIUS: 1.01 CM
PISA TR MAX VEL: 2.46 M/S
RA MAJOR: 5.32 CM
RA PRESSURE: 15 MMHG
RA WIDTH: 4.56 CM
RIGHT VENTRICULAR END-DIASTOLIC DIMENSION: 4.6 CM
RV TISSUE DOPPLER FREE WALL SYSTOLIC VELOCITY 1 (APICAL 4 CHAMBER VIEW): 12.83 CM/S
SINUS: 3.07 CM
STJ: 2.4 CM
TDI LATERAL: 0.09 M/S
TDI SEPTAL: 0.07 M/S
TDI: 0.08 M/S
TR MAX PG: 24 MMHG
TRICUSPID ANNULAR PLANE SYSTOLIC EXCURSION: 2.02 CM
TV REST PULMONARY ARTERY PRESSURE: 39 MMHG

## 2020-08-03 PROCEDURE — 93306 TTE W/DOPPLER COMPLETE: CPT | Mod: HCNC

## 2020-08-03 PROCEDURE — 93227 XTRNL ECG REC<48 HR R&I: CPT | Mod: HCNC,,, | Performed by: INTERNAL MEDICINE

## 2020-08-03 PROCEDURE — 93306 ECHO (CUPID ONLY): ICD-10-PCS | Mod: 26,HCNC,, | Performed by: INTERNAL MEDICINE

## 2020-08-03 PROCEDURE — 93226 XTRNL ECG REC<48 HR SCAN A/R: CPT | Mod: HCNC

## 2020-08-03 PROCEDURE — 93227 HOLTER MONITOR - 48 HOUR (CUPID ONLY): ICD-10-PCS | Mod: HCNC,,, | Performed by: INTERNAL MEDICINE

## 2020-08-03 PROCEDURE — 93306 TTE W/DOPPLER COMPLETE: CPT | Mod: 26,HCNC,, | Performed by: INTERNAL MEDICINE

## 2020-08-05 ENCOUNTER — PES CALL (OUTPATIENT)
Dept: ADMINISTRATIVE | Facility: CLINIC | Age: 80
End: 2020-08-05

## 2020-08-06 ENCOUNTER — PATIENT MESSAGE (OUTPATIENT)
Dept: CARDIOLOGY | Facility: CLINIC | Age: 80
End: 2020-08-06

## 2020-08-06 DIAGNOSIS — I49.3 PVC'S (PREMATURE VENTRICULAR CONTRACTIONS): Primary | ICD-10-CM

## 2020-08-06 DIAGNOSIS — I34.1 MVP (MITRAL VALVE PROLAPSE): ICD-10-CM

## 2020-08-12 LAB
OHS CV EVENT MONITOR DAY: 0
OHS CV HOLTER LENGTH DECIMAL HOURS: 48
OHS CV HOLTER LENGTH HOURS: 48
OHS CV HOLTER LENGTH MINUTES: 0

## 2020-08-14 ENCOUNTER — TELEPHONE (OUTPATIENT)
Dept: CARDIOLOGY | Facility: CLINIC | Age: 80
End: 2020-08-14

## 2020-08-14 DIAGNOSIS — I47.29 PVT (PAROXYSMAL VENTRICULAR TACHYCARDIA): Primary | ICD-10-CM

## 2020-08-14 DIAGNOSIS — I49.3 PVC'S (PREMATURE VENTRICULAR CONTRACTIONS): ICD-10-CM

## 2020-08-14 RX ORDER — MEXILETINE HYDROCHLORIDE 150 MG/1
150 CAPSULE ORAL EVERY 12 HOURS
COMMUNITY
End: 2020-08-14 | Stop reason: SDUPTHER

## 2020-08-14 NOTE — TELEPHONE ENCOUNTER
Sent patient portal message and will send new prescription to local Pharmacy for 30 day supply      ----- Message from Fernando Disla MD sent at 8/13/2020  2:07 PM CDT -----  See comments below and call patient to discuss.   Please close encounter when done -- no need to route back to me.  Thanks    This is OK in general   However, the number of PVCs and non sustained VT remains high  I cannot increase her dose of BBs due to past intolerance, tendency towards low BPs and pauses during sleep (not dangerous at this stage but limit the ability to increase AV blocker use).  I would like to start a trial of Mexitil 150 Q 12 hrs (200 Q 12 if the 150 formulation is on back order) and, if tolerated, repeat Holer in 2 weeks along with a trough blood level.  If she has questions and would like me to talk to her, her DIL or her son, I would be happy to do so.  RTC to be determined after above.  Tx

## 2020-08-15 RX ORDER — MEXILETINE HYDROCHLORIDE 150 MG/1
150 CAPSULE ORAL EVERY 12 HOURS
Qty: 60 CAPSULE | Refills: 1 | Status: SHIPPED | OUTPATIENT
Start: 2020-08-15 | End: 2021-02-23 | Stop reason: SINTOL

## 2020-08-17 ENCOUNTER — PES CALL (OUTPATIENT)
Dept: ADMINISTRATIVE | Facility: CLINIC | Age: 80
End: 2020-08-17

## 2020-08-18 ENCOUNTER — LAB VISIT (OUTPATIENT)
Dept: LAB | Facility: HOSPITAL | Age: 80
End: 2020-08-18
Attending: UROLOGY
Payer: MEDICARE

## 2020-08-18 DIAGNOSIS — N39.0 RECURRENT UTI: ICD-10-CM

## 2020-08-18 PROCEDURE — 87088 URINE BACTERIA CULTURE: CPT | Mod: HCNC

## 2020-08-18 PROCEDURE — 87086 URINE CULTURE/COLONY COUNT: CPT | Mod: HCNC

## 2020-08-18 PROCEDURE — 87077 CULTURE AEROBIC IDENTIFY: CPT | Mod: HCNC

## 2020-08-18 PROCEDURE — 87186 SC STD MICRODIL/AGAR DIL: CPT | Mod: HCNC

## 2020-08-21 ENCOUNTER — PATIENT MESSAGE (OUTPATIENT)
Dept: UROLOGY | Facility: CLINIC | Age: 80
End: 2020-08-21

## 2020-08-21 LAB — BACTERIA UR CULT: ABNORMAL

## 2020-08-21 RX ORDER — CEPHALEXIN 500 MG/1
500 CAPSULE ORAL EVERY 12 HOURS
Qty: 14 CAPSULE | Refills: 0 | Status: SHIPPED | OUTPATIENT
Start: 2020-08-21 | End: 2020-08-28

## 2020-08-31 PROCEDURE — 99454 PR REMOTE MNTR, PHYS PARAM, INITIAL, EA 30 DAYS: ICD-10-PCS | Mod: S$GLB,,, | Performed by: INTERNAL MEDICINE

## 2020-08-31 PROCEDURE — 99454 REM MNTR PHYSIOL PARAM 16-30: CPT | Mod: S$GLB,,, | Performed by: INTERNAL MEDICINE

## 2020-09-10 ENCOUNTER — LAB VISIT (OUTPATIENT)
Dept: LAB | Facility: HOSPITAL | Age: 80
End: 2020-09-10
Attending: INTERNAL MEDICINE
Payer: MEDICARE

## 2020-09-10 ENCOUNTER — TELEPHONE (OUTPATIENT)
Dept: INTERNAL MEDICINE | Facility: CLINIC | Age: 80
End: 2020-09-10

## 2020-09-10 ENCOUNTER — PATIENT OUTREACH (OUTPATIENT)
Dept: ADMINISTRATIVE | Facility: OTHER | Age: 80
End: 2020-09-10

## 2020-09-10 ENCOUNTER — TELEPHONE (OUTPATIENT)
Dept: UROLOGY | Facility: CLINIC | Age: 80
End: 2020-09-10

## 2020-09-10 DIAGNOSIS — N39.0 URINARY TRACT INFECTION WITHOUT HEMATURIA, SITE UNSPECIFIED: ICD-10-CM

## 2020-09-10 DIAGNOSIS — N39.0 URINARY TRACT INFECTION WITHOUT HEMATURIA, SITE UNSPECIFIED: Primary | ICD-10-CM

## 2020-09-10 LAB
BACTERIA #/AREA URNS AUTO: ABNORMAL /HPF
BILIRUB UR QL STRIP: NEGATIVE
CLARITY UR REFRACT.AUTO: ABNORMAL
COLOR UR AUTO: YELLOW
GLUCOSE UR QL STRIP: NEGATIVE
HGB UR QL STRIP: ABNORMAL
KETONES UR QL STRIP: NEGATIVE
LEUKOCYTE ESTERASE UR QL STRIP: ABNORMAL
MICROSCOPIC COMMENT: ABNORMAL
NITRITE UR QL STRIP: NEGATIVE
PH UR STRIP: 5 [PH] (ref 5–8)
PROT UR QL STRIP: NEGATIVE
RBC #/AREA URNS AUTO: 0 /HPF (ref 0–4)
SP GR UR STRIP: 1.01 (ref 1–1.03)
URN SPEC COLLECT METH UR: ABNORMAL
WBC #/AREA URNS AUTO: 54 /HPF (ref 0–5)
WBC CLUMPS UR QL AUTO: ABNORMAL

## 2020-09-10 PROCEDURE — 87088 URINE BACTERIA CULTURE: CPT | Mod: HCNC

## 2020-09-10 PROCEDURE — 87077 CULTURE AEROBIC IDENTIFY: CPT | Mod: HCNC

## 2020-09-10 PROCEDURE — 81001 URINALYSIS AUTO W/SCOPE: CPT | Mod: HCNC

## 2020-09-10 PROCEDURE — 87086 URINE CULTURE/COLONY COUNT: CPT | Mod: HCNC

## 2020-09-10 PROCEDURE — 87186 SC STD MICRODIL/AGAR DIL: CPT | Mod: HCNC

## 2020-09-10 NOTE — PROGRESS NOTES
LINKS immunization registry not responding  Care Everywhere updated  Health Maintenance updated  Chart reviewed for overdue Proactive [facility] Encounters (CHIKIS) health maintenance testing (CRS, Breast Ca, Diabetic Eye Exam)   Orders entered:N/A

## 2020-09-10 NOTE — TELEPHONE ENCOUNTER
Patient is coming in the office today for a vaginal swab and cath urine specimen. Today at 1:20 pm with Mariza Tolentino.    Keshawn

## 2020-09-11 ENCOUNTER — TELEPHONE (OUTPATIENT)
Dept: INTERNAL MEDICINE | Facility: CLINIC | Age: 80
End: 2020-09-11

## 2020-09-11 RX ORDER — LEVOFLOXACIN 250 MG/1
250 TABLET ORAL DAILY
Qty: 7 TABLET | Refills: 0 | Status: SHIPPED | OUTPATIENT
Start: 2020-09-11 | End: 2020-10-05

## 2020-09-12 LAB — BACTERIA UR CULT: ABNORMAL

## 2020-09-21 ENCOUNTER — PATIENT OUTREACH (OUTPATIENT)
Dept: OTHER | Facility: OTHER | Age: 80
End: 2020-09-21

## 2020-09-21 ENCOUNTER — PATIENT MESSAGE (OUTPATIENT)
Dept: INTERNAL MEDICINE | Facility: CLINIC | Age: 80
End: 2020-09-21

## 2020-09-21 NOTE — PROGRESS NOTES
Digital Medicine: Health  Follow-Up    The history is provided by the patient.             Reason for review: Blood pressure at goal        Topics Covered on Call: Diet and New Afib medication     Additional Follow-up details: Patient is doing well and feeling good. She denies any major issues at this time. She will reach out if any concerns arise.     She did start a new Afib medication recently and will continue to follow up with her physician.           Diet-no change to diet    No change to diet.        Physical Activity-no change to routine  No change to exercise routine.       Additional physical activity details: Patient is not walking as much as she once was but she continues to stay active in her home and around her house.       Medication Adherence-Medication adherence was assessed.      Substance, Sleep, Stress-Not assessed      Continue current diet/physical activity routine.       Addressed patient questions and patient has my contact information if needed prior to next outreach.   Explained the importance of self-monitoring and medication adherence. Encouraged the patient to communicate with their health  for lifestyle modifications to help improve or maintain a healthy lifestyle.            There are no preventive care reminders to display for this patient.    Last 5 Patient Entered Readings                                      Current 30 Day Average: 111/66     Recent Readings 9/21/2020 9/20/2020 9/19/2020 9/19/2020 9/18/2020    SBP (mmHg) 116 111 99 107 109    DBP (mmHg) 75 67 69 49 68    Pulse 73 65 59 30 71

## 2020-10-02 ENCOUNTER — PATIENT MESSAGE (OUTPATIENT)
Dept: CARDIOLOGY | Facility: CLINIC | Age: 80
End: 2020-10-02

## 2020-10-02 DIAGNOSIS — I10 HTN (HYPERTENSION), BENIGN: ICD-10-CM

## 2020-10-02 DIAGNOSIS — I47.29 PAROXYSMAL VENTRICULAR TACHYCARDIA: ICD-10-CM

## 2020-10-02 DIAGNOSIS — I34.1 MVP (MITRAL VALVE PROLAPSE): ICD-10-CM

## 2020-10-02 DIAGNOSIS — I49.3 PVC'S (PREMATURE VENTRICULAR CONTRACTIONS): Primary | ICD-10-CM

## 2020-10-05 ENCOUNTER — TELEPHONE (OUTPATIENT)
Dept: INTERNAL MEDICINE | Facility: CLINIC | Age: 80
End: 2020-10-05

## 2020-10-05 ENCOUNTER — PATIENT MESSAGE (OUTPATIENT)
Dept: INTERNAL MEDICINE | Facility: CLINIC | Age: 80
End: 2020-10-05

## 2020-10-05 ENCOUNTER — OFFICE VISIT (OUTPATIENT)
Dept: INTERNAL MEDICINE | Facility: CLINIC | Age: 80
End: 2020-10-05
Payer: MEDICARE

## 2020-10-05 ENCOUNTER — IMMUNIZATION (OUTPATIENT)
Dept: INTERNAL MEDICINE | Facility: CLINIC | Age: 80
End: 2020-10-05
Payer: MEDICARE

## 2020-10-05 VITALS
WEIGHT: 148.81 LBS | HEART RATE: 72 BPM | SYSTOLIC BLOOD PRESSURE: 152 MMHG | DIASTOLIC BLOOD PRESSURE: 76 MMHG | BODY MASS INDEX: 22.04 KG/M2 | HEIGHT: 69 IN | OXYGEN SATURATION: 99 %

## 2020-10-05 DIAGNOSIS — R73.9 HYPERGLYCEMIA: ICD-10-CM

## 2020-10-05 DIAGNOSIS — R42 DIZZINESS AND GIDDINESS: ICD-10-CM

## 2020-10-05 DIAGNOSIS — I10 HTN (HYPERTENSION), BENIGN: ICD-10-CM

## 2020-10-05 DIAGNOSIS — M89.9 DISORDER OF BONE: ICD-10-CM

## 2020-10-05 DIAGNOSIS — Z12.11 SCREENING FOR COLON CANCER: ICD-10-CM

## 2020-10-05 DIAGNOSIS — Z12.31 SCREENING MAMMOGRAM, ENCOUNTER FOR: ICD-10-CM

## 2020-10-05 DIAGNOSIS — E55.9 MILD VITAMIN D DEFICIENCY: ICD-10-CM

## 2020-10-05 DIAGNOSIS — I48.91 ATRIAL FIBRILLATION, UNSPECIFIED TYPE: ICD-10-CM

## 2020-10-05 DIAGNOSIS — E78.00 HYPERCHOLESTEROLEMIA: ICD-10-CM

## 2020-10-05 DIAGNOSIS — M81.0 AGE-RELATED OSTEOPOROSIS WITHOUT CURRENT PATHOLOGICAL FRACTURE: ICD-10-CM

## 2020-10-05 DIAGNOSIS — N39.0 URINARY TRACT INFECTION WITHOUT HEMATURIA, SITE UNSPECIFIED: Primary | ICD-10-CM

## 2020-10-05 LAB
BACTERIA #/AREA URNS AUTO: ABNORMAL /HPF
BILIRUB UR QL STRIP: NEGATIVE
CLARITY UR REFRACT.AUTO: CLEAR
COLOR UR AUTO: YELLOW
GLUCOSE UR QL STRIP: NEGATIVE
HGB UR QL STRIP: NEGATIVE
KETONES UR QL STRIP: NEGATIVE
LEUKOCYTE ESTERASE UR QL STRIP: NEGATIVE
MICROSCOPIC COMMENT: ABNORMAL
NITRITE UR QL STRIP: NEGATIVE
PH UR STRIP: 6 [PH] (ref 5–8)
PROT UR QL STRIP: NEGATIVE
RBC #/AREA URNS AUTO: 2 /HPF (ref 0–4)
SP GR UR STRIP: 1 (ref 1–1.03)
URN SPEC COLLECT METH UR: NORMAL
WBC #/AREA URNS AUTO: 1 /HPF (ref 0–5)

## 2020-10-05 PROCEDURE — 1159F MED LIST DOCD IN RCRD: CPT | Mod: HCNC,S$GLB,, | Performed by: INTERNAL MEDICINE

## 2020-10-05 PROCEDURE — 93005 EKG 12-LEAD: ICD-10-PCS | Mod: HCNC,S$GLB,, | Performed by: INTERNAL MEDICINE

## 2020-10-05 PROCEDURE — 93005 ELECTROCARDIOGRAM TRACING: CPT | Mod: HCNC,S$GLB,, | Performed by: INTERNAL MEDICINE

## 2020-10-05 PROCEDURE — 99214 OFFICE O/P EST MOD 30 MIN: CPT | Mod: HCNC,25,S$GLB, | Performed by: INTERNAL MEDICINE

## 2020-10-05 PROCEDURE — 87086 URINE CULTURE/COLONY COUNT: CPT | Mod: HCNC

## 2020-10-05 PROCEDURE — 3078F DIAST BP <80 MM HG: CPT | Mod: HCNC,CPTII,S$GLB, | Performed by: INTERNAL MEDICINE

## 2020-10-05 PROCEDURE — 90694 VACC AIIV4 NO PRSRV 0.5ML IM: CPT | Mod: HCNC,S$GLB,, | Performed by: INTERNAL MEDICINE

## 2020-10-05 PROCEDURE — 3077F SYST BP >= 140 MM HG: CPT | Mod: HCNC,CPTII,S$GLB, | Performed by: INTERNAL MEDICINE

## 2020-10-05 PROCEDURE — 99999 PR PBB SHADOW E&M-EST. PATIENT-LVL V: ICD-10-PCS | Mod: PBBFAC,HCNC,, | Performed by: INTERNAL MEDICINE

## 2020-10-05 PROCEDURE — 1101F PR PT FALLS ASSESS DOC 0-1 FALLS W/OUT INJ PAST YR: ICD-10-PCS | Mod: HCNC,CPTII,S$GLB, | Performed by: INTERNAL MEDICINE

## 2020-10-05 PROCEDURE — 3078F PR MOST RECENT DIASTOLIC BLOOD PRESSURE < 80 MM HG: ICD-10-PCS | Mod: HCNC,CPTII,S$GLB, | Performed by: INTERNAL MEDICINE

## 2020-10-05 PROCEDURE — 1101F PT FALLS ASSESS-DOCD LE1/YR: CPT | Mod: HCNC,CPTII,S$GLB, | Performed by: INTERNAL MEDICINE

## 2020-10-05 PROCEDURE — 81001 URINALYSIS AUTO W/SCOPE: CPT | Mod: HCNC

## 2020-10-05 PROCEDURE — 3077F PR MOST RECENT SYSTOLIC BLOOD PRESSURE >= 140 MM HG: ICD-10-PCS | Mod: HCNC,CPTII,S$GLB, | Performed by: INTERNAL MEDICINE

## 2020-10-05 PROCEDURE — 1159F PR MEDICATION LIST DOCUMENTED IN MEDICAL RECORD: ICD-10-PCS | Mod: HCNC,S$GLB,, | Performed by: INTERNAL MEDICINE

## 2020-10-05 PROCEDURE — G0008 ADMIN INFLUENZA VIRUS VAC: HCPCS | Mod: HCNC,S$GLB,, | Performed by: INTERNAL MEDICINE

## 2020-10-05 PROCEDURE — 1125F AMNT PAIN NOTED PAIN PRSNT: CPT | Mod: HCNC,S$GLB,, | Performed by: INTERNAL MEDICINE

## 2020-10-05 PROCEDURE — 93010 ELECTROCARDIOGRAM REPORT: CPT | Mod: HCNC,S$GLB,, | Performed by: INTERNAL MEDICINE

## 2020-10-05 PROCEDURE — 1125F PR PAIN SEVERITY QUANTIFIED, PAIN PRESENT: ICD-10-PCS | Mod: HCNC,S$GLB,, | Performed by: INTERNAL MEDICINE

## 2020-10-05 PROCEDURE — G0008 FLU VACCINE - QUADRIVALENT - ADJUVANTED: ICD-10-PCS | Mod: HCNC,S$GLB,, | Performed by: INTERNAL MEDICINE

## 2020-10-05 PROCEDURE — 90694 FLU VACCINE - QUADRIVALENT - ADJUVANTED: ICD-10-PCS | Mod: HCNC,S$GLB,, | Performed by: INTERNAL MEDICINE

## 2020-10-05 PROCEDURE — 99214 PR OFFICE/OUTPT VISIT, EST, LEVL IV, 30-39 MIN: ICD-10-PCS | Mod: HCNC,25,S$GLB, | Performed by: INTERNAL MEDICINE

## 2020-10-05 PROCEDURE — 93010 EKG 12-LEAD: ICD-10-PCS | Mod: HCNC,S$GLB,, | Performed by: INTERNAL MEDICINE

## 2020-10-05 PROCEDURE — 99999 PR PBB SHADOW E&M-EST. PATIENT-LVL V: CPT | Mod: PBBFAC,HCNC,, | Performed by: INTERNAL MEDICINE

## 2020-10-05 RX ORDER — ALPRAZOLAM 0.25 MG/1
TABLET ORAL
Qty: 2 TABLET | Refills: 0 | Status: SHIPPED | OUTPATIENT
Start: 2020-10-05 | End: 2021-03-09

## 2020-10-05 NOTE — PROGRESS NOTES
Subjective:       Patient ID: Madan Bell is a 80 y.o. female.    Chief Complaint: Annual Exam    HPIPt with significant balance issues and dizziness.  Needs to hold onto someone always.    Review of Systems   Constitutional: Negative for activity change and unexpected weight change.   HENT: Negative for hearing loss, rhinorrhea and trouble swallowing.    Eyes: Negative for discharge and visual disturbance.   Respiratory: Negative for chest tightness and wheezing.    Cardiovascular: Positive for palpitations. Negative for chest pain.   Gastrointestinal: Negative for blood in stool, constipation, diarrhea and vomiting.   Endocrine: Negative for polydipsia and polyuria.   Genitourinary: Negative for difficulty urinating, dysuria, hematuria and menstrual problem.   Musculoskeletal: Positive for arthralgias. Negative for joint swelling and neck pain.   Neurological: Negative for weakness and headaches.   Psychiatric/Behavioral: Negative for confusion and dysphoric mood.       Objective:      Physical Exam  Constitutional:       General: She is not in acute distress.     Appearance: She is well-developed.   Neck:      Musculoskeletal: Neck supple.      Thyroid: No thyromegaly.      Vascular: No JVD.   Cardiovascular:      Rate and Rhythm: Normal rate and regular rhythm.      Heart sounds: Normal heart sounds. No murmur. No friction rub. No gallop.    Pulmonary:      Effort: Pulmonary effort is normal.      Breath sounds: Normal breath sounds. No wheezing or rales.   Abdominal:      General: Bowel sounds are normal. There is no distension.      Palpations: Abdomen is soft. There is no mass.      Tenderness: There is no abdominal tenderness. There is no guarding or rebound.   Lymphadenopathy:      Cervical: No cervical adenopathy.   Skin:     General: Skin is warm and dry.   Neurological:      Mental Status: She is alert and oriented to person, place, and time.      Deep Tendon Reflexes: Reflexes are normal and  symmetric.   Psychiatric:         Behavior: Behavior normal.         Thought Content: Thought content normal.         Judgment: Judgment normal.         Assessment:       1. Urinary tract infection without hematuria, site unspecified    2. Dizziness and giddiness    3. HTN (hypertension), benign    4. Hypercholesterolemia    5. Hyperglycemia    6. Mild vitamin D deficiency    7. Disorder of bone    8. Screening mammogram, encounter for    9. Screening for colon cancer    10. Age-related osteoporosis without current pathological fracture     11. Atrial fibrillation, unspecified type        Plan:   Urinary tract infection without hematuria, site unspecified  -     Urinalysis  -     Urine culture    Dizziness and giddiness  -     MRI Brain W WO Contrast; Future; Expected date: 10/05/2020  -     MRA Brain without contrast; Future; Expected date: 10/05/2020  -     MRA Neck without contrast; Future; Expected date: 10/05/2020    HTN (hypertension), benign  -     CBC auto differential; Future; Expected date: 10/05/2020  -     Comprehensive Metabolic Panel; Future; Expected date: 10/05/2020  -     TSH; Future; Expected date: 10/05/2020  Controlled - continue current meds    Hypercholesterolemia  -     Lipid Panel; Future; Expected date: 10/05/2020    Hyperglycemia  -     Hemoglobin A1C; Future; Expected date: 10/05/2020    Mild vitamin D deficiency    Disorder of bone  -     Vitamin D; Future; Expected date: 10/05/2020  -     DXA Bone Density Spine And Hip; Future; Expected date: 10/05/2020    Screening mammogram, encounter for  -     Mammo Digital Screening Bilat w/ Roberto; Future; Expected date: 04/05/2021    Screening for colon cancer  -     Fecal Immunochemical Test (iFOBT); Future; Expected date: 10/05/2020    Age-related osteoporosis without current pathological fracture   -     DXA Bone Density Spine And Hip; Future; Expected date: 10/05/2020    Atrial fibrillation, unspecified type  -     EKG 12-lead    Other orders  -      Urinalysis Microscopic        Answers for HPI/ROS submitted by the patient on 10/2/2020   activity change: No  unexpected weight change: No  neck pain: No  hearing loss: No  rhinorrhea: No  trouble swallowing: No  eye discharge: No  visual disturbance: No  chest tightness: No  wheezing: No  chest pain: No  palpitations: Yes  blood in stool: No  constipation: No  vomiting: No  diarrhea: No  polydipsia: No  polyuria: No  difficulty urinating: No  hematuria: No  menstrual problem: No  dysuria: No  joint swelling: No  arthralgias: Yes  headaches: No  weakness: No  confusion: No  dysphoric mood: No

## 2020-10-06 ENCOUNTER — LAB VISIT (OUTPATIENT)
Dept: LAB | Facility: HOSPITAL | Age: 80
End: 2020-10-06
Attending: INTERNAL MEDICINE
Payer: MEDICARE

## 2020-10-06 DIAGNOSIS — E78.00 HYPERCHOLESTEROLEMIA: ICD-10-CM

## 2020-10-06 DIAGNOSIS — I10 HTN (HYPERTENSION), BENIGN: ICD-10-CM

## 2020-10-06 DIAGNOSIS — R73.9 HYPERGLYCEMIA: ICD-10-CM

## 2020-10-06 DIAGNOSIS — M89.9 DISORDER OF BONE: ICD-10-CM

## 2020-10-06 LAB
25(OH)D3+25(OH)D2 SERPL-MCNC: 39 NG/ML (ref 30–96)
ALBUMIN SERPL BCP-MCNC: 4.2 G/DL (ref 3.5–5.2)
ALP SERPL-CCNC: 63 U/L (ref 55–135)
ALT SERPL W/O P-5'-P-CCNC: 19 U/L (ref 10–44)
ANION GAP SERPL CALC-SCNC: 12 MMOL/L (ref 8–16)
AST SERPL-CCNC: 22 U/L (ref 10–40)
BASOPHILS # BLD AUTO: 0.03 K/UL (ref 0–0.2)
BASOPHILS NFR BLD: 0.5 % (ref 0–1.9)
BILIRUB SERPL-MCNC: 1.6 MG/DL (ref 0.1–1)
BUN SERPL-MCNC: 9 MG/DL (ref 8–23)
CALCIUM SERPL-MCNC: 9.8 MG/DL (ref 8.7–10.5)
CHLORIDE SERPL-SCNC: 97 MMOL/L (ref 95–110)
CHOLEST SERPL-MCNC: 121 MG/DL (ref 120–199)
CHOLEST/HDLC SERPL: 2.2 {RATIO} (ref 2–5)
CO2 SERPL-SCNC: 25 MMOL/L (ref 23–29)
CREAT SERPL-MCNC: 0.8 MG/DL (ref 0.5–1.4)
DIFFERENTIAL METHOD: ABNORMAL
EOSINOPHIL # BLD AUTO: 0.1 K/UL (ref 0–0.5)
EOSINOPHIL NFR BLD: 1 % (ref 0–8)
ERYTHROCYTE [DISTWIDTH] IN BLOOD BY AUTOMATED COUNT: 13.3 % (ref 11.5–14.5)
EST. GFR  (AFRICAN AMERICAN): >60 ML/MIN/1.73 M^2
EST. GFR  (NON AFRICAN AMERICAN): >60 ML/MIN/1.73 M^2
ESTIMATED AVG GLUCOSE: 126 MG/DL (ref 68–131)
GLUCOSE SERPL-MCNC: 99 MG/DL (ref 70–110)
HBA1C MFR BLD HPLC: 6 % (ref 4–5.6)
HCT VFR BLD AUTO: 44.5 % (ref 37–48.5)
HDLC SERPL-MCNC: 54 MG/DL (ref 40–75)
HDLC SERPL: 44.6 % (ref 20–50)
HGB BLD-MCNC: 13.9 G/DL (ref 12–16)
IMM GRANULOCYTES # BLD AUTO: 0.01 K/UL (ref 0–0.04)
IMM GRANULOCYTES NFR BLD AUTO: 0.2 % (ref 0–0.5)
LDLC SERPL CALC-MCNC: 57.8 MG/DL (ref 63–159)
LYMPHOCYTES # BLD AUTO: 1.6 K/UL (ref 1–4.8)
LYMPHOCYTES NFR BLD: 26.7 % (ref 18–48)
MCH RBC QN AUTO: 31.3 PG (ref 27–31)
MCHC RBC AUTO-ENTMCNC: 31.2 G/DL (ref 32–36)
MCV RBC AUTO: 100 FL (ref 82–98)
MONOCYTES # BLD AUTO: 0.5 K/UL (ref 0.3–1)
MONOCYTES NFR BLD: 8.4 % (ref 4–15)
NEUTROPHILS # BLD AUTO: 3.8 K/UL (ref 1.8–7.7)
NEUTROPHILS NFR BLD: 63.2 % (ref 38–73)
NONHDLC SERPL-MCNC: 67 MG/DL
NRBC BLD-RTO: 0 /100 WBC
PLATELET # BLD AUTO: 159 K/UL (ref 150–350)
PMV BLD AUTO: 11.2 FL (ref 9.2–12.9)
POTASSIUM SERPL-SCNC: 5 MMOL/L (ref 3.5–5.1)
PROT SERPL-MCNC: 7 G/DL (ref 6–8.4)
RBC # BLD AUTO: 4.44 M/UL (ref 4–5.4)
SODIUM SERPL-SCNC: 134 MMOL/L (ref 136–145)
T4 FREE SERPL-MCNC: 1.13 NG/DL (ref 0.71–1.51)
TRIGL SERPL-MCNC: 46 MG/DL (ref 30–150)
TSH SERPL DL<=0.005 MIU/L-ACNC: 6.07 UIU/ML (ref 0.4–4)
WBC # BLD AUTO: 5.95 K/UL (ref 3.9–12.7)

## 2020-10-06 PROCEDURE — 84439 ASSAY OF FREE THYROXINE: CPT | Mod: HCNC

## 2020-10-06 PROCEDURE — 82306 VITAMIN D 25 HYDROXY: CPT | Mod: HCNC

## 2020-10-06 PROCEDURE — 80053 COMPREHEN METABOLIC PANEL: CPT | Mod: HCNC

## 2020-10-06 PROCEDURE — 80061 LIPID PANEL: CPT | Mod: HCNC

## 2020-10-06 PROCEDURE — 84443 ASSAY THYROID STIM HORMONE: CPT | Mod: HCNC

## 2020-10-06 PROCEDURE — 83036 HEMOGLOBIN GLYCOSYLATED A1C: CPT | Mod: HCNC

## 2020-10-06 PROCEDURE — 85025 COMPLETE CBC W/AUTO DIFF WBC: CPT | Mod: HCNC

## 2020-10-06 PROCEDURE — 36415 COLL VENOUS BLD VENIPUNCTURE: CPT | Mod: HCNC,PO

## 2020-10-07 LAB
BACTERIA UR CULT: NORMAL
BACTERIA UR CULT: NORMAL

## 2020-10-13 ENCOUNTER — PATIENT OUTREACH (OUTPATIENT)
Dept: ADMINISTRATIVE | Facility: OTHER | Age: 80
End: 2020-10-13

## 2020-10-13 NOTE — PROGRESS NOTES
LINKS immunization registry updated  Care Everywhere updated  Health Maintenance updated  Chart reviewed for overdue Proactive [facility] Encounters (CHIKIS) health maintenance testing (CRS, Breast Ca, Diabetic Eye Exam)   Orders entered:N/A

## 2020-10-14 ENCOUNTER — CLINICAL SUPPORT (OUTPATIENT)
Dept: CARDIOLOGY | Facility: CLINIC | Age: 80
End: 2020-10-14
Attending: INTERNAL MEDICINE
Payer: MEDICARE

## 2020-10-14 ENCOUNTER — OFFICE VISIT (OUTPATIENT)
Dept: UROLOGY | Facility: CLINIC | Age: 80
End: 2020-10-14
Payer: MEDICARE

## 2020-10-14 VITALS
BODY MASS INDEX: 21.65 KG/M2 | HEIGHT: 69 IN | DIASTOLIC BLOOD PRESSURE: 83 MMHG | SYSTOLIC BLOOD PRESSURE: 135 MMHG | WEIGHT: 146.19 LBS | HEART RATE: 60 BPM

## 2020-10-14 DIAGNOSIS — I49.3 PVC'S (PREMATURE VENTRICULAR CONTRACTIONS): ICD-10-CM

## 2020-10-14 DIAGNOSIS — I10 HTN (HYPERTENSION), BENIGN: ICD-10-CM

## 2020-10-14 DIAGNOSIS — I47.29 PAROXYSMAL VENTRICULAR TACHYCARDIA: ICD-10-CM

## 2020-10-14 DIAGNOSIS — N39.46 MIXED STRESS AND URGE URINARY INCONTINENCE: ICD-10-CM

## 2020-10-14 DIAGNOSIS — N39.0 RECURRENT UTI: Primary | ICD-10-CM

## 2020-10-14 DIAGNOSIS — I34.1 MVP (MITRAL VALVE PROLAPSE): ICD-10-CM

## 2020-10-14 PROCEDURE — 1159F PR MEDICATION LIST DOCUMENTED IN MEDICAL RECORD: ICD-10-PCS | Mod: HCNC,S$GLB,, | Performed by: UROLOGY

## 2020-10-14 PROCEDURE — 99214 OFFICE O/P EST MOD 30 MIN: CPT | Mod: HCNC,S$GLB,, | Performed by: UROLOGY

## 2020-10-14 PROCEDURE — 99999 PR PBB SHADOW E&M-EST. PATIENT-LVL IV: ICD-10-PCS | Mod: PBBFAC,HCNC,, | Performed by: UROLOGY

## 2020-10-14 PROCEDURE — 1126F AMNT PAIN NOTED NONE PRSNT: CPT | Mod: HCNC,S$GLB,, | Performed by: UROLOGY

## 2020-10-14 PROCEDURE — 1126F PR PAIN SEVERITY QUANTIFIED, NO PAIN PRESENT: ICD-10-PCS | Mod: HCNC,S$GLB,, | Performed by: UROLOGY

## 2020-10-14 PROCEDURE — 1159F MED LIST DOCD IN RCRD: CPT | Mod: HCNC,S$GLB,, | Performed by: UROLOGY

## 2020-10-14 PROCEDURE — 99214 PR OFFICE/OUTPT VISIT, EST, LEVL IV, 30-39 MIN: ICD-10-PCS | Mod: HCNC,S$GLB,, | Performed by: UROLOGY

## 2020-10-14 PROCEDURE — 3075F SYST BP GE 130 - 139MM HG: CPT | Mod: HCNC,CPTII,S$GLB, | Performed by: UROLOGY

## 2020-10-14 PROCEDURE — 93224 XTRNL ECG REC UP TO 48 HRS: CPT | Mod: HCNC,S$GLB,, | Performed by: INTERNAL MEDICINE

## 2020-10-14 PROCEDURE — 1101F PR PT FALLS ASSESS DOC 0-1 FALLS W/OUT INJ PAST YR: ICD-10-PCS | Mod: HCNC,CPTII,S$GLB, | Performed by: UROLOGY

## 2020-10-14 PROCEDURE — 3075F PR MOST RECENT SYSTOLIC BLOOD PRESS GE 130-139MM HG: ICD-10-PCS | Mod: HCNC,CPTII,S$GLB, | Performed by: UROLOGY

## 2020-10-14 PROCEDURE — 3079F PR MOST RECENT DIASTOLIC BLOOD PRESSURE 80-89 MM HG: ICD-10-PCS | Mod: HCNC,CPTII,S$GLB, | Performed by: UROLOGY

## 2020-10-14 PROCEDURE — 99999 PR PBB SHADOW E&M-EST. PATIENT-LVL II: ICD-10-PCS | Mod: PBBFAC,HCNC,,

## 2020-10-14 PROCEDURE — 99999 PR PBB SHADOW E&M-EST. PATIENT-LVL II: CPT | Mod: PBBFAC,HCNC,,

## 2020-10-14 PROCEDURE — 99999 PR PBB SHADOW E&M-EST. PATIENT-LVL IV: CPT | Mod: PBBFAC,HCNC,, | Performed by: UROLOGY

## 2020-10-14 PROCEDURE — 93224 HOLTER MONITOR - 48 HOUR (CUPID ONLY): ICD-10-PCS | Mod: HCNC,S$GLB,, | Performed by: INTERNAL MEDICINE

## 2020-10-14 PROCEDURE — 3079F DIAST BP 80-89 MM HG: CPT | Mod: HCNC,CPTII,S$GLB, | Performed by: UROLOGY

## 2020-10-14 PROCEDURE — 1101F PT FALLS ASSESS-DOCD LE1/YR: CPT | Mod: HCNC,CPTII,S$GLB, | Performed by: UROLOGY

## 2020-10-14 RX ORDER — METHENAMINE HIPPURATE 1000 MG/1
1 TABLET ORAL 2 TIMES DAILY
Qty: 60 TABLET | Refills: 12 | Status: ON HOLD | OUTPATIENT
Start: 2020-10-14 | End: 2021-04-22 | Stop reason: SDDI

## 2020-10-14 NOTE — PROGRESS NOTES
Subjective:       Patient ID: Madan Bell is a 80 y.o. female.    Chief Complaint: Urinary Tract Infection (recurrent)    HPI   Madan Bell is a 80 y.o. female  by  s/p laparoscopic hysterectomy and BSO  with HTN, paroxysmal V tach, s/p thyroidectomy for multinodular goiter 2013, chronic low back pain, who presents for follow up of recurrent UTI.    Estrace vaginal cream 3x/weeks.   Last positive urine culture was 2019.   She is on vaginal estrogen since  twice a week.   She is doing well right now.   She has a pcp.     Nighttime frequency, increased lower back pain, dysuria.     3/9/20 ultrasound - films personally reviewed.     1. Both kidneys are normal in size with no evidence of nephrolithiasis or renal mass.  2. Right-sided extrarenal pelvis is noted.     Cysto  showed no abnormalities  CT RSS  showed a punctate right renal stone. No other stones. No hydro. Films personally reviewed.   Ultrasound 17 showed stable 3mm right renal stone.      Nocturia x 1-2 times.  She does drink a lot of water.   She states that she empties well.  No obstructive symptoms.    Patient endorses leaking urine with cough, laugh, sneeze, and activity.  She notes occasional smaller volume leakage with urgency, but this is rare.  2 liners a day (previous 6-7 times a day).   No urge incontinence.   She denies constipation.      She is on Align probiotic daily.  Drinking cranberry juice.   No D-mannose.   Eats yogurt daily 4 times a week.        Past Surgical History:   Procedure Laterality Date    BREAST BIOPSY Left     excisional    BREAST BIOPSY Left     core needle biopsy    BREAST CYST ASPIRATION   - ??    BREAST CYST EXCISION   - ??    CARDIOVERSION N/A 2018    Procedure: CARDIOVERSION;  Surgeon: Fernando Disla MD;  [facility] LAB;  Service: Cardiology;  Laterality: N/A;  AF,DCCV,ANAHI,MAC,FAS,3 PREP    HYSTERECTOMY      tahbso    OOPHORECTOMY   hysterectomy - 2000 - ??    THYROID SURGERY         Past Medical History:   Diagnosis Date    Anticoagulant long-term use     Anxiety     Arrhythmia     Arthritis     knees    Basal cell carcinoma     Breast cyst     Fibrocystic breast 1980 - ??    Heart murmur     Hyperlipidemia     Hypertension     Mitral valve disorder     Mitral valve prolapse     PVC's (premature ventricular contractions)     Squamous cell carcinoma bx 7-2017    right upper forehead    Thyroid cancer 1/29/2013    Thyroid disease     Vasovagal near syncope 11/12/2012    VTE (venous thromboembolism)     in 1960s, post partum, pt unsure of details        Social History     Socioeconomic History    Marital status:      Spouse name: Not on file    Number of children: Not on file    Years of education: Not on file    Highest education level: Not on file   Occupational History    Not on file   Social Needs    Financial resource strain: Not hard at all    Food insecurity     Worry: Never true     Inability: Never true    Transportation needs     Medical: No     Non-medical: Not on file   Tobacco Use    Smoking status: Never Smoker    Smokeless tobacco: Never Used   Substance and Sexual Activity    Alcohol use: No     Frequency: Never     Drinks per session: Patient refused     Binge frequency: Patient refused    Drug use: No    Sexual activity: Not on file   Lifestyle    Physical activity     Days per week: Not on file     Minutes per session: 20 min    Stress: Only a little   Relationships    Social connections     Talks on phone: More than three times a week     Gets together: More than three times a week     Attends Orthodox service: Not on file     Active member of club or organization: Yes     Attends meetings of clubs or organizations: More than 4 times per year     Relationship status:    Other Topics Concern    Are you pregnant or think you may be? Not Asked    Breast-feeding Not Asked    Social History Narrative    Not on file       Family History   Problem Relation Age of Onset    Arrhythmia Son     Mitral valve prolapse Son     Sudden death Son         sudden cardiac death    Heart disease Mother     Hyperlipidemia Maternal Grandmother     Breast cancer Maternal Uncle     No Known Problems Daughter     No Known Problems Son     No Known Problems Son     Melanoma Neg Hx        Current Outpatient Medications   Medication Sig Dispense Refill    ALPRAZolam (XANAX) 0.25 MG tablet One tablet an hour prior to MRI may repeat immediately prior to MRI 2 tablet 0    apixaban (ELIQUIS) 5 mg Tab Take 1 tablet (5 mg total) by mouth 2 (two) times daily. 180 tablet 3    ascorbic acid (VITAMIN C) 500 MG tablet Take 500 mg by mouth once daily.      aspirin 81 mg Tab Take 1 tablet by mouth every Mon, Wed, Fri.       atorvastatin (LIPITOR) 40 MG tablet Take 1 tablet (40 mg total) by mouth once daily. 90 tablet 3    Bifidobacterium infantis (ALIGN) 4 mg Cap One daily (Patient taking differently: Take one capsule by mouth once daily.Take additional dose while on antibiotics) 30 capsule 6    bisoprolol-hydrochlorothiazide 2.5-6.25 mg (ZIAC) 2.5-6.25 mg Tab TAKE 1/2 TABLET EVERY DAY 45 tablet 3    cholecalciferol, vitamin D3, (VITAMIN D3) 2,000 unit Cap Take by mouth once daily.        D-MANNOSE ORAL Take 4 tablets by mouth once daily.       estradioL (ESTRACE) 0.01 % (0.1 mg/gram) vaginal cream APPLY A PEA SIZED AMOUNT DAILY FOR TWO WEEKS THEN THREE TIMES A WEEK 1785 g 0    levothyroxine (SYNTHROID) 75 MCG tablet TAKE 1 TABLET EVERY DAY (NEED MD APPOINTMENT) 90 tablet 3    metoprolol tartrate (LOPRESSOR) 50 MG tablet Take one half to one tablet daily as needed for palpitations. 90 tablet 1    mexiletine (MEXITIL) 150 MG Cap Take 1 capsule (150 mg total) by mouth every 12 (twelve) hours. 60 capsule 1    mometasone (ELOCON) 0.1 % solution Mix entire bottle in jar of cerave cream and aaa qd- bid  prn itching 60 mL 3    omega-3 fatty acids-vitamin E (FISH OIL) 1,000 mg Cap Take 1 capsule by mouth once daily. 1400mg      pantoprazole (PROTONIX) 40 MG tablet TAKE 1 TABLET EVERY DAY 90 tablet 3    triamcinolone acetonide 0.1% (KENALOG) 0.1 % cream AAA qd to leg prn flare; not more than 2 weeks straight in same location, avoid use on face and groin 45 g 2     No current facility-administered medications for this visit.        Review of patient's allergies indicates:   Allergen Reactions    Fluoride Hives     Other reaction(s): Hives    Fluoride preparations Hives    Amoxicillin-pot clavulanate Diarrhea    Nitrofurantoin monohyd/m-cryst Nausea Only and Other (See Comments)        BMP  Lab Results   Component Value Date     (L) 10/06/2020    K 5.0 10/06/2020    CL 97 10/06/2020    CO2 25 10/06/2020    BUN 9 10/06/2020    CREATININE 0.8 10/06/2020    CALCIUM 9.8 10/06/2020    ANIONGAP 12 10/06/2020    ESTGFRAFRICA >60.0 10/06/2020    EGFRNONAA >60.0 10/06/2020       Review of Systems   Constitutional: Negative for chills, fever and unexpected weight change.   HENT: Negative for nosebleeds.    Eyes: Negative for visual disturbance.   Respiratory: Negative for chest tightness.    Cardiovascular: Negative for chest pain.   Gastrointestinal: Negative for constipation and diarrhea.   Genitourinary: Negative for dysuria, frequency, hematuria, nocturia and urgency.   Musculoskeletal: Negative for myalgias.   Skin: Negative for rash.   Neurological: Negative for seizures.   Hematological: Does not bruise/bleed easily.   Psychiatric/Behavioral: Negative for behavioral problems.     Objective:      Physical Exam   Vitals reviewed.  Constitutional: She is oriented to person, place, and time. She appears well-developed.   HENT:   Head: Normocephalic and atraumatic.   Eyes: No scleral icterus.   Cardiovascular: Normal rate.    Pulmonary/Chest: Effort normal. No respiratory distress.   Abdominal: She exhibits no  mass.   Musculoskeletal: No tenderness.   Lymphadenopathy:     She has no cervical adenopathy.   Neurological: She is alert and oriented to person, place, and time.   Skin: Skin is warm and dry. No rash noted.       urine dip clear  Patient declined pelvic examination.   A post void residual bladder scan was performed immediately after voiding. The patient's PVR was noted to be 300cc.     Assessment:       1. Recurrent UTI    2. Mixed stress and urge urinary incontinence        Plan:   Madan was seen today for urinary tract infection.    Diagnoses and all orders for this visit:    Recurrent UTI  -     Basic Metabolic Panel; Future  -     Urine culture; Standing  -     US Retroperitoneal Complete (Kidney and; Future    Mixed stress and urge urinary incontinence    Other orders  -     methenamine (HIPREX) 1 gram Tab; Take 1 tablet (1 g total) by mouth 2 (two) times daily.    double voiding.   Continue estrace, fluids decrease to 2 liters. Bmp in 6 weeks.

## 2020-10-20 ENCOUNTER — HOSPITAL ENCOUNTER (OUTPATIENT)
Dept: RADIOLOGY | Facility: HOSPITAL | Age: 80
Discharge: HOME | End: 2020-10-20
Attending: INTERNAL MEDICINE
Payer: MEDICARE

## 2020-10-20 DIAGNOSIS — R42 DIZZINESS AND GIDDINESS: ICD-10-CM

## 2020-10-20 PROCEDURE — 70547 MR ANGIOGRAPHY NECK W/O DYE: CPT | Mod: 26,HCNC,, | Performed by: RADIOLOGY

## 2020-10-20 PROCEDURE — 70544 MR ANGIOGRAPHY HEAD W/O DYE: CPT | Mod: 26,59,HCNC, | Performed by: RADIOLOGY

## 2020-10-20 PROCEDURE — 70553 MRI BRAIN W WO CONTRAST: ICD-10-PCS | Mod: 26,HCNC,, | Performed by: RADIOLOGY

## 2020-10-20 PROCEDURE — 70553 MRI BRAIN STEM W/O & W/DYE: CPT | Mod: 26,HCNC,, | Performed by: RADIOLOGY

## 2020-10-20 PROCEDURE — 70547 MRA NECK WITHOUT CONTRAST: ICD-10-PCS | Mod: 26,HCNC,, | Performed by: RADIOLOGY

## 2020-10-20 PROCEDURE — 70547 MR ANGIOGRAPHY NECK W/O DYE: CPT | Mod: TC,HCNC

## 2020-10-20 PROCEDURE — 70544 MR ANGIOGRAPHY HEAD W/O DYE: CPT | Mod: TC,HCNC

## 2020-10-20 PROCEDURE — 70553 MRI BRAIN STEM W/O & W/DYE: CPT | Mod: TC,HCNC

## 2020-10-20 PROCEDURE — 25500020 PHARM REV CODE 255: Mod: HCNC | Performed by: INTERNAL MEDICINE

## 2020-10-20 PROCEDURE — A9585 GADOBUTROL INJECTION: HCPCS | Mod: HCNC | Performed by: INTERNAL MEDICINE

## 2020-10-20 PROCEDURE — 70544 MRA BRAIN WITHOUT CONTRAST: ICD-10-PCS | Mod: 26,59,HCNC, | Performed by: RADIOLOGY

## 2020-10-20 RX ORDER — GADOBUTROL 604.72 MG/ML
10 INJECTION INTRAVENOUS
Status: COMPLETED | OUTPATIENT
Start: 2020-10-20 | End: 2020-10-20

## 2020-10-20 RX ADMIN — GADOBUTROL 10 ML: 604.72 INJECTION INTRAVENOUS at 03:10

## 2020-10-22 ENCOUNTER — PES CALL (OUTPATIENT)
Dept: ADMINISTRATIVE | Facility: CLINIC | Age: 80
End: 2020-10-22

## 2020-10-23 DIAGNOSIS — M17.0 PRIMARY OSTEOARTHRITIS OF BOTH KNEES: Primary | ICD-10-CM

## 2020-10-26 ENCOUNTER — PATIENT MESSAGE (OUTPATIENT)
Dept: CARDIOLOGY | Facility: CLINIC | Age: 80
End: 2020-10-26

## 2020-10-28 ENCOUNTER — TELEPHONE (OUTPATIENT)
Dept: CARDIOLOGY | Facility: CLINIC | Age: 80
End: 2020-10-28

## 2020-10-28 NOTE — TELEPHONE ENCOUNTER
"Telephoned patient with results and released to patient portal.  Reviewed with daughter also and only complaint question was about "tremors' she has noticed lately with fine hand activity writing, picking up objects, advised that it is a side effect from Mexitil but she also stated it's currently not unbearable.  Dr. Christiansen- please note and advise if neccesary    ----- Message from Fernando Disla MD sent at 10/22/2020  9:24 AM CDT -----  See comments below and call patient to discuss.   Please close encounter when done -- no need to route back to me.  Thanks    Continuous AF with reasonavble rate control  No VT, 6% PVCs -- all told, OK, no need to change current Rx    "

## 2020-11-03 ENCOUNTER — OFFICE VISIT (OUTPATIENT)
Dept: ORTHOPEDICS | Facility: CLINIC | Age: 80
End: 2020-11-03
Payer: MEDICARE

## 2020-11-03 ENCOUNTER — HOSPITAL ENCOUNTER (OUTPATIENT)
Dept: RADIOLOGY | Facility: HOSPITAL | Age: 80
Discharge: HOME | End: 2020-11-03
Attending: ORTHOPAEDIC SURGERY
Payer: MEDICARE

## 2020-11-03 VITALS — HEIGHT: 68 IN | BODY MASS INDEX: 22.16 KG/M2 | WEIGHT: 146.19 LBS

## 2020-11-03 DIAGNOSIS — M17.0 PRIMARY OSTEOARTHRITIS OF BOTH KNEES: Primary | ICD-10-CM

## 2020-11-03 DIAGNOSIS — M17.0 PRIMARY OSTEOARTHRITIS OF BOTH KNEES: ICD-10-CM

## 2020-11-03 PROCEDURE — 1157F ADVNC CARE PLAN IN RCRD: CPT | Mod: HCNC,S$GLB,, | Performed by: ORTHOPAEDIC SURGERY

## 2020-11-03 PROCEDURE — 1157F PR ADVANCE CARE PLAN OR EQUIV PRESENT IN MEDICAL RECORD: ICD-10-PCS | Mod: HCNC,S$GLB,, | Performed by: ORTHOPAEDIC SURGERY

## 2020-11-03 PROCEDURE — 1159F MED LIST DOCD IN RCRD: CPT | Mod: HCNC,S$GLB,, | Performed by: ORTHOPAEDIC SURGERY

## 2020-11-03 PROCEDURE — 73564 X-RAY EXAM KNEE 4 OR MORE: CPT | Mod: TC,50,HCNC

## 2020-11-03 PROCEDURE — 99999 PR PBB SHADOW E&M-EST. PATIENT-LVL III: ICD-10-PCS | Mod: PBBFAC,HCNC,, | Performed by: ORTHOPAEDIC SURGERY

## 2020-11-03 PROCEDURE — 1125F AMNT PAIN NOTED PAIN PRSNT: CPT | Mod: HCNC,S$GLB,, | Performed by: ORTHOPAEDIC SURGERY

## 2020-11-03 PROCEDURE — 1159F PR MEDICATION LIST DOCUMENTED IN MEDICAL RECORD: ICD-10-PCS | Mod: HCNC,S$GLB,, | Performed by: ORTHOPAEDIC SURGERY

## 2020-11-03 PROCEDURE — 73564 X-RAY EXAM KNEE 4 OR MORE: CPT | Mod: 26,50,HCNC, | Performed by: RADIOLOGY

## 2020-11-03 PROCEDURE — 1125F PR PAIN SEVERITY QUANTIFIED, PAIN PRESENT: ICD-10-PCS | Mod: HCNC,S$GLB,, | Performed by: ORTHOPAEDIC SURGERY

## 2020-11-03 PROCEDURE — 99214 PR OFFICE/OUTPT VISIT, EST, LEVL IV, 30-39 MIN: ICD-10-PCS | Mod: 25,HCNC,S$GLB, | Performed by: ORTHOPAEDIC SURGERY

## 2020-11-03 PROCEDURE — 99999 PR PBB SHADOW E&M-EST. PATIENT-LVL III: CPT | Mod: PBBFAC,HCNC,, | Performed by: ORTHOPAEDIC SURGERY

## 2020-11-03 PROCEDURE — 73564 XR KNEE ORTHO BILAT WITH FLEXION: ICD-10-PCS | Mod: 26,50,HCNC, | Performed by: RADIOLOGY

## 2020-11-03 PROCEDURE — 99214 OFFICE O/P EST MOD 30 MIN: CPT | Mod: 25,HCNC,S$GLB, | Performed by: ORTHOPAEDIC SURGERY

## 2020-11-03 NOTE — PROGRESS NOTES
Subjective:     HPI:   Madan Bell is a 80 y.o. 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     Social support: Currently lives at home by her self but have family in the area.      ROS:  The updated medical history is in the chart and has been reviewed. A review of systems is updated and there is no reported vision changes, ear/nose/mouth/throat complaints,  chest pain, shortness of breath, abdominal pain, urological complaints, fevers or chills, psychiatric complaints. Musculoskeletal and neurologcial symptoms are as documented. All other systems are negative.      Objective:   Exam:  There were no vitals filed for this visit.  Body mass index is 22.22 kg/m².    Physical examination included assessment of the patient's general appearance with particular attention to development, nutrition, body habitus, attention to grooming, and any evidence of distress.  Constitutional: The patient is a well-developed, well-nourished patient in no acute distress.   Cardiovascular: Vascular examination included warmth and capillary refill as well inspection for edema and assessment of pedal pulses. Pulses are palpable and regular.  Musculoskeletal: Gait was assessed as to whether it was steady, non-antalgic, and/or required the use of an assist device. The patient was also asked to walk independently and get onto the examination table.  Skin: The skin was examined for any obvious rashes or lesions in the  extremity.  Neurologic: Sensation is intact to light touch in the extremity. The patient has good coordination without hyperreflexia and is alert and oriented to person, place and time and has normal mood and affect.     All of the above were examined and found to be within normal limits except for the following pertinent clinical findings:    Antalgic gait with a limp.  Right knee 5-117 left knee 5-130 right knee 8 degree valgus left knee 6 degree valgus knees are stable anterior-posterior varus and valgus stresses with flexion contractures but no extensor lag.  She is not a moderate effusions.  Tender palpation mediolateral and patellofemoral line on the right just patellofemoral joint on the left.  She got significant quad atrophy 5-out of 5 strength      Imaging:  Indication:  Right knee pain  Exam Ordered: Radiographs of the right knee include a standing anteroposterior view, a standing posterioanterior view, a lateral view in full flexion, and a sunrise view  Details of Examination: Todays exam shows evidence of joint space narrowing, osteophyte formation, and subchondral sclerosis, all consistent with degenerative arthritis of the knee.  No other significant findings are noted.  Impression:  Degenerative Arthritis, Right Knee    Indication:  Left knee pain  Exam Ordered: Radiographs of the left knee include a standing anteroposterior view, a standing posterioanterior view, a lateral view in full flexion, and a sunrise view  Details of Examination: Todays exam shows evidence of joint space narrowing, osteophyte formation, and subchondral sclerosis, all consistent with degenerative arthritis of the knee.  No other significant findings are noted.  Impression:  Degenerative Arthritis, Left Knee    Grade 4 valgus knee arthritic changes severe patellofemoral joint on the left      Assessment:       ICD-10-CM ICD-9-CM   1. Primary osteoarthritis of both knees  M17.0 715.16    Debilitation and quad atrophy    She has  got a debilitated has been doing a lot of sitting with the pandemic and her knee arthritis getting recurrent UTIs likely related to this    Thyroid cancer  PTE in the 1960s postpartum no family history  Atrial fibrillation on Eliquis  We Diabetes A1c 6.0     Plan:       The above findings were discussed with patient length. We discussed the risks of conservative versus surgical management knee arthritis. Conservative management consisting of anti-inflammatory medications, glucosamine/chondroitin sulfate, weight loss, physical therapy, activity modification, as well as injections (lubricant versus corticosteroid) was discussed at length. At this point considering the patient's level of activity, pain, and radiographic findings I recommend aggressive pretibial a taken and then consideration of knee replacements    I explained the potentially adverse gastric, cardiac, and renal effects of NSAIDs and explained that if the patient wishes to take it for longer that the patient should discuss this with their primary care physician to determine if it is safe to do so.  Tylenol and possibly Voltaren gel shin discussed with Eliquis prescriber    Will do bilateral knee steroid injections today in a course of physical therapy for preop pre habilitation    Six week follow-up no x-rays possible surgical discussion      Orders Placed This Encounter   Procedures    Ambulatory referral/consult to Physical/Occupational Therapy     Standing Status:   Future     Standing Expiration Date:   12/3/2021     Referral Priority:   Routine     Referral Type:   Physical Medicine     Referral Reason:   Specialty Services Required     Number of Visits Requested:   1             Past Medical History:   Diagnosis Date    Anticoagulant long-term use     Anxiety     Arrhythmia     Arthritis     knees    Basal cell carcinoma     Breast cyst     Fibrocystic breast 1980 - ??    Heart murmur     Hyperlipidemia     Hypertension     Mitral  valve disorder     Mitral valve prolapse     PVC's (premature ventricular contractions)     Squamous cell carcinoma bx 7-2017    right upper forehead    Thyroid cancer 1/29/2013    Thyroid disease     Vasovagal near syncope 11/12/2012    VTE (venous thromboembolism)     in 1960s, post partum, pt unsure of details        Past Surgical History:   Procedure Laterality Date    BREAST BIOPSY Left     excisional    BREAST BIOPSY Left     core needle biopsy    BREAST CYST ASPIRATION  1980's - ??    BREAST CYST EXCISION  2008 - ??    CARDIOVERSION N/A 8/13/2018    Procedure: CARDIOVERSION;  Surgeon: Fernando Disla MD;  Location: [facility] CATH LAB;  Service: Cardiology;  Laterality: N/A;  AF,DCCV,ANAHI,MAC,FAS,3 PREP    HYSTERECTOMY      tahbso    OOPHORECTOMY  hysterectomy - 2000 - ??    THYROID SURGERY         Family History   Problem Relation Age of Onset    Arrhythmia Son     Mitral valve prolapse Son     Sudden death Son         sudden cardiac death    Heart disease Mother     Hyperlipidemia Maternal Grandmother     No Known Problems Daughter     No Known Problems Son     No Known Problems Son     Breast cancer Maternal Aunt     Melanoma Neg Hx        Social History     Socioeconomic History    Marital status:      Spouse name: Not on file    Number of children: Not on file    Years of education: Not on file    Highest education level: Not on file   Occupational History    Not on file   Social Needs    Financial resource strain: Not hard at all    Food insecurity     Worry: Never true     Inability: Never true    Transportation needs     Medical: No     Non-medical: Not on file   Tobacco Use    Smoking status: Never Smoker    Smokeless tobacco: Never Used   Substance and Sexual Activity    Alcohol use: No     Frequency: Never     Drinks per session: Patient refused     Binge frequency: Patient refused    Drug use: No    Sexual activity: Not on file   Lifestyle    Physical  activity     Days per week: Not on file     Minutes per session: 20 min    Stress: Only a little   Relationships    Social connections     Talks on phone: More than three times a week     Gets together: More than three times a week     Attends Faith service: Not on file     Active member of club or organization: Yes     Attends meetings of clubs or organizations: More than 4 times per year     Relationship status:    Other Topics Concern    Are you pregnant or think you may be? Not Asked    Breast-feeding Not Asked   Social History Narrative    Not on file

## 2020-11-04 ENCOUNTER — HOSPITAL ENCOUNTER (OUTPATIENT)
Dept: RADIOLOGY | Facility: HOSPITAL | Age: 80
Discharge: HOME | End: 2020-11-04
Attending: UROLOGY
Payer: MEDICARE

## 2020-11-04 ENCOUNTER — PATIENT MESSAGE (OUTPATIENT)
Dept: CARDIOLOGY | Facility: CLINIC | Age: 80
End: 2020-11-04

## 2020-11-04 ENCOUNTER — PATIENT MESSAGE (OUTPATIENT)
Dept: UROLOGY | Facility: CLINIC | Age: 80
End: 2020-11-04

## 2020-11-04 DIAGNOSIS — N39.0 RECURRENT UTI: ICD-10-CM

## 2020-11-04 PROCEDURE — 76770 US RETROPERITONEAL COMPLETE: ICD-10-PCS | Mod: 26,HCNC,, | Performed by: RADIOLOGY

## 2020-11-04 PROCEDURE — 76770 US EXAM ABDO BACK WALL COMP: CPT | Mod: TC,HCNC

## 2020-11-04 PROCEDURE — 76770 US EXAM ABDO BACK WALL COMP: CPT | Mod: 26,HCNC,, | Performed by: RADIOLOGY

## 2020-11-11 ENCOUNTER — OFFICE VISIT (OUTPATIENT)
Dept: OPHTHALMOLOGY | Facility: CLINIC | Age: 80
End: 2020-11-11
Payer: MEDICARE

## 2020-11-11 DIAGNOSIS — Z01.818 PRE-OP TESTING: ICD-10-CM

## 2020-11-11 DIAGNOSIS — H25.13 NUCLEAR SCLEROSIS, BILATERAL: Primary | ICD-10-CM

## 2020-11-11 PROCEDURE — 99999 PR PBB SHADOW E&M-EST. PATIENT-LVL III: ICD-10-PCS | Mod: PBBFAC,HCNC,, | Performed by: OPHTHALMOLOGY

## 2020-11-11 PROCEDURE — 92004 COMPRE OPH EXAM NEW PT 1/>: CPT | Mod: HCNC,S$GLB,, | Performed by: OPHTHALMOLOGY

## 2020-11-11 PROCEDURE — 92004 PR EYE EXAM, NEW PATIENT,COMPREHESV: ICD-10-PCS | Mod: HCNC,S$GLB,, | Performed by: OPHTHALMOLOGY

## 2020-11-11 PROCEDURE — 92136 OPHTHALMIC BIOMETRY: CPT | Mod: HCNC,LT,S$GLB, | Performed by: OPHTHALMOLOGY

## 2020-11-11 PROCEDURE — 99999 PR PBB SHADOW E&M-EST. PATIENT-LVL III: CPT | Mod: PBBFAC,HCNC,, | Performed by: OPHTHALMOLOGY

## 2020-11-11 PROCEDURE — 92136 IOL MASTER - OU - BOTH EYES: ICD-10-PCS | Mod: HCNC,LT,S$GLB, | Performed by: OPHTHALMOLOGY

## 2020-11-11 RX ORDER — MOXIFLOXACIN 5 MG/ML
1 SOLUTION/ DROPS OPHTHALMIC
Status: CANCELLED | OUTPATIENT
Start: 2020-11-11

## 2020-11-11 RX ORDER — PHENYLEPHRINE HYDROCHLORIDE 25 MG/ML
1 SOLUTION/ DROPS OPHTHALMIC
Status: CANCELLED | OUTPATIENT
Start: 2020-11-11

## 2020-11-11 RX ORDER — TETRACAINE HYDROCHLORIDE 5 MG/ML
1 SOLUTION OPHTHALMIC
Status: CANCELLED | OUTPATIENT
Start: 2020-11-11

## 2020-11-11 RX ORDER — LACTOBAC NO.30/BIFIDOBACT NO.4 30B CELL
1 CAPSULE,DELAYED RELEASE (ENTERIC COATED) ORAL EVERY MORNING
COMMUNITY
Start: 2020-11-04

## 2020-11-11 RX ORDER — SODIUM CHLORIDE 0.9 % (FLUSH) 0.9 %
10 SYRINGE (ML) INJECTION
Status: DISCONTINUED | OUTPATIENT
Start: 2020-11-11 | End: 2021-05-30 | Stop reason: CLARIF

## 2020-11-11 RX ORDER — TROPICAMIDE 10 MG/ML
1 SOLUTION/ DROPS OPHTHALMIC
Status: CANCELLED | OUTPATIENT
Start: 2020-11-11

## 2020-11-11 NOTE — PROGRESS NOTES
HPI     NSC OU    79 YO female here for cat eval. Pt reports that her vision is blurry and   she cannot drive at night due to glares. No other ocular complaints.     Last edited by Verenice Qureshi on 11/11/2020  2:57 PM. (History)            Assessment /Plan     For exam results, see Encounter Report.    Nuclear sclerosis, bilateral      Visually Significant Cataract: Patient reports decreased vision consistent with the clinical amount of lenticular opacity, which reaches the level of visual significance and affects activities of daily living. Risks, benefits, and alternatives to cataract surgery were discussed and the consent reviewed. IOL options were discussed, including ATIOLs and the associated side effects and additional patient cost associated with them.   IOL Selections:   Right eye  IOL: TFNT30 vs 40 20.5 at 50 vs dcboo 21.0     Left eye  IOL: TFNT30 21.0 at 170 vs dcboo 21.5    Pt wishes to have left eye done first.    Considering FLACS....  The patient expresses a desire to reduce spectacle dependence. I reviewed various IOL and LASER refractive surgical options and we will attempt to minimize spectacle dependence by managing astigmatism and optimizing IOL selection. Femtosecond LASER assisted cataract surgery (FLACS) technology was explained to the patient with educational videos and discussion.  The patient voices understanding and wishes to implement this technology during the cataract procedure.  I explained the increased precision of the LASER versus manual techniques, especially as it relates to astigmatism reduction with arcuate incisions.  I emphasized that although our goal is to reduce the need for refractive correction after surgery, there may still be a need for spectacle correction to achieve optimal visual acuity, and that a reasonable range of functional vision should be the expectation.  No guarantees are made about post operative refraction or visual acuity, as the eye may heal in  unpredictable ways, and the standard risks, benefits, and alternatives to cataract surgery were explained.  The patient understands that the refractive portions of this cataract procedure are not covered by insurance, and that there is an out of pocket expense of $2250 per eye. I also explained that even though our pre-operative plan is to utilize advanced refractive technologies during surgery, that I may decide to eliminate part or all of this plan if surgical challenges or complications arise, or I feel that it is not in the patient's best interest. Consent forms and an ABN form were given to the patient to review.    ORA only, scoliosis

## 2020-11-12 ENCOUNTER — HOSPITAL ENCOUNTER (OUTPATIENT)
Dept: RADIOLOGY | Facility: CLINIC | Age: 80
Discharge: HOME | End: 2020-11-12
Attending: INTERNAL MEDICINE
Payer: MEDICARE

## 2020-11-12 ENCOUNTER — HOSPITAL ENCOUNTER (OUTPATIENT)
Dept: RADIOLOGY | Facility: HOSPITAL | Age: 80
Discharge: HOME | End: 2020-11-12
Attending: INTERNAL MEDICINE
Payer: MEDICARE

## 2020-11-12 DIAGNOSIS — M81.0 AGE-RELATED OSTEOPOROSIS WITHOUT CURRENT PATHOLOGICAL FRACTURE: ICD-10-CM

## 2020-11-12 DIAGNOSIS — M89.9 DISORDER OF BONE: ICD-10-CM

## 2020-11-12 DIAGNOSIS — Z12.31 SCREENING MAMMOGRAM, ENCOUNTER FOR: ICD-10-CM

## 2020-11-12 PROCEDURE — 77063 MAMMO DIGITAL SCREENING BILAT WITH TOMO: ICD-10-PCS | Mod: 26,HCNC,, | Performed by: RADIOLOGY

## 2020-11-12 PROCEDURE — 77067 SCR MAMMO BI INCL CAD: CPT | Mod: 26,HCNC,, | Performed by: RADIOLOGY

## 2020-11-12 PROCEDURE — 77067 SCR MAMMO BI INCL CAD: CPT | Mod: TC,HCNC

## 2020-11-12 PROCEDURE — 77080 DXA BONE DENSITY AXIAL: CPT | Mod: 26,HCNC,, | Performed by: INTERNAL MEDICINE

## 2020-11-12 PROCEDURE — 77067 MAMMO DIGITAL SCREENING BILAT WITH TOMO: ICD-10-PCS | Mod: 26,HCNC,, | Performed by: RADIOLOGY

## 2020-11-12 PROCEDURE — 77063 BREAST TOMOSYNTHESIS BI: CPT | Mod: 26,HCNC,, | Performed by: RADIOLOGY

## 2020-11-12 PROCEDURE — 77080 DXA BONE DENSITY AXIAL: CPT | Mod: TC,HCNC

## 2020-11-12 PROCEDURE — 77080 DEXA BONE DENSITY SPINE HIP: ICD-10-PCS | Mod: 26,HCNC,, | Performed by: INTERNAL MEDICINE

## 2020-12-17 ENCOUNTER — PATIENT OUTREACH (OUTPATIENT)
Dept: OTHER | Facility: OTHER | Age: 80
End: 2020-12-17

## 2020-12-17 ENCOUNTER — PATIENT MESSAGE (OUTPATIENT)
Dept: ADMINISTRATIVE | Facility: OTHER | Age: 80
End: 2020-12-17

## 2020-12-30 ENCOUNTER — PATIENT MESSAGE (OUTPATIENT)
Dept: ORTHOPEDICS | Facility: CLINIC | Age: 80
End: 2020-12-30

## 2021-01-05 ENCOUNTER — PATIENT MESSAGE (OUTPATIENT)
Dept: ORTHOPEDICS | Facility: CLINIC | Age: 81
End: 2021-01-05

## 2021-01-13 ENCOUNTER — PATIENT MESSAGE (OUTPATIENT)
Dept: CARDIOLOGY | Facility: CLINIC | Age: 81
End: 2021-01-13

## 2021-01-26 ENCOUNTER — LAB VISIT (OUTPATIENT)
Dept: LAB | Facility: HOSPITAL | Age: 81
End: 2021-01-26
Attending: UROLOGY
Payer: MEDICARE

## 2021-01-26 DIAGNOSIS — N39.0 RECURRENT UTI: ICD-10-CM

## 2021-01-26 PROCEDURE — 87088 URINE BACTERIA CULTURE: CPT

## 2021-01-26 PROCEDURE — 87077 CULTURE AEROBIC IDENTIFY: CPT

## 2021-01-26 PROCEDURE — 87186 SC STD MICRODIL/AGAR DIL: CPT

## 2021-01-26 PROCEDURE — 87086 URINE CULTURE/COLONY COUNT: CPT

## 2021-01-26 NOTE — PROGRESS NOTES
CARDIOVERSION EDUCATION CHECK LIST    PRE PROCEDURE TESTING    8-8-18  Pre - procedure labs have been ordered for you at:  [facility]  · Be sure to arrive at your scheduled time. YOU DO NOT HAVE TO FAST FOR THIS LAB WORK!      DAY OF PROCEDURE     8-13-18 @ 8 AM   Report to Cardiology Waiting Room on 3rd floor of the hospital  · Do not eat or drink anything after 12 mn on the night before your procedure.  · Please do not wear makeup (especially mascara) when arriving for your procedure.    Medications:  · You may take your usual morning medications with a sip of water    Directions to the Cardiology Waiting Room  If you park in the Parking Garage:  Take elevators to the 2nd floor  Walk up ramp and turn right by Gold Elevators  Take elevator to the 3rd floor  Upon exiting the elevator, turn away from the clinic areas  Walk long marie around to front of hospital to area with windows overlooking [facility]  Check in at Reception Desk  OR  If family is dropping you off:  Have them drop you off at the front of the Hospital  (Near the ER, where all the flags are hung).  Take the E elevators to the 3rd floor.  Check in at the Reception Desk in the waiting room.        · YOU WILL BE GOING HOME THE SAME DAY AS YOUR PROCEDURE  · YOU WILL NEED SOMEONE TO DRIVE YOU HOME AFTER YOUR PROCEDURE   · YOUR PAIN DURING YOUR PROCEDURE WILL BE MANAGED BY THE ANESTHESIA TEAM    Any need to reschedule or cancel procedures, or any questions regarding your procedure should be addressed directly with the Arrhythmia Department Nurses at the following phone number: [phone]    THE ABOVE INSTRUCTIONS WERE GIVEN TO THE PATIENT VERBALLY AND THEY VERBALIZED UNDERSTANDING. THEY DO NOT REQUIRE ANY SPECIAL NEEDS AND DO NOT HAVE ANY LEARNING BARRIERS.        Patient made aware of 24/7 emergency services.

## 2021-01-29 ENCOUNTER — PATIENT MESSAGE (OUTPATIENT)
Dept: UROLOGY | Facility: CLINIC | Age: 81
End: 2021-01-29

## 2021-01-29 DIAGNOSIS — A49.9 BACTERIAL UTI: Primary | ICD-10-CM

## 2021-01-29 DIAGNOSIS — N39.0 BACTERIAL UTI: Primary | ICD-10-CM

## 2021-01-29 DIAGNOSIS — T36.95XA ANTIBIOTIC-INDUCED YEAST INFECTION: ICD-10-CM

## 2021-01-29 DIAGNOSIS — B37.9 ANTIBIOTIC-INDUCED YEAST INFECTION: ICD-10-CM

## 2021-01-29 LAB — BACTERIA UR CULT: ABNORMAL

## 2021-01-29 RX ORDER — DOXYCYCLINE 100 MG/1
100 CAPSULE ORAL 2 TIMES DAILY
Qty: 14 CAPSULE | Refills: 0 | Status: SHIPPED | OUTPATIENT
Start: 2021-01-29 | End: 2021-02-05

## 2021-02-02 DIAGNOSIS — I49.8 OTHER SPECIFIED CARDIAC ARRHYTHMIAS: Primary | ICD-10-CM

## 2021-02-09 ENCOUNTER — PATIENT MESSAGE (OUTPATIENT)
Dept: INTERNAL MEDICINE | Facility: CLINIC | Age: 81
End: 2021-02-09

## 2021-02-10 ENCOUNTER — HOSPITAL ENCOUNTER (EMERGENCY)
Facility: HOSPITAL | Age: 81
Discharge: HOME | End: 2021-02-10
Attending: EMERGENCY MEDICINE
Payer: MEDICARE

## 2021-02-10 VITALS
TEMPERATURE: 98 F | HEIGHT: 69 IN | BODY MASS INDEX: 21.48 KG/M2 | RESPIRATION RATE: 16 BRPM | DIASTOLIC BLOOD PRESSURE: 68 MMHG | OXYGEN SATURATION: 96 % | SYSTOLIC BLOOD PRESSURE: 124 MMHG | HEART RATE: 82 BPM | WEIGHT: 145 LBS

## 2021-02-10 DIAGNOSIS — R41.82 ALTERED MENTAL STATUS: ICD-10-CM

## 2021-02-10 DIAGNOSIS — R40.4 TRANSIENT ALTERATION OF AWARENESS: Primary | ICD-10-CM

## 2021-02-10 DIAGNOSIS — E87.1 HYPONATREMIA: ICD-10-CM

## 2021-02-10 PROBLEM — G93.40 ENCEPHALOPATHY: Status: ACTIVE | Noted: 2021-02-10

## 2021-02-10 LAB
ALBUMIN SERPL BCP-MCNC: 4.3 G/DL (ref 3.5–5.2)
ALP SERPL-CCNC: 71 U/L (ref 55–135)
ALT SERPL W/O P-5'-P-CCNC: 22 U/L (ref 10–44)
ANION GAP SERPL CALC-SCNC: 7 MMOL/L (ref 8–16)
AST SERPL-CCNC: 22 U/L (ref 10–40)
BASOPHILS # BLD AUTO: 0.04 K/UL (ref 0–0.2)
BASOPHILS NFR BLD: 0.6 % (ref 0–1.9)
BILIRUB SERPL-MCNC: 1.4 MG/DL (ref 0.1–1)
BUN SERPL-MCNC: 14 MG/DL (ref 8–23)
CALCIUM SERPL-MCNC: 10.3 MG/DL (ref 8.7–10.5)
CHLORIDE SERPL-SCNC: 97 MMOL/L (ref 95–110)
CHOLEST SERPL-MCNC: 145 MG/DL (ref 120–199)
CHOLEST/HDLC SERPL: 2.3 {RATIO} (ref 2–5)
CO2 SERPL-SCNC: 29 MMOL/L (ref 23–29)
CREAT SERPL-MCNC: 0.8 MG/DL (ref 0.5–1.4)
DIFFERENTIAL METHOD: ABNORMAL
EOSINOPHIL # BLD AUTO: 0.1 K/UL (ref 0–0.5)
EOSINOPHIL NFR BLD: 0.8 % (ref 0–8)
ERYTHROCYTE [DISTWIDTH] IN BLOOD BY AUTOMATED COUNT: 12.8 % (ref 11.5–14.5)
EST. GFR  (AFRICAN AMERICAN): >60 ML/MIN/1.73 M^2
EST. GFR  (NON AFRICAN AMERICAN): >60 ML/MIN/1.73 M^2
GLUCOSE SERPL-MCNC: 104 MG/DL (ref 70–110)
HCT VFR BLD AUTO: 44.1 % (ref 37–48.5)
HCV AB SERPL QL IA: NEGATIVE
HDLC SERPL-MCNC: 63 MG/DL (ref 40–75)
HDLC SERPL: 43.4 % (ref 20–50)
HGB BLD-MCNC: 14.5 G/DL (ref 12–16)
IMM GRANULOCYTES # BLD AUTO: 0.02 K/UL (ref 0–0.04)
IMM GRANULOCYTES NFR BLD AUTO: 0.3 % (ref 0–0.5)
INR PPP: 1.1 (ref 0.8–1.2)
LDLC SERPL CALC-MCNC: 69.4 MG/DL (ref 63–159)
LYMPHOCYTES # BLD AUTO: 1.9 K/UL (ref 1–4.8)
LYMPHOCYTES NFR BLD: 28.1 % (ref 18–48)
MCH RBC QN AUTO: 31.9 PG (ref 27–31)
MCHC RBC AUTO-ENTMCNC: 32.9 G/DL (ref 32–36)
MCV RBC AUTO: 97 FL (ref 82–98)
MONOCYTES # BLD AUTO: 0.5 K/UL (ref 0.3–1)
MONOCYTES NFR BLD: 7.4 % (ref 4–15)
NEUTROPHILS # BLD AUTO: 4.2 K/UL (ref 1.8–7.7)
NEUTROPHILS NFR BLD: 62.8 % (ref 38–73)
NONHDLC SERPL-MCNC: 82 MG/DL
NRBC BLD-RTO: 0 /100 WBC
PLATELET # BLD AUTO: 159 K/UL (ref 150–350)
PMV BLD AUTO: 10.3 FL (ref 9.2–12.9)
POCT GLUCOSE: 100 MG/DL (ref 70–110)
POTASSIUM SERPL-SCNC: 3.9 MMOL/L (ref 3.5–5.1)
PROT SERPL-MCNC: 7.4 G/DL (ref 6–8.4)
PROTHROMBIN TIME: 11.7 SEC (ref 9–12.5)
RBC # BLD AUTO: 4.54 M/UL (ref 4–5.4)
SODIUM SERPL-SCNC: 133 MMOL/L (ref 136–145)
T4 FREE SERPL-MCNC: 1.17 NG/DL (ref 0.71–1.51)
TRIGL SERPL-MCNC: 63 MG/DL (ref 30–150)
TSH SERPL DL<=0.005 MIU/L-ACNC: 5.65 UIU/ML (ref 0.4–4)
WBC # BLD AUTO: 6.62 K/UL (ref 3.9–12.7)

## 2021-02-10 PROCEDURE — 99285 EMERGENCY DEPT VISIT HI MDM: CPT | Mod: 25

## 2021-02-10 PROCEDURE — 93005 ELECTROCARDIOGRAM TRACING: CPT

## 2021-02-10 PROCEDURE — 86803 HEPATITIS C AB TEST: CPT

## 2021-02-10 PROCEDURE — 80053 COMPREHEN METABOLIC PANEL: CPT

## 2021-02-10 PROCEDURE — 84443 ASSAY THYROID STIM HORMONE: CPT

## 2021-02-10 PROCEDURE — 80061 LIPID PANEL: CPT

## 2021-02-10 PROCEDURE — 99285 EMERGENCY DEPT VISIT HI MDM: CPT | Mod: ,,, | Performed by: EMERGENCY MEDICINE

## 2021-02-10 PROCEDURE — 99285 PR EMERGENCY DEPT VISIT,LEVEL V: ICD-10-PCS | Mod: ,,, | Performed by: EMERGENCY MEDICINE

## 2021-02-10 PROCEDURE — 84439 ASSAY OF FREE THYROXINE: CPT

## 2021-02-10 PROCEDURE — 85025 COMPLETE CBC W/AUTO DIFF WBC: CPT

## 2021-02-10 PROCEDURE — 93010 ELECTROCARDIOGRAM REPORT: CPT | Mod: ,,, | Performed by: INTERNAL MEDICINE

## 2021-02-10 PROCEDURE — 82962 GLUCOSE BLOOD TEST: CPT

## 2021-02-10 PROCEDURE — 93010 EKG 12-LEAD: ICD-10-PCS | Mod: ,,, | Performed by: INTERNAL MEDICINE

## 2021-02-10 PROCEDURE — 99283 PR EMERGENCY DEPT VISIT,LEVEL III: ICD-10-PCS | Mod: GC,,, | Performed by: PSYCHIATRY & NEUROLOGY

## 2021-02-10 PROCEDURE — 99283 EMERGENCY DEPT VISIT LOW MDM: CPT | Mod: GC,,, | Performed by: PSYCHIATRY & NEUROLOGY

## 2021-02-10 PROCEDURE — 85610 PROTHROMBIN TIME: CPT

## 2021-02-11 ENCOUNTER — PATIENT MESSAGE (OUTPATIENT)
Dept: INTERNAL MEDICINE | Facility: CLINIC | Age: 81
End: 2021-02-11

## 2021-02-13 ENCOUNTER — IMMUNIZATION (OUTPATIENT)
Dept: PHARMACY | Facility: CLINIC | Age: 81
End: 2021-02-13
Payer: MEDICARE

## 2021-02-13 DIAGNOSIS — Z23 NEED FOR VACCINATION: Primary | ICD-10-CM

## 2021-02-21 ENCOUNTER — TELEPHONE (OUTPATIENT)
Dept: INTERNAL MEDICINE | Facility: CLINIC | Age: 81
End: 2021-02-21

## 2021-02-23 ENCOUNTER — OFFICE VISIT (OUTPATIENT)
Dept: ELECTROPHYSIOLOGY | Facility: CLINIC | Age: 81
End: 2021-02-23
Payer: MEDICARE

## 2021-02-23 VITALS
BODY MASS INDEX: 22.49 KG/M2 | WEIGHT: 148.38 LBS | HEART RATE: 76 BPM | HEIGHT: 68 IN | SYSTOLIC BLOOD PRESSURE: 141 MMHG | DIASTOLIC BLOOD PRESSURE: 76 MMHG

## 2021-02-23 DIAGNOSIS — I49.8 OTHER SPECIFIED CARDIAC ARRHYTHMIAS: ICD-10-CM

## 2021-02-23 DIAGNOSIS — I48.19 PERSISTENT ATRIAL FIBRILLATION: Primary | ICD-10-CM

## 2021-02-23 DIAGNOSIS — I34.0 NONRHEUMATIC MITRAL VALVE REGURGITATION: ICD-10-CM

## 2021-02-23 DIAGNOSIS — I34.1 MVP (MITRAL VALVE PROLAPSE): ICD-10-CM

## 2021-02-23 DIAGNOSIS — I49.3 PVC'S (PREMATURE VENTRICULAR CONTRACTIONS): ICD-10-CM

## 2021-02-23 DIAGNOSIS — I47.29 PAROXYSMAL VENTRICULAR TACHYCARDIA: ICD-10-CM

## 2021-02-23 PROCEDURE — 93005 RHYTHM STRIP: ICD-10-PCS | Mod: S$GLB,,, | Performed by: INTERNAL MEDICINE

## 2021-02-23 PROCEDURE — 99214 OFFICE O/P EST MOD 30 MIN: CPT | Mod: S$GLB,,, | Performed by: INTERNAL MEDICINE

## 2021-02-23 PROCEDURE — 3077F SYST BP >= 140 MM HG: CPT | Mod: CPTII,S$GLB,, | Performed by: INTERNAL MEDICINE

## 2021-02-23 PROCEDURE — 3288F FALL RISK ASSESSMENT DOCD: CPT | Mod: CPTII,S$GLB,, | Performed by: INTERNAL MEDICINE

## 2021-02-23 PROCEDURE — 93010 RHYTHM STRIP: ICD-10-PCS | Mod: S$GLB,,, | Performed by: INTERNAL MEDICINE

## 2021-02-23 PROCEDURE — 1157F PR ADVANCE CARE PLAN OR EQUIV PRESENT IN MEDICAL RECORD: ICD-10-PCS | Mod: S$GLB,,, | Performed by: INTERNAL MEDICINE

## 2021-02-23 PROCEDURE — 3288F PR FALLS RISK ASSESSMENT DOCUMENTED: ICD-10-PCS | Mod: CPTII,S$GLB,, | Performed by: INTERNAL MEDICINE

## 2021-02-23 PROCEDURE — 3078F PR MOST RECENT DIASTOLIC BLOOD PRESSURE < 80 MM HG: ICD-10-PCS | Mod: CPTII,S$GLB,, | Performed by: INTERNAL MEDICINE

## 2021-02-23 PROCEDURE — 99499 RISK ADDL DX/OHS AUDIT: ICD-10-PCS | Mod: S$GLB,,, | Performed by: INTERNAL MEDICINE

## 2021-02-23 PROCEDURE — 3078F DIAST BP <80 MM HG: CPT | Mod: CPTII,S$GLB,, | Performed by: INTERNAL MEDICINE

## 2021-02-23 PROCEDURE — 93010 ELECTROCARDIOGRAM REPORT: CPT | Mod: S$GLB,,, | Performed by: INTERNAL MEDICINE

## 2021-02-23 PROCEDURE — 3077F PR MOST RECENT SYSTOLIC BLOOD PRESSURE >= 140 MM HG: ICD-10-PCS | Mod: CPTII,S$GLB,, | Performed by: INTERNAL MEDICINE

## 2021-02-23 PROCEDURE — 1126F PR PAIN SEVERITY QUANTIFIED, NO PAIN PRESENT: ICD-10-PCS | Mod: S$GLB,,, | Performed by: INTERNAL MEDICINE

## 2021-02-23 PROCEDURE — 1157F ADVNC CARE PLAN IN RCRD: CPT | Mod: S$GLB,,, | Performed by: INTERNAL MEDICINE

## 2021-02-23 PROCEDURE — 99499 UNLISTED E&M SERVICE: CPT | Mod: S$GLB,,, | Performed by: INTERNAL MEDICINE

## 2021-02-23 PROCEDURE — 99999 PR PBB SHADOW E&M-EST. PATIENT-LVL III: CPT | Mod: PBBFAC,,, | Performed by: INTERNAL MEDICINE

## 2021-02-23 PROCEDURE — 93005 ELECTROCARDIOGRAM TRACING: CPT | Mod: S$GLB,,, | Performed by: INTERNAL MEDICINE

## 2021-02-23 PROCEDURE — 99999 PR PBB SHADOW E&M-EST. PATIENT-LVL III: ICD-10-PCS | Mod: PBBFAC,,, | Performed by: INTERNAL MEDICINE

## 2021-02-23 PROCEDURE — 1101F PR PT FALLS ASSESS DOC 0-1 FALLS W/OUT INJ PAST YR: ICD-10-PCS | Mod: CPTII,S$GLB,, | Performed by: INTERNAL MEDICINE

## 2021-02-23 PROCEDURE — 99214 PR OFFICE/OUTPT VISIT, EST, LEVL IV, 30-39 MIN: ICD-10-PCS | Mod: S$GLB,,, | Performed by: INTERNAL MEDICINE

## 2021-02-23 PROCEDURE — 1159F PR MEDICATION LIST DOCUMENTED IN MEDICAL RECORD: ICD-10-PCS | Mod: S$GLB,,, | Performed by: INTERNAL MEDICINE

## 2021-02-23 PROCEDURE — 1159F MED LIST DOCD IN RCRD: CPT | Mod: S$GLB,,, | Performed by: INTERNAL MEDICINE

## 2021-02-23 PROCEDURE — 1126F AMNT PAIN NOTED NONE PRSNT: CPT | Mod: S$GLB,,, | Performed by: INTERNAL MEDICINE

## 2021-02-23 PROCEDURE — 1101F PT FALLS ASSESS-DOCD LE1/YR: CPT | Mod: CPTII,S$GLB,, | Performed by: INTERNAL MEDICINE

## 2021-03-01 ENCOUNTER — CLINICAL SUPPORT (OUTPATIENT)
Dept: CARDIOLOGY | Facility: HOSPITAL | Age: 81
End: 2021-03-01
Attending: INTERNAL MEDICINE
Payer: MEDICARE

## 2021-03-01 DIAGNOSIS — I48.19 PERSISTENT ATRIAL FIBRILLATION: ICD-10-CM

## 2021-03-01 PROCEDURE — 93246 EXT ECG>7D<15D RECORDING: CPT

## 2021-03-01 PROCEDURE — 93248 CV CARDIAC MONITOR - 3-14 DAY ADULT (CUPID ONLY): ICD-10-PCS | Mod: ,,, | Performed by: INTERNAL MEDICINE

## 2021-03-01 PROCEDURE — 93248 EXT ECG>7D<15D REV&INTERPJ: CPT | Mod: ,,, | Performed by: INTERNAL MEDICINE

## 2021-03-01 PROCEDURE — 93247 EXT ECG>7D<15D SCAN A/R: CPT

## 2021-03-09 ENCOUNTER — LAB VISIT (OUTPATIENT)
Dept: LAB | Facility: HOSPITAL | Age: 81
End: 2021-03-09
Attending: INTERNAL MEDICINE
Payer: MEDICARE

## 2021-03-09 ENCOUNTER — OFFICE VISIT (OUTPATIENT)
Dept: INTERNAL MEDICINE | Facility: CLINIC | Age: 81
End: 2021-03-09
Payer: MEDICARE

## 2021-03-09 ENCOUNTER — PATIENT MESSAGE (OUTPATIENT)
Dept: INTERNAL MEDICINE | Facility: CLINIC | Age: 81
End: 2021-03-09

## 2021-03-09 VITALS
WEIGHT: 147.94 LBS | DIASTOLIC BLOOD PRESSURE: 88 MMHG | HEIGHT: 69 IN | BODY MASS INDEX: 21.91 KG/M2 | HEART RATE: 64 BPM | SYSTOLIC BLOOD PRESSURE: 138 MMHG

## 2021-03-09 DIAGNOSIS — N39.0 URINARY TRACT INFECTION WITHOUT HEMATURIA, SITE UNSPECIFIED: Primary | ICD-10-CM

## 2021-03-09 DIAGNOSIS — R41.3 MEMORY CHANGE: ICD-10-CM

## 2021-03-09 DIAGNOSIS — R53.81 DEBILITY: ICD-10-CM

## 2021-03-09 PROCEDURE — 99999 PR PBB SHADOW E&M-EST. PATIENT-LVL IV: CPT | Mod: PBBFAC,,, | Performed by: INTERNAL MEDICINE

## 2021-03-09 PROCEDURE — 99214 PR OFFICE/OUTPT VISIT, EST, LEVL IV, 30-39 MIN: ICD-10-PCS | Mod: S$GLB,,, | Performed by: INTERNAL MEDICINE

## 2021-03-09 PROCEDURE — 99214 OFFICE O/P EST MOD 30 MIN: CPT | Mod: S$GLB,,, | Performed by: INTERNAL MEDICINE

## 2021-03-09 PROCEDURE — 1157F ADVNC CARE PLAN IN RCRD: CPT | Mod: S$GLB,,, | Performed by: INTERNAL MEDICINE

## 2021-03-09 PROCEDURE — 1101F PT FALLS ASSESS-DOCD LE1/YR: CPT | Mod: CPTII,S$GLB,, | Performed by: INTERNAL MEDICINE

## 2021-03-09 PROCEDURE — 99999 PR PBB SHADOW E&M-EST. PATIENT-LVL IV: ICD-10-PCS | Mod: PBBFAC,,, | Performed by: INTERNAL MEDICINE

## 2021-03-09 PROCEDURE — 3288F PR FALLS RISK ASSESSMENT DOCUMENTED: ICD-10-PCS | Mod: CPTII,S$GLB,, | Performed by: INTERNAL MEDICINE

## 2021-03-09 PROCEDURE — 3079F DIAST BP 80-89 MM HG: CPT | Mod: CPTII,S$GLB,, | Performed by: INTERNAL MEDICINE

## 2021-03-09 PROCEDURE — 1159F PR MEDICATION LIST DOCUMENTED IN MEDICAL RECORD: ICD-10-PCS | Mod: S$GLB,,, | Performed by: INTERNAL MEDICINE

## 2021-03-09 PROCEDURE — 3079F PR MOST RECENT DIASTOLIC BLOOD PRESSURE 80-89 MM HG: ICD-10-PCS | Mod: CPTII,S$GLB,, | Performed by: INTERNAL MEDICINE

## 2021-03-09 PROCEDURE — 3075F PR MOST RECENT SYSTOLIC BLOOD PRESS GE 130-139MM HG: ICD-10-PCS | Mod: CPTII,S$GLB,, | Performed by: INTERNAL MEDICINE

## 2021-03-09 PROCEDURE — 3075F SYST BP GE 130 - 139MM HG: CPT | Mod: CPTII,S$GLB,, | Performed by: INTERNAL MEDICINE

## 2021-03-09 PROCEDURE — 81001 URINALYSIS AUTO W/SCOPE: CPT | Performed by: INTERNAL MEDICINE

## 2021-03-09 PROCEDURE — 87088 URINE BACTERIA CULTURE: CPT | Performed by: INTERNAL MEDICINE

## 2021-03-09 PROCEDURE — 1101F PR PT FALLS ASSESS DOC 0-1 FALLS W/OUT INJ PAST YR: ICD-10-PCS | Mod: CPTII,S$GLB,, | Performed by: INTERNAL MEDICINE

## 2021-03-09 PROCEDURE — 3288F FALL RISK ASSESSMENT DOCD: CPT | Mod: CPTII,S$GLB,, | Performed by: INTERNAL MEDICINE

## 2021-03-09 PROCEDURE — 87086 URINE CULTURE/COLONY COUNT: CPT | Performed by: INTERNAL MEDICINE

## 2021-03-09 PROCEDURE — 1157F PR ADVANCE CARE PLAN OR EQUIV PRESENT IN MEDICAL RECORD: ICD-10-PCS | Mod: S$GLB,,, | Performed by: INTERNAL MEDICINE

## 2021-03-09 PROCEDURE — 1159F MED LIST DOCD IN RCRD: CPT | Mod: S$GLB,,, | Performed by: INTERNAL MEDICINE

## 2021-03-10 ENCOUNTER — LAB VISIT (OUTPATIENT)
Dept: LAB | Facility: HOSPITAL | Age: 81
End: 2021-03-10
Attending: INTERNAL MEDICINE
Payer: MEDICARE

## 2021-03-10 DIAGNOSIS — R41.3 MEMORY CHANGE: ICD-10-CM

## 2021-03-10 LAB
BASOPHILS # BLD AUTO: 0.04 K/UL (ref 0–0.2)
BASOPHILS NFR BLD: 0.6 % (ref 0–1.9)
BILIRUB UR QL STRIP: NEGATIVE
CLARITY UR REFRACT.AUTO: CLEAR
COLOR UR AUTO: YELLOW
DIFFERENTIAL METHOD: ABNORMAL
EOSINOPHIL # BLD AUTO: 0 K/UL (ref 0–0.5)
EOSINOPHIL NFR BLD: 0.6 % (ref 0–8)
ERYTHROCYTE [DISTWIDTH] IN BLOOD BY AUTOMATED COUNT: 12.5 % (ref 11.5–14.5)
GLUCOSE UR QL STRIP: NEGATIVE
HCT VFR BLD AUTO: 41.5 % (ref 37–48.5)
HGB BLD-MCNC: 13.4 G/DL (ref 12–16)
HGB UR QL STRIP: NEGATIVE
IMM GRANULOCYTES # BLD AUTO: 0.02 K/UL (ref 0–0.04)
IMM GRANULOCYTES NFR BLD AUTO: 0.3 % (ref 0–0.5)
KETONES UR QL STRIP: NEGATIVE
LEUKOCYTE ESTERASE UR QL STRIP: NEGATIVE
LYMPHOCYTES # BLD AUTO: 1.6 K/UL (ref 1–4.8)
LYMPHOCYTES NFR BLD: 26 % (ref 18–48)
MCH RBC QN AUTO: 32.5 PG (ref 27–31)
MCHC RBC AUTO-ENTMCNC: 32.3 G/DL (ref 32–36)
MCV RBC AUTO: 101 FL (ref 82–98)
MICROSCOPIC COMMENT: NORMAL
MONOCYTES # BLD AUTO: 0.6 K/UL (ref 0.3–1)
MONOCYTES NFR BLD: 10.3 % (ref 4–15)
NEUTROPHILS # BLD AUTO: 3.9 K/UL (ref 1.8–7.7)
NEUTROPHILS NFR BLD: 62.2 % (ref 38–73)
NITRITE UR QL STRIP: NEGATIVE
NRBC BLD-RTO: 0 /100 WBC
PH UR STRIP: 5 [PH] (ref 5–8)
PLATELET # BLD AUTO: 155 K/UL (ref 150–350)
PMV BLD AUTO: 11.1 FL (ref 9.2–12.9)
PROT UR QL STRIP: NEGATIVE
RBC # BLD AUTO: 4.12 M/UL (ref 4–5.4)
RBC #/AREA URNS AUTO: 1 /HPF (ref 0–4)
SP GR UR STRIP: 1.01 (ref 1–1.03)
URN SPEC COLLECT METH UR: NORMAL
WBC # BLD AUTO: 6.22 K/UL (ref 3.9–12.7)

## 2021-03-10 PROCEDURE — 84439 ASSAY OF FREE THYROXINE: CPT | Performed by: INTERNAL MEDICINE

## 2021-03-10 PROCEDURE — 83921 ORGANIC ACID SINGLE QUANT: CPT | Performed by: INTERNAL MEDICINE

## 2021-03-10 PROCEDURE — 84443 ASSAY THYROID STIM HORMONE: CPT | Performed by: INTERNAL MEDICINE

## 2021-03-10 PROCEDURE — 85025 COMPLETE CBC W/AUTO DIFF WBC: CPT | Performed by: INTERNAL MEDICINE

## 2021-03-10 PROCEDURE — 82607 VITAMIN B-12: CPT | Performed by: INTERNAL MEDICINE

## 2021-03-10 PROCEDURE — 80053 COMPREHEN METABOLIC PANEL: CPT | Performed by: INTERNAL MEDICINE

## 2021-03-10 PROCEDURE — 86592 SYPHILIS TEST NON-TREP QUAL: CPT | Performed by: INTERNAL MEDICINE

## 2021-03-10 PROCEDURE — 36415 COLL VENOUS BLD VENIPUNCTURE: CPT | Mod: PO | Performed by: INTERNAL MEDICINE

## 2021-03-11 LAB
ALBUMIN SERPL BCP-MCNC: 4 G/DL (ref 3.5–5.2)
ALP SERPL-CCNC: 57 U/L (ref 55–135)
ALT SERPL W/O P-5'-P-CCNC: 14 U/L (ref 10–44)
ANION GAP SERPL CALC-SCNC: 9 MMOL/L (ref 8–16)
AST SERPL-CCNC: 21 U/L (ref 10–40)
BILIRUB SERPL-MCNC: 1.3 MG/DL (ref 0.1–1)
BUN SERPL-MCNC: 11 MG/DL (ref 8–23)
CALCIUM SERPL-MCNC: 9.4 MG/DL (ref 8.7–10.5)
CHLORIDE SERPL-SCNC: 101 MMOL/L (ref 95–110)
CO2 SERPL-SCNC: 25 MMOL/L (ref 23–29)
CREAT SERPL-MCNC: 0.8 MG/DL (ref 0.5–1.4)
EST. GFR  (AFRICAN AMERICAN): >60 ML/MIN/1.73 M^2
EST. GFR  (NON AFRICAN AMERICAN): >60 ML/MIN/1.73 M^2
GLUCOSE SERPL-MCNC: 88 MG/DL (ref 70–110)
POTASSIUM SERPL-SCNC: 4.6 MMOL/L (ref 3.5–5.1)
PROT SERPL-MCNC: 6.6 G/DL (ref 6–8.4)
RPR SER QL: NORMAL
SODIUM SERPL-SCNC: 135 MMOL/L (ref 136–145)
T4 FREE SERPL-MCNC: 1.14 NG/DL (ref 0.71–1.51)
TSH SERPL DL<=0.005 MIU/L-ACNC: 5.16 UIU/ML (ref 0.4–4)
VIT B12 SERPL-MCNC: 213 PG/ML (ref 210–950)

## 2021-03-12 ENCOUNTER — TELEPHONE (OUTPATIENT)
Dept: INTERNAL MEDICINE | Facility: CLINIC | Age: 81
End: 2021-03-12

## 2021-03-12 ENCOUNTER — TELEPHONE (OUTPATIENT)
Dept: OPHTHALMOLOGY | Facility: CLINIC | Age: 81
End: 2021-03-12

## 2021-03-12 ENCOUNTER — PATIENT MESSAGE (OUTPATIENT)
Dept: INTERNAL MEDICINE | Facility: CLINIC | Age: 81
End: 2021-03-12

## 2021-03-12 DIAGNOSIS — N39.0 URINARY TRACT INFECTION WITHOUT HEMATURIA, SITE UNSPECIFIED: Primary | ICD-10-CM

## 2021-03-12 LAB — BACTERIA UR CULT: ABNORMAL

## 2021-03-13 ENCOUNTER — IMMUNIZATION (OUTPATIENT)
Dept: PHARMACY | Facility: CLINIC | Age: 81
End: 2021-03-13
Payer: MEDICARE

## 2021-03-13 DIAGNOSIS — Z23 NEED FOR VACCINATION: Primary | ICD-10-CM

## 2021-03-15 ENCOUNTER — LAB VISIT (OUTPATIENT)
Dept: LAB | Facility: HOSPITAL | Age: 81
End: 2021-03-15
Attending: INTERNAL MEDICINE
Payer: MEDICARE

## 2021-03-15 DIAGNOSIS — N39.0 URINARY TRACT INFECTION WITHOUT HEMATURIA, SITE UNSPECIFIED: ICD-10-CM

## 2021-03-15 DIAGNOSIS — H25.12 NUCLEAR SCLEROTIC CATARACT OF LEFT EYE: Primary | ICD-10-CM

## 2021-03-15 PROCEDURE — 81001 URINALYSIS AUTO W/SCOPE: CPT | Performed by: INTERNAL MEDICINE

## 2021-03-15 PROCEDURE — 87086 URINE CULTURE/COLONY COUNT: CPT | Performed by: INTERNAL MEDICINE

## 2021-03-16 LAB
BACTERIA #/AREA URNS AUTO: NORMAL /HPF
BACTERIA UR CULT: NO GROWTH
BILIRUB UR QL STRIP: NEGATIVE
CLARITY UR REFRACT.AUTO: CLEAR
COLOR UR AUTO: YELLOW
GLUCOSE UR QL STRIP: NEGATIVE
HGB UR QL STRIP: NEGATIVE
KETONES UR QL STRIP: NEGATIVE
LEUKOCYTE ESTERASE UR QL STRIP: NEGATIVE
METHYLMALONATE SERPL-SCNC: 0.5 UMOL/L
MICROSCOPIC COMMENT: NORMAL
NITRITE UR QL STRIP: NEGATIVE
PH UR STRIP: 6 [PH] (ref 5–8)
PROT UR QL STRIP: NEGATIVE
RBC #/AREA URNS AUTO: 3 /HPF (ref 0–4)
SP GR UR STRIP: 1.01 (ref 1–1.03)
URN SPEC COLLECT METH UR: NORMAL
WBC #/AREA URNS AUTO: 0 /HPF (ref 0–5)

## 2021-03-24 ENCOUNTER — PATIENT MESSAGE (OUTPATIENT)
Dept: INTERNAL MEDICINE | Facility: CLINIC | Age: 81
End: 2021-03-24

## 2021-03-28 ENCOUNTER — TELEPHONE (OUTPATIENT)
Dept: INTERNAL MEDICINE | Facility: CLINIC | Age: 81
End: 2021-03-28

## 2021-03-28 DIAGNOSIS — E03.9 HYPOTHYROIDISM, UNSPECIFIED TYPE: Primary | ICD-10-CM

## 2021-03-28 RX ORDER — LEVOTHYROXINE SODIUM 88 UG/1
88 TABLET ORAL
Qty: 90 TABLET | Refills: 3 | Status: SHIPPED | OUTPATIENT
Start: 2021-03-28 | End: 2021-12-16

## 2021-03-30 ENCOUNTER — TELEPHONE (OUTPATIENT)
Dept: ELECTROPHYSIOLOGY | Facility: CLINIC | Age: 81
End: 2021-03-30

## 2021-04-12 ENCOUNTER — CLINICAL SUPPORT (OUTPATIENT)
Dept: REHABILITATION | Facility: HOSPITAL | Age: 81
End: 2021-04-12
Attending: INTERNAL MEDICINE
Payer: MEDICARE

## 2021-04-12 ENCOUNTER — PATIENT OUTREACH (OUTPATIENT)
Dept: ADMINISTRATIVE | Facility: OTHER | Age: 81
End: 2021-04-12

## 2021-04-12 ENCOUNTER — LAB VISIT (OUTPATIENT)
Dept: LAB | Facility: HOSPITAL | Age: 81
End: 2021-04-12
Attending: UROLOGY
Payer: MEDICARE

## 2021-04-12 DIAGNOSIS — R29.898 DECREASED STRENGTH INVOLVING KNEE JOINT: Primary | ICD-10-CM

## 2021-04-12 DIAGNOSIS — Z74.09 IMPAIRED TRANSFERS: ICD-10-CM

## 2021-04-12 DIAGNOSIS — M25.661 DECREASED RANGE OF MOTION OF RIGHT KNEE: ICD-10-CM

## 2021-04-12 DIAGNOSIS — Z74.09 IMPAIRED FUNCTIONAL MOBILITY AND ACTIVITY TOLERANCE: ICD-10-CM

## 2021-04-12 DIAGNOSIS — N39.0 RECURRENT UTI: ICD-10-CM

## 2021-04-12 PROCEDURE — 97162 PT EVAL MOD COMPLEX 30 MIN: CPT | Mod: PO

## 2021-04-12 PROCEDURE — 87088 URINE BACTERIA CULTURE: CPT | Performed by: UROLOGY

## 2021-04-12 PROCEDURE — 87077 CULTURE AEROBIC IDENTIFY: CPT | Performed by: UROLOGY

## 2021-04-12 PROCEDURE — 87086 URINE CULTURE/COLONY COUNT: CPT | Performed by: UROLOGY

## 2021-04-12 PROCEDURE — 87186 SC STD MICRODIL/AGAR DIL: CPT | Performed by: UROLOGY

## 2021-04-14 ENCOUNTER — OFFICE VISIT (OUTPATIENT)
Dept: OPHTHALMOLOGY | Facility: CLINIC | Age: 81
End: 2021-04-14
Payer: MEDICARE

## 2021-04-14 ENCOUNTER — OFFICE VISIT (OUTPATIENT)
Dept: CARDIOLOGY | Facility: CLINIC | Age: 81
End: 2021-04-14
Payer: MEDICARE

## 2021-04-14 VITALS
DIASTOLIC BLOOD PRESSURE: 73 MMHG | HEIGHT: 68 IN | BODY MASS INDEX: 22.55 KG/M2 | HEART RATE: 75 BPM | SYSTOLIC BLOOD PRESSURE: 156 MMHG | WEIGHT: 148.81 LBS

## 2021-04-14 DIAGNOSIS — I70.0 ATHEROSCLEROSIS OF AORTA: ICD-10-CM

## 2021-04-14 DIAGNOSIS — I47.29 PAROXYSMAL VENTRICULAR TACHYCARDIA: ICD-10-CM

## 2021-04-14 DIAGNOSIS — I10 HTN (HYPERTENSION), BENIGN: ICD-10-CM

## 2021-04-14 DIAGNOSIS — E78.00 HYPERCHOLESTEROLEMIA: ICD-10-CM

## 2021-04-14 DIAGNOSIS — E06.3 HASHIMOTO'S DISEASE: ICD-10-CM

## 2021-04-14 DIAGNOSIS — I34.1 MVP (MITRAL VALVE PROLAPSE): Primary | ICD-10-CM

## 2021-04-14 DIAGNOSIS — I34.0 NONRHEUMATIC MITRAL VALVE REGURGITATION: ICD-10-CM

## 2021-04-14 DIAGNOSIS — I48.19 PERSISTENT ATRIAL FIBRILLATION: ICD-10-CM

## 2021-04-14 DIAGNOSIS — E89.0 POST-OPERATIVE HYPOTHYROIDISM: ICD-10-CM

## 2021-04-14 DIAGNOSIS — H25.13 NUCLEAR SCLEROSIS, BILATERAL: Primary | ICD-10-CM

## 2021-04-14 DIAGNOSIS — Z01.818 PRE-OP TESTING: ICD-10-CM

## 2021-04-14 DIAGNOSIS — I49.3 PVC'S (PREMATURE VENTRICULAR CONTRACTIONS): ICD-10-CM

## 2021-04-14 DIAGNOSIS — R55 VASOVAGAL NEAR SYNCOPE: ICD-10-CM

## 2021-04-14 PROCEDURE — 3288F PR FALLS RISK ASSESSMENT DOCUMENTED: ICD-10-PCS | Mod: CPTII,S$GLB,, | Performed by: OPHTHALMOLOGY

## 2021-04-14 PROCEDURE — 99999 PR PBB SHADOW E&M-EST. PATIENT-LVL III: ICD-10-PCS | Mod: PBBFAC,,, | Performed by: OPHTHALMOLOGY

## 2021-04-14 PROCEDURE — 1159F MED LIST DOCD IN RCRD: CPT | Mod: S$GLB,,, | Performed by: INTERNAL MEDICINE

## 2021-04-14 PROCEDURE — 99999 PR PBB SHADOW E&M-EST. PATIENT-LVL III: CPT | Mod: PBBFAC,,, | Performed by: OPHTHALMOLOGY

## 2021-04-14 PROCEDURE — 92014 COMPRE OPH EXAM EST PT 1/>: CPT | Mod: S$GLB,,, | Performed by: OPHTHALMOLOGY

## 2021-04-14 PROCEDURE — 1101F PR PT FALLS ASSESS DOC 0-1 FALLS W/OUT INJ PAST YR: ICD-10-PCS | Mod: CPTII,S$GLB,, | Performed by: OPHTHALMOLOGY

## 2021-04-14 PROCEDURE — 99215 OFFICE O/P EST HI 40 MIN: CPT | Mod: S$GLB,,, | Performed by: INTERNAL MEDICINE

## 2021-04-14 PROCEDURE — 1126F AMNT PAIN NOTED NONE PRSNT: CPT | Mod: S$GLB,,, | Performed by: INTERNAL MEDICINE

## 2021-04-14 PROCEDURE — 99999 PR PBB SHADOW E&M-EST. PATIENT-LVL III: ICD-10-PCS | Mod: PBBFAC,,, | Performed by: INTERNAL MEDICINE

## 2021-04-14 PROCEDURE — 92014 PR EYE EXAM, EST PATIENT,COMPREHESV: ICD-10-PCS | Mod: S$GLB,,, | Performed by: OPHTHALMOLOGY

## 2021-04-14 PROCEDURE — 1101F PT FALLS ASSESS-DOCD LE1/YR: CPT | Mod: CPTII,S$GLB,, | Performed by: OPHTHALMOLOGY

## 2021-04-14 PROCEDURE — 1159F PR MEDICATION LIST DOCUMENTED IN MEDICAL RECORD: ICD-10-PCS | Mod: S$GLB,,, | Performed by: INTERNAL MEDICINE

## 2021-04-14 PROCEDURE — 1126F PR PAIN SEVERITY QUANTIFIED, NO PAIN PRESENT: ICD-10-PCS | Mod: S$GLB,,, | Performed by: INTERNAL MEDICINE

## 2021-04-14 PROCEDURE — 1157F ADVNC CARE PLAN IN RCRD: CPT | Mod: S$GLB,,, | Performed by: INTERNAL MEDICINE

## 2021-04-14 PROCEDURE — 1157F PR ADVANCE CARE PLAN OR EQUIV PRESENT IN MEDICAL RECORD: ICD-10-PCS | Mod: S$GLB,,, | Performed by: OPHTHALMOLOGY

## 2021-04-14 PROCEDURE — 3288F FALL RISK ASSESSMENT DOCD: CPT | Mod: CPTII,S$GLB,, | Performed by: OPHTHALMOLOGY

## 2021-04-14 PROCEDURE — 1126F AMNT PAIN NOTED NONE PRSNT: CPT | Mod: S$GLB,,, | Performed by: OPHTHALMOLOGY

## 2021-04-14 PROCEDURE — 1157F PR ADVANCE CARE PLAN OR EQUIV PRESENT IN MEDICAL RECORD: ICD-10-PCS | Mod: S$GLB,,, | Performed by: INTERNAL MEDICINE

## 2021-04-14 PROCEDURE — 99215 PR OFFICE/OUTPT VISIT, EST, LEVL V, 40-54 MIN: ICD-10-PCS | Mod: S$GLB,,, | Performed by: INTERNAL MEDICINE

## 2021-04-14 PROCEDURE — 1157F ADVNC CARE PLAN IN RCRD: CPT | Mod: S$GLB,,, | Performed by: OPHTHALMOLOGY

## 2021-04-14 PROCEDURE — 99999 PR PBB SHADOW E&M-EST. PATIENT-LVL III: CPT | Mod: PBBFAC,,, | Performed by: INTERNAL MEDICINE

## 2021-04-14 PROCEDURE — 1126F PR PAIN SEVERITY QUANTIFIED, NO PAIN PRESENT: ICD-10-PCS | Mod: S$GLB,,, | Performed by: OPHTHALMOLOGY

## 2021-04-14 RX ORDER — MOXIFLOXACIN 5 MG/ML
1 SOLUTION/ DROPS OPHTHALMIC
Status: CANCELLED | OUTPATIENT
Start: 2021-04-14

## 2021-04-14 RX ORDER — PREDNISOLONE ACETATE-GATIFLOXACIN-BROMFENAC .75; 5; 1 MG/ML; MG/ML; MG/ML
1 SUSPENSION/ DROPS OPHTHALMIC 3 TIMES DAILY
Qty: 5 ML | Refills: 3 | Status: SHIPPED | OUTPATIENT
Start: 2021-04-14 | End: 2021-07-27 | Stop reason: ALTCHOICE

## 2021-04-14 RX ORDER — PHENYLEPHRINE HYDROCHLORIDE 25 MG/ML
1 SOLUTION/ DROPS OPHTHALMIC
Status: CANCELLED | OUTPATIENT
Start: 2021-04-14

## 2021-04-14 RX ORDER — IBUPROFEN 200 MG
1 CAPSULE ORAL DAILY
COMMUNITY

## 2021-04-14 RX ORDER — TROPICAMIDE 10 MG/ML
1 SOLUTION/ DROPS OPHTHALMIC
Status: CANCELLED | OUTPATIENT
Start: 2021-04-14

## 2021-04-14 RX ORDER — TETRACAINE HYDROCHLORIDE 5 MG/ML
1 SOLUTION OPHTHALMIC
Status: CANCELLED | OUTPATIENT
Start: 2021-04-14

## 2021-04-14 RX ORDER — SODIUM CHLORIDE 0.9 % (FLUSH) 0.9 %
10 SYRINGE (ML) INJECTION
Status: DISCONTINUED | OUTPATIENT
Start: 2021-04-14 | End: 2021-05-30 | Stop reason: CLARIF

## 2021-04-15 ENCOUNTER — PATIENT MESSAGE (OUTPATIENT)
Dept: UROLOGY | Facility: CLINIC | Age: 81
End: 2021-04-15

## 2021-04-15 DIAGNOSIS — N39.0 RECURRENT UTI: Primary | ICD-10-CM

## 2021-04-15 LAB — BACTERIA UR CULT: ABNORMAL

## 2021-04-15 RX ORDER — DOXYCYCLINE HYCLATE 100 MG
100 TABLET ORAL 2 TIMES DAILY
Qty: 14 TABLET | Refills: 0 | Status: SHIPPED | OUTPATIENT
Start: 2021-04-15 | End: 2021-04-22

## 2021-04-19 ENCOUNTER — TELEPHONE (OUTPATIENT)
Dept: OPHTHALMOLOGY | Facility: CLINIC | Age: 81
End: 2021-04-19

## 2021-04-20 ENCOUNTER — LAB VISIT (OUTPATIENT)
Dept: INTERNAL MEDICINE | Facility: CLINIC | Age: 81
End: 2021-04-20
Payer: MEDICARE

## 2021-04-20 ENCOUNTER — TELEPHONE (OUTPATIENT)
Dept: OPHTHALMOLOGY | Facility: CLINIC | Age: 81
End: 2021-04-20

## 2021-04-20 ENCOUNTER — PATIENT MESSAGE (OUTPATIENT)
Dept: CARDIOLOGY | Facility: CLINIC | Age: 81
End: 2021-04-20

## 2021-04-20 ENCOUNTER — HOSPITAL ENCOUNTER (OUTPATIENT)
Dept: CARDIOLOGY | Facility: HOSPITAL | Age: 81
Discharge: HOME | End: 2021-04-20
Attending: INTERNAL MEDICINE
Payer: MEDICARE

## 2021-04-20 VITALS
SYSTOLIC BLOOD PRESSURE: 110 MMHG | HEIGHT: 68 IN | DIASTOLIC BLOOD PRESSURE: 70 MMHG | BODY MASS INDEX: 22.43 KG/M2 | WEIGHT: 148 LBS | HEART RATE: 80 BPM

## 2021-04-20 DIAGNOSIS — I49.3 PVC'S (PREMATURE VENTRICULAR CONTRACTIONS): ICD-10-CM

## 2021-04-20 DIAGNOSIS — I70.0 ATHEROSCLEROSIS OF AORTA: ICD-10-CM

## 2021-04-20 DIAGNOSIS — I48.19 PERSISTENT ATRIAL FIBRILLATION: ICD-10-CM

## 2021-04-20 DIAGNOSIS — I34.0 NONRHEUMATIC MITRAL VALVE REGURGITATION: ICD-10-CM

## 2021-04-20 DIAGNOSIS — I34.1 MVP (MITRAL VALVE PROLAPSE): ICD-10-CM

## 2021-04-20 DIAGNOSIS — Z01.818 PRE-OP TESTING: ICD-10-CM

## 2021-04-20 LAB
ASCENDING AORTA: 2.98 CM
AV INDEX (PROSTH): 0.61
AV MEAN GRADIENT: 3 MMHG
AV PEAK GRADIENT: 5 MMHG
AV VALVE AREA: 2.54 CM2
AV VELOCITY RATIO: 0.76
BSA FOR ECHO PROCEDURE: 1.79 M2
CV ECHO LV RWT: 0.28 CM
DOP CALC AO PEAK VEL: 1.08 M/S
DOP CALC AO VTI: 21.9 CM
DOP CALC LVOT AREA: 4.2 CM2
DOP CALC LVOT DIAMETER: 2.3 CM
DOP CALC LVOT PEAK VEL: 0.82 M/S
DOP CALC LVOT STROKE VOLUME: 55.73 CM3
DOP CALCLVOT PEAK VEL VTI: 13.42 CM
E/E' RATIO: 12.12 M/S
ECHO LV POSTERIOR WALL: 0.76 CM (ref 0.6–1.1)
EJECTION FRACTION: 55 %
FRACTIONAL SHORTENING: 38 % (ref 28–44)
INTERVENTRICULAR SEPTUM: 1.16 CM (ref 0.6–1.1)
LA MAJOR: 4.87 CM
LA MINOR: 4.69 CM
LA WIDTH: 4.5 CM
LEFT ATRIUM SIZE: 5.36 CM
LEFT ATRIUM VOLUME INDEX MOD: 36.6 ML/M2
LEFT ATRIUM VOLUME INDEX: 54.4 ML/M2
LEFT ATRIUM VOLUME MOD: 65.82 CM3
LEFT ATRIUM VOLUME: 97.96 CM3
LEFT INTERNAL DIMENSION IN SYSTOLE: 3.37 CM (ref 2.1–4)
LEFT VENTRICLE DIASTOLIC VOLUME INDEX: 80.16 ML/M2
LEFT VENTRICLE DIASTOLIC VOLUME: 144.29 ML
LEFT VENTRICLE MASS INDEX: 111 G/M2
LEFT VENTRICLE SYSTOLIC VOLUME INDEX: 25.7 ML/M2
LEFT VENTRICLE SYSTOLIC VOLUME: 46.31 ML
LEFT VENTRICULAR INTERNAL DIMENSION IN DIASTOLE: 5.45 CM (ref 3.5–6)
LEFT VENTRICULAR MASS: 198.98 G
LV LATERAL E/E' RATIO: 10.3 M/S
LV SEPTAL E/E' RATIO: 14.71 M/S
MV A" WAVE DURATION": 7.71 MSEC
MV MEAN GRADIENT: 1 MMHG
MV PEAK E VEL: 1.03 M/S
MV PEAK GRADIENT: 4 MMHG
PISA MRMAX VEL: 0.06 M/S
PISA RADIUS: 0.93 CM
PISA TR MAX VEL: 2.87 M/S
PISA TR VN NYQUIST: 0 M/S
PULM VEIN S/D RATIO: 0.89
PV PEAK D VEL: 0.45 M/S
PV PEAK S VEL: 0.4 M/S
RA MAJOR: 5.3 CM
RA PRESSURE: 15 MMHG
RA WIDTH: 4.5 CM
RIGHT VENTRICULAR END-DIASTOLIC DIMENSION: 3.88 CM
RV TISSUE DOPPLER FREE WALL SYSTOLIC VELOCITY 1 (APICAL 4 CHAMBER VIEW): 12.72 CM/S
SINUS: 3.14 CM
STJ: 2.6 CM
TDI LATERAL: 0.1 M/S
TDI SEPTAL: 0.07 M/S
TDI: 0.09 M/S
TR MAX PG: 33 MMHG
TRICUSPID ANNULAR PLANE SYSTOLIC EXCURSION: 2.21 CM
TV REST PULMONARY ARTERY PRESSURE: 48 MMHG

## 2021-04-20 PROCEDURE — 93306 ECHO (CUPID ONLY): ICD-10-PCS | Mod: 26,,, | Performed by: INTERNAL MEDICINE

## 2021-04-20 PROCEDURE — U0005 INFEC AGEN DETEC AMPLI PROBE: HCPCS | Performed by: OPHTHALMOLOGY

## 2021-04-20 PROCEDURE — 93306 TTE W/DOPPLER COMPLETE: CPT

## 2021-04-20 PROCEDURE — 93306 TTE W/DOPPLER COMPLETE: CPT | Mod: 26,,, | Performed by: INTERNAL MEDICINE

## 2021-04-20 PROCEDURE — U0003 INFECTIOUS AGENT DETECTION BY NUCLEIC ACID (DNA OR RNA); SEVERE ACUTE RESPIRATORY SYNDROME CORONAVIRUS 2 (SARS-COV-2) (CORONAVIRUS DISEASE [COVID-19]), AMPLIFIED PROBE TECHNIQUE, MAKING USE OF HIGH THROUGHPUT TECHNOLOGIES AS DESCRIBED BY CMS-2020-01-R: HCPCS | Performed by: OPHTHALMOLOGY

## 2021-04-21 LAB — SARS-COV-2 RNA RESP QL NAA+PROBE: NOT DETECTED

## 2021-04-22 ENCOUNTER — ANESTHESIA EVENT (OUTPATIENT)
Dept: SURGERY | Facility: OTHER | Age: 81
End: 2021-04-22
Payer: MEDICARE

## 2021-04-22 ENCOUNTER — HOSPITAL ENCOUNTER (OUTPATIENT)
Facility: OTHER | Age: 81
Discharge: HOME | End: 2021-04-22
Attending: OPHTHALMOLOGY | Admitting: OPHTHALMOLOGY
Payer: MEDICARE

## 2021-04-22 ENCOUNTER — PATIENT MESSAGE (OUTPATIENT)
Dept: CARDIOLOGY | Facility: CLINIC | Age: 81
End: 2021-04-22

## 2021-04-22 ENCOUNTER — ANESTHESIA (OUTPATIENT)
Dept: SURGERY | Facility: OTHER | Age: 81
End: 2021-04-22
Payer: MEDICARE

## 2021-04-22 VITALS
OXYGEN SATURATION: 96 % | HEIGHT: 68 IN | RESPIRATION RATE: 18 BRPM | BODY MASS INDEX: 22.43 KG/M2 | SYSTOLIC BLOOD PRESSURE: 138 MMHG | HEART RATE: 77 BPM | TEMPERATURE: 98 F | DIASTOLIC BLOOD PRESSURE: 79 MMHG | WEIGHT: 148 LBS

## 2021-04-22 DIAGNOSIS — H25.13 NUCLEAR SCLEROSIS, BILATERAL: ICD-10-CM

## 2021-04-22 PROCEDURE — 25000003 PHARM REV CODE 250: Performed by: OPHTHALMOLOGY

## 2021-04-22 PROCEDURE — 36000706: Performed by: OPHTHALMOLOGY

## 2021-04-22 PROCEDURE — V2788 PRESBYOPIA-CORRECT FUNCTION: HCPCS | Mod: WS | Performed by: OPHTHALMOLOGY

## 2021-04-22 PROCEDURE — 37000009 HC ANESTHESIA EA ADD 15 MINS: Performed by: OPHTHALMOLOGY

## 2021-04-22 PROCEDURE — 63600175 PHARM REV CODE 636 W HCPCS: Performed by: NURSE ANESTHETIST, CERTIFIED REGISTERED

## 2021-04-22 PROCEDURE — 66999 UNLISTED PX ANT SEGMENT EYE: CPT | Mod: CSM,,, | Performed by: OPHTHALMOLOGY

## 2021-04-22 PROCEDURE — 66984 PR REMOVAL, CATARACT, W/INSRT INTRAOC LENS, W/O ENDO CYCLO: ICD-10-PCS | Mod: LT,,, | Performed by: OPHTHALMOLOGY

## 2021-04-22 PROCEDURE — 36000707: Performed by: OPHTHALMOLOGY

## 2021-04-22 PROCEDURE — 66999 PR FEMTO MFIOL: ICD-10-PCS | Mod: CSM,,, | Performed by: OPHTHALMOLOGY

## 2021-04-22 PROCEDURE — 66984 XCAPSL CTRC RMVL W/O ECP: CPT | Mod: LT,,, | Performed by: OPHTHALMOLOGY

## 2021-04-22 PROCEDURE — 37000008 HC ANESTHESIA 1ST 15 MINUTES: Performed by: OPHTHALMOLOGY

## 2021-04-22 PROCEDURE — 71000015 HC POSTOP RECOV 1ST HR: Performed by: OPHTHALMOLOGY

## 2021-04-22 DEVICE — IMPLANTABLE DEVICE: Type: IMPLANTABLE DEVICE | Site: EYE | Status: FUNCTIONAL

## 2021-04-22 RX ORDER — MOXIFLOXACIN 5 MG/ML
1 SOLUTION/ DROPS OPHTHALMIC
Status: COMPLETED | OUTPATIENT
Start: 2021-04-22 | End: 2021-04-22

## 2021-04-22 RX ORDER — FUROSEMIDE 20 MG/1
20 TABLET ORAL DAILY
Qty: 90 TABLET | Refills: 3 | Status: SHIPPED | OUTPATIENT
Start: 2021-04-22 | End: 2022-05-20

## 2021-04-22 RX ORDER — MIDAZOLAM HYDROCHLORIDE 1 MG/ML
INJECTION INTRAMUSCULAR; INTRAVENOUS
Status: DISCONTINUED | OUTPATIENT
Start: 2021-04-22 | End: 2021-04-22

## 2021-04-22 RX ORDER — ACETAMINOPHEN 325 MG/1
650 TABLET ORAL EVERY 4 HOURS PRN
Status: DISCONTINUED | OUTPATIENT
Start: 2021-04-22 | End: 2021-04-22 | Stop reason: HOSPADM

## 2021-04-22 RX ORDER — MOXIFLOXACIN 5 MG/ML
SOLUTION/ DROPS OPHTHALMIC
Status: DISCONTINUED | OUTPATIENT
Start: 2021-04-22 | End: 2021-04-22 | Stop reason: HOSPADM

## 2021-04-22 RX ORDER — TROPICAMIDE 10 MG/ML
1 SOLUTION/ DROPS OPHTHALMIC
Status: COMPLETED | OUTPATIENT
Start: 2021-04-22 | End: 2021-04-22

## 2021-04-22 RX ORDER — PROPARACAINE HYDROCHLORIDE 5 MG/ML
1 SOLUTION/ DROPS OPHTHALMIC
Status: DISCONTINUED | OUTPATIENT
Start: 2021-04-22 | End: 2021-04-22 | Stop reason: HOSPADM

## 2021-04-22 RX ORDER — PHENYLEPHRINE HYDROCHLORIDE 25 MG/ML
1 SOLUTION/ DROPS OPHTHALMIC
Status: COMPLETED | OUTPATIENT
Start: 2021-04-22 | End: 2021-04-22

## 2021-04-22 RX ORDER — LIDOCAINE HYDROCHLORIDE 40 MG/ML
INJECTION, SOLUTION RETROBULBAR
Status: DISCONTINUED | OUTPATIENT
Start: 2021-04-22 | End: 2021-04-22 | Stop reason: HOSPADM

## 2021-04-22 RX ORDER — TETRACAINE HYDROCHLORIDE 5 MG/ML
1 SOLUTION OPHTHALMIC
Status: COMPLETED | OUTPATIENT
Start: 2021-04-22 | End: 2021-04-22

## 2021-04-22 RX ORDER — FUROSEMIDE 20 MG/1
20 TABLET ORAL DAILY
COMMUNITY
End: 2021-04-22 | Stop reason: SDUPTHER

## 2021-04-22 RX ADMIN — MOXIFLOXACIN 1 DROP: 5 SOLUTION/ DROPS OPHTHALMIC at 10:04

## 2021-04-22 RX ADMIN — MIDAZOLAM HYDROCHLORIDE 1 MG: 1 INJECTION, SOLUTION INTRAMUSCULAR; INTRAVENOUS at 11:04

## 2021-04-22 RX ADMIN — TETRACAINE HYDROCHLORIDE 1 DROP: 5 SOLUTION OPHTHALMIC at 11:04

## 2021-04-22 RX ADMIN — MOXIFLOXACIN 1 DROP: 5 SOLUTION/ DROPS OPHTHALMIC at 12:04

## 2021-04-22 RX ADMIN — TROPICAMIDE 1 DROP: 10 SOLUTION/ DROPS OPHTHALMIC at 11:04

## 2021-04-22 RX ADMIN — MOXIFLOXACIN 1 DROP: 5 SOLUTION/ DROPS OPHTHALMIC at 11:04

## 2021-04-22 RX ADMIN — TETRACAINE HYDROCHLORIDE 1 DROP: 5 SOLUTION OPHTHALMIC at 10:04

## 2021-04-22 RX ADMIN — TROPICAMIDE 1 DROP: 10 SOLUTION/ DROPS OPHTHALMIC at 10:04

## 2021-04-22 RX ADMIN — PHENYLEPHRINE HYDROCHLORIDE 1 DROP: 25 SOLUTION/ DROPS OPHTHALMIC at 11:04

## 2021-04-22 RX ADMIN — PHENYLEPHRINE HYDROCHLORIDE 1 DROP: 25 SOLUTION/ DROPS OPHTHALMIC at 10:04

## 2021-04-23 ENCOUNTER — OFFICE VISIT (OUTPATIENT)
Dept: OPHTHALMOLOGY | Facility: CLINIC | Age: 81
End: 2021-04-23
Attending: OPHTHALMOLOGY
Payer: MEDICARE

## 2021-04-23 DIAGNOSIS — Z98.890 POST-OPERATIVE STATE: Primary | ICD-10-CM

## 2021-04-23 DIAGNOSIS — H25.13 NUCLEAR SCLEROSIS, BILATERAL: ICD-10-CM

## 2021-04-23 PROCEDURE — 99024 PR POST-OP FOLLOW-UP VISIT: ICD-10-PCS | Mod: S$GLB,,, | Performed by: OPHTHALMOLOGY

## 2021-04-23 PROCEDURE — 1101F PT FALLS ASSESS-DOCD LE1/YR: CPT | Mod: CPTII,S$GLB,, | Performed by: OPHTHALMOLOGY

## 2021-04-23 PROCEDURE — 1157F ADVNC CARE PLAN IN RCRD: CPT | Mod: S$GLB,,, | Performed by: OPHTHALMOLOGY

## 2021-04-23 PROCEDURE — 99024 POSTOP FOLLOW-UP VISIT: CPT | Mod: S$GLB,,, | Performed by: OPHTHALMOLOGY

## 2021-04-23 PROCEDURE — 99999 PR PBB SHADOW E&M-EST. PATIENT-LVL III: ICD-10-PCS | Mod: PBBFAC,,, | Performed by: OPHTHALMOLOGY

## 2021-04-23 PROCEDURE — 1126F AMNT PAIN NOTED NONE PRSNT: CPT | Mod: S$GLB,,, | Performed by: OPHTHALMOLOGY

## 2021-04-23 PROCEDURE — 99999 PR PBB SHADOW E&M-EST. PATIENT-LVL III: CPT | Mod: PBBFAC,,, | Performed by: OPHTHALMOLOGY

## 2021-04-23 PROCEDURE — 3288F PR FALLS RISK ASSESSMENT DOCUMENTED: ICD-10-PCS | Mod: CPTII,S$GLB,, | Performed by: OPHTHALMOLOGY

## 2021-04-23 PROCEDURE — 1157F PR ADVANCE CARE PLAN OR EQUIV PRESENT IN MEDICAL RECORD: ICD-10-PCS | Mod: S$GLB,,, | Performed by: OPHTHALMOLOGY

## 2021-04-23 PROCEDURE — 1126F PR PAIN SEVERITY QUANTIFIED, NO PAIN PRESENT: ICD-10-PCS | Mod: S$GLB,,, | Performed by: OPHTHALMOLOGY

## 2021-04-23 PROCEDURE — 3288F FALL RISK ASSESSMENT DOCD: CPT | Mod: CPTII,S$GLB,, | Performed by: OPHTHALMOLOGY

## 2021-04-23 PROCEDURE — 1101F PR PT FALLS ASSESS DOC 0-1 FALLS W/OUT INJ PAST YR: ICD-10-PCS | Mod: CPTII,S$GLB,, | Performed by: OPHTHALMOLOGY

## 2021-04-27 ENCOUNTER — OFFICE VISIT (OUTPATIENT)
Dept: ORTHOPEDICS | Facility: CLINIC | Age: 81
End: 2021-04-27
Payer: MEDICARE

## 2021-04-27 VITALS — HEIGHT: 68 IN | WEIGHT: 147.69 LBS | BODY MASS INDEX: 22.38 KG/M2

## 2021-04-27 DIAGNOSIS — M17.11 PRIMARY OSTEOARTHRITIS OF RIGHT KNEE: Primary | ICD-10-CM

## 2021-04-27 DIAGNOSIS — Z01.818 PRE-OP TESTING: ICD-10-CM

## 2021-04-27 PROCEDURE — 1159F PR MEDICATION LIST DOCUMENTED IN MEDICAL RECORD: ICD-10-PCS | Mod: S$GLB,,, | Performed by: ORTHOPAEDIC SURGERY

## 2021-04-27 PROCEDURE — 99215 PR OFFICE/OUTPT VISIT, EST, LEVL V, 40-54 MIN: ICD-10-PCS | Mod: S$GLB,,, | Performed by: ORTHOPAEDIC SURGERY

## 2021-04-27 PROCEDURE — 1157F PR ADVANCE CARE PLAN OR EQUIV PRESENT IN MEDICAL RECORD: ICD-10-PCS | Mod: S$GLB,,, | Performed by: ORTHOPAEDIC SURGERY

## 2021-04-27 PROCEDURE — 3288F FALL RISK ASSESSMENT DOCD: CPT | Mod: CPTII,S$GLB,, | Performed by: ORTHOPAEDIC SURGERY

## 2021-04-27 PROCEDURE — 1159F MED LIST DOCD IN RCRD: CPT | Mod: S$GLB,,, | Performed by: ORTHOPAEDIC SURGERY

## 2021-04-27 PROCEDURE — 1157F ADVNC CARE PLAN IN RCRD: CPT | Mod: S$GLB,,, | Performed by: ORTHOPAEDIC SURGERY

## 2021-04-27 PROCEDURE — 1125F AMNT PAIN NOTED PAIN PRSNT: CPT | Mod: S$GLB,,, | Performed by: ORTHOPAEDIC SURGERY

## 2021-04-27 PROCEDURE — 3288F PR FALLS RISK ASSESSMENT DOCUMENTED: ICD-10-PCS | Mod: CPTII,S$GLB,, | Performed by: ORTHOPAEDIC SURGERY

## 2021-04-27 PROCEDURE — 1125F PR PAIN SEVERITY QUANTIFIED, PAIN PRESENT: ICD-10-PCS | Mod: S$GLB,,, | Performed by: ORTHOPAEDIC SURGERY

## 2021-04-27 PROCEDURE — 99215 OFFICE O/P EST HI 40 MIN: CPT | Mod: S$GLB,,, | Performed by: ORTHOPAEDIC SURGERY

## 2021-04-27 PROCEDURE — 1101F PT FALLS ASSESS-DOCD LE1/YR: CPT | Mod: CPTII,S$GLB,, | Performed by: ORTHOPAEDIC SURGERY

## 2021-04-27 PROCEDURE — 99999 PR PBB SHADOW E&M-EST. PATIENT-LVL III: CPT | Mod: PBBFAC,,, | Performed by: ORTHOPAEDIC SURGERY

## 2021-04-27 PROCEDURE — 1101F PR PT FALLS ASSESS DOC 0-1 FALLS W/OUT INJ PAST YR: ICD-10-PCS | Mod: CPTII,S$GLB,, | Performed by: ORTHOPAEDIC SURGERY

## 2021-04-27 PROCEDURE — 99999 PR PBB SHADOW E&M-EST. PATIENT-LVL III: ICD-10-PCS | Mod: PBBFAC,,, | Performed by: ORTHOPAEDIC SURGERY

## 2021-04-30 ENCOUNTER — OFFICE VISIT (OUTPATIENT)
Dept: OPHTHALMOLOGY | Facility: CLINIC | Age: 81
End: 2021-04-30
Attending: OPHTHALMOLOGY
Payer: MEDICARE

## 2021-04-30 ENCOUNTER — PATIENT MESSAGE (OUTPATIENT)
Dept: OPHTHALMOLOGY | Facility: CLINIC | Age: 81
End: 2021-04-30

## 2021-04-30 DIAGNOSIS — H25.13 NUCLEAR SCLEROSIS, BILATERAL: Primary | ICD-10-CM

## 2021-04-30 DIAGNOSIS — Z98.890 POST-OPERATIVE STATE: ICD-10-CM

## 2021-04-30 PROCEDURE — 99999 PR PBB SHADOW E&M-EST. PATIENT-LVL III: ICD-10-PCS | Mod: PBBFAC,,, | Performed by: OPHTHALMOLOGY

## 2021-04-30 PROCEDURE — 1157F PR ADVANCE CARE PLAN OR EQUIV PRESENT IN MEDICAL RECORD: ICD-10-PCS | Mod: S$GLB,,, | Performed by: OPHTHALMOLOGY

## 2021-04-30 PROCEDURE — 1157F ADVNC CARE PLAN IN RCRD: CPT | Mod: S$GLB,,, | Performed by: OPHTHALMOLOGY

## 2021-04-30 PROCEDURE — 99024 POSTOP FOLLOW-UP VISIT: CPT | Mod: S$GLB,,, | Performed by: OPHTHALMOLOGY

## 2021-04-30 PROCEDURE — 1126F PR PAIN SEVERITY QUANTIFIED, NO PAIN PRESENT: ICD-10-PCS | Mod: S$GLB,,, | Performed by: OPHTHALMOLOGY

## 2021-04-30 PROCEDURE — 92136 IOL MASTER - OU - BOTH EYES: ICD-10-PCS | Mod: 26,RT,S$GLB, | Performed by: OPHTHALMOLOGY

## 2021-04-30 PROCEDURE — 99024 PR POST-OP FOLLOW-UP VISIT: ICD-10-PCS | Mod: S$GLB,,, | Performed by: OPHTHALMOLOGY

## 2021-04-30 PROCEDURE — 92136 OPHTHALMIC BIOMETRY: CPT | Mod: 26,RT,S$GLB, | Performed by: OPHTHALMOLOGY

## 2021-04-30 PROCEDURE — 99999 PR PBB SHADOW E&M-EST. PATIENT-LVL III: CPT | Mod: PBBFAC,,, | Performed by: OPHTHALMOLOGY

## 2021-04-30 PROCEDURE — 1126F AMNT PAIN NOTED NONE PRSNT: CPT | Mod: S$GLB,,, | Performed by: OPHTHALMOLOGY

## 2021-04-30 RX ORDER — SODIUM CHLORIDE 0.9 % (FLUSH) 0.9 %
10 SYRINGE (ML) INJECTION
Status: DISCONTINUED | OUTPATIENT
Start: 2021-04-30 | End: 2021-05-30 | Stop reason: CLARIF

## 2021-04-30 RX ORDER — TROPICAMIDE 10 MG/ML
1 SOLUTION/ DROPS OPHTHALMIC
Status: CANCELLED | OUTPATIENT
Start: 2021-04-30

## 2021-04-30 RX ORDER — MOXIFLOXACIN 5 MG/ML
1 SOLUTION/ DROPS OPHTHALMIC
Status: CANCELLED | OUTPATIENT
Start: 2021-04-30

## 2021-04-30 RX ORDER — PHENYLEPHRINE HYDROCHLORIDE 25 MG/ML
1 SOLUTION/ DROPS OPHTHALMIC
Status: CANCELLED | OUTPATIENT
Start: 2021-04-30

## 2021-04-30 RX ORDER — TETRACAINE HYDROCHLORIDE 5 MG/ML
1 SOLUTION OPHTHALMIC
Status: CANCELLED | OUTPATIENT
Start: 2021-04-30

## 2021-05-01 ENCOUNTER — PATIENT MESSAGE (OUTPATIENT)
Dept: INTERNAL MEDICINE | Facility: CLINIC | Age: 81
End: 2021-05-01

## 2021-05-03 ENCOUNTER — TELEPHONE (OUTPATIENT)
Dept: OPHTHALMOLOGY | Facility: CLINIC | Age: 81
End: 2021-05-03

## 2021-05-03 DIAGNOSIS — H25.11 NUCLEAR SCLEROTIC CATARACT OF RIGHT EYE: Primary | ICD-10-CM

## 2021-05-04 ENCOUNTER — TELEPHONE (OUTPATIENT)
Dept: INTERNAL MEDICINE | Facility: CLINIC | Age: 81
End: 2021-05-04

## 2021-05-07 ENCOUNTER — PATIENT MESSAGE (OUTPATIENT)
Dept: ADMINISTRATIVE | Facility: OTHER | Age: 81
End: 2021-05-07

## 2021-05-10 ENCOUNTER — LAB VISIT (OUTPATIENT)
Dept: LAB | Facility: HOSPITAL | Age: 81
End: 2021-05-10
Attending: INTERNAL MEDICINE
Payer: MEDICARE

## 2021-05-10 DIAGNOSIS — E03.9 HYPOTHYROIDISM, UNSPECIFIED TYPE: ICD-10-CM

## 2021-05-10 LAB — TSH SERPL DL<=0.005 MIU/L-ACNC: 1.71 UIU/ML (ref 0.4–4)

## 2021-05-10 PROCEDURE — 36415 COLL VENOUS BLD VENIPUNCTURE: CPT | Mod: PO | Performed by: INTERNAL MEDICINE

## 2021-05-10 PROCEDURE — 84443 ASSAY THYROID STIM HORMONE: CPT | Performed by: INTERNAL MEDICINE

## 2021-05-12 ENCOUNTER — TELEPHONE (OUTPATIENT)
Dept: OPHTHALMOLOGY | Facility: CLINIC | Age: 81
End: 2021-05-12

## 2021-05-13 ENCOUNTER — TELEPHONE (OUTPATIENT)
Dept: OPHTHALMOLOGY | Facility: CLINIC | Age: 81
End: 2021-05-13

## 2021-05-13 ENCOUNTER — PATIENT MESSAGE (OUTPATIENT)
Dept: OPHTHALMOLOGY | Facility: CLINIC | Age: 81
End: 2021-05-13

## 2021-05-15 ENCOUNTER — LAB VISIT (OUTPATIENT)
Dept: INTERNAL MEDICINE | Facility: CLINIC | Age: 81
End: 2021-05-15
Payer: MEDICARE

## 2021-05-15 DIAGNOSIS — Z01.818 PRE-OP TESTING: ICD-10-CM

## 2021-05-15 PROCEDURE — U0005 INFEC AGEN DETEC AMPLI PROBE: HCPCS | Performed by: OPHTHALMOLOGY

## 2021-05-15 PROCEDURE — U0003 INFECTIOUS AGENT DETECTION BY NUCLEIC ACID (DNA OR RNA); SEVERE ACUTE RESPIRATORY SYNDROME CORONAVIRUS 2 (SARS-COV-2) (CORONAVIRUS DISEASE [COVID-19]), AMPLIFIED PROBE TECHNIQUE, MAKING USE OF HIGH THROUGHPUT TECHNOLOGIES AS DESCRIBED BY CMS-2020-01-R: HCPCS | Performed by: OPHTHALMOLOGY

## 2021-05-16 LAB — SARS-COV-2 RNA RESP QL NAA+PROBE: NOT DETECTED

## 2021-05-17 ENCOUNTER — ANESTHESIA EVENT (OUTPATIENT)
Dept: SURGERY | Facility: OTHER | Age: 81
End: 2021-05-17
Payer: MEDICARE

## 2021-05-17 ENCOUNTER — HOSPITAL ENCOUNTER (OUTPATIENT)
Facility: OTHER | Age: 81
Discharge: HOME | End: 2021-05-17
Attending: OPHTHALMOLOGY | Admitting: OPHTHALMOLOGY
Payer: MEDICARE

## 2021-05-17 ENCOUNTER — ANESTHESIA (OUTPATIENT)
Dept: SURGERY | Facility: OTHER | Age: 81
End: 2021-05-17
Payer: MEDICARE

## 2021-05-17 VITALS
DIASTOLIC BLOOD PRESSURE: 70 MMHG | RESPIRATION RATE: 18 BRPM | HEIGHT: 68 IN | BODY MASS INDEX: 22.28 KG/M2 | HEART RATE: 79 BPM | SYSTOLIC BLOOD PRESSURE: 119 MMHG | OXYGEN SATURATION: 98 % | TEMPERATURE: 98 F | WEIGHT: 147 LBS

## 2021-05-17 DIAGNOSIS — H25.13 NUCLEAR SCLEROSIS, BILATERAL: ICD-10-CM

## 2021-05-17 PROCEDURE — 37000009 HC ANESTHESIA EA ADD 15 MINS: Performed by: OPHTHALMOLOGY

## 2021-05-17 PROCEDURE — 66984 XCAPSL CTRC RMVL W/O ECP: CPT | Mod: 79,RT,, | Performed by: OPHTHALMOLOGY

## 2021-05-17 PROCEDURE — 66984 PR REMOVAL, CATARACT, W/INSRT INTRAOC LENS, W/O ENDO CYCLO: ICD-10-PCS | Mod: 79,RT,, | Performed by: OPHTHALMOLOGY

## 2021-05-17 PROCEDURE — 37000008 HC ANESTHESIA 1ST 15 MINUTES: Performed by: OPHTHALMOLOGY

## 2021-05-17 PROCEDURE — 36000707: Performed by: OPHTHALMOLOGY

## 2021-05-17 PROCEDURE — 71000015 HC POSTOP RECOV 1ST HR: Performed by: OPHTHALMOLOGY

## 2021-05-17 PROCEDURE — 36000706: Performed by: OPHTHALMOLOGY

## 2021-05-17 PROCEDURE — 25000003 PHARM REV CODE 250: Performed by: OPHTHALMOLOGY

## 2021-05-17 PROCEDURE — 25000003 PHARM REV CODE 250

## 2021-05-17 PROCEDURE — 66999 UNLISTED PX ANT SEGMENT EYE: CPT | Mod: CSM,RT,, | Performed by: OPHTHALMOLOGY

## 2021-05-17 PROCEDURE — 66999 PR FEMTO MFIOL: ICD-10-PCS | Mod: CSM,RT,, | Performed by: OPHTHALMOLOGY

## 2021-05-17 PROCEDURE — V2788 PRESBYOPIA-CORRECT FUNCTION: HCPCS | Performed by: OPHTHALMOLOGY

## 2021-05-17 PROCEDURE — 63600175 PHARM REV CODE 636 W HCPCS: Performed by: NURSE ANESTHETIST, CERTIFIED REGISTERED

## 2021-05-17 RX ORDER — MOXIFLOXACIN 5 MG/ML
1 SOLUTION/ DROPS OPHTHALMIC
Status: COMPLETED | OUTPATIENT
Start: 2021-05-17 | End: 2021-05-17

## 2021-05-17 RX ORDER — LIDOCAINE HYDROCHLORIDE 40 MG/ML
INJECTION, SOLUTION RETROBULBAR
Status: DISCONTINUED | OUTPATIENT
Start: 2021-05-17 | End: 2021-05-17 | Stop reason: HOSPADM

## 2021-05-17 RX ORDER — TROPICAMIDE 10 MG/ML
1 SOLUTION/ DROPS OPHTHALMIC
Status: COMPLETED | OUTPATIENT
Start: 2021-05-17 | End: 2021-05-17

## 2021-05-17 RX ORDER — PHENYLEPHRINE HYDROCHLORIDE 25 MG/ML
1 SOLUTION/ DROPS OPHTHALMIC
Status: COMPLETED | OUTPATIENT
Start: 2021-05-17 | End: 2021-05-17

## 2021-05-17 RX ORDER — ACETAMINOPHEN 325 MG/1
650 TABLET ORAL EVERY 4 HOURS PRN
Status: DISCONTINUED | OUTPATIENT
Start: 2021-05-17 | End: 2021-05-17 | Stop reason: HOSPADM

## 2021-05-17 RX ORDER — PROPARACAINE HYDROCHLORIDE 5 MG/ML
1 SOLUTION/ DROPS OPHTHALMIC
Status: DISCONTINUED | OUTPATIENT
Start: 2021-05-17 | End: 2021-05-17 | Stop reason: HOSPADM

## 2021-05-17 RX ORDER — MIDAZOLAM HYDROCHLORIDE 1 MG/ML
INJECTION INTRAMUSCULAR; INTRAVENOUS
Status: DISCONTINUED | OUTPATIENT
Start: 2021-05-17 | End: 2021-05-17

## 2021-05-17 RX ORDER — TETRACAINE HYDROCHLORIDE 5 MG/ML
1 SOLUTION OPHTHALMIC
Status: COMPLETED | OUTPATIENT
Start: 2021-05-17 | End: 2021-05-17

## 2021-05-17 RX ORDER — MOXIFLOXACIN 5 MG/ML
SOLUTION/ DROPS OPHTHALMIC
Status: DISCONTINUED | OUTPATIENT
Start: 2021-05-17 | End: 2021-05-17 | Stop reason: HOSPADM

## 2021-05-17 RX ADMIN — PHENYLEPHRINE HYDROCHLORIDE 1 DROP: 25 SOLUTION/ DROPS OPHTHALMIC at 07:05

## 2021-05-17 RX ADMIN — TETRACAINE HYDROCHLORIDE 1 DROP: 5 SOLUTION OPHTHALMIC at 07:05

## 2021-05-17 RX ADMIN — MOXIFLOXACIN 1 DROP: 5 SOLUTION/ DROPS OPHTHALMIC at 07:05

## 2021-05-17 RX ADMIN — MIDAZOLAM HYDROCHLORIDE 2 MG: 1 INJECTION, SOLUTION INTRAMUSCULAR; INTRAVENOUS at 08:05

## 2021-05-17 RX ADMIN — TROPICAMIDE 1 DROP: 10 SOLUTION/ DROPS OPHTHALMIC at 07:05

## 2021-05-17 RX ADMIN — MOXIFLOXACIN 1 DROP: 5 SOLUTION/ DROPS OPHTHALMIC at 09:05

## 2021-05-18 ENCOUNTER — OFFICE VISIT (OUTPATIENT)
Dept: OPHTHALMOLOGY | Facility: CLINIC | Age: 81
End: 2021-05-18
Attending: OPHTHALMOLOGY
Payer: MEDICARE

## 2021-05-18 DIAGNOSIS — Z98.890 POST-OPERATIVE STATE: Primary | ICD-10-CM

## 2021-05-18 PROCEDURE — 1157F PR ADVANCE CARE PLAN OR EQUIV PRESENT IN MEDICAL RECORD: ICD-10-PCS | Mod: S$GLB,,, | Performed by: OPHTHALMOLOGY

## 2021-05-18 PROCEDURE — 99999 PR PBB SHADOW E&M-EST. PATIENT-LVL III: CPT | Mod: PBBFAC,,, | Performed by: OPHTHALMOLOGY

## 2021-05-18 PROCEDURE — 1101F PR PT FALLS ASSESS DOC 0-1 FALLS W/OUT INJ PAST YR: ICD-10-PCS | Mod: CPTII,S$GLB,, | Performed by: OPHTHALMOLOGY

## 2021-05-18 PROCEDURE — 99024 POSTOP FOLLOW-UP VISIT: CPT | Mod: S$GLB,,, | Performed by: OPHTHALMOLOGY

## 2021-05-18 PROCEDURE — 99024 PR POST-OP FOLLOW-UP VISIT: ICD-10-PCS | Mod: S$GLB,,, | Performed by: OPHTHALMOLOGY

## 2021-05-18 PROCEDURE — 3288F FALL RISK ASSESSMENT DOCD: CPT | Mod: CPTII,S$GLB,, | Performed by: OPHTHALMOLOGY

## 2021-05-18 PROCEDURE — 99999 PR PBB SHADOW E&M-EST. PATIENT-LVL III: ICD-10-PCS | Mod: PBBFAC,,, | Performed by: OPHTHALMOLOGY

## 2021-05-18 PROCEDURE — 1157F ADVNC CARE PLAN IN RCRD: CPT | Mod: S$GLB,,, | Performed by: OPHTHALMOLOGY

## 2021-05-18 PROCEDURE — 1126F PR PAIN SEVERITY QUANTIFIED, NO PAIN PRESENT: ICD-10-PCS | Mod: S$GLB,,, | Performed by: OPHTHALMOLOGY

## 2021-05-18 PROCEDURE — 3288F PR FALLS RISK ASSESSMENT DOCUMENTED: ICD-10-PCS | Mod: CPTII,S$GLB,, | Performed by: OPHTHALMOLOGY

## 2021-05-18 PROCEDURE — 1101F PT FALLS ASSESS-DOCD LE1/YR: CPT | Mod: CPTII,S$GLB,, | Performed by: OPHTHALMOLOGY

## 2021-05-18 PROCEDURE — 1126F AMNT PAIN NOTED NONE PRSNT: CPT | Mod: S$GLB,,, | Performed by: OPHTHALMOLOGY

## 2021-05-20 ENCOUNTER — PATIENT OUTREACH (OUTPATIENT)
Dept: ADMINISTRATIVE | Facility: OTHER | Age: 81
End: 2021-05-20

## 2021-05-21 ENCOUNTER — TELEPHONE (OUTPATIENT)
Dept: INTERNAL MEDICINE | Facility: CLINIC | Age: 81
End: 2021-05-21

## 2021-05-24 ENCOUNTER — EDUCATION (OUTPATIENT)
Dept: ORTHOPEDICS | Facility: CLINIC | Age: 81
End: 2021-05-24

## 2021-05-24 ENCOUNTER — TELEPHONE (OUTPATIENT)
Dept: INTERNAL MEDICINE | Facility: CLINIC | Age: 81
End: 2021-05-24

## 2021-05-24 ENCOUNTER — PATIENT MESSAGE (OUTPATIENT)
Dept: INTERNAL MEDICINE | Facility: CLINIC | Age: 81
End: 2021-05-24

## 2021-05-25 ENCOUNTER — PATIENT MESSAGE (OUTPATIENT)
Dept: ADMINISTRATIVE | Facility: OTHER | Age: 81
End: 2021-05-25

## 2021-05-25 ENCOUNTER — OFFICE VISIT (OUTPATIENT)
Dept: ORTHOPEDICS | Facility: CLINIC | Age: 81
End: 2021-05-25
Payer: MEDICARE

## 2021-05-25 ENCOUNTER — HOSPITAL ENCOUNTER (OUTPATIENT)
Dept: RADIOLOGY | Facility: HOSPITAL | Age: 81
Discharge: HOME | End: 2021-05-25
Attending: NURSE PRACTITIONER
Payer: MEDICARE

## 2021-05-25 DIAGNOSIS — M17.11 OSTEOARTHRITIS OF RIGHT KNEE, UNSPECIFIED OSTEOARTHRITIS TYPE: Primary | ICD-10-CM

## 2021-05-25 DIAGNOSIS — M17.11 OSTEOARTHRITIS OF RIGHT KNEE, UNSPECIFIED OSTEOARTHRITIS TYPE: ICD-10-CM

## 2021-05-25 PROCEDURE — 1157F PR ADVANCE CARE PLAN OR EQUIV PRESENT IN MEDICAL RECORD: ICD-10-PCS | Mod: S$GLB,,, | Performed by: NURSE PRACTITIONER

## 2021-05-25 PROCEDURE — 73560 X-RAY EXAM OF KNEE 1 OR 2: CPT | Mod: 26,RT,, | Performed by: RADIOLOGY

## 2021-05-25 PROCEDURE — 99499 UNLISTED E&M SERVICE: CPT | Mod: S$GLB,,, | Performed by: NURSE PRACTITIONER

## 2021-05-25 PROCEDURE — 3288F PR FALLS RISK ASSESSMENT DOCUMENTED: ICD-10-PCS | Mod: CPTII,S$GLB,, | Performed by: NURSE PRACTITIONER

## 2021-05-25 PROCEDURE — 99999 PR PBB SHADOW E&M-EST. PATIENT-LVL III: CPT | Mod: PBBFAC,,, | Performed by: NURSE PRACTITIONER

## 2021-05-25 PROCEDURE — 3288F FALL RISK ASSESSMENT DOCD: CPT | Mod: CPTII,S$GLB,, | Performed by: NURSE PRACTITIONER

## 2021-05-25 PROCEDURE — 73560 XR KNEE 1 OR 2 VIEW RIGHT: ICD-10-PCS | Mod: 26,RT,, | Performed by: RADIOLOGY

## 2021-05-25 PROCEDURE — 1157F ADVNC CARE PLAN IN RCRD: CPT | Mod: S$GLB,,, | Performed by: NURSE PRACTITIONER

## 2021-05-25 PROCEDURE — 99999 PR PBB SHADOW E&M-EST. PATIENT-LVL III: ICD-10-PCS | Mod: PBBFAC,,, | Performed by: NURSE PRACTITIONER

## 2021-05-25 PROCEDURE — 73560 X-RAY EXAM OF KNEE 1 OR 2: CPT | Mod: TC,RT

## 2021-05-25 PROCEDURE — 1101F PT FALLS ASSESS-DOCD LE1/YR: CPT | Mod: CPTII,S$GLB,, | Performed by: NURSE PRACTITIONER

## 2021-05-25 PROCEDURE — 1125F PR PAIN SEVERITY QUANTIFIED, PAIN PRESENT: ICD-10-PCS | Mod: S$GLB,,, | Performed by: NURSE PRACTITIONER

## 2021-05-25 PROCEDURE — 99499 NO LOS: ICD-10-PCS | Mod: S$GLB,,, | Performed by: NURSE PRACTITIONER

## 2021-05-25 PROCEDURE — 1101F PR PT FALLS ASSESS DOC 0-1 FALLS W/OUT INJ PAST YR: ICD-10-PCS | Mod: CPTII,S$GLB,, | Performed by: NURSE PRACTITIONER

## 2021-05-25 PROCEDURE — 1125F AMNT PAIN NOTED PAIN PRSNT: CPT | Mod: S$GLB,,, | Performed by: NURSE PRACTITIONER

## 2021-05-26 ENCOUNTER — CLINICAL SUPPORT (OUTPATIENT)
Dept: REHABILITATION | Facility: HOSPITAL | Age: 81
End: 2021-05-26
Attending: ORTHOPAEDIC SURGERY
Payer: MEDICARE

## 2021-05-26 DIAGNOSIS — Z74.09 IMPAIRED FUNCTIONAL MOBILITY AND ACTIVITY TOLERANCE: ICD-10-CM

## 2021-05-26 DIAGNOSIS — Z74.09 IMPAIRED TRANSFERS: ICD-10-CM

## 2021-05-26 DIAGNOSIS — M25.661 DECREASED RANGE OF MOTION OF RIGHT KNEE: ICD-10-CM

## 2021-05-26 DIAGNOSIS — R29.898 DECREASED STRENGTH INVOLVING KNEE JOINT: ICD-10-CM

## 2021-05-26 PROCEDURE — 97110 THERAPEUTIC EXERCISES: CPT | Mod: PO

## 2021-05-26 PROCEDURE — 97530 THERAPEUTIC ACTIVITIES: CPT | Mod: PO

## 2021-05-27 RX ORDER — POLYETHYLENE GLYCOL 3350 17 G/17G
17 POWDER, FOR SOLUTION ORAL DAILY
Status: CANCELLED | OUTPATIENT
Start: 2021-05-27

## 2021-05-27 RX ORDER — BISACODYL 10 MG
10 SUPPOSITORY, RECTAL RECTAL EVERY 12 HOURS PRN
Status: CANCELLED | OUTPATIENT
Start: 2021-05-27

## 2021-05-27 RX ORDER — AMOXICILLIN 250 MG
1 CAPSULE ORAL 2 TIMES DAILY
Status: CANCELLED | OUTPATIENT
Start: 2021-05-27

## 2021-05-27 RX ORDER — PROCHLORPERAZINE EDISYLATE 5 MG/ML
5 INJECTION INTRAMUSCULAR; INTRAVENOUS EVERY 6 HOURS PRN
Status: CANCELLED | OUTPATIENT
Start: 2021-05-27

## 2021-05-27 RX ORDER — ROPIVACAINE HYDROCHLORIDE 2 MG/ML
8 INJECTION, SOLUTION EPIDURAL; INFILTRATION; PERINEURAL CONTINUOUS
Status: CANCELLED | OUTPATIENT
Start: 2021-05-27

## 2021-05-27 RX ORDER — TALC
6 POWDER (GRAM) TOPICAL NIGHTLY PRN
Status: CANCELLED | OUTPATIENT
Start: 2021-05-27

## 2021-05-27 RX ORDER — MUPIROCIN 20 MG/G
1 OINTMENT TOPICAL
Status: CANCELLED | OUTPATIENT
Start: 2021-05-27

## 2021-05-27 RX ORDER — CEFAZOLIN SODIUM 2 G/50ML
2 SOLUTION INTRAVENOUS
Status: CANCELLED | OUTPATIENT
Start: 2021-05-27

## 2021-05-27 RX ORDER — MUPIROCIN 20 MG/G
1 OINTMENT TOPICAL 2 TIMES DAILY
Status: CANCELLED | OUTPATIENT
Start: 2021-05-27 | End: 2021-06-01

## 2021-05-27 RX ORDER — SODIUM CHLORIDE 9 MG/ML
INJECTION, SOLUTION INTRAVENOUS CONTINUOUS
Status: CANCELLED | OUTPATIENT
Start: 2021-05-27 | End: 2021-05-28

## 2021-05-27 RX ORDER — LIDOCAINE HYDROCHLORIDE 10 MG/ML
1 INJECTION, SOLUTION EPIDURAL; INFILTRATION; INTRACAUDAL; PERINEURAL
Status: CANCELLED | OUTPATIENT
Start: 2021-05-27

## 2021-05-27 RX ORDER — MIDAZOLAM HYDROCHLORIDE 1 MG/ML
1 INJECTION INTRAMUSCULAR; INTRAVENOUS EVERY 5 MIN PRN
Status: CANCELLED | OUTPATIENT
Start: 2021-05-27

## 2021-05-27 RX ORDER — ONDANSETRON 2 MG/ML
4 INJECTION INTRAMUSCULAR; INTRAVENOUS EVERY 8 HOURS PRN
Status: CANCELLED | OUTPATIENT
Start: 2021-05-27

## 2021-05-27 RX ORDER — ACETAMINOPHEN 500 MG
1000 TABLET ORAL
Status: CANCELLED | OUTPATIENT
Start: 2021-05-27

## 2021-05-27 RX ORDER — PREGABALIN 75 MG/1
75 CAPSULE ORAL
Status: CANCELLED | OUTPATIENT
Start: 2021-05-27

## 2021-05-27 RX ORDER — FAMOTIDINE 20 MG/1
20 TABLET, FILM COATED ORAL 2 TIMES DAILY
Status: CANCELLED | OUTPATIENT
Start: 2021-05-27

## 2021-05-27 RX ORDER — FENTANYL CITRATE 50 UG/ML
25 INJECTION, SOLUTION INTRAMUSCULAR; INTRAVENOUS EVERY 5 MIN PRN
Status: CANCELLED | OUTPATIENT
Start: 2021-05-27

## 2021-05-27 RX ORDER — MORPHINE SULFATE 2 MG/ML
2 INJECTION, SOLUTION INTRAMUSCULAR; INTRAVENOUS
Status: CANCELLED | OUTPATIENT
Start: 2021-05-27

## 2021-05-27 RX ORDER — OXYCODONE HYDROCHLORIDE 5 MG/1
5 TABLET ORAL
Status: CANCELLED | OUTPATIENT
Start: 2021-05-27

## 2021-05-27 RX ORDER — ACETAMINOPHEN 500 MG
1000 TABLET ORAL EVERY 6 HOURS
Status: CANCELLED | OUTPATIENT
Start: 2021-05-27 | End: 2021-05-29

## 2021-05-27 RX ORDER — CELECOXIB 200 MG/1
200 CAPSULE ORAL DAILY
Status: CANCELLED | OUTPATIENT
Start: 2021-05-27

## 2021-05-27 RX ORDER — CELECOXIB 200 MG/1
400 CAPSULE ORAL
Status: CANCELLED | OUTPATIENT
Start: 2021-05-27

## 2021-05-27 RX ORDER — OXYCODONE HYDROCHLORIDE 5 MG/1
10 TABLET ORAL
Status: CANCELLED | OUTPATIENT
Start: 2021-05-27

## 2021-05-27 RX ORDER — ASPIRIN 81 MG/1
81 TABLET ORAL 2 TIMES DAILY
Status: CANCELLED | OUTPATIENT
Start: 2021-05-27

## 2021-05-27 RX ORDER — SODIUM CHLORIDE 0.9 % (FLUSH) 0.9 %
10 SYRINGE (ML) INJECTION
Status: CANCELLED | OUTPATIENT
Start: 2021-05-27

## 2021-05-27 RX ORDER — PREGABALIN 75 MG/1
75 CAPSULE ORAL NIGHTLY
Status: CANCELLED | OUTPATIENT
Start: 2021-05-27

## 2021-05-27 RX ORDER — SODIUM CHLORIDE 9 MG/ML
INJECTION, SOLUTION INTRAVENOUS
Status: CANCELLED | OUTPATIENT
Start: 2021-05-27

## 2021-05-27 RX ORDER — NALOXONE HCL 0.4 MG/ML
0.02 VIAL (ML) INJECTION
Status: CANCELLED | OUTPATIENT
Start: 2021-05-27

## 2021-05-27 RX ORDER — CEFAZOLIN SODIUM 2 G/50ML
2 SOLUTION INTRAVENOUS
Status: CANCELLED | OUTPATIENT
Start: 2021-05-27 | End: 2021-05-28

## 2021-05-29 DIAGNOSIS — M79.604 PAIN OF RIGHT LOWER EXTREMITY: Primary | ICD-10-CM

## 2021-05-30 RX ORDER — IBUPROFEN 100 MG/5ML
1000 SUSPENSION, ORAL (FINAL DOSE FORM) ORAL DAILY
COMMUNITY

## 2021-05-30 RX ORDER — ACETAMINOPHEN 325 MG/1
650 TABLET ORAL 2 TIMES DAILY
COMMUNITY
End: 2021-06-13 | Stop reason: HOSPADM

## 2021-05-30 RX ORDER — VITAMIN E 268 MG
400 CAPSULE ORAL DAILY
COMMUNITY

## 2021-05-30 RX ORDER — LANOLIN ALCOHOL/MO/W.PET/CERES
100 CREAM (GRAM) TOPICAL NIGHTLY
COMMUNITY

## 2021-05-31 ENCOUNTER — PATIENT MESSAGE (OUTPATIENT)
Dept: INTERNAL MEDICINE | Facility: CLINIC | Age: 81
End: 2021-05-31

## 2021-05-31 ENCOUNTER — TELEPHONE (OUTPATIENT)
Dept: PREADMISSION TESTING | Facility: HOSPITAL | Age: 81
End: 2021-05-31

## 2021-05-31 ENCOUNTER — TELEPHONE (OUTPATIENT)
Dept: UROLOGY | Facility: CLINIC | Age: 81
End: 2021-05-31

## 2021-05-31 ENCOUNTER — TELEPHONE (OUTPATIENT)
Dept: INTERNAL MEDICINE | Facility: CLINIC | Age: 81
End: 2021-05-31

## 2021-05-31 DIAGNOSIS — N39.0 URINARY TRACT INFECTION WITHOUT HEMATURIA, SITE UNSPECIFIED: Primary | ICD-10-CM

## 2021-06-01 ENCOUNTER — LAB VISIT (OUTPATIENT)
Dept: LAB | Facility: HOSPITAL | Age: 81
End: 2021-06-01
Attending: INTERNAL MEDICINE
Payer: MEDICARE

## 2021-06-01 ENCOUNTER — PATIENT MESSAGE (OUTPATIENT)
Dept: INTERNAL MEDICINE | Facility: CLINIC | Age: 81
End: 2021-06-01

## 2021-06-01 DIAGNOSIS — N39.0 URINARY TRACT INFECTION WITHOUT HEMATURIA, SITE UNSPECIFIED: ICD-10-CM

## 2021-06-01 LAB
BACTERIA #/AREA URNS AUTO: ABNORMAL /HPF
BILIRUB UR QL STRIP: NEGATIVE
CLARITY UR REFRACT.AUTO: ABNORMAL
COLOR UR AUTO: YELLOW
GLUCOSE UR QL STRIP: NEGATIVE
HGB UR QL STRIP: NEGATIVE
KETONES UR QL STRIP: NEGATIVE
LEUKOCYTE ESTERASE UR QL STRIP: ABNORMAL
MICROSCOPIC COMMENT: ABNORMAL
NITRITE UR QL STRIP: NEGATIVE
PH UR STRIP: 5 [PH] (ref 5–8)
PROT UR QL STRIP: NEGATIVE
RBC #/AREA URNS AUTO: 2 /HPF (ref 0–4)
SP GR UR STRIP: 1.01 (ref 1–1.03)
URN SPEC COLLECT METH UR: ABNORMAL
WBC #/AREA URNS AUTO: 94 /HPF (ref 0–5)
WBC CLUMPS UR QL AUTO: ABNORMAL

## 2021-06-01 PROCEDURE — 81001 URINALYSIS AUTO W/SCOPE: CPT | Performed by: INTERNAL MEDICINE

## 2021-06-01 PROCEDURE — 87088 URINE BACTERIA CULTURE: CPT | Performed by: INTERNAL MEDICINE

## 2021-06-01 PROCEDURE — 87077 CULTURE AEROBIC IDENTIFY: CPT | Performed by: INTERNAL MEDICINE

## 2021-06-01 PROCEDURE — 87086 URINE CULTURE/COLONY COUNT: CPT | Performed by: INTERNAL MEDICINE

## 2021-06-01 PROCEDURE — 87186 SC STD MICRODIL/AGAR DIL: CPT | Performed by: INTERNAL MEDICINE

## 2021-06-02 ENCOUNTER — TELEPHONE (OUTPATIENT)
Dept: ORTHOPEDICS | Facility: CLINIC | Age: 81
End: 2021-06-02

## 2021-06-04 ENCOUNTER — PATIENT MESSAGE (OUTPATIENT)
Dept: INTERNAL MEDICINE | Facility: CLINIC | Age: 81
End: 2021-06-04

## 2021-06-04 ENCOUNTER — TELEPHONE (OUTPATIENT)
Dept: INTERNAL MEDICINE | Facility: CLINIC | Age: 81
End: 2021-06-04

## 2021-06-04 LAB — BACTERIA UR CULT: ABNORMAL

## 2021-06-04 RX ORDER — CIPROFLOXACIN 500 MG/1
500 TABLET ORAL EVERY 12 HOURS
Qty: 14 TABLET | Refills: 0 | Status: SHIPPED | OUTPATIENT
Start: 2021-06-04 | End: 2021-07-27 | Stop reason: ALTCHOICE

## 2021-06-05 ENCOUNTER — PATIENT MESSAGE (OUTPATIENT)
Dept: ORTHOPEDICS | Facility: CLINIC | Age: 81
End: 2021-06-05

## 2021-06-05 PROBLEM — K21.9 GERD (GASTROESOPHAGEAL REFLUX DISEASE): Status: ACTIVE | Noted: 2021-06-05

## 2021-06-05 PROBLEM — Z79.890 POST-MENOPAUSE ON HRT (HORMONE REPLACEMENT THERAPY): Status: ACTIVE | Noted: 2021-06-05

## 2021-06-10 ENCOUNTER — OFFICE VISIT (OUTPATIENT)
Dept: INTERNAL MEDICINE | Facility: CLINIC | Age: 81
End: 2021-06-10
Payer: MEDICARE

## 2021-06-10 ENCOUNTER — HOSPITAL ENCOUNTER (OUTPATIENT)
Dept: RADIOLOGY | Facility: HOSPITAL | Age: 81
Discharge: HOME | End: 2021-06-10
Attending: NURSE PRACTITIONER
Payer: MEDICARE

## 2021-06-10 VITALS
OXYGEN SATURATION: 98 % | SYSTOLIC BLOOD PRESSURE: 145 MMHG | BODY MASS INDEX: 22.13 KG/M2 | WEIGHT: 146 LBS | TEMPERATURE: 98 F | HEIGHT: 68 IN | DIASTOLIC BLOOD PRESSURE: 70 MMHG | HEART RATE: 65 BPM

## 2021-06-10 DIAGNOSIS — E87.1 HYPONATREMIA: ICD-10-CM

## 2021-06-10 DIAGNOSIS — F41.9 ANXIETY: ICD-10-CM

## 2021-06-10 DIAGNOSIS — G47.33 OSA (OBSTRUCTIVE SLEEP APNEA): ICD-10-CM

## 2021-06-10 DIAGNOSIS — R09.89 BIBASILAR CRACKLES: ICD-10-CM

## 2021-06-10 DIAGNOSIS — R09.89 BIBASILAR CRACKLES: Primary | ICD-10-CM

## 2021-06-10 DIAGNOSIS — K21.9 GASTROESOPHAGEAL REFLUX DISEASE, UNSPECIFIED WHETHER ESOPHAGITIS PRESENT: ICD-10-CM

## 2021-06-10 DIAGNOSIS — I48.0 PAROXYSMAL ATRIAL FIBRILLATION: ICD-10-CM

## 2021-06-10 DIAGNOSIS — I48.19 PERSISTENT ATRIAL FIBRILLATION: ICD-10-CM

## 2021-06-10 DIAGNOSIS — E78.00 HYPERCHOLESTEROLEMIA: ICD-10-CM

## 2021-06-10 DIAGNOSIS — I34.1 MVP (MITRAL VALVE PROLAPSE): ICD-10-CM

## 2021-06-10 DIAGNOSIS — N39.0 RECURRENT UTI: ICD-10-CM

## 2021-06-10 DIAGNOSIS — I70.0 ATHEROSCLEROSIS OF AORTA: ICD-10-CM

## 2021-06-10 DIAGNOSIS — I47.29 PAROXYSMAL VENTRICULAR TACHYCARDIA: ICD-10-CM

## 2021-06-10 DIAGNOSIS — Z87.898 HISTORY OF PREDIABETES: ICD-10-CM

## 2021-06-10 DIAGNOSIS — R55 VASOVAGAL NEAR SYNCOPE: ICD-10-CM

## 2021-06-10 DIAGNOSIS — I49.3 PVC'S (PREMATURE VENTRICULAR CONTRACTIONS): ICD-10-CM

## 2021-06-10 DIAGNOSIS — I10 HTN (HYPERTENSION), BENIGN: ICD-10-CM

## 2021-06-10 DIAGNOSIS — H25.13 NUCLEAR SCLEROSIS, BILATERAL: ICD-10-CM

## 2021-06-10 DIAGNOSIS — E89.0 POST-OPERATIVE HYPOTHYROIDISM: ICD-10-CM

## 2021-06-10 DIAGNOSIS — Z79.890 POST-MENOPAUSE ON HRT (HORMONE REPLACEMENT THERAPY): ICD-10-CM

## 2021-06-10 PROBLEM — J18.9 COMMUNITY ACQUIRED PNEUMONIA OF RIGHT LOWER LOBE OF LUNG: Status: RESOLVED | Noted: 2018-12-11 | Resolved: 2021-06-10

## 2021-06-10 PROBLEM — R53.1 WEAKNESS: Status: RESOLVED | Noted: 2018-12-11 | Resolved: 2021-06-10

## 2021-06-10 PROBLEM — G93.40 ENCEPHALOPATHY: Status: RESOLVED | Noted: 2021-02-10 | Resolved: 2021-06-10

## 2021-06-10 PROCEDURE — 3077F SYST BP >= 140 MM HG: CPT | Mod: CPTII,S$GLB,, | Performed by: HOSPITALIST

## 2021-06-10 PROCEDURE — 99999 PR PBB SHADOW E&M-EST. PATIENT-LVL V: CPT | Mod: PBBFAC,,,

## 2021-06-10 PROCEDURE — 99499 UNLISTED E&M SERVICE: CPT | Mod: S$GLB,,, | Performed by: NURSE PRACTITIONER

## 2021-06-10 PROCEDURE — 99499 RISK ADDL DX/OHS AUDIT: ICD-10-PCS | Mod: S$GLB,,, | Performed by: NURSE PRACTITIONER

## 2021-06-10 PROCEDURE — 99999 PR PBB SHADOW E&M-EST. PATIENT-LVL V: ICD-10-PCS | Mod: PBBFAC,,,

## 2021-06-10 PROCEDURE — 1157F PR ADVANCE CARE PLAN OR EQUIV PRESENT IN MEDICAL RECORD: ICD-10-PCS | Mod: S$GLB,,, | Performed by: HOSPITALIST

## 2021-06-10 PROCEDURE — 71046 XR CHEST PA AND LATERAL: ICD-10-PCS | Mod: 26,,, | Performed by: RADIOLOGY

## 2021-06-10 PROCEDURE — 99214 OFFICE O/P EST MOD 30 MIN: CPT | Mod: S$GLB,,, | Performed by: HOSPITALIST

## 2021-06-10 PROCEDURE — 99214 PR OFFICE/OUTPT VISIT, EST, LEVL IV, 30-39 MIN: ICD-10-PCS | Mod: S$GLB,,, | Performed by: HOSPITALIST

## 2021-06-10 PROCEDURE — 1159F PR MEDICATION LIST DOCUMENTED IN MEDICAL RECORD: ICD-10-PCS | Mod: S$GLB,,, | Performed by: HOSPITALIST

## 2021-06-10 PROCEDURE — 71046 X-RAY EXAM CHEST 2 VIEWS: CPT | Mod: 26,,, | Performed by: RADIOLOGY

## 2021-06-10 PROCEDURE — 71046 X-RAY EXAM CHEST 2 VIEWS: CPT | Mod: TC

## 2021-06-10 PROCEDURE — 3078F DIAST BP <80 MM HG: CPT | Mod: CPTII,S$GLB,, | Performed by: HOSPITALIST

## 2021-06-10 PROCEDURE — 1159F MED LIST DOCD IN RCRD: CPT | Mod: S$GLB,,, | Performed by: HOSPITALIST

## 2021-06-10 PROCEDURE — 3077F PR MOST RECENT SYSTOLIC BLOOD PRESSURE >= 140 MM HG: ICD-10-PCS | Mod: CPTII,S$GLB,, | Performed by: HOSPITALIST

## 2021-06-10 PROCEDURE — 3078F PR MOST RECENT DIASTOLIC BLOOD PRESSURE < 80 MM HG: ICD-10-PCS | Mod: CPTII,S$GLB,, | Performed by: HOSPITALIST

## 2021-06-10 PROCEDURE — 1157F ADVNC CARE PLAN IN RCRD: CPT | Mod: S$GLB,,, | Performed by: HOSPITALIST

## 2021-06-11 ENCOUNTER — ANESTHESIA EVENT (OUTPATIENT)
Dept: SURGERY | Facility: HOSPITAL | Age: 81
End: 2021-06-11
Payer: MEDICARE

## 2021-06-11 ENCOUNTER — PATIENT MESSAGE (OUTPATIENT)
Dept: ORTHOPEDICS | Facility: CLINIC | Age: 81
End: 2021-06-11

## 2021-06-11 ENCOUNTER — TELEPHONE (OUTPATIENT)
Dept: PREADMISSION TESTING | Facility: HOSPITAL | Age: 81
End: 2021-06-11

## 2021-06-11 ENCOUNTER — TELEPHONE (OUTPATIENT)
Dept: ORTHOPEDICS | Facility: CLINIC | Age: 81
End: 2021-06-11

## 2021-06-11 PROBLEM — E78.00 HYPERCHOLESTEROLEMIA: Status: RESOLVED | Noted: 2017-06-13 | Resolved: 2021-06-11

## 2021-06-11 PROBLEM — E87.1 HYPONATREMIA: Status: RESOLVED | Noted: 2018-12-11 | Resolved: 2021-06-11

## 2021-06-11 PROBLEM — Z87.898 HISTORY OF PREDIABETES: Status: ACTIVE | Noted: 2021-06-11

## 2021-06-11 PROBLEM — K21.9 GERD (GASTROESOPHAGEAL REFLUX DISEASE): Status: RESOLVED | Noted: 2021-06-05 | Resolved: 2021-06-11

## 2021-06-13 RX ORDER — DEXTROMETHORPHAN HYDROBROMIDE, GUAIFENESIN 5; 100 MG/5ML; MG/5ML
650 LIQUID ORAL EVERY 8 HOURS PRN
Qty: 120 TABLET | Refills: 0 | Status: SHIPPED | OUTPATIENT
Start: 2021-06-13 | End: 2021-07-27 | Stop reason: ALTCHOICE

## 2021-06-13 RX ORDER — DOCUSATE SODIUM 100 MG/1
100 CAPSULE, LIQUID FILLED ORAL 2 TIMES DAILY PRN
Qty: 60 CAPSULE | Refills: 0 | Status: SHIPPED | OUTPATIENT
Start: 2021-06-13 | End: 2021-07-27 | Stop reason: ALTCHOICE

## 2021-06-13 RX ORDER — OXYCODONE HYDROCHLORIDE 5 MG/1
TABLET ORAL
Qty: 50 TABLET | Refills: 0 | Status: SHIPPED | OUTPATIENT
Start: 2021-06-13 | End: 2021-07-27 | Stop reason: ALTCHOICE

## 2021-06-13 RX ORDER — CELECOXIB 200 MG/1
200 CAPSULE ORAL DAILY
Qty: 30 CAPSULE | Refills: 0 | Status: SHIPPED | OUTPATIENT
Start: 2021-06-13 | End: 2021-07-15

## 2021-06-14 ENCOUNTER — HOSPITAL ENCOUNTER (OUTPATIENT)
Facility: HOSPITAL | Age: 81
Discharge: HOME | End: 2021-06-15
Attending: ORTHOPAEDIC SURGERY | Admitting: ORTHOPAEDIC SURGERY
Payer: MEDICARE

## 2021-06-14 ENCOUNTER — ANESTHESIA (OUTPATIENT)
Dept: SURGERY | Facility: HOSPITAL | Age: 81
End: 2021-06-14
Payer: MEDICARE

## 2021-06-14 DIAGNOSIS — M17.11 OSTEOARTHRITIS OF RIGHT KNEE, UNSPECIFIED OSTEOARTHRITIS TYPE: ICD-10-CM

## 2021-06-14 PROCEDURE — 27447 TOTAL KNEE ARTHROPLASTY: CPT | Mod: RT,,, | Performed by: ORTHOPAEDIC SURGERY

## 2021-06-14 PROCEDURE — 27201423 OPTIME MED/SURG SUP & DEVICES STERILE SUPPLY: Performed by: ORTHOPAEDIC SURGERY

## 2021-06-14 PROCEDURE — D9220A PRA ANESTHESIA: ICD-10-PCS | Mod: ANES,,, | Performed by: ANESTHESIOLOGY

## 2021-06-14 PROCEDURE — 63600175 PHARM REV CODE 636 W HCPCS: Performed by: NURSE ANESTHETIST, CERTIFIED REGISTERED

## 2021-06-14 PROCEDURE — 71000033 HC RECOVERY, INTIAL HOUR: Performed by: ORTHOPAEDIC SURGERY

## 2021-06-14 PROCEDURE — D9220A PRA ANESTHESIA: ICD-10-PCS | Mod: CRNA,,, | Performed by: NURSE ANESTHETIST, CERTIFIED REGISTERED

## 2021-06-14 PROCEDURE — 63600175 PHARM REV CODE 636 W HCPCS: Performed by: NURSE PRACTITIONER

## 2021-06-14 PROCEDURE — 25000003 PHARM REV CODE 250: Performed by: ORTHOPAEDIC SURGERY

## 2021-06-14 PROCEDURE — 36000710: Performed by: ORTHOPAEDIC SURGERY

## 2021-06-14 PROCEDURE — 25000003 PHARM REV CODE 250: Performed by: NURSE PRACTITIONER

## 2021-06-14 PROCEDURE — C1776 JOINT DEVICE (IMPLANTABLE): HCPCS | Performed by: ORTHOPAEDIC SURGERY

## 2021-06-14 PROCEDURE — D9220A PRA ANESTHESIA: Mod: ANES,,, | Performed by: ANESTHESIOLOGY

## 2021-06-14 PROCEDURE — D9220A PRA ANESTHESIA: Mod: CRNA,,, | Performed by: NURSE ANESTHETIST, CERTIFIED REGISTERED

## 2021-06-14 PROCEDURE — 25000003 PHARM REV CODE 250: Performed by: NURSE ANESTHETIST, CERTIFIED REGISTERED

## 2021-06-14 PROCEDURE — 94799 UNLISTED PULMONARY SVC/PX: CPT

## 2021-06-14 PROCEDURE — 27100025 HC TUBING, SET FLUID WARMER: Performed by: ANESTHESIOLOGY

## 2021-06-14 PROCEDURE — 27447 PR TOTAL KNEE ARTHROPLASTY: ICD-10-PCS | Mod: RT,,, | Performed by: ORTHOPAEDIC SURGERY

## 2021-06-14 PROCEDURE — 37000008 HC ANESTHESIA 1ST 15 MINUTES: Performed by: ORTHOPAEDIC SURGERY

## 2021-06-14 PROCEDURE — 37000009 HC ANESTHESIA EA ADD 15 MINS: Performed by: ORTHOPAEDIC SURGERY

## 2021-06-14 PROCEDURE — 99900035 HC TECH TIME PER 15 MIN (STAT)

## 2021-06-14 PROCEDURE — C1713 ANCHOR/SCREW BN/BN,TIS/BN: HCPCS | Performed by: ORTHOPAEDIC SURGERY

## 2021-06-14 PROCEDURE — 36000711: Performed by: ORTHOPAEDIC SURGERY

## 2021-06-14 PROCEDURE — 94761 N-INVAS EAR/PLS OXIMETRY MLT: CPT

## 2021-06-14 PROCEDURE — 63600175 PHARM REV CODE 636 W HCPCS: Performed by: ORTHOPAEDIC SURGERY

## 2021-06-14 PROCEDURE — 71000039 HC RECOVERY, EACH ADD'L HOUR: Performed by: ORTHOPAEDIC SURGERY

## 2021-06-14 DEVICE — TRAY TIBIAL CEMENTED 2.5 COBAL: Type: IMPLANTABLE DEVICE | Site: KNEE | Status: FUNCTIONAL

## 2021-06-14 DEVICE — INSERT TIBIAL SZ 2.5 10MM PFC: Type: IMPLANTABLE DEVICE | Site: KNEE | Status: FUNCTIONAL

## 2021-06-14 DEVICE — CEMENT BONE WHOLE BATCH: Type: IMPLANTABLE DEVICE | Site: KNEE | Status: FUNCTIONAL

## 2021-06-14 DEVICE — SYS KNEE EPAK PIN ATTUNE: Type: IMPLANTABLE DEVICE | Site: KNEE | Status: FUNCTIONAL

## 2021-06-14 DEVICE — PATELLA OVAL DOME 35MM: Type: IMPLANTABLE DEVICE | Site: KNEE | Status: FUNCTIONAL

## 2021-06-14 DEVICE — COMPONENT SIGMA FEM PS SZ2.5 R: Type: IMPLANTABLE DEVICE | Site: KNEE | Status: FUNCTIONAL

## 2021-06-14 RX ORDER — TRANEXAMIC ACID 100 MG/ML
1000 INJECTION, SOLUTION INTRAVENOUS
Status: COMPLETED | OUTPATIENT
Start: 2021-06-14 | End: 2021-06-14

## 2021-06-14 RX ORDER — FAMOTIDINE 20 MG/1
20 TABLET, FILM COATED ORAL 2 TIMES DAILY
Status: DISCONTINUED | OUTPATIENT
Start: 2021-06-14 | End: 2021-06-15 | Stop reason: HOSPADM

## 2021-06-14 RX ORDER — ACETAMINOPHEN 500 MG
1000 TABLET ORAL
Status: COMPLETED | OUTPATIENT
Start: 2021-06-14 | End: 2021-06-14

## 2021-06-14 RX ORDER — MIDAZOLAM HYDROCHLORIDE 1 MG/ML
INJECTION, SOLUTION INTRAMUSCULAR; INTRAVENOUS
Status: DISCONTINUED | OUTPATIENT
Start: 2021-06-14 | End: 2021-06-14

## 2021-06-14 RX ORDER — ACETAMINOPHEN 500 MG
1000 TABLET ORAL EVERY 6 HOURS
Status: DISCONTINUED | OUTPATIENT
Start: 2021-06-14 | End: 2021-06-15 | Stop reason: HOSPADM

## 2021-06-14 RX ORDER — ONDANSETRON 2 MG/ML
4 INJECTION INTRAMUSCULAR; INTRAVENOUS EVERY 8 HOURS PRN
Status: DISCONTINUED | OUTPATIENT
Start: 2021-06-14 | End: 2021-06-15 | Stop reason: HOSPADM

## 2021-06-14 RX ORDER — ROPIVACAINE HYDROCHLORIDE 2 MG/ML
8 INJECTION, SOLUTION EPIDURAL; INFILTRATION; PERINEURAL CONTINUOUS
Status: DISCONTINUED | OUTPATIENT
Start: 2021-06-14 | End: 2021-06-15 | Stop reason: HOSPADM

## 2021-06-14 RX ORDER — VANCOMYCIN HCL IN 5 % DEXTROSE 1G/250ML
1000 PLASTIC BAG, INJECTION (ML) INTRAVENOUS
Status: COMPLETED | OUTPATIENT
Start: 2021-06-14 | End: 2021-06-14

## 2021-06-14 RX ORDER — FUROSEMIDE 20 MG/1
20 TABLET ORAL
Status: DISCONTINUED | OUTPATIENT
Start: 2021-06-15 | End: 2021-06-15 | Stop reason: HOSPADM

## 2021-06-14 RX ORDER — BISACODYL 10 MG
10 SUPPOSITORY, RECTAL RECTAL EVERY 12 HOURS PRN
Status: DISCONTINUED | OUTPATIENT
Start: 2021-06-14 | End: 2021-06-15 | Stop reason: HOSPADM

## 2021-06-14 RX ORDER — SODIUM CHLORIDE 9 MG/ML
INJECTION, SOLUTION INTRAVENOUS
Status: COMPLETED | OUTPATIENT
Start: 2021-06-14 | End: 2021-06-14

## 2021-06-14 RX ORDER — POLYETHYLENE GLYCOL 3350 17 G/17G
17 POWDER, FOR SOLUTION ORAL DAILY
Status: DISCONTINUED | OUTPATIENT
Start: 2021-06-15 | End: 2021-06-15 | Stop reason: HOSPADM

## 2021-06-14 RX ORDER — ROPIVACAINE/EPI/CLONIDINE/KET 2.46-0.005
SYRINGE (ML) INJECTION
Status: DISCONTINUED | OUTPATIENT
Start: 2021-06-14 | End: 2021-06-14 | Stop reason: HOSPADM

## 2021-06-14 RX ORDER — OXYCODONE HYDROCHLORIDE 10 MG/1
10 TABLET ORAL
Status: DISCONTINUED | OUTPATIENT
Start: 2021-06-14 | End: 2021-06-15 | Stop reason: HOSPADM

## 2021-06-14 RX ORDER — ONDANSETRON 2 MG/ML
INJECTION INTRAMUSCULAR; INTRAVENOUS
Status: DISCONTINUED | OUTPATIENT
Start: 2021-06-14 | End: 2021-06-14

## 2021-06-14 RX ORDER — AMOXICILLIN 250 MG
1 CAPSULE ORAL 2 TIMES DAILY
Status: DISCONTINUED | OUTPATIENT
Start: 2021-06-14 | End: 2021-06-15 | Stop reason: HOSPADM

## 2021-06-14 RX ORDER — PREGABALIN 75 MG/1
75 CAPSULE ORAL
Status: COMPLETED | OUTPATIENT
Start: 2021-06-14 | End: 2021-06-14

## 2021-06-14 RX ORDER — CELECOXIB 200 MG/1
400 CAPSULE ORAL
Status: COMPLETED | OUTPATIENT
Start: 2021-06-14 | End: 2021-06-14

## 2021-06-14 RX ORDER — PROCHLORPERAZINE EDISYLATE 5 MG/ML
5 INJECTION INTRAMUSCULAR; INTRAVENOUS EVERY 6 HOURS PRN
Status: DISCONTINUED | OUTPATIENT
Start: 2021-06-14 | End: 2021-06-15 | Stop reason: HOSPADM

## 2021-06-14 RX ORDER — PANTOPRAZOLE SODIUM 40 MG/1
40 TABLET, DELAYED RELEASE ORAL DAILY
Status: DISCONTINUED | OUTPATIENT
Start: 2021-06-15 | End: 2021-06-15 | Stop reason: HOSPADM

## 2021-06-14 RX ORDER — OXYCODONE HYDROCHLORIDE 5 MG/1
5 TABLET ORAL
Status: DISCONTINUED | OUTPATIENT
Start: 2021-06-14 | End: 2021-06-15 | Stop reason: HOSPADM

## 2021-06-14 RX ORDER — FAMOTIDINE 10 MG/ML
INJECTION INTRAVENOUS
Status: DISCONTINUED | OUTPATIENT
Start: 2021-06-14 | End: 2021-06-14

## 2021-06-14 RX ORDER — MUPIROCIN 20 MG/G
1 OINTMENT TOPICAL
Status: COMPLETED | OUTPATIENT
Start: 2021-06-14 | End: 2021-06-14

## 2021-06-14 RX ORDER — LIDOCAINE HYDROCHLORIDE 10 MG/ML
1 INJECTION, SOLUTION EPIDURAL; INFILTRATION; INTRACAUDAL; PERINEURAL
Status: DISCONTINUED | OUTPATIENT
Start: 2021-06-14 | End: 2021-06-14 | Stop reason: HOSPADM

## 2021-06-14 RX ORDER — CEFAZOLIN SODIUM 1 G/3ML
2 INJECTION, POWDER, FOR SOLUTION INTRAMUSCULAR; INTRAVENOUS
Status: COMPLETED | OUTPATIENT
Start: 2021-06-14 | End: 2021-06-14

## 2021-06-14 RX ORDER — BISOPROLOL FUMARATE AND HYDROCHLOROTHIAZIDE 2.5; 6.25 MG/1; MG/1
0.5 TABLET ORAL DAILY
Status: DISCONTINUED | OUTPATIENT
Start: 2021-06-14 | End: 2021-06-15 | Stop reason: HOSPADM

## 2021-06-14 RX ORDER — PREGABALIN 75 MG/1
75 CAPSULE ORAL NIGHTLY
Status: DISCONTINUED | OUTPATIENT
Start: 2021-06-14 | End: 2021-06-15 | Stop reason: HOSPADM

## 2021-06-14 RX ORDER — KETAMINE HCL IN 0.9 % NACL 50 MG/5 ML
SYRINGE (ML) INTRAVENOUS
Status: DISCONTINUED | OUTPATIENT
Start: 2021-06-14 | End: 2021-06-14

## 2021-06-14 RX ORDER — LIDOCAINE HYDROCHLORIDE 20 MG/ML
INJECTION INTRAVENOUS
Status: DISCONTINUED | OUTPATIENT
Start: 2021-06-14 | End: 2021-06-14

## 2021-06-14 RX ORDER — VANCOMYCIN HYDROCHLORIDE 1 G/20ML
INJECTION, POWDER, LYOPHILIZED, FOR SOLUTION INTRAVENOUS
Status: DISCONTINUED | OUTPATIENT
Start: 2021-06-14 | End: 2021-06-14 | Stop reason: HOSPADM

## 2021-06-14 RX ORDER — ASPIRIN 81 MG/1
81 TABLET ORAL 2 TIMES DAILY
Status: DISCONTINUED | OUTPATIENT
Start: 2021-06-14 | End: 2021-06-15

## 2021-06-14 RX ORDER — CEFAZOLIN SODIUM 1 G/3ML
2 INJECTION, POWDER, FOR SOLUTION INTRAMUSCULAR; INTRAVENOUS
Status: DISCONTINUED | OUTPATIENT
Start: 2021-06-14 | End: 2021-06-15

## 2021-06-14 RX ORDER — LEVOTHYROXINE SODIUM 88 UG/1
88 TABLET ORAL
Status: DISCONTINUED | OUTPATIENT
Start: 2021-06-15 | End: 2021-06-15 | Stop reason: HOSPADM

## 2021-06-14 RX ORDER — DEXAMETHASONE SODIUM PHOSPHATE 4 MG/ML
INJECTION, SOLUTION INTRA-ARTICULAR; INTRALESIONAL; INTRAMUSCULAR; INTRAVENOUS; SOFT TISSUE
Status: DISCONTINUED | OUTPATIENT
Start: 2021-06-14 | End: 2021-06-14

## 2021-06-14 RX ORDER — SODIUM CHLORIDE 0.9 % (FLUSH) 0.9 %
10 SYRINGE (ML) INJECTION
Status: DISCONTINUED | OUTPATIENT
Start: 2021-06-14 | End: 2021-06-14 | Stop reason: HOSPADM

## 2021-06-14 RX ORDER — TRANEXAMIC ACID 100 MG/ML
1000 INJECTION, SOLUTION INTRAVENOUS
Status: DISCONTINUED | OUTPATIENT
Start: 2021-06-14 | End: 2021-06-14 | Stop reason: HOSPADM

## 2021-06-14 RX ORDER — FENTANYL CITRATE 50 UG/ML
INJECTION, SOLUTION INTRAMUSCULAR; INTRAVENOUS
Status: DISCONTINUED | OUTPATIENT
Start: 2021-06-14 | End: 2021-06-14

## 2021-06-14 RX ORDER — FENTANYL CITRATE 50 UG/ML
25 INJECTION, SOLUTION INTRAMUSCULAR; INTRAVENOUS EVERY 5 MIN PRN
Status: DISCONTINUED | OUTPATIENT
Start: 2021-06-14 | End: 2021-06-14 | Stop reason: HOSPADM

## 2021-06-14 RX ORDER — SODIUM CHLORIDE 9 MG/ML
INJECTION, SOLUTION INTRAVENOUS CONTINUOUS
Status: DISCONTINUED | OUTPATIENT
Start: 2021-06-14 | End: 2021-06-15 | Stop reason: HOSPADM

## 2021-06-14 RX ORDER — MORPHINE SULFATE 2 MG/ML
2 INJECTION, SOLUTION INTRAMUSCULAR; INTRAVENOUS
Status: DISCONTINUED | OUTPATIENT
Start: 2021-06-14 | End: 2021-06-15 | Stop reason: HOSPADM

## 2021-06-14 RX ORDER — CELECOXIB 200 MG/1
200 CAPSULE ORAL DAILY
Status: DISCONTINUED | OUTPATIENT
Start: 2021-06-15 | End: 2021-06-15 | Stop reason: HOSPADM

## 2021-06-14 RX ORDER — TALC
6 POWDER (GRAM) TOPICAL NIGHTLY PRN
Status: DISCONTINUED | OUTPATIENT
Start: 2021-06-14 | End: 2021-06-14 | Stop reason: HOSPADM

## 2021-06-14 RX ORDER — PROPOFOL 10 MG/ML
VIAL (ML) INTRAVENOUS
Status: DISCONTINUED | OUTPATIENT
Start: 2021-06-14 | End: 2021-06-14

## 2021-06-14 RX ORDER — MIDAZOLAM HYDROCHLORIDE 1 MG/ML
1 INJECTION INTRAMUSCULAR; INTRAVENOUS EVERY 5 MIN PRN
Status: DISCONTINUED | OUTPATIENT
Start: 2021-06-14 | End: 2021-06-14 | Stop reason: HOSPADM

## 2021-06-14 RX ORDER — MUPIROCIN 20 MG/G
1 OINTMENT TOPICAL 2 TIMES DAILY
Status: DISCONTINUED | OUTPATIENT
Start: 2021-06-14 | End: 2021-06-15 | Stop reason: HOSPADM

## 2021-06-14 RX ORDER — NALOXONE HCL 0.4 MG/ML
0.02 VIAL (ML) INJECTION
Status: DISCONTINUED | OUTPATIENT
Start: 2021-06-14 | End: 2021-06-15 | Stop reason: HOSPADM

## 2021-06-14 RX ADMIN — SODIUM CHLORIDE: 0.9 INJECTION, SOLUTION INTRAVENOUS at 01:06

## 2021-06-14 RX ADMIN — OXYCODONE 5 MG: 5 TABLET ORAL at 05:06

## 2021-06-14 RX ADMIN — DOCUSATE SODIUM 50MG AND SENNOSIDES 8.6MG 1 TABLET: 8.6; 5 TABLET, FILM COATED ORAL at 09:06

## 2021-06-14 RX ADMIN — Medication 10 MG: at 02:06

## 2021-06-14 RX ADMIN — MUPIROCIN 1 G: 20 OINTMENT TOPICAL at 01:06

## 2021-06-14 RX ADMIN — CEFAZOLIN 2 G: 330 INJECTION, POWDER, FOR SOLUTION INTRAMUSCULAR; INTRAVENOUS at 11:06

## 2021-06-14 RX ADMIN — PREGABALIN 75 MG: 75 CAPSULE ORAL at 01:06

## 2021-06-14 RX ADMIN — FAMOTIDINE 20 MG: 10 INJECTION, SOLUTION INTRAVENOUS at 02:06

## 2021-06-14 RX ADMIN — BISOPROLOL FUMARATE AND HYDROCHLOROTHIAZIDE 1 TABLET: 2.5; 6.25 TABLET ORAL at 05:06

## 2021-06-14 RX ADMIN — ACETAMINOPHEN 1000 MG: 500 TABLET, FILM COATED ORAL at 05:06

## 2021-06-14 RX ADMIN — CELECOXIB 400 MG: 200 CAPSULE ORAL at 01:06

## 2021-06-14 RX ADMIN — Medication 10 MG: at 03:06

## 2021-06-14 RX ADMIN — SODIUM CHLORIDE: 0.9 INJECTION, SOLUTION INTRAVENOUS at 11:06

## 2021-06-14 RX ADMIN — MIDAZOLAM HYDROCHLORIDE 1 MG: 1 INJECTION, SOLUTION INTRAMUSCULAR; INTRAVENOUS at 02:06

## 2021-06-14 RX ADMIN — TRANEXAMIC ACID 1000 MG: 100 INJECTION, SOLUTION INTRAVENOUS at 04:06

## 2021-06-14 RX ADMIN — TRANEXAMIC ACID 1000 MG: 100 INJECTION, SOLUTION INTRAVENOUS at 02:06

## 2021-06-14 RX ADMIN — SODIUM CHLORIDE: 0.9 INJECTION, SOLUTION INTRAVENOUS at 05:06

## 2021-06-14 RX ADMIN — PROPOFOL 50 MCG/KG/MIN: 10 INJECTION, EMULSION INTRAVENOUS at 02:06

## 2021-06-14 RX ADMIN — FAMOTIDINE 20 MG: 20 TABLET ORAL at 09:06

## 2021-06-14 RX ADMIN — PREGABALIN 75 MG: 75 CAPSULE ORAL at 09:06

## 2021-06-14 RX ADMIN — ASPIRIN 81 MG: 81 TABLET, COATED ORAL at 09:06

## 2021-06-14 RX ADMIN — FENTANYL CITRATE 50 MCG: 50 INJECTION, SOLUTION INTRAMUSCULAR; INTRAVENOUS at 03:06

## 2021-06-14 RX ADMIN — MUPIROCIN 1 G: 20 OINTMENT TOPICAL at 09:06

## 2021-06-14 RX ADMIN — LIDOCAINE HYDROCHLORIDE 75 MG: 20 INJECTION, SOLUTION INTRAVENOUS at 02:06

## 2021-06-14 RX ADMIN — ACETAMINOPHEN 1000 MG: 500 TABLET, FILM COATED ORAL at 11:06

## 2021-06-14 RX ADMIN — VANCOMYCIN HYDROCHLORIDE 1000 MG: 1 INJECTION, POWDER, LYOPHILIZED, FOR SOLUTION INTRAVENOUS at 01:06

## 2021-06-14 RX ADMIN — DEXAMETHASONE SODIUM PHOSPHATE 8 MG: 4 INJECTION, SOLUTION INTRAMUSCULAR; INTRAVENOUS at 02:06

## 2021-06-14 RX ADMIN — ONDANSETRON 4 MG: 2 INJECTION, SOLUTION INTRAMUSCULAR; INTRAVENOUS at 04:06

## 2021-06-14 RX ADMIN — PROPOFOL 20 MG: 10 INJECTION, EMULSION INTRAVENOUS at 03:06

## 2021-06-14 RX ADMIN — ACETAMINOPHEN 1000 MG: 500 TABLET ORAL at 01:06

## 2021-06-14 RX ADMIN — CEFAZOLIN 2 G: 330 INJECTION, POWDER, FOR SOLUTION INTRAMUSCULAR; INTRAVENOUS at 02:06

## 2021-06-15 VITALS
DIASTOLIC BLOOD PRESSURE: 62 MMHG | TEMPERATURE: 98 F | SYSTOLIC BLOOD PRESSURE: 119 MMHG | OXYGEN SATURATION: 97 % | WEIGHT: 145 LBS | BODY MASS INDEX: 21.98 KG/M2 | HEART RATE: 71 BPM | HEIGHT: 68 IN | RESPIRATION RATE: 16 BRPM

## 2021-06-15 PROCEDURE — 94761 N-INVAS EAR/PLS OXIMETRY MLT: CPT

## 2021-06-15 PROCEDURE — 97535 SELF CARE MNGMENT TRAINING: CPT

## 2021-06-15 PROCEDURE — 97161 PT EVAL LOW COMPLEX 20 MIN: CPT

## 2021-06-15 PROCEDURE — 99212 PR OFFICE/OUTPT VISIT, EST, LEVL II, 10-19 MIN: ICD-10-PCS | Mod: ,,, | Performed by: ANESTHESIOLOGY

## 2021-06-15 PROCEDURE — 97116 GAIT TRAINING THERAPY: CPT

## 2021-06-15 PROCEDURE — 99900035 HC TECH TIME PER 15 MIN (STAT)

## 2021-06-15 PROCEDURE — 25000003 PHARM REV CODE 250: Performed by: ANESTHESIOLOGY

## 2021-06-15 PROCEDURE — 63600175 PHARM REV CODE 636 W HCPCS: Performed by: ORTHOPAEDIC SURGERY

## 2021-06-15 PROCEDURE — 99212 OFFICE O/P EST SF 10 MIN: CPT | Mod: ,,, | Performed by: ANESTHESIOLOGY

## 2021-06-15 PROCEDURE — 97165 OT EVAL LOW COMPLEX 30 MIN: CPT

## 2021-06-15 PROCEDURE — 25000003 PHARM REV CODE 250: Performed by: ORTHOPAEDIC SURGERY

## 2021-06-15 PROCEDURE — 25000003 PHARM REV CODE 250: Performed by: NURSE PRACTITIONER

## 2021-06-15 PROCEDURE — 97110 THERAPEUTIC EXERCISES: CPT

## 2021-06-15 RX ORDER — CEFAZOLIN SODIUM 1 G/3ML
2 INJECTION, POWDER, FOR SOLUTION INTRAMUSCULAR; INTRAVENOUS ONCE
Status: COMPLETED | OUTPATIENT
Start: 2021-06-15 | End: 2021-06-15

## 2021-06-15 RX ORDER — ASPIRIN 81 MG/1
81 TABLET ORAL ONCE
Status: COMPLETED | OUTPATIENT
Start: 2021-06-15 | End: 2021-06-15

## 2021-06-15 RX ADMIN — LEVOTHYROXINE SODIUM 88 MCG: 88 TABLET ORAL at 05:06

## 2021-06-15 RX ADMIN — POLYETHYLENE GLYCOL 3350 17 G: 17 POWDER, FOR SOLUTION ORAL at 09:06

## 2021-06-15 RX ADMIN — ASPIRIN 81 MG: 81 TABLET, COATED ORAL at 09:06

## 2021-06-15 RX ADMIN — CEFAZOLIN 2 G: 330 INJECTION, POWDER, FOR SOLUTION INTRAMUSCULAR; INTRAVENOUS at 08:06

## 2021-06-15 RX ADMIN — ACETAMINOPHEN 1000 MG: 500 TABLET, FILM COATED ORAL at 05:06

## 2021-06-15 RX ADMIN — MUPIROCIN 1 G: 20 OINTMENT TOPICAL at 09:06

## 2021-06-15 RX ADMIN — CELECOXIB 200 MG: 200 CAPSULE ORAL at 09:06

## 2021-06-16 ENCOUNTER — CLINICAL SUPPORT (OUTPATIENT)
Dept: ORTHOPEDICS | Facility: CLINIC | Age: 81
End: 2021-06-16
Payer: MEDICARE

## 2021-06-16 ENCOUNTER — CLINICAL SUPPORT (OUTPATIENT)
Dept: REHABILITATION | Facility: HOSPITAL | Age: 81
End: 2021-06-16
Attending: ORTHOPAEDIC SURGERY
Payer: MEDICARE

## 2021-06-16 DIAGNOSIS — M17.11 PRIMARY OSTEOARTHRITIS OF RIGHT KNEE: ICD-10-CM

## 2021-06-16 DIAGNOSIS — M17.11 OSTEOARTHRITIS OF RIGHT KNEE, UNSPECIFIED OSTEOARTHRITIS TYPE: ICD-10-CM

## 2021-06-16 DIAGNOSIS — M25.661 DECREASED RANGE OF MOTION OF RIGHT KNEE: Primary | ICD-10-CM

## 2021-06-16 DIAGNOSIS — Z96.659 STATUS POST KNEE REPLACEMENT, UNSPECIFIED LATERALITY: Primary | ICD-10-CM

## 2021-06-16 DIAGNOSIS — R29.898 DECREASED STRENGTH INVOLVING KNEE JOINT: ICD-10-CM

## 2021-06-16 DIAGNOSIS — M25.561 CHRONIC PAIN OF RIGHT KNEE: ICD-10-CM

## 2021-06-16 DIAGNOSIS — G89.29 CHRONIC PAIN OF RIGHT KNEE: ICD-10-CM

## 2021-06-16 PROCEDURE — 99499 NO LOS: ICD-10-PCS | Mod: 95,,, | Performed by: ORTHOPAEDIC SURGERY

## 2021-06-16 PROCEDURE — 97110 THERAPEUTIC EXERCISES: CPT | Mod: PO

## 2021-06-16 PROCEDURE — 97161 PT EVAL LOW COMPLEX 20 MIN: CPT | Mod: PO

## 2021-06-16 PROCEDURE — 99499 UNLISTED E&M SERVICE: CPT | Mod: 95,,, | Performed by: ORTHOPAEDIC SURGERY

## 2021-06-18 ENCOUNTER — CLINICAL SUPPORT (OUTPATIENT)
Dept: REHABILITATION | Facility: HOSPITAL | Age: 81
End: 2021-06-18
Attending: ORTHOPAEDIC SURGERY
Payer: MEDICARE

## 2021-06-18 DIAGNOSIS — R29.898 DECREASED STRENGTH INVOLVING KNEE JOINT: ICD-10-CM

## 2021-06-18 DIAGNOSIS — M17.11 OSTEOARTHRITIS OF RIGHT KNEE, UNSPECIFIED OSTEOARTHRITIS TYPE: ICD-10-CM

## 2021-06-18 DIAGNOSIS — M25.561 CHRONIC PAIN OF RIGHT KNEE: Primary | ICD-10-CM

## 2021-06-18 DIAGNOSIS — G89.29 CHRONIC PAIN OF RIGHT KNEE: Primary | ICD-10-CM

## 2021-06-18 DIAGNOSIS — M25.661 DECREASED RANGE OF MOTION OF RIGHT KNEE: ICD-10-CM

## 2021-06-18 PROCEDURE — 97110 THERAPEUTIC EXERCISES: CPT | Mod: PO,CQ

## 2021-06-21 ENCOUNTER — CLINICAL SUPPORT (OUTPATIENT)
Dept: REHABILITATION | Facility: HOSPITAL | Age: 81
End: 2021-06-21
Attending: ORTHOPAEDIC SURGERY
Payer: MEDICARE

## 2021-06-21 DIAGNOSIS — M17.11 OSTEOARTHRITIS OF RIGHT KNEE, UNSPECIFIED OSTEOARTHRITIS TYPE: ICD-10-CM

## 2021-06-21 DIAGNOSIS — M25.661 DECREASED RANGE OF MOTION OF RIGHT KNEE: ICD-10-CM

## 2021-06-21 DIAGNOSIS — R29.898 DECREASED STRENGTH INVOLVING KNEE JOINT: ICD-10-CM

## 2021-06-21 DIAGNOSIS — M25.561 CHRONIC PAIN OF RIGHT KNEE: Primary | ICD-10-CM

## 2021-06-21 DIAGNOSIS — G89.29 CHRONIC PAIN OF RIGHT KNEE: Primary | ICD-10-CM

## 2021-06-21 PROCEDURE — 97110 THERAPEUTIC EXERCISES: CPT | Mod: PO,CQ

## 2021-06-21 PROCEDURE — 97140 MANUAL THERAPY 1/> REGIONS: CPT | Mod: PO,CQ

## 2021-06-23 ENCOUNTER — CLINICAL SUPPORT (OUTPATIENT)
Dept: REHABILITATION | Facility: HOSPITAL | Age: 81
End: 2021-06-23
Payer: MEDICARE

## 2021-06-23 DIAGNOSIS — M25.661 DECREASED RANGE OF MOTION OF RIGHT KNEE: ICD-10-CM

## 2021-06-23 DIAGNOSIS — M17.11 OSTEOARTHRITIS OF RIGHT KNEE, UNSPECIFIED OSTEOARTHRITIS TYPE: ICD-10-CM

## 2021-06-23 DIAGNOSIS — M25.561 CHRONIC PAIN OF RIGHT KNEE: Primary | ICD-10-CM

## 2021-06-23 DIAGNOSIS — G89.29 CHRONIC PAIN OF RIGHT KNEE: Primary | ICD-10-CM

## 2021-06-23 DIAGNOSIS — R29.898 DECREASED STRENGTH INVOLVING KNEE JOINT: ICD-10-CM

## 2021-06-23 PROCEDURE — 97110 THERAPEUTIC EXERCISES: CPT | Mod: PO,CQ

## 2021-06-25 ENCOUNTER — CLINICAL SUPPORT (OUTPATIENT)
Dept: REHABILITATION | Facility: HOSPITAL | Age: 81
End: 2021-06-25
Attending: ORTHOPAEDIC SURGERY
Payer: MEDICARE

## 2021-06-25 DIAGNOSIS — M17.11 OSTEOARTHRITIS OF RIGHT KNEE, UNSPECIFIED OSTEOARTHRITIS TYPE: Primary | ICD-10-CM

## 2021-06-25 PROCEDURE — 97110 THERAPEUTIC EXERCISES: CPT | Mod: PO,CQ

## 2021-06-28 ENCOUNTER — PATIENT MESSAGE (OUTPATIENT)
Dept: INTERNAL MEDICINE | Facility: CLINIC | Age: 81
End: 2021-06-28

## 2021-06-28 ENCOUNTER — PATIENT MESSAGE (OUTPATIENT)
Dept: OPHTHALMOLOGY | Facility: CLINIC | Age: 81
End: 2021-06-28

## 2021-06-29 ENCOUNTER — OFFICE VISIT (OUTPATIENT)
Dept: ORTHOPEDICS | Facility: CLINIC | Age: 81
End: 2021-06-29
Payer: MEDICARE

## 2021-06-29 ENCOUNTER — CLINICAL SUPPORT (OUTPATIENT)
Dept: REHABILITATION | Facility: HOSPITAL | Age: 81
End: 2021-06-29
Attending: ORTHOPAEDIC SURGERY
Payer: MEDICARE

## 2021-06-29 DIAGNOSIS — G89.29 CHRONIC PAIN OF RIGHT KNEE: ICD-10-CM

## 2021-06-29 DIAGNOSIS — M25.561 CHRONIC PAIN OF RIGHT KNEE: ICD-10-CM

## 2021-06-29 DIAGNOSIS — R29.898 DECREASED STRENGTH INVOLVING KNEE JOINT: ICD-10-CM

## 2021-06-29 DIAGNOSIS — M17.11 OSTEOARTHRITIS OF RIGHT KNEE, UNSPECIFIED OSTEOARTHRITIS TYPE: Primary | ICD-10-CM

## 2021-06-29 DIAGNOSIS — Z96.651 STATUS POST RIGHT KNEE REPLACEMENT: Primary | ICD-10-CM

## 2021-06-29 DIAGNOSIS — M25.661 DECREASED RANGE OF MOTION OF RIGHT KNEE: ICD-10-CM

## 2021-06-29 PROCEDURE — 99999 PR PBB SHADOW E&M-EST. PATIENT-LVL III: ICD-10-PCS | Mod: PBBFAC,,, | Performed by: NURSE PRACTITIONER

## 2021-06-29 PROCEDURE — 1157F ADVNC CARE PLAN IN RCRD: CPT | Mod: S$GLB,,, | Performed by: NURSE PRACTITIONER

## 2021-06-29 PROCEDURE — 97110 THERAPEUTIC EXERCISES: CPT | Mod: PO,CQ

## 2021-06-29 PROCEDURE — 1157F PR ADVANCE CARE PLAN OR EQUIV PRESENT IN MEDICAL RECORD: ICD-10-PCS | Mod: S$GLB,,, | Performed by: NURSE PRACTITIONER

## 2021-06-29 PROCEDURE — 99024 POSTOP FOLLOW-UP VISIT: CPT | Mod: S$GLB,,, | Performed by: NURSE PRACTITIONER

## 2021-06-29 PROCEDURE — 3288F FALL RISK ASSESSMENT DOCD: CPT | Mod: CPTII,S$GLB,, | Performed by: NURSE PRACTITIONER

## 2021-06-29 PROCEDURE — 1125F AMNT PAIN NOTED PAIN PRSNT: CPT | Mod: S$GLB,,, | Performed by: NURSE PRACTITIONER

## 2021-06-29 PROCEDURE — 1101F PR PT FALLS ASSESS DOC 0-1 FALLS W/OUT INJ PAST YR: ICD-10-PCS | Mod: CPTII,S$GLB,, | Performed by: NURSE PRACTITIONER

## 2021-06-29 PROCEDURE — 99024 PR POST-OP FOLLOW-UP VISIT: ICD-10-PCS | Mod: S$GLB,,, | Performed by: NURSE PRACTITIONER

## 2021-06-29 PROCEDURE — 3288F PR FALLS RISK ASSESSMENT DOCUMENTED: ICD-10-PCS | Mod: CPTII,S$GLB,, | Performed by: NURSE PRACTITIONER

## 2021-06-29 PROCEDURE — 1101F PT FALLS ASSESS-DOCD LE1/YR: CPT | Mod: CPTII,S$GLB,, | Performed by: NURSE PRACTITIONER

## 2021-06-29 PROCEDURE — 1125F PR PAIN SEVERITY QUANTIFIED, PAIN PRESENT: ICD-10-PCS | Mod: S$GLB,,, | Performed by: NURSE PRACTITIONER

## 2021-06-29 PROCEDURE — 99999 PR PBB SHADOW E&M-EST. PATIENT-LVL III: CPT | Mod: PBBFAC,,, | Performed by: NURSE PRACTITIONER

## 2021-06-30 ENCOUNTER — CLINICAL SUPPORT (OUTPATIENT)
Dept: REHABILITATION | Facility: HOSPITAL | Age: 81
End: 2021-06-30
Attending: ORTHOPAEDIC SURGERY
Payer: MEDICARE

## 2021-06-30 DIAGNOSIS — M25.661 DECREASED RANGE OF MOTION OF RIGHT KNEE: Primary | ICD-10-CM

## 2021-06-30 PROCEDURE — 97110 THERAPEUTIC EXERCISES: CPT | Mod: PO

## 2021-06-30 PROCEDURE — 97140 MANUAL THERAPY 1/> REGIONS: CPT | Mod: PO

## 2021-07-01 ENCOUNTER — TELEPHONE (OUTPATIENT)
Dept: UROLOGY | Facility: CLINIC | Age: 81
End: 2021-07-01

## 2021-07-01 ENCOUNTER — LAB VISIT (OUTPATIENT)
Dept: LAB | Facility: HOSPITAL | Age: 81
End: 2021-07-01
Attending: UROLOGY
Payer: MEDICARE

## 2021-07-01 ENCOUNTER — PATIENT MESSAGE (OUTPATIENT)
Dept: UROLOGY | Facility: CLINIC | Age: 81
End: 2021-07-01

## 2021-07-01 DIAGNOSIS — N39.0 RECURRENT UTI: Primary | ICD-10-CM

## 2021-07-01 DIAGNOSIS — N39.0 RECURRENT UTI: ICD-10-CM

## 2021-07-01 PROCEDURE — 87088 URINE BACTERIA CULTURE: CPT | Performed by: UROLOGY

## 2021-07-01 PROCEDURE — 87086 URINE CULTURE/COLONY COUNT: CPT | Performed by: UROLOGY

## 2021-07-01 PROCEDURE — 87077 CULTURE AEROBIC IDENTIFY: CPT | Performed by: UROLOGY

## 2021-07-01 PROCEDURE — 87186 SC STD MICRODIL/AGAR DIL: CPT | Performed by: UROLOGY

## 2021-07-02 ENCOUNTER — OFFICE VISIT (OUTPATIENT)
Dept: OPTOMETRY | Facility: CLINIC | Age: 81
End: 2021-07-02
Payer: MEDICARE

## 2021-07-02 ENCOUNTER — CLINICAL SUPPORT (OUTPATIENT)
Dept: REHABILITATION | Facility: HOSPITAL | Age: 81
End: 2021-07-02
Payer: MEDICARE

## 2021-07-02 DIAGNOSIS — Z98.42 S/P BILATERAL CATARACT EXTRACTION: Primary | ICD-10-CM

## 2021-07-02 DIAGNOSIS — H26.491 AFTER-CATARACT OBSCURING VISION, RIGHT: ICD-10-CM

## 2021-07-02 DIAGNOSIS — M25.661 DECREASED RANGE OF MOTION OF RIGHT KNEE: Primary | ICD-10-CM

## 2021-07-02 DIAGNOSIS — Z98.41 S/P BILATERAL CATARACT EXTRACTION: Primary | ICD-10-CM

## 2021-07-02 PROCEDURE — 1157F PR ADVANCE CARE PLAN OR EQUIV PRESENT IN MEDICAL RECORD: ICD-10-PCS | Mod: S$GLB,,, | Performed by: OPTOMETRIST

## 2021-07-02 PROCEDURE — 1126F PR PAIN SEVERITY QUANTIFIED, NO PAIN PRESENT: ICD-10-PCS | Mod: S$GLB,,, | Performed by: OPTOMETRIST

## 2021-07-02 PROCEDURE — 3288F FALL RISK ASSESSMENT DOCD: CPT | Mod: CPTII,S$GLB,, | Performed by: OPTOMETRIST

## 2021-07-02 PROCEDURE — 97110 THERAPEUTIC EXERCISES: CPT | Mod: PO

## 2021-07-02 PROCEDURE — 99024 PR POST-OP FOLLOW-UP VISIT: ICD-10-PCS | Mod: S$GLB,,, | Performed by: OPTOMETRIST

## 2021-07-02 PROCEDURE — 99999 PR PBB SHADOW E&M-EST. PATIENT-LVL III: ICD-10-PCS | Mod: PBBFAC,,, | Performed by: OPTOMETRIST

## 2021-07-02 PROCEDURE — 3288F PR FALLS RISK ASSESSMENT DOCUMENTED: ICD-10-PCS | Mod: CPTII,S$GLB,, | Performed by: OPTOMETRIST

## 2021-07-02 PROCEDURE — 1101F PR PT FALLS ASSESS DOC 0-1 FALLS W/OUT INJ PAST YR: ICD-10-PCS | Mod: CPTII,S$GLB,, | Performed by: OPTOMETRIST

## 2021-07-02 PROCEDURE — 1101F PT FALLS ASSESS-DOCD LE1/YR: CPT | Mod: CPTII,S$GLB,, | Performed by: OPTOMETRIST

## 2021-07-02 PROCEDURE — 99024 POSTOP FOLLOW-UP VISIT: CPT | Mod: S$GLB,,, | Performed by: OPTOMETRIST

## 2021-07-02 PROCEDURE — 97140 MANUAL THERAPY 1/> REGIONS: CPT | Mod: PO

## 2021-07-02 PROCEDURE — 99999 PR PBB SHADOW E&M-EST. PATIENT-LVL III: CPT | Mod: PBBFAC,,, | Performed by: OPTOMETRIST

## 2021-07-02 PROCEDURE — 1157F ADVNC CARE PLAN IN RCRD: CPT | Mod: S$GLB,,, | Performed by: OPTOMETRIST

## 2021-07-02 PROCEDURE — 1126F AMNT PAIN NOTED NONE PRSNT: CPT | Mod: S$GLB,,, | Performed by: OPTOMETRIST

## 2021-07-04 ENCOUNTER — PATIENT MESSAGE (OUTPATIENT)
Dept: UROLOGY | Facility: CLINIC | Age: 81
End: 2021-07-04

## 2021-07-04 LAB — BACTERIA UR CULT: ABNORMAL

## 2021-07-04 RX ORDER — DOXYCYCLINE 100 MG/1
100 CAPSULE ORAL 2 TIMES DAILY
Qty: 14 CAPSULE | Refills: 0 | Status: CANCELLED | OUTPATIENT
Start: 2021-07-04 | End: 2021-07-11

## 2021-07-04 RX ORDER — DOXYCYCLINE 100 MG/1
100 CAPSULE ORAL 2 TIMES DAILY
Qty: 14 CAPSULE | Refills: 0 | Status: SHIPPED | OUTPATIENT
Start: 2021-07-04 | End: 2021-07-11

## 2021-07-06 ENCOUNTER — CLINICAL SUPPORT (OUTPATIENT)
Dept: REHABILITATION | Facility: HOSPITAL | Age: 81
End: 2021-07-06
Payer: MEDICARE

## 2021-07-06 DIAGNOSIS — M25.661 KNEE STIFFNESS, RIGHT: Primary | ICD-10-CM

## 2021-07-06 PROCEDURE — 97140 MANUAL THERAPY 1/> REGIONS: CPT | Mod: PO

## 2021-07-06 PROCEDURE — 97110 THERAPEUTIC EXERCISES: CPT | Mod: PO

## 2021-07-07 ENCOUNTER — CLINICAL SUPPORT (OUTPATIENT)
Dept: REHABILITATION | Facility: HOSPITAL | Age: 81
End: 2021-07-07
Payer: MEDICARE

## 2021-07-07 DIAGNOSIS — M25.661 KNEE STIFFNESS, RIGHT: Primary | ICD-10-CM

## 2021-07-07 DIAGNOSIS — M17.11 OSTEOARTHRITIS OF RIGHT KNEE, UNSPECIFIED OSTEOARTHRITIS TYPE: ICD-10-CM

## 2021-07-07 DIAGNOSIS — M25.661 DECREASED RANGE OF MOTION OF RIGHT KNEE: ICD-10-CM

## 2021-07-07 PROCEDURE — 97110 THERAPEUTIC EXERCISES: CPT | Mod: PO,CQ

## 2021-07-09 ENCOUNTER — CLINICAL SUPPORT (OUTPATIENT)
Dept: REHABILITATION | Facility: HOSPITAL | Age: 81
End: 2021-07-09
Payer: MEDICARE

## 2021-07-09 DIAGNOSIS — R29.898 DECREASED STRENGTH INVOLVING KNEE JOINT: Primary | ICD-10-CM

## 2021-07-09 PROCEDURE — 97140 MANUAL THERAPY 1/> REGIONS: CPT | Mod: PO

## 2021-07-09 PROCEDURE — 97110 THERAPEUTIC EXERCISES: CPT | Mod: PO

## 2021-07-12 ENCOUNTER — CLINICAL SUPPORT (OUTPATIENT)
Dept: REHABILITATION | Facility: HOSPITAL | Age: 81
End: 2021-07-12
Payer: MEDICARE

## 2021-07-12 DIAGNOSIS — R29.898 DECREASED STRENGTH INVOLVING KNEE JOINT: Primary | ICD-10-CM

## 2021-07-12 DIAGNOSIS — M25.661 KNEE STIFFNESS, RIGHT: ICD-10-CM

## 2021-07-12 DIAGNOSIS — M25.661 DECREASED RANGE OF MOTION OF RIGHT KNEE: ICD-10-CM

## 2021-07-12 PROCEDURE — 97140 MANUAL THERAPY 1/> REGIONS: CPT | Mod: PO,CQ

## 2021-07-12 PROCEDURE — 97110 THERAPEUTIC EXERCISES: CPT | Mod: PO,CQ

## 2021-07-12 RX ORDER — DOXYCYCLINE 100 MG/1
100 CAPSULE ORAL 2 TIMES DAILY
Qty: 14 CAPSULE | Refills: 0 | OUTPATIENT
Start: 2021-07-12 | End: 2021-07-19

## 2021-07-13 ENCOUNTER — PATIENT MESSAGE (OUTPATIENT)
Dept: CARDIOLOGY | Facility: CLINIC | Age: 81
End: 2021-07-13

## 2021-07-14 ENCOUNTER — CLINICAL SUPPORT (OUTPATIENT)
Dept: REHABILITATION | Facility: HOSPITAL | Age: 81
End: 2021-07-14
Payer: MEDICARE

## 2021-07-14 DIAGNOSIS — R29.898 DECREASED STRENGTH INVOLVING KNEE JOINT: Primary | ICD-10-CM

## 2021-07-14 PROCEDURE — 97110 THERAPEUTIC EXERCISES: CPT | Mod: PO

## 2021-07-14 PROCEDURE — 97140 MANUAL THERAPY 1/> REGIONS: CPT | Mod: PO

## 2021-07-19 ENCOUNTER — CLINICAL SUPPORT (OUTPATIENT)
Dept: REHABILITATION | Facility: HOSPITAL | Age: 81
End: 2021-07-19
Payer: MEDICARE

## 2021-07-19 DIAGNOSIS — M25.661 KNEE STIFFNESS, RIGHT: ICD-10-CM

## 2021-07-19 DIAGNOSIS — M25.661 DECREASED RANGE OF MOTION OF RIGHT KNEE: ICD-10-CM

## 2021-07-19 DIAGNOSIS — R29.898 DECREASED STRENGTH INVOLVING KNEE JOINT: Primary | ICD-10-CM

## 2021-07-19 PROCEDURE — 97110 THERAPEUTIC EXERCISES: CPT | Mod: PO,CQ

## 2021-07-19 PROCEDURE — 97140 MANUAL THERAPY 1/> REGIONS: CPT | Mod: PO,CQ

## 2021-07-20 ENCOUNTER — PATIENT MESSAGE (OUTPATIENT)
Dept: ORTHOPEDICS | Facility: CLINIC | Age: 81
End: 2021-07-20

## 2021-07-20 DIAGNOSIS — Z96.651 STATUS POST RIGHT KNEE REPLACEMENT: Primary | ICD-10-CM

## 2021-07-21 ENCOUNTER — TELEPHONE (OUTPATIENT)
Dept: OPHTHALMOLOGY | Facility: CLINIC | Age: 81
End: 2021-07-21

## 2021-07-21 ENCOUNTER — DOCUMENTATION ONLY (OUTPATIENT)
Dept: REHABILITATION | Facility: HOSPITAL | Age: 81
End: 2021-07-21

## 2021-07-23 ENCOUNTER — CLINICAL SUPPORT (OUTPATIENT)
Dept: REHABILITATION | Facility: HOSPITAL | Age: 81
End: 2021-07-23
Payer: MEDICARE

## 2021-07-23 DIAGNOSIS — M25.661 KNEE STIFFNESS, RIGHT: Primary | ICD-10-CM

## 2021-07-23 PROCEDURE — 97140 MANUAL THERAPY 1/> REGIONS: CPT | Mod: PO

## 2021-07-23 PROCEDURE — 97110 THERAPEUTIC EXERCISES: CPT | Mod: PO

## 2021-07-26 ENCOUNTER — CLINICAL SUPPORT (OUTPATIENT)
Dept: REHABILITATION | Facility: HOSPITAL | Age: 81
End: 2021-07-26
Payer: MEDICARE

## 2021-07-26 DIAGNOSIS — M25.661 KNEE STIFFNESS, RIGHT: Primary | ICD-10-CM

## 2021-07-26 PROCEDURE — 97140 MANUAL THERAPY 1/> REGIONS: CPT | Mod: PO,CQ

## 2021-07-26 PROCEDURE — 97110 THERAPEUTIC EXERCISES: CPT | Mod: PO,CQ

## 2021-07-27 ENCOUNTER — HOSPITAL ENCOUNTER (OUTPATIENT)
Dept: RADIOLOGY | Facility: HOSPITAL | Age: 81
Discharge: HOME | End: 2021-07-27
Attending: ORTHOPAEDIC SURGERY
Payer: MEDICARE

## 2021-07-27 ENCOUNTER — OFFICE VISIT (OUTPATIENT)
Dept: ORTHOPEDICS | Facility: CLINIC | Age: 81
End: 2021-07-27
Payer: MEDICARE

## 2021-07-27 VITALS — WEIGHT: 142 LBS | BODY MASS INDEX: 21.52 KG/M2 | HEIGHT: 68 IN

## 2021-07-27 DIAGNOSIS — Z96.651 STATUS POST RIGHT KNEE REPLACEMENT: Primary | ICD-10-CM

## 2021-07-27 DIAGNOSIS — Z96.651 STATUS POST RIGHT KNEE REPLACEMENT: ICD-10-CM

## 2021-07-27 PROCEDURE — 99999 PR PBB SHADOW E&M-EST. PATIENT-LVL III: CPT | Mod: PBBFAC,,, | Performed by: ORTHOPAEDIC SURGERY

## 2021-07-27 PROCEDURE — 1101F PR PT FALLS ASSESS DOC 0-1 FALLS W/OUT INJ PAST YR: ICD-10-PCS | Mod: CPTII,S$GLB,, | Performed by: ORTHOPAEDIC SURGERY

## 2021-07-27 PROCEDURE — 73562 XR KNEE 3 VIEW RIGHT: ICD-10-PCS | Mod: 26,RT,, | Performed by: RADIOLOGY

## 2021-07-27 PROCEDURE — 73562 X-RAY EXAM OF KNEE 3: CPT | Mod: TC,RT

## 2021-07-27 PROCEDURE — 99024 POSTOP FOLLOW-UP VISIT: CPT | Mod: S$GLB,,, | Performed by: ORTHOPAEDIC SURGERY

## 2021-07-27 PROCEDURE — 1101F PT FALLS ASSESS-DOCD LE1/YR: CPT | Mod: CPTII,S$GLB,, | Performed by: ORTHOPAEDIC SURGERY

## 2021-07-27 PROCEDURE — 3288F FALL RISK ASSESSMENT DOCD: CPT | Mod: CPTII,S$GLB,, | Performed by: ORTHOPAEDIC SURGERY

## 2021-07-27 PROCEDURE — 3288F PR FALLS RISK ASSESSMENT DOCUMENTED: ICD-10-PCS | Mod: CPTII,S$GLB,, | Performed by: ORTHOPAEDIC SURGERY

## 2021-07-27 PROCEDURE — 1157F ADVNC CARE PLAN IN RCRD: CPT | Mod: CPTII,S$GLB,, | Performed by: ORTHOPAEDIC SURGERY

## 2021-07-27 PROCEDURE — 99999 PR PBB SHADOW E&M-EST. PATIENT-LVL III: ICD-10-PCS | Mod: PBBFAC,,, | Performed by: ORTHOPAEDIC SURGERY

## 2021-07-27 PROCEDURE — 1126F PR PAIN SEVERITY QUANTIFIED, NO PAIN PRESENT: ICD-10-PCS | Mod: CPTII,S$GLB,, | Performed by: ORTHOPAEDIC SURGERY

## 2021-07-27 PROCEDURE — 73562 X-RAY EXAM OF KNEE 3: CPT | Mod: 26,RT,, | Performed by: RADIOLOGY

## 2021-07-27 PROCEDURE — 1159F PR MEDICATION LIST DOCUMENTED IN MEDICAL RECORD: ICD-10-PCS | Mod: CPTII,S$GLB,, | Performed by: ORTHOPAEDIC SURGERY

## 2021-07-27 PROCEDURE — 1126F AMNT PAIN NOTED NONE PRSNT: CPT | Mod: CPTII,S$GLB,, | Performed by: ORTHOPAEDIC SURGERY

## 2021-07-27 PROCEDURE — 1159F MED LIST DOCD IN RCRD: CPT | Mod: CPTII,S$GLB,, | Performed by: ORTHOPAEDIC SURGERY

## 2021-07-27 PROCEDURE — 1157F PR ADVANCE CARE PLAN OR EQUIV PRESENT IN MEDICAL RECORD: ICD-10-PCS | Mod: CPTII,S$GLB,, | Performed by: ORTHOPAEDIC SURGERY

## 2021-07-27 PROCEDURE — 99024 PR POST-OP FOLLOW-UP VISIT: ICD-10-PCS | Mod: S$GLB,,, | Performed by: ORTHOPAEDIC SURGERY

## 2021-07-28 ENCOUNTER — CLINICAL SUPPORT (OUTPATIENT)
Dept: REHABILITATION | Facility: HOSPITAL | Age: 81
End: 2021-07-28
Payer: MEDICARE

## 2021-07-28 DIAGNOSIS — M25.661 KNEE STIFFNESS, RIGHT: Primary | ICD-10-CM

## 2021-07-28 PROCEDURE — 97110 THERAPEUTIC EXERCISES: CPT | Mod: PO,CQ

## 2021-08-01 ENCOUNTER — PATIENT MESSAGE (OUTPATIENT)
Dept: ADMINISTRATIVE | Facility: OTHER | Age: 81
End: 2021-08-01

## 2021-08-12 ENCOUNTER — TELEPHONE (OUTPATIENT)
Dept: UROLOGY | Facility: CLINIC | Age: 81
End: 2021-08-12

## 2021-08-12 ENCOUNTER — TELEPHONE (OUTPATIENT)
Dept: INTERNAL MEDICINE | Facility: CLINIC | Age: 81
End: 2021-08-12

## 2021-08-13 ENCOUNTER — PATIENT MESSAGE (OUTPATIENT)
Dept: INTERNAL MEDICINE | Facility: CLINIC | Age: 81
End: 2021-08-13

## 2021-08-13 DIAGNOSIS — R30.0 DYSURIA: Primary | ICD-10-CM

## 2021-08-14 ENCOUNTER — LAB VISIT (OUTPATIENT)
Dept: LAB | Facility: HOSPITAL | Age: 81
End: 2021-08-14
Attending: FAMILY MEDICINE
Payer: MEDICARE

## 2021-08-14 DIAGNOSIS — R30.0 DYSURIA: ICD-10-CM

## 2021-08-14 LAB
BACTERIA #/AREA URNS AUTO: ABNORMAL /HPF
BILIRUB UR QL STRIP: NEGATIVE
CLARITY UR REFRACT.AUTO: ABNORMAL
COLOR UR AUTO: ABNORMAL
GLUCOSE UR QL STRIP: NEGATIVE
HGB UR QL STRIP: NEGATIVE
HYALINE CASTS UR QL AUTO: 0 /LPF
KETONES UR QL STRIP: NEGATIVE
LEUKOCYTE ESTERASE UR QL STRIP: ABNORMAL
MICROSCOPIC COMMENT: ABNORMAL
NITRITE UR QL STRIP: POSITIVE
PH UR STRIP: 5 [PH] (ref 5–8)
PROT UR QL STRIP: ABNORMAL
RBC #/AREA URNS AUTO: 14 /HPF (ref 0–4)
SP GR UR STRIP: 1.02 (ref 1–1.03)
URN SPEC COLLECT METH UR: ABNORMAL
WBC #/AREA URNS AUTO: >100 /HPF (ref 0–5)
WBC CLUMPS UR QL AUTO: ABNORMAL

## 2021-08-14 PROCEDURE — 81001 URINALYSIS AUTO W/SCOPE: CPT | Performed by: FAMILY MEDICINE

## 2021-08-14 PROCEDURE — 87088 URINE BACTERIA CULTURE: CPT | Performed by: FAMILY MEDICINE

## 2021-08-14 PROCEDURE — 87186 SC STD MICRODIL/AGAR DIL: CPT | Performed by: FAMILY MEDICINE

## 2021-08-14 PROCEDURE — 87077 CULTURE AEROBIC IDENTIFY: CPT | Performed by: FAMILY MEDICINE

## 2021-08-14 PROCEDURE — 87086 URINE CULTURE/COLONY COUNT: CPT | Performed by: FAMILY MEDICINE

## 2021-08-16 ENCOUNTER — TELEPHONE (OUTPATIENT)
Dept: INTERNAL MEDICINE | Facility: CLINIC | Age: 81
End: 2021-08-16

## 2021-08-16 LAB — BACTERIA UR CULT: ABNORMAL

## 2021-08-16 RX ORDER — CIPROFLOXACIN 500 MG/1
500 TABLET ORAL 2 TIMES DAILY
Qty: 14 TABLET | Refills: 0 | Status: SHIPPED | OUTPATIENT
Start: 2021-08-16 | End: 2022-02-22

## 2021-08-17 ENCOUNTER — PES CALL (OUTPATIENT)
Dept: ADMINISTRATIVE | Facility: CLINIC | Age: 81
End: 2021-08-17

## 2021-08-23 ENCOUNTER — TELEPHONE (OUTPATIENT)
Dept: OPHTHALMOLOGY | Facility: CLINIC | Age: 81
End: 2021-08-23

## 2021-08-27 ENCOUNTER — OFFICE VISIT (OUTPATIENT)
Dept: OPHTHALMOLOGY | Facility: CLINIC | Age: 81
End: 2021-08-27
Attending: OPHTHALMOLOGY
Payer: MEDICARE

## 2021-08-27 DIAGNOSIS — H26.493 POSTERIOR CAPSULAR OPACIFICATION, BILATERAL: Primary | ICD-10-CM

## 2021-08-27 PROCEDURE — 1126F PR PAIN SEVERITY QUANTIFIED, NO PAIN PRESENT: ICD-10-PCS | Mod: CPTII,S$GLB,, | Performed by: OPHTHALMOLOGY

## 2021-08-27 PROCEDURE — 1159F MED LIST DOCD IN RCRD: CPT | Mod: CPTII,S$GLB,, | Performed by: OPHTHALMOLOGY

## 2021-08-27 PROCEDURE — 99999 PR PBB SHADOW E&M-EST. PATIENT-LVL III: CPT | Mod: PBBFAC,,, | Performed by: OPHTHALMOLOGY

## 2021-08-27 PROCEDURE — 92012 INTRM OPH EXAM EST PATIENT: CPT | Mod: 57,S$GLB,, | Performed by: OPHTHALMOLOGY

## 2021-08-27 PROCEDURE — 1157F PR ADVANCE CARE PLAN OR EQUIV PRESENT IN MEDICAL RECORD: ICD-10-PCS | Mod: CPTII,S$GLB,, | Performed by: OPHTHALMOLOGY

## 2021-08-27 PROCEDURE — 1126F AMNT PAIN NOTED NONE PRSNT: CPT | Mod: CPTII,S$GLB,, | Performed by: OPHTHALMOLOGY

## 2021-08-27 PROCEDURE — 92012 PR EYE EXAM, EST PATIENT,INTERMED: ICD-10-PCS | Mod: 57,S$GLB,, | Performed by: OPHTHALMOLOGY

## 2021-08-27 PROCEDURE — 99999 PR PBB SHADOW E&M-EST. PATIENT-LVL III: ICD-10-PCS | Mod: PBBFAC,,, | Performed by: OPHTHALMOLOGY

## 2021-08-27 PROCEDURE — 66821 PR DISCISSION,2ND CATARACT,LASER: ICD-10-PCS | Mod: 50,S$GLB,, | Performed by: OPHTHALMOLOGY

## 2021-08-27 PROCEDURE — 66821 AFTER CATARACT LASER SURGERY: CPT | Mod: 50,S$GLB,, | Performed by: OPHTHALMOLOGY

## 2021-08-27 PROCEDURE — 1157F ADVNC CARE PLAN IN RCRD: CPT | Mod: CPTII,S$GLB,, | Performed by: OPHTHALMOLOGY

## 2021-08-27 PROCEDURE — 1159F PR MEDICATION LIST DOCUMENTED IN MEDICAL RECORD: ICD-10-PCS | Mod: CPTII,S$GLB,, | Performed by: OPHTHALMOLOGY

## 2021-09-02 ENCOUNTER — PATIENT MESSAGE (OUTPATIENT)
Dept: ORTHOPEDICS | Facility: CLINIC | Age: 81
End: 2021-09-02

## 2021-09-02 ENCOUNTER — PATIENT MESSAGE (OUTPATIENT)
Dept: ADMINISTRATIVE | Facility: OTHER | Age: 81
End: 2021-09-02

## 2021-09-03 ENCOUNTER — TELEPHONE (OUTPATIENT)
Dept: ORTHOPEDICS | Facility: CLINIC | Age: 81
End: 2021-09-03

## 2021-09-07 ENCOUNTER — TELEPHONE (OUTPATIENT)
Dept: ORTHOPEDICS | Facility: CLINIC | Age: 81
End: 2021-09-07

## 2021-09-10 ENCOUNTER — PATIENT MESSAGE (OUTPATIENT)
Dept: ORTHOPEDICS | Facility: CLINIC | Age: 81
End: 2021-09-10

## 2021-09-14 ENCOUNTER — PATIENT MESSAGE (OUTPATIENT)
Dept: ADMINISTRATIVE | Facility: OTHER | Age: 81
End: 2021-09-14

## 2021-09-30 ENCOUNTER — OFFICE VISIT (OUTPATIENT)
Dept: ORTHOPEDICS | Facility: CLINIC | Age: 81
End: 2021-09-30
Payer: MEDICARE

## 2021-09-30 VITALS — WEIGHT: 142 LBS | BODY MASS INDEX: 21.52 KG/M2 | HEIGHT: 68 IN

## 2021-09-30 DIAGNOSIS — Z96.651 STATUS POST RIGHT KNEE REPLACEMENT: Primary | ICD-10-CM

## 2021-09-30 PROCEDURE — 99999 PR PBB SHADOW E&M-EST. PATIENT-LVL II: ICD-10-PCS | Mod: PBBFAC,HCNC,, | Performed by: ORTHOPAEDIC SURGERY

## 2021-09-30 PROCEDURE — 99024 PR POST-OP FOLLOW-UP VISIT: ICD-10-PCS | Mod: HCNC,S$GLB,, | Performed by: ORTHOPAEDIC SURGERY

## 2021-09-30 PROCEDURE — 3288F PR FALLS RISK ASSESSMENT DOCUMENTED: ICD-10-PCS | Mod: HCNC,CPTII,S$GLB, | Performed by: ORTHOPAEDIC SURGERY

## 2021-09-30 PROCEDURE — 1101F PT FALLS ASSESS-DOCD LE1/YR: CPT | Mod: HCNC,CPTII,S$GLB, | Performed by: ORTHOPAEDIC SURGERY

## 2021-09-30 PROCEDURE — 1125F AMNT PAIN NOTED PAIN PRSNT: CPT | Mod: HCNC,CPTII,S$GLB, | Performed by: ORTHOPAEDIC SURGERY

## 2021-09-30 PROCEDURE — 99999 PR PBB SHADOW E&M-EST. PATIENT-LVL II: CPT | Mod: PBBFAC,HCNC,, | Performed by: ORTHOPAEDIC SURGERY

## 2021-09-30 PROCEDURE — 1125F PR PAIN SEVERITY QUANTIFIED, PAIN PRESENT: ICD-10-PCS | Mod: HCNC,CPTII,S$GLB, | Performed by: ORTHOPAEDIC SURGERY

## 2021-09-30 PROCEDURE — 3288F FALL RISK ASSESSMENT DOCD: CPT | Mod: HCNC,CPTII,S$GLB, | Performed by: ORTHOPAEDIC SURGERY

## 2021-09-30 PROCEDURE — 1157F PR ADVANCE CARE PLAN OR EQUIV PRESENT IN MEDICAL RECORD: ICD-10-PCS | Mod: HCNC,CPTII,S$GLB, | Performed by: ORTHOPAEDIC SURGERY

## 2021-09-30 PROCEDURE — 1157F ADVNC CARE PLAN IN RCRD: CPT | Mod: HCNC,CPTII,S$GLB, | Performed by: ORTHOPAEDIC SURGERY

## 2021-09-30 PROCEDURE — 1101F PR PT FALLS ASSESS DOC 0-1 FALLS W/OUT INJ PAST YR: ICD-10-PCS | Mod: HCNC,CPTII,S$GLB, | Performed by: ORTHOPAEDIC SURGERY

## 2021-09-30 PROCEDURE — 99024 POSTOP FOLLOW-UP VISIT: CPT | Mod: HCNC,S$GLB,, | Performed by: ORTHOPAEDIC SURGERY

## 2021-10-06 ENCOUNTER — OFFICE VISIT (OUTPATIENT)
Dept: UROLOGY | Facility: CLINIC | Age: 81
End: 2021-10-06
Payer: MEDICARE

## 2021-10-06 VITALS
HEART RATE: 73 BPM | DIASTOLIC BLOOD PRESSURE: 78 MMHG | HEIGHT: 68 IN | SYSTOLIC BLOOD PRESSURE: 119 MMHG | BODY MASS INDEX: 21.75 KG/M2 | WEIGHT: 143.5 LBS

## 2021-10-06 DIAGNOSIS — N39.0 RECURRENT UTI: Primary | ICD-10-CM

## 2021-10-06 PROCEDURE — 99499 RISK ADDL DX/OHS AUDIT: ICD-10-PCS | Mod: S$PBB,,, | Performed by: UROLOGY

## 2021-10-06 PROCEDURE — 3078F DIAST BP <80 MM HG: CPT | Mod: HCNC,CPTII,S$GLB, | Performed by: UROLOGY

## 2021-10-06 PROCEDURE — 1126F PR PAIN SEVERITY QUANTIFIED, NO PAIN PRESENT: ICD-10-PCS | Mod: HCNC,CPTII,S$GLB, | Performed by: UROLOGY

## 2021-10-06 PROCEDURE — 3074F PR MOST RECENT SYSTOLIC BLOOD PRESSURE < 130 MM HG: ICD-10-PCS | Mod: HCNC,CPTII,S$GLB, | Performed by: UROLOGY

## 2021-10-06 PROCEDURE — 1126F AMNT PAIN NOTED NONE PRSNT: CPT | Mod: HCNC,CPTII,S$GLB, | Performed by: UROLOGY

## 2021-10-06 PROCEDURE — 3288F PR FALLS RISK ASSESSMENT DOCUMENTED: ICD-10-PCS | Mod: HCNC,CPTII,S$GLB, | Performed by: UROLOGY

## 2021-10-06 PROCEDURE — 1157F ADVNC CARE PLAN IN RCRD: CPT | Mod: HCNC,CPTII,S$GLB, | Performed by: UROLOGY

## 2021-10-06 PROCEDURE — 3288F FALL RISK ASSESSMENT DOCD: CPT | Mod: HCNC,CPTII,S$GLB, | Performed by: UROLOGY

## 2021-10-06 PROCEDURE — 1101F PT FALLS ASSESS-DOCD LE1/YR: CPT | Mod: HCNC,CPTII,S$GLB, | Performed by: UROLOGY

## 2021-10-06 PROCEDURE — 87186 SC STD MICRODIL/AGAR DIL: CPT | Mod: HCNC | Performed by: UROLOGY

## 2021-10-06 PROCEDURE — 99499 UNLISTED E&M SERVICE: CPT | Mod: S$PBB,,, | Performed by: UROLOGY

## 2021-10-06 PROCEDURE — 1160F RVW MEDS BY RX/DR IN RCRD: CPT | Mod: HCNC,CPTII,S$GLB, | Performed by: UROLOGY

## 2021-10-06 PROCEDURE — 1159F PR MEDICATION LIST DOCUMENTED IN MEDICAL RECORD: ICD-10-PCS | Mod: HCNC,CPTII,S$GLB, | Performed by: UROLOGY

## 2021-10-06 PROCEDURE — 1160F PR REVIEW ALL MEDS BY PRESCRIBER/CLIN PHARMACIST DOCUMENTED: ICD-10-PCS | Mod: HCNC,CPTII,S$GLB, | Performed by: UROLOGY

## 2021-10-06 PROCEDURE — 87088 URINE BACTERIA CULTURE: CPT | Mod: HCNC | Performed by: UROLOGY

## 2021-10-06 PROCEDURE — 1157F PR ADVANCE CARE PLAN OR EQUIV PRESENT IN MEDICAL RECORD: ICD-10-PCS | Mod: HCNC,CPTII,S$GLB, | Performed by: UROLOGY

## 2021-10-06 PROCEDURE — 87086 URINE CULTURE/COLONY COUNT: CPT | Mod: HCNC | Performed by: UROLOGY

## 2021-10-06 PROCEDURE — 87077 CULTURE AEROBIC IDENTIFY: CPT | Mod: HCNC | Performed by: UROLOGY

## 2021-10-06 PROCEDURE — 99999 PR PBB SHADOW E&M-EST. PATIENT-LVL IV: CPT | Mod: PBBFAC,HCNC,, | Performed by: UROLOGY

## 2021-10-06 PROCEDURE — 1101F PR PT FALLS ASSESS DOC 0-1 FALLS W/OUT INJ PAST YR: ICD-10-PCS | Mod: HCNC,CPTII,S$GLB, | Performed by: UROLOGY

## 2021-10-06 PROCEDURE — 99213 OFFICE O/P EST LOW 20 MIN: CPT | Mod: HCNC,S$GLB,, | Performed by: UROLOGY

## 2021-10-06 PROCEDURE — 3078F PR MOST RECENT DIASTOLIC BLOOD PRESSURE < 80 MM HG: ICD-10-PCS | Mod: HCNC,CPTII,S$GLB, | Performed by: UROLOGY

## 2021-10-06 PROCEDURE — 3074F SYST BP LT 130 MM HG: CPT | Mod: HCNC,CPTII,S$GLB, | Performed by: UROLOGY

## 2021-10-06 PROCEDURE — 99999 PR PBB SHADOW E&M-EST. PATIENT-LVL IV: ICD-10-PCS | Mod: PBBFAC,HCNC,, | Performed by: UROLOGY

## 2021-10-06 PROCEDURE — 99213 PR OFFICE/OUTPT VISIT, EST, LEVL III, 20-29 MIN: ICD-10-PCS | Mod: HCNC,S$GLB,, | Performed by: UROLOGY

## 2021-10-06 PROCEDURE — 1159F MED LIST DOCD IN RCRD: CPT | Mod: HCNC,CPTII,S$GLB, | Performed by: UROLOGY

## 2021-10-06 RX ORDER — GINSENG 100 MG
200 CAPSULE ORAL DAILY
Qty: 30 CAPSULE | Refills: 12 | Status: SHIPPED | OUTPATIENT
Start: 2021-10-06

## 2021-10-09 LAB — BACTERIA UR CULT: ABNORMAL

## 2021-10-11 ENCOUNTER — TELEPHONE (OUTPATIENT)
Dept: UROLOGY | Facility: CLINIC | Age: 81
End: 2021-10-11

## 2021-10-11 RX ORDER — DOXYCYCLINE 100 MG/1
100 CAPSULE ORAL 2 TIMES DAILY
Qty: 20 CAPSULE | Refills: 0 | Status: SHIPPED | OUTPATIENT
Start: 2021-10-11 | End: 2021-10-21

## 2021-10-13 ENCOUNTER — HOSPITAL ENCOUNTER (OUTPATIENT)
Dept: RADIOLOGY | Facility: HOSPITAL | Age: 81
Discharge: HOME | End: 2021-10-13
Attending: UROLOGY
Payer: MEDICARE

## 2021-10-13 DIAGNOSIS — N39.0 RECURRENT UTI: ICD-10-CM

## 2021-10-13 PROCEDURE — 76770 US EXAM ABDO BACK WALL COMP: CPT | Mod: TC,HCNC

## 2021-10-13 PROCEDURE — 76770 US EXAM ABDO BACK WALL COMP: CPT | Mod: 26,HCNC,, | Performed by: RADIOLOGY

## 2021-10-13 PROCEDURE — 76770 US RETROPERITONEAL COMPLETE: ICD-10-PCS | Mod: 26,HCNC,, | Performed by: RADIOLOGY

## 2021-10-14 ENCOUNTER — PATIENT MESSAGE (OUTPATIENT)
Dept: UROLOGY | Facility: CLINIC | Age: 81
End: 2021-10-14
Payer: MEDICARE

## 2021-10-14 ENCOUNTER — PATIENT MESSAGE (OUTPATIENT)
Dept: UROLOGY | Facility: CLINIC | Age: 81
End: 2021-10-14

## 2021-10-19 ENCOUNTER — TELEPHONE (OUTPATIENT)
Dept: OPHTHALMOLOGY | Facility: CLINIC | Age: 81
End: 2021-10-19

## 2021-10-26 ENCOUNTER — TELEPHONE (OUTPATIENT)
Dept: UROLOGY | Facility: CLINIC | Age: 81
End: 2021-10-26
Payer: MEDICARE

## 2021-10-26 ENCOUNTER — PATIENT MESSAGE (OUTPATIENT)
Dept: INTERNAL MEDICINE | Facility: CLINIC | Age: 81
End: 2021-10-26
Payer: MEDICARE

## 2021-10-27 ENCOUNTER — LAB VISIT (OUTPATIENT)
Dept: LAB | Facility: HOSPITAL | Age: 81
End: 2021-10-27
Attending: UROLOGY
Payer: MEDICARE

## 2021-10-27 ENCOUNTER — PROCEDURE VISIT (OUTPATIENT)
Dept: UROLOGY | Facility: CLINIC | Age: 81
End: 2021-10-27
Payer: MEDICARE

## 2021-10-27 VITALS
DIASTOLIC BLOOD PRESSURE: 75 MMHG | TEMPERATURE: 98 F | HEIGHT: 68 IN | BODY MASS INDEX: 21.2 KG/M2 | HEART RATE: 73 BPM | SYSTOLIC BLOOD PRESSURE: 144 MMHG | RESPIRATION RATE: 16 BRPM | WEIGHT: 139.88 LBS

## 2021-10-27 DIAGNOSIS — N39.0 RECURRENT UTI: ICD-10-CM

## 2021-10-27 PROCEDURE — 52000 CYSTOURETHROSCOPY: CPT | Mod: HCNC,S$GLB,, | Performed by: UROLOGY

## 2021-10-27 PROCEDURE — 87086 URINE CULTURE/COLONY COUNT: CPT | Mod: HCNC | Performed by: UROLOGY

## 2021-10-27 PROCEDURE — 52000 CYSTOSCOPY: ICD-10-PCS | Mod: HCNC,S$GLB,, | Performed by: UROLOGY

## 2021-10-27 RX ORDER — DOXYCYCLINE 100 MG/1
100 CAPSULE ORAL 2 TIMES DAILY
Qty: 14 CAPSULE | Refills: 0 | Status: SHIPPED | OUTPATIENT
Start: 2021-10-27 | End: 2021-11-03

## 2021-10-27 RX ORDER — DOXYCYCLINE HYCLATE 100 MG
100 TABLET ORAL
Status: COMPLETED | OUTPATIENT
Start: 2021-10-27 | End: 2021-10-27

## 2021-10-27 RX ORDER — LIDOCAINE HYDROCHLORIDE 20 MG/ML
JELLY TOPICAL
Status: COMPLETED | OUTPATIENT
Start: 2021-10-27 | End: 2021-10-27

## 2021-10-27 RX ADMIN — LIDOCAINE HYDROCHLORIDE: 20 JELLY TOPICAL at 10:10

## 2021-10-27 RX ADMIN — Medication 100 MG: at 10:10

## 2021-10-29 LAB — BACTERIA UR CULT: NO GROWTH

## 2021-11-03 ENCOUNTER — IMMUNIZATION (OUTPATIENT)
Dept: INTERNAL MEDICINE | Facility: CLINIC | Age: 81
End: 2021-11-03
Payer: MEDICARE

## 2021-11-03 ENCOUNTER — OFFICE VISIT (OUTPATIENT)
Dept: OPHTHALMOLOGY | Facility: CLINIC | Age: 81
End: 2021-11-03
Payer: MEDICARE

## 2021-11-03 ENCOUNTER — TELEPHONE (OUTPATIENT)
Dept: OPHTHALMOLOGY | Facility: CLINIC | Age: 81
End: 2021-11-03
Payer: MEDICARE

## 2021-11-03 DIAGNOSIS — H52.221 REGULAR ASTIGMATISM OF RIGHT EYE: Primary | ICD-10-CM

## 2021-11-03 DIAGNOSIS — Z23 NEED FOR VACCINATION: Primary | ICD-10-CM

## 2021-11-03 PROCEDURE — 1157F ADVNC CARE PLAN IN RCRD: CPT | Mod: HCNC,CPTII,S$GLB, | Performed by: OPHTHALMOLOGY

## 2021-11-03 PROCEDURE — 1126F AMNT PAIN NOTED NONE PRSNT: CPT | Mod: HCNC,CPTII,S$GLB, | Performed by: OPHTHALMOLOGY

## 2021-11-03 PROCEDURE — 1157F PR ADVANCE CARE PLAN OR EQUIV PRESENT IN MEDICAL RECORD: ICD-10-PCS | Mod: HCNC,CPTII,S$GLB, | Performed by: OPHTHALMOLOGY

## 2021-11-03 PROCEDURE — 99024 POSTOP FOLLOW-UP VISIT: CPT | Mod: HCNC,S$GLB,, | Performed by: OPHTHALMOLOGY

## 2021-11-03 PROCEDURE — 1159F MED LIST DOCD IN RCRD: CPT | Mod: HCNC,CPTII,S$GLB, | Performed by: OPHTHALMOLOGY

## 2021-11-03 PROCEDURE — 1160F RVW MEDS BY RX/DR IN RCRD: CPT | Mod: HCNC,CPTII,S$GLB, | Performed by: OPHTHALMOLOGY

## 2021-11-03 PROCEDURE — 99999 PR PBB SHADOW E&M-EST. PATIENT-LVL III: ICD-10-PCS | Mod: PBBFAC,HCNC,, | Performed by: OPHTHALMOLOGY

## 2021-11-03 PROCEDURE — 0064A COVID-19, MRNA, LNP-S, PF, 100 MCG/0.25 ML DOSE VACCINE (MODERNA BOOSTER): CPT | Mod: HCNC,CV19,PBBFAC | Performed by: INTERNAL MEDICINE

## 2021-11-03 PROCEDURE — 1160F PR REVIEW ALL MEDS BY PRESCRIBER/CLIN PHARMACIST DOCUMENTED: ICD-10-PCS | Mod: HCNC,CPTII,S$GLB, | Performed by: OPHTHALMOLOGY

## 2021-11-03 PROCEDURE — 3288F PR FALLS RISK ASSESSMENT DOCUMENTED: ICD-10-PCS | Mod: HCNC,CPTII,S$GLB, | Performed by: OPHTHALMOLOGY

## 2021-11-03 PROCEDURE — 99999 PR PBB SHADOW E&M-EST. PATIENT-LVL III: CPT | Mod: PBBFAC,HCNC,, | Performed by: OPHTHALMOLOGY

## 2021-11-03 PROCEDURE — 1101F PR PT FALLS ASSESS DOC 0-1 FALLS W/OUT INJ PAST YR: ICD-10-PCS | Mod: HCNC,CPTII,S$GLB, | Performed by: OPHTHALMOLOGY

## 2021-11-03 PROCEDURE — 1159F PR MEDICATION LIST DOCUMENTED IN MEDICAL RECORD: ICD-10-PCS | Mod: HCNC,CPTII,S$GLB, | Performed by: OPHTHALMOLOGY

## 2021-11-03 PROCEDURE — 99024 PR POST-OP FOLLOW-UP VISIT: ICD-10-PCS | Mod: HCNC,S$GLB,, | Performed by: OPHTHALMOLOGY

## 2021-11-03 PROCEDURE — 3288F FALL RISK ASSESSMENT DOCD: CPT | Mod: HCNC,CPTII,S$GLB, | Performed by: OPHTHALMOLOGY

## 2021-11-03 PROCEDURE — 1126F PR PAIN SEVERITY QUANTIFIED, NO PAIN PRESENT: ICD-10-PCS | Mod: HCNC,CPTII,S$GLB, | Performed by: OPHTHALMOLOGY

## 2021-11-03 PROCEDURE — 1101F PT FALLS ASSESS-DOCD LE1/YR: CPT | Mod: HCNC,CPTII,S$GLB, | Performed by: OPHTHALMOLOGY

## 2021-12-10 ENCOUNTER — TELEPHONE (OUTPATIENT)
Dept: INTERNAL MEDICINE | Facility: CLINIC | Age: 81
End: 2021-12-10
Payer: MEDICARE

## 2021-12-13 ENCOUNTER — TELEPHONE (OUTPATIENT)
Dept: INTERNAL MEDICINE | Facility: CLINIC | Age: 81
End: 2021-12-13

## 2021-12-13 NOTE — TELEPHONE ENCOUNTER
----- Message from Brenda Vuong sent at 12/13/2021 11:10 AM CST -----  Contact: Self [phone]  Patient is returning a phone call.    Who left a message for the patient: nurse     Does patient know what this is regarding:  Medication refill on bisoprolol-hydrochlorothiazide 2.5-6.25 mg (ZIAC) 2.5-6.25 mg Tab. Provider will need to sign patient assistance form ELIQUIS 5 mg Tab.    Would you like a call back, or a response through your [facility] portal?: portal    Comments:  Pt also states the patient assistance form has been mailed to the office.

## 2021-12-16 RX ORDER — BISOPROLOL FUMARATE AND HYDROCHLOROTHIAZIDE 2.5; 6.25 MG/1; MG/1
TABLET ORAL
Qty: 45 TABLET | Refills: 3 | Status: SHIPPED | OUTPATIENT
Start: 2021-12-16 | End: 2022-10-04

## 2021-12-16 RX ORDER — LEVOTHYROXINE SODIUM 88 UG/1
88 TABLET ORAL
Qty: 90 TABLET | Refills: 3 | Status: SHIPPED | OUTPATIENT
Start: 2021-12-16 | End: 2022-10-03

## 2021-12-16 RX ORDER — BISOPROLOL FUMARATE AND HYDROCHLOROTHIAZIDE 2.5; 6.25 MG/1; MG/1
0.5 TABLET ORAL DAILY
Qty: 45 TABLET | Refills: 3 | OUTPATIENT
Start: 2021-12-16

## 2021-12-19 ENCOUNTER — PATIENT MESSAGE (OUTPATIENT)
Dept: ADMINISTRATIVE | Facility: OTHER | Age: 81
End: 2021-12-19
Payer: MEDICARE

## 2021-12-20 ENCOUNTER — TELEPHONE (OUTPATIENT)
Dept: OPHTHALMOLOGY | Facility: CLINIC | Age: 81
End: 2021-12-20
Payer: MEDICARE

## 2021-12-22 ENCOUNTER — TELEPHONE (OUTPATIENT)
Dept: OPHTHALMOLOGY | Facility: CLINIC | Age: 81
End: 2021-12-22
Payer: MEDICARE

## 2021-12-27 NOTE — PROGRESS NOTES
Occupational Therapy  Visit Type: treatment  Precautions:  Medical precautions:  fall risk; standard precautions.    Lines:     Basic: IV, NG and telemetry      Lines in chart and on patient reviewed, precautions maintained throughout session.    SUBJECTIVE  Patient agreed to participate in therapy this date.  \"I am wondering where my niece is?\"  Patient / Family Goal: maximize function    Pain   RN informed on pain level    OBJECTIVE   Level of consciousness: alert    Oriented to person, place, time and situation     Arousal alertness: appropriate responses to stimuli    Affect/Behavior: alert and pleasant  Patient activity tolerance: 1 to 1 activity to rest  Functional Communication/Cognition    Overall status:  Within functional limits    Form of communication:  Verbal   Attention span:  Appears intact    Commands: follows all commands and directions consistently.    Transition between tasks: transitions tasks without difficulty.    Safety judgement: good awareness of safety precautions.    Awareness of deficits: fully aware of deficits.    Transfers:    Assistive devices: gait belt, 2-wheeled walker and 1 person    Sit to stand: stand by assist    Stand to sit: stand by assist    Training completed:    Tasks: sit to stand and stand to sit  Activities of Daily Living (ADLs):  Lower Body Dressing:     Footwear assistance: contact guard/touching/steadying assist    Footwear position: chair    Assist needed for: don/doff right sock and don/doff left sock    Lower body dressing equipment: reacher and sock aide recommended. Pt declines to want to use at this time.             ASSESSMENT    Impairments: bed mobility, pain and strength  Functional Limitations: bed mobility, functional mobility, grooming, bathing, toileting, functional transfers, IADLs, showering, dressing and participating in meaningful/purposeful activities    89 year old female patient seen on  nursing unit. Today's treatment focused on activity  Subjective:       Patient ID: Madan Bell is a 78 y.o. female.    Chief Complaint: Follow-up (recurretn UTI's)    Madan Bell is a 77 y.o. female  by  s/p laparoscopic hysterectomy and BSO  with HTN, paroxysmal V tach, s/p thyroidectomy for multinodular goiter 2013, chronic low back pain, who presents for follow up of recurrent UTI.    She is on vaginal estrogen since  twice a week.      Klebsiella  3/17 E. Coli   Klebsiella   Klebsiella.    E.coli    , 10/17,  culture negative.      Last UTI was e.coli, MDR to cipro, bactrim, amp, gent and doxy. She is on Align in February.   Cysto  showed no abnormalities  CT RSS  showed a punctate right renal stone. No other stones. No hydro. Films personally reviewed.   Ultrasound 17 showed stable 3mm right renal stone.     Nocturia x 1.  She does drink a lot of water.   She states that she empties well.  No obstructive symptoms.    Patient endorses leaking urine with cough, laugh, sneeze, and activity.  She notes occasional smaller volume leakage with urgency, but this is rare.  2 liners a day (previous 6-7 times a day).   No urge incontinence.   She denies constipation.     She is on Align probiotic daily.  Drinking cranberry juice.   No D-mannose.   Eats yogurt daily 4 times a week.         Past Surgical History:   Procedure Laterality Date    BREAST BIOPSY Left     excisional    BREAST BIOPSY Left     core needle biopsy    BREAST CYST ASPIRATION   - ??    BREAST CYST EXCISION   - ??    CARDIOVERSION N/A 2018    [facility] CATH LAB    ECHOCARDIOGRAM,TRANSESOPHAGEAL N/A 2018    [facility] CATH LAB    HYSTERECTOMY      tahbso    OOPHORECTOMY  hysterectomy -  - ??    THYROID SURGERY      THYROIDECTOMY Bilateral 2013    Performed by Connor Santos MD at [facility] OR [address] [place] [address]       Past Medical History:    Diagnosis Date    Anticoagulant long-term use     Anxiety     Arrhythmia     Arthritis     knees    Breast cyst     Fibrocystic breast 1980 - ??    Heart murmur     Hyperlipidemia     Hypertension     Mitral valve disorder     Mitral valve prolapse     PVC's (premature ventricular contractions)     Squamous cell carcinoma bx 7-2017    right upper forehead    Thyroid cancer 1/29/2013    Thyroid disease     Vasovagal near syncope 11/12/2012    VTE (venous thromboembolism)     in 1960s, post partum, pt unsure of details        Social History     Socioeconomic History    Marital status:      Spouse name: Not on file    Number of children: Not on file    Years of education: Not on file    Highest education level: Not on file   Social Needs    Financial resource strain: Not on file    Food insecurity - worry: Not on file    Food insecurity - inability: Not on file    Transportation needs - medical: Not on file    Transportation needs - non-medical: Not on file   Occupational History    Not on file   Tobacco Use    Smoking status: Never Smoker    Smokeless tobacco: Never Used   Substance and Sexual Activity    Alcohol use: No    Drug use: No    Sexual activity: Not on file   Other Topics Concern    Are you pregnant or think you may be? Not Asked    Breast-feeding Not Asked   Social History Narrative    Not on file       Family History   Problem Relation Age of Onset    Arrhythmia Son     Mitral valve prolapse Son     Sudden death Son         sudden cardiac death    Heart disease Mother     Hyperlipidemia Maternal Grandmother     Breast cancer Maternal Uncle     No Known Problems Daughter     No Known Problems Son     No Known Problems Son     Melanoma Neg Hx        Current Outpatient Medications   Medication Sig Dispense Refill    ascorbic acid (VITAMIN C) 500 MG tablet Take 500 mg by mouth once daily.      aspirin 81 mg Tab Take 1 tablet by mouth every Mon, Wed, Fri.  progression, mobility progression, ADL re-training, activity tolerance training, safety awareness training, patient education and energy conservation technique training. The patient is demonstrating good progress as evidenced by today's function noted in objective section.     Collaborated with PRANAV Mane regarding patient's current pain level.          Discharge Recommendations  Recommendations for Discharge: OT WI: 24 Hour assist,Assisted living,Home therapy            PT/OT Mobility Equipment for Discharge: Pt owns walker           Skilled therapy is required to address these limitations in attempt to maximize the patient's independence.  Progress: progressing toward goals    End of Session:   Location: in chair  Safety measures: call light within reach  Handoff to: nurse  Education Provided:   Learning assessment:  - Primary learner: patient  - Are they ready to learn: yes  - Preferred learning style: verbal  - Barriers to learning: no barriers apparent at this time  Education provided during session:  - Receiving education: patient  -      - Results of above outlined education: Verbalizes understanding and Needs reinforcement    PLAN  Suggestions for next session as indicated: Bathing, grooming sink side sitting, Toilet transfer if not connected to suction.    OT Frequency: 5 days/week     Interventions: ADL retraining, activity tolerance training, compensatory technique education, functional transfer training, safety training, therapeutic activity, positioning, therapeutic exercise and patient education      GOALS  Long Term Goals: (to be met by time of discharge from hospital)  Grooming: Patient will complete grooming tasks in standing modified independent.  Lower body dressing: Patient will complete lower body dressing in sitting and in standing modified independent.  Toileting: Patient will complete toileting modified independent.  Bathing: Patient will complete bathing standing at sink and sitting at        atorvastatin (LIPITOR) 40 MG tablet TAKE 1 TABLET EVERY DAY 90 tablet 3    Bifidobacterium infantis (ALIGN) 4 mg Cap One daily (Patient taking differently: Take one capsule by mouth once daily.Take additional dose while on antibiotics) 30 capsule 6    bisoprolol-hydrochlorothiazide 2.5-6.25 mg (ZIAC) 2.5-6.25 mg Tab TAKE 1/2 TABLET EVERY DAY 45 tablet 3    cholecalciferol, vitamin D3, (VITAMIN D3) 2,000 unit Cap Take by mouth once daily.        D-MANNOSE ORAL Take 6 tablets by mouth once daily.       ELIQUIS 5 mg Tab TAKE 1 TABLET TWICE DAILY 180 tablet 3    estradiol (ESTRACE) 0.01 % (0.1 mg/gram) vaginal cream Apply a pea sized amount every day for 2 weeks, then 3x/week. 90 day supply 42.5 g 4    levothyroxine (SYNTHROID) 75 MCG tablet TAKE 1 TABLET EVERY DAY (NEED MD APPOINTMENT) 90 tablet 3    omega-3 fatty acids-vitamin E (FISH OIL) 1,000 mg Cap Take 1 capsule by mouth once daily. 1400mg      pantoprazole (PROTONIX) 40 MG tablet TAKE 1 TABLET EVERY DAY 90 tablet 3    gabapentin (NEURONTIN) 100 MG capsule Sig 1 po qhs,  Can increase to taking tid as tolerated if needed 30 capsule 1    guaifenesin/dextromethorphan (ROBITUSSIN-DM ORAL) Take by mouth as needed (cough).      ketoconazole (NIZORAL) 2 % cream AAA bid x 3 weeks to foot 60 g 3    metoprolol tartrate (LOPRESSOR) 50 MG tablet Take one half to one tablet daily as needed for palpitations. 90 tablet 1    nitrofurantoin, macrocrystal-monohydrate, (MACROBID) 100 MG capsule Take 1 capsule (100 mg total) by mouth 2 (two) times daily. 14 capsule 0    valACYclovir (VALTREX) 1000 MG tablet Take 1 tablet (1,000 mg total) by mouth 3 (three) times daily. for 7 days 21 tablet 0     No current facility-administered medications for this visit.        Review of patient's allergies indicates:   Allergen Reactions    Fluoride preparations Hives    Fluoride      Other reaction(s): Hives       Review of Systems   Constitutional: Negative for chills,  sinksupervision Toilet transfer: Patient will complete toilet transfer with supervision.         Documented in the chart in the following areas: Pain. Assessment. Plan.      Therapy procedure time and total treatment time can be found documented on the Time Entry flowsheet   fever and unexpected weight change.   HENT: Negative for nosebleeds.    Eyes: Negative for visual disturbance.   Respiratory: Negative for chest tightness.    Cardiovascular: Negative for chest pain.   Gastrointestinal: Negative for diarrhea.   Musculoskeletal: Negative for myalgias.   Skin: Negative for rash.   Neurological: Negative for seizures.   Hematological: Does not bruise/bleed easily.   Psychiatric/Behavioral: Negative for behavioral problems.       Objective:      Physical Exam   Constitutional: She is oriented to person, place, and time. She appears well-developed and well-nourished.   HENT:   Head: Normocephalic and atraumatic.   Eyes: No scleral icterus.   Cardiovascular: Normal rate.    Pulmonary/Chest: Effort normal. No stridor.   Neurological: She is alert and oriented to person, place, and time.   Skin: Skin is warm and dry.     Psychiatric: She has a normal mood and affect.     urine dip trace blood.  Assessment:       1. Recurrent UTI    2. HTN (hypertension), benign    3. Elevated LFTs        Plan:   Home collect urine culture for future UTI.  Continue vaginal estrogen.  D-mannose 2 grams a day.   Continue align  Continue to drink fluids.   Continue probiotic daily .  Try to stop wearing pads.     cmp for f/u lfts.     Ultrasound 1 year.     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   Films personally reviewed with patient.   I spent 25 minutes with the patient of which more than half was spent in direct consultation with the patient in regards to our treatment and plan.

## 2021-12-29 ENCOUNTER — TELEPHONE (OUTPATIENT)
Dept: INTERNAL MEDICINE | Facility: CLINIC | Age: 81
End: 2021-12-29
Payer: MEDICARE

## 2022-01-10 ENCOUNTER — OFFICE VISIT (OUTPATIENT)
Dept: ELECTROPHYSIOLOGY | Facility: CLINIC | Age: 82
End: 2022-01-10
Payer: MEDICARE

## 2022-01-10 ENCOUNTER — HOSPITAL ENCOUNTER (OUTPATIENT)
Dept: CARDIOLOGY | Facility: CLINIC | Age: 82
Discharge: HOME | End: 2022-01-10
Payer: MEDICARE

## 2022-01-10 VITALS
WEIGHT: 142.19 LBS | SYSTOLIC BLOOD PRESSURE: 158 MMHG | DIASTOLIC BLOOD PRESSURE: 89 MMHG | HEIGHT: 68 IN | BODY MASS INDEX: 21.55 KG/M2 | HEART RATE: 79 BPM

## 2022-01-10 DIAGNOSIS — I10 HTN (HYPERTENSION), BENIGN: ICD-10-CM

## 2022-01-10 DIAGNOSIS — I34.0 NONRHEUMATIC MITRAL VALVE REGURGITATION: ICD-10-CM

## 2022-01-10 DIAGNOSIS — I49.8 OTHER SPECIFIED CARDIAC ARRHYTHMIAS: ICD-10-CM

## 2022-01-10 DIAGNOSIS — R55 VASOVAGAL NEAR SYNCOPE: ICD-10-CM

## 2022-01-10 DIAGNOSIS — I47.29 PAROXYSMAL VENTRICULAR TACHYCARDIA: ICD-10-CM

## 2022-01-10 DIAGNOSIS — I34.1 MVP (MITRAL VALVE PROLAPSE): ICD-10-CM

## 2022-01-10 DIAGNOSIS — I48.19 PERSISTENT ATRIAL FIBRILLATION: Primary | ICD-10-CM

## 2022-01-10 DIAGNOSIS — G47.33 OSA (OBSTRUCTIVE SLEEP APNEA): ICD-10-CM

## 2022-01-10 DIAGNOSIS — I49.3 PVC'S (PREMATURE VENTRICULAR CONTRACTIONS): ICD-10-CM

## 2022-01-10 PROCEDURE — 3077F SYST BP >= 140 MM HG: CPT | Mod: HCNC,CPTII,S$GLB, | Performed by: INTERNAL MEDICINE

## 2022-01-10 PROCEDURE — 1157F ADVNC CARE PLAN IN RCRD: CPT | Mod: HCNC,CPTII,S$GLB, | Performed by: INTERNAL MEDICINE

## 2022-01-10 PROCEDURE — 1159F MED LIST DOCD IN RCRD: CPT | Mod: HCNC,CPTII,S$GLB, | Performed by: INTERNAL MEDICINE

## 2022-01-10 PROCEDURE — 99499 UNLISTED E&M SERVICE: CPT | Mod: S$GLB,,, | Performed by: INTERNAL MEDICINE

## 2022-01-10 PROCEDURE — 3288F PR FALLS RISK ASSESSMENT DOCUMENTED: ICD-10-PCS | Mod: HCNC,CPTII,S$GLB, | Performed by: INTERNAL MEDICINE

## 2022-01-10 PROCEDURE — 99999 PR PBB SHADOW E&M-EST. PATIENT-LVL IV: CPT | Mod: PBBFAC,HCNC,, | Performed by: INTERNAL MEDICINE

## 2022-01-10 PROCEDURE — 93005 ELECTROCARDIOGRAM TRACING: CPT | Mod: HCNC,S$GLB,, | Performed by: INTERNAL MEDICINE

## 2022-01-10 PROCEDURE — 1126F PR PAIN SEVERITY QUANTIFIED, NO PAIN PRESENT: ICD-10-PCS | Mod: HCNC,CPTII,S$GLB, | Performed by: INTERNAL MEDICINE

## 2022-01-10 PROCEDURE — 93005 RHYTHM STRIP: ICD-10-PCS | Mod: HCNC,S$GLB,, | Performed by: INTERNAL MEDICINE

## 2022-01-10 PROCEDURE — 93010 RHYTHM STRIP: ICD-10-PCS | Mod: HCNC,S$GLB,, | Performed by: INTERNAL MEDICINE

## 2022-01-10 PROCEDURE — 99999 PR PBB SHADOW E&M-EST. PATIENT-LVL IV: ICD-10-PCS | Mod: PBBFAC,HCNC,, | Performed by: INTERNAL MEDICINE

## 2022-01-10 PROCEDURE — 3288F FALL RISK ASSESSMENT DOCD: CPT | Mod: HCNC,CPTII,S$GLB, | Performed by: INTERNAL MEDICINE

## 2022-01-10 PROCEDURE — 1157F PR ADVANCE CARE PLAN OR EQUIV PRESENT IN MEDICAL RECORD: ICD-10-PCS | Mod: HCNC,CPTII,S$GLB, | Performed by: INTERNAL MEDICINE

## 2022-01-10 PROCEDURE — 1126F AMNT PAIN NOTED NONE PRSNT: CPT | Mod: HCNC,CPTII,S$GLB, | Performed by: INTERNAL MEDICINE

## 2022-01-10 PROCEDURE — 99214 PR OFFICE/OUTPT VISIT, EST, LEVL IV, 30-39 MIN: ICD-10-PCS | Mod: HCNC,S$GLB,, | Performed by: INTERNAL MEDICINE

## 2022-01-10 PROCEDURE — 1160F RVW MEDS BY RX/DR IN RCRD: CPT | Mod: HCNC,CPTII,S$GLB, | Performed by: INTERNAL MEDICINE

## 2022-01-10 PROCEDURE — 1101F PT FALLS ASSESS-DOCD LE1/YR: CPT | Mod: HCNC,CPTII,S$GLB, | Performed by: INTERNAL MEDICINE

## 2022-01-10 PROCEDURE — 1160F PR REVIEW ALL MEDS BY PRESCRIBER/CLIN PHARMACIST DOCUMENTED: ICD-10-PCS | Mod: HCNC,CPTII,S$GLB, | Performed by: INTERNAL MEDICINE

## 2022-01-10 PROCEDURE — 1101F PR PT FALLS ASSESS DOC 0-1 FALLS W/OUT INJ PAST YR: ICD-10-PCS | Mod: HCNC,CPTII,S$GLB, | Performed by: INTERNAL MEDICINE

## 2022-01-10 PROCEDURE — 99499 RISK ADDL DX/OHS AUDIT: ICD-10-PCS | Mod: S$GLB,,, | Performed by: INTERNAL MEDICINE

## 2022-01-10 PROCEDURE — 93010 ELECTROCARDIOGRAM REPORT: CPT | Mod: HCNC,S$GLB,, | Performed by: INTERNAL MEDICINE

## 2022-01-10 PROCEDURE — 3079F DIAST BP 80-89 MM HG: CPT | Mod: HCNC,CPTII,S$GLB, | Performed by: INTERNAL MEDICINE

## 2022-01-10 PROCEDURE — 99214 OFFICE O/P EST MOD 30 MIN: CPT | Mod: HCNC,S$GLB,, | Performed by: INTERNAL MEDICINE

## 2022-01-10 PROCEDURE — 3077F PR MOST RECENT SYSTOLIC BLOOD PRESSURE >= 140 MM HG: ICD-10-PCS | Mod: HCNC,CPTII,S$GLB, | Performed by: INTERNAL MEDICINE

## 2022-01-10 PROCEDURE — 1159F PR MEDICATION LIST DOCUMENTED IN MEDICAL RECORD: ICD-10-PCS | Mod: HCNC,CPTII,S$GLB, | Performed by: INTERNAL MEDICINE

## 2022-01-10 PROCEDURE — 3079F PR MOST RECENT DIASTOLIC BLOOD PRESSURE 80-89 MM HG: ICD-10-PCS | Mod: HCNC,CPTII,S$GLB, | Performed by: INTERNAL MEDICINE

## 2022-01-10 NOTE — PROGRESS NOTES
Subjective:    Patient ID:  Madan Bell is a 81 y.o. female who presents for follow-up of Atrial Fibrillation    Referring Electrophysiologist: Fernando Disla MD  Primary Cardiologist: Loc Daly MD  Primary Care Physician: Sheila Mcgee MD    HPI  Prior Hx:  I had the pleasure of seeing Mrs. Bell in our electrophysiology in follow-up for her ventricular ectopy. As you are aware she is a pleasant 81 year-old woman, former patient of Dr. Disla, with moderate mitral valve prolapse/mitral sclerosis with preserved LV function, PVCs and nonsustained VT, orthostatic light-headedness, persistent AF and thyroid cancer s/p thyroidectomy. She had been a patient of Dr. Disla for many years. Her PVCs were treated with metoprolol. She developed persistent AF in 2018 and underwent ANAHI guided DCCV. She remained in sinus rhythm without anti-arrhythmic therapy. At her last follow-up up visit with Dr. Disla in July 2020 she was in rate controlled AF. Plan was for rate control. She remained on eliquis and bisoprolol. At our first visit in 2/2021 it was noted she was recently in the ER for brief episode of confusion. CT head and brain MRI were negative. She felt her dizziness was getting worse. She noted having early morning low heart rates with her monitoring for digital HTN clinic (pulse measures in the 30s-40s at times). She was concerned her neurologic symptoms may be related to her bradycardia. Her heart rate was in the 70s when she was in the ER.    A holter monitor from August 2020 noted persistent rate controlled AF with a 6% PVC burden. She was started on Mexitil to attempt to reduce PVC burden some. October 2020 noted persistent rate controlled atrial fibrillation with 6% PVC burden and 2 runs of NSVT (4 and 5 beats). She also developed a tremor related to Mexitil and it was stopped. An echocardiogram from 8/2020 noted preserved LV function with moderate MR, bileaflet prolapse and severe  LA enlargement. We discussed low likelihood of maintaining sinus rhythm at this point with plan to continue rate control.    To investigate her complaints of bradycardia in the morning, she wore a 14 day holter which showed rate controlled AF with 5% multifocal PVCs and no significant bradycardia while awake (had some during sleep hours). Symptomatic button activations corresponded to AF with normal ventricular rates.    Interim Hx:  Mrs. Bell returns for yearly follow-up. She reports continued episodes of light-headedness that are random. She feels great when she is physically active. She confirmed that when she wore the monitor and she pressed the symptom button she was having her symptoms. She describes that some times when she has sudden head position changes she feels dizzy and describes it like the room is spinning.    ECHO from 4/2021 noted a LVEF of 55% and moderated MR with severe left atrial enlargement, PASP of 45mmHg and enlarged IVC (pressure 15mmHg).      My interpretation of today's in clinic ECG is AF with average ventricular rate of 79 bpm with PVCs.    Review of Systems   Constitutional: Negative for fever and malaise/fatigue.   HENT: Negative for congestion and sore throat.    Eyes: Negative for blurred vision and visual disturbance.   Cardiovascular: Positive for leg swelling. Negative for chest pain, dyspnea on exertion, irregular heartbeat, near-syncope, palpitations and syncope.   Respiratory: Negative for cough and shortness of breath.    Hematologic/Lymphatic: Negative for bleeding problem. Does not bruise/bleed easily.   Skin: Negative.    Musculoskeletal: Negative.    Gastrointestinal: Negative for bloating, abdominal pain, hematochezia and melena.   Neurological: Positive for light-headedness and weakness. Negative for focal weakness.   Psychiatric/Behavioral: Negative.         Objective:    Physical Exam  Vitals reviewed.   Constitutional:       General: She is not in acute  distress.     Appearance: She is well-developed. She is not diaphoretic.   HENT:      Head: Normocephalic and atraumatic.   Eyes:      General:         Right eye: No discharge.         Left eye: No discharge.      Conjunctiva/sclera: Conjunctivae normal.   Cardiovascular:      Rate and Rhythm: Normal rate. Rhythm irregularly irregular.      Heart sounds: No murmur heard.  No friction rub. No gallop.    Pulmonary:      Effort: Pulmonary effort is normal. No respiratory distress.      Breath sounds: Normal breath sounds. No wheezing or rales.   Abdominal:      General: Bowel sounds are normal. There is no distension.      Palpations: Abdomen is soft.      Tenderness: There is no abdominal tenderness.   Musculoskeletal:      Cervical back: Neck supple.   Skin:     General: Skin is warm and dry.   Neurological:      Mental Status: She is alert and oriented to person, place, and time.   Psychiatric:         Behavior: Behavior normal.         Thought Content: Thought content normal.         Judgment: Judgment normal.           Assessment:       1. Persistent atrial fibrillation    2. HTN (hypertension), benign    3. Paroxysmal ventricular tachycardia    4. Nonrheumatic mitral valve regurgitation    5. MVP (mitral valve prolapse)    6. PVC's (premature ventricular contractions)    7. RIZWANA (obstructive sleep apnea)    8. Vasovagal near syncope         Plan:     In summary, Mrs. Bell  is a pleasant 81 year-old woman, former patient of Dr. Disla, with moderate mitral valve prolapse/mitral sclerosis with preserved LV function, PVCs and nonsustained VT, orthostatic light-headedness, persistent AF and thyroid cancer s/p thyroidectomy. She had no apparent symptomatic benefit of restoration of sinus rhythm in 2018. A rate control strategy is reasonable. Discussed in setting of significant MR it would likely be very difficult to maintain sinus rhythm even with anti-arrhythmic therapy. Discussed her PVC burden is not in the  range to increase her risk of PVC induced CMP. At some point her MR may need to be addressed, surgical or endovascular approach. Her AF is rate controlled. I do not think her dizzy episodes are related to her AF. Certainly not with slow heart rates. Recommend she be evaluated by ENT as her description sounds more like vertigo.    Continue eliquis lifelong as tolerated.    RTC in 1 year, sooner if needed.    Thank you for allowing me to participate in the care of this patient. Please do not hesitate to call me with any questions or concerns.    Joey Champagne MD, PhD  Cardiac Electrophysiology

## 2022-01-17 ENCOUNTER — PATIENT MESSAGE (OUTPATIENT)
Dept: ELECTROPHYSIOLOGY | Facility: CLINIC | Age: 82
End: 2022-01-17
Payer: MEDICARE

## 2022-01-24 ENCOUNTER — TELEPHONE (OUTPATIENT)
Dept: OPHTHALMOLOGY | Facility: CLINIC | Age: 82
End: 2022-01-24
Payer: MEDICARE

## 2022-01-25 ENCOUNTER — TELEPHONE (OUTPATIENT)
Dept: OPHTHALMOLOGY | Facility: CLINIC | Age: 82
End: 2022-01-25
Payer: MEDICARE

## 2022-01-25 NOTE — TELEPHONE ENCOUNTER
----- Message from Shanda Boston sent at 1/25/2022  9:53 AM CST -----  Contact: Pt @ [phone]  Pt returning call to Tara to r/s appt for 2/25/22. Tried assisting, but schedule pushing back date to May. Pt says she has a meeting around 10am, that may last about 30 mins. If you can, pls call her after 10:30a and she is asking if she can do afternoon appts?

## 2022-01-27 NOTE — TELEPHONE ENCOUNTER
No new care gaps identified.  Powered by [employer] [place] [employer]. Reference number: [id number].   1/26/2022 7:17:01 PM CST

## 2022-02-03 RX ORDER — ATORVASTATIN CALCIUM 40 MG/1
TABLET, FILM COATED ORAL
Qty: 90 TABLET | Refills: 0 | Status: SHIPPED | OUTPATIENT
Start: 2022-02-03 | End: 2022-04-07

## 2022-02-03 NOTE — TELEPHONE ENCOUNTER
Refill Routing Note   Medication(s) are not appropriate for processing by [facility] for the following reason(s):      - Drug-Disease Interaction (Drug-Disease: atorvastatin and Post-operative hypothyroidism)    [facility] action(s):  Defer Medication-related problems identified: Drug-disease interaction        --->Care Gap information included in message below if applicable.   Medication reconciliation completed: No   Automatic Epic Generated Protocol Data:        Requested Prescriptions   Pending Prescriptions Disp Refills    atorvastatin (LIPITOR) 40 MG tablet [Pharmacy Med Name: ATORVASTATIN CALCIUM 40 MG Tablet] 90 tablet 0     Sig: TAKE 1 TABLET EVERY DAY       Cardiovascular:  Antilipid - Statins Passed - 1/26/2022  7:16 PM        Passed - Patient is at least 18 years old        Passed - Valid encounter within last 15 months     Recent Visits  Date Type Provider Dept   03/09/21 Office Visit Sheila Mcgee MD [facility]   10/05/20 Office Visit Sheila Mcgee MD [facility]   Showing recent visits within past 720 days and meeting all other requirements  Future Appointments  No visits were found meeting these conditions.  Showing future appointments within next 150 days and meeting all other requirements      Future Appointments              In 5 days [facility]    In 5 days Yoshi Wong DNP, FNP-C [facility]    In 1 week Gabby Sandoval MD [facility]    In 2 weeks Sheila Mcgee MD [facility] [place]                Passed - ALT is 131 or below and within 360 days     ALT   Date Value Ref Range Status   03/10/2021 14 10 - 44 U/L Final   02/10/2021 22 10 - 44 U/L Final   10/06/2020 19 10 - 44 U/L Final              Passed - AST is 119 or below and within 360 days     AST   Date Value Ref Range Status   03/10/2021 21 10 - 40 U/L  Final   02/10/2021 22 10 - 40 U/L Final   10/06/2020 22 10 - 40 U/L Final              Passed - Total Cholesterol within 360 days     Lab Results   Component Value Date    CHOL 145 02/10/2021    CHOL 121 10/06/2020    CHOL 149 03/23/2017              Passed - LDL within 360 days     LDL Cholesterol   Date Value Ref Range Status   02/10/2021 69.4 63.0 - 159.0 mg/dL Final     Comment:     The National Cholesterol Education Program (NCEP) has set the  following guidelines (reference values) for LDL Cholesterol:  Optimal.......................<130 mg/dL  Borderline High...............130-159 mg/dL  High..........................160-189 mg/dL  Very High.....................>190 mg/dL              Passed - HDL within 360 days     HDL   Date Value Ref Range Status   02/10/2021 63 40 - 75 mg/dL Final     Comment:     The National Cholesterol Education Program (NCEP) has set the  following guidelines (reference values) for HDL Cholesterol:  Low...............<40 mg/dL  Optimal...........>60 mg/dL              Passed - Triglycerides within 360 days     Lab Results   Component Value Date    TRIG 63 02/10/2021    TRIG 46 10/06/2020    TRIG 55 03/23/2017                    Appointments  past 12m or future 3m with PCP    Date Provider   Last Visit   3/9/2021 Sheila Mcgee MD   Next Visit   2/22/2022 Sheila Mcgee MD   ED visits in past 90 days: 0        Note composed:2:24 PM 02/03/2022

## 2022-02-08 ENCOUNTER — OFFICE VISIT (OUTPATIENT)
Dept: OTOLARYNGOLOGY | Facility: CLINIC | Age: 82
End: 2022-02-08
Payer: MEDICARE

## 2022-02-08 ENCOUNTER — CLINICAL SUPPORT (OUTPATIENT)
Dept: AUDIOLOGY | Facility: CLINIC | Age: 82
End: 2022-02-08
Payer: MEDICARE

## 2022-02-08 DIAGNOSIS — H90.3 SENSORINEURAL HEARING LOSS (SNHL) OF BOTH EARS: Primary | ICD-10-CM

## 2022-02-08 DIAGNOSIS — H90.3 SENSORINEURAL HEARING LOSS (SNHL) OF BOTH EARS: ICD-10-CM

## 2022-02-08 DIAGNOSIS — H90.3 SENSORINEURAL HEARING LOSS OF BOTH EARS: Primary | ICD-10-CM

## 2022-02-08 DIAGNOSIS — R42 DIZZINESS: ICD-10-CM

## 2022-02-08 PROCEDURE — 99203 PR OFFICE/OUTPT VISIT, NEW, LEVL III, 30-44 MIN: ICD-10-PCS | Mod: HCNC,S$GLB,, | Performed by: NURSE PRACTITIONER

## 2022-02-08 PROCEDURE — 1126F PR PAIN SEVERITY QUANTIFIED, NO PAIN PRESENT: ICD-10-PCS | Mod: HCNC,CPTII,S$GLB, | Performed by: NURSE PRACTITIONER

## 2022-02-08 PROCEDURE — 3288F FALL RISK ASSESSMENT DOCD: CPT | Mod: HCNC,CPTII,S$GLB, | Performed by: NURSE PRACTITIONER

## 2022-02-08 PROCEDURE — 92557 PR COMPREHENSIVE HEARING TEST: ICD-10-PCS | Mod: HCNC,S$GLB,, | Performed by: AUDIOLOGIST

## 2022-02-08 PROCEDURE — 1160F RVW MEDS BY RX/DR IN RCRD: CPT | Mod: HCNC,CPTII,S$GLB, | Performed by: NURSE PRACTITIONER

## 2022-02-08 PROCEDURE — 92567 PR TYMPA2METRY: ICD-10-PCS | Mod: HCNC,S$GLB,, | Performed by: AUDIOLOGIST

## 2022-02-08 PROCEDURE — 3288F PR FALLS RISK ASSESSMENT DOCUMENTED: ICD-10-PCS | Mod: HCNC,CPTII,S$GLB, | Performed by: NURSE PRACTITIONER

## 2022-02-08 PROCEDURE — 1160F PR REVIEW ALL MEDS BY PRESCRIBER/CLIN PHARMACIST DOCUMENTED: ICD-10-PCS | Mod: HCNC,CPTII,S$GLB, | Performed by: NURSE PRACTITIONER

## 2022-02-08 PROCEDURE — 1157F ADVNC CARE PLAN IN RCRD: CPT | Mod: HCNC,CPTII,S$GLB, | Performed by: NURSE PRACTITIONER

## 2022-02-08 PROCEDURE — 1157F PR ADVANCE CARE PLAN OR EQUIV PRESENT IN MEDICAL RECORD: ICD-10-PCS | Mod: HCNC,CPTII,S$GLB, | Performed by: NURSE PRACTITIONER

## 2022-02-08 PROCEDURE — 1126F AMNT PAIN NOTED NONE PRSNT: CPT | Mod: HCNC,CPTII,S$GLB, | Performed by: NURSE PRACTITIONER

## 2022-02-08 PROCEDURE — 1159F PR MEDICATION LIST DOCUMENTED IN MEDICAL RECORD: ICD-10-PCS | Mod: HCNC,CPTII,S$GLB, | Performed by: NURSE PRACTITIONER

## 2022-02-08 PROCEDURE — 92567 TYMPANOMETRY: CPT | Mod: HCNC,S$GLB,, | Performed by: AUDIOLOGIST

## 2022-02-08 PROCEDURE — 92557 COMPREHENSIVE HEARING TEST: CPT | Mod: HCNC,S$GLB,, | Performed by: AUDIOLOGIST

## 2022-02-08 PROCEDURE — 1101F PT FALLS ASSESS-DOCD LE1/YR: CPT | Mod: HCNC,CPTII,S$GLB, | Performed by: NURSE PRACTITIONER

## 2022-02-08 PROCEDURE — 99999 PR PBB SHADOW E&M-EST. PATIENT-LVL III: ICD-10-PCS | Mod: PBBFAC,HCNC,, | Performed by: NURSE PRACTITIONER

## 2022-02-08 PROCEDURE — 1159F MED LIST DOCD IN RCRD: CPT | Mod: HCNC,CPTII,S$GLB, | Performed by: NURSE PRACTITIONER

## 2022-02-08 PROCEDURE — 99203 OFFICE O/P NEW LOW 30 MIN: CPT | Mod: HCNC,S$GLB,, | Performed by: NURSE PRACTITIONER

## 2022-02-08 PROCEDURE — 99999 PR PBB SHADOW E&M-EST. PATIENT-LVL III: CPT | Mod: PBBFAC,HCNC,, | Performed by: NURSE PRACTITIONER

## 2022-02-08 PROCEDURE — 1101F PR PT FALLS ASSESS DOC 0-1 FALLS W/OUT INJ PAST YR: ICD-10-PCS | Mod: HCNC,CPTII,S$GLB, | Performed by: NURSE PRACTITIONER

## 2022-02-08 PROCEDURE — 99999 PR PBB SHADOW E&M-EST. PATIENT-LVL I: ICD-10-PCS | Mod: PBBFAC,HCNC,, | Performed by: AUDIOLOGIST

## 2022-02-08 PROCEDURE — 99999 PR PBB SHADOW E&M-EST. PATIENT-LVL I: CPT | Mod: PBBFAC,HCNC,, | Performed by: AUDIOLOGIST

## 2022-02-08 NOTE — PROGRESS NOTES
Subjective:      Madan Bell is a 81 y.o. female who was self-referred for dizziness.    Ms. Bell presents today for dizziness. She reports having recurrent episodes of dizziness that is triggering when looking down, laying head back or sitting up quickly. She describes her dizziness as lightheaded or spinning. She denies ear pain, ear drainage, change in hearing, nasal or sinus symptoms or headache. She has a history of A-fib which she was contributing to dizziness too, but was recommended ENT evaluated as change in head position triggers his dizziness.   There is not a family history of hearing loss at a young age.  There is not a prior history of ear surgery.  There is not a prior history of ear infections .  She denies a history of significant noise exposure.  She does not wear hearing aids currently.  She has not had a hearing test recently.     Past Medical History  She has a past medical history of A-fib, Anticoagulant long-term use, Anxiety, Arrhythmia, Arthritis, Basal cell carcinoma, Breast cyst, Community acquired pneumonia of right lower lobe of lung, Deep vein thrombosis, Encephalopathy, Fibrocystic breast, Heart murmur, Hypercholesterolemia, Hyperlipidemia, Hypertension, Hypothyroidism, Mitral valve disorder, Mitral valve prolapse, PVC's (premature ventricular contractions), Squamous cell carcinoma, Thyroid cancer, Thyroid disease, Vasovagal near syncope, Vasovagal near syncope, VTE (venous thromboembolism), and Weakness.    Past Surgical History  She has a past surgical history that includes Thyroid surgery; Hysterectomy; Breast cyst aspiration (1980's - ??); Oophorectomy (hysterectomy - 2000 - ??); Breast cyst excision (2008 - ??); Breast biopsy (Left); Breast biopsy (Left); Cardioversion (N/A, 8/13/2018); Cataract extraction w/  intraocular lens implant (Left, 4/22/2021); Cataract extraction w/  intraocular lens implant (Right, 5/17/2021); and Knee Arthroplasty (Right,  6/14/2021).    Family History  Her family history includes Arrhythmia in her son; Breast cancer in her maternal aunt; Heart disease in her mother; Hyperlipidemia in her maternal grandmother; Mitral valve prolapse in her son; No Known Problems in her daughter, son, and son; Sudden death in her son.    Social History  She reports that she has never smoked. She has never used smokeless tobacco. She reports that she does not drink alcohol and does not use drugs.    Allergies  She is allergic to fluoride, fluoride preparations, amoxicillin-pot clavulanate, and nitrofurantoin monohyd/m-cryst.    Medications  She has a current medication list which includes the following prescription(s): apixaban, ascorbic acid (vitamin c), atorvastatin, bisoprolol-hydrochlorothiazide 2.5-6.25 mg, calcium citrate, cholecalciferol (vitamin d3), ciprofloxacin hcl, cranberry extract, cyanocobalamin, d-mannose, estradiol, furosemide, ultimate andrea probiotic, levothyroxine, omega-3 fatty acids-vitamin e, pantoprazole, vitamin b complex, and vitamin e.    Review of Systems   Constitutional: Negative for chills, fever and unexpected weight change.   HENT: Positive for hearing loss and tinnitus. Negative for congestion, ear discharge, ear pain, facial swelling, postnasal drip, sinus pressure, sore throat and trouble swallowing.    Eyes: Negative for pain and visual disturbance.   Respiratory: Negative.  Negative for apnea and shortness of breath.    Cardiovascular: Negative for chest pain and palpitations.   Gastrointestinal: Negative.  Negative for abdominal pain and nausea.   Endocrine: Negative.  Negative for cold intolerance and heat intolerance.   Genitourinary: Negative.    Musculoskeletal: Positive for back pain. Negative for joint swelling and neck stiffness.   Skin: Positive for rash. Negative for color change.   Allergic/Immunologic: Negative.    Neurological: Positive for dizziness and light-headedness. Negative for facial asymmetry  and headaches.   Hematological: Negative.  Negative for adenopathy. Does not bruise/bleed easily.   Psychiatric/Behavioral: Positive for decreased concentration. Negative for agitation. The patient is not nervous/anxious.         Objective:     LMP  (LMP Unknown)      Constitutional:   She is oriented to person, place, and time. Vital signs are normal. She appears well-developed and well-nourished. She appears alert. Normal speech.      Head:  Normocephalic and atraumatic.     Ears:    Right Ear: No lacerations. No drainage, swelling or tenderness. No foreign bodies. No mastoid tenderness. Tympanic membrane is not injected, not scarred, not perforated, not erythematous, not retracted and not bulging. Tympanic membrane mobility is normal. No middle ear effusion. No hemotympanum. Decreased hearing is noted.   Left Ear: No lacerations. No drainage, swelling or tenderness. No foreign bodies. No mastoid tenderness. Tympanic membrane is not injected, not scarred, not perforated, not erythematous, not retracted and not bulging. Tympanic membrane mobility is normal.  No middle ear effusion. No hemotympanum. Decreased hearing is noted.   [place]-hallpike test left: negative  [place]-hallpike test right: negative    Romberg test: negative    Head thrust test: negative      Nose:  Nose normal including turbinates, nasal mucosa, sinuses and nasal septum.     Neck:  Neck normal without thyromegaly masses, asymmetry, normal tracheal structure, crepitus, and tenderness and no adenopathy.     Psychiatric:   She has a normal mood and affect. Her speech is normal.     Neurological:   She is alert and oriented to person, place, and time.     Skin:   No abrasions, lacerations, lesions, or rashes.       Procedure    None      Data Reviewed    WBC (K/uL)   Date Value   06/10/2021 7.57     Platelets (K/uL)   Date Value   06/10/2021 175      Creatinine (mg/dL)   Date Value   06/10/2021 0.8     TSH (uIU/mL)   Date Value   05/10/2021 1.714      Glucose (mg/dL)   Date Value   06/10/2021 105     Hemoglobin A1C (%)   Date Value   10/06/2020 6.0 (H)       I independently reviewed the tracings of the complete audiometric evaluation performed today.  I reviewed the audiogram with the patient as well.  Pertinent findings include bilateral mild to moderate-severe SNHL. Right SRT 35 with 96% discrimination at 75db. Left SRT 35 with 88% discrimination at 75db. Type A tympanogram in both ears. .         Assessment:     1. Sensorineural hearing loss (SNHL) of both ears    2. Dizziness         Plan:     Audiogram Reviewed: Bilateral SNHL.  Noise and hearing Protection pamphlet provided.  Hearing conservation strongly recommended.  Trial of amplification bilaterally also recommended(patient is not interested at this time).  Re-check of hearing in 18-24 months or sooner if subjective change noted.    Romberg test, Head Thrust test and [place]-marie pike test are negative.   I recommend Cawthorne exercises 2 times daily for 8 weeks.  If symptoms fail to improve will consider VNG and/or formal vestibular therapy.     Follow up if symptoms worsen or fail to improve.

## 2022-02-08 NOTE — PROGRESS NOTES
[facility] for an audiological evaluation.     Audiological testing revealed a mild to moderate sensorineural hearing loss for the right ear and a mild to moderately-severe sensorineural hearing loss for the left ear.  A speech reception threshold was obtained at 35 dBHL for the right ear and at 35 dBHL for the left ear.  Speech discrimination was 96% for the right ear and 88% for the left ear.      Tympanometry testing revealed a Type A tympanogram for the right ear and a Type A tympanogram for the left ear.      Recommendations:  1. Otologic evaluation  2. Annual audiological evaluation  3. Hearing protection when in noise   4. Hearing aid consultation

## 2022-02-14 ENCOUNTER — TELEPHONE (OUTPATIENT)
Dept: OPHTHALMOLOGY | Facility: CLINIC | Age: 82
End: 2022-02-14
Payer: MEDICARE

## 2022-02-15 ENCOUNTER — PROCEDURE VISIT (OUTPATIENT)
Dept: OPHTHALMOLOGY | Facility: CLINIC | Age: 82
End: 2022-02-15
Attending: OPHTHALMOLOGY
Payer: MEDICARE

## 2022-02-15 DIAGNOSIS — H53.8 BLURRY VISION, RIGHT EYE: Primary | ICD-10-CM

## 2022-02-15 PROCEDURE — 92012 INTRM OPH EXAM EST PATIENT: CPT | Mod: HCNC,S$GLB,, | Performed by: OPHTHALMOLOGY

## 2022-02-15 PROCEDURE — 92012 PR EYE EXAM, EST PATIENT,INTERMED: ICD-10-PCS | Mod: HCNC,S$GLB,, | Performed by: OPHTHALMOLOGY

## 2022-02-15 RX ORDER — OFLOXACIN 3 MG/ML
1 SOLUTION/ DROPS OPHTHALMIC 4 TIMES DAILY
Qty: 10 ML | Refills: 1 | Status: SHIPPED | OUTPATIENT
Start: 2022-02-15 | End: 2023-02-02

## 2022-02-15 NOTE — PROGRESS NOTES
HPI     80 y/o female presents to clinic for LRI OD    Pt states VA OD seems the same. Sometimes it seems different especially on   the computer     Last edited by Tara Claudio on 2/15/2022  4:01 PM. (History)            Assessment /Plan     For exam results, see Encounter Report.    Blurry vision, right eye      Post OP astigmatism with MFIOL OD  ID LRI performed at 180 at [facility] without complication.  Abx tid 3-5 days.   Recheck Mrx and UCVA in I month.

## 2022-02-21 ENCOUNTER — TELEPHONE (OUTPATIENT)
Dept: OPHTHALMOLOGY | Facility: CLINIC | Age: 82
End: 2022-02-21
Payer: MEDICARE

## 2022-02-22 ENCOUNTER — OFFICE VISIT (OUTPATIENT)
Dept: INTERNAL MEDICINE | Facility: CLINIC | Age: 82
End: 2022-02-22
Payer: MEDICARE

## 2022-02-22 VITALS
SYSTOLIC BLOOD PRESSURE: 140 MMHG | HEART RATE: 76 BPM | TEMPERATURE: 98 F | BODY MASS INDEX: 21.48 KG/M2 | OXYGEN SATURATION: 99 % | HEIGHT: 68 IN | DIASTOLIC BLOOD PRESSURE: 84 MMHG | WEIGHT: 141.75 LBS

## 2022-02-22 DIAGNOSIS — E78.00 HYPERCHOLESTEROLEMIA: ICD-10-CM

## 2022-02-22 DIAGNOSIS — Z12.31 SCREENING MAMMOGRAM, ENCOUNTER FOR: ICD-10-CM

## 2022-02-22 DIAGNOSIS — N39.0 URINARY TRACT INFECTION WITHOUT HEMATURIA, SITE UNSPECIFIED: ICD-10-CM

## 2022-02-22 DIAGNOSIS — M85.80 OSTEOPENIA, UNSPECIFIED LOCATION: ICD-10-CM

## 2022-02-22 DIAGNOSIS — M81.0 AGE-RELATED OSTEOPOROSIS WITHOUT CURRENT PATHOLOGICAL FRACTURE: ICD-10-CM

## 2022-02-22 DIAGNOSIS — I10 HTN (HYPERTENSION), BENIGN: Primary | ICD-10-CM

## 2022-02-22 DIAGNOSIS — R73.9 HYPERGLYCEMIA: ICD-10-CM

## 2022-02-22 LAB
ALBUMIN/CREAT UR: 16.1 UG/MG (ref 0–30)
BILIRUB UR QL STRIP: NEGATIVE
CLARITY UR REFRACT.AUTO: CLEAR
COLOR UR AUTO: YELLOW
CREAT UR-MCNC: 31 MG/DL (ref 15–325)
GLUCOSE UR QL STRIP: NEGATIVE
HGB UR QL STRIP: NEGATIVE
KETONES UR QL STRIP: NEGATIVE
LEUKOCYTE ESTERASE UR QL STRIP: NEGATIVE
MICROALBUMIN UR DL<=1MG/L-MCNC: 5 UG/ML
NITRITE UR QL STRIP: NEGATIVE
PH UR STRIP: 5 [PH] (ref 5–8)
PROT UR QL STRIP: NEGATIVE
SP GR UR STRIP: 1 (ref 1–1.03)
URN SPEC COLLECT METH UR: NORMAL

## 2022-02-22 PROCEDURE — 3079F DIAST BP 80-89 MM HG: CPT | Mod: HCNC,CPTII,S$GLB, | Performed by: INTERNAL MEDICINE

## 2022-02-22 PROCEDURE — 1101F PT FALLS ASSESS-DOCD LE1/YR: CPT | Mod: HCNC,CPTII,S$GLB, | Performed by: INTERNAL MEDICINE

## 2022-02-22 PROCEDURE — 99999 PR PBB SHADOW E&M-EST. PATIENT-LVL V: ICD-10-PCS | Mod: PBBFAC,HCNC,, | Performed by: INTERNAL MEDICINE

## 2022-02-22 PROCEDURE — 90715 TDAP VACCINE 7 YRS/> IM: CPT | Mod: HCNC,S$GLB,, | Performed by: INTERNAL MEDICINE

## 2022-02-22 PROCEDURE — 1159F PR MEDICATION LIST DOCUMENTED IN MEDICAL RECORD: ICD-10-PCS | Mod: HCNC,CPTII,S$GLB, | Performed by: INTERNAL MEDICINE

## 2022-02-22 PROCEDURE — 99214 PR OFFICE/OUTPT VISIT, EST, LEVL IV, 30-39 MIN: ICD-10-PCS | Mod: 25,HCNC,S$GLB, | Performed by: INTERNAL MEDICINE

## 2022-02-22 PROCEDURE — 90471 IMMUNIZATION ADMIN: CPT | Mod: HCNC,S$GLB,, | Performed by: INTERNAL MEDICINE

## 2022-02-22 PROCEDURE — 1126F PR PAIN SEVERITY QUANTIFIED, NO PAIN PRESENT: ICD-10-PCS | Mod: HCNC,CPTII,S$GLB, | Performed by: INTERNAL MEDICINE

## 2022-02-22 PROCEDURE — 81003 URINALYSIS AUTO W/O SCOPE: CPT | Mod: HCNC | Performed by: INTERNAL MEDICINE

## 2022-02-22 PROCEDURE — 90471 TDAP VACCINE GREATER THAN OR EQUAL TO 7YO IM: ICD-10-PCS | Mod: HCNC,S$GLB,, | Performed by: INTERNAL MEDICINE

## 2022-02-22 PROCEDURE — 1159F MED LIST DOCD IN RCRD: CPT | Mod: HCNC,CPTII,S$GLB, | Performed by: INTERNAL MEDICINE

## 2022-02-22 PROCEDURE — 99999 PR PBB SHADOW E&M-EST. PATIENT-LVL V: CPT | Mod: PBBFAC,HCNC,, | Performed by: INTERNAL MEDICINE

## 2022-02-22 PROCEDURE — 3288F PR FALLS RISK ASSESSMENT DOCUMENTED: ICD-10-PCS | Mod: HCNC,CPTII,S$GLB, | Performed by: INTERNAL MEDICINE

## 2022-02-22 PROCEDURE — 3079F PR MOST RECENT DIASTOLIC BLOOD PRESSURE 80-89 MM HG: ICD-10-PCS | Mod: HCNC,CPTII,S$GLB, | Performed by: INTERNAL MEDICINE

## 2022-02-22 PROCEDURE — 1157F PR ADVANCE CARE PLAN OR EQUIV PRESENT IN MEDICAL RECORD: ICD-10-PCS | Mod: HCNC,CPTII,S$GLB, | Performed by: INTERNAL MEDICINE

## 2022-02-22 PROCEDURE — 87086 URINE CULTURE/COLONY COUNT: CPT | Mod: HCNC | Performed by: INTERNAL MEDICINE

## 2022-02-22 PROCEDURE — 82570 ASSAY OF URINE CREATININE: CPT | Mod: HCNC | Performed by: INTERNAL MEDICINE

## 2022-02-22 PROCEDURE — 3288F FALL RISK ASSESSMENT DOCD: CPT | Mod: HCNC,CPTII,S$GLB, | Performed by: INTERNAL MEDICINE

## 2022-02-22 PROCEDURE — 99214 OFFICE O/P EST MOD 30 MIN: CPT | Mod: 25,HCNC,S$GLB, | Performed by: INTERNAL MEDICINE

## 2022-02-22 PROCEDURE — 1126F AMNT PAIN NOTED NONE PRSNT: CPT | Mod: HCNC,CPTII,S$GLB, | Performed by: INTERNAL MEDICINE

## 2022-02-22 PROCEDURE — 1157F ADVNC CARE PLAN IN RCRD: CPT | Mod: HCNC,CPTII,S$GLB, | Performed by: INTERNAL MEDICINE

## 2022-02-22 PROCEDURE — 90715 TDAP VACCINE GREATER THAN OR EQUAL TO 7YO IM: ICD-10-PCS | Mod: HCNC,S$GLB,, | Performed by: INTERNAL MEDICINE

## 2022-02-22 PROCEDURE — 3077F SYST BP >= 140 MM HG: CPT | Mod: HCNC,CPTII,S$GLB, | Performed by: INTERNAL MEDICINE

## 2022-02-22 PROCEDURE — 3077F PR MOST RECENT SYSTOLIC BLOOD PRESSURE >= 140 MM HG: ICD-10-PCS | Mod: HCNC,CPTII,S$GLB, | Performed by: INTERNAL MEDICINE

## 2022-02-22 PROCEDURE — 1101F PR PT FALLS ASSESS DOC 0-1 FALLS W/OUT INJ PAST YR: ICD-10-PCS | Mod: HCNC,CPTII,S$GLB, | Performed by: INTERNAL MEDICINE

## 2022-02-22 NOTE — PROGRESS NOTES
Subjective:       Patient ID: Madan Bell is a 81 y.o. female.    Chief Complaint: Annual Exam    HPIPt is doing better since knee replacement - no falls.  Able to walk without a cane  No CP - cannot walk up a ramp at a stadium without significant dyspnea but able to walk without issue on flat surface.  No syncope.  UTIs are improved. Still some dizziness but trying Cawthorne exercises for now.  Lives alone - has life alert.   Review of Systems   Constitutional: Negative for activity change and unexpected weight change.   HENT: Positive for hearing loss. Negative for trouble swallowing.    Eyes: Positive for visual disturbance. Negative for discharge.   Respiratory: Negative for chest tightness, shortness of breath (PND or orthopnea) and wheezing.    Cardiovascular: Negative for chest pain and palpitations.   Gastrointestinal: Negative for abdominal pain, blood in stool, constipation, diarrhea, nausea and vomiting.   Endocrine: Negative for polydipsia and polyuria.   Genitourinary: Negative for difficulty urinating, dysuria, hematuria and menstrual problem.   Musculoskeletal: Negative for arthralgias, joint swelling and neck pain.   Neurological: Negative for seizures, syncope, weakness and headaches.   Psychiatric/Behavioral: Positive for confusion. Negative for dysphoric mood.       Objective:      Physical Exam  Constitutional:       General: She is not in acute distress.     Appearance: She is well-developed.   HENT:      Head: Normocephalic.   Neck:      Thyroid: No thyromegaly.      Vascular: No JVD.   Cardiovascular:      Rate and Rhythm: Normal rate. Rhythm irregular.      Heart sounds: Normal heart sounds. No murmur heard.    No friction rub. No gallop.      Comments: Chronic afib  Pulmonary:      Effort: Pulmonary effort is normal.      Breath sounds: Normal breath sounds. No wheezing or rales.      Comments: Breasts: No masses, discharge or adenopathy bilaterally    Abdominal:       General: Bowel sounds are normal. There is no distension.      Palpations: Abdomen is soft. There is no mass.      Tenderness: There is no abdominal tenderness. There is no guarding or rebound.   Musculoskeletal:      Cervical back: Neck supple.   Lymphadenopathy:      Cervical: No cervical adenopathy.   Skin:     General: Skin is warm and dry.   Neurological:      Mental Status: She is alert and oriented to person, place, and time.      Deep Tendon Reflexes: Reflexes are normal and symmetric.   Psychiatric:         Behavior: Behavior normal.         Thought Content: Thought content normal.         Judgment: Judgment normal.         Assessment:       1. HTN (hypertension), benign    2. Hypercholesterolemia    3. Hyperglycemia    4. Urinary tract infection without hematuria, site unspecified    5. Screening mammogram, encounter for    6. Osteopenia, unspecified location    7. Age-related osteoporosis without current pathological fracture         Plan:   HTN (hypertension), benign  -     CBC Auto Differential; Future; Expected date: 02/22/2022  -     Comprehensive Metabolic Panel; Future; Expected date: 02/22/2022  -     TSH; Future; Expected date: 02/22/2022  -     Microalbumin/Creatinine Ratio, Urine  Controlled - continue current meds    Hypercholesterolemia  -     Lipid Panel; Future; Expected date: 02/22/2022    Hyperglycemia  -     Hemoglobin A1C; Future; Expected date: 02/22/2022    Urinary tract infection without hematuria, site unspecified  -     Urinalysis  -     Urine culture    Screening mammogram, encounter for  -     Mammo Digital Screening Bilat w/ [facility]; Future; Expected date: 08/22/2022    Osteopenia, unspecified location  -     DXA Bone Density Spine And Hip; Future; Expected date: 02/22/2022    Age-related osteoporosis without current pathological fracture   -     DXA Bone Density Spine And Hip; Future; Expected date: 02/22/2022    Other orders  -     Tdap Vaccine    Consider Neuro follow up if  dizziness persists - had neg MRI brain and MRA head and neck    Discuss dyspnea on ramp with cardiology Dr. Daly    Has varicose vv - advised compression hose    Answers for HPI/ROS submitted by the patient on 2/21/2022  activity change: No  unexpected weight change: No  neck pain: No  hearing loss: Yes  trouble swallowing: No  eye discharge: No  visual disturbance: Yes  chest tightness: No  wheezing: No  chest pain: No  palpitations: No  blood in stool: No  constipation: No  vomiting: No  diarrhea: No  polydipsia: No  polyuria: No  difficulty urinating: No  hematuria: No  menstrual problem: No  dysuria: No  joint swelling: No  arthralgias: No  headaches: No  weakness: No  confusion: Yes  dysphoric mood: No

## 2022-02-23 DIAGNOSIS — D84.9 IMMUNOSUPPRESSED STATUS: ICD-10-CM

## 2022-02-24 ENCOUNTER — LAB VISIT (OUTPATIENT)
Dept: LAB | Facility: HOSPITAL | Age: 82
End: 2022-02-24
Attending: INTERNAL MEDICINE
Payer: MEDICARE

## 2022-02-24 DIAGNOSIS — I10 HTN (HYPERTENSION), BENIGN: ICD-10-CM

## 2022-02-24 DIAGNOSIS — R73.9 HYPERGLYCEMIA: ICD-10-CM

## 2022-02-24 DIAGNOSIS — E78.00 HYPERCHOLESTEROLEMIA: ICD-10-CM

## 2022-02-24 LAB
ALBUMIN SERPL BCP-MCNC: 3.8 G/DL (ref 3.5–5.2)
ALP SERPL-CCNC: 63 U/L (ref 55–135)
ALT SERPL W/O P-5'-P-CCNC: 11 U/L (ref 10–44)
ANION GAP SERPL CALC-SCNC: 10 MMOL/L (ref 8–16)
AST SERPL-CCNC: 17 U/L (ref 10–40)
BACTERIA UR CULT: NO GROWTH
BASOPHILS # BLD AUTO: 0.06 K/UL (ref 0–0.2)
BASOPHILS NFR BLD: 1 % (ref 0–1.9)
BILIRUB SERPL-MCNC: 2.1 MG/DL (ref 0.1–1)
BUN SERPL-MCNC: 10 MG/DL (ref 8–23)
CALCIUM SERPL-MCNC: 9.7 MG/DL (ref 8.7–10.5)
CHLORIDE SERPL-SCNC: 101 MMOL/L (ref 95–110)
CHOLEST SERPL-MCNC: 126 MG/DL (ref 120–199)
CHOLEST/HDLC SERPL: 2.3 {RATIO} (ref 2–5)
CO2 SERPL-SCNC: 27 MMOL/L (ref 23–29)
CREAT SERPL-MCNC: 0.8 MG/DL (ref 0.5–1.4)
DIFFERENTIAL METHOD: NORMAL
EOSINOPHIL # BLD AUTO: 0.1 K/UL (ref 0–0.5)
EOSINOPHIL NFR BLD: 0.8 % (ref 0–8)
ERYTHROCYTE [DISTWIDTH] IN BLOOD BY AUTOMATED COUNT: 13.2 % (ref 11.5–14.5)
EST. GFR  (AFRICAN AMERICAN): >60 ML/MIN/1.73 M^2
EST. GFR  (NON AFRICAN AMERICAN): >60 ML/MIN/1.73 M^2
ESTIMATED AVG GLUCOSE: 120 MG/DL (ref 68–131)
GLUCOSE SERPL-MCNC: 105 MG/DL (ref 70–110)
HBA1C MFR BLD: 5.8 % (ref 4–5.6)
HCT VFR BLD AUTO: 41.6 % (ref 37–48.5)
HDLC SERPL-MCNC: 55 MG/DL (ref 40–75)
HDLC SERPL: 43.7 % (ref 20–50)
HGB BLD-MCNC: 13.3 G/DL (ref 12–16)
IMM GRANULOCYTES # BLD AUTO: 0.01 K/UL (ref 0–0.04)
IMM GRANULOCYTES NFR BLD AUTO: 0.2 % (ref 0–0.5)
LDLC SERPL CALC-MCNC: 59.6 MG/DL (ref 63–159)
LYMPHOCYTES # BLD AUTO: 1.7 K/UL (ref 1–4.8)
LYMPHOCYTES NFR BLD: 27.9 % (ref 18–48)
MCH RBC QN AUTO: 30.9 PG (ref 27–31)
MCHC RBC AUTO-ENTMCNC: 32 G/DL (ref 32–36)
MCV RBC AUTO: 97 FL (ref 82–98)
MONOCYTES # BLD AUTO: 0.7 K/UL (ref 0.3–1)
MONOCYTES NFR BLD: 10.8 % (ref 4–15)
NEUTROPHILS # BLD AUTO: 3.7 K/UL (ref 1.8–7.7)
NEUTROPHILS NFR BLD: 59.3 % (ref 38–73)
NONHDLC SERPL-MCNC: 71 MG/DL
NRBC BLD-RTO: 0 /100 WBC
PLATELET # BLD AUTO: 160 K/UL (ref 150–450)
PMV BLD AUTO: 10.9 FL (ref 9.2–12.9)
POTASSIUM SERPL-SCNC: 4.2 MMOL/L (ref 3.5–5.1)
PROT SERPL-MCNC: 6.6 G/DL (ref 6–8.4)
RBC # BLD AUTO: 4.31 M/UL (ref 4–5.4)
SODIUM SERPL-SCNC: 138 MMOL/L (ref 136–145)
TRIGL SERPL-MCNC: 57 MG/DL (ref 30–150)
TSH SERPL DL<=0.005 MIU/L-ACNC: 0.64 UIU/ML (ref 0.4–4)
WBC # BLD AUTO: 6.19 K/UL (ref 3.9–12.7)

## 2022-02-24 PROCEDURE — 36415 COLL VENOUS BLD VENIPUNCTURE: CPT | Mod: HCNC,PO | Performed by: INTERNAL MEDICINE

## 2022-02-24 PROCEDURE — 80053 COMPREHEN METABOLIC PANEL: CPT | Mod: HCNC | Performed by: INTERNAL MEDICINE

## 2022-02-24 PROCEDURE — 84443 ASSAY THYROID STIM HORMONE: CPT | Mod: HCNC | Performed by: INTERNAL MEDICINE

## 2022-02-24 PROCEDURE — 85025 COMPLETE CBC W/AUTO DIFF WBC: CPT | Mod: HCNC | Performed by: INTERNAL MEDICINE

## 2022-02-24 PROCEDURE — 80061 LIPID PANEL: CPT | Mod: HCNC | Performed by: INTERNAL MEDICINE

## 2022-02-24 PROCEDURE — 83036 HEMOGLOBIN GLYCOSYLATED A1C: CPT | Mod: HCNC | Performed by: INTERNAL MEDICINE

## 2022-03-04 ENCOUNTER — PATIENT MESSAGE (OUTPATIENT)
Dept: INTERNAL MEDICINE | Facility: CLINIC | Age: 82
End: 2022-03-04
Payer: MEDICARE

## 2022-03-08 ENCOUNTER — TELEPHONE (OUTPATIENT)
Dept: INTERNAL MEDICINE | Facility: CLINIC | Age: 82
End: 2022-03-08
Payer: MEDICARE

## 2022-03-08 DIAGNOSIS — R79.89 ELEVATED LFTS: Primary | ICD-10-CM

## 2022-03-11 ENCOUNTER — LAB VISIT (OUTPATIENT)
Dept: LAB | Facility: HOSPITAL | Age: 82
End: 2022-03-11
Attending: INTERNAL MEDICINE
Payer: MEDICARE

## 2022-03-11 DIAGNOSIS — R79.89 ELEVATED LFTS: ICD-10-CM

## 2022-03-11 LAB
ALBUMIN SERPL BCP-MCNC: 4 G/DL (ref 3.5–5.2)
ALP SERPL-CCNC: 68 U/L (ref 55–135)
ALT SERPL W/O P-5'-P-CCNC: 19 U/L (ref 10–44)
AST SERPL-CCNC: 21 U/L (ref 10–40)
BILIRUB DIRECT SERPL-MCNC: 0.6 MG/DL (ref 0.1–0.3)
BILIRUB DIRECT SERPL-MCNC: 0.6 MG/DL (ref 0.1–0.3)
BILIRUB SERPL-MCNC: 1.6 MG/DL (ref 0.1–1)
PROT SERPL-MCNC: 6.8 G/DL (ref 6–8.4)

## 2022-03-11 PROCEDURE — 36415 COLL VENOUS BLD VENIPUNCTURE: CPT | Mod: PO | Performed by: INTERNAL MEDICINE

## 2022-03-11 PROCEDURE — 80076 HEPATIC FUNCTION PANEL: CPT | Performed by: INTERNAL MEDICINE

## 2022-03-15 ENCOUNTER — OFFICE VISIT (OUTPATIENT)
Dept: OPHTHALMOLOGY | Facility: CLINIC | Age: 82
End: 2022-03-15
Attending: OPHTHALMOLOGY
Payer: MEDICARE

## 2022-03-15 DIAGNOSIS — Z98.890 POST-OPERATIVE STATE: Primary | ICD-10-CM

## 2022-03-15 PROCEDURE — 99024 POSTOP FOLLOW-UP VISIT: CPT | Mod: S$GLB,,, | Performed by: OPHTHALMOLOGY

## 2022-03-15 PROCEDURE — 3288F PR FALLS RISK ASSESSMENT DOCUMENTED: ICD-10-PCS | Mod: CPTII,S$GLB,, | Performed by: OPHTHALMOLOGY

## 2022-03-15 PROCEDURE — 99999 PR PBB SHADOW E&M-EST. PATIENT-LVL III: ICD-10-PCS | Mod: PBBFAC,,, | Performed by: OPHTHALMOLOGY

## 2022-03-15 PROCEDURE — 1157F PR ADVANCE CARE PLAN OR EQUIV PRESENT IN MEDICAL RECORD: ICD-10-PCS | Mod: CPTII,S$GLB,, | Performed by: OPHTHALMOLOGY

## 2022-03-15 PROCEDURE — 99999 PR PBB SHADOW E&M-EST. PATIENT-LVL III: CPT | Mod: PBBFAC,,, | Performed by: OPHTHALMOLOGY

## 2022-03-15 PROCEDURE — 1160F RVW MEDS BY RX/DR IN RCRD: CPT | Mod: CPTII,S$GLB,, | Performed by: OPHTHALMOLOGY

## 2022-03-15 PROCEDURE — 99024 PR POST-OP FOLLOW-UP VISIT: ICD-10-PCS | Mod: S$GLB,,, | Performed by: OPHTHALMOLOGY

## 2022-03-15 PROCEDURE — 1160F PR REVIEW ALL MEDS BY PRESCRIBER/CLIN PHARMACIST DOCUMENTED: ICD-10-PCS | Mod: CPTII,S$GLB,, | Performed by: OPHTHALMOLOGY

## 2022-03-15 PROCEDURE — 1101F PT FALLS ASSESS-DOCD LE1/YR: CPT | Mod: CPTII,S$GLB,, | Performed by: OPHTHALMOLOGY

## 2022-03-15 PROCEDURE — 1101F PR PT FALLS ASSESS DOC 0-1 FALLS W/OUT INJ PAST YR: ICD-10-PCS | Mod: CPTII,S$GLB,, | Performed by: OPHTHALMOLOGY

## 2022-03-15 PROCEDURE — 3288F FALL RISK ASSESSMENT DOCD: CPT | Mod: CPTII,S$GLB,, | Performed by: OPHTHALMOLOGY

## 2022-03-15 PROCEDURE — 1157F ADVNC CARE PLAN IN RCRD: CPT | Mod: CPTII,S$GLB,, | Performed by: OPHTHALMOLOGY

## 2022-03-15 PROCEDURE — 1159F MED LIST DOCD IN RCRD: CPT | Mod: CPTII,S$GLB,, | Performed by: OPHTHALMOLOGY

## 2022-03-15 PROCEDURE — 1126F AMNT PAIN NOTED NONE PRSNT: CPT | Mod: CPTII,S$GLB,, | Performed by: OPHTHALMOLOGY

## 2022-03-15 PROCEDURE — 1126F PR PAIN SEVERITY QUANTIFIED, NO PAIN PRESENT: ICD-10-PCS | Mod: CPTII,S$GLB,, | Performed by: OPHTHALMOLOGY

## 2022-03-15 PROCEDURE — 1159F PR MEDICATION LIST DOCUMENTED IN MEDICAL RECORD: ICD-10-PCS | Mod: CPTII,S$GLB,, | Performed by: OPHTHALMOLOGY

## 2022-03-15 NOTE — PROGRESS NOTES
HPI     80 y/o female is here for LRI OD follow up.  She does not feel like   vision is much better, but when riding with someone at night it seems a   little better.  Watching tv is good, but she still cannot read.    Systane BID OU    Last edited by Kiara Rangel on 3/15/2022  3:12 PM. (History)            Assessment /Plan     For exam results, see Encounter Report.    Post-operative state      Residual CYl post op MFIOL  LRI at 180 done 2/15/22, now with minimal WTR (-0.50 +0.75 at 90 20/25)  LRI effective. So Monitor for stabilization.

## 2022-03-25 ENCOUNTER — HOSPITAL ENCOUNTER (OUTPATIENT)
Dept: RADIOLOGY | Facility: CLINIC | Age: 82
Discharge: HOME | End: 2022-03-25
Attending: INTERNAL MEDICINE
Payer: MEDICARE

## 2022-03-25 ENCOUNTER — HOSPITAL ENCOUNTER (OUTPATIENT)
Dept: RADIOLOGY | Facility: HOSPITAL | Age: 82
Discharge: HOME | End: 2022-03-25
Attending: INTERNAL MEDICINE
Payer: MEDICARE

## 2022-03-25 VITALS — BODY MASS INDEX: 21.37 KG/M2 | HEIGHT: 68 IN | WEIGHT: 141 LBS

## 2022-03-25 DIAGNOSIS — Z12.31 SCREENING MAMMOGRAM, ENCOUNTER FOR: ICD-10-CM

## 2022-03-25 DIAGNOSIS — M85.80 OSTEOPENIA, UNSPECIFIED LOCATION: ICD-10-CM

## 2022-03-25 DIAGNOSIS — M81.0 AGE-RELATED OSTEOPOROSIS WITHOUT CURRENT PATHOLOGICAL FRACTURE: ICD-10-CM

## 2022-03-25 PROCEDURE — 77067 SCR MAMMO BI INCL CAD: CPT | Mod: TC

## 2022-03-25 PROCEDURE — 77067 MAMMO DIGITAL SCREENING BILAT WITH TOMO: ICD-10-PCS | Mod: 26,,, | Performed by: RADIOLOGY

## 2022-03-25 PROCEDURE — 77080 DXA BONE DENSITY AXIAL: CPT | Mod: 26,,, | Performed by: INTERNAL MEDICINE

## 2022-03-25 PROCEDURE — 77063 MAMMO DIGITAL SCREENING BILAT WITH TOMO: ICD-10-PCS | Mod: 26,,, | Performed by: RADIOLOGY

## 2022-03-25 PROCEDURE — 77067 SCR MAMMO BI INCL CAD: CPT | Mod: 26,,, | Performed by: RADIOLOGY

## 2022-03-25 PROCEDURE — 77080 DEXA BONE DENSITY SPINE HIP: ICD-10-PCS | Mod: 26,,, | Performed by: INTERNAL MEDICINE

## 2022-03-25 PROCEDURE — 77063 BREAST TOMOSYNTHESIS BI: CPT | Mod: 26,,, | Performed by: RADIOLOGY

## 2022-03-25 PROCEDURE — 77063 BREAST TOMOSYNTHESIS BI: CPT | Mod: TC

## 2022-03-25 PROCEDURE — 77080 DXA BONE DENSITY AXIAL: CPT | Mod: TC

## 2022-04-01 ENCOUNTER — PATIENT MESSAGE (OUTPATIENT)
Dept: DERMATOLOGY | Facility: CLINIC | Age: 82
End: 2022-04-01
Payer: MEDICARE

## 2022-04-01 DIAGNOSIS — L30.9 DERMATITIS: Primary | ICD-10-CM

## 2022-04-04 ENCOUNTER — PATIENT MESSAGE (OUTPATIENT)
Dept: DERMATOLOGY | Facility: CLINIC | Age: 82
End: 2022-04-04
Payer: MEDICARE

## 2022-04-04 RX ORDER — KETOCONAZOLE 20 MG/G
CREAM TOPICAL
Qty: 60 G | Refills: 3 | Status: SHIPPED | OUTPATIENT
Start: 2022-04-04

## 2022-04-06 NOTE — TELEPHONE ENCOUNTER
No new care gaps identified.  Powered by [email or web address]. Reference number: [id number].   4/06/2022 2:38:31 PM CDT

## 2022-04-07 RX ORDER — ATORVASTATIN CALCIUM 40 MG/1
TABLET, FILM COATED ORAL
Qty: 90 TABLET | Refills: 3 | Status: SHIPPED | OUTPATIENT
Start: 2022-04-07 | End: 2023-03-01

## 2022-04-07 NOTE — TELEPHONE ENCOUNTER
Refill Authorization Note   Madan Bell  is requesting a refill authorization.  Brief Assessment and Rationale for Refill:  Approve     Medication Therapy Plan:       Medication Reconciliation Completed: No   Comments:     No Care Gaps recommended.     Note composed:4:05 PM 04/07/2022

## 2022-04-24 NOTE — PROGRESS NOTES
Subjective:   Patient ID:  Madan Bell is a 82 y.o. female who presents for follow-up of CVD    HPI: The patient is here for valvular heart disease/AF/MVP/MR/VT.   The patient has no chest pain, SOB, TIA, palpitations, syncope or pre-syncope.Patient does  Exercise.BP 110s 120s and minimal swelling some end of days.        Review of Systems   Constitutional: Negative for chills, decreased appetite, diaphoresis, fever, malaise/fatigue, night sweats, weight gain and weight loss.   HENT: Negative for congestion, hoarse voice, nosebleeds, sore throat and tinnitus.    Eyes: Negative for blurred vision, double vision, vision loss in left eye, vision loss in right eye, visual disturbance and visual halos.   Cardiovascular: Negative for chest pain, claudication, cyanosis, dyspnea on exertion, irregular heartbeat, leg swelling, near-syncope, orthopnea, palpitations, paroxysmal nocturnal dyspnea and syncope.   Respiratory: Negative for cough, hemoptysis, shortness of breath, sleep disturbances due to breathing, snoring, sputum production and wheezing.    Endocrine: Negative for cold intolerance, heat intolerance, polydipsia, polyphagia and polyuria.   Hematologic/Lymphatic: Negative for adenopathy and bleeding problem. Does not bruise/bleed easily.   Skin: Negative for color change, dry skin, flushing, itching, nail changes, poor wound healing, rash, skin cancer, suspicious lesions and unusual hair distribution.   Musculoskeletal: Negative for arthritis, back pain, falls, gout, joint pain, joint swelling, muscle cramps, muscle weakness, myalgias and stiffness.   Gastrointestinal: Negative for abdominal pain, anorexia, change in bowel habit, constipation, diarrhea, dysphagia, heartburn, hematemesis, hematochezia, melena and vomiting.   Genitourinary: Negative for decreased libido, dysuria, hematuria, hesitancy and urgency.   Neurological: Negative for excessive daytime sleepiness, dizziness, focal weakness,  "headaches, light-headedness, loss of balance, numbness, paresthesias, seizures, sensory change, tremors, vertigo and weakness.   Psychiatric/Behavioral: Negative for altered mental status, depression, hallucinations, memory loss, substance abuse and suicidal ideas. The patient does not have insomnia and is not nervous/anxious.    Allergic/Immunologic: Negative for environmental allergies and hives.       Objective: BP (!) 146/69 (BP Location: Left arm, Patient Position: Sitting, BP Method: Medium (Automatic))   Pulse (!) 47   Ht 5' 8.5" (1.74 m)   Wt 65.3 kg (143 lb 15.4 oz)   LMP  (LMP Unknown)   BMI 21.57 kg/m²      Physical Exam  Constitutional:       Appearance: She is well-developed.   HENT:      Head: Normocephalic.   Eyes:      Pupils: Pupils are equal, round, and reactive to light.   Neck:      Thyroid: No thyromegaly.      Vascular: Normal carotid pulses. No carotid bruit, hepatojugular reflux or JVD.   Cardiovascular:      Rate and Rhythm: Normal rate and regular rhythm.      Pulses: Intact distal pulses.      Heart sounds: Normal heart sounds. No murmur heard.    No friction rub. No gallop.   Pulmonary:      Effort: Pulmonary effort is normal. No tachypnea or respiratory distress.      Breath sounds: Normal breath sounds. No wheezing or rales.   Chest:      Chest wall: No tenderness.   Abdominal:      General: Bowel sounds are normal. There is no distension.      Palpations: Abdomen is soft. There is no mass.      Tenderness: There is no abdominal tenderness. There is no guarding or rebound.   Musculoskeletal:         General: No tenderness. Normal range of motion.      Cervical back: Normal range of motion.   Lymphadenopathy:      Cervical: No cervical adenopathy.   Skin:     General: Skin is warm.      Findings: No erythema or rash.   Neurological:      Mental Status: She is alert and oriented to person, place, and time.      Cranial Nerves: No cranial nerve deficit.      Coordination: Coordination " normal.   Psychiatric:         Behavior: Behavior normal.         Thought Content: Thought content normal.         Judgment: Judgment normal.         Assessment:     1. MVP (mitral valve prolapse)    2. Nonrheumatic mitral valve regurgitation    3. PVC's (premature ventricular contractions)    4. Paroxysmal ventricular tachycardia    5. Atherosclerosis of aorta    6. HTN (hypertension), benign    7. Persistent atrial fibrillation    8. RIZWANA (obstructive sleep apnea)    9. Decreased strength involving knee joint    10. Primary osteoarthritis of right knee    11. Chronic pain of right knee    12. History of prediabetes    13. Hashimoto's disease    14. Post-operative hypothyroidism    15. Anxiety        Plan:   Discussed diet , achieving and maintaining ideal body weight, and exercise.   We reviewed meds in detail.  Reassured-Discussed goals, options, plan.  Omega-3 > 800 mg/d combined EPA/DHA.  Goal BP< 130/80.  Repeat echo in near future    Madan was seen today for annual exam and atrial fibrillation.    Diagnoses and all orders for this visit:    MVP (mitral valve prolapse)  -     EKG 12-lead; Future; Expected date: 06/25/2023  -     Echo    Nonrheumatic mitral valve regurgitation  -     EKG 12-lead; Future; Expected date: 06/25/2023  -     Echo    PVC's (premature ventricular contractions)  -     EKG 12-lead; Future; Expected date: 06/25/2023  -     Echo    Paroxysmal ventricular tachycardia  -     EKG 12-lead; Future; Expected date: 06/25/2023  -     Echo    Atherosclerosis of aorta    HTN (hypertension), benign  -     EKG 12-lead; Future; Expected date: 06/25/2023  -     Echo    Persistent atrial fibrillation    RIZWANA (obstructive sleep apnea)    Decreased strength involving knee joint    Primary osteoarthritis of right knee    Chronic pain of right knee    History of prediabetes    Hashimoto's disease    Post-operative hypothyroidism    Anxiety            Follow up in about 15 months (around 7/25/2023) for with  ECG and CFD Lavie day Lavie to read; CFD Lavie soon ( ordered but just orderd again).

## 2022-04-25 ENCOUNTER — OFFICE VISIT (OUTPATIENT)
Dept: CARDIOLOGY | Facility: CLINIC | Age: 82
End: 2022-04-25
Payer: MEDICARE

## 2022-04-25 VITALS
DIASTOLIC BLOOD PRESSURE: 69 MMHG | WEIGHT: 143.94 LBS | SYSTOLIC BLOOD PRESSURE: 146 MMHG | HEART RATE: 47 BPM | HEIGHT: 69 IN | BODY MASS INDEX: 21.32 KG/M2

## 2022-04-25 DIAGNOSIS — F41.9 ANXIETY: ICD-10-CM

## 2022-04-25 DIAGNOSIS — I47.29 PAROXYSMAL VENTRICULAR TACHYCARDIA: ICD-10-CM

## 2022-04-25 DIAGNOSIS — M17.11 PRIMARY OSTEOARTHRITIS OF RIGHT KNEE: ICD-10-CM

## 2022-04-25 DIAGNOSIS — I10 HTN (HYPERTENSION), BENIGN: ICD-10-CM

## 2022-04-25 DIAGNOSIS — I49.3 PVC'S (PREMATURE VENTRICULAR CONTRACTIONS): ICD-10-CM

## 2022-04-25 DIAGNOSIS — G47.33 OSA (OBSTRUCTIVE SLEEP APNEA): ICD-10-CM

## 2022-04-25 DIAGNOSIS — Z87.898 HISTORY OF PREDIABETES: ICD-10-CM

## 2022-04-25 DIAGNOSIS — E89.0 POST-OPERATIVE HYPOTHYROIDISM: ICD-10-CM

## 2022-04-25 DIAGNOSIS — I70.0 ATHEROSCLEROSIS OF AORTA: ICD-10-CM

## 2022-04-25 DIAGNOSIS — M25.561 CHRONIC PAIN OF RIGHT KNEE: ICD-10-CM

## 2022-04-25 DIAGNOSIS — I34.0 NONRHEUMATIC MITRAL VALVE REGURGITATION: ICD-10-CM

## 2022-04-25 DIAGNOSIS — R29.898 DECREASED STRENGTH INVOLVING KNEE JOINT: ICD-10-CM

## 2022-04-25 DIAGNOSIS — E06.3 HASHIMOTO'S DISEASE: ICD-10-CM

## 2022-04-25 DIAGNOSIS — I34.1 MVP (MITRAL VALVE PROLAPSE): Primary | ICD-10-CM

## 2022-04-25 DIAGNOSIS — I48.19 PERSISTENT ATRIAL FIBRILLATION: ICD-10-CM

## 2022-04-25 DIAGNOSIS — G89.29 CHRONIC PAIN OF RIGHT KNEE: ICD-10-CM

## 2022-04-25 PROCEDURE — 3288F FALL RISK ASSESSMENT DOCD: CPT | Mod: CPTII,S$GLB,, | Performed by: INTERNAL MEDICINE

## 2022-04-25 PROCEDURE — 1157F ADVNC CARE PLAN IN RCRD: CPT | Mod: CPTII,S$GLB,, | Performed by: INTERNAL MEDICINE

## 2022-04-25 PROCEDURE — 1157F PR ADVANCE CARE PLAN OR EQUIV PRESENT IN MEDICAL RECORD: ICD-10-PCS | Mod: CPTII,S$GLB,, | Performed by: INTERNAL MEDICINE

## 2022-04-25 PROCEDURE — 1159F PR MEDICATION LIST DOCUMENTED IN MEDICAL RECORD: ICD-10-PCS | Mod: CPTII,S$GLB,, | Performed by: INTERNAL MEDICINE

## 2022-04-25 PROCEDURE — 1101F PT FALLS ASSESS-DOCD LE1/YR: CPT | Mod: CPTII,S$GLB,, | Performed by: INTERNAL MEDICINE

## 2022-04-25 PROCEDURE — 3077F PR MOST RECENT SYSTOLIC BLOOD PRESSURE >= 140 MM HG: ICD-10-PCS | Mod: CPTII,S$GLB,, | Performed by: INTERNAL MEDICINE

## 2022-04-25 PROCEDURE — 1126F PR PAIN SEVERITY QUANTIFIED, NO PAIN PRESENT: ICD-10-PCS | Mod: CPTII,S$GLB,, | Performed by: INTERNAL MEDICINE

## 2022-04-25 PROCEDURE — 3078F PR MOST RECENT DIASTOLIC BLOOD PRESSURE < 80 MM HG: ICD-10-PCS | Mod: CPTII,S$GLB,, | Performed by: INTERNAL MEDICINE

## 2022-04-25 PROCEDURE — 1159F MED LIST DOCD IN RCRD: CPT | Mod: CPTII,S$GLB,, | Performed by: INTERNAL MEDICINE

## 2022-04-25 PROCEDURE — 99999 PR PBB SHADOW E&M-EST. PATIENT-LVL V: ICD-10-PCS | Mod: PBBFAC,,, | Performed by: INTERNAL MEDICINE

## 2022-04-25 PROCEDURE — 99215 PR OFFICE/OUTPT VISIT, EST, LEVL V, 40-54 MIN: ICD-10-PCS | Mod: S$GLB,,, | Performed by: INTERNAL MEDICINE

## 2022-04-25 PROCEDURE — 1126F AMNT PAIN NOTED NONE PRSNT: CPT | Mod: CPTII,S$GLB,, | Performed by: INTERNAL MEDICINE

## 2022-04-25 PROCEDURE — 3288F PR FALLS RISK ASSESSMENT DOCUMENTED: ICD-10-PCS | Mod: CPTII,S$GLB,, | Performed by: INTERNAL MEDICINE

## 2022-04-25 PROCEDURE — 99499 UNLISTED E&M SERVICE: CPT | Mod: HCNC,S$GLB,, | Performed by: INTERNAL MEDICINE

## 2022-04-25 PROCEDURE — 1101F PR PT FALLS ASSESS DOC 0-1 FALLS W/OUT INJ PAST YR: ICD-10-PCS | Mod: CPTII,S$GLB,, | Performed by: INTERNAL MEDICINE

## 2022-04-25 PROCEDURE — 99215 OFFICE O/P EST HI 40 MIN: CPT | Mod: S$GLB,,, | Performed by: INTERNAL MEDICINE

## 2022-04-25 PROCEDURE — 99999 PR PBB SHADOW E&M-EST. PATIENT-LVL V: CPT | Mod: PBBFAC,,, | Performed by: INTERNAL MEDICINE

## 2022-04-25 PROCEDURE — 3077F SYST BP >= 140 MM HG: CPT | Mod: CPTII,S$GLB,, | Performed by: INTERNAL MEDICINE

## 2022-04-25 PROCEDURE — 99499 RISK ADDL DX/OHS AUDIT: ICD-10-PCS | Mod: HCNC,S$GLB,, | Performed by: INTERNAL MEDICINE

## 2022-04-25 PROCEDURE — 3078F DIAST BP <80 MM HG: CPT | Mod: CPTII,S$GLB,, | Performed by: INTERNAL MEDICINE

## 2022-04-25 NOTE — PATIENT INSTRUCTIONS
Discussed diet , achieving and maintaining ideal body weight, and exercise.   We reviewed meds in detail.  Reassured-Discussed goals, options, plan.  Omega-3 > 800 mg/d combined EPA/DHA.  Goal BP< 130/80.  Repeat echo in near future

## 2022-05-02 ENCOUNTER — PATIENT OUTREACH (OUTPATIENT)
Dept: ADMINISTRATIVE | Facility: OTHER | Age: 82
End: 2022-05-02
Payer: MEDICARE

## 2022-05-03 ENCOUNTER — OFFICE VISIT (OUTPATIENT)
Dept: OPHTHALMOLOGY | Facility: CLINIC | Age: 82
End: 2022-05-03
Attending: OPHTHALMOLOGY
Payer: MEDICARE

## 2022-05-03 DIAGNOSIS — H43.393 VITREOUS FLOATERS OF BOTH EYES: Primary | ICD-10-CM

## 2022-05-03 PROCEDURE — 92014 COMPRE OPH EXAM EST PT 1/>: CPT | Mod: S$GLB,,, | Performed by: OPHTHALMOLOGY

## 2022-05-03 PROCEDURE — 1159F MED LIST DOCD IN RCRD: CPT | Mod: CPTII,S$GLB,, | Performed by: OPHTHALMOLOGY

## 2022-05-03 PROCEDURE — 3288F FALL RISK ASSESSMENT DOCD: CPT | Mod: CPTII,S$GLB,, | Performed by: OPHTHALMOLOGY

## 2022-05-03 PROCEDURE — 92014 PR EYE EXAM, EST PATIENT,COMPREHESV: ICD-10-PCS | Mod: S$GLB,,, | Performed by: OPHTHALMOLOGY

## 2022-05-03 PROCEDURE — 1126F AMNT PAIN NOTED NONE PRSNT: CPT | Mod: CPTII,S$GLB,, | Performed by: OPHTHALMOLOGY

## 2022-05-03 PROCEDURE — 1159F PR MEDICATION LIST DOCUMENTED IN MEDICAL RECORD: ICD-10-PCS | Mod: CPTII,S$GLB,, | Performed by: OPHTHALMOLOGY

## 2022-05-03 PROCEDURE — 99999 PR PBB SHADOW E&M-EST. PATIENT-LVL III: CPT | Mod: PBBFAC,,, | Performed by: OPHTHALMOLOGY

## 2022-05-03 PROCEDURE — 3288F PR FALLS RISK ASSESSMENT DOCUMENTED: ICD-10-PCS | Mod: CPTII,S$GLB,, | Performed by: OPHTHALMOLOGY

## 2022-05-03 PROCEDURE — 1157F ADVNC CARE PLAN IN RCRD: CPT | Mod: CPTII,S$GLB,, | Performed by: OPHTHALMOLOGY

## 2022-05-03 PROCEDURE — 1157F PR ADVANCE CARE PLAN OR EQUIV PRESENT IN MEDICAL RECORD: ICD-10-PCS | Mod: CPTII,S$GLB,, | Performed by: OPHTHALMOLOGY

## 2022-05-03 PROCEDURE — 1160F PR REVIEW ALL MEDS BY PRESCRIBER/CLIN PHARMACIST DOCUMENTED: ICD-10-PCS | Mod: CPTII,S$GLB,, | Performed by: OPHTHALMOLOGY

## 2022-05-03 PROCEDURE — 1101F PR PT FALLS ASSESS DOC 0-1 FALLS W/OUT INJ PAST YR: ICD-10-PCS | Mod: CPTII,S$GLB,, | Performed by: OPHTHALMOLOGY

## 2022-05-03 PROCEDURE — 1101F PT FALLS ASSESS-DOCD LE1/YR: CPT | Mod: CPTII,S$GLB,, | Performed by: OPHTHALMOLOGY

## 2022-05-03 PROCEDURE — 99999 PR PBB SHADOW E&M-EST. PATIENT-LVL III: ICD-10-PCS | Mod: PBBFAC,,, | Performed by: OPHTHALMOLOGY

## 2022-05-03 PROCEDURE — 1160F RVW MEDS BY RX/DR IN RCRD: CPT | Mod: CPTII,S$GLB,, | Performed by: OPHTHALMOLOGY

## 2022-05-03 PROCEDURE — 1126F PR PAIN SEVERITY QUANTIFIED, NO PAIN PRESENT: ICD-10-PCS | Mod: CPTII,S$GLB,, | Performed by: OPHTHALMOLOGY

## 2022-05-03 NOTE — PROGRESS NOTES
HPI     83 y/o female presents to clinic for LRI f/u    Pt states VA is no better or worse. Saw an arc of light  through the   middle of the vision this morning looking in the mirror    Systane BID OU    Last edited by Tara Claudio on 5/3/2022  2:48 PM. (History)            Assessment /Plan     For exam results, see Encounter Report.    Vitreous floaters of both eyes      PCIOL OU with good 20/30 distance and 8pt near font reading    Etiology of PVD discussed. Signs/symptoms of retinal tear and detachment reviewed.  Patient advised to return urgently if signs of retinal detachment occur.  No RT/RD seen on exam at this time.

## 2022-05-09 ENCOUNTER — PATIENT MESSAGE (OUTPATIENT)
Dept: ORTHOPEDICS | Facility: CLINIC | Age: 82
End: 2022-05-09
Payer: MEDICARE

## 2022-05-10 ENCOUNTER — PATIENT MESSAGE (OUTPATIENT)
Dept: ADMINISTRATIVE | Facility: OTHER | Age: 82
End: 2022-05-10
Payer: MEDICARE

## 2022-05-17 ENCOUNTER — PATIENT MESSAGE (OUTPATIENT)
Dept: ORTHOPEDICS | Facility: CLINIC | Age: 82
End: 2022-05-17
Payer: MEDICARE

## 2022-05-17 ENCOUNTER — PATIENT MESSAGE (OUTPATIENT)
Dept: INTERNAL MEDICINE | Facility: CLINIC | Age: 82
End: 2022-05-17
Payer: MEDICARE

## 2022-05-17 ENCOUNTER — TELEPHONE (OUTPATIENT)
Dept: INTERNAL MEDICINE | Facility: CLINIC | Age: 82
End: 2022-05-17
Payer: MEDICARE

## 2022-05-17 NOTE — TELEPHONE ENCOUNTER
Please schedule her second COVID booster tomorrow at about 3 PM at [facility] [place] [facility] thanks.

## 2022-05-18 ENCOUNTER — HOSPITAL ENCOUNTER (OUTPATIENT)
Dept: CARDIOLOGY | Facility: HOSPITAL | Age: 82
Discharge: HOME | End: 2022-05-18
Attending: INTERNAL MEDICINE
Payer: MEDICARE

## 2022-05-18 ENCOUNTER — IMMUNIZATION (OUTPATIENT)
Dept: PHARMACY | Facility: CLINIC | Age: 82
End: 2022-05-18
Payer: MEDICARE

## 2022-05-18 VITALS
SYSTOLIC BLOOD PRESSURE: 140 MMHG | HEIGHT: 68 IN | WEIGHT: 143 LBS | DIASTOLIC BLOOD PRESSURE: 70 MMHG | BODY MASS INDEX: 21.67 KG/M2 | HEART RATE: 80 BPM

## 2022-05-18 DIAGNOSIS — Z23 NEED FOR VACCINATION: Primary | ICD-10-CM

## 2022-05-18 LAB
ASCENDING AORTA: 3.37 CM
AV INDEX (PROSTH): 1.15
AV MEAN GRADIENT: 2 MMHG
AV PEAK GRADIENT: 5 MMHG
AV VALVE AREA: 3.9 CM2
AV VELOCITY RATIO: 0.86
BSA FOR ECHO PROCEDURE: 1.76 M2
CV ECHO LV RWT: 0.24 CM
DOP CALC AO PEAK VEL: 1.07 M/S
DOP CALC AO VTI: 15.61 CM
DOP CALC LVOT AREA: 3.4 CM2
DOP CALC LVOT DIAMETER: 2.08 CM
DOP CALC LVOT PEAK VEL: 0.92 M/S
DOP CALC LVOT STROKE VOLUME: 60.89 CM3
DOP CALCLVOT PEAK VEL VTI: 17.93 CM
E/E' RATIO: 8.53 M/S
ECHO LV POSTERIOR WALL: 0.68 CM (ref 0.6–1.1)
EJECTION FRACTION: 63 %
FRACTIONAL SHORTENING: 35 % (ref 28–44)
INTERVENTRICULAR SEPTUM: 1.01 CM (ref 0.6–1.1)
IVRT: 91.34 MSEC
LA MAJOR: 5.7 CM
LA MINOR: 6.04 CM
LA WIDTH: 5.27 CM
LEFT ATRIUM SIZE: 4.67 CM
LEFT ATRIUM VOLUME INDEX MOD: 68.1 ML/M2
LEFT ATRIUM VOLUME INDEX: 69.3 ML/M2
LEFT ATRIUM VOLUME MOD: 120.53 CM3
LEFT ATRIUM VOLUME: 122.69 CM3
LEFT INTERNAL DIMENSION IN SYSTOLE: 3.68 CM (ref 2.1–4)
LEFT VENTRICLE DIASTOLIC VOLUME INDEX: 89.73 ML/M2
LEFT VENTRICLE DIASTOLIC VOLUME: 158.83 ML
LEFT VENTRICLE MASS INDEX: 102 G/M2
LEFT VENTRICLE SYSTOLIC VOLUME INDEX: 32.5 ML/M2
LEFT VENTRICLE SYSTOLIC VOLUME: 57.49 ML
LEFT VENTRICULAR INTERNAL DIMENSION IN DIASTOLE: 5.68 CM (ref 3.5–6)
LEFT VENTRICULAR MASS: 181.2 G
LV LATERAL E/E' RATIO: 7.11 M/S
LV SEPTAL E/E' RATIO: 10.67 M/S
MV PEAK E VEL: 0.64 M/S
PISA TR MAX VEL: 2.63 M/S
RA MAJOR: 5.28 CM
RA PRESSURE: 15 MMHG
RA WIDTH: 5.07 CM
RIGHT VENTRICULAR END-DIASTOLIC DIMENSION: 4.31 CM
RV TISSUE DOPPLER FREE WALL SYSTOLIC VELOCITY 1 (APICAL 4 CHAMBER VIEW): 15.1 CM/S
SINUS: 3.88 CM
STJ: 3.22 CM
TDI LATERAL: 0.09 M/S
TDI SEPTAL: 0.06 M/S
TDI: 0.08 M/S
TR MAX PG: 28 MMHG
TRICUSPID ANNULAR PLANE SYSTOLIC EXCURSION: 2.14 CM
TV REST PULMONARY ARTERY PRESSURE: 43 MMHG

## 2022-05-18 PROCEDURE — 93306 TTE W/DOPPLER COMPLETE: CPT | Mod: 26,,, | Performed by: INTERNAL MEDICINE

## 2022-05-18 PROCEDURE — 93306 TTE W/DOPPLER COMPLETE: CPT

## 2022-05-18 PROCEDURE — 93306 ECHO (CUPID ONLY): ICD-10-PCS | Mod: 26,,, | Performed by: INTERNAL MEDICINE

## 2022-05-20 ENCOUNTER — PATIENT MESSAGE (OUTPATIENT)
Dept: ORTHOPEDICS | Facility: CLINIC | Age: 82
End: 2022-05-20
Payer: MEDICARE

## 2022-05-31 ENCOUNTER — TELEPHONE (OUTPATIENT)
Dept: CARDIOLOGY | Facility: CLINIC | Age: 82
End: 2022-05-31
Payer: MEDICARE

## 2022-05-31 ENCOUNTER — PATIENT MESSAGE (OUTPATIENT)
Dept: CARDIOLOGY | Facility: CLINIC | Age: 82
End: 2022-05-31
Payer: MEDICARE

## 2022-05-31 DIAGNOSIS — I10 HYPERTENSION, UNSPECIFIED TYPE: Primary | ICD-10-CM

## 2022-05-31 NOTE — TELEPHONE ENCOUNTER
Spoke with Patient's daughter regarding results of recent echo. She confirmed that patient is not taking Furosemide every day but she will start taking it daily now. Will schedule labs ( bmp & BNP ) in 6 wks at [place] per pt's request.      Sudhir Daly MD          Release- echo looks good MR same or slightly less than before. However, still looks like she has too much fluid in her system. If she is just taking the Furosimide 3 or 4 days per week, increase to all days-if she is taking it differently,let me know. Chem 7 and BNP in 6 weeks.

## 2022-06-01 ENCOUNTER — TELEPHONE (OUTPATIENT)
Dept: ORTHOPEDICS | Facility: CLINIC | Age: 82
End: 2022-06-01
Payer: MEDICARE

## 2022-06-01 ENCOUNTER — PATIENT MESSAGE (OUTPATIENT)
Dept: ORTHOPEDICS | Facility: CLINIC | Age: 82
End: 2022-06-01
Payer: MEDICARE

## 2022-06-01 RX ORDER — FUROSEMIDE 20 MG/1
20 TABLET ORAL DAILY
Qty: 90 TABLET | Refills: 3 | Status: SHIPPED | OUTPATIENT
Start: 2022-06-01 | End: 2023-04-19 | Stop reason: SDUPTHER

## 2022-06-01 NOTE — TELEPHONE ENCOUNTER
I called and spoke to the patient and informed her that her appointment is now at 1:30pm on 6/14/22. The patient is understanding and has no further questions.     ----- Message from David Martin sent at 6/1/2022  2:07 PM CDT -----  Regarding: FW: appointment concerns  Good afternoon,     I just sent you a message, she can come at that day.    Sincerely,   Jake Martin MS, OTC  OR & Clinical Assistant to Dr. Tashi Perkins III  Phone: [phone]  Fax: [phone]    ----- Message -----  From: Leonela Graham MA  Sent: 6/1/2022   2:04 PM CDT  To: David Martin  Subject: FW: appointment concerns                         Good afternoon,    I called and spoke to the patient. The patient is only able to come in the afternoon and she would like to come on 6/14/22 at 1:30pm.    Please let me know when the patient is scheduled and I can call her to confirm.    Sincerely,   Leonela Graham   Medical Assistant   Phone: [phone]  Fax: [phone]  ----- Message -----  From: David Martin  Sent: 6/1/2022  11:09 AM CDT  To: Leonela Graham MA  Subject: RE: appointment concerns                         Good morning,     Did you go through and look for any open slots?     There are several 8:15am slots next week, and a 1pm slot on 6/9  ----- Message -----  From: Leonela Graham MA  Sent: 6/1/2022  11:04 AM CDT  To: David Martin  Subject: FW: appointment concerns                         Good morning Jake,    I called and spoke to the patient. The patient stated that she was only able to get an appointment on 7/12/22 (first available). I see some NP slots on 6/14/22 at 1:30pm and 2:00pm. I let the patient know that one of us will call her back to let her know if we are going to reschedule to a sooner date or not.    Please let me know.    Sincerely,   Leonela Graham   Medical Assistant   Phone: [phone]  Fax: [phone]  ----- Message -----  From: Sophia Peacock  Sent: 6/1/2022  10:16 AM CDT  To:  Gregorio AYON Staff  Subject: appointment concerns                             Name of Who is Calling: Madan           What is the request in detail: Patient is requesting a call back from Jake in regards to her appointment that she thought was previously scheduled.            Can the clinic reply by [facility]: No           What Number to Call Back if not in [facility]: [phone]

## 2022-06-07 ENCOUNTER — PATIENT MESSAGE (OUTPATIENT)
Dept: ADMINISTRATIVE | Facility: OTHER | Age: 82
End: 2022-06-07
Payer: MEDICARE

## 2022-06-07 ENCOUNTER — PATIENT MESSAGE (OUTPATIENT)
Dept: ORTHOPEDICS | Facility: CLINIC | Age: 82
End: 2022-06-07
Payer: MEDICARE

## 2022-06-08 DIAGNOSIS — Z96.651 STATUS POST RIGHT KNEE REPLACEMENT: Primary | ICD-10-CM

## 2022-06-09 ENCOUNTER — PATIENT MESSAGE (OUTPATIENT)
Dept: ADMINISTRATIVE | Facility: OTHER | Age: 82
End: 2022-06-09
Payer: MEDICARE

## 2022-06-09 ENCOUNTER — OFFICE VISIT (OUTPATIENT)
Dept: DERMATOLOGY | Facility: CLINIC | Age: 82
End: 2022-06-09
Payer: MEDICARE

## 2022-06-09 DIAGNOSIS — L57.0 AK (ACTINIC KERATOSIS): ICD-10-CM

## 2022-06-09 DIAGNOSIS — L30.9 DERMATITIS: ICD-10-CM

## 2022-06-09 DIAGNOSIS — D48.5 NEOPLASM OF UNCERTAIN BEHAVIOR OF SKIN: Primary | ICD-10-CM

## 2022-06-09 PROCEDURE — 1157F PR ADVANCE CARE PLAN OR EQUIV PRESENT IN MEDICAL RECORD: ICD-10-PCS | Mod: CPTII,S$GLB,, | Performed by: DERMATOLOGY

## 2022-06-09 PROCEDURE — 99213 PR OFFICE/OUTPT VISIT, EST, LEVL III, 20-29 MIN: ICD-10-PCS | Mod: 25,S$GLB,, | Performed by: DERMATOLOGY

## 2022-06-09 PROCEDURE — 99999 PR PBB SHADOW E&M-EST. PATIENT-LVL III: ICD-10-PCS | Mod: PBBFAC,,, | Performed by: DERMATOLOGY

## 2022-06-09 PROCEDURE — 11102 PR TANGENTIAL BIOPSY, SKIN, SINGLE LESION: ICD-10-PCS | Mod: S$GLB,,, | Performed by: DERMATOLOGY

## 2022-06-09 PROCEDURE — 88305 TISSUE EXAM BY PATHOLOGIST: CPT | Mod: 26,,, | Performed by: PATHOLOGY

## 2022-06-09 PROCEDURE — 3288F FALL RISK ASSESSMENT DOCD: CPT | Mod: CPTII,S$GLB,, | Performed by: DERMATOLOGY

## 2022-06-09 PROCEDURE — 1159F MED LIST DOCD IN RCRD: CPT | Mod: CPTII,S$GLB,, | Performed by: DERMATOLOGY

## 2022-06-09 PROCEDURE — 88342 IMHCHEM/IMCYTCHM 1ST ANTB: CPT | Mod: 26,,, | Performed by: PATHOLOGY

## 2022-06-09 PROCEDURE — 1101F PR PT FALLS ASSESS DOC 0-1 FALLS W/OUT INJ PAST YR: ICD-10-PCS | Mod: CPTII,S$GLB,, | Performed by: DERMATOLOGY

## 2022-06-09 PROCEDURE — 1157F ADVNC CARE PLAN IN RCRD: CPT | Mod: CPTII,S$GLB,, | Performed by: DERMATOLOGY

## 2022-06-09 PROCEDURE — 99213 OFFICE O/P EST LOW 20 MIN: CPT | Mod: 25,S$GLB,, | Performed by: DERMATOLOGY

## 2022-06-09 PROCEDURE — 17000 DESTRUCT PREMALG LESION: CPT | Mod: 59,S$GLB,, | Performed by: DERMATOLOGY

## 2022-06-09 PROCEDURE — 88305 TISSUE EXAM BY PATHOLOGIST: CPT | Performed by: PATHOLOGY

## 2022-06-09 PROCEDURE — 1126F AMNT PAIN NOTED NONE PRSNT: CPT | Mod: CPTII,S$GLB,, | Performed by: DERMATOLOGY

## 2022-06-09 PROCEDURE — 88342 CHG IMMUNOCYTOCHEMISTRY: ICD-10-PCS | Mod: 26,,, | Performed by: PATHOLOGY

## 2022-06-09 PROCEDURE — 1159F PR MEDICATION LIST DOCUMENTED IN MEDICAL RECORD: ICD-10-PCS | Mod: CPTII,S$GLB,, | Performed by: DERMATOLOGY

## 2022-06-09 PROCEDURE — 99999 PR PBB SHADOW E&M-EST. PATIENT-LVL III: CPT | Mod: PBBFAC,,, | Performed by: DERMATOLOGY

## 2022-06-09 PROCEDURE — 11102 TANGNTL BX SKIN SINGLE LES: CPT | Mod: S$GLB,,, | Performed by: DERMATOLOGY

## 2022-06-09 PROCEDURE — 88342 IMHCHEM/IMCYTCHM 1ST ANTB: CPT | Performed by: PATHOLOGY

## 2022-06-09 PROCEDURE — 1160F PR REVIEW ALL MEDS BY PRESCRIBER/CLIN PHARMACIST DOCUMENTED: ICD-10-PCS | Mod: CPTII,S$GLB,, | Performed by: DERMATOLOGY

## 2022-06-09 PROCEDURE — 88305 TISSUE EXAM BY PATHOLOGIST: ICD-10-PCS | Mod: 26,,, | Performed by: PATHOLOGY

## 2022-06-09 PROCEDURE — 1101F PT FALLS ASSESS-DOCD LE1/YR: CPT | Mod: CPTII,S$GLB,, | Performed by: DERMATOLOGY

## 2022-06-09 PROCEDURE — 17000 PR DESTRUCTION(LASER SURGERY,CRYOSURGERY,CHEMOSURGERY),PREMALIGNANT LESIONS,FIRST LESION: ICD-10-PCS | Mod: 59,S$GLB,, | Performed by: DERMATOLOGY

## 2022-06-09 PROCEDURE — 3288F PR FALLS RISK ASSESSMENT DOCUMENTED: ICD-10-PCS | Mod: CPTII,S$GLB,, | Performed by: DERMATOLOGY

## 2022-06-09 PROCEDURE — 1160F RVW MEDS BY RX/DR IN RCRD: CPT | Mod: CPTII,S$GLB,, | Performed by: DERMATOLOGY

## 2022-06-09 PROCEDURE — 1126F PR PAIN SEVERITY QUANTIFIED, NO PAIN PRESENT: ICD-10-PCS | Mod: CPTII,S$GLB,, | Performed by: DERMATOLOGY

## 2022-06-09 NOTE — PROGRESS NOTES
Subjective:       Patient ID:  Madan Bell is a 82 y.o. female who presents for   Chief Complaint   Patient presents with    Skin Check     UBSE     Pt here today for a recheck of her face    The patient was last seen on: 2/3/2020 for cryosurgery to actinic keratoses which have resolved.     This is a high risk patient here to check for the development of new lesions.    Patient with new complaint of lesion(s)  Location: forehead  Duration: 2 months  Symptoms: was rash, but fading  Relieving factors/Previous treatments: ketoconazole cream, finds hydrocortisone cream and moisturizer helped more         Review of Systems   Skin: Positive for daily sunscreen use and activity-related sunscreen use. Negative for itching, rash and recent sunburn.   Hematologic/Lymphatic: Bruises/bleeds easily (eliquis).        Objective:    Physical Exam   Constitutional: She appears well-developed and well-nourished. No distress.   Neurological: She is alert and oriented to person, place, and time. She is not disoriented.   Psychiatric: She has a normal mood and affect.   Skin:   Areas Examined (abnormalities noted in diagram):   Head / Face Inspection Performed              Diagram Legend     Erythematous scaling macule/papule c/w actinic keratosis       Vascular papule c/w angioma      Pigmented verrucoid papule/plaque c/w seborrheic keratosis      Yellow umbilicated papule c/w sebaceous hyperplasia      Irregularly shaped tan macule c/w lentigo     1-2 mm smooth white papules consistent with Milia      Movable subcutaneous cyst with punctum c/w epidermal inclusion cyst      Subcutaneous movable cyst c/w pilar cyst      Firm pink to brown papule c/w dermatofibroma      Pedunculated fleshy papule(s) c/w skin tag(s)      Evenly pigmented macule c/w junctional nevus     Mildly variegated pigmented, slightly irregular-bordered macule c/w mildly atypical nevus      Flesh colored to evenly pigmented papule c/w intradermal  nevus       Pink pearly papule/plaque c/w basal cell carcinoma      Erythematous hyperkeratotic cursted plaque c/w SCC      Surgical scar with no sign of skin cancer recurrence      Open and closed comedones      Inflammatory papules and pustules      Verrucoid papule consistent consistent with wart     Erythematous eczematous patches and plaques     Dystrophic onycholytic nail with subungual debris c/w onychomycosis     Umbilicated papule    Erythematous-base heme-crusted tan verrucoid plaque consistent with inflamed seborrheic keratosis     Erythematous Silvery Scaling Plaque c/w Psoriasis     See annotation          Assessment / Plan:      Pathology Orders:     Normal Orders This Visit    Specimen to Pathology, Dermatology     Comments:    Number of Specimens:->1  ------------------------->-------------------------  Spec 1 Procedure:->Biopsy  Spec 1 Clinical Impression:->r/o BCC v ak v BLK  Spec 1 Source:->left lateral forehed    Questions:    Procedure Type: Dermatology and skin neoplasms    Number of Specimens: 1    ------------------------: -------------------------    Spec 1 Procedure: Biopsy    Spec 1 Clinical Impression: r/o BCC v ak v BLK    Spec 1 Source: left lateral forehed    Release to patient:         Neoplasm of uncertain behavior of skin  Shave biopsy procedure note:    Shave biopsy performed after verbal consent including risk of infection, scar, recurrence, need for additional treatment of site. Area prepped with alcohol, anesthetized with approximately 1.0cc of 1% lidocaine with epinephrine. Lesional tissue shaved with razor blade. Hemostasis achieved with application of aluminum chloride followed by hyfrecation. No complications. Dressing applied. Wound care explained.      -     Specimen to Pathology, Dermatology    AK (actinic keratosis)  Cryosurgery Procedure Note    Verbal consent from the patient is obtained including, but not limited to, risk of hypopigmentation/hyperpigmentation, scar,  recurrence of lesion. The patient is aware of the precancerous quality and need for treatment of these lesions. Liquid nitrogen cryosurgery is applied to the 1 actinic keratoses, as detailed in the physical exam, to produce a freeze injury. The patient is aware that blisters may form and is instructed on wound care with gentle cleansing and use of vaseline ointment to keep moist until healed. The patient is supplied a handout on cryosurgery and is instructed to call if lesions do not completely resolve.    Dermatitis  Discussed with patient good skin care regimen including avoiding fragranced products and very hot showers.  Recommended dove sensitive skin bar soap or cerave hydrating cleanser or bar.  Recommend Cerave cream  For moisturization daily -2x daily.   Moisturize and use HC cream otc if needed           Follow up for prn bx report.

## 2022-06-09 NOTE — PATIENT INSTRUCTIONS
Shave Biopsy Wound Care    Your doctor has performed a shave biopsy today.  A band aid and vaseline ointment has been placed over the site.  This should remain in place for NO LONGER THAN 48 hours.  It is fine to remove the bandaid after 24 hours, if the area is no longer bleeding. It is recommended that you keep the area dry (do not wet)) for the first 24 hours.  After 24 hours, wash the area with warm soap and water and apply Vaseline jelly.  Many patients prefer to use Neosporin or Bacitracin ointment.  This is acceptable; however, know that you can develop an allergy to this medication even if you have used it safely for years.  It is important to keep the area moist.  Letting it dry out and get air slows healing time, and will worsen the scar.        If you notice increasing redness, tenderness, pain, or yellow drainage at the biopsy site, please notify your doctor.  These are signs of an infection.    If your biopsy site is bleeding, apply firm pressure for 15 minutes straight.  Repeat for another 15 minutes, if it is still bleeding.   If the surgical site continues to bleed, then please contact your doctor.      For [facility] users:   You will receive your biopsy results in [facility] as soon as they are available. Please be assured that your physician/provider will review your results and will then determine what further treatment, evaluation, or planning is required. You should be contacted by your physician's/provider's office within 5 business days of receiving your results; If not, please reach out to directly. This is one more way [employer] is putting you first.     [facility] [place] [address]/ [phone] FAX/ [email or web address]

## 2022-06-14 ENCOUNTER — OFFICE VISIT (OUTPATIENT)
Dept: ORTHOPEDICS | Facility: CLINIC | Age: 82
End: 2022-06-14
Payer: MEDICARE

## 2022-06-14 ENCOUNTER — HOSPITAL ENCOUNTER (OUTPATIENT)
Dept: RADIOLOGY | Facility: HOSPITAL | Age: 82
Discharge: HOME | End: 2022-06-14
Attending: ORTHOPAEDIC SURGERY
Payer: MEDICARE

## 2022-06-14 VITALS — WEIGHT: 143.13 LBS | BODY MASS INDEX: 21.69 KG/M2 | HEIGHT: 68 IN

## 2022-06-14 DIAGNOSIS — Z96.651 STATUS POST RIGHT KNEE REPLACEMENT: ICD-10-CM

## 2022-06-14 DIAGNOSIS — Z96.651 STATUS POST RIGHT KNEE REPLACEMENT: Primary | ICD-10-CM

## 2022-06-14 DIAGNOSIS — M17.12 PRIMARY OSTEOARTHRITIS OF LEFT KNEE: ICD-10-CM

## 2022-06-14 PROCEDURE — 1101F PT FALLS ASSESS-DOCD LE1/YR: CPT | Mod: CPTII,S$GLB,, | Performed by: ORTHOPAEDIC SURGERY

## 2022-06-14 PROCEDURE — 99999 PR PBB SHADOW E&M-EST. PATIENT-LVL III: CPT | Mod: PBBFAC,,, | Performed by: ORTHOPAEDIC SURGERY

## 2022-06-14 PROCEDURE — 99213 OFFICE O/P EST LOW 20 MIN: CPT | Mod: S$GLB,,, | Performed by: ORTHOPAEDIC SURGERY

## 2022-06-14 PROCEDURE — 1126F AMNT PAIN NOTED NONE PRSNT: CPT | Mod: CPTII,S$GLB,, | Performed by: ORTHOPAEDIC SURGERY

## 2022-06-14 PROCEDURE — 3288F PR FALLS RISK ASSESSMENT DOCUMENTED: ICD-10-PCS | Mod: CPTII,S$GLB,, | Performed by: ORTHOPAEDIC SURGERY

## 2022-06-14 PROCEDURE — 73562 X-RAY EXAM OF KNEE 3: CPT | Mod: TC,LT

## 2022-06-14 PROCEDURE — 73562 XR KNEE ORTHO RIGHT WITH FLEXION: ICD-10-PCS | Mod: 26,LT,, | Performed by: RADIOLOGY

## 2022-06-14 PROCEDURE — 1126F PR PAIN SEVERITY QUANTIFIED, NO PAIN PRESENT: ICD-10-PCS | Mod: CPTII,S$GLB,, | Performed by: ORTHOPAEDIC SURGERY

## 2022-06-14 PROCEDURE — 1157F PR ADVANCE CARE PLAN OR EQUIV PRESENT IN MEDICAL RECORD: ICD-10-PCS | Mod: CPTII,S$GLB,, | Performed by: ORTHOPAEDIC SURGERY

## 2022-06-14 PROCEDURE — 73562 X-RAY EXAM OF KNEE 3: CPT | Mod: 26,LT,, | Performed by: RADIOLOGY

## 2022-06-14 PROCEDURE — 1101F PR PT FALLS ASSESS DOC 0-1 FALLS W/OUT INJ PAST YR: ICD-10-PCS | Mod: CPTII,S$GLB,, | Performed by: ORTHOPAEDIC SURGERY

## 2022-06-14 PROCEDURE — 73564 XR KNEE ORTHO RIGHT WITH FLEXION: ICD-10-PCS | Mod: 26,RT,, | Performed by: RADIOLOGY

## 2022-06-14 PROCEDURE — 1159F PR MEDICATION LIST DOCUMENTED IN MEDICAL RECORD: ICD-10-PCS | Mod: CPTII,S$GLB,, | Performed by: ORTHOPAEDIC SURGERY

## 2022-06-14 PROCEDURE — 99999 PR PBB SHADOW E&M-EST. PATIENT-LVL III: ICD-10-PCS | Mod: PBBFAC,,, | Performed by: ORTHOPAEDIC SURGERY

## 2022-06-14 PROCEDURE — 73564 X-RAY EXAM KNEE 4 OR MORE: CPT | Mod: 26,RT,, | Performed by: RADIOLOGY

## 2022-06-14 PROCEDURE — 1157F ADVNC CARE PLAN IN RCRD: CPT | Mod: CPTII,S$GLB,, | Performed by: ORTHOPAEDIC SURGERY

## 2022-06-14 PROCEDURE — 1159F MED LIST DOCD IN RCRD: CPT | Mod: CPTII,S$GLB,, | Performed by: ORTHOPAEDIC SURGERY

## 2022-06-14 PROCEDURE — 99213 PR OFFICE/OUTPT VISIT, EST, LEVL III, 20-29 MIN: ICD-10-PCS | Mod: S$GLB,,, | Performed by: ORTHOPAEDIC SURGERY

## 2022-06-14 PROCEDURE — 3288F FALL RISK ASSESSMENT DOCD: CPT | Mod: CPTII,S$GLB,, | Performed by: ORTHOPAEDIC SURGERY

## 2022-06-14 NOTE — PROGRESS NOTES
Subjective:     HPI:   Madan Bell is a 82 y.o. female who presents for annual follow up right TKA    Date of surgery: 6/14/21    Medications: none    Assistive Devices: none    Limitations: none from right knee, left knee: stairs and getting up out of chair  Doing floor peddlar 5x/week    No pain, R TKA doing great  No L knee pain, only subjective weakness on stairs and getting out of chair       Objective:   Body mass index is 21.76 kg/m².  Exam:    Gait: limp/antalgic none    Incision: healed    Stability:  Knee stable anterior-posterior varus and valgus stresses, no extensor lag    Extension: 0    Flexion: 125    Valgus angle: 5    Pre-op   3 month 3-120      Imaging:  Indication:  Exam status post right total knee arthroplasty  Exam Ordered: Radiographs of the right knee include a standing anteroposterior view, a lateral view, and a sunrise view  Details of Examination: Todays exam show a well fixed, well positioned total knee arthroplasty with no evidence of wear, osteolysis, or loosening.  Impression:  Status post right total knee arthroplasty, implant in good position with no abnormality         Assessment:       ICD-10-CM ICD-9-CM   1. Status post right knee replacement  Z96.651 V43.65   2. Primary osteoarthritis of left knee  M17.12 715.16      R TKA Doing well  L knee predominantly PF OA + patella yasmin     Plan:       Patient is doing very well with their total knee arthroplasty.  They will continue with their routine care of the knee replacement and see me back for their follow-up at the routine interval.  If there are problems in the interim they will see me back sooner.    L knee: continue floor peddlar, SLR for quad strengthening    5 year follow up for standard xrays    PRN left knee: CSI v HA then TKA  PT Rx: eval and treat with modalities: left knee patellofemoral arthritis, 3 visits to develop a home program for knee arthritis and quad strengthening without exacerbating  patellofemoral joint (minimize squats and knee extensions)      Orders Placed This Encounter   Procedures    Ambulatory referral/consult to Physical/Occupational Therapy     Standing Status:   Future     Standing Expiration Date:   7/14/2023     Referral Priority:   Routine     Referral Type:   Physical Medicine     Referral Reason:   Specialty Services Required     Number of Visits Requested:   1             Past Medical History:   Diagnosis Date    A-fib     Anticoagulant long-term use     Anxiety     Arrhythmia     Arthritis     knees    Basal cell carcinoma     Breast cyst     Community acquired pneumonia of right lower lobe of lung 12/11/2018    Deep vein thrombosis     one DVT during  pregnancy    Encephalopathy 2/10/2021    Fibrocystic breast 1980 - ??    Heart murmur     Hypercholesterolemia 6/13/2017    Hyperlipidemia     Hypertension     Hypothyroidism     Mitral valve disorder     Mitral valve prolapse     PVC's (premature ventricular contractions)     Squamous cell carcinoma bx 7-2017    right upper forehead    Thyroid cancer 1/29/2013    Thyroid disease     Vasovagal near syncope 11/12/2012    Vasovagal near syncope 11/12/2012    VTE (venous thromboembolism)     in 1960s, post partum, pt unsure of details     Weakness 12/11/2018       Past Surgical History:   Procedure Laterality Date    BREAST BIOPSY Left     excisional    BREAST BIOPSY Left     core needle biopsy    BREAST CYST ASPIRATION Bilateral 1980's - ??    BREAST CYST EXCISION Right 2008 - ??    CARDIOVERSION N/A 08/13/2018    Procedure: CARDIOVERSION;  Surgeon: Fernando Disla MD;  Location: [facility] CATH LAB;  Service: Cardiology;  Laterality: N/A;  AF,DCCV,ANAHI,MAC,FAS,3 PREP    CATARACT EXTRACTION W/  INTRAOCULAR LENS IMPLANT Left 04/22/2021    Procedure: EXTRACTION, CATARACT, WITH IOL INSERTION;  Surgeon: Gabby Sandoval MD;  Location: [facility] [place] OR;  Service: Ophthalmology;  Laterality: Left;    CATARACT  EXTRACTION W/  INTRAOCULAR LENS IMPLANT Right 05/17/2021    Procedure: EXTRACTION, CATARACT, WITH IOL INSERTION;  Surgeon: Gabby Sandoval MD;  Location: [facility] OR;  Service: Ophthalmology;  Laterality: Right;    HYSTERECTOMY      tahbso    KNEE ARTHROPLASTY Right 06/14/2021    Procedure: ARTHROPLASTY, KNEE:RIGHT:DEPUY-SIGMA;  Surgeon: Tashi Perkins III, MD;  Location: [facility] OR;  Service: Orthopedics;  Laterality: Right;    OOPHORECTOMY  hysterectomy - 2000 - ??   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   Tobacco Use    Smoking status: Never Smoker    Smokeless tobacco: Never Used   Substance and Sexual Activity    Alcohol use: No    Drug use: No   Social History Narrative    Daughter Barby is nurse- Does not use stairs at home     Social Determinants of Health     Financial Resource Strain: Low Risk     Difficulty of Paying Living Expenses: Not very hard   Food Insecurity: No Food Insecurity    Worried About Running Out of Food in the Last Year: Never true    Ran Out of Food in the Last Year: Never true   Transportation Needs: No Transportation Needs    Lack of Transportation (Medical): No    Lack of Transportation (Non-Medical): No   Physical Activity: Sufficiently Active    Days of Exercise per Week: 6 days    Minutes of Exercise per Session: 40 min   Stress: No Stress Concern Present    Feeling of Stress : Not at all   Social Connections: Unknown    Frequency of Communication with Friends and Family: More  than three times a week    Frequency of Social Gatherings with Friends and Family: More than three times a week    Active Member of Clubs or Organizations: Yes    Attends Club or Organization Meetings: More than 4 times per year    Marital Status:    Housing Stability: Low Risk     Unable to Pay for Housing in the Last Year: No    Number of Places Lived in the Last Year: 1    Unstable Housing in the Last Year: No

## 2022-06-22 LAB
FINAL PATHOLOGIC DIAGNOSIS: NORMAL
GROSS: NORMAL
Lab: NORMAL
MICROSCOPIC EXAM: NORMAL

## 2022-06-27 ENCOUNTER — TELEPHONE (OUTPATIENT)
Dept: ORTHOPEDICS | Facility: CLINIC | Age: 82
End: 2022-06-27
Payer: MEDICARE

## 2022-06-27 NOTE — TELEPHONE ENCOUNTER
I called and spoke to the patient regarding her call back request. The patient stated that she is going to the dentist today and would like to know if she will need antibiotics. I informed the patient that since her procedure was a year ago she should not but I will email her the Dental Antibiotic Protocol to ([email or web address]). The patient verbalized understanding and has no further questions.     ----- Message from Kumar Mccracken sent at 6/27/2022 10:25 AM CDT -----  Contact: @ [phone]   Patient requesting a return call about antibiotic before a dental crown placement ( today @ 1pm ), pls advis

## 2022-07-02 DIAGNOSIS — N95.2 ATROPHIC VAGINITIS: ICD-10-CM

## 2022-07-02 DIAGNOSIS — N39.0 RECURRENT UTI: ICD-10-CM

## 2022-07-05 ENCOUNTER — TELEPHONE (OUTPATIENT)
Dept: UROLOGY | Facility: CLINIC | Age: 82
End: 2022-07-05
Payer: MEDICARE

## 2022-07-05 ENCOUNTER — LAB VISIT (OUTPATIENT)
Dept: LAB | Facility: HOSPITAL | Age: 82
End: 2022-07-05
Attending: INTERNAL MEDICINE
Payer: MEDICARE

## 2022-07-05 DIAGNOSIS — N95.2 ATROPHIC VAGINITIS: ICD-10-CM

## 2022-07-05 DIAGNOSIS — I10 HYPERTENSION, UNSPECIFIED TYPE: ICD-10-CM

## 2022-07-05 DIAGNOSIS — N39.0 RECURRENT UTI: ICD-10-CM

## 2022-07-05 LAB
ANION GAP SERPL CALC-SCNC: 5 MMOL/L (ref 8–16)
BNP SERPL-MCNC: 319 PG/ML (ref 0–99)
BUN SERPL-MCNC: 10 MG/DL (ref 8–23)
CALCIUM SERPL-MCNC: 9.7 MG/DL (ref 8.7–10.5)
CHLORIDE SERPL-SCNC: 101 MMOL/L (ref 95–110)
CO2 SERPL-SCNC: 29 MMOL/L (ref 23–29)
CREAT SERPL-MCNC: 0.8 MG/DL (ref 0.5–1.4)
EST. GFR  (AFRICAN AMERICAN): >60 ML/MIN/1.73 M^2
EST. GFR  (NON AFRICAN AMERICAN): >60 ML/MIN/1.73 M^2
GLUCOSE SERPL-MCNC: 95 MG/DL (ref 70–110)
POTASSIUM SERPL-SCNC: 4 MMOL/L (ref 3.5–5.1)
SODIUM SERPL-SCNC: 135 MMOL/L (ref 136–145)

## 2022-07-05 PROCEDURE — 83880 ASSAY OF NATRIURETIC PEPTIDE: CPT | Performed by: INTERNAL MEDICINE

## 2022-07-05 PROCEDURE — 36415 COLL VENOUS BLD VENIPUNCTURE: CPT | Mod: PO | Performed by: INTERNAL MEDICINE

## 2022-07-05 PROCEDURE — 80048 BASIC METABOLIC PNL TOTAL CA: CPT | Performed by: INTERNAL MEDICINE

## 2022-07-05 RX ORDER — ESTRADIOL 0.1 MG/G
CREAM VAGINAL
Qty: 1785 G | Refills: 0 | Status: CANCELLED | OUTPATIENT
Start: 2022-07-05

## 2022-07-05 RX ORDER — ESTRADIOL 0.1 MG/G
CREAM VAGINAL
Qty: 1785 G | Refills: 1 | Status: SHIPPED | OUTPATIENT
Start: 2022-07-05 | End: 2022-07-19

## 2022-07-05 RX ORDER — ESTRADIOL 0.1 MG/G
CREAM VAGINAL
Qty: 1785 G | Refills: 1 | Status: SHIPPED | OUTPATIENT
Start: 2022-07-05 | End: 2022-07-05

## 2022-07-05 NOTE — TELEPHONE ENCOUNTER
----- Message from Darlin Wilson LPN sent at 7/5/2022  5:24 PM CDT -----  Contact: @[phone]    ----- Message -----  From: Joann Hdez RN  Sent: 7/5/2022   4:11 PM CDT  To: Darlin Wilson LPN      ----- Message -----  From: Ariadna Jaime  Sent: 7/5/2022   3:35 PM CDT  To: Ace HODGSON Staff    Pt requesting her refill be sent to [facility] instead of [facility].  Pt called in already to have medication (estradioL (ESTRACE) 0.01 % (0.1 mg/gram) vaginal cream) refilled now she states she would like it to [facility].  Please call to discuss further.      [facility] ([place]) - [address] - [address]  [address] [place] [address]  Phone: [phone] Fax: [phone]

## 2022-07-05 NOTE — TELEPHONE ENCOUNTER
----- Message from Ariadna Jaime sent at 7/5/2022  3:31 PM CDT -----  Contact: @[phone]  Pt requesting her refill be sent to [facility] instead of [facility].  Pt called in already to have medication (estradioL (ESTRACE) 0.01 % (0.1 mg/gram) vaginal cream) refilled now she states she would like it to [facility].  Please call to discuss further.      [facility] ([place]) - [address] - [address]  [address] [place] [address]  Phone: [phone] Fax: [phone]

## 2022-07-06 ENCOUNTER — PATIENT MESSAGE (OUTPATIENT)
Dept: CARDIOLOGY | Facility: CLINIC | Age: 82
End: 2022-07-06
Payer: MEDICARE

## 2022-07-07 ENCOUNTER — TELEPHONE (OUTPATIENT)
Dept: CARDIOLOGY | Facility: CLINIC | Age: 82
End: 2022-07-07
Payer: MEDICARE

## 2022-07-07 DIAGNOSIS — I50.9 CONGESTIVE HEART FAILURE, UNSPECIFIED HF CHRONICITY, UNSPECIFIED HEART FAILURE TYPE: Primary | ICD-10-CM

## 2022-07-08 ENCOUNTER — PATIENT MESSAGE (OUTPATIENT)
Dept: CARDIOLOGY | Facility: CLINIC | Age: 82
End: 2022-07-08
Payer: MEDICARE

## 2022-07-13 ENCOUNTER — CLINICAL SUPPORT (OUTPATIENT)
Dept: REHABILITATION | Facility: HOSPITAL | Age: 82
End: 2022-07-13
Attending: ORTHOPAEDIC SURGERY
Payer: MEDICARE

## 2022-07-13 DIAGNOSIS — M17.12 PRIMARY OSTEOARTHRITIS OF LEFT KNEE: ICD-10-CM

## 2022-07-13 DIAGNOSIS — M25.562 LEFT KNEE PAIN, UNSPECIFIED CHRONICITY: ICD-10-CM

## 2022-07-13 PROCEDURE — 97110 THERAPEUTIC EXERCISES: CPT | Mod: PO

## 2022-07-13 PROCEDURE — 97161 PT EVAL LOW COMPLEX 20 MIN: CPT | Mod: PO

## 2022-07-13 NOTE — PROGRESS NOTES
[facility]   Physical Therapy Initial Evaluation     Date: 7/13/2022   Name: Madan Cantu [facility]  Clinic Number: [id number]    Therapy Diagnosis:   Encounter Diagnoses   Name Primary?    Primary osteoarthritis of left knee     Left knee pain, unspecified chronicity      Physician: Tashi Perkins III, *    Physician Orders: PT Eval and Treat   Medical Diagnosis from Referral: Primary osteoarthritis of left knee  Evaluation Date: 7/13/2022  Authorization Period Expiration: 6/14/2023  Plan of Care Expiration: 9/13/2022  Progress Note Due: 8/13/2022  Visit # / Visits authorized: 1/ 1   FOTO: 1/3    Precautions: Standard and A-fib     Time In: 3:35  Time Out: 4:15  Total Appointment Time (timed & untimed codes): 40 minutes      SUBJECTIVE     Date of onset: Pt reported that she has been having trouble climbing stairs leading he with her L LE for the past couple of months.    History of current condition - Madan reports: Pt had a R TKA 1 year ago     Falls: none    Imaging, X-Rays:     Prior Therapy: yes  Social History: Pt lives in a two story home with one step to enter her home.   She does not need to go upstairs. Pt lives alone  Occupation: retired  Prior Level of Function: Pt has been recovering from a R TKA  Current Level of Function: Pt is having trouble climbing stairs leading with her L LE. Pt has a floor bike that she uses daily.    Pain:  Current 0/10, worst 0/10, best 0/10   Location: left knee    Description: NA  Aggravating Factors: climbing stairs  Easing Factors: NA    Patients goals: to gain strength in her L LE     Medical History:   Past Medical History:   Diagnosis Date    A-fib     Anticoagulant long-term use     Anxiety     Arrhythmia     Arthritis     knees    Basal cell carcinoma     Breast cyst     Community acquired pneumonia of right lower lobe of lung 12/11/2018    Deep vein thrombosis     one DVT during  pregnancy    Encephalopathy 2/10/2021     Fibrocystic breast 1980 - ??    Heart murmur     Hypercholesterolemia 6/13/2017    Hyperlipidemia     Hypertension     Hypothyroidism     Mitral valve disorder     Mitral valve prolapse     PVC's (premature ventricular contractions)     Squamous cell carcinoma bx 7-2017    right upper forehead    Thyroid cancer 1/29/2013    Thyroid disease     Vasovagal near syncope 11/12/2012    Vasovagal near syncope 11/12/2012    VTE (venous thromboembolism)     in 1960s, post partum, pt unsure of details     Weakness 12/11/2018       Surgical History:   Madan Bell  has a past surgical history that includes Thyroid surgery; Hysterectomy; Breast cyst aspiration (Bilateral, 1980's - ??); Oophorectomy (hysterectomy - 2000 - ??); Breast cyst excision (Right, 2008 - ??); Breast biopsy (Left); Breast biopsy (Left); Cardioversion (N/A, 08/13/2018); Cataract extraction w/  intraocular lens implant (Left, 04/22/2021); Cataract extraction w/  intraocular lens implant (Right, 05/17/2021); and Knee Arthroplasty (Right, 06/14/2021).    Medications:   Madan has a current medication list which includes the following prescription(s): apixaban, ascorbic acid (vitamin c), atorvastatin, bisoprolol-hydrochlorothiazide 2.5-6.25 mg, calcium citrate, cholecalciferol (vitamin d3), cranberry extract, cyanocobalamin, d-mannose, estradiol, furosemide, ketoconazole, ultimate andrea probiotic, levothyroxine, ofloxacin, omega-3 fatty acids-vitamin e, pantoprazole, vitamin b complex, and vitamin e.    Allergies:   Review of patient's allergies indicates:   Allergen Reactions    Fluoride Hives     Other reaction(s): Hives    Fluoride preparations Hives    Amoxicillin-pot clavulanate Diarrhea    Nitrofurantoin monohyd/m-cryst Nausea Only and Other (See Comments)          OBJECTIVE   Observation: Pt was able to enter the clinic ambulating independently without an assistive device.    Posture: WFL      Range of Motion:    Knee Left active Left Passive Right Active R passive   Flexion 134 nt 120 nt   Extension 0 nt 0 nt           Lower Extremity Strength  Right LE  Left LE    Knee extension: 5/5 Knee extension: 4+/5   Knee flexion: 5/5 Knee flexion: 4+/5   Hip flexion: 3+/5 Hip flexion: 3/5   Hip extension:  nt Hip extension: nt   Hip abduction: 3/5 Hip abduction: 3+/5   Hip adduction: 2/5 Hip adduction 2/5   Ankle dorsiflexion: 5/5 Ankle dorsiflexion: 4/5   Ankle plantarflexion: nt Ankle plantarflexion: nt       Step down test: nt      Function:    - SLS R: nt  - SLS L: nt  - Squat: nt   - Sit <--> Stand: independent   - Bed Mobility:  independent        Joint Mobility: WFL  Patellar hypomobile    Palpation: no palpable tenderness    Sensation: intact    Flexibility:    Ely's test: R = nt degrees ; L = nt degrees   Popliteal Angle: R = nt degrees ; L = nt degrees   Lisa's test: R = nt ; L = nt   Edvin test: R = nt ; L = nt    Edema: none      Limitation/Restriction for FOTO knee Survey    Therapist reviewed FOTO scores for Madan Bell on 7/13/2022.   FOTO documents entered into [employer] - see Media section.    Limitation Score: 35%         TREATMENT     Total Treatment time (time-based codes) separate from Evaluation: 20 minutes      Madan received the treatments listed below:      therapeutic exercises to develop strength for 20 minutes including:  Quad sets 10 x 2 with 3 sec hole  Hook lying B hip abduction 10 x 2 with a green TB  Hook lying B hip adduction 10 x 2 with a ball  Bridges 10 x 3        PATIENT EDUCATION AND HOME EXERCISES     Education provided:   - yes    Written Home Exercises Provided: yes. Exercises were reviewed and Madan was able to demonstrate them prior to the end of the session.  Madan demonstrated good  understanding of the education provided. See EMR under Patient Instructions for exercises provided during therapy sessions.    ASSESSMENT     Madan is a 82 y.o. female referred to outpatient  Physical Therapy with a medical diagnosis of Primary osteoarthritis of left knee. Patient presents with L LE weaness    Patient prognosis is Good.   Patient will benefit from skilled outpatient Physical Therapy to address the deficits stated above and in the chart below, provide patient /family education, and to maximize patientt's level of independence.     Plan of care discussed with patient: Yes  Patient's spiritual, cultural and educational needs considered and patient is agreeable to the plan of care and goals as stated below:     Anticipated Barriers for therapy: none    Medical Necessity is demonstrated by the following  History  Co-morbidities and personal factors that may impact the plan of care Co-morbidities:   advanced age and See past medical history    Personal Factors:   age     low   Examination  Body Structures and Functions, activity limitations and participation restrictions that may impact the plan of care Body Regions:   lower extremities    Body Systems:    strength    Participation Restrictions:   Climbing stairs    Activity limitations:   Learning and applying knowledge  no deficits    General Tasks and Commands  no deficits    Communication  no deficits    Mobility  lifting and carrying objects    Self care  no deficits    Domestic Life  doing house work (cleaning house, washing dishes, laundry)    Interactions/Relationships  no deficits    Life Areas  no deficits    Community and Social Life  recreation and leisure         low   Clinical Presentation stable and uncomplicated low   Decision Making/ Complexity Score: low     Goals:  Short Term Goals: 3 weeks   1. Pt will be instructed in an exercise program to address functional deficits related to her L LE Sx.  2. Pt will report less difficulty climbing stairs    Long Term Goals: 6 weeks   1. Pt will be independent in a HEP to assist in managing her L LE Sx.  2. Pt will report that she is able to climb stairs to the second floor of her home  without any diffculty.  3. Pt will report improved functional ability through an improved score on the FOTO Knee survey.    PLAN   Plan of care Certification: 7/13/2022 to 9/13/2022.    Outpatient Physical Therapy 1 times weekly for 3 visits over the next weeks to include the following interventions: Electrical Stimulation as indicated , Gait Training, Manual Therapy, Moist Heat/ Ice, Neuromuscular Re-ed, Patient Education, Self Care, Therapeutic Activities, Therapeutic Exercise and dry needling.     Baldev Cherry, PT      I CERTIFY THE NEED FOR THESE SERVICES FURNISHED UNDER THIS PLAN OF TREATMENT AND WHILE UNDER MY CARE   Physician's comments:     Physician's Signature: ___________________________________________________

## 2022-07-19 ENCOUNTER — PATIENT MESSAGE (OUTPATIENT)
Dept: RESEARCH | Facility: CLINIC | Age: 82
End: 2022-07-19
Payer: MEDICARE

## 2022-07-19 ENCOUNTER — TELEPHONE (OUTPATIENT)
Dept: UROLOGY | Facility: CLINIC | Age: 82
End: 2022-07-19
Payer: MEDICARE

## 2022-07-19 DIAGNOSIS — N95.2 ATROPHIC VAGINITIS: ICD-10-CM

## 2022-07-19 DIAGNOSIS — N39.0 RECURRENT UTI: ICD-10-CM

## 2022-07-19 RX ORDER — ESTRADIOL 0.1 MG/G
CREAM VAGINAL
Qty: 42.5 G | Refills: 4 | Status: SHIPPED | OUTPATIENT
Start: 2022-07-19 | End: 2023-07-27 | Stop reason: SDUPTHER

## 2022-07-24 PROBLEM — M25.562 LEFT KNEE PAIN: Status: ACTIVE | Noted: 2022-07-24

## 2022-07-25 NOTE — PLAN OF CARE
[facility]   Physical Therapy Initial Evaluation     Date: 7/13/2022   Name: Madan Cantu [facility]  Clinic Number: [id number]    Therapy Diagnosis:   Encounter Diagnoses   Name Primary?    Primary osteoarthritis of left knee     Left knee pain, unspecified chronicity      Physician: Tashi Perkins III, *    Physician Orders: PT Eval and Treat   Medical Diagnosis from Referral: Primary osteoarthritis of left knee  Evaluation Date: 7/13/2022  Authorization Period Expiration: 6/14/2023  Plan of Care Expiration: 9/13/2022  Progress Note Due: 8/13/2022  Visit # / Visits authorized: 1/ 1   FOTO: 1/3    Precautions: Standard and A-fib     Time In: 3:35  Time Out: 4:15  Total Appointment Time (timed & untimed codes): 40 minutes      SUBJECTIVE     Date of onset: Pt reported that she has been having trouble climbing stairs leading he with her L LE for the past couple of months.    History of current condition - Madan reports: Pt had a R TKA 1 year ago     Falls: none    Imaging, X-Rays:     Prior Therapy: yes  Social History: Pt lives in a two story home with one step to enter her home.   She does not need to go upstairs. Pt lives alone  Occupation: retired  Prior Level of Function: Pt has been recovering from a R TKA  Current Level of Function: Pt is having trouble climbing stairs leading with her L LE. Pt has a floor bike that she uses daily.    Pain:  Current 0/10, worst 0/10, best 0/10   Location: left knee    Description: NA  Aggravating Factors: climbing stairs  Easing Factors: NA    Patients goals: to gain strength in her L LE     Medical History:   Past Medical History:   Diagnosis Date    A-fib     Anticoagulant long-term use     Anxiety     Arrhythmia     Arthritis     knees    Basal cell carcinoma     Breast cyst     Community acquired pneumonia of right lower lobe of lung 12/11/2018    Deep vein thrombosis     one DVT during  pregnancy    Encephalopathy 2/10/2021     Fibrocystic breast 1980 - ??    Heart murmur     Hypercholesterolemia 6/13/2017    Hyperlipidemia     Hypertension     Hypothyroidism     Mitral valve disorder     Mitral valve prolapse     PVC's (premature ventricular contractions)     Squamous cell carcinoma bx 7-2017    right upper forehead    Thyroid cancer 1/29/2013    Thyroid disease     Vasovagal near syncope 11/12/2012    Vasovagal near syncope 11/12/2012    VTE (venous thromboembolism)     in 1960s, post partum, pt unsure of details     Weakness 12/11/2018       Surgical History:   Madan Bell  has a past surgical history that includes Thyroid surgery; Hysterectomy; Breast cyst aspiration (Bilateral, 1980's - ??); Oophorectomy (hysterectomy - 2000 - ??); Breast cyst excision (Right, 2008 - ??); Breast biopsy (Left); Breast biopsy (Left); Cardioversion (N/A, 08/13/2018); Cataract extraction w/  intraocular lens implant (Left, 04/22/2021); Cataract extraction w/  intraocular lens implant (Right, 05/17/2021); and Knee Arthroplasty (Right, 06/14/2021).    Medications:   Madan has a current medication list which includes the following prescription(s): apixaban, ascorbic acid (vitamin c), atorvastatin, bisoprolol-hydrochlorothiazide 2.5-6.25 mg, calcium citrate, cholecalciferol (vitamin d3), cranberry extract, cyanocobalamin, d-mannose, estradiol, furosemide, ketoconazole, ultimate andrea probiotic, levothyroxine, ofloxacin, omega-3 fatty acids-vitamin e, pantoprazole, vitamin b complex, and vitamin e.    Allergies:   Review of patient's allergies indicates:   Allergen Reactions    Fluoride Hives     Other reaction(s): Hives    Fluoride preparations Hives    Amoxicillin-pot clavulanate Diarrhea    Nitrofurantoin monohyd/m-cryst Nausea Only and Other (See Comments)          OBJECTIVE   Observation: Pt was able to enter the clinic ambulating independently without an assistive device.    Posture: WFL      Range of Motion:    Knee Left active Left Passive Right Active R passive   Flexion 134 nt 120 nt   Extension 0 nt 0 nt           Lower Extremity Strength  Right LE  Left LE    Knee extension: 5/5 Knee extension: 4+/5   Knee flexion: 5/5 Knee flexion: 4+/5   Hip flexion: 3+/5 Hip flexion: 3/5   Hip extension:  nt Hip extension: nt   Hip abduction: 3/5 Hip abduction: 3+/5   Hip adduction: 2/5 Hip adduction 2/5   Ankle dorsiflexion: 5/5 Ankle dorsiflexion: 4/5   Ankle plantarflexion: nt Ankle plantarflexion: nt       Step down test: nt      Function:    - SLS R: nt  - SLS L: nt  - Squat: nt   - Sit <--> Stand: independent   - Bed Mobility:  independent        Joint Mobility: WFL  Patellar hypomobile    Palpation: no palpable tenderness    Sensation: intact    Flexibility:    Ely's test: R = nt degrees ; L = nt degrees   Popliteal Angle: R = nt degrees ; L = nt degrees   Lisa's test: R = nt ; L = nt   Edvin test: R = nt ; L = nt    Edema: none      Limitation/Restriction for FOTO knee Survey    Therapist reviewed FOTO scores for Madan Bell on 7/13/2022.   FOTO documents entered into [employer] - see Media section.    Limitation Score: 35%         TREATMENT     Total Treatment time (time-based codes) separate from Evaluation: 20 minutes      Madan received the treatments listed below:      therapeutic exercises to develop strength for 20 minutes including:  Quad sets 10 x 2 with 3 sec hole  Hook lying B hip abduction 10 x 2 with a green TB  Hook lying B hip adduction 10 x 2 with a ball  Bridges 10 x 3        PATIENT EDUCATION AND HOME EXERCISES     Education provided:   - yes    Written Home Exercises Provided: yes. Exercises were reviewed and Madan was able to demonstrate them prior to the end of the session.  Madan demonstrated good  understanding of the education provided. See EMR under Patient Instructions for exercises provided during therapy sessions.    ASSESSMENT     Madan is a 82 y.o. female referred to outpatient  Physical Therapy with a medical diagnosis of Primary osteoarthritis of left knee. Patient presents with L LE weaness    Patient prognosis is Good.   Patient will benefit from skilled outpatient Physical Therapy to address the deficits stated above and in the chart below, provide patient /family education, and to maximize patientt's level of independence.     Plan of care discussed with patient: Yes  Patient's spiritual, cultural and educational needs considered and patient is agreeable to the plan of care and goals as stated below:     Anticipated Barriers for therapy: none    Medical Necessity is demonstrated by the following  History  Co-morbidities and personal factors that may impact the plan of care Co-morbidities:   advanced age and See past medical history    Personal Factors:   age     low   Examination  Body Structures and Functions, activity limitations and participation restrictions that may impact the plan of care Body Regions:   lower extremities    Body Systems:    strength    Participation Restrictions:   Climbing stairs    Activity limitations:   Learning and applying knowledge  no deficits    General Tasks and Commands  no deficits    Communication  no deficits    Mobility  lifting and carrying objects    Self care  no deficits    Domestic Life  doing house work (cleaning house, washing dishes, laundry)    Interactions/Relationships  no deficits    Life Areas  no deficits    Community and Social Life  recreation and leisure         low   Clinical Presentation stable and uncomplicated low   Decision Making/ Complexity Score: low     Goals:  Short Term Goals: 3 weeks   1. Pt will be instructed in an exercise program to address functional deficits related to her L LE Sx.  2. Pt will report less difficulty climbing stairs    Long Term Goals: 6 weeks   1. Pt will be independent in a HEP to assist in managing her L LE Sx.  2. Pt will report that she is able to climb stairs to the second floor of her home  without any diffculty.  3. Pt will report improved functional ability through an improved score on the FOTO Knee survey.    PLAN   Plan of care Certification: 7/13/2022 to 9/13/2022.    Outpatient Physical Therapy 1 times weekly for 3 visits over the next weeks to include the following interventions: Electrical Stimulation as indicated , Gait Training, Manual Therapy, Moist Heat/ Ice, Neuromuscular Re-ed, Patient Education, Self Care, Therapeutic Activities, Therapeutic Exercise and dry needling.     Baldev Cherry, PT      I CERTIFY THE NEED FOR THESE SERVICES FURNISHED UNDER THIS PLAN OF TREATMENT AND WHILE UNDER MY CARE   Physician's comments:     Physician's Signature: ___________________________________________________

## 2022-07-25 NOTE — TELEPHONE ENCOUNTER
No new care gaps identified.  [facility] Embedded Care Gaps. Reference number: [id number]. 7/25/2022   1:34:49 PM CDT

## 2022-07-26 ENCOUNTER — CLINICAL SUPPORT (OUTPATIENT)
Dept: REHABILITATION | Facility: HOSPITAL | Age: 82
End: 2022-07-26
Attending: ORTHOPAEDIC SURGERY
Payer: MEDICARE

## 2022-07-26 DIAGNOSIS — M25.562 LEFT KNEE PAIN, UNSPECIFIED CHRONICITY: Primary | ICD-10-CM

## 2022-07-26 PROCEDURE — 97110 THERAPEUTIC EXERCISES: CPT | Mod: PO

## 2022-07-26 RX ORDER — PANTOPRAZOLE SODIUM 40 MG/1
TABLET, DELAYED RELEASE ORAL
Qty: 90 TABLET | Refills: 1 | Status: SHIPPED | OUTPATIENT
Start: 2022-07-26 | End: 2023-03-13

## 2022-07-26 NOTE — TELEPHONE ENCOUNTER
Refill Decision Note   Madan Bell  is requesting a refill authorization.  Brief Assessment and Rationale for Refill:  Approve     Medication Therapy Plan:       Medication Reconciliation Completed: No   Comments:     No Care Gaps recommended.     Note composed:4:31 PM 07/26/2022

## 2022-07-26 NOTE — PROGRESS NOTES
[facility]   Physical Therapy Treatment Note     [facility]  [facility] Number: [id number]    Therapy Diagnosis:   Encounter Diagnosis   Name Primary?    Left knee pain, unspecified chronicity Yes     Physician: Tashi Perkins III, *    Visit Date: 7/26/2022    Physician: Tashi Perkins III, *     Physician Orders: PT Eval and Treat   Medical Diagnosis from Referral: Primary osteoarthritis of left knee  Evaluation Date: 7/13/2022  Authorization Period Expiration: 6/14/2023  Plan of Care Expiration: 9/13/2022  Progress Note Due: 8/13/2022  Visit # / Visits authorized: 1/ 1   FOTO: 1/3     Precautions: Standard and A-fib   PTA Visit #: 0/5     Time In: 5:45  Time Out: 5:55  Total Billable Time: 40 minutes    SUBJECTIVE     Pt reports: that she has been doing her HEP, but stopped doing her floor bike.  Pt asked if I had any suggestions for something to prop her head up on while se performed her exercises.  She was compliant with home exercise program.  Response to previous treatment: no problems  Functional change: none    Pain: 0/10  Location: left knee      OBJECTIVE     Objective Measures updated at progress report unless specified.     Treatment     Madan received the treatments listed below:      therapeutic exercises to develop strength for 30 minutes including:  SLR 10 x 3  Quad sets x 10 with 3 sec hold  Hook lying B hip abduction 10 x 3 with a green TB  Hook lying B hip adduction 10 x 3 with a ball  Bridges 10 x 3  Stationary bike x 8 min on level 1      Patient Education and Home Exercises     Home Exercises Provided and Patient Education Provided     Education provided:   - Add SLR to her HEP. It was suggested Pt try a wedge to help position her while exercising    Written Home Exercises Provided: yes. Exercises were reviewed and Madan was able to demonstrate them prior to the end of the session.  Madan demonstrated good  understanding of the education  provided. See EMR under Patient Instructions for exercises provided during therapy sessions    ASSESSMENT     Pt had little difficulty performing SLR on the L.  She was not able to perform a step up on the L without puling herself up by the hand rails of the stairs     Madan Is progressing well towards her goals.   Pt prognosis is Good.     Pt will continue to benefit from skilled outpatient physical therapy to address the deficits listed in the problem list box on initial evaluation, provide pt/family education and to maximize pt's level of independence in the home and community environment.     Pt's spiritual, cultural and educational needs considered and pt agreeable to plan of care and goals.     Anticipated barriers to physical therapy: none    Goals:   Short Term Goals: 3 weeks   1. Pt will be instructed in an exercise program to address functional deficits related to her L LE Sx.  2. Pt will report less difficulty climbing stairs     Long Term Goals: 6 weeks   1. Pt will be independent in a HEP to assist in managing her L LE Sx.  2. Pt will report that she is able to climb stairs to the second floor of her home without any diffculty.  3. Pt will report improved functional ability through an improved score on the FOTO Knee survey.  PLAN     Progress Physical Therapy program to address L LE weakness and have Pt independent in a HEP.    Baldev Cherry, PT

## 2022-08-09 ENCOUNTER — CLINICAL SUPPORT (OUTPATIENT)
Dept: REHABILITATION | Facility: HOSPITAL | Age: 82
End: 2022-08-09
Attending: ORTHOPAEDIC SURGERY
Payer: MEDICARE

## 2022-08-09 DIAGNOSIS — M25.562 LEFT KNEE PAIN, UNSPECIFIED CHRONICITY: Primary | ICD-10-CM

## 2022-08-09 PROCEDURE — 97110 THERAPEUTIC EXERCISES: CPT | Mod: PO

## 2022-08-09 PROCEDURE — 97530 THERAPEUTIC ACTIVITIES: CPT | Mod: PO

## 2022-08-09 NOTE — PROGRESS NOTES
[facility]   Physical Therapy Treatment Note     Name: Madan Cantu [facility]  Clinic Number: [id number]    Therapy Diagnosis:   Encounter Diagnosis   Name Primary?    Left knee pain, unspecified chronicity Yes     Physician: Tashi Perkins III, *    Visit Date: 8/9/2022    Physician: Tashi Perkins III *     Physician Orders: PT Eval and Treat   Medical Diagnosis from Referral: Primary osteoarthritis of left knee  Evaluation Date: 7/13/2022  Authorization Period Expiration: 6/14/2023  Plan of Care Expiration: 9/13/2022  Progress Note Due: 8/13/2022  Visit # / Visits authorized: 1/ 1   FOTO: 1/3     Precautions: Standard and A-fib   PTA Visit #: 0/5     Time In: 5:00  Time Out: 5:45  Total Billable Time: 45 minutes    SUBJECTIVE     Pt reports: that she has been doing her HEP and added heel raises.  She also reported that she forgot the exercise she learned last time.  She was compliant with home exercise program.  Response to previous treatment: no problems  Functional change: none    Pain: 0/10  Location: left knee      OBJECTIVE     Objective Measures updated at progress report unless specified.     Treatment     Madan received the treatments listed below:      therapeutic exercises to develop strength for 30 minutes including:  SLR 10 x 3  Quad sets x 10 with 3 sec hold  Hook lying B hip abduction 10 x 3 with a green TB  Hook lying B hip adduction 10 x 3 with a ball  Bridges 10 x 3  Stationary bike x 8 min on level 1  Standing heel raises 10 x 2  Squats at the counter 10 x 3    Patient Education and Home Exercises     Home Exercises Provided and Patient Education Provided     Education provided:   - Add squats to her HEP.     Written Home Exercises Provided: yes. Exercises were reviewed and Madan was able to demonstrate them prior to the end of the session.  Madan demonstrated good  understanding of the education provided. See EMR under Patient Instructions for exercises  provided during therapy sessions    ASSESSMENT     Pt appears to understand her HEP and reports that she can perform her exercises independently.  Pt reported a limitation of 44% today while her initial report indicated a 35% limitation although she continues to report no pain in her L knee.    Madan Is progressing well towards her goals.   Pt prognosis is Good.     Pt will be discharged to a HEP as per her Dr's orders.  Pt's spiritual, cultural and educational needs considered and pt agreeable to plan of care and goals.     Anticipated barriers to physical therapy: none    Goals:   Short Term Goals: 3 weeks   1. Pt will be instructed in an exercise program to address functional deficits related to her L LE Sx. MET 8/9/22  2. Pt will report less difficulty climbing stairs NOT MET 8/9/22     Long Term Goals: 6 weeks   1. Pt will be independent in a HEP to assist in managing her L LE Sx. MET 8/9/22  2. Pt will report that she is able to climb stairs to the second floor of her home without any diffculty.  NOT MET 8/9/22  3. Pt will report improved functional ability through an improved score on the FOTO Knee survey. NOT MET 8/9/22  PLAN      Pt will be discharged to an independent in a HEP as per her Dr's orders.    Baldev Cherry, PT

## 2022-08-10 ENCOUNTER — PATIENT MESSAGE (OUTPATIENT)
Dept: INTERNAL MEDICINE | Facility: CLINIC | Age: 82
End: 2022-08-10
Payer: MEDICARE

## 2022-08-17 ENCOUNTER — TELEPHONE (OUTPATIENT)
Dept: INTERNAL MEDICINE | Facility: CLINIC | Age: 82
End: 2022-08-17

## 2022-08-17 DIAGNOSIS — D53.1 MEGALOBLASTIC ANEMIA DUE TO B12 DEFICIENCY: Primary | ICD-10-CM

## 2022-08-17 DIAGNOSIS — M89.9 DISORDER OF BONE, UNSPECIFIED: ICD-10-CM

## 2022-08-18 ENCOUNTER — LAB VISIT (OUTPATIENT)
Dept: LAB | Facility: HOSPITAL | Age: 82
End: 2022-08-18
Attending: INTERNAL MEDICINE
Payer: MEDICARE

## 2022-08-18 DIAGNOSIS — I50.9 CONGESTIVE HEART FAILURE, UNSPECIFIED HF CHRONICITY, UNSPECIFIED HEART FAILURE TYPE: ICD-10-CM

## 2022-08-18 DIAGNOSIS — D53.1 MEGALOBLASTIC ANEMIA DUE TO B12 DEFICIENCY: ICD-10-CM

## 2022-08-18 DIAGNOSIS — M89.9 DISORDER OF BONE, UNSPECIFIED: ICD-10-CM

## 2022-08-18 LAB
25(OH)D3+25(OH)D2 SERPL-MCNC: 38 NG/ML (ref 30–96)
ALBUMIN SERPL BCP-MCNC: 4.1 G/DL (ref 3.5–5.2)
ALP SERPL-CCNC: 70 U/L (ref 55–135)
ALT SERPL W/O P-5'-P-CCNC: 11 U/L (ref 10–44)
ANION GAP SERPL CALC-SCNC: 10 MMOL/L (ref 8–16)
AST SERPL-CCNC: 20 U/L (ref 10–40)
BILIRUB DIRECT SERPL-MCNC: 0.6 MG/DL (ref 0.1–0.3)
BILIRUB SERPL-MCNC: 1.6 MG/DL (ref 0.1–1)
BNP SERPL-MCNC: 179 PG/ML (ref 0–99)
BUN SERPL-MCNC: 11 MG/DL (ref 8–23)
CALCIUM SERPL-MCNC: 9.9 MG/DL (ref 8.7–10.5)
CHLORIDE SERPL-SCNC: 102 MMOL/L (ref 95–110)
CO2 SERPL-SCNC: 28 MMOL/L (ref 23–29)
CREAT SERPL-MCNC: 0.8 MG/DL (ref 0.5–1.4)
CRP SERPL-MCNC: 0.5 MG/L (ref 0–8.2)
ERYTHROCYTE [SEDIMENTATION RATE] IN BLOOD BY PHOTOMETRIC METHOD: <2 MM/HR (ref 0–36)
EST. GFR  (NO RACE VARIABLE): >60 ML/MIN/1.73 M^2
GLUCOSE SERPL-MCNC: 103 MG/DL (ref 70–110)
POTASSIUM SERPL-SCNC: 4.2 MMOL/L (ref 3.5–5.1)
PROT SERPL-MCNC: 6.6 G/DL (ref 6–8.4)
SODIUM SERPL-SCNC: 140 MMOL/L (ref 136–145)
VIT B12 SERPL-MCNC: 1148 PG/ML (ref 210–950)

## 2022-08-18 PROCEDURE — 83921 ORGANIC ACID SINGLE QUANT: CPT | Performed by: INTERNAL MEDICINE

## 2022-08-18 PROCEDURE — 83880 ASSAY OF NATRIURETIC PEPTIDE: CPT | Performed by: INTERNAL MEDICINE

## 2022-08-18 PROCEDURE — 80076 HEPATIC FUNCTION PANEL: CPT | Performed by: INTERNAL MEDICINE

## 2022-08-18 PROCEDURE — 86140 C-REACTIVE PROTEIN: CPT | Performed by: INTERNAL MEDICINE

## 2022-08-18 PROCEDURE — 80048 BASIC METABOLIC PNL TOTAL CA: CPT | Performed by: INTERNAL MEDICINE

## 2022-08-18 PROCEDURE — 82306 VITAMIN D 25 HYDROXY: CPT | Performed by: INTERNAL MEDICINE

## 2022-08-18 PROCEDURE — 85652 RBC SED RATE AUTOMATED: CPT | Performed by: INTERNAL MEDICINE

## 2022-08-18 PROCEDURE — 82607 VITAMIN B-12: CPT | Performed by: INTERNAL MEDICINE

## 2022-08-23 ENCOUNTER — OFFICE VISIT (OUTPATIENT)
Dept: INTERNAL MEDICINE | Facility: CLINIC | Age: 82
End: 2022-08-23
Payer: MEDICARE

## 2022-08-23 VITALS
WEIGHT: 143.75 LBS | HEART RATE: 42 BPM | SYSTOLIC BLOOD PRESSURE: 128 MMHG | OXYGEN SATURATION: 99 % | HEIGHT: 68 IN | DIASTOLIC BLOOD PRESSURE: 60 MMHG | BODY MASS INDEX: 21.78 KG/M2

## 2022-08-23 DIAGNOSIS — I48.19 PERSISTENT ATRIAL FIBRILLATION: Primary | ICD-10-CM

## 2022-08-23 DIAGNOSIS — E89.0 POST-OPERATIVE HYPOTHYROIDISM: ICD-10-CM

## 2022-08-23 DIAGNOSIS — R00.1 BRADYCARDIA: ICD-10-CM

## 2022-08-23 DIAGNOSIS — I10 HTN (HYPERTENSION), BENIGN: ICD-10-CM

## 2022-08-23 DIAGNOSIS — N39.0 URINARY TRACT INFECTION WITHOUT HEMATURIA, SITE UNSPECIFIED: ICD-10-CM

## 2022-08-23 DIAGNOSIS — R42 VERTIGO: ICD-10-CM

## 2022-08-23 LAB
BILIRUB UR QL STRIP: NEGATIVE
CLARITY UR REFRACT.AUTO: CLEAR
COLOR UR AUTO: COLORLESS
GLUCOSE UR QL STRIP: NEGATIVE
HGB UR QL STRIP: NEGATIVE
KETONES UR QL STRIP: NEGATIVE
LEUKOCYTE ESTERASE UR QL STRIP: NEGATIVE
METHYLMALONATE SERPL-SCNC: 0.17 UMOL/L
NITRITE UR QL STRIP: NEGATIVE
PH UR STRIP: 5 [PH] (ref 5–8)
PROT UR QL STRIP: NEGATIVE
SP GR UR STRIP: 1 (ref 1–1.03)
URN SPEC COLLECT METH UR: ABNORMAL

## 2022-08-23 PROCEDURE — 3078F PR MOST RECENT DIASTOLIC BLOOD PRESSURE < 80 MM HG: ICD-10-PCS | Mod: CPTII,S$GLB,, | Performed by: INTERNAL MEDICINE

## 2022-08-23 PROCEDURE — 87086 URINE CULTURE/COLONY COUNT: CPT | Performed by: INTERNAL MEDICINE

## 2022-08-23 PROCEDURE — 99999 PR PBB SHADOW E&M-EST. PATIENT-LVL IV: CPT | Mod: PBBFAC,,, | Performed by: INTERNAL MEDICINE

## 2022-08-23 PROCEDURE — 99214 OFFICE O/P EST MOD 30 MIN: CPT | Mod: S$GLB,,, | Performed by: INTERNAL MEDICINE

## 2022-08-23 PROCEDURE — 1157F PR ADVANCE CARE PLAN OR EQUIV PRESENT IN MEDICAL RECORD: ICD-10-PCS | Mod: CPTII,S$GLB,, | Performed by: INTERNAL MEDICINE

## 2022-08-23 PROCEDURE — 1101F PR PT FALLS ASSESS DOC 0-1 FALLS W/OUT INJ PAST YR: ICD-10-PCS | Mod: CPTII,S$GLB,, | Performed by: INTERNAL MEDICINE

## 2022-08-23 PROCEDURE — 3078F DIAST BP <80 MM HG: CPT | Mod: CPTII,S$GLB,, | Performed by: INTERNAL MEDICINE

## 2022-08-23 PROCEDURE — 99214 PR OFFICE/OUTPT VISIT, EST, LEVL IV, 30-39 MIN: ICD-10-PCS | Mod: S$GLB,,, | Performed by: INTERNAL MEDICINE

## 2022-08-23 PROCEDURE — 3074F PR MOST RECENT SYSTOLIC BLOOD PRESSURE < 130 MM HG: ICD-10-PCS | Mod: CPTII,S$GLB,, | Performed by: INTERNAL MEDICINE

## 2022-08-23 PROCEDURE — 1159F PR MEDICATION LIST DOCUMENTED IN MEDICAL RECORD: ICD-10-PCS | Mod: CPTII,S$GLB,, | Performed by: INTERNAL MEDICINE

## 2022-08-23 PROCEDURE — 3288F PR FALLS RISK ASSESSMENT DOCUMENTED: ICD-10-PCS | Mod: CPTII,S$GLB,, | Performed by: INTERNAL MEDICINE

## 2022-08-23 PROCEDURE — 1157F ADVNC CARE PLAN IN RCRD: CPT | Mod: CPTII,S$GLB,, | Performed by: INTERNAL MEDICINE

## 2022-08-23 PROCEDURE — 3074F SYST BP LT 130 MM HG: CPT | Mod: CPTII,S$GLB,, | Performed by: INTERNAL MEDICINE

## 2022-08-23 PROCEDURE — 81003 URINALYSIS AUTO W/O SCOPE: CPT | Performed by: INTERNAL MEDICINE

## 2022-08-23 PROCEDURE — 99999 PR PBB SHADOW E&M-EST. PATIENT-LVL IV: ICD-10-PCS | Mod: PBBFAC,,, | Performed by: INTERNAL MEDICINE

## 2022-08-23 PROCEDURE — 1101F PT FALLS ASSESS-DOCD LE1/YR: CPT | Mod: CPTII,S$GLB,, | Performed by: INTERNAL MEDICINE

## 2022-08-23 PROCEDURE — 3288F FALL RISK ASSESSMENT DOCD: CPT | Mod: CPTII,S$GLB,, | Performed by: INTERNAL MEDICINE

## 2022-08-23 PROCEDURE — 1159F MED LIST DOCD IN RCRD: CPT | Mod: CPTII,S$GLB,, | Performed by: INTERNAL MEDICINE

## 2022-08-23 RX ORDER — ALENDRONATE SODIUM 70 MG/1
70 TABLET ORAL
Qty: 12 TABLET | Refills: 3 | Status: SHIPPED | OUTPATIENT
Start: 2022-08-23 | End: 2023-08-19

## 2022-08-23 NOTE — PROGRESS NOTES
Subjective:       Patient ID: Madan Bell is a 82 y.o. female.    Chief Complaint: Follow-up    Follow-up  Pertinent negatives include no abdominal pain, chest pain (arm pain or jaw pain), headaches, nausea or vomiting. Pt with hx of Afib on apixaban. HR reads low on pulse ox.  She is not weak.  Has chronic vertigo.  Had normal MRI brain and MRA head and neck in the last few years.  No recent falls.  No CP. Mild MCKINLEY.    Review of Systems   Respiratory:  Negative for shortness of breath (PND or orthopnea).    Cardiovascular:  Negative for chest pain (arm pain or jaw pain).   Gastrointestinal:  Negative for abdominal pain, diarrhea, nausea and vomiting.   Genitourinary:  Negative for dysuria.   Neurological:  Negative for seizures, syncope and headaches.     Objective:      Physical Exam  Constitutional:       General: She is not in acute distress.     Appearance: She is well-developed.   HENT:      Head: Normocephalic.   Eyes:      Pupils: Pupils are equal, round, and reactive to light.   Neck:      Thyroid: No thyromegaly.      Vascular: No JVD.   Cardiovascular:      Rate and Rhythm: Normal rate and regular rhythm.      Heart sounds: Normal heart sounds. No murmur heard.    No friction rub. No gallop.   Pulmonary:      Effort: Pulmonary effort is normal.      Breath sounds: Normal breath sounds. No wheezing or rales.   Abdominal:      General: Bowel sounds are normal. There is no distension.      Palpations: Abdomen is soft. There is no mass.      Tenderness: There is no abdominal tenderness. There is no guarding or rebound.   Musculoskeletal:      Cervical back: Neck supple.   Lymphadenopathy:      Cervical: No cervical adenopathy.   Skin:     General: Skin is warm and dry.   Neurological:      Mental Status: She is alert and oriented to person, place, and time.      Deep Tendon Reflexes: Reflexes are normal and symmetric.   Psychiatric:         Behavior: Behavior normal.         Thought Content:  Thought content normal.         Judgment: Judgment normal.       Assessment:       1. Persistent atrial fibrillation    2. HTN (hypertension), benign    3. Post-operative hypothyroidism    4. Bradycardia    5. Vertigo    6. Urinary tract infection without hematuria, site unspecified          Plan:   Persistent atrial fibrillation  Stable and rate controlled - EKG confirms that HR is not that low  HTN (hypertension), benign  Controlled - continue current meds    Post-operative hypothyroidism  Controlled - continue current meds    Bradycardia  -     Holter monitor - 24 hour; Future    Vertigo  -     Ambulatory referral/consult to Physical/Occupational Therapy; Future; Expected date: 08/30/2022    Urinary tract infection without hematuria, site unspecified  -     Urinalysis  -     Urine culture    Other orders  -     alendronate (FOSAMAX) 70 MG tablet; Take 1 tablet (70 mg total) by mouth every 7 days.  Dispense: 12 tablet; Refill: 3        Answers for HPI/ROS submitted by the patient on 8/16/2022  activity change: No  unexpected weight change: No  neck pain: No  hearing loss: Yes  rhinorrhea: No  trouble swallowing: No  eye discharge: No  visual disturbance: No  chest tightness: No  wheezing: No  chest pain: No  palpitations: No  blood in stool: No  constipation: No  vomiting: No  diarrhea: No  polydipsia: No  polyuria: No  difficulty urinating: No  hematuria: No  menstrual problem: No  dysuria: No  joint swelling: No  arthralgias: No  headaches: No  weakness: No  confusion: No  dysphoric mood: No

## 2022-08-25 ENCOUNTER — CLINICAL SUPPORT (OUTPATIENT)
Dept: REHABILITATION | Facility: HOSPITAL | Age: 82
End: 2022-08-25
Attending: INTERNAL MEDICINE
Payer: MEDICARE

## 2022-08-25 DIAGNOSIS — R42 VERTIGO: ICD-10-CM

## 2022-08-25 DIAGNOSIS — R42 DIZZINESS: ICD-10-CM

## 2022-08-25 LAB — BACTERIA UR CULT: NORMAL

## 2022-08-25 PROCEDURE — 97162 PT EVAL MOD COMPLEX 30 MIN: CPT | Mod: PO

## 2022-08-25 NOTE — PLAN OF CARE
[facility]  Physical Therapy Neurological Rehabilitation Initial Evaluation    Name: Madan Cantu [facility]  Clinic Number: [id number]    Therapy Diagnosis:   Encounter Diagnoses   Name Primary?    Vertigo     Dizziness      Physician: Sheila Mcgee MD    Physician Orders: PT Eval and Treat   Medical Diagnosis from Referral: Vertigo  Evaluation Date: 8/25/2022  Authorization Period Expiration: 08/23/22 to 08/23/23  Plan of Care Expiration: 10/21/22  Visit # / Visits authorized: 01/ 01    Time In: 15:15  Time Out: 16:00  Total Billable Time: 45 minutes    Precautions: Standard, h/o A-fib, h/o cancer    Subjective   Date of onset: dizziness has worsened over the past ~3 months    History of current condition - Madan reports: that dizziness started a few years ago. She thought it was due to A-fib. Dizziness is described feeling like she may faint- has occurred ~4 times. Can also get a spinning sensation when lying down flat and then standing up. Endorses occasional balance deficits- sometimes it can be fine. When she is up and walking, she is fine- mainly has issues with positional changes. No issues with driving. Not currently on meclizine. Sleeps on 2 pillows. She does start a Holter monitor trial in September. Followed up with ENT in Feb 2022- has not returned but notes indicate potential need for VNG if symptoms persist.     History of Current Symptoms   Triggers: positional changes   Alleviating Factors: has to sit and let symptoms pass   Description of symptoms: lightheadedness like she may faint, spinning sensation at times   Onset: worsening over past 3 months   Frequency: variable   Duration: variable   Positional changes:  Yes, see above    Limitations due to symptoms: does not tolerate lying flat for extended periods of time    History of migraines: No  Blood Pressure: HTN  History of Heart Condition: A-fib, arrhthymias, vasovagal syncope     Medical History:   Past  Medical History:   Diagnosis Date    A-fib     Anticoagulant long-term use     Anxiety     Arrhythmia     Arthritis     knees    Basal cell carcinoma     Breast cyst     Community acquired pneumonia of right lower lobe of lung 12/11/2018    Deep vein thrombosis     one DVT during  pregnancy    Encephalopathy 2/10/2021    Fibrocystic breast 1980 - ??    Heart murmur     Hypercholesterolemia 6/13/2017    Hyperlipidemia     Hypertension     Hypothyroidism     Mitral valve disorder     Mitral valve prolapse     PVC's (premature ventricular contractions)     Squamous cell carcinoma bx 7-2017    right upper forehead    Thyroid cancer 1/29/2013    Thyroid disease     Vasovagal near syncope 11/12/2012    Vasovagal near syncope 11/12/2012    VTE (venous thromboembolism)     in 1960s, post partum, pt unsure of details     Weakness 12/11/2018       Surgical History:   Madan Monique Pepe Arabella  has a past surgical history that includes Thyroid surgery; Hysterectomy; Breast cyst aspiration (Bilateral, 1980's - ??); Oophorectomy (hysterectomy - 2000 - ??); Breast cyst excision (Right, 2008 - ??); Breast biopsy (Left); Breast biopsy (Left); Cardioversion (N/A, 08/13/2018); Cataract extraction w/  intraocular lens implant (Left, 04/22/2021); Cataract extraction w/  intraocular lens implant (Right, 05/17/2021); and Knee Arthroplasty (Right, 06/14/2021).    Medications:   Madan has a current medication list which includes the following prescription(s): alendronate, apixaban, ascorbic acid (vitamin c), atorvastatin, bisoprolol-hydrochlorothiazide 2.5-6.25 mg, calcium citrate, cholecalciferol (vitamin d3), cranberry extract, cyanocobalamin, d-mannose, estradiol, furosemide, ketoconazole, ultimate andrea probiotic, levothyroxine, ofloxacin, omega-3 fatty acids-vitamin e, pantoprazole, vitamin b complex, and vitamin e.    Allergies:   Review of patient's allergies indicates:   Allergen Reactions    Fluoride  "Hives     Other reaction(s): Hives    Fluoride preparations Hives    Amoxicillin-pot clavulanate Diarrhea    Nitrofurantoin monohyd/m-cryst Nausea Only and Other (See Comments)        Imagin/10/22 MRI of Brain:  No acute intracranial abnormality. Mild senescent and chronic microvascular ischemic changes.  02/10/22 CT of Head: No acute intracranial abnormalities. Chronic microvascular ischemic changes.  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    Prior Therapy: just finished in ortho PT for knee pain  Social History: lives alone; adult son checks in daily  Falls: 0   DME: uses shower chair  Home Environment: 2 SH, stays on 1st floor   Exercise Routine / History: pedal bike x 5 days per week; walks outside when weather is nice  Family Present at time of Eval: none present  Occupation: retired  Prior Level of Function: (I) to Mod Iwith functional mobility/ADL; drives short distances; children drop off food  Current Level of Function: (I) to Mod Iwith functional mobility/ADL; drives short distances; children drop off food; has decreased [employer] attendance due to worried about passing out    Pain:  No pain at this time.     Patient's goals: "To see if this therapy can help with the dizziness"    Objective   - Follows commands: 100% of time   - Speech: no deficits       Mental status: alert, oriented to person, place, and time, normal mood, behavior, speech, dress, motor activity, and thought processes  Appearance: Casually dressed  Behavior:  calm and cooperative  Attention Span and Concentration:  Normal    Posture Alignment in sitting/standing:   Head: forward head   Scapulae: rounded shoulders   Trunk: slouched posture   Pelvis: NT   Legs: WFL     Sensation: Light Touch: WFL          Proprioception: NT, Kinesthesia NT    Auditory: see " audiogram above         Visual/Oculomotor Screen: endorses bilateral cataract surgeries ~ 1 year ago   Spontaneous nystagmus (fixation present): none present  Gaze-evoked nystagmus (fixation present): none present  Tracking/Smooth Pursuits:Intact  Saccades: Intact  Convergence: WFL, < 5 cm  VOR 1: Impaired: no visual slippage, but min dizziness  VOR cancellation: impaired, slight dizziness  Acuity:Intact- uses magnifying lens if needed for reading  R/L discrimination: Intact  Visual field: Intact  VCR: NT (head remains still, body moves)    Coordination: NT    ROM:   CERVICAL SPINE  Flexion 60 degrees (80-90 deg)  Extension 38 degrees (70-80 deg); slight dizziness  L side bend 32 degrees, R side bend 30 degrees (20-45 degrees); slight dizziness  L rotation 64 degrees, R rotation 61 degrees (70-90 degrees); slight dizziness  Are concurrent symptoms present with any of these movements: yes, see above    Modified VAS (Vertebral Artery Screen), in sitting (rotation, then extension):  R: (-)  L: (-)       YASMINE SENSORY TESTING: to be performed at first follow up session    Functional Gait Assessment: to be performed at first follow up session    Gait Assessment:  - AD used: none  - Assistance: (I)  - Distance: community distances    Endurance Deficit: mild    POSITIONAL CANAL TESTING  Looking for nystagmus (slow phase followed by quick phase to the affected side for BPPV)    Supported Hetal Hallpike (posterior / CL anterior)   Right : (-) vertigo, (-) nystagmus, (+) dizziness/lightheadedness   Left: (-) vertigo, (-) nystagmus, (+) dizziness/lightheadedness  Horizontal Canals- supine roll test   Right: (-) vertigo, (-) nystagmus, (-) dizziness/lightheadedness   Left: (-) vertigo, (-) nystagmus, (-) dizziness/lightheadedness  Treatment Performed: not indicated this date      CMS Impairment/Limitation/Restriction for FOTO for Vertigo Survey    Therapist reviewed FOTO scores for Madan Bell on 8/25/2022.   FOTO  documents entered into EPIC - see Media section.    Limitation Score: 54%         TREATMENT   No treatment provided this date. Entire time spent on evaluation.       Home Exercises and Patient Education Provided    Education provided:   - plan of care (POC), scheduling    Written Home Exercises Provided: to be provided at first follow up session    Assessment   Madan is a 82 y.o. female referred to outpatient Physical Therapy with a medical diagnosis of Vertigo. Patient presents with chief complaints of persistent lightheadedness and occasional vertigo episodes that have caused her to almost pass out which have worsened over the past 3 months. Episodes are usually triggered with positional changes, but have occurred with patient sitting still. During oculomotor testing, impairment noted with VOR 1 and VOR cancellation. No visual slippage or saccadic intrusions noted, but patient endorses dizziness/lightheadedness with these movements. During cervical ROM testing, slight dizziness reported with almost all movements. VAS test (-) bilaterally. During CRTs, patient (-) for vertigo and nystagmus in both Hetal Stevens pike test positions, but does endorse significant lightheadedness. Patient also (-) for vertigo, nystagmus, and dizziness in both supine roll test positions. Therefore, testing indicates (-) BPPV this date. Plan to further assess vestibular response to postural balance testing and dynamic balance testing next session. Since symptoms are elicited with head movement and positional changes (potentially consistent with vestibular pathology), it is reasonable to trial 1 month of vestibular rehab to address gaze stabilization and motion tolerance deficits. However, patient does have PMH of A-fib which could also contribute to symptom presentation. PT instructed patient that if after 1 month of vestibular intervention, if symptoms do not improve, then PT will recommend referral back to ENT for VNG testing and further  assessment. Patient agreeable.     Patient prognosis is Good.   Patient will benefit from skilled outpatient Physical Therapy to address the deficits stated above and in the chart below, provide patient/family education, and to maximize patient's level of independence.     Plan of care discussed with patient: Yes  Patient's spiritual, cultural and educational needs considered and patient is agreeable to the plan of care and goals as stated below:     Anticipated Barriers for therapy: chronicity of symptoms, co-morbidities    Medical Necessity is demonstrated by the following  History  Co-morbidities and personal factors that may impact the plan of care Co-morbidities:   Anxiety, PVCs, tachycardia, hypertension, A-fib, recurrent UTI, h/o cancer, thyroid disease, bilateral knee pain, RIZWANA    Personal Factors:   no deficits     high   Examination  Body Structures and Functions, activity limitations and participation restrictions that may impact the plan of care Body Regions:   head  lower extremities  trunk    Body Systems:    gross symmetry  ROM  strength  gross coordinated movement  balance  gait  transfers  transitions  motor control  motor learning    Participation Restrictions:   Knee pain, CV conditions    Activity limitations:   Learning and applying knowledge  no deficits    General Tasks and Commands  no deficits    Communication  no deficits    Mobility  positional changes, quick movements    Self care  no deficits    Domestic Life  cooking    Interactions/Relationships  no deficits    Life Areas  no deficits    Community and Social Life  community life  recreation and leisure  Evangelical and spirituality         moderate   Clinical Presentation evolving clinical presentation with changing clinical characteristics moderate   Decision Making/ Complexity Score: moderate     Goals:  Short Term Goals: 4 weeks   1. Patient to be (I) with established home exercise program.   2. Patient to perform VOR 1 at 70 bpm WFL  for improved gaze stabilization.   3. Patient to perform VOR cancellation at 60 bpm WFL for improved motion tolerance.   4. PT to formally assess GST for postural control and establish appropriate goals.   5. PT to formally assess Functional Gait Assessment for dynamic balance and establish appropriate goal if indicated.     Long Term Goals: 8 weeks   1. Patient to be (I) with advanced home exercise program.   2. Patient to perform VOR 1 at 90 bpm WFL for improved gaze stabilization.   3. Patient to perform VOR cancellation at 80 bpm WFL for improved motion tolerance.   4. TBD pending GST.   5. TBD pending Functional Gait Assessment.     Plan   Plan of care Certification: 8/25/2022 to 10/21/22.    Outpatient Physical Therapy 2 times weekly for 8 weeks to include the following interventions: Gait Training, Manual Therapy, Moist Heat/ Ice, Neuromuscular Re-ed, Orthotic Management and Training, Patient Education, Self Care, Therapeutic Activities, Therapeutic Exercise and Canalith Repositioning Procedures.     Next session:  -GST and Functional Gait Assessment  -initiate home exercise program for gaze stabilization exercises  -motion tolerance in sitting as tolerated    Laila Spear, PT

## 2022-08-29 ENCOUNTER — CLINICAL SUPPORT (OUTPATIENT)
Dept: REHABILITATION | Facility: HOSPITAL | Age: 82
End: 2022-08-29
Attending: ORTHOPAEDIC SURGERY
Payer: MEDICARE

## 2022-08-29 DIAGNOSIS — R42 DIZZINESS: Primary | ICD-10-CM

## 2022-08-29 PROCEDURE — 97110 THERAPEUTIC EXERCISES: CPT | Mod: PO

## 2022-08-29 PROCEDURE — 97112 NEUROMUSCULAR REEDUCATION: CPT | Mod: PO

## 2022-08-29 NOTE — PROGRESS NOTES
"[facility]   Physical Therapy Treatment Note     Name: Madan Bell  Clinic Number: [id number]    Therapy Diagnosis:   Encounter Diagnosis   Name Primary?    Dizziness Yes     Physician: Sheila Mcgee MD    Visit Date: 8/29/2022    Physician Orders: PT Eval and Treat   Medical Diagnosis from Referral: Vertigo  Evaluation Date: 8/25/2022  Authorization Period Expiration: 08/23/22 to 11/29/22  Plan of Care Expiration: 10/21/22  Visit # / Visits authorized: 01/12       PTA Visit #: 0/5     Time In: 1445  Time Out: 1530  Total Billable Time: 45 minutes    SUBJECTIVE     Pt reports: Patient concerned about her dizziness, states it is her biggest problem right now. "It can just happen when I'm sitting down"  HEP not given last session  Response to previous treatment: eval only  Functional change:     Pain: 0/10  Location: bilateral knees    OBJECTIVE     Objective Measures updated at progress report unless specified.     Treatment     aMdan received the treatments listed below:      therapeutic exercises to develop strength, endurance, flexibility, and posture for 35 minutes including:  YASMINE SENSORY TESTING:  (P= Pass, F= Fail; note any sway; hold each position for 30")  Condition 1: (firm surface/feet together/eyes open) P  Condition 2: (firm surface/feet together/eyes closed) P  Condition 3: (firm surface/feet in tandem/eyes open) P  Condition 4: (firm surface/feet in tandem/eyes closed) F (only 2-3 Sec)  Condition 5: (soft surface/feet together/eyes open) P minimal sway  Condition 6: (soft surface/feet together/eyes closed) F 5 sec  Condition 7: (Fakuda step test), measure distance varied from center starting position NP    Functional Gait Assessment:   1. Gait on level surface =  2   (3) Normal: less than 5.5 sec, no A.D., no imbalance, normal gait pattern, deviates< 6in   (2) Mild impairment: 7-5.6 sec, uses A.D., mild gait deviations, or deviates 6-10 in   (1) " Moderate impairment: > 7 sec, slow speed, imbalance, deviates 10-15 in.   (0) Severe impairment: needs assist, deviates >15 in, reach/touch wall  2. Change in Gait Speed = 3   (3) Normal: smooth change w/o loss of balance or gait deviation, deviates < 6 in, significant difference between speeds   (2) Mild impairment: changes speed, but demonstrates mild gait deviations, deviates 6-10 in, OR no deviations but unable to significantly speed, OR uses A.D.   (1) Moderate impairment: minor changes to speed, OR changes speed w/ significant deviations, deviates 10-15 in, OR  Changes speed , but loses balance & recovers   (0) Severe impairment: cannot change speed, deviates >15 in, or loses balance & needs assist  3. Gait with horizontal head turns  = 2   (3) Normal: no change in gait, deviates <6 in   (2) Mild impairment: slight change in speed, deviates 6-10 in, OR uses A.D.   (1) Moderate impairment: moderate change in speed, deviates 10-15 in   (0) Severe impairment: severe disruption of gait, deviates >15in  4. Gait with vertical head turns = 3   (3) Normal: no change in gait, deviates <6 in   (2) Mild impairment: slight change in speed, deviates 6-10 in OR uses A.D.   (1) Moderate impairment: moderate change in speed, deviates 10-15 in   (0) Severe impairment: severe disruption of gait, deviates >15 in  5. Gait with pivot turns = 2   (3) Normal: performs safely in 3 sec, no LOB   (2) Mild impairment: performs in >3 sec & no LOB, OR turns safely & requires several steps to regain LOB   (1) Moderate impairment: turns slow, OR requires several small steps for balance following turn & stop   (0) Severe impairment: cannot turn safely, needs assist  6. Step over obstacle = 2   (3) Normal: steps over 2 stacked boxes w/o change in speed or LOB   (2) Mild impairment: able to step over 1 box w/o change in speed or LOB   (1) Moderate impairment: steps over 1 box but must slow down, may require VC   (0) Severe impairment: cannot  perform w/o assist  7. Gait with Narrow MALLY = 2   (3) Normal: 10 steps no staggering   (2) Mild impairment: 7-9 steps   (1) Moderate impairment: 4-7 steps   (0) Severe impairment: < 4 steps or cannot perform w/o assist  8. Gait with eyes closed = 0   (3) Normal: < 7 sec, no A.D., no LOB, normal gait pattern, deviates <6 in   (2) Mild impairment: 7.1-9 sec, mild gait deviations, deviates 6-10 in   (1) Moderate impairment: > 9 sec, abnormal pattern, LOB, deviates 10-15 in   (0) Severe impairment: cannot perform w/o assist, LOB, deviates >15in  9. Ambulating Backwards = 1   (3) Normal: no A.D., no LOB, normal gait pattern, deviates <6in   (2) Mild impairment: uses A.D., slower speed, mild gait deviations, deviates 6-10 in   (1) Moderate impairment: slow speed, abnormal gait pattern, LOB, deviates 10-15 in   (0) Severe impairment: severe gait deviations or LOB, deviates >15in  10. Steps = 1   (3) Normal: alternating feet, no rail   (2) Mild Impairment: alternating feet, uses rail   (1) Moderate impairment: step-to, uses rail   (0) Severe impairment: cannot perform safely    Score 18/30     Score:   <22/30 fall risk   <20/30 fall risk in older adults   <18/30 fall risk in Parkinsons        Seated cervical rotation x 5 each direction, no dizziness      neuromuscular re-education activities to improve: Balance, Coordination, and Proprioception for 10 minutes. The following activities were included:  VOR x 1 horizontal, seated, clean background self selected speed 2 x 30 sec , reports minimal dizziness that subsides with rest  VOR x 1 vertical, seated/standing, clean/busy background 2 x 30 sec  Smooth pursuit x 30 sec, no dizziness       Feet together eyes clsoed    Patient Education and Home Exercises     Home Exercises Provided and Patient Education Provided     Education provided:   - HEP, vestibular system and it contributions to balance, many factors that can play a role in dizziness.     Written Home Exercises  Provided: yes. Exercises were reviewed and Madan was able to demonstrate them prior to the end of the session.  Madan demonstrated good  understanding of the education provided. See EMR under Patient Instructions for exercises provided during therapy sessions    ASSESSMENT     Ms. Bell tolerated first follow up session well.  Completed objective measures during this session.  She failed both trials of eyes closed activities of the [place], indicating impaired balance with low vision and increased reliance on vision for stability.  She scored 18/30 on the FGA indicating increased fall risk and decreased balance. Initiated VOR x 1 for gaze stability, she reports minimal dizziness but only performs at slow self selected speed at this time.  She will benefit from skilled Physical Therapy to address impairments, improve balance and maximize functional mobility.   Madan Is progressing well towards her goals.   Pt prognosis is Good.     Pt will continue to benefit from skilled outpatient physical therapy to address the deficits listed in the problem list box on initial evaluation, provide pt/family education and to maximize pt's level of independence in the home and community environment.     Pt's spiritual, cultural and educational needs considered and pt agreeable to plan of care and goals.     Anticipated barriers to physical therapy: TKA, lives alone    Goals:   Short Term Goals: 4 weeks   1. Patient to be (I) with established home exercise program. On going   2. Patient to perform VOR 1 at 70 bpm WFL for improved gaze stabilization. On going   3. Patient to perform VOR cancellation at 60 bpm WFL for improved motion tolerance. On going   4. PT to formally assess GST for postural control and establish appropriate goals. On going   5. PT to formally assess Functional Gait Assessment for dynamic balance and establish appropriate goal if indicated. On going      Long Term Goals: 8 weeks   1. Patient to be (I) with  advanced home exercise program. On going   2. Patient to perform VOR 1 at 90 bpm WFL for improved gaze stabilization. On going   3. Patient to perform VOR cancellation at 80 bpm WFL for improved motion tolerance. On going   4. Patient to improve condition 4 on the GST to greater than or equal to 5 seconds for improved balance in low vision environments on going   5. Patient to score greater than or equal to 22/30 on the FGA for decreased fall risk. On going     PLAN     Progress standing balance exercise and HEP as appropriate    Lovely Solis, PT

## 2022-08-31 ENCOUNTER — PATIENT MESSAGE (OUTPATIENT)
Dept: CARDIOLOGY | Facility: CLINIC | Age: 82
End: 2022-08-31
Payer: MEDICARE

## 2022-08-31 NOTE — PROGRESS NOTES
"[facility]   Physical Therapy Treatment Note     [facility]  Cl[facility] Number: [id number]    Therapy Diagnosis:   Encounter Diagnosis   Name Primary?    Dizziness Yes       Physician: Sheila Mcgee MD    Visit Date: 9/1/2022    Physician Orders: PT Eval and Treat   Medical Diagnosis from Referral: Vertigo  Evaluation Date: 8/25/2022  Authorization Period Expiration: 08/23/22 to 11/29/22  Plan of Care Expiration: 10/21/22  Visit # / Visits authorized: 02/12 + eval       PTA Visit #: 0/5     Time In: 14:30  Time Out: 15:15  Total Billable Time: 45 minutes    SUBJECTIVE     Pt reports: that she felt fine after her therapy session earlier this week. She had more dizziness yesterday and today but is unsure as to why. She is a little dizzy to start session. She takes her BP daily and gets alerts if out of normal range. She gets her Holter monitor tomorrow.     She was compliant with home exercise program.   Response to previous treatment: no adverse effects  Functional change: ongoing    Pain: 0/10  Location: bilateral knees    OBJECTIVE     Objective Measures updated at progress report unless specified.     Treatment     Madan received the treatments listed below:      neuromuscular re-education activities to improve: Balance, Coordination, and Proprioception for 45 minutes. The following activities were included:    VOR 1: x target  3 x 30" horizontal at self selected speed, no visual slippage- slight/min dizziness  3 x 30" vertical at self selected speed, no visual slippage- very slight dizziness    VOR cancellation: x target  3 x 30" horizontal at self selected speed- slight dizziness    Ambulation in hallway:  2 x 100 feet ambulation with right<>left head movements, CGA  2 x 100 feet ambulation with up <> down head movements, CGA  2 x 100 feet forward ambulation with ball toss <> catch, CGA    Foam pad:  3 x 30" stance with eyes closed, CGA, occ touchdown " "support    X 4 laps tandem ambulation in // bars, occ touchdown support, CGA    2 x 30" each lower extremity, single leg stance with alternate lower extremity on fitter board, eyes closed, CGA, no upper extremity support    Patient Education and Home Exercises     Home Exercises Provided and Patient Education Provided     Education provided:   - HEP, vestibular system and it contributions to balance, many factors that can play a role in dizziness.     Written Home Exercises Provided: yes. Exercises were reviewed and Madan was able to demonstrate them prior to the end of the session.  Madan demonstrated good  understanding of the education provided. See EMR under Patient Instructions for exercises provided during therapy sessions    ASSESSMENT     Ms. Bell tolerated first follow up session well.  Slight brief dizziness elicited with VOR activities, but symptoms resolved quickly after completion of activities. Balance activities appeared appropriately challenging, but no significant loss of balance episodes. Most dizziness elicited upon standing up quickly which could be related to cardiac issues, but PT to continue to monitor.  She will benefit from skilled Physical Therapy to address impairments, improve balance and maximize functional mobility.     Madan Is progressing well towards her goals.   Pt prognosis is Good.     Pt will continue to benefit from skilled outpatient physical therapy to address the deficits listed in the problem list box on initial evaluation, provide pt/family education and to maximize pt's level of independence in the home and community environment.     Pt's spiritual, cultural and educational needs considered and pt agreeable to plan of care and goals.     Anticipated barriers to physical therapy: TKA, lives alone    Goals:   Short Term Goals: 4 weeks   1. Patient to be (I) with established home exercise program. On going   2. Patient to perform VOR 1 at 70 bpm WFL for improved gaze " stabilization. On going   3. Patient to perform VOR cancellation at 60 bpm WFL for improved motion tolerance. On going   4. PT to formally assess GST for postural control and establish appropriate goals. On going   5. PT to formally assess Functional Gait Assessment for dynamic balance and establish appropriate goal if indicated. On going      Long Term Goals: 8 weeks   1. Patient to be (I) with advanced home exercise program. On going   2. Patient to perform VOR 1 at 90 bpm WFL for improved gaze stabilization. On going   3. Patient to perform VOR cancellation at 80 bpm WFL for improved motion tolerance. On going   4. Patient to improve condition 4 on the GST to greater than or equal to 5 seconds for improved balance in low vision environments on going   5. Patient to score greater than or equal to 22/30 on the FGA for decreased fall risk. On going     PLAN     Progress standing balance exercise and HEP as appropriate    Laila Spear, PT

## 2022-09-01 ENCOUNTER — CLINICAL SUPPORT (OUTPATIENT)
Dept: REHABILITATION | Facility: HOSPITAL | Age: 82
End: 2022-09-01
Attending: ORTHOPAEDIC SURGERY
Payer: MEDICARE

## 2022-09-01 DIAGNOSIS — R42 DIZZINESS: Primary | ICD-10-CM

## 2022-09-01 PROCEDURE — 97112 NEUROMUSCULAR REEDUCATION: CPT | Mod: PO

## 2022-09-02 ENCOUNTER — HOSPITAL ENCOUNTER (OUTPATIENT)
Dept: CARDIOLOGY | Facility: HOSPITAL | Age: 82
Discharge: HOME | End: 2022-09-02
Attending: INTERNAL MEDICINE
Payer: MEDICARE

## 2022-09-02 DIAGNOSIS — R00.1 BRADYCARDIA: ICD-10-CM

## 2022-09-02 PROCEDURE — 93226 XTRNL ECG REC<48 HR SCAN A/R: CPT

## 2022-09-02 PROCEDURE — 93227 HOLTER MONITOR - 24 HOUR (CUPID ONLY): ICD-10-PCS | Mod: ,,, | Performed by: INTERNAL MEDICINE

## 2022-09-02 PROCEDURE — 93227 XTRNL ECG REC<48 HR R&I: CPT | Mod: ,,, | Performed by: INTERNAL MEDICINE

## 2022-09-06 NOTE — PROGRESS NOTES
"[facility]   Physical Therapy Treatment Note     Name: Madan Bell  Clinic Number: [id number]    Therapy Diagnosis:   Encounter Diagnosis   Name Primary?    Dizziness Yes         Physician: Sheila Mcgee MD    Visit Date: 9/7/2022    Physician Orders: PT Eval and Treat   Medical Diagnosis from Referral: Vertigo  Evaluation Date: 8/25/2022  Authorization Period Expiration: 08/23/22 to 11/29/22  Plan of Care Expiration: 10/21/22  Visit # / Visits authorized: 03/12 + eval       PTA Visit #: 0/5     Time In: 17:00  Time Out: 17:45  Total Billable Time: 45 minutes    SUBJECTIVE     Pt reports: that she is doing fine this afternoon. On Saturday, at 9:40 am, she had a bad episode of dizziness while walking from the kitchen to the kitchen table and reached up to open the blinds and then turned to sit. She describes this episode as feeling like she was going to faint. The Holter monitor was a 24 hr monitor. This is the 4th overall time that she has had an episode like this.     She was compliant with home exercise program.   Response to previous treatment: no adverse effects  Functional change: ongoing    Pain: 0/10  Location: bilateral knees    OBJECTIVE     Objective Measures updated at progress report unless specified.     Treatment     Madan received the treatments listed below:      neuromuscular re-education activities to improve: Balance, Coordination, and Proprioception for 45 minutes. The following activities were included:    VOR 1: x target  3 x 30" horizontal at self selected speed, no visual slippage- no sig dizziness during but would be unsteady if she moved after this activity  3 x 30" vertical at self selected speed, no visual slippage- no sig dizziness during but would be unsteady if she moved after this activity; less severe compared to side to side    VOR cancellation: x target  2 x 30" horizontal at self selected speed- slight dizziness    Ambulation in " "hallway:  2 x 100 feet ambulation with right<>left head movements, CGA  2 x 100 feet ambulation with up <> down head movements, CGA    Foam pad:  3 x 30" stance with eyes closed, CGA, no upper extremity support    Fitter board:  X 10 reps, each lower extremity, forward step up onto board, BUE support, CGA- no change in symptoms  2 x 30" each lower extremity, single leg stance with alternate lower extremity on fitter board, eyes closed, CGA, occ upper extremity support    2 X 10 reps, each lower extremity single leg stance with alternate lower extremity tapping 2 large cones in front, CGA, occ touchdown support    X 4 laps tandem ambulation along ballet bar, CGA, no upper extremity support     Patient Education and Home Exercises     Home Exercises Provided and Patient Education Provided     Education provided:   - HEP, vestibular system and it contributions to balance, many factors that can play a role in dizziness.     Written Home Exercises Provided: yes. Exercises were reviewed and Titijoanie was able to demonstrate them prior to the end of the session.  Madan demonstrated good  understanding of the education provided. See EMR under Patient Instructions for exercises provided during therapy sessions    ASSESSMENT     Ms. Bell tolerated therapy session well this afternoon, but does report significant dizziness episode over the weekend which almost resulted in her passing out. Holter monitor trial completed over the weekend and patient is awaiting results. Pending results of Holter monitor, PT will recommend follow up with either ENT or cardiologist as patient continues to have frequent dizziness episodes that do not appear related to vestibular system triggers. PT educated patient extensively on vestibular system triggers and common response to vestibular exercises- to which her current symptoms do not appear consistent with vestibular system pathology.  She will benefit from skilled Physical Therapy to address " impairments, improve balance and maximize functional mobility.     Madan Is progressing well towards her goals.   Pt prognosis is Good.     Pt will continue to benefit from skilled outpatient physical therapy to address the deficits listed in the problem list box on initial evaluation, provide pt/family education and to maximize pt's level of independence in the home and community environment.     Pt's spiritual, cultural and educational needs considered and pt agreeable to plan of care and goals.     Anticipated barriers to physical therapy: TKA, lives alone    Goals:   Short Term Goals: 4 weeks   1. Patient to be (I) with established home exercise program. On going   2. Patient to perform VOR 1 at 70 bpm WFL for improved gaze stabilization. On going   3. Patient to perform VOR cancellation at 60 bpm WFL for improved motion tolerance. On going   4. PT to formally assess GST for postural control and establish appropriate goals. On going   5. PT to formally assess Functional Gait Assessment for dynamic balance and establish appropriate goal if indicated. On going      Long Term Goals: 8 weeks   1. Patient to be (I) with advanced home exercise program. On going   2. Patient to perform VOR 1 at 90 bpm WFL for improved gaze stabilization. On going   3. Patient to perform VOR cancellation at 80 bpm WFL for improved motion tolerance. On going   4. Patient to improve condition 4 on the GST to greater than or equal to 5 seconds for improved balance in low vision environments on going   5. Patient to score greater than or equal to 22/30 on the FGA for decreased fall risk. On going     PLAN     Progress standing balance exercise and HEP as appropriate    Laila Spear, PT

## 2022-09-07 ENCOUNTER — CLINICAL SUPPORT (OUTPATIENT)
Dept: REHABILITATION | Facility: HOSPITAL | Age: 82
End: 2022-09-07
Attending: ORTHOPAEDIC SURGERY
Payer: MEDICARE

## 2022-09-07 DIAGNOSIS — R42 DIZZINESS: Primary | ICD-10-CM

## 2022-09-07 LAB
OHS CV EVENT MONITOR DAY: 0
OHS CV HOLTER LENGTH DECIMAL HOURS: 24
OHS CV HOLTER LENGTH HOURS: 24
OHS CV HOLTER LENGTH MINUTES: 0

## 2022-09-07 PROCEDURE — 97112 NEUROMUSCULAR REEDUCATION: CPT | Mod: PO

## 2022-09-08 ENCOUNTER — CLINICAL SUPPORT (OUTPATIENT)
Dept: REHABILITATION | Facility: HOSPITAL | Age: 82
End: 2022-09-08
Attending: ORTHOPAEDIC SURGERY
Payer: MEDICARE

## 2022-09-08 ENCOUNTER — PATIENT MESSAGE (OUTPATIENT)
Dept: ELECTROPHYSIOLOGY | Facility: CLINIC | Age: 82
End: 2022-09-08
Payer: MEDICARE

## 2022-09-08 DIAGNOSIS — R42 DIZZINESS: Primary | ICD-10-CM

## 2022-09-08 PROCEDURE — 97112 NEUROMUSCULAR REEDUCATION: CPT | Mod: PO

## 2022-09-08 NOTE — PROGRESS NOTES
"[facility]   Physical Therapy Treatment Note     [facility]  Cl[facility] Number: [id number]    Therapy Diagnosis:   Encounter Diagnosis   Name Primary?    Dizziness Yes       Physician: Sheila Mcgee MD    Visit Date: 9/8/2022    Physician Orders: PT Eval and Treat   Medical Diagnosis from Referral: Vertigo  Evaluation Date: 8/25/2022  Authorization Period Expiration: 08/23/22 to 11/29/22  Plan of Care Expiration: 10/21/22  Visit # / Visits authorized: 04/12 + eval       PTA Visit #: 0/5     Time In: 14:30  Time Out: 15:10  Total Billable Time: 40 minutes    SUBJECTIVE     Pt reports: that her daughter messaged patient's cardiologist regarding recent bad episode of dizziness over past weekend. They are awaiting to hear back regarding further recommendations. Patient endorses that she still does not feel like herself in regards to these symptoms.     She was compliant with home exercise program.   Response to previous treatment: no adverse effects  Functional change: ongoing    Pain: 0/10  Location: bilateral knees    OBJECTIVE     Objective Measures updated at progress report unless specified.     Treatment     Madan received the treatments listed below:      neuromuscular re-education activities to improve: Balance, Coordination, and Proprioception for 40 minutes. The following activities were included:    VOR 1: x target  3 x 30" horizontal at self selected speed, no visual slippage- no sig dizziness during but would be unsteady if she moved after this activity  3 x 30" vertical at self selected speed, no visual slippage- no sig dizziness during but would be unsteady if she moved after this activity; less severe compared to side to side    VOR cancellation: x target  2 x 30" horizontal at self selected speed- slight dizziness    Ambulation in hallway:  2 x 100 feet ambulation with right<>left head movements, CGA  2 x 100 feet ambulation with up <> down head " "movements, CGA    Foam pad:  3 x 30" stance with eyes closed, CGA, no upper extremity support    X 30" each lower extremity in front, tandem stance with eyes open, CGA, no upper extremity support  2 x 30" each lower extremity in front, tandem stance with eyes closed, CGA, no upper extremity support - slight sway    X 6 laps right<>left side stepping along foam balance beam, CGA, no upper extremity support    5 hurdles:  X 4 laps forward reciprocal stepping over hurdles, CGA, no upper extremity support  X 4 laps forward reciprocal right<>left stepping over hurdles, CGA, no upper extremity support      Patient Education and Home Exercises     Home Exercises Provided and Patient Education Provided     Education provided:   - HEP, vestibular system and it contributions to balance, many factors that can play a role in dizziness.     Written Home Exercises Provided: yes. Exercises were reviewed and Madan was able to demonstrate them prior to the end of the session.  Madan demonstrated good  understanding of the education provided. See EMR under Patient Instructions for exercises provided during therapy sessions    ASSESSMENT     Ms. Bell tolerated therapy session well this afternoon, but does report significant dizziness episode over the weekend which almost resulted in her passing out. Holter monitor trial completed over the weekend and patient is awaiting results. Pending results of Holter monitor, PT will recommend follow up with either ENT or cardiologist as patient continues to have frequent dizziness episodes that do not appear related to vestibular system triggers. PT educated patient extensively on vestibular system triggers and common response to vestibular exercises- to which her current symptoms do not appear consistent with vestibular system pathology.  She will benefit from skilled Physical Therapy to address impairments, improve balance and maximize functional mobility.     Mavinayakjoanie Is progressing well " towards her goals.   Pt prognosis is Good.     Pt will continue to benefit from skilled outpatient physical therapy to address the deficits listed in the problem list box on initial evaluation, provide pt/family education and to maximize pt's level of independence in the home and community environment.     Pt's spiritual, cultural and educational needs considered and pt agreeable to plan of care and goals.     Anticipated barriers to physical therapy: TKA, lives alone    Goals:   Short Term Goals: 4 weeks   1. Patient to be (I) with established home exercise program. On going   2. Patient to perform VOR 1 at 70 bpm WFL for improved gaze stabilization. On going   3. Patient to perform VOR cancellation at 60 bpm WFL for improved motion tolerance. On going   4. PT to formally assess GST for postural control and establish appropriate goals. On going   5. PT to formally assess Functional Gait Assessment for dynamic balance and establish appropriate goal if indicated. On going      Long Term Goals: 8 weeks   1. Patient to be (I) with advanced home exercise program. On going   2. Patient to perform VOR 1 at 90 bpm WFL for improved gaze stabilization. On going   3. Patient to perform VOR cancellation at 80 bpm WFL for improved motion tolerance. On going   4. Patient to improve condition 4 on the GST to greater than or equal to 5 seconds for improved balance in low vision environments on going   5. Patient to score greater than or equal to 22/30 on the FGA for decreased fall risk. On going     PLAN     Progress standing balance exercise and HEP as appropriate    Laila Spear, PT

## 2022-09-13 ENCOUNTER — PATIENT MESSAGE (OUTPATIENT)
Dept: ELECTROPHYSIOLOGY | Facility: CLINIC | Age: 82
End: 2022-09-13
Payer: MEDICARE

## 2022-09-13 ENCOUNTER — TELEPHONE (OUTPATIENT)
Dept: ELECTROPHYSIOLOGY | Facility: CLINIC | Age: 82
End: 2022-09-13
Payer: MEDICARE

## 2022-09-13 DIAGNOSIS — R42 DIZZINESS: ICD-10-CM

## 2022-09-13 DIAGNOSIS — I47.29 PAROXYSMAL VENTRICULAR TACHYCARDIA: Primary | ICD-10-CM

## 2022-09-13 DIAGNOSIS — I49.3 PVC'S (PREMATURE VENTRICULAR CONTRACTIONS): ICD-10-CM

## 2022-09-13 DIAGNOSIS — I48.19 PERSISTENT ATRIAL FIBRILLATION: ICD-10-CM

## 2022-09-13 NOTE — TELEPHONE ENCOUNTER
----- Message from Joey Champagne MD sent at 9/13/2022 12:56 PM CDT -----  Wilman Cummings    I saw the message you forwarded to me. I would like a 14 day Bardy patch on Mrs. Bell and then see her in clinic a few weeks after.    Joey

## 2022-09-13 NOTE — PROGRESS NOTES
"[facility]   Physical Therapy Treatment Note     Name: Madan Bell  Clinic Number: [id number]    Therapy Diagnosis:   Encounter Diagnosis   Name Primary?    Dizziness Yes     Physician: Sheila Mcgee MD    Visit Date: 9/14/2022    Physician Orders: PT Eval and Treat   Medical Diagnosis from Referral: Vertigo  Evaluation Date: 8/25/2022  Authorization Period Expiration: 08/23/22 to 11/29/22  Plan of Care Expiration: 10/21/22  Visit # / Visits authorized: 05/12 + eval       PTA Visit #: 0/5     Time In: 14:45  Time Out: 15:15  Total Billable Time: 30 minutes    SUBJECTIVE     Pt reports: that she is feeling much more unsteady today. Symptoms of unsteadiness and dizziness feel like they are getting more persistent. Today, when driving to clinic which is only ~5 minutes away, she started to feel a lightheaded/floaty feeling; this occurred while looking straight ahead- was not triggered by head/body motion. She endorses feeling lightheaded on and off this morning as well, but this is common for her. Patient's daughter has been messaging the cardiologist and they have decided to do a 14 day Holter monitor test that starts next week.     She was compliant with home exercise program.   Response to previous treatment: no adverse effects  Functional change: ongoing    Pain: 0/10  Location: bilateral knees    OBJECTIVE     Vitals seated at rest:  LUE automatic cuff    BP: 162/105  HR: 83    Vitals seated after 3 min rest break:  BP: 159/89  HR: 47  Pulse Ox HR: 50-81 bpm, SP02: 98%    Vitals seated after additional 3 min rest break:  BP: 124/85  HR: 91  Pulse Ox HR: 42-64 bpm, SP02: 96-97%    Treatment     Madan received the treatments listed below:      neuromuscular re-education activities to improve: Balance, Coordination, and Proprioception for 30 minutes. The following activities were included:    VOR 1: x target  1 x 30" horizontal at self selected speed, no visual " slippage- no sig change in baseline symptoms  Patient requested to hold off on further exercise so that she can drive home.       Time spent discussing the following:  -vestibular system anatomy and normal function  -vestibular system pathology  -typical vestibular pathology presentation  -recommendation to set up ENT appointment for possible VNG testing in the event that cardiology testing does not yield any results into current symptoms presentation  Patient agreeable      Patient Education and Home Exercises     Home Exercises Provided and Patient Education Provided     Education provided:   - HEP, vestibular system and it contributions to balance, many factors that can play a role in dizziness.     Written Home Exercises Provided: yes. Exercises were reviewed and Madan was able to demonstrate them prior to the end of the session.  Madan demonstrated good  understanding of the education provided. See EMR under Patient Instructions for exercises provided during therapy sessions    ASSESSMENT     Patient presents to therapy session appearing more unsteady compared to baseline. Patient endorses increased lightheadedness today. Vitals monitored at start of session. BP gradually improved to normal range, but pulse rate continues to demonstrate variable pattern, however, pulse rate did not exceed therapeutic range. PT spent extensive portion of session discussing vestibular system anatomy/pathology and recommendation to schedule ENT appointment for VNG testing in the event that upcoming cardiology testing does not yield results to attribute recent increase in dizziness. Patient agreeable. PT instructed that if symptoms worsen, she will nee to seek immediate medical attention. Patient escorted to car, but endorses that she feels safe enough to drive home since the lives ~5 min from clinic. She will benefit from skilled Physical Therapy to address impairments, improve balance and maximize functional mobility.      Madan Is progressing well towards her goals.   Pt prognosis is Good.     Pt will continue to benefit from skilled outpatient physical therapy to address the deficits listed in the problem list box on initial evaluation, provide pt/family education and to maximize pt's level of independence in the home and community environment.     Pt's spiritual, cultural and educational needs considered and pt agreeable to plan of care and goals.     Anticipated barriers to physical therapy: TKA, lives alone    Goals:   Short Term Goals: 4 weeks   1. Patient to be (I) with established home exercise program. On going   2. Patient to perform VOR 1 at 70 bpm WFL for improved gaze stabilization. On going   3. Patient to perform VOR cancellation at 60 bpm WFL for improved motion tolerance. On going   4. PT to formally assess GST for postural control and establish appropriate goals. On going   5. PT to formally assess Functional Gait Assessment for dynamic balance and establish appropriate goal if indicated. On going      Long Term Goals: 8 weeks   1. Patient to be (I) with advanced home exercise program. On going   2. Patient to perform VOR 1 at 90 bpm WFL for improved gaze stabilization. On going   3. Patient to perform VOR cancellation at 80 bpm WFL for improved motion tolerance. On going   4. Patient to improve condition 4 on the GST to greater than or equal to 5 seconds for improved balance in low vision environments on going   5. Patient to score greater than or equal to 22/30 on the FGA for decreased fall risk. On going     PLAN     Progress standing balance exercise and HEP as appropriate    Laila Spear, PT

## 2022-09-13 NOTE — TELEPHONE ENCOUNTER
Order for 14 day Bardy monitor placed . Karen notified , and requested to follow up with patient to arrange.

## 2022-09-14 ENCOUNTER — CLINICAL SUPPORT (OUTPATIENT)
Dept: REHABILITATION | Facility: HOSPITAL | Age: 82
End: 2022-09-14
Attending: INTERNAL MEDICINE
Payer: MEDICARE

## 2022-09-14 ENCOUNTER — TELEPHONE (OUTPATIENT)
Dept: ELECTROPHYSIOLOGY | Facility: CLINIC | Age: 82
End: 2022-09-14
Payer: MEDICARE

## 2022-09-14 DIAGNOSIS — R42 DIZZINESS: Primary | ICD-10-CM

## 2022-09-14 PROCEDURE — 97112 NEUROMUSCULAR REEDUCATION: CPT | Mod: PO

## 2022-09-14 NOTE — TELEPHONE ENCOUNTER
Called patient to schedule 14 day monitor. Patient scheduled for 9/22/22. Offered patient an appointment with Dr. Champagne for follow up after the monitor. Patient states she will check with her daughter and call me back.

## 2022-09-16 ENCOUNTER — TELEPHONE (OUTPATIENT)
Dept: ELECTROPHYSIOLOGY | Facility: CLINIC | Age: 82
End: 2022-09-16
Payer: MEDICARE

## 2022-09-16 DIAGNOSIS — I48.19 PERSISTENT ATRIAL FIBRILLATION: ICD-10-CM

## 2022-09-16 DIAGNOSIS — I47.29 PAROXYSMAL VENTRICULAR TACHYCARDIA: Primary | ICD-10-CM

## 2022-09-16 NOTE — TELEPHONE ENCOUNTER
Called patient to scheduled follow up appointment after Bardy patch. Patient agreed to appointment with Dr. Champagne 10/27/22. Appointment scheduled in computer.

## 2022-09-17 ENCOUNTER — PATIENT MESSAGE (OUTPATIENT)
Dept: INTERNAL MEDICINE | Facility: CLINIC | Age: 82
End: 2022-09-17
Payer: MEDICARE

## 2022-09-18 ENCOUNTER — PATIENT MESSAGE (OUTPATIENT)
Dept: CARDIOLOGY | Facility: CLINIC | Age: 82
End: 2022-09-18
Payer: MEDICARE

## 2022-09-19 DIAGNOSIS — I50.9 CONGESTIVE HEART FAILURE, UNSPECIFIED HF CHRONICITY, UNSPECIFIED HEART FAILURE TYPE: ICD-10-CM

## 2022-09-19 DIAGNOSIS — I48.19 PERSISTENT ATRIAL FIBRILLATION: ICD-10-CM

## 2022-09-19 DIAGNOSIS — R42 DIZZINESS: Primary | ICD-10-CM

## 2022-09-21 ENCOUNTER — CLINICAL SUPPORT (OUTPATIENT)
Dept: REHABILITATION | Facility: HOSPITAL | Age: 82
End: 2022-09-21
Attending: INTERNAL MEDICINE
Payer: MEDICARE

## 2022-09-21 DIAGNOSIS — R42 DIZZINESS: Primary | ICD-10-CM

## 2022-09-21 PROCEDURE — 97112 NEUROMUSCULAR REEDUCATION: CPT | Mod: PO

## 2022-09-21 NOTE — PROGRESS NOTES
"[facility]   Physical Therapy Treatment Note     [facility]  Cl[facility] Number: [id number]    Therapy Diagnosis:   Encounter Diagnosis   Name Primary?    Dizziness Yes       Physician: Sheila Mcgee MD    Visit Date: 9/21/2022    Physician Orders: PT Eval and Treat   Medical Diagnosis from Referral: Vertigo  Evaluation Date: 8/25/2022  Authorization Period Expiration: 08/23/22 to 11/29/22  Plan of Care Expiration: 10/21/22  Visit # / Visits authorized: 06/12 + eval     PTA Visit #: 0/5     Time In: 15:30  Time Out: 16:15  Total Billable Time: 45 minutes    SUBJECTIVE     Pt reports: that she is feeling better today. On this past Sunday morning, she was at the sink cutting strawberries and sudden onset of dizziness came on. She was able to make it to a chair and get her head in a dependent position. She felt like she was going to pass out and endorses having sweating and a headache. Tomorrow, she picks starts her 14 day Holter monitor trial. Her daughter messaged MD about reducing Lasix to see if this helps symptoms.     PT reminded patient to schedule ENT appointment.     She was compliant with home exercise program.   Response to previous treatment: no adverse effects  Functional change: ongoing    Pain: 0/10  Location: bilateral knees    OBJECTIVE     None this date.     Treatment     Madan received the treatments listed below:      neuromuscular re-education activities to improve: Balance, Coordination, and Proprioception for 45 minutes. The following activities were included:     Ambulation in hallway:  2 x 100 feet ambulation with right<>left head movements, CGA  2 x 100 feet ambulation with up <> down head movements, CGA     Foam pad:  3 x 30" stance with eyes closed, CGA, no upper extremity support     X 30" each lower extremity in front, tandem stance with eyes open, CGA, no upper extremity support  2 x 30" each lower extremity in front, tandem stance with " "eyes closed, CGA, occ upper extremity support     X 4 laps tandem ambulation along ballet bar, CGA, no upper extremity support    2 x 30" each lower extremity, single leg stance with alternate lower extremity on fitter board, eyes closed, CGA, no upper extremity support     5 hurdles:  X 4 laps forward reciprocal stepping over hurdles, CGA, no upper extremity support  X 4 laps forward reciprocal right<>left stepping over hurdles, CGA, no upper extremity support    2 x 100 feet forward ambulation in hallway tossing <> catching ball, CGA      Patient Education and Home Exercises     Home Exercises Provided and Patient Education Provided     Education provided:   - HEP, vestibular system and it contributions to balance, many factors that can play a role in dizziness.     Written Home Exercises Provided: yes. Exercises were reviewed and Madan was able to demonstrate them prior to the end of the session.  Madan demonstrated good  understanding of the education provided. See EMR under Patient Instructions for exercises provided during therapy sessions    ASSESSMENT     Patient presents to therapy session today with reports of improved symptoms from prior session. However, she endorses another episode of spinning dizziness with feeling of about to pass out over the weekend while cutting fruit at the sink. PT had long discussion with patient about need to continue to follow up with cardiology and need to schedule appointment for ENT for further evaluation as symptoms do not appear to follow true vestibular pathology pattern. Additionally, no significant increase in dizziness symptoms with therapy interventions. She will benefit from skilled Physical Therapy to address impairments, improve balance and maximize functional mobility.     Madan Is progressing well towards her goals.   Pt prognosis is Good.     Pt will continue to benefit from skilled outpatient physical therapy to address the deficits listed in the problem " list box on initial evaluation, provide pt/family education and to maximize pt's level of independence in the home and community environment.     Pt's spiritual, cultural and educational needs considered and pt agreeable to plan of care and goals.     Anticipated barriers to physical therapy: TKA, lives alone    Goals:   Short Term Goals: 4 weeks   1. Patient to be (I) with established home exercise program. On going   2. Patient to perform VOR 1 at 70 bpm WFL for improved gaze stabilization. On going   3. Patient to perform VOR cancellation at 60 bpm WFL for improved motion tolerance. On going   4. PT to formally assess GST for postural control and establish appropriate goals. On going   5. PT to formally assess Functional Gait Assessment for dynamic balance and establish appropriate goal if indicated. On going      Long Term Goals: 8 weeks   1. Patient to be (I) with advanced home exercise program. On going   2. Patient to perform VOR 1 at 90 bpm WFL for improved gaze stabilization. On going   3. Patient to perform VOR cancellation at 80 bpm WFL for improved motion tolerance. On going   4. Patient to improve condition 4 on the GST to greater than or equal to 5 seconds for improved balance in low vision environments on going   5. Patient to score greater than or equal to 22/30 on the FGA for decreased fall risk. On going     PLAN     Progress standing balance exercise and HEP as appropriate    Laila Spear, PT

## 2022-09-22 ENCOUNTER — IMMUNIZATION (OUTPATIENT)
Dept: INTERNAL MEDICINE | Facility: CLINIC | Age: 82
End: 2022-09-22
Payer: MEDICARE

## 2022-09-22 ENCOUNTER — CLINICAL SUPPORT (OUTPATIENT)
Dept: CARDIOLOGY | Facility: HOSPITAL | Age: 82
End: 2022-09-22
Attending: INTERNAL MEDICINE
Payer: MEDICARE

## 2022-09-22 DIAGNOSIS — I48.19 PERSISTENT ATRIAL FIBRILLATION: ICD-10-CM

## 2022-09-22 DIAGNOSIS — R42 DIZZINESS: ICD-10-CM

## 2022-09-22 DIAGNOSIS — Z23 NEED FOR VACCINATION: Primary | ICD-10-CM

## 2022-09-22 DIAGNOSIS — I49.3 PVC'S (PREMATURE VENTRICULAR CONTRACTIONS): ICD-10-CM

## 2022-09-22 PROCEDURE — 0124A COVID-19, MRNA, LNP-S, BIVALENT BOOSTER, PF, 30 MCG/0.3 ML DOSE: CPT | Mod: CV19,PBBFAC | Performed by: INTERNAL MEDICINE

## 2022-09-22 PROCEDURE — 90694 FLU VACCINE - QUADRIVALENT - ADJUVANTED: ICD-10-PCS | Mod: S$GLB,,, | Performed by: INTERNAL MEDICINE

## 2022-09-22 PROCEDURE — 90694 VACC AIIV4 NO PRSRV 0.5ML IM: CPT | Mod: S$GLB,,, | Performed by: INTERNAL MEDICINE

## 2022-09-22 PROCEDURE — 91312 COVID-19, MRNA, LNP-S, BIVALENT BOOSTER, PF, 30 MCG/0.3 ML DOSE: ICD-10-PCS | Mod: S$GLB,,, | Performed by: INTERNAL MEDICINE

## 2022-09-22 PROCEDURE — G0008 ADMIN INFLUENZA VIRUS VAC: HCPCS | Mod: S$GLB,,, | Performed by: INTERNAL MEDICINE

## 2022-09-22 PROCEDURE — 93248 CV CARDIAC MONITOR - 3-15 DAY ADULT (CUPID ONLY): ICD-10-PCS | Mod: ,,, | Performed by: INTERNAL MEDICINE

## 2022-09-22 PROCEDURE — G0008 FLU VACCINE - QUADRIVALENT - ADJUVANTED: ICD-10-PCS | Mod: S$GLB,,, | Performed by: INTERNAL MEDICINE

## 2022-09-22 PROCEDURE — 91312 COVID-19, MRNA, LNP-S, BIVALENT BOOSTER, PF, 30 MCG/0.3 ML DOSE: CPT | Mod: S$GLB,,, | Performed by: INTERNAL MEDICINE

## 2022-09-22 PROCEDURE — 93248 EXT ECG>7D<15D REV&INTERPJ: CPT | Mod: ,,, | Performed by: INTERNAL MEDICINE

## 2022-09-26 ENCOUNTER — DOCUMENTATION ONLY (OUTPATIENT)
Dept: REHABILITATION | Facility: HOSPITAL | Age: 82
End: 2022-09-26
Payer: MEDICARE

## 2022-09-26 DIAGNOSIS — R42 DIZZINESS: Primary | ICD-10-CM

## 2022-09-26 NOTE — PROGRESS NOTES
OUTPATIENT PHYSICAL THERAPY DISCHARGE SUMMARY     Name: Madan Cantu [facility] Number: [id number]    Diagnosis:   Encounter Diagnosis   Name Primary?    Dizziness Yes     Physician: Sheila Mcgee MD  Treatment Orders: PT Eval and Treat  Past Medical History:   Diagnosis Date    A-fib     Anticoagulant long-term use     Anxiety     Arrhythmia     Arthritis     knees    Basal cell carcinoma     Breast cyst     Community acquired pneumonia of right lower lobe of lung 12/11/2018    Deep vein thrombosis     one DVT during  pregnancy    Encephalopathy 2/10/2021    Fibrocystic breast 1980 - ??    Heart murmur     Hypercholesterolemia 6/13/2017    Hyperlipidemia     Hypertension     Hypothyroidism     Mitral valve disorder     Mitral valve prolapse     PVC's (premature ventricular contractions)     Squamous cell carcinoma bx 7-2017    right upper forehead    Thyroid cancer 1/29/2013    Thyroid disease     Vasovagal near syncope 11/12/2012    Vasovagal near syncope 11/12/2012    VTE (venous thromboembolism)     in 1960s, post partum, pt unsure of details     Weakness 12/11/2018       Initial visit: 08/25/22  Date of Last visit: 09/21/22  Date of Discharge Note:  09/26/22  Total Visits Received: 7  Missed Visits: 4 cancelled appointments  ASSESSMENT   Status Towards Goals Met:   Please refer to last follow up note on 09/21/22 for most updated functional status. PT has had multiple discussions with patient about need to continue to follow up with cardiology and need to schedule appointment for ENT for further evaluation as symptoms do not appear to follow true vestibular pathology pattern. Patient cancelled her last 3 follow up sessions. Recommend follow up with MD team to help reduce frequent dizziness/lightheadedness episodes prior to return to therapy, if indicated at a future date.       Discharge reason : Patient self discharge and Patient has maximized benefit from PT at this time    PLAN   This patient  is discharged from Outpatient Physical Therapy Services.     Laila Spear, PT  09/26/2022

## 2022-09-29 ENCOUNTER — PATIENT MESSAGE (OUTPATIENT)
Dept: CARDIOLOGY | Facility: CLINIC | Age: 82
End: 2022-09-29
Payer: MEDICARE

## 2022-10-03 ENCOUNTER — OFFICE VISIT (OUTPATIENT)
Dept: DERMATOLOGY | Facility: CLINIC | Age: 82
End: 2022-10-03
Payer: MEDICARE

## 2022-10-03 DIAGNOSIS — D22.9 NEVUS: ICD-10-CM

## 2022-10-03 DIAGNOSIS — D48.5 NEOPLASM OF UNCERTAIN BEHAVIOR OF SKIN: Primary | ICD-10-CM

## 2022-10-03 DIAGNOSIS — L64.9 ANDROGENETIC ALOPECIA: ICD-10-CM

## 2022-10-03 PROCEDURE — 1159F MED LIST DOCD IN RCRD: CPT | Mod: CPTII,S$GLB,, | Performed by: DERMATOLOGY

## 2022-10-03 PROCEDURE — 1159F PR MEDICATION LIST DOCUMENTED IN MEDICAL RECORD: ICD-10-PCS | Mod: CPTII,S$GLB,, | Performed by: DERMATOLOGY

## 2022-10-03 PROCEDURE — 3288F FALL RISK ASSESSMENT DOCD: CPT | Mod: CPTII,S$GLB,, | Performed by: DERMATOLOGY

## 2022-10-03 PROCEDURE — 1157F PR ADVANCE CARE PLAN OR EQUIV PRESENT IN MEDICAL RECORD: ICD-10-PCS | Mod: CPTII,S$GLB,, | Performed by: DERMATOLOGY

## 2022-10-03 PROCEDURE — 1157F ADVNC CARE PLAN IN RCRD: CPT | Mod: CPTII,S$GLB,, | Performed by: DERMATOLOGY

## 2022-10-03 PROCEDURE — 99999 PR PBB SHADOW E&M-EST. PATIENT-LVL III: CPT | Mod: PBBFAC,,, | Performed by: DERMATOLOGY

## 2022-10-03 PROCEDURE — 11104 PUNCH BX SKIN SINGLE LESION: CPT | Mod: S$GLB,,, | Performed by: DERMATOLOGY

## 2022-10-03 PROCEDURE — 99999 PR PBB SHADOW E&M-EST. PATIENT-LVL III: ICD-10-PCS | Mod: PBBFAC,,, | Performed by: DERMATOLOGY

## 2022-10-03 PROCEDURE — 88305 TISSUE EXAM BY PATHOLOGIST: CPT | Performed by: PATHOLOGY

## 2022-10-03 PROCEDURE — 88305 TISSUE EXAM BY PATHOLOGIST: ICD-10-PCS | Mod: 26,,, | Performed by: PATHOLOGY

## 2022-10-03 PROCEDURE — 1126F PR PAIN SEVERITY QUANTIFIED, NO PAIN PRESENT: ICD-10-PCS | Mod: CPTII,S$GLB,, | Performed by: DERMATOLOGY

## 2022-10-03 PROCEDURE — 11104 PR PUNCH BIOPSY, SKIN, SINGLE LESION: ICD-10-PCS | Mod: S$GLB,,, | Performed by: DERMATOLOGY

## 2022-10-03 PROCEDURE — 1101F PT FALLS ASSESS-DOCD LE1/YR: CPT | Mod: CPTII,S$GLB,, | Performed by: DERMATOLOGY

## 2022-10-03 PROCEDURE — 99214 OFFICE O/P EST MOD 30 MIN: CPT | Mod: 25,S$GLB,, | Performed by: DERMATOLOGY

## 2022-10-03 PROCEDURE — 1126F AMNT PAIN NOTED NONE PRSNT: CPT | Mod: CPTII,S$GLB,, | Performed by: DERMATOLOGY

## 2022-10-03 PROCEDURE — 88305 TISSUE EXAM BY PATHOLOGIST: CPT | Mod: 26,,, | Performed by: PATHOLOGY

## 2022-10-03 PROCEDURE — 1160F PR REVIEW ALL MEDS BY PRESCRIBER/CLIN PHARMACIST DOCUMENTED: ICD-10-PCS | Mod: CPTII,S$GLB,, | Performed by: DERMATOLOGY

## 2022-10-03 PROCEDURE — 1101F PR PT FALLS ASSESS DOC 0-1 FALLS W/OUT INJ PAST YR: ICD-10-PCS | Mod: CPTII,S$GLB,, | Performed by: DERMATOLOGY

## 2022-10-03 PROCEDURE — 1160F RVW MEDS BY RX/DR IN RCRD: CPT | Mod: CPTII,S$GLB,, | Performed by: DERMATOLOGY

## 2022-10-03 PROCEDURE — 99214 PR OFFICE/OUTPT VISIT, EST, LEVL IV, 30-39 MIN: ICD-10-PCS | Mod: 25,S$GLB,, | Performed by: DERMATOLOGY

## 2022-10-03 PROCEDURE — 3288F PR FALLS RISK ASSESSMENT DOCUMENTED: ICD-10-PCS | Mod: CPTII,S$GLB,, | Performed by: DERMATOLOGY

## 2022-10-03 NOTE — PROGRESS NOTES
Subjective:       Patient ID:  Madan Bell is a 82 y.o. female who presents for   Chief Complaint   Patient presents with    Follow-up     Recheck L forehead     Pt here today for recheck of L lateral forehead; s/p efudex .  Lesion is still partly present  Pt also has lesion on left front of her ear.  Not painful or tender.  Unsure how long it has been present  Pt also concerned about thinning hair.  Has one area on left scalp that is very thin; present for many years and progressing.       Final Pathologic Diagnosis       Date                     Value               Ref Range           Status                06/09/2022                                                       Final             Skin, left lateral forehead ,shave biopsy:   -ACTINIC KERATOSIS AND SURFACE OF ADNEXAL NEOPLASM, see comment     COMMENT:  Clinical images were reviewed in epic and differential diagnosis noted.  There are actinic changes throughout the biopsy as clinically suspected.  However there is also but appears to be the surface of a focal adnexal neoplasm.  Unfortunately, given the superficial nature of this biopsy, the interpretation of the adnexal proliferation is limited.  Could represent the surface of a syringoma, trichoepithelioma, or microcystic adnexal carcinoma.  Deeper shave or punch biopsy is recommended for further characterization.     Comment:    Interp By Alexandre Mcqueen M.D., Signed on 06/22/2022 at 12:41  ----------            Follow-up    Review of Systems   Skin:  Positive for daily sunscreen use and activity-related sunscreen use.   Hematologic/Lymphatic: Bruises/bleeds easily (eliquis).      Objective:    Physical Exam   Constitutional: She appears well-developed and well-nourished. No distress.   Neurological: She is alert and oriented to person, place, and time. She is not disoriented.   Psychiatric: She has a normal mood and affect.   Skin:   Areas Examined (abnormalities noted in diagram):   Head /  Face Inspection Performed              Diagram Legend     Erythematous scaling macule/papule c/w actinic keratosis       Vascular papule c/w angioma      Pigmented verrucoid papule/plaque c/w seborrheic keratosis      Yellow umbilicated papule c/w sebaceous hyperplasia      Irregularly shaped tan macule c/w lentigo     1-2 mm smooth white papules consistent with Milia      Movable subcutaneous cyst with punctum c/w epidermal inclusion cyst      Subcutaneous movable cyst c/w pilar cyst      Firm pink to brown papule c/w dermatofibroma      Pedunculated fleshy papule(s) c/w skin tag(s)      Evenly pigmented macule c/w junctional nevus     Mildly variegated pigmented, slightly irregular-bordered macule c/w mildly atypical nevus      Flesh colored to evenly pigmented papule c/w intradermal nevus       Pink pearly papule/plaque c/w basal cell carcinoma      Erythematous hyperkeratotic cursted plaque c/w SCC      Surgical scar with no sign of skin cancer recurrence      Open and closed comedones      Inflammatory papules and pustules      Verrucoid papule consistent consistent with wart     Erythematous eczematous patches and plaques     Dystrophic onycholytic nail with subungual debris c/w onychomycosis     Umbilicated papule    Erythematous-base heme-crusted tan verrucoid plaque consistent with inflamed seborrheic keratosis     Erythematous Silvery Scaling Plaque c/w Psoriasis     See annotation      Assessment / Plan:      Pathology Orders:       Normal Orders This Visit    Specimen to Pathology, Dermatology     Comments:    Number of Specimens:->1  ------------------------->-------------------------  Spec 1 Procedure:->Biopsy  Spec 1 Clinical Impression:->r/o scar v adnexal tumor v  other- repeat bx  Spec 1 Source:->left lateral forehead    Questions:    Procedure Type: Dermatology and skin neoplasms    Number of Specimens: 1    ------------------------: -------------------------    Spec 1 Procedure: Biopsy    Spec 1  Clinical Impression: r/o scar v adnexal tumor v other- repeat bx    Spec 1 Source: left lateral forehead    Release to patient:           Neoplasm of uncertain behavior of skin  Punch biopsy procedure note:  Punch biopsy performed after verbal consent obtained. Area marked and prepped with alcohol. Approximately 1cc of 1% lidocaine with epinephrine injected. 6 mm disposable punch used to remove lesion. Hemostasis obtained and biopsy site closed with 1 - 2 Prolene sutures. Wound care instructions reviewed with patient and handout given.    -     Specimen to Pathology, Dermatology    Androgenetic alopecia  Rogaine- can use 2% or 5%  To affected area 1x per day. Must use consistently and if you stop using, the hair it stimulated to grow may fall out over time. Generic acceptable    Discussed with patient that there are multiple over the counter hair supplements, few of which have proven efficacy in controlled clinical trials. However, anecdotal reports have indicated a benefit for these vitamins.  Handout reviewing active ingredients, allergies, and proper use were provided for Nutrafol, Viviscal, AG Pro.  The patient can elect to take at his/her discretion. If taking biotin, patient should refrain from taking it 1 week before lab work.     Zenegen shampoo    Nevus  Discussed ABCDE's of nevi.  Monitor for new mole or moles that are becoming bigger, darker, irritated, or developing irregular borders. Brochure provided. Instructed patient to observe lesion(s) for changes and follow up in clinic if changes are noted. Patient to monitor skin at home for new or changing lesions.            Follow up in about 1 week (around 10/10/2022) for PO and 6 mo skin check .

## 2022-10-03 NOTE — PATIENT INSTRUCTIONS
Rogaine- can use 2% or 5%  To affected area 1x per day. Must use consistently and if you stop using, the hair it stimulated to grow may fall out over time. Generic acceptable    Discussed with patient that there are multiple over the counter hair supplements, few of which have proven efficacy in controlled clinical trials. However, anecdotal reports have indicated a benefit for these vitamins.  Handout reviewing active ingredients, allergies, and proper use were provided for Nutrafol, Viviscal, AG Pro.  The patient can elect to take at his/her discretion. If taking biotin, patient should refrain from taking it 1 week before lab work.     Dominicegen shampoo

## 2022-10-07 ENCOUNTER — PATIENT MESSAGE (OUTPATIENT)
Dept: ELECTROPHYSIOLOGY | Facility: CLINIC | Age: 82
End: 2022-10-07
Payer: MEDICARE

## 2022-10-07 LAB
FINAL PATHOLOGIC DIAGNOSIS: NORMAL
GROSS: NORMAL
Lab: NORMAL
MICROSCOPIC EXAM: NORMAL

## 2022-10-08 ENCOUNTER — PATIENT MESSAGE (OUTPATIENT)
Dept: INTERNAL MEDICINE | Facility: CLINIC | Age: 82
End: 2022-10-08
Payer: MEDICARE

## 2022-10-10 ENCOUNTER — CLINICAL SUPPORT (OUTPATIENT)
Dept: DERMATOLOGY | Facility: CLINIC | Age: 82
End: 2022-10-10
Payer: MEDICARE

## 2022-10-10 DIAGNOSIS — Z48.02 VISIT FOR SUTURE REMOVAL: Primary | ICD-10-CM

## 2022-10-10 NOTE — PROGRESS NOTES
Subjective:       Patient ID:  Madan Bell is a 82 y.o. female who presents for   Chief Complaint   Patient presents with    Suture / Staple Removal     Patient presents for suture removal. The wound is well healed without signs of infection.  The sutures are removed. Wound care and activity instructions given. Return prn.      Suture / Staple Removal      Review of Systems     Objective:    Physical Exam       Diagram Legend     Erythematous scaling macule/papule c/w actinic keratosis       Vascular papule c/w angioma      Pigmented verrucoid papule/plaque c/w seborrheic keratosis      Yellow umbilicated papule c/w sebaceous hyperplasia      Irregularly shaped tan macule c/w lentigo     1-2 mm smooth white papules consistent with Milia      Movable subcutaneous cyst with punctum c/w epidermal inclusion cyst      Subcutaneous movable cyst c/w pilar cyst      Firm pink to brown papule c/w dermatofibroma      Pedunculated fleshy papule(s) c/w skin tag(s)      Evenly pigmented macule c/w junctional nevus     Mildly variegated pigmented, slightly irregular-bordered macule c/w mildly atypical nevus      Flesh colored to evenly pigmented papule c/w intradermal nevus       Pink pearly papule/plaque c/w basal cell carcinoma      Erythematous hyperkeratotic cursted plaque c/w SCC      Surgical scar with no sign of skin cancer recurrence      Open and closed comedones      Inflammatory papules and pustules      Verrucoid papule consistent consistent with wart     Erythematous eczematous patches and plaques     Dystrophic onycholytic nail with subungual debris c/w onychomycosis     Umbilicated papule    Erythematous-base heme-crusted tan verrucoid plaque consistent with inflamed seborrheic keratosis     Erythematous Silvery Scaling Plaque c/w Psoriasis     See annotation      Assessment / Plan:        There are no diagnoses linked to this encounter.         No follow-ups on file.

## 2022-10-10 NOTE — PROGRESS NOTES
Ms. Bell, your pathology report indicates that the lesion is removed completely.. No further treatment is needed at this time. Please schedule a follow up appointment in 12 months. Thank you for allowing me to care for you and take care, Dr. Molina    12 mo reminder. thanks

## 2022-10-13 LAB
OHS CV EVENT MONITOR DAY: 13
OHS CV HOLTER HOOKUP DATE: NORMAL
OHS CV HOLTER HOOKUP TIME: NORMAL
OHS CV HOLTER LENGTH DECIMAL HOURS: 335.4
OHS CV HOLTER LENGTH HOURS: 23
OHS CV HOLTER LENGTH MINUTES: 24
OHS CV HOLTER SCAN DATE: NORMAL
OHS CV HOLTER SINUS AVERAGE HR: 73 BPM
OHS CV HOLTER SINUS MAX HR: 171 BPM
OHS CV HOLTER SINUS MIN HR: 34 BPM
OHS CV HOLTER STUDY END DATE: NORMAL
OHS CV HOLTER STUDY END TIME: NORMAL

## 2022-10-14 NOTE — PROGRESS NOTES
Please notify the patient that her monitor showed normal heart rates. She continues to have frequent PVCs but less than her prior monitor. We can discuss further at her upcoming visit.

## 2022-10-18 ENCOUNTER — TELEPHONE (OUTPATIENT)
Dept: ELECTROPHYSIOLOGY | Facility: CLINIC | Age: 82
End: 2022-10-18
Payer: MEDICARE

## 2022-10-18 NOTE — TELEPHONE ENCOUNTER
----- Message from Joey Champagne MD sent at 10/14/2022 12:41 PM CDT -----  Please notify the patient that her monitor showed normal heart rates. She continues to have frequent PVCs but less than her prior monitor. We can discuss further at her upcoming visit.

## 2022-10-18 NOTE — TELEPHONE ENCOUNTER
Spoke with patient and advised that Dr Champagne reviewed her monitor results which showed that normal heart rates. Advised that she continues to have frequent PVCs but less than her prior monitor. Instructed that Dr Champagne will discuss results further at her upcoming visit. Understanding verbalized.

## 2022-10-28 ENCOUNTER — PATIENT MESSAGE (OUTPATIENT)
Dept: ELECTROPHYSIOLOGY | Facility: CLINIC | Age: 82
End: 2022-10-28
Payer: MEDICARE

## 2022-10-31 ENCOUNTER — TELEPHONE (OUTPATIENT)
Dept: INTERNAL MEDICINE | Facility: CLINIC | Age: 82
End: 2022-10-31

## 2022-10-31 DIAGNOSIS — R42 VERTIGO: Primary | ICD-10-CM

## 2022-11-04 ENCOUNTER — TELEPHONE (OUTPATIENT)
Dept: ELECTROPHYSIOLOGY | Facility: CLINIC | Age: 82
End: 2022-11-04
Payer: MEDICARE

## 2022-11-04 NOTE — TELEPHONE ENCOUNTER
Spoke with pt to discuss appointment. Patient wants to wait until after she see the ENT on 12/30, then she will see Dr. Champagne. Appointment canceled and will give us a call when she is ready to r/s. Offered NP Ewa Farr but she is going to think on it

## 2022-12-30 ENCOUNTER — CLINICAL SUPPORT (OUTPATIENT)
Dept: AUDIOLOGY | Facility: CLINIC | Age: 82
End: 2022-12-30
Payer: MEDICARE

## 2022-12-30 ENCOUNTER — OFFICE VISIT (OUTPATIENT)
Dept: OTOLARYNGOLOGY | Facility: CLINIC | Age: 82
End: 2022-12-30
Payer: MEDICARE

## 2022-12-30 VITALS — WEIGHT: 143 LBS | BODY MASS INDEX: 21.74 KG/M2

## 2022-12-30 DIAGNOSIS — H90.3 HEARING LOSS SENSORY, BILATERAL: Primary | ICD-10-CM

## 2022-12-30 DIAGNOSIS — R42 DIZZINESS: ICD-10-CM

## 2022-12-30 DIAGNOSIS — H90.3 SENSORINEURAL HEARING LOSS, BILATERAL: Primary | ICD-10-CM

## 2022-12-30 DIAGNOSIS — Z86.79 HISTORY OF ATRIAL FIBRILLATION: ICD-10-CM

## 2022-12-30 PROCEDURE — 3288F PR FALLS RISK ASSESSMENT DOCUMENTED: ICD-10-PCS | Mod: HCNC,CPTII,S$GLB, | Performed by: OTOLARYNGOLOGY

## 2022-12-30 PROCEDURE — 1159F MED LIST DOCD IN RCRD: CPT | Mod: HCNC,CPTII,S$GLB, | Performed by: OTOLARYNGOLOGY

## 2022-12-30 PROCEDURE — 1157F PR ADVANCE CARE PLAN OR EQUIV PRESENT IN MEDICAL RECORD: ICD-10-PCS | Mod: HCNC,CPTII,S$GLB, | Performed by: OTOLARYNGOLOGY

## 2022-12-30 PROCEDURE — 1159F PR MEDICATION LIST DOCUMENTED IN MEDICAL RECORD: ICD-10-PCS | Mod: HCNC,CPTII,S$GLB, | Performed by: OTOLARYNGOLOGY

## 2022-12-30 PROCEDURE — 3288F FALL RISK ASSESSMENT DOCD: CPT | Mod: HCNC,CPTII,S$GLB, | Performed by: OTOLARYNGOLOGY

## 2022-12-30 PROCEDURE — 99999 PR PBB SHADOW E&M-EST. PATIENT-LVL III: ICD-10-PCS | Mod: PBBFAC,HCNC,, | Performed by: OTOLARYNGOLOGY

## 2022-12-30 PROCEDURE — 1101F PT FALLS ASSESS-DOCD LE1/YR: CPT | Mod: HCNC,CPTII,S$GLB, | Performed by: OTOLARYNGOLOGY

## 2022-12-30 PROCEDURE — 1157F ADVNC CARE PLAN IN RCRD: CPT | Mod: HCNC,CPTII,S$GLB, | Performed by: OTOLARYNGOLOGY

## 2022-12-30 PROCEDURE — 99214 OFFICE O/P EST MOD 30 MIN: CPT | Mod: HCNC,S$GLB,, | Performed by: OTOLARYNGOLOGY

## 2022-12-30 PROCEDURE — 99999 PR PBB SHADOW E&M-EST. PATIENT-LVL III: CPT | Mod: PBBFAC,HCNC,, | Performed by: OTOLARYNGOLOGY

## 2022-12-30 PROCEDURE — 1101F PR PT FALLS ASSESS DOC 0-1 FALLS W/OUT INJ PAST YR: ICD-10-PCS | Mod: HCNC,CPTII,S$GLB, | Performed by: OTOLARYNGOLOGY

## 2022-12-30 PROCEDURE — 1126F AMNT PAIN NOTED NONE PRSNT: CPT | Mod: HCNC,CPTII,S$GLB, | Performed by: OTOLARYNGOLOGY

## 2022-12-30 PROCEDURE — 99214 PR OFFICE/OUTPT VISIT, EST, LEVL IV, 30-39 MIN: ICD-10-PCS | Mod: HCNC,S$GLB,, | Performed by: OTOLARYNGOLOGY

## 2022-12-30 PROCEDURE — 1126F PR PAIN SEVERITY QUANTIFIED, NO PAIN PRESENT: ICD-10-PCS | Mod: HCNC,CPTII,S$GLB, | Performed by: OTOLARYNGOLOGY

## 2022-12-30 PROCEDURE — 92557 PR COMPREHENSIVE HEARING TEST: ICD-10-PCS | Mod: HCNC,S$GLB,,

## 2022-12-30 PROCEDURE — 92557 COMPREHENSIVE HEARING TEST: CPT | Mod: HCNC,S$GLB,,

## 2022-12-30 PROCEDURE — 92567 PR TYMPA2METRY: ICD-10-PCS | Mod: HCNC,S$GLB,,

## 2022-12-30 PROCEDURE — 99999 PR PBB SHADOW E&M-EST. PATIENT-LVL I: ICD-10-PCS | Mod: PBBFAC,HCNC,,

## 2022-12-30 PROCEDURE — 99999 PR PBB SHADOW E&M-EST. PATIENT-LVL I: CPT | Mod: PBBFAC,HCNC,,

## 2022-12-30 PROCEDURE — 92567 TYMPANOMETRY: CPT | Mod: HCNC,S$GLB,,

## 2022-12-30 NOTE — PROGRESS NOTES
"Mamaddi Floreshe Bell was seen today in the clinic for an audiologic evaluation.  Patient's main complaint was decreased hearing bilaterally and dizziness.  Ms. Bell reported that she may have had a slight decrease in hearing since her last audiogram and that her dizziness symptoms are unchanged. Ms. Bell's daughter accompanied her to the appointment today and noted that she has not had near fainting episodes in the last few weeks but still continues to have the "aura" and lightheadedness. Ms. Bell has been to her cardiologist who reported that her dizziness is not related to cardiologic issues.    Tympanometry revealed Type As in the right ear and Type A in the left ear.     Audiogram results revealed mild to moderate sensorineural hearing loss in the right ear and mild to moderately severe sensorineural hearing loss in the left ear.      Speech reception thresholds were noted at 35 dB in the right ear and 35 dB in the left ear.    Speech discrimination scores were 96% in the right ear and 92% in the left ear.    Results were reviewed with Ms. Bell and her daughter today and hearing aids were recommended. Ms. Bell has [employer] insurance and she was encouraged to contact them to discuss possible hearing aid benefits.     Recommendations:  Otologic evaluation  Annual audiogram  Hearing protection when in noise  Hearing aid consultation          "

## 2022-12-30 NOTE — PROGRESS NOTES
"Subjective:       Patient ID: Madan Bell is a 82 y.o. female.    Chief Complaint: Dizziness    HPI  Ms. Bell, a 82 year old female, referred per Dr. Gillespie for complaints of chronic dizziness and lightheadedness. She reports approximately 4 episodes this year. One episode she describes she that she felt like she was going to black out and felt " whoosy" and weak as she was pulling something out of a dryer which was alleviated by sitting down; she denies any LOC. She reports her symptoms lasted many hours that day. She also reports she experiences a " dull heaviness on her head" almost every day along with palpations and lightheadedness which has been causing her anxiety everyday and affecting her quality of life. She reports a gradual hearing loss that has been present several years. She reports unsteadiness  and having to hold onto walls as she walks She is doing vestibular rehabilitation however feels that it is not helping her. She has a history of atrial fibrillation and PVCs.   Past Medical History:   Diagnosis Date    A-fib     Anticoagulant long-term use     Anxiety     Arrhythmia     Arthritis     knees    Basal cell carcinoma     Breast cyst     Community acquired pneumonia of right lower lobe of lung 12/11/2018    Deep vein thrombosis     one DVT during  pregnancy    Encephalopathy 2/10/2021    Fibrocystic breast 1980 - ??    Heart murmur     Hypercholesterolemia 6/13/2017    Hyperlipidemia     Hypertension     Hypothyroidism     Mitral valve disorder     Mitral valve prolapse     PVC's (premature ventricular contractions)     Squamous cell carcinoma bx 7-2017    right upper forehead    Thyroid cancer 1/29/2013    Thyroid disease     Vasovagal near syncope 11/12/2012    Vasovagal near syncope 11/12/2012    VTE (venous thromboembolism)     in 1960s, post partum, pt unsure of details     Weakness 12/11/2018     Past Surgical History:   Procedure Laterality Date    BREAST BIOPSY Left  "    excisional    BREAST BIOPSY Left     core needle biopsy    BREAST CYST ASPIRATION Bilateral 1980's - ??    BREAST CYST EXCISION Right 2008 - ??    CARDIOVERSION N/A 08/13/2018    Procedure: CARDIOVERSION;  Surgeon: Fernando Disla MD;  Location: [facility] CATH LAB;  Service: Cardiology;  Laterality: N/A;  AF,DCCV,ANAHI,MAC,FAS,3 PREP    CATARACT EXTRACTION W/  INTRAOCULAR LENS IMPLANT Left 04/22/2021    Procedure: EXTRACTION, CATARACT, WITH IOL INSERTION;  Surgeon: Gabby Sandoval MD;  Location: [facility] OR;  Service: Ophthalmology;  Laterality: Left;    CATARACT EXTRACTION W/  INTRAOCULAR LENS IMPLANT Right 05/17/2021    Procedure: EXTRACTION, CATARACT, WITH IOL INSERTION;  Surgeon: Gabby Sandoval MD;  Location: [facility] OR;  Service: Ophthalmology;  Laterality: Right;    HYSTERECTOMY      tahbso    KNEE ARTHROPLASTY Right 06/14/2021    Procedure: ARTHROPLASTY, KNEE:RIGHT:DEPUY-SIGMA;  Surgeon: Tashi Perkins III, MD;  Location: [facility] OR;  Service: Orthopedics;  Laterality: Right;    OOPHORECTOMY  hysterectomy - 2000 - ??    THYROID SURGERY       Family History   Problem Relation Age of Onset    Arrhythmia Son     Mitral valve prolapse Son     Sudden death Son         sudden cardiac death    Heart disease Mother     Hyperlipidemia Maternal Grandmother     No Known Problems Daughter     No Known Problems Son     No Known Problems Son     Breast cancer Maternal Aunt     Melanoma Neg Hx      Social History  Social History     Tobacco Use    Smoking status: Never    Smokeless tobacco: Never   Substance Use Topics    Alcohol use: No    Drug use: No       Review of Systems     Constitutional: Negative for appetite change, chills, fatigue, fever and unexpected weight loss.      HENT: Positive for hearing loss.      Eyes:  Negative for change in eyesight, eye drainage, eye itching and photophobia.     Respiratory:  Negative for cough, shortness of breath, sleep apnea, snoring and wheezing.      Cardiovascular:  Positive for  irregular heartbeat.     Gastrointestinal:  Negative for abdominal pain, acid reflux, constipation, diarrhea, heartburn and vomiting.     Genitourinary: Negative for difficulty urinating, sexual problems and frequent urination.     Musc: Positive for back pain.     Skin: Negative for rash.     Allergy: Negative for food allergies and seasonal allergies.     Neurological: Positive for headaches and light-headedness.     Hematologic: Negative for bruises/bleeds easily and swollen glands.      Psychiatric: Positive for decreased concentration.             Objective:      Physical Exam  Vitals and nursing note reviewed.   Constitutional:       Appearance: Normal appearance.   HENT:      Head: Normocephalic and atraumatic.      Right Ear: Tympanic membrane, ear canal and external ear normal. Decreased hearing noted. No laceration, drainage, swelling or tenderness. No middle ear effusion. There is no impacted cerumen. No foreign body. No mastoid tenderness. No PE tube. No hemotympanum. Tympanic membrane is not injected, scarred, perforated, erythematous, retracted or bulging.      Left Ear: Tympanic membrane, ear canal and external ear normal. Decreased hearing noted. No laceration, drainage, swelling or tenderness.  No middle ear effusion. There is no impacted cerumen. No foreign body. No mastoid tenderness. No PE tube. No hemotympanum. Tympanic membrane is not injected, scarred, perforated, erythematous, retracted or bulging.      Nose: Nose normal.      Mouth/Throat:      Lips: Pink.      Mouth: Mucous membranes are moist.   Eyes:      General: Lids are normal.   Neurological:      Mental Status: She is alert.   Psychiatric:         Attention and Perception: Attention normal.         Mood and Affect: Mood normal. Mood is not anxious.         Speech: Speech normal.         Behavior: Behavior normal. Behavior is cooperative.         Audiogram     Audiogram tracings independently reviewed and discussed with  patient.    MRI 2/10/2021  EXAMINATION:  MRI BRAIN WITHOUT CONTRAST     CLINICAL HISTORY:  Altered mental status;.     TECHNIQUE:  Multiplanar multisequence MR imaging of the brain was performed without contrast.     COMPARISON:  Head CT earlier same day and MRI brain 10/20/2020     FINDINGS:  Intracranial compartment:     Generalized cerebral volume loss.  Ventricles are midline and stable in size and configuration without distortion by mass effect or acute hydrocephalus.  No extra-axial blood or fluid collections.     Grossly stable distribution of patchy and confluent nonspecific T2/FLAIR hyperintense signal of the subcortical and periventricular white matter consistent with chronic microvascular ischemic change.  No mass lesion, acute hemorrhage, edema or acute infarct.     Normal vascular flow voids are preserved.  The sella and parasellar regions are within normal limits.     Skull/extracranial contents (limited evaluation): Hyperostosis frontalis interna.  Bone marrow signal intensity is normal.  Bilateral orbits are within normal limits.  Craniocervical junction is within normal limits.     Impression:     No acute intracranial abnormality.     Mild senescent and chronic microvascular ischemic changes      CT Scan 2/21/2021  EXAMINATION:  CT HEAD WITHOUT CONTRAST     CLINICAL HISTORY:  Neuro deficit, acute, stroke suspected;Altered mental status;     TECHNIQUE:  Low dose axial images were obtained through the head.  Coronal and sagittal reformations were also performed. Contrast was not administered.     COMPARISON:  10/20/2020     FINDINGS:  There is no midline shift, hydrocephalus or mass effect.  There is no evidence of acute intracranial hemorrhage or CT evidence of acute major vascular territory infarct.  There are chronic microvascular ischemic changes.  No abnormal extra-axial fluid collections.  Calvarium is intact without evidence of fracture.  Visualized paranasal sinuses and mastoid air cells are  clear.     Impression:     No acute intracranial abnormalities.     Chronic microvascular ischemic changes.  Assessment:       1. Hearing loss sensory, bilateral    2. Dizziness    3. History of atrial fibrillation          Plan:     - Consider a VNG if symptoms or concerns persist  -  continue vestibular rehabilitation therapy  - Consider Behavioral therapy to decrease anxiety  _ Follow up with Cardiology   -Audiometric testing interpretation consistent with sensorineural hearing loss.  Discussed the etiology of SNHL.  Medically cleared for hearing amplification, and will follow-up with Audiology if interested.  Hearing conservation in noisy environments.  Return to clinic every 2 years for audiometric testing.

## 2023-01-09 ENCOUNTER — TELEPHONE (OUTPATIENT)
Dept: OTOLARYNGOLOGY | Facility: CLINIC | Age: 83
End: 2023-01-09
Payer: MEDICARE

## 2023-01-09 NOTE — TELEPHONE ENCOUNTER
Spoke with patient to follow up on hearing aids. Patient would like order to be sent to [facility] hearing. Advise patient to contact Nadiya hadley for hearing aids order.

## 2023-01-19 ENCOUNTER — PATIENT MESSAGE (OUTPATIENT)
Dept: ADMINISTRATIVE | Facility: OTHER | Age: 83
End: 2023-01-19
Payer: MEDICARE

## 2023-02-02 ENCOUNTER — HOSPITAL ENCOUNTER (OUTPATIENT)
Dept: CARDIOLOGY | Facility: CLINIC | Age: 83
Discharge: HOME | End: 2023-02-02
Payer: MEDICARE

## 2023-02-02 ENCOUNTER — OFFICE VISIT (OUTPATIENT)
Dept: ELECTROPHYSIOLOGY | Facility: CLINIC | Age: 83
End: 2023-02-02
Payer: MEDICARE

## 2023-02-02 ENCOUNTER — CLINICAL SUPPORT (OUTPATIENT)
Dept: CARDIOLOGY | Facility: HOSPITAL | Age: 83
End: 2023-02-02
Attending: INTERNAL MEDICINE
Payer: MEDICARE

## 2023-02-02 VITALS
SYSTOLIC BLOOD PRESSURE: 130 MMHG | HEIGHT: 68 IN | BODY MASS INDEX: 21.88 KG/M2 | WEIGHT: 144.38 LBS | DIASTOLIC BLOOD PRESSURE: 80 MMHG | HEART RATE: 86 BPM

## 2023-02-02 DIAGNOSIS — R42 DIZZINESS: ICD-10-CM

## 2023-02-02 DIAGNOSIS — I10 HTN (HYPERTENSION), BENIGN: ICD-10-CM

## 2023-02-02 DIAGNOSIS — G47.33 OSA (OBSTRUCTIVE SLEEP APNEA): ICD-10-CM

## 2023-02-02 DIAGNOSIS — I48.19 PERSISTENT ATRIAL FIBRILLATION: Primary | ICD-10-CM

## 2023-02-02 DIAGNOSIS — I34.0 NONRHEUMATIC MITRAL VALVE REGURGITATION: ICD-10-CM

## 2023-02-02 DIAGNOSIS — I48.19 PERSISTENT ATRIAL FIBRILLATION: ICD-10-CM

## 2023-02-02 DIAGNOSIS — I49.3 PVC'S (PREMATURE VENTRICULAR CONTRACTIONS): Primary | ICD-10-CM

## 2023-02-02 DIAGNOSIS — I70.0 ATHEROSCLEROSIS OF AORTA: ICD-10-CM

## 2023-02-02 PROCEDURE — 93010 ELECTROCARDIOGRAM REPORT: CPT | Mod: HCNC,S$GLB,, | Performed by: INTERNAL MEDICINE

## 2023-02-02 PROCEDURE — 93005 ELECTROCARDIOGRAM TRACING: CPT | Mod: HCNC,S$GLB,, | Performed by: INTERNAL MEDICINE

## 2023-02-02 PROCEDURE — 1101F PT FALLS ASSESS-DOCD LE1/YR: CPT | Mod: HCNC,CPTII,S$GLB, | Performed by: INTERNAL MEDICINE

## 2023-02-02 PROCEDURE — 1159F MED LIST DOCD IN RCRD: CPT | Mod: HCNC,CPTII,S$GLB, | Performed by: INTERNAL MEDICINE

## 2023-02-02 PROCEDURE — 99214 PR OFFICE/OUTPT VISIT, EST, LEVL IV, 30-39 MIN: ICD-10-PCS | Mod: HCNC,S$GLB,, | Performed by: INTERNAL MEDICINE

## 2023-02-02 PROCEDURE — 3288F PR FALLS RISK ASSESSMENT DOCUMENTED: ICD-10-PCS | Mod: HCNC,CPTII,S$GLB, | Performed by: INTERNAL MEDICINE

## 2023-02-02 PROCEDURE — 3288F FALL RISK ASSESSMENT DOCD: CPT | Mod: HCNC,CPTII,S$GLB, | Performed by: INTERNAL MEDICINE

## 2023-02-02 PROCEDURE — 1126F AMNT PAIN NOTED NONE PRSNT: CPT | Mod: HCNC,CPTII,S$GLB, | Performed by: INTERNAL MEDICINE

## 2023-02-02 PROCEDURE — 1160F PR REVIEW ALL MEDS BY PRESCRIBER/CLIN PHARMACIST DOCUMENTED: ICD-10-PCS | Mod: HCNC,CPTII,S$GLB, | Performed by: INTERNAL MEDICINE

## 2023-02-02 PROCEDURE — 1160F RVW MEDS BY RX/DR IN RCRD: CPT | Mod: HCNC,CPTII,S$GLB, | Performed by: INTERNAL MEDICINE

## 2023-02-02 PROCEDURE — 99999 PR PBB SHADOW E&M-EST. PATIENT-LVL IV: ICD-10-PCS | Mod: PBBFAC,HCNC,, | Performed by: INTERNAL MEDICINE

## 2023-02-02 PROCEDURE — 1101F PR PT FALLS ASSESS DOC 0-1 FALLS W/OUT INJ PAST YR: ICD-10-PCS | Mod: HCNC,CPTII,S$GLB, | Performed by: INTERNAL MEDICINE

## 2023-02-02 PROCEDURE — 3075F SYST BP GE 130 - 139MM HG: CPT | Mod: HCNC,CPTII,S$GLB, | Performed by: INTERNAL MEDICINE

## 2023-02-02 PROCEDURE — 1126F PR PAIN SEVERITY QUANTIFIED, NO PAIN PRESENT: ICD-10-PCS | Mod: HCNC,CPTII,S$GLB, | Performed by: INTERNAL MEDICINE

## 2023-02-02 PROCEDURE — 93248 CV CARDIAC MONITOR - 3-15 DAY ADULT (CUPID ONLY): ICD-10-PCS | Mod: HCNC,,, | Performed by: INTERNAL MEDICINE

## 2023-02-02 PROCEDURE — 93248 EXT ECG>7D<15D REV&INTERPJ: CPT | Mod: HCNC,,, | Performed by: INTERNAL MEDICINE

## 2023-02-02 PROCEDURE — 1157F PR ADVANCE CARE PLAN OR EQUIV PRESENT IN MEDICAL RECORD: ICD-10-PCS | Mod: HCNC,CPTII,S$GLB, | Performed by: INTERNAL MEDICINE

## 2023-02-02 PROCEDURE — 93010 RHYTHM STRIP: ICD-10-PCS | Mod: HCNC,S$GLB,, | Performed by: INTERNAL MEDICINE

## 2023-02-02 PROCEDURE — 3075F PR MOST RECENT SYSTOLIC BLOOD PRESS GE 130-139MM HG: ICD-10-PCS | Mod: HCNC,CPTII,S$GLB, | Performed by: INTERNAL MEDICINE

## 2023-02-02 PROCEDURE — 1159F PR MEDICATION LIST DOCUMENTED IN MEDICAL RECORD: ICD-10-PCS | Mod: HCNC,CPTII,S$GLB, | Performed by: INTERNAL MEDICINE

## 2023-02-02 PROCEDURE — 99214 OFFICE O/P EST MOD 30 MIN: CPT | Mod: HCNC,S$GLB,, | Performed by: INTERNAL MEDICINE

## 2023-02-02 PROCEDURE — 1157F ADVNC CARE PLAN IN RCRD: CPT | Mod: HCNC,CPTII,S$GLB, | Performed by: INTERNAL MEDICINE

## 2023-02-02 PROCEDURE — 99999 PR PBB SHADOW E&M-EST. PATIENT-LVL IV: CPT | Mod: PBBFAC,HCNC,, | Performed by: INTERNAL MEDICINE

## 2023-02-02 PROCEDURE — 3079F PR MOST RECENT DIASTOLIC BLOOD PRESSURE 80-89 MM HG: ICD-10-PCS | Mod: HCNC,CPTII,S$GLB, | Performed by: INTERNAL MEDICINE

## 2023-02-02 PROCEDURE — 93005 RHYTHM STRIP: ICD-10-PCS | Mod: HCNC,S$GLB,, | Performed by: INTERNAL MEDICINE

## 2023-02-02 PROCEDURE — 3079F DIAST BP 80-89 MM HG: CPT | Mod: HCNC,CPTII,S$GLB, | Performed by: INTERNAL MEDICINE

## 2023-02-02 NOTE — PROGRESS NOTES
Subjective:    Patient ID:  Madan Bell is a 82 y.o. female who presents for follow-up of Atrial Fibrillation    Referring Electrophysiologist: Fernando Disla MD  Primary Cardiologist: Loc Daly MD  Primary Care Physician: Sheila Mcgee MD    HPIPrior Hx:  I had the pleasure of seeing Mrs. Bell in our electrophysiology in follow-up for her ventricular ectopy. As you are aware she is a pleasant 82 year-old woman, former patient of Dr. Disla, with moderate mitral valve prolapse/mitral sclerosis with preserved LV function, PVCs and nonsustained VT, orthostatic light-headedness, persistent AF and thyroid cancer s/p thyroidectomy. She had been a patient of Dr. Disla for many years. Her PVCs were treated with metoprolol. She developed persistent AF in 2018 and underwent ANAHI guided DCCV. She remained in sinus rhythm without anti-arrhythmic therapy. At her last follow-up up visit with Dr. Disla in July 2020 she was in rate controlled AF. Plan was for rate control. She remained on eliquis and bisoprolol. At our first visit in 2/2021 it was noted she was recently in the ER for brief episode of confusion. CT head and brain MRI were negative. She felt her dizziness was getting worse. She noted having early morning low heart rates with her monitoring for digital HTN clinic (pulse measures in the 30s-40s at times). She was concerned her neurologic symptoms may be related to her bradycardia. Her heart rate was in the 70s when she was in the ER.    A holter monitor from August 2020 noted persistent rate controlled AF with a 6% PVC burden. She was started on Mexitil to attempt to reduce PVC burden some. October 2020 noted persistent rate controlled atrial fibrillation with 6% PVC burden and 2 runs of NSVT (4 and 5 beats). She also developed a tremor related to Mexitil and it was stopped. An echocardiogram from 8/2020 noted preserved LV function with moderate MR, bileaflet prolapse and severe  LA enlargement. We discussed low likelihood of maintaining sinus rhythm at this point with plan to continue rate control.    To investigate her complaints of bradycardia in the morning, she wore a 14 day holter which showed rate controlled AF with 5% multifocal PVCs and no significant bradycardia while awake (had some during sleep hours). Symptomatic button activations corresponded to AF with normal ventricular rates.    ECHO from 4/2021 noted a LVEF of 55% and moderated MR with severe left atrial enlargement, PASP of 45mmHg and enlarged IVC (pressure 15mmHg).    1/2022: Mrs. Bell returned for yearly follow-up. She reported continued episodes of light-headedness that are random. She felt great when she was physically active. She confirmed that when she wore the monitor and she pressed the symptom button she was having her symptoms. She describes that some times when she has sudden head position changes she feels dizzy and describes it like the room is spinning. Recommended ENT evaluation.    Interim Hx:  Mrs. Bell returns for yearly follow-up. Extended holter 9/2022 noted rate controlled AF with 10% PVC burden and 4 episodes of nonsustained VT up to 5 beats in duration. She had some pauses of 3-4 seconds however these appeared to be during night-time hours. She did not activate the symptom button. She continues to have dizziness. She was seen in ENT clinic and they did not feel like she had vertigo. She feels like lying down makes her dizzy and then when suddenly trying to get up makes her dizzy also. She also reports sometimes she gets waves of dizziness while upright.       My interpretation of today's in clinic ECG is AF with an average rate of 86 bpm with PVCs.    Review of Systems   Constitutional: Negative for fever and malaise/fatigue.   HENT:  Negative for congestion and sore throat.    Eyes:  Negative for blurred vision and visual disturbance.   Cardiovascular:  Positive for leg swelling. Negative for  chest pain, dyspnea on exertion, irregular heartbeat, near-syncope, palpitations and syncope.   Respiratory:  Negative for cough and shortness of breath.    Hematologic/Lymphatic: Negative for bleeding problem. Does not bruise/bleed easily.   Skin: Negative.    Musculoskeletal: Negative.    Gastrointestinal:  Negative for bloating, abdominal pain, hematochezia and melena.   Neurological:  Positive for light-headedness and weakness. Negative for focal weakness.   Psychiatric/Behavioral: Negative.        Objective:    Physical Exam  Vitals reviewed.   Constitutional:       General: She is not in acute distress.     Appearance: She is well-developed. She is not diaphoretic.   HENT:      Head: Normocephalic and atraumatic.   Eyes:      General:         Right eye: No discharge.         Left eye: No discharge.      Conjunctiva/sclera: Conjunctivae normal.   Cardiovascular:      Rate and Rhythm: Normal rate. Rhythm irregularly irregular.      Heart sounds: No murmur heard.    No friction rub. No gallop.   Pulmonary:      Effort: Pulmonary effort is normal. No respiratory distress.      Breath sounds: Normal breath sounds. No wheezing or rales.   Abdominal:      General: Bowel sounds are normal. There is no distension.      Palpations: Abdomen is soft.      Tenderness: There is no abdominal tenderness.   Musculoskeletal:      Cervical back: Neck supple.   Skin:     General: Skin is warm and dry.   Neurological:      Mental Status: She is alert and oriented to person, place, and time.   Psychiatric:         Behavior: Behavior normal.         Thought Content: Thought content normal.         Judgment: Judgment normal.         Assessment:       1. PVC's (premature ventricular contractions)    2. Persistent atrial fibrillation    3. Atherosclerosis of aorta    4. Nonrheumatic mitral valve regurgitation    5. HTN (hypertension), benign    6. RIZWANA (obstructive sleep apnea)    7. Dizziness         Plan:     In summary, Mrs. Bell   is a pleasant 82 year-old woman, former patient of Dr. Disla, with moderate mitral valve prolapse/mitral sclerosis with preserved LV function, PVCs and nonsustained VT, orthostatic light-headedness, persistent AF and thyroid cancer s/p thyroidectomy. She had no apparent symptomatic benefit of restoration of sinus rhythm in 2018. A rate control strategy is reasonable. Discussed in setting of significant MR it would likely be very difficult to maintain sinus rhythm even with anti-arrhythmic therapy. Discussed her PVC burden is not in the range to increase her risk of PVC induced CMP. At some point her MR may need to be addressed, surgical or endovascular approach. Her AF is rate controlled. I am still unsure what her dizziness is from. She had some pauses on her prior monitor however this appeared nocturnal. She did not activate it for any symptoms. Will repeat ZIO patch and asked her to activate it with symptoms and label them. Offered trial of DCCV and amiodarone however discussed this may require a PPM. Also offered amiodarone to try to reduce PVCs to see if this helped her symptoms. Will start with repeat monitor and discuss on the phone.    Continue eliquis lifelong as tolerated.    RTC in 4 months    Thank you for allowing me to participate in the care of this patient. Please do not hesitate to call me with any questions or concerns.    Joey Champagne MD, PhD  Cardiac Electrophysiology

## 2023-02-06 ENCOUNTER — TELEPHONE (OUTPATIENT)
Dept: INTERNAL MEDICINE | Facility: CLINIC | Age: 83
End: 2023-02-06
Payer: MEDICARE

## 2023-02-06 ENCOUNTER — PATIENT MESSAGE (OUTPATIENT)
Dept: INTERNAL MEDICINE | Facility: CLINIC | Age: 83
End: 2023-02-06
Payer: MEDICARE

## 2023-02-06 DIAGNOSIS — E55.9 MILD VITAMIN D DEFICIENCY: ICD-10-CM

## 2023-02-06 DIAGNOSIS — I48.0 PAROXYSMAL ATRIAL FIBRILLATION: ICD-10-CM

## 2023-02-06 DIAGNOSIS — E06.3 HASHIMOTO'S DISEASE: ICD-10-CM

## 2023-02-06 DIAGNOSIS — G99.2: ICD-10-CM

## 2023-02-06 DIAGNOSIS — E53.8: ICD-10-CM

## 2023-02-06 DIAGNOSIS — E78.5 HYPERLIPIDEMIA, UNSPECIFIED HYPERLIPIDEMIA TYPE: ICD-10-CM

## 2023-02-06 DIAGNOSIS — R73.9 HYPERGLYCEMIA: ICD-10-CM

## 2023-02-06 DIAGNOSIS — I10 HTN (HYPERTENSION), BENIGN: Primary | ICD-10-CM

## 2023-02-07 DIAGNOSIS — Z00.00 ENCOUNTER FOR MEDICARE ANNUAL WELLNESS EXAM: ICD-10-CM

## 2023-02-08 ENCOUNTER — LAB VISIT (OUTPATIENT)
Dept: LAB | Facility: HOSPITAL | Age: 83
End: 2023-02-08
Attending: INTERNAL MEDICINE
Payer: MEDICARE

## 2023-02-08 DIAGNOSIS — G99.2: ICD-10-CM

## 2023-02-08 DIAGNOSIS — I10 HTN (HYPERTENSION), BENIGN: ICD-10-CM

## 2023-02-08 DIAGNOSIS — E55.9 MILD VITAMIN D DEFICIENCY: ICD-10-CM

## 2023-02-08 DIAGNOSIS — E06.3 HASHIMOTO'S DISEASE: ICD-10-CM

## 2023-02-08 DIAGNOSIS — E78.5 HYPERLIPIDEMIA, UNSPECIFIED HYPERLIPIDEMIA TYPE: ICD-10-CM

## 2023-02-08 DIAGNOSIS — R73.9 HYPERGLYCEMIA: ICD-10-CM

## 2023-02-08 DIAGNOSIS — E53.8: ICD-10-CM

## 2023-02-08 DIAGNOSIS — I48.0 PAROXYSMAL ATRIAL FIBRILLATION: ICD-10-CM

## 2023-02-08 LAB
25(OH)D3+25(OH)D2 SERPL-MCNC: 29 NG/ML (ref 30–96)
ALBUMIN SERPL BCP-MCNC: 4.1 G/DL (ref 3.5–5.2)
ALP SERPL-CCNC: 55 U/L (ref 55–135)
ALT SERPL W/O P-5'-P-CCNC: 14 U/L (ref 10–44)
ANION GAP SERPL CALC-SCNC: 11 MMOL/L (ref 8–16)
AST SERPL-CCNC: 22 U/L (ref 10–40)
BASOPHILS # BLD AUTO: 0.03 K/UL (ref 0–0.2)
BASOPHILS NFR BLD: 0.4 % (ref 0–1.9)
BILIRUB SERPL-MCNC: 1.5 MG/DL (ref 0.1–1)
BNP SERPL-MCNC: 155 PG/ML (ref 0–99)
BUN SERPL-MCNC: 10 MG/DL (ref 8–23)
CALCIUM SERPL-MCNC: 9.6 MG/DL (ref 8.7–10.5)
CHLORIDE SERPL-SCNC: 103 MMOL/L (ref 95–110)
CHOLEST SERPL-MCNC: 127 MG/DL (ref 120–199)
CHOLEST/HDLC SERPL: 2.5 {RATIO} (ref 2–5)
CO2 SERPL-SCNC: 24 MMOL/L (ref 23–29)
CREAT SERPL-MCNC: 0.8 MG/DL (ref 0.5–1.4)
DIFFERENTIAL METHOD: ABNORMAL
EOSINOPHIL # BLD AUTO: 0.1 K/UL (ref 0–0.5)
EOSINOPHIL NFR BLD: 0.7 % (ref 0–8)
ERYTHROCYTE [DISTWIDTH] IN BLOOD BY AUTOMATED COUNT: 13.2 % (ref 11.5–14.5)
EST. GFR  (NO RACE VARIABLE): >60 ML/MIN/1.73 M^2
ESTIMATED AVG GLUCOSE: 123 MG/DL (ref 68–131)
GLUCOSE SERPL-MCNC: 95 MG/DL (ref 70–110)
HBA1C MFR BLD: 5.9 % (ref 4–5.6)
HCT VFR BLD AUTO: 42.7 % (ref 37–48.5)
HDLC SERPL-MCNC: 51 MG/DL (ref 40–75)
HDLC SERPL: 40.2 % (ref 20–50)
HGB BLD-MCNC: 13.6 G/DL (ref 12–16)
IMM GRANULOCYTES # BLD AUTO: 0.02 K/UL (ref 0–0.04)
IMM GRANULOCYTES NFR BLD AUTO: 0.3 % (ref 0–0.5)
LDLC SERPL CALC-MCNC: 61.8 MG/DL (ref 63–159)
LYMPHOCYTES # BLD AUTO: 1.8 K/UL (ref 1–4.8)
LYMPHOCYTES NFR BLD: 25.8 % (ref 18–48)
MCH RBC QN AUTO: 31.4 PG (ref 27–31)
MCHC RBC AUTO-ENTMCNC: 31.9 G/DL (ref 32–36)
MCV RBC AUTO: 99 FL (ref 82–98)
MONOCYTES # BLD AUTO: 0.6 K/UL (ref 0.3–1)
MONOCYTES NFR BLD: 8.5 % (ref 4–15)
NEUTROPHILS # BLD AUTO: 4.6 K/UL (ref 1.8–7.7)
NEUTROPHILS NFR BLD: 64.3 % (ref 38–73)
NONHDLC SERPL-MCNC: 76 MG/DL
NRBC BLD-RTO: 0 /100 WBC
PLATELET # BLD AUTO: 158 K/UL (ref 150–450)
PMV BLD AUTO: 11.4 FL (ref 9.2–12.9)
POTASSIUM SERPL-SCNC: 4.5 MMOL/L (ref 3.5–5.1)
PROT SERPL-MCNC: 6.9 G/DL (ref 6–8.4)
RBC # BLD AUTO: 4.33 M/UL (ref 4–5.4)
SODIUM SERPL-SCNC: 138 MMOL/L (ref 136–145)
TRIGL SERPL-MCNC: 71 MG/DL (ref 30–150)
TSH SERPL DL<=0.005 MIU/L-ACNC: 0.81 UIU/ML (ref 0.4–4)
VIT B12 SERPL-MCNC: 1507 PG/ML (ref 210–950)
WBC # BLD AUTO: 7.09 K/UL (ref 3.9–12.7)

## 2023-02-08 PROCEDURE — 82306 VITAMIN D 25 HYDROXY: CPT | Mod: HCNC | Performed by: INTERNAL MEDICINE

## 2023-02-08 PROCEDURE — 80061 LIPID PANEL: CPT | Mod: HCNC | Performed by: INTERNAL MEDICINE

## 2023-02-08 PROCEDURE — 85025 COMPLETE CBC W/AUTO DIFF WBC: CPT | Mod: HCNC | Performed by: INTERNAL MEDICINE

## 2023-02-08 PROCEDURE — 83921 ORGANIC ACID SINGLE QUANT: CPT | Mod: HCNC | Performed by: INTERNAL MEDICINE

## 2023-02-08 PROCEDURE — 84443 ASSAY THYROID STIM HORMONE: CPT | Mod: HCNC | Performed by: INTERNAL MEDICINE

## 2023-02-08 PROCEDURE — 80053 COMPREHEN METABOLIC PANEL: CPT | Mod: HCNC | Performed by: INTERNAL MEDICINE

## 2023-02-08 PROCEDURE — 36415 COLL VENOUS BLD VENIPUNCTURE: CPT | Mod: HCNC,PN | Performed by: INTERNAL MEDICINE

## 2023-02-08 PROCEDURE — 83036 HEMOGLOBIN GLYCOSYLATED A1C: CPT | Mod: HCNC | Performed by: INTERNAL MEDICINE

## 2023-02-08 PROCEDURE — 83880 ASSAY OF NATRIURETIC PEPTIDE: CPT | Mod: HCNC | Performed by: INTERNAL MEDICINE

## 2023-02-08 PROCEDURE — 82607 VITAMIN B-12: CPT | Mod: HCNC | Performed by: INTERNAL MEDICINE

## 2023-02-09 DIAGNOSIS — Z00.00 ENCOUNTER FOR MEDICARE ANNUAL WELLNESS EXAM: ICD-10-CM

## 2023-02-10 ENCOUNTER — LAB VISIT (OUTPATIENT)
Dept: LAB | Facility: HOSPITAL | Age: 83
End: 2023-02-10
Attending: INTERNAL MEDICINE
Payer: MEDICARE

## 2023-02-10 ENCOUNTER — OFFICE VISIT (OUTPATIENT)
Dept: INTERNAL MEDICINE | Facility: CLINIC | Age: 83
End: 2023-02-10
Payer: MEDICARE

## 2023-02-10 VITALS
HEIGHT: 68 IN | HEART RATE: 82 BPM | BODY MASS INDEX: 22.02 KG/M2 | DIASTOLIC BLOOD PRESSURE: 72 MMHG | WEIGHT: 145.31 LBS | SYSTOLIC BLOOD PRESSURE: 128 MMHG | OXYGEN SATURATION: 98 %

## 2023-02-10 DIAGNOSIS — N39.0 URINARY TRACT INFECTION WITHOUT HEMATURIA, SITE UNSPECIFIED: ICD-10-CM

## 2023-02-10 DIAGNOSIS — R42 DIZZINESS AND GIDDINESS: ICD-10-CM

## 2023-02-10 DIAGNOSIS — R29.6 FALLING EPISODES: ICD-10-CM

## 2023-02-10 DIAGNOSIS — R42 DIZZINESS: ICD-10-CM

## 2023-02-10 DIAGNOSIS — I10 HTN (HYPERTENSION), BENIGN: ICD-10-CM

## 2023-02-10 DIAGNOSIS — R42 DIZZINESS: Primary | ICD-10-CM

## 2023-02-10 DIAGNOSIS — Z12.31 SCREENING MAMMOGRAM, ENCOUNTER FOR: ICD-10-CM

## 2023-02-10 PROCEDURE — 81003 URINALYSIS AUTO W/O SCOPE: CPT | Mod: HCNC | Performed by: INTERNAL MEDICINE

## 2023-02-10 PROCEDURE — 86140 C-REACTIVE PROTEIN: CPT | Mod: HCNC | Performed by: INTERNAL MEDICINE

## 2023-02-10 PROCEDURE — 3078F DIAST BP <80 MM HG: CPT | Mod: HCNC,CPTII,S$GLB, | Performed by: INTERNAL MEDICINE

## 2023-02-10 PROCEDURE — 87086 URINE CULTURE/COLONY COUNT: CPT | Mod: HCNC | Performed by: INTERNAL MEDICINE

## 2023-02-10 PROCEDURE — 1126F AMNT PAIN NOTED NONE PRSNT: CPT | Mod: HCNC,CPTII,S$GLB, | Performed by: INTERNAL MEDICINE

## 2023-02-10 PROCEDURE — 3288F FALL RISK ASSESSMENT DOCD: CPT | Mod: HCNC,CPTII,S$GLB, | Performed by: INTERNAL MEDICINE

## 2023-02-10 PROCEDURE — 1126F PR PAIN SEVERITY QUANTIFIED, NO PAIN PRESENT: ICD-10-PCS | Mod: HCNC,CPTII,S$GLB, | Performed by: INTERNAL MEDICINE

## 2023-02-10 PROCEDURE — 3074F SYST BP LT 130 MM HG: CPT | Mod: HCNC,CPTII,S$GLB, | Performed by: INTERNAL MEDICINE

## 2023-02-10 PROCEDURE — 36415 COLL VENOUS BLD VENIPUNCTURE: CPT | Mod: HCNC | Performed by: INTERNAL MEDICINE

## 2023-02-10 PROCEDURE — 99999 PR PBB SHADOW E&M-EST. PATIENT-LVL V: ICD-10-PCS | Mod: PBBFAC,HCNC,, | Performed by: INTERNAL MEDICINE

## 2023-02-10 PROCEDURE — 99999 PR PBB SHADOW E&M-EST. PATIENT-LVL V: CPT | Mod: PBBFAC,HCNC,, | Performed by: INTERNAL MEDICINE

## 2023-02-10 PROCEDURE — 82570 ASSAY OF URINE CREATININE: CPT | Mod: HCNC | Performed by: INTERNAL MEDICINE

## 2023-02-10 PROCEDURE — 99214 PR OFFICE/OUTPT VISIT, EST, LEVL IV, 30-39 MIN: ICD-10-PCS | Mod: HCNC,S$GLB,, | Performed by: INTERNAL MEDICINE

## 2023-02-10 PROCEDURE — 99214 OFFICE O/P EST MOD 30 MIN: CPT | Mod: HCNC,S$GLB,, | Performed by: INTERNAL MEDICINE

## 2023-02-10 PROCEDURE — 1159F MED LIST DOCD IN RCRD: CPT | Mod: HCNC,CPTII,S$GLB, | Performed by: INTERNAL MEDICINE

## 2023-02-10 PROCEDURE — 85652 RBC SED RATE AUTOMATED: CPT | Mod: HCNC | Performed by: INTERNAL MEDICINE

## 2023-02-10 PROCEDURE — 1101F PR PT FALLS ASSESS DOC 0-1 FALLS W/OUT INJ PAST YR: ICD-10-PCS | Mod: HCNC,CPTII,S$GLB, | Performed by: INTERNAL MEDICINE

## 2023-02-10 PROCEDURE — 1157F PR ADVANCE CARE PLAN OR EQUIV PRESENT IN MEDICAL RECORD: ICD-10-PCS | Mod: HCNC,CPTII,S$GLB, | Performed by: INTERNAL MEDICINE

## 2023-02-10 PROCEDURE — 1159F PR MEDICATION LIST DOCUMENTED IN MEDICAL RECORD: ICD-10-PCS | Mod: HCNC,CPTII,S$GLB, | Performed by: INTERNAL MEDICINE

## 2023-02-10 PROCEDURE — 3078F PR MOST RECENT DIASTOLIC BLOOD PRESSURE < 80 MM HG: ICD-10-PCS | Mod: HCNC,CPTII,S$GLB, | Performed by: INTERNAL MEDICINE

## 2023-02-10 PROCEDURE — 3288F PR FALLS RISK ASSESSMENT DOCUMENTED: ICD-10-PCS | Mod: HCNC,CPTII,S$GLB, | Performed by: INTERNAL MEDICINE

## 2023-02-10 PROCEDURE — 1101F PT FALLS ASSESS-DOCD LE1/YR: CPT | Mod: HCNC,CPTII,S$GLB, | Performed by: INTERNAL MEDICINE

## 2023-02-10 PROCEDURE — 3074F PR MOST RECENT SYSTOLIC BLOOD PRESSURE < 130 MM HG: ICD-10-PCS | Mod: HCNC,CPTII,S$GLB, | Performed by: INTERNAL MEDICINE

## 2023-02-10 PROCEDURE — 1157F ADVNC CARE PLAN IN RCRD: CPT | Mod: HCNC,CPTII,S$GLB, | Performed by: INTERNAL MEDICINE

## 2023-02-10 NOTE — PROGRESS NOTES
Subjective:       Patient ID: Madan Bell is a 82 y.o. female.    Chief Complaint: Follow-up    Follow-up  Associated symptoms include arthralgias, headaches and weakness. Pertinent negatives include no chest pain, joint swelling, neck pain or vomiting. Pt is feeling okay except for dizziness.  She is doing better and walking by herself more.  No recent falls.  No CP or SOB. Some new HA.  Review of Systems   Constitutional:  Positive for activity change. Negative for unexpected weight change.   HENT:  Positive for hearing loss. Negative for rhinorrhea and trouble swallowing.    Eyes:  Negative for discharge and visual disturbance.   Respiratory:  Negative for chest tightness and wheezing.    Cardiovascular:  Negative for chest pain and palpitations.   Gastrointestinal:  Negative for blood in stool, constipation, diarrhea and vomiting.   Endocrine: Negative for polydipsia and polyuria.   Genitourinary:  Negative for difficulty urinating, dysuria, hematuria and menstrual problem.   Musculoskeletal:  Positive for arthralgias. Negative for joint swelling and neck pain.   Neurological:  Positive for weakness and headaches.   Psychiatric/Behavioral:  Positive for confusion. Negative for dysphoric mood.      Objective:      Physical Exam  Constitutional:       General: She is not in acute distress.     Appearance: She is well-developed.   HENT:      Head: Normocephalic.   Eyes:      Pupils: Pupils are equal, round, and reactive to light.   Neck:      Thyroid: No thyromegaly.      Vascular: No JVD.   Cardiovascular:      Rate and Rhythm: Normal rate and regular rhythm.      Heart sounds: Normal heart sounds. No murmur heard.    No friction rub. No gallop.   Pulmonary:      Effort: Pulmonary effort is normal.      Breath sounds: Normal breath sounds. No wheezing or rales.   Abdominal:      General: Bowel sounds are normal. There is no distension.      Palpations: Abdomen is soft. There is no mass.       Tenderness: There is no abdominal tenderness. There is no guarding or rebound.   Musculoskeletal:      Cervical back: Neck supple.   Lymphadenopathy:      Cervical: No cervical adenopathy.   Skin:     General: Skin is warm and dry.   Neurological:      Mental Status: She is alert and oriented to person, place, and time.      Deep Tendon Reflexes: Reflexes are normal and symmetric.   Psychiatric:         Behavior: Behavior normal.         Thought Content: Thought content normal.         Judgment: Judgment normal.       Assessment:       1. Dizziness    2. Urinary tract infection without hematuria, site unspecified    3. HTN (hypertension), benign    4. Screening mammogram, encounter for    5. Falling episodes    6. Dizziness and giddiness          Plan:   Dizziness  -     Ambulatory referral/consult to Neurology; Future; Expected date: 02/17/2023  -     MRI Brain Without Contrast; Future; Expected date: 02/10/2023  -     Sedimentation rate; Future; Expected date: 02/10/2023  -     C-Reactive Protein; Future; Expected date: 02/10/2023    Urinary tract infection without hematuria, site unspecified  -     Urinalysis  -     Urine culture    HTN (hypertension), benign  -     Microalbumin/Creatinine Ratio, Urine    Screening mammogram, encounter for  -     Mammo Digital Screening Bilat w/ Roberto; Future; Expected date: 08/10/2023    Falling episodes  -     Ambulatory referral/consult to Physical/Occupational Therapy; Future; Expected date: 02/17/2023    Dizziness and giddiness  -     MRA Brain without contrast; Future; Expected date: 02/10/2023  -     MRA Neck without contrast; Future; Expected date: 02/10/2023    Consider removing HCTZ completely fro BP meds.   Answers submitted by the patient for this visit:  Review of Systems Questionnaire (Submitted on 2/3/2023)  activity change: Yes  unexpected weight change: No  neck pain: No  hearing loss: Yes  rhinorrhea: No  trouble swallowing: No  eye discharge: No  visual  disturbance: No  chest tightness: No  wheezing: No  chest pain: No  palpitations: No  blood in stool: No  constipation: No  vomiting: No  diarrhea: No  polydipsia: No  polyuria: No  difficulty urinating: No  hematuria: No  menstrual problem: No  dysuria: No  joint swelling: No  arthralgias: Yes  headaches: Yes  weakness: Yes  confusion: Yes  dysphoric mood: No

## 2023-02-11 LAB
ALBUMIN/CREAT UR: NORMAL UG/MG (ref 0–30)
BILIRUB UR QL STRIP: NEGATIVE
CLARITY UR REFRACT.AUTO: CLEAR
COLOR UR AUTO: NORMAL
CREAT UR-MCNC: 16 MG/DL (ref 15–325)
CRP SERPL-MCNC: 0.4 MG/L (ref 0–8.2)
ERYTHROCYTE [SEDIMENTATION RATE] IN BLOOD BY PHOTOMETRIC METHOD: <2 MM/HR (ref 0–36)
GLUCOSE UR QL STRIP: NEGATIVE
HGB UR QL STRIP: NEGATIVE
KETONES UR QL STRIP: NEGATIVE
LEUKOCYTE ESTERASE UR QL STRIP: NEGATIVE
MICROALBUMIN UR DL<=1MG/L-MCNC: <5 UG/ML
NITRITE UR QL STRIP: NEGATIVE
PH UR STRIP: 5 [PH] (ref 5–8)
PROT UR QL STRIP: NEGATIVE
SP GR UR STRIP: 1 (ref 1–1.03)
URN SPEC COLLECT METH UR: NORMAL

## 2023-02-12 LAB — BACTERIA UR CULT: NO GROWTH

## 2023-02-14 LAB — METHYLMALONATE SERPL-SCNC: 0.12 UMOL/L

## 2023-02-27 LAB
OHS CV EVENT MONITOR DAY: 14
OHS CV HOLTER HOOKUP DATE: NORMAL
OHS CV HOLTER HOOKUP TIME: NORMAL
OHS CV HOLTER LENGTH DECIMAL HOURS: 336
OHS CV HOLTER LENGTH HOURS: 0
OHS CV HOLTER LENGTH MINUTES: 0
OHS CV HOLTER SCAN DATE: NORMAL
OHS CV HOLTER STUDY END DATE: NORMAL
OHS CV HOLTER STUDY END TIME: NORMAL

## 2023-03-01 RX ORDER — ATORVASTATIN CALCIUM 40 MG/1
40 TABLET, FILM COATED ORAL DAILY
Qty: 90 TABLET | Refills: 3 | Status: SHIPPED | OUTPATIENT
Start: 2023-03-01 | End: 2024-01-11 | Stop reason: SDUPTHER

## 2023-03-01 NOTE — TELEPHONE ENCOUNTER
Refill Decision Note   Madan Bell  is requesting a refill authorization.  Brief Assessment and Rationale for Refill:  Approve     Medication Therapy Plan:       Medication Reconciliation Completed: No    Alert overridden per protocol: Yes   Comments:     No Care Gaps recommended.     Note composed:3:10 PM 03/01/2023

## 2023-03-01 NOTE — TELEPHONE ENCOUNTER
No new care gaps identified.  [facility] Embedded Care Gaps. Reference number: [id number]. 3/01/2023   3:08:50 PM CST

## 2023-03-08 ENCOUNTER — PATIENT MESSAGE (OUTPATIENT)
Dept: ELECTROPHYSIOLOGY | Facility: CLINIC | Age: 83
End: 2023-03-08
Payer: MEDICARE

## 2023-03-09 ENCOUNTER — PATIENT MESSAGE (OUTPATIENT)
Dept: ELECTROPHYSIOLOGY | Facility: CLINIC | Age: 83
End: 2023-03-09
Payer: MEDICARE

## 2023-03-16 ENCOUNTER — TELEPHONE (OUTPATIENT)
Dept: INTERNAL MEDICINE | Facility: CLINIC | Age: 83
End: 2023-03-16
Payer: MEDICARE

## 2023-03-16 NOTE — TELEPHONE ENCOUNTER
----- Message from Brenda Vuong sent at 3/16/2023 11:24 AM CDT -----  Contact: Self [phone]  Requesting an RX refill or new RX.    Is this a refill or new RX: refill    RX name and strength (copy/paste from chart):  apixaban (ELIQUIS) 5 mg Tab    Is this a 30 day or 90 day RX: 90    Pharmacy name and phone # (copy/paste from chart):      [facility] - [place], [place] - [address]  [address] [address]  Phone: [phone] Fax: [phone]    Pt states pharm is requesting a renewal. Pt is requesting a portal message.

## 2023-03-17 NOTE — TELEPHONE ENCOUNTER
Informed patient of UTI, she will  antibiotics and will see DR. Doll on 01/29/19   Md discussed discharge instructions with pt.  AVS printed and given to pt.

## 2023-03-31 ENCOUNTER — PATIENT MESSAGE (OUTPATIENT)
Dept: INTERNAL MEDICINE | Facility: CLINIC | Age: 83
End: 2023-03-31
Payer: MEDICARE

## 2023-04-04 ENCOUNTER — TELEPHONE (OUTPATIENT)
Dept: INTERNAL MEDICINE | Facility: CLINIC | Age: 83
End: 2023-04-04
Payer: MEDICARE

## 2023-04-04 RX ORDER — MIRTAZAPINE 7.5 MG/1
TABLET, FILM COATED ORAL
Qty: 5 TABLET | Refills: 0 | Status: SHIPPED | OUTPATIENT
Start: 2023-04-04 | End: 2023-10-26

## 2023-04-17 NOTE — ASSESSMENT & PLAN NOTE
Temp 103, productive cough, confusion, CURB-65: 2 (in-patient), WBC 14  Received 1L NS in ED, along with 500mg Azithromycin and 1g ceftriaxone  Rapid Flu negative, Legionella antigen PENDING  Blood cultures PENDING  Lactic was 1.0 and 0.6 on repeat  On 2L NC, lauryn PRN  Continue on azithromycin for 3 more days and rochedominikn      97

## 2023-04-19 ENCOUNTER — PATIENT MESSAGE (OUTPATIENT)
Dept: CARDIOLOGY | Facility: CLINIC | Age: 83
End: 2023-04-19
Payer: MEDICARE

## 2023-04-20 ENCOUNTER — HOSPITAL ENCOUNTER (OUTPATIENT)
Dept: RADIOLOGY | Facility: HOSPITAL | Age: 83
Discharge: HOME | End: 2023-04-20
Attending: INTERNAL MEDICINE
Payer: MEDICARE

## 2023-04-20 DIAGNOSIS — Z12.31 SCREENING MAMMOGRAM, ENCOUNTER FOR: ICD-10-CM

## 2023-04-20 PROCEDURE — 77067 SCR MAMMO BI INCL CAD: CPT | Mod: 26,HCNC,, | Performed by: RADIOLOGY

## 2023-04-20 PROCEDURE — 77063 BREAST TOMOSYNTHESIS BI: CPT | Mod: 26,HCNC,, | Performed by: RADIOLOGY

## 2023-04-20 PROCEDURE — 77067 MAMMO DIGITAL SCREENING BILAT WITH TOMO: ICD-10-PCS | Mod: 26,HCNC,, | Performed by: RADIOLOGY

## 2023-04-20 PROCEDURE — 77063 MAMMO DIGITAL SCREENING BILAT WITH TOMO: ICD-10-PCS | Mod: 26,HCNC,, | Performed by: RADIOLOGY

## 2023-04-20 PROCEDURE — 77067 SCR MAMMO BI INCL CAD: CPT | Mod: TC,HCNC

## 2023-04-20 RX ORDER — FUROSEMIDE 20 MG/1
TABLET ORAL
Qty: 135 TABLET | Refills: 3 | Status: SHIPPED | OUTPATIENT
Start: 2023-04-20 | End: 2024-01-11 | Stop reason: SDUPTHER

## 2023-05-11 ENCOUNTER — PATIENT MESSAGE (OUTPATIENT)
Dept: ADMINISTRATIVE | Facility: OTHER | Age: 83
End: 2023-05-11
Payer: MEDICARE

## 2023-05-12 ENCOUNTER — HOSPITAL ENCOUNTER (OUTPATIENT)
Dept: RADIOLOGY | Facility: HOSPITAL | Age: 83
Discharge: HOME | End: 2023-05-12
Attending: INTERNAL MEDICINE
Payer: MEDICARE

## 2023-05-12 DIAGNOSIS — R42 DIZZINESS: ICD-10-CM

## 2023-05-12 DIAGNOSIS — R42 DIZZINESS AND GIDDINESS: ICD-10-CM

## 2023-05-12 PROCEDURE — 70551 MRI BRAIN STEM W/O DYE: CPT | Mod: 26,HCNC,, | Performed by: RADIOLOGY

## 2023-05-12 PROCEDURE — 70547 MRA NECK WITHOUT CONTRAST: ICD-10-PCS | Mod: 26,HCNC,, | Performed by: RADIOLOGY

## 2023-05-12 PROCEDURE — 70551 MRI BRAIN WITHOUT CONTRAST: ICD-10-PCS | Mod: 26,HCNC,, | Performed by: RADIOLOGY

## 2023-05-12 PROCEDURE — 70547 MR ANGIOGRAPHY NECK W/O DYE: CPT | Mod: TC,HCNC

## 2023-05-12 PROCEDURE — 70551 MRI BRAIN STEM W/O DYE: CPT | Mod: TC,HCNC

## 2023-05-12 PROCEDURE — 70547 MR ANGIOGRAPHY NECK W/O DYE: CPT | Mod: 26,HCNC,, | Performed by: RADIOLOGY

## 2023-05-23 ENCOUNTER — PATIENT MESSAGE (OUTPATIENT)
Dept: INTERNAL MEDICINE | Facility: CLINIC | Age: 83
End: 2023-05-23
Payer: MEDICARE

## 2023-05-31 RX ORDER — LEVOTHYROXINE SODIUM 88 UG/1
88 TABLET ORAL
Qty: 90 TABLET | Refills: 2 | Status: SHIPPED | OUTPATIENT
Start: 2023-05-31

## 2023-05-31 NOTE — TELEPHONE ENCOUNTER
Refill Decision Note   Madan Bell  is requesting a refill authorization.  Brief Assessment and Rationale for Refill:  Approve     Medication Therapy Plan:  TSH-WNL      Comments:     Note composed:11:27 AM 05/31/2023             Appointments     Last Visit   2/10/2023 Sheila Mcgee MD   Next Visit   Visit date not found Sheila Mcgee MD

## 2023-05-31 NOTE — TELEPHONE ENCOUNTER
No care due was identified.  [facility] Embedded Care Due Messages. Reference number: [id number].   5/31/2023 10:38:40 AM CDT

## 2023-06-06 ENCOUNTER — OFFICE VISIT (OUTPATIENT)
Dept: ELECTROPHYSIOLOGY | Facility: CLINIC | Age: 83
End: 2023-06-06
Payer: MEDICARE

## 2023-06-06 ENCOUNTER — HOSPITAL ENCOUNTER (OUTPATIENT)
Dept: CARDIOLOGY | Facility: CLINIC | Age: 83
Discharge: HOME | End: 2023-06-06
Payer: MEDICARE

## 2023-06-06 VITALS
HEIGHT: 68 IN | BODY MASS INDEX: 21.75 KG/M2 | WEIGHT: 143.5 LBS | SYSTOLIC BLOOD PRESSURE: 102 MMHG | DIASTOLIC BLOOD PRESSURE: 66 MMHG | HEART RATE: 90 BPM

## 2023-06-06 DIAGNOSIS — I47.29 PAROXYSMAL VENTRICULAR TACHYCARDIA: Primary | ICD-10-CM

## 2023-06-06 DIAGNOSIS — I48.19 PERSISTENT ATRIAL FIBRILLATION: ICD-10-CM

## 2023-06-06 DIAGNOSIS — G47.33 OSA (OBSTRUCTIVE SLEEP APNEA): ICD-10-CM

## 2023-06-06 DIAGNOSIS — I34.1 MVP (MITRAL VALVE PROLAPSE): ICD-10-CM

## 2023-06-06 DIAGNOSIS — R42 DIZZINESS: ICD-10-CM

## 2023-06-06 DIAGNOSIS — I49.3 PVC'S (PREMATURE VENTRICULAR CONTRACTIONS): ICD-10-CM

## 2023-06-06 DIAGNOSIS — I10 HTN (HYPERTENSION), BENIGN: ICD-10-CM

## 2023-06-06 PROCEDURE — 1159F MED LIST DOCD IN RCRD: CPT | Mod: CPTII,S$GLB,, | Performed by: INTERNAL MEDICINE

## 2023-06-06 PROCEDURE — 3078F DIAST BP <80 MM HG: CPT | Mod: CPTII,S$GLB,, | Performed by: INTERNAL MEDICINE

## 2023-06-06 PROCEDURE — 99999 PR PBB SHADOW E&M-EST. PATIENT-LVL IV: CPT | Mod: PBBFAC,,, | Performed by: INTERNAL MEDICINE

## 2023-06-06 PROCEDURE — 1126F PR PAIN SEVERITY QUANTIFIED, NO PAIN PRESENT: ICD-10-PCS | Mod: CPTII,S$GLB,, | Performed by: INTERNAL MEDICINE

## 2023-06-06 PROCEDURE — 93005 RHYTHM STRIP: ICD-10-PCS | Mod: S$GLB,,, | Performed by: INTERNAL MEDICINE

## 2023-06-06 PROCEDURE — 93010 RHYTHM STRIP: ICD-10-PCS | Mod: S$GLB,,, | Performed by: INTERNAL MEDICINE

## 2023-06-06 PROCEDURE — 3074F SYST BP LT 130 MM HG: CPT | Mod: CPTII,S$GLB,, | Performed by: INTERNAL MEDICINE

## 2023-06-06 PROCEDURE — 1160F PR REVIEW ALL MEDS BY PRESCRIBER/CLIN PHARMACIST DOCUMENTED: ICD-10-PCS | Mod: CPTII,S$GLB,, | Performed by: INTERNAL MEDICINE

## 2023-06-06 PROCEDURE — 99214 OFFICE O/P EST MOD 30 MIN: CPT | Mod: S$GLB,,, | Performed by: INTERNAL MEDICINE

## 2023-06-06 PROCEDURE — 99214 PR OFFICE/OUTPT VISIT, EST, LEVL IV, 30-39 MIN: ICD-10-PCS | Mod: S$GLB,,, | Performed by: INTERNAL MEDICINE

## 2023-06-06 PROCEDURE — 1160F RVW MEDS BY RX/DR IN RCRD: CPT | Mod: CPTII,S$GLB,, | Performed by: INTERNAL MEDICINE

## 2023-06-06 PROCEDURE — 3288F FALL RISK ASSESSMENT DOCD: CPT | Mod: CPTII,S$GLB,, | Performed by: INTERNAL MEDICINE

## 2023-06-06 PROCEDURE — 3288F PR FALLS RISK ASSESSMENT DOCUMENTED: ICD-10-PCS | Mod: CPTII,S$GLB,, | Performed by: INTERNAL MEDICINE

## 2023-06-06 PROCEDURE — 3074F PR MOST RECENT SYSTOLIC BLOOD PRESSURE < 130 MM HG: ICD-10-PCS | Mod: CPTII,S$GLB,, | Performed by: INTERNAL MEDICINE

## 2023-06-06 PROCEDURE — 1157F PR ADVANCE CARE PLAN OR EQUIV PRESENT IN MEDICAL RECORD: ICD-10-PCS | Mod: CPTII,S$GLB,, | Performed by: INTERNAL MEDICINE

## 2023-06-06 PROCEDURE — 1159F PR MEDICATION LIST DOCUMENTED IN MEDICAL RECORD: ICD-10-PCS | Mod: CPTII,S$GLB,, | Performed by: INTERNAL MEDICINE

## 2023-06-06 PROCEDURE — 1126F AMNT PAIN NOTED NONE PRSNT: CPT | Mod: CPTII,S$GLB,, | Performed by: INTERNAL MEDICINE

## 2023-06-06 PROCEDURE — 99999 PR PBB SHADOW E&M-EST. PATIENT-LVL IV: ICD-10-PCS | Mod: PBBFAC,,, | Performed by: INTERNAL MEDICINE

## 2023-06-06 PROCEDURE — 3078F PR MOST RECENT DIASTOLIC BLOOD PRESSURE < 80 MM HG: ICD-10-PCS | Mod: CPTII,S$GLB,, | Performed by: INTERNAL MEDICINE

## 2023-06-06 PROCEDURE — 93010 ELECTROCARDIOGRAM REPORT: CPT | Mod: S$GLB,,, | Performed by: INTERNAL MEDICINE

## 2023-06-06 PROCEDURE — 1157F ADVNC CARE PLAN IN RCRD: CPT | Mod: CPTII,S$GLB,, | Performed by: INTERNAL MEDICINE

## 2023-06-06 PROCEDURE — 93005 ELECTROCARDIOGRAM TRACING: CPT | Mod: S$GLB,,, | Performed by: INTERNAL MEDICINE

## 2023-06-06 PROCEDURE — 1101F PR PT FALLS ASSESS DOC 0-1 FALLS W/OUT INJ PAST YR: ICD-10-PCS | Mod: CPTII,S$GLB,, | Performed by: INTERNAL MEDICINE

## 2023-06-06 PROCEDURE — 1101F PT FALLS ASSESS-DOCD LE1/YR: CPT | Mod: CPTII,S$GLB,, | Performed by: INTERNAL MEDICINE

## 2023-06-06 NOTE — PROGRESS NOTES
Subjective:    Patient ID:  Madan Bell is a 83 y.o. female who presents for follow-up of Atrial Fibrillation    Referring Electrophysiologist: Fernando Disla MD  Primary Cardiologist: Loc Daly MD  Primary Care Physician: Sheila Mcgee MD    HPIPrior Hx:  I had the pleasure of seeing Mrs. Bell in our electrophysiology in follow-up for her ventricular ectopy. As you are aware she is a pleasant 82 year-old woman, former patient of Dr. Disla, with moderate mitral valve prolapse/mitral sclerosis with preserved LV function, PVCs and nonsustained VT, orthostatic light-headedness, persistent AF and thyroid cancer s/p thyroidectomy. She had been a patient of Dr. Disla for many years. Her PVCs were treated with metoprolol. She developed persistent AF in 2018 and underwent ANAHI guided DCCV. She remained in sinus rhythm without anti-arrhythmic therapy. At her last follow-up up visit with Dr. Disla in July 2020 she was in rate controlled AF. Plan was for rate control. She remained on eliquis and bisoprolol. At our first visit in 2/2021 it was noted she was recently in the ER for brief episode of confusion. CT head and brain MRI were negative. She felt her dizziness was getting worse. She noted having early morning low heart rates with her monitoring for digital HTN clinic (pulse measures in the 30s-40s at times). She was concerned her neurologic symptoms may be related to her bradycardia. Her heart rate was in the 70s when she was in the ER.    A holter monitor from August 2020 noted persistent rate controlled AF with a 6% PVC burden. She was started on Mexitil to attempt to reduce PVC burden some. October 2020 noted persistent rate controlled atrial fibrillation with 6% PVC burden and 2 runs of NSVT (4 and 5 beats). She also developed a tremor related to Mexitil and it was stopped. An echocardiogram from 8/2020 noted preserved LV function with moderate MR, bileaflet prolapse and severe  LA enlargement. We discussed low likelihood of maintaining sinus rhythm at this point with plan to continue rate control.    To investigate her complaints of bradycardia in the morning, she wore a 14 day holter which showed rate controlled AF with 5% multifocal PVCs and no significant bradycardia while awake (had some during sleep hours). Symptomatic button activations corresponded to AF with normal ventricular rates.    ECHO from 4/2021 noted a LVEF of 55% and moderated MR with severe left atrial enlargement, PASP of 45mmHg and enlarged IVC (pressure 15mmHg).    1/2022: Mrs. Bell returned for yearly follow-up. She reported continued episodes of light-headedness that are random. She felt great when she was physically active. She confirmed that when she wore the monitor and she pressed the symptom button she was having her symptoms. She describes that some times when she has sudden head position changes she feels dizzy and describes it like the room is spinning. Recommended ENT evaluation.    2/2023: Mrs. Bell returned for yearly follow-up. Extended holter 9/2022 noted rate controlled AF with 10% PVC burden and 4 episodes of nonsustained VT up to 5 beats in duration. She had some pauses of 3-4 seconds however these appeared to be during night-time hours. She did not activate the symptom button. She continued to have dizziness. She was seen in ENT clinic and they did not feel like she had vertigo. She feels like lying down makes her dizzy and then when suddenly trying to get up makes her dizzy also. She also reports sometimes she gets waves of dizziness while upright. We repeated an extended holter monitor which demonstrated rate controlled AF with 13.6% PVC burden and short runs of nonsustained VT. Symptomatic activations for dizziness corresponded to rate controlled AF and given PVC burden.    Interim Hx:  Mrs. Bell returns for follow-up. Continues to report chronic dizziness and head pressure/heaviness.  Inquiring again if it could be from AF, even if rate controlled.    My interpretation of today's in clinic ECG is AF with an average rate of 90 bpm with PVCs.    Review of Systems   Constitutional: Negative for fever and malaise/fatigue.   HENT:  Negative for congestion and sore throat.    Eyes:  Negative for blurred vision and visual disturbance.   Cardiovascular:  Positive for leg swelling. Negative for chest pain, dyspnea on exertion, irregular heartbeat, near-syncope, palpitations and syncope.   Respiratory:  Negative for cough and shortness of breath.    Hematologic/Lymphatic: Negative for bleeding problem. Does not bruise/bleed easily.   Skin: Negative.    Musculoskeletal: Negative.    Gastrointestinal:  Negative for bloating, abdominal pain, hematochezia and melena.   Neurological:  Positive for light-headedness and weakness. Negative for focal weakness.   Psychiatric/Behavioral: Negative.        Objective:    Physical Exam  Vitals reviewed.   Constitutional:       General: She is not in acute distress.     Appearance: She is well-developed. She is not diaphoretic.   HENT:      Head: Normocephalic and atraumatic.   Eyes:      General:         Right eye: No discharge.         Left eye: No discharge.      Conjunctiva/sclera: Conjunctivae normal.   Cardiovascular:      Rate and Rhythm: Normal rate. Rhythm irregularly irregular.      Heart sounds: No murmur heard.    No friction rub. No gallop.   Pulmonary:      Effort: Pulmonary effort is normal. No respiratory distress.      Breath sounds: Normal breath sounds. No wheezing or rales.   Abdominal:      General: Bowel sounds are normal. There is no distension.      Palpations: Abdomen is soft.      Tenderness: There is no abdominal tenderness.   Musculoskeletal:      Cervical back: Neck supple.   Skin:     General: Skin is warm and dry.   Neurological:      Mental Status: She is alert and oriented to person, place, and time.   Psychiatric:         Behavior:  Behavior normal.         Thought Content: Thought content normal.         Judgment: Judgment normal.         Assessment:       1. Paroxysmal ventricular tachycardia    2. PVC's (premature ventricular contractions)    3. MVP (mitral valve prolapse)    4. HTN (hypertension), benign    5. Persistent atrial fibrillation    6. RIZWANA (obstructive sleep apnea)    7. Dizziness         Plan:     In summary, Mrs. Bell  is a pleasant 83 year-old woman, former patient of Dr. Disla, with moderate mitral valve prolapse/mitral sclerosis with preserved LV function, PVCs and nonsustained VT, orthostatic light-headedness, persistent AF and thyroid cancer s/p thyroidectomy. She had no apparent symptomatic benefit of restoration of sinus rhythm in 2018. Reviewed notes from when she was being treated by Dr. Disla. She had at least a 4 month period of sinus rhythm in the fall of 2018. She continued to report symptoms. She was intolerant to AAD therapy. Discussed in setting of significant MR it would likely be very difficult to maintain sinus rhythm even with anti-arrhythmic therapy. Discussed her PVC burden is not in the range to increase her risk of PVC induced CMP. At some point her MR may need to be addressed, surgical or endovascular approach. Her AF is rate controlled. I am still unsure what her dizziness is from (could it be from PVCs?). Have discussed trial again of amiodarone therapy. She prefers to avoid anti-arrhythmic drug therapy.    Continue eliquis lifelong as tolerated.    RTC in 6 months    Thank you for allowing me to participate in the care of this patient. Please do not hesitate to call me with any questions or concerns.    Joey Champagne MD, PhD  Cardiac Electrophysiology

## 2023-06-16 ENCOUNTER — OFFICE VISIT (OUTPATIENT)
Dept: NEUROLOGY | Facility: CLINIC | Age: 83
End: 2023-06-16
Payer: MEDICARE

## 2023-06-16 VITALS
SYSTOLIC BLOOD PRESSURE: 134 MMHG | WEIGHT: 143.5 LBS | DIASTOLIC BLOOD PRESSURE: 74 MMHG | HEART RATE: 43 BPM | BODY MASS INDEX: 21.75 KG/M2 | HEIGHT: 68 IN

## 2023-06-16 DIAGNOSIS — G44.59 OTHER COMPLICATED HEADACHE SYNDROME: ICD-10-CM

## 2023-06-16 DIAGNOSIS — H81.90 VESTIBULOPATHY, UNSPECIFIED LATERALITY: Primary | ICD-10-CM

## 2023-06-16 PROCEDURE — 99204 PR OFFICE/OUTPT VISIT, NEW, LEVL IV, 45-59 MIN: ICD-10-PCS | Mod: S$GLB,,, | Performed by: PSYCHIATRY & NEUROLOGY

## 2023-06-16 PROCEDURE — 1159F MED LIST DOCD IN RCRD: CPT | Mod: CPTII,S$GLB,, | Performed by: PSYCHIATRY & NEUROLOGY

## 2023-06-16 PROCEDURE — 99999 PR PBB SHADOW E&M-EST. PATIENT-LVL V: CPT | Mod: PBBFAC,,, | Performed by: PSYCHIATRY & NEUROLOGY

## 2023-06-16 PROCEDURE — 99999 PR PBB SHADOW E&M-EST. PATIENT-LVL V: ICD-10-PCS | Mod: PBBFAC,,, | Performed by: PSYCHIATRY & NEUROLOGY

## 2023-06-16 PROCEDURE — 3288F PR FALLS RISK ASSESSMENT DOCUMENTED: ICD-10-PCS | Mod: CPTII,S$GLB,, | Performed by: PSYCHIATRY & NEUROLOGY

## 2023-06-16 PROCEDURE — 1126F AMNT PAIN NOTED NONE PRSNT: CPT | Mod: CPTII,S$GLB,, | Performed by: PSYCHIATRY & NEUROLOGY

## 2023-06-16 PROCEDURE — 3075F SYST BP GE 130 - 139MM HG: CPT | Mod: CPTII,S$GLB,, | Performed by: PSYCHIATRY & NEUROLOGY

## 2023-06-16 PROCEDURE — 3078F DIAST BP <80 MM HG: CPT | Mod: CPTII,S$GLB,, | Performed by: PSYCHIATRY & NEUROLOGY

## 2023-06-16 PROCEDURE — 3078F PR MOST RECENT DIASTOLIC BLOOD PRESSURE < 80 MM HG: ICD-10-PCS | Mod: CPTII,S$GLB,, | Performed by: PSYCHIATRY & NEUROLOGY

## 2023-06-16 PROCEDURE — 1157F PR ADVANCE CARE PLAN OR EQUIV PRESENT IN MEDICAL RECORD: ICD-10-PCS | Mod: CPTII,S$GLB,, | Performed by: PSYCHIATRY & NEUROLOGY

## 2023-06-16 PROCEDURE — 1159F PR MEDICATION LIST DOCUMENTED IN MEDICAL RECORD: ICD-10-PCS | Mod: CPTII,S$GLB,, | Performed by: PSYCHIATRY & NEUROLOGY

## 2023-06-16 PROCEDURE — 1160F PR REVIEW ALL MEDS BY PRESCRIBER/CLIN PHARMACIST DOCUMENTED: ICD-10-PCS | Mod: CPTII,S$GLB,, | Performed by: PSYCHIATRY & NEUROLOGY

## 2023-06-16 PROCEDURE — 1126F PR PAIN SEVERITY QUANTIFIED, NO PAIN PRESENT: ICD-10-PCS | Mod: CPTII,S$GLB,, | Performed by: PSYCHIATRY & NEUROLOGY

## 2023-06-16 PROCEDURE — 1101F PT FALLS ASSESS-DOCD LE1/YR: CPT | Mod: CPTII,S$GLB,, | Performed by: PSYCHIATRY & NEUROLOGY

## 2023-06-16 PROCEDURE — 3288F FALL RISK ASSESSMENT DOCD: CPT | Mod: CPTII,S$GLB,, | Performed by: PSYCHIATRY & NEUROLOGY

## 2023-06-16 PROCEDURE — 1157F ADVNC CARE PLAN IN RCRD: CPT | Mod: CPTII,S$GLB,, | Performed by: PSYCHIATRY & NEUROLOGY

## 2023-06-16 PROCEDURE — 1101F PR PT FALLS ASSESS DOC 0-1 FALLS W/OUT INJ PAST YR: ICD-10-PCS | Mod: CPTII,S$GLB,, | Performed by: PSYCHIATRY & NEUROLOGY

## 2023-06-16 PROCEDURE — 99204 OFFICE O/P NEW MOD 45 MIN: CPT | Mod: S$GLB,,, | Performed by: PSYCHIATRY & NEUROLOGY

## 2023-06-16 PROCEDURE — 3075F PR MOST RECENT SYSTOLIC BLOOD PRESS GE 130-139MM HG: ICD-10-PCS | Mod: CPTII,S$GLB,, | Performed by: PSYCHIATRY & NEUROLOGY

## 2023-06-16 PROCEDURE — 1160F RVW MEDS BY RX/DR IN RCRD: CPT | Mod: CPTII,S$GLB,, | Performed by: PSYCHIATRY & NEUROLOGY

## 2023-06-16 RX ORDER — MEMANTINE HYDROCHLORIDE 10 MG/1
10 TABLET ORAL 2 TIMES DAILY
Qty: 60 TABLET | Refills: 11 | Status: SHIPPED | OUTPATIENT
Start: 2023-06-16 | End: 2023-07-26

## 2023-06-16 NOTE — PROGRESS NOTES
[facility] NEUROLOGY  [facility], [place]    Date: 6/16/23  Patient Name: Madan Bell   MRN: [id number]   PCP: Sheila Mcgee  Referring Provider: Sheila Mcgee MD    Chief Complaint:  Unusual episodes of lightheadedness  Subjective:   Patient seen in consultation at the request of Sheila Mcgee MD for the evaluation of the above chief complaint. A copy of this note will be sent to the referring physician.      HPI:   Ms. Madan Bell is a 83 y.o. RH female with past medical history as below including atrial fibrillation (chronic anticoagulation, Xarelto), ventricular arrhythmia presenting for evaluation of unusual episodes lightheadedness.    The patient notes that starting around 2016, she started to have sudden onset episodes where she felt quite lightheaded without visual changes, chest pain, shortness of breath, palpitations, spinning sensations.  Usually very brief, always less than 2 or 3 minutes.  Associated with a slight pressure around the head and near dizziness, fogginess.    Never has experienced loss of consciousness with the events.  No focal or lateralizing sensory or motor deficits associated with the episodes.  No rhythmic movements or uncontrolled movements of the arms or legs.  No episodes of associated confusion, bowel/bladder incontinence.  Experienced a gradual increase in frequency over the years and now experiences these episodes multiple times per day.  She is had extensive workup from her cardiologist with no identified underlying etiology.    No history of migraines.  No history of vertigo but did complete vestibular rehab with limited results.  Separately, the patient is able to identify that she has some orthostatic dizziness after sudden changes in body positioning or bending over and suddenly standing up.  She is clearly able to identify these are separate sensations.    In an abundance of caution, the patient has  stopped driving as she felt these episodes could potentially put her or the people around her risk.  Ambulates without the use of cane or walker.  Notes chronic significant knee pain; right knee replacement history, left knee on its way. 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  Also some residual blood artifact suggestive of prior subarachnoid hemorrhage; patient has no history of trauma and was unaware these previous bleeds.    Denies being symptomatic at the time of today's encounter.    PAST MEDICAL HISTORY:  Past Medical History:   Diagnosis Date    A-fib     Anticoagulant long-term use     Anxiety     Arrhythmia     Arthritis     knees    Basal cell carcinoma     Breast cyst     Community acquired pneumonia of right lower lobe of lung 12/11/2018    Deep vein thrombosis     one DVT during  pregnancy    Encephalopathy 2/10/2021    Fibrocystic breast 1980 - ??    Heart murmur     Hypercholesterolemia 6/13/2017    Hyperlipidemia     Hypertension     Hypothyroidism     Mitral valve disorder     Mitral valve prolapse     PVC's (premature ventricular contractions)     Squamous cell carcinoma bx 7-2017    right upper forehead    Thyroid cancer 1/29/2013    Thyroid disease     Vasovagal near syncope 11/12/2012    Vasovagal near syncope 11/12/2012    VTE (venous thromboembolism)     in 1960s, post partum, pt unsure of details     Weakness 12/11/2018       PAST SURGICAL HISTORY:  Past Surgical History:   Procedure Laterality Date    BREAST BIOPSY Left     excisional    BREAST BIOPSY Left     core needle biopsy    BREAST CYST ASPIRATION Bilateral 1980's - ??    BREAST CYST EXCISION Right 2008 - ??    CARDIOVERSION N/A 08/13/2018    Procedure: CARDIOVERSION;  Surgeon: Fernando Disla MD;  Location: [facility] CATH LAB;  Service: Cardiology;  Laterality: N/A;  AF,DCCV,ANAHI,MAC,FAS,3 PREP    CATARACT EXTRACTION W/  INTRAOCULAR LENS  IMPLANT Left 04/22/2021    Procedure: EXTRACTION, CATARACT, WITH IOL INSERTION;  Surgeon: Gabby Sandoval MD;  Location: [facility] OR;  Service: Ophthalmology;  Laterality: Left;    CATARACT EXTRACTION W/  INTRAOCULAR LENS IMPLANT Right 05/17/2021    Procedure: EXTRACTION, CATARACT, WITH IOL INSERTION;  Surgeon: Gabby Sandoval MD;  Location: [facility] OR;  Service: Ophthalmology;  Laterality: Right;    HYSTERECTOMY      tahbso    KNEE ARTHROPLASTY Right 06/14/2021    Procedure: ARTHROPLASTY, KNEE:RIGHT:DEPUY-SIGMA;  Surgeon: Tashi Perkins III, MD;  Location: [facility] OR;  Service: Orthopedics;  Laterality: Right;    OOPHORECTOMY  hysterectomy - 2000 - ??    THYROID SURGERY         CURRENT MEDS:  Current Outpatient Medications   Medication Sig Dispense Refill    alendronate (FOSAMAX) 70 MG tablet Take 1 tablet (70 mg total) by mouth every 7 days. 12 tablet 3    ascorbic acid, vitamin C, (VITAMIN C) 1000 MG tablet Take 1,000 mg by mouth once daily. Take the last dose before surgery 6/6      atorvastatin (LIPITOR) 40 MG tablet Take 1 tablet (40 mg total) by mouth once daily. 90 tablet 3    bisoprolol-hydrochlorothiazide 2.5-6.25 mg (ZIAC) 2.5-6.25 mg Tab TAKE 1/2 TABLET EVERY DAY 45 tablet 3    calcium citrate (CALCITRATE) 200 mg (950 mg) tablet Take 1 tablet by mouth once daily. Take the last dose before surgery 6/6      cholecalciferol, vitamin D3, (VITAMIN D3) 50 mcg (2,000 unit) Cap Take by mouth every morning. Take the last dose before surgery 6/6      cranberry extract 200 mg Cap Take 200 mg by mouth once daily. 30 capsule 12    cyanocobalamin (VITAMIN B-12) 1000 MCG tablet Take 100 mcg by mouth every evening. Take the last dose before surgery 6/6      D-MANNOSE ORAL Take 4 tablets by mouth every morning. Take the last dose before surgery 6/6      ELIQUIS 5 mg Tab TAKE 1 TABLET TWICE DAILY 180 tablet 3    estradioL (ESTRACE) 0.01 % (0.1 mg/gram) vaginal cream APPLY A PEA SIZED AMOUNT DAILY FOR TWO WEEKS THEN THREE TIMES A  WEEK.   90 day supply 42.5 g 4    furosemide (LASIX) 20 MG tablet One tablet by mouth daily and take an extra tablet 4 days a week  or  as directed. 135 tablet 3    ketoconazole (NIZORAL) 2 % cream AAA qd- bid prn flare 60 g 3    Lactobac no.30-Bifidobact no.4 (ULTIMATE NICKOLAS PROBIOTIC) 30 billion cell CpDR Take 1 tablet by mouth every morning. Hold 7 days prior to surgery      levothyroxine (SYNTHROID) 88 MCG tablet TAKE 1 TABLET (88 MCG TOTAL) BY MOUTH BEFORE BREAKFAST. 90 tablet 2    mirtazapine (REMERON) 7.5 MG Tab Half to one tablet prior to flying 5 tablet 0    omega-3 fatty acids-vitamin E 1,000 mg Cap Take 1 capsule by mouth every evening. Take the last dose before surgery 6/6      pantoprazole (PROTONIX) 40 MG tablet Take 1 tablet (40 mg total) by mouth once daily. 90 tablet 3    VITAMIN B COMPLEX ORAL Take 1 tablet by mouth every evening. Take the last dose before surgery 6/6      vitamin E 400 UNIT capsule Take 400 Units by mouth once daily. Take the last dose before surgery 6/6      memantine (NAMENDA) 10 MG Tab Take 1 tablet (10 mg total) by mouth 2 (two) times daily. 60 tablet 11     No current facility-administered medications for this visit.       ALLERGIES:  Review of patient's allergies indicates:   Allergen Reactions    Fluoride Hives     Other reaction(s): Hives    Fluoride preparations Hives    Amoxicillin-pot clavulanate Diarrhea    Nitrofurantoin monohyd/m-cryst Nausea Only and Other (See Comments)       FAMILY HISTORY:  Family History   Problem Relation Age of Onset    Arrhythmia Son     Mitral valve prolapse Son     Sudden death Son         sudden cardiac death    Heart disease Mother     Hyperlipidemia Maternal Grandmother     No Known Problems Daughter     No Known Problems Son     No Known Problems Son     Breast cancer Maternal Aunt     Melanoma Neg Hx        SOCIAL HISTORY:  Social History     Tobacco Use    Smoking status: Never    Smokeless tobacco: Never   Substance Use Topics     "Alcohol use: No    Drug use: No       Review of Systems:  Gen: no fever, no chills, no generalized feeling of weakness   HEENT: no double vision, no blurred vision, no eye pain, no eye exudates. no nasal congestion,   no traumatic injury of head, no neck pain, no neck stiffness. no photophobia or phonophobia at this time. ?    Heart: no chest pain, no SOB    Lungs: no SOB, no cough    MSK: no weakness of legs, intact ROM    ABD: no abd pain, no N/V/D/C, no difficulty with defecation.    Extremities: No leg pain, no edema.       Objective:     Vitals:    06/16/23 1305   BP: 134/74   BP Location: Right arm   Patient Position: Sitting   Pulse: (!) 43   Weight: 65.1 kg (143 lb 8.3 oz)   Height: 5' 8" (1.727 m)     General: female in NAD, alert and awake, Aox3, well groomed. ?    ? ?    HEENT: Head is NC/AT EOMI, pupil size: 4 mm B/L; hearing grossly intact b/l. Mucous membrane moist, uvula midline, no pharyngeal erythema, exudates or discharges.      Neck: Supple. no nuchal rigidity.      Cardiovascular: well perfused, no cyanosis        Respiratory: Symmetric chest rise noted       Musculoskeletal: Muscle tone noted to be adequate for patient age, muscle mass is WNL. No spontaneous movements or fasciculations noted during this examination.       Extremities: No pedal edema or calf tenderness. No cogwheel rigidity noted on B/L UE extremities.       Neurological Examination.    Mental status: AA&O x3; Affect/mood is euthymic/congruent; no aphasia noted during examination. Patient answers simple questions appropriately & follows simple commands; no dysarthria or expressive aphasia; no brent-neglect or extinction. Vocabulary/word finding: excellent.       Cranial Nerves: II-XII grossly intact though the patient does display oculomotor sensitivity with nonsustained nystagmus at end point gaze, worse on the right, and experienced some pressure in the head during eye tracking exam     Muscle Function: Tone WNL and Muscle bulk " WNL. Left elbow flexors/extensors (5/5), Left shoulder (5/5); left Finger flexor/extensors (5/5). Right elbow flexors/extensors (5/5). Right shoulder abduction/flexion (5/5), Right finger flexors/extensors (5/5). Left LE hip flexion/extension (5/5), Left Knee flexion/extension (5/5) and Left ankle flexion/extension/Eversion/inversion (5/5). Right LE hip flexion/extension (5/5), Right Knee flexion/extension (5/5) and ankle flexion/extension/Eversion/inversion (5/5).       Sensory: ?Intact to light touch, temperature, vibration throughout     Reflexes:  1/4 and symmetric throughout     Coordination: no dysmetria (finger to nose negative)     Gait:  The patient is obviously quite cautious.  Adequate stride length and arm swing.  Stable without assistance.  Obvious knee discomfort makes gait slightly antalgic.  Very cautious turns but no extra steps.    Other:   05/12/2023 MRI brain without contrast:  FINDINGS:  Intracranial compartment:  Ventricles appear stable in size compared to the prior exam.  No evidence for hydrocephalus.  No evidence of acute extra-axial blood or fluid collections.  Diffuse susceptibility abnormality within multiple folia of the vermis and bilateral cerebellar hemispheres along the superior aspect of the cerebellum which may represent hemosiderin staining from prior bleed.  Multiple susceptibility abnormality within the bilateral anterior temporal lobes as well as additional foci within the left anterior frontal lobe.  No evidence of mass effect or midline shift.  No evidence of acute infarct.  Scattered T2 FLAIR signal abnormalities within the is supratentorial white matter reflective chronic microvascular ischemic disease.  Normal vascular flow voids are preserved.  Skull/extracranial contents (limited evaluation): Bone marrow signal intensity is normal.  Impression:  No evidence of acute hemorrhage or infarct.  Interval development of multifocal foci of susceptibility abnormality within the  "bilateral temporal lobes and left frontal lobe which may represent sequela of prior trauma, remote hypertensive microhemorrhages, or early cerebral amyloid.  Interval hemosiderin staining along multiple folia of the cerebellum suggestive of prior subarachnoid hemorrhage   Generalized cerebral volume loss and chronic microvascular ischemic disease."    05/12/2023 MRA neck without contrast:  FINDINGS:  The aortic arch and great vessel origins are normal in appearance. The common carotid and internal carotid arteries are widely patent and show no evidence of hemodynamically significant stenosis, vessel occlusion, or dissection.  The vertebral arteries are widely patent and show no evidence of high grade stenosis, occlusion, or dissection.  Impression:  Normal magnetic resonance angiogram of the extracranial carotid vasculature.  No hemodynamically significant luminal stenosis or occlusion appreciated involving the internal carotid or vertebral arteries."    Assessment:   Madan Bell is a 83 y.o. female presenting for evaluation of unusual short lasting episodes of lightheadedness with pressure in the head in slight cognitive fog.  This along with her physical exam lend to the possibility of vestibulopathy.  We had an extensive conversation regarding state of neuronal hyperexcitability, chronic altered habituation, and the variety of unusual symptoms sometimes manifested as result of this clinical scenario.  After review benefits, risks, side effects, alternatives, we will proceed with a trial of low-dose memantine with gradual titration as tolerated/needed.  Patient has already completed vestibular rehab.      The patient had asymptomatic bradycardia during today's encounter.  She is followed closely with Cardiology, but recommended routine follow-up given the fact that this pulse rate was quite different than previous recordings.  The patient has notably already had Holter monitor where heart rate " "during acute symptoms was captured and found to be normal.    Plan:     1. Vestibulopathy, unspecified laterality  Ambulatory referral/consult to Neurology    memantine (NAMENDA) 10 MG Tab      2. Other complicated headache syndrome  memantine (NAMENDA) 10 MG Tab        - continue close follow-up with Cardiology per their recommendations  - start: Memantine 5 mg daily x7 days, 5 mg twice daily x7 days, 5 mg q.a.m. and 10 mg q.p.m. x7 days, then 10 mg twice daily until the time of next encounter   - benefits, risks, side effects, alternatives discussed at length   - follow-up with our clinic in 4 weeks or sooner as needed    ============  Memantine was selected for its exceptional coverage in complicated headache syndromes and the potential for correcting neuronal hyperexcitability.    YOJANA Mccabe and MARTIN Miller (2018), Posttraumatic Headache: Basic Mechanisms and Therapeutic Targets. Headache: The Journal of Head and Face Pain, 58: 811-826. https://doi.[email or web address]/head.66629    Atul Dc et al. "Memantine for Prophylactic Treatment of Migraine Without Aura: A Randomized Double-Blind Placebo-Controlled Study." Headache: The Journal of Head and Face Pain 56.1 (2016): .   ============    I spent a total of 45 minutes on the day of the visit. This includes face to face time and non-face to face time preparing to see the patient (eg, review of tests), obtaining and/or reviewing separately obtained history, documenting clinical information in the electronic or other health record, independently interpreting results and communicating results to the patient/family/caregiver, or care coordinator.    A dictation device was used to produce this document. Use of such devices sometimes results in grammatical errors or replacement of words that sound similarly.    Narendra Roe, DO    "

## 2023-06-16 NOTE — PATIENT INSTRUCTIONS
Memantine 10 mg tabs:    Take 1/2 tablet ONCE daily x7 days  Take 1/2 tablet TWICE daily x7 days  Take 1/2 tablet in the morning and 1 tablet in the evening x7 days  Take 1 tablet twice daily until next appointment.

## 2023-06-22 NOTE — PHYSICIAN QUERY
"PT Name: Madan Bell  MR #: [id number]    Physician Query Form - Respiratory Condition Clarification      CDS: Goldie Garcias RN, CCDS         Contact information :[phone])  [email or web address]       This form is a permanent document in the medical record.    Query Date: December 27, 2018    By submitting this query, we are merely seeking further clarification of documentation. Please utilize your independent clinical judgment when addressing the question(s) below.    The Medical record contains the following   Indicators   Supporting Clinical Findings Location in Medical Record   x   SOB, MCKINLEY, Wheezing, Productive Cough, Use of Accessory Muscles, etc. "Respiratory: Positive for cough and shortness of breath. Negative for chest tightness and wheezing."  "Pulmonary/Chest: Effort normal. No respiratory distress. She has no wheezes.  She has rhonchi in the right middle field and the right lower field." Critical Care consult Note 12/11/18   x   Acute/Chronic Illness Community acquired pneumonia of right lower lobe of lung" H&P 12/12/18      Radiology Findings     x   Respiratory Distress or Failure  "Vitals notable for a Tmax 103F with   acute hypoxemic respiratory failure, and was placed on 2L NC."  H&P attestation 12/12/18      Hypoxia or Hypercapnia     x   RR         ABGs         O2 sat RR 20-21, O2 Sat 92% VS record 12/11/18      BiPAP/Intubation        Supplemental O2        Home O2, Oxygen Dependence     x   Treatment "In the ED, she was given IVF and antibiotics with improvement in her mentation.  ICU was consulted given concern for encephalitis with fever and hyponatremia.  Check urine legionella - given hyponatremia and infiltrate on CXR.  Continue Azithro/Ceftriaxone.  Repeat BMP to monitor hyponatremia." H&P 12/12/18   x   Other "Do not feel she has ICU needs at this time as confusion is resolving, she does not  require pressors, she is not hemodynamically unstable and she is requiring minimal " Inpatient Behavioral Health Initial Evaluation    Patient:  Britton Mendosa 54 year old  MRN#:  [id number]  Date of Service:  6/22/2023  Primary Provider:  Gonzalo Benton MD      Chief Complaint:  \"I take care of myself\"  Reason for Consultation: depression     Informants:  The patient, who is considered a fair/poor historian, as well as the patient's medical record.    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  Patient presented to the ED on 6/19/2023 with complaints of falling; cuts to left stump and right knee. At prosthetic clinic patient was noted to have purulent and foul smelling wounds and sent to the ED. Patient was admitted with cellulitis and abscess of left leg, uncontrolled type 2 diabetes.     Psychiatry consulted for depression. Patient states \"anyone would have depression with what is going on\" Patient denies need for medications or therapist. He states he does not like asking anyone for help. He lives with \"wife and children\" but reports he had to move into a apartment that is wheelchair accessible due to his falling. He  Reports some hopelessness.  He denies SI, plan or intent. \"only God will take me\"Patient does not feel he needs medications or therapist at this time. He is agreeable to having information placed on AVS in case he changes his mind.     Psychiatric Review of Symptoms:  Regarding symptoms of DEPRESSION, the patient endorses depressed mood, not significantly changed sleep, anhedonia, feelings of guilt, low energy, poor concentration, not significantly changed appetite, psychomotor slowing and denies suicidal ideation.    Regarding symptoms of GERSON, the patient denies decreased need for sleep, grandiosity, distractibility, flight of ideas, increased goal directed activities, pressured speech and inappropriate behavior.    With respect to PSYCHOSIS, the patient denies auditory hallucinations, visual  "oxygen support with nasal cannula."   Critical Care consult Note 12/11/18     Respiratory failure can be acute, chronic or both. It is generally further specified as hypoxic, hypercapnic or both. Lastly, it is important to identify an etiology, if known or suspected.   References:: https://www.acphospitalist.org/archives/2013/10/coding; htm; http://[employer]/acute-respiratory-failure-know  The clinical guidelines noted below are only system guidelines, and do not replace the providers clinical judgment.    Provider, please specify diagnosis or diagnoses associated with above clinical findings.   Please clarify respiratory diagnosis.    [   ] Acute Respiratory Failure with Hypoxia - ABG pO2 < 60 mmHg or O2 sat of 88% on RA and respiratory symptoms documented   [ X ] Acute Respiratory Insufficiency - Generally describes less severe respiratory symptoms and measurements (pO2, SpO2, pH, and pCO2) NOT meeting criteria for respiratory failure     [  ] Acute Respiratory Distress - Generally describes less severe respiratory symptoms (tachypnea, in respiratory distress, increased work of breathing, unable to speak in complete sentences, labored breathing, use of accessory muscles, RR> 24, cyanosis, dyspnea, wheezing, stridor, lethargy) without sufficient measurements (pO2, SpO2, pH, and pCO2) to meet criteria for respiratory failure    [   ] Hypoxia Only   [   ] Other Respiratory Diagnosis (please specify): _________________________________   [   ]  Clinically Undetermined       Please document in your progress notes daily for the duration of treatment until resolved and include in your discharge summary.    " hallucinations, delusions, ideas of reference, thought insertion and thought broadcasting.    When asked about symptoms of GENERALIZED ANXIETY, the patient denies increased worry, muscle tension, restlessness, poor concentration, and irritability.    Regarding episodes of PANIC, the patient denies episodes of impending doom, lost control or feelings of going crazy.  Furthermore, the patient denies symptoms of sweating, trembling, derealization, racing heart, shortness of breath, chest pain, nausea and tingling.    Addressing POST TRAUMATIC STRESS DISORDER, the patient denies recurrent episodes of nightmares, flashbacks, hypervigilance, easy startle and elevated fear.      VITALS:  Visit Vitals  /79 (BP Location: RUE - Right upper extremity, Patient Position: Supine)   Pulse 83   Temp 97.9 °F (36.6 °C) (Oral)   Resp 18   Ht 5' 8\" (1.727 m)   Wt 43.1 kg (95 lb 0.3 oz)   SpO2 92%   BMI 14.45 kg/m²         Past Psychiatric History:  Previous Diagnoses:  denies  Hospitalizations: denies  Suicide Attempts/Self-Harm Behaviors:  denies  Outpatient  Providers:  denies  Medication Trials:  denies  ECT:  denies       Past Medical History:   Diagnosis Date   • Diabetes mellitus (CMD)    • Fracture        Recent Lab Findings:  Lab Results   Component Value Date    WBC 8.9 06/22/2023    WBC 7.6 01/30/2019    RBC 3.75 (L) 06/22/2023    RBC 4.43 01/30/2019    HGB 10.1 (L) 06/22/2023    HGB 13.6 01/30/2019    HCT 32.5 (L) 06/22/2023    HCT 41.2 01/30/2019     06/22/2023     01/30/2019    SEG 52 06/22/2023    SEG 43 08/03/2015    PMON 8 06/22/2023    PMON 7 08/03/2015    PEOS 2 06/22/2023    PEOS 2 08/03/2015    PBASO 1 06/22/2023    PBASO 0 08/03/2015    ANEUT 4.7 06/22/2023    ANEUT 2.8 08/03/2015    ALYMS 3.2 06/22/2023    ALYMS 3.1 08/03/2015    MATA 0.7 06/22/2023    MATA 0.5 08/03/2015    AEOS 0.2 06/22/2023    AEOS 0.1 08/03/2015    ABASO 0.1 06/22/2023    ABASO 0.0 08/03/2015     Lab Results   Component  Value Date    SODIUM 135 06/22/2023    POTASSIUM 4.3 06/22/2023    CHLORIDE 103 06/22/2023    CO2 29 06/22/2023    GLUCOSE 364 (H) 06/22/2023    BUN 19 06/22/2023    CREATININE 0.51 (L) 06/22/2023    CALCIUM 8.3 (L) 06/22/2023    ALBUMIN 2.1 (L) 06/20/2023    MG 2.1 04/27/2023    BILIRUBIN 0.3 06/20/2023    ALKPT 124 (H) 06/20/2023    AST 6 06/20/2023    GPT 6 06/20/2023    PHOS 3.7 04/27/2023           Past Surgical History:   Procedure Laterality Date   • Ankle surgery Right 2005   • Leg amputation through lower tibia and fibula Left 04/27/2023   • Wound debridement Left 04/18/2023    left leg debridement   • Wrist surgery         Allergies:  ALLERGIES:  No Known Allergies    Medications:  Current Facility-Administered Medications   Medication   • nicotine (NICODERM) 21 MG/24HR patch 1 patch   • insulin lispro (ADMELOG,HumaLOG) - Correction Dose   • insulin lispro (ADMELOG, HumaLOG) injection 3 Units   • sodium hypochlorite (DAKIN'S) 0.0125 % irrigation solution   • sodium hypochlorite (DAKIN'S) 0.0125 % irrigation solution   • insulin glargine (LANTUS) injection 14 Units   • cyclobenzaprine (FLEXERIL) tablet 5 mg   • oxyCODONE (IMM REL) (ROXICODONE) tablet 5 mg    Or   • oxyCODONE (IMM REL) (ROXICODONE) tablet 10 mg   • vancomycin (VANCOCIN) 1,000 mg in sodium chloride 0.9 % 250 mL IVPB   • acetaminophen (TYLENOL) tablet 1,000 mg   • ceFEPIme (MAXIPIME) 2,000 mg in sodium chloride 0.9 % 100 mL IVPB   • insulin lispro (ADMELOG,HumaLOG) - Scheduled Mealtime Dose   • insulin lispro (ADMELOG,HumaLOG) - Correction Dose   • gabapentin (NEURONTIN) capsule 400 mg   • dextrose 50 % injection 25 g   • dextrose 50 % injection 12.5 g   • glucagon (GLUCAGEN) injection 1 mg   • dextrose (GLUTOSE) 40 % gel 15 g   • dextrose (GLUTOSE) 40 % gel 30 g   • metroNIDAZOLE (FLAGYL) premix IVPB 500 mg   • VANCOMYCIN - PHARMACIST MONITORED Misc   • thiamine (VITAMIN B1) tablet 100 mg   • LORazepam (ATIVAN) tablet 2 mg    Or   • LORazepam  (ATIVAN) injection 2 mg    Or   • LORazepam (ATIVAN) injection 2 mg   • sodium chloride 0.9 % flush bag 25 mL   • sodium chloride (PF) 0.9 % injection 2 mL   • sodium chloride (NORMAL SALINE) 0.9 % bolus 500 mL   • enoxaparin (LOVENOX) injection 40 mg       Social History: Patient reports . He reports having to move to apartment, that is wheel chair accessible, due to falling. He reports being sober from alcohol for 4 years. He smokes THC occasionally. No IVDU in history.      Family Medical History:   family history includes Asthma in his son; Cancer in his father; Diabetes in his brother, father, and mother.    Family Psychiatric History:  Patient denies    Mental Status Exam:  Orientation:  Alert and oriented to person, place and situation   Appearance:  Appropriately groomed and casually dressed   Speech:  Appropriate rate, rhythm, volume and inflection   Eye contact:  fair  Behavior:  irritable  Psychomotor:  No agitation, tic, tremor, slowing or abnormal movement noted   Mood:  I have a lot going on in my life  Affect: irritated  Thought Process:  Linear, logical, goal oriented   Suicidality: denies  Comprehensive Suicide assessment:  Not complete  Prior Attempts:  denies  Access to lethal means:  unknown  Plan:  denies  Family history of completed suicides:  denies  Thought Content:  no homicidal ideation, denies auditory and visual hallucinations, not actively responding to internal stimuli   Insight:  limited  Judgment:  fair  Sensorium:  Grossly intact   Cognition:  Was not formally tested and found to be without appreciable deficit   Attention:  fair  Concentration:  fair    Biopsychosocial Assessment/ Medical Decision Making   Patient is a 54 year old male with a past medical history of diabetes, and 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  Patient presented to the ED on 6/19/2023 with complaints of falling; cuts to left stump and right knee. At prosthetic clinic patient was noted to have purulent and foul smelling  wounds and sent to the ED. Patient was admitted with cellulitis and abscess of left leg, uncontrolled type 2 diabetes.     Psychiatry consulted for depression. Patient states \"anyone would have depression with what is going on\" Patient denies need for medications or therapist. He states he does not like asking anyone for help. He lives with \"wife and children\" but reports he had to move into a apartment that is wheelchair accessible due to his falling. He  Reports some hopelessness.  He denies SI, plan or intent. \"only God will take me\"Patient does not feel he needs medications or therapist at this time. He is agreeable to having information placed on AVS in case he changes his mind.     Patient verbalizes difficulty with adjusting to limitations on his independence right now and feels hopeless/useless but denies this has led to thoughts of SI. He feels his family is who he will talk to if needed.         Diagnoses:  Adjustment disorder with depression     Admission Medical Diagnosis  Bilateral lower leg cellulitis  (primary encounter diagnosis)  Cellulitis and abscess of left leg  Right BKA infection (CMD)  Alcohol abuse  Pseudohyponatremia  Uncontrolled type 2 diabetes mellitus with hyperglycemia (CMD)  Cellulitis of left lower extremity  Falls frequently  Foot infection  Syncope and collapse    Plan:  1. Patient declines medications to assist with symptoms of depression  2. Patient denies need for therapist at this time      Thank you for involving me in the care of this patient.  Please call [phone] with any questions.    Xochilt Del Rosario DNP, APNP, PMHNP-BC  Psychiatry

## 2023-06-28 ENCOUNTER — PATIENT MESSAGE (OUTPATIENT)
Dept: ADMINISTRATIVE | Facility: OTHER | Age: 83
End: 2023-06-28
Payer: MEDICARE

## 2023-06-28 ENCOUNTER — PATIENT MESSAGE (OUTPATIENT)
Dept: NEUROLOGY | Facility: CLINIC | Age: 83
End: 2023-06-28
Payer: MEDICARE

## 2023-07-09 ENCOUNTER — PATIENT MESSAGE (OUTPATIENT)
Dept: NEUROLOGY | Facility: CLINIC | Age: 83
End: 2023-07-09
Payer: MEDICARE

## 2023-07-11 ENCOUNTER — PATIENT MESSAGE (OUTPATIENT)
Dept: UROLOGY | Facility: CLINIC | Age: 83
End: 2023-07-11
Payer: MEDICARE

## 2023-07-23 ENCOUNTER — PATIENT MESSAGE (OUTPATIENT)
Dept: UROLOGY | Facility: CLINIC | Age: 83
End: 2023-07-23
Payer: MEDICARE

## 2023-07-26 ENCOUNTER — OFFICE VISIT (OUTPATIENT)
Dept: NEUROLOGY | Facility: CLINIC | Age: 83
End: 2023-07-26
Payer: MEDICARE

## 2023-07-26 VITALS
DIASTOLIC BLOOD PRESSURE: 72 MMHG | HEIGHT: 68 IN | HEART RATE: 60 BPM | SYSTOLIC BLOOD PRESSURE: 133 MMHG | WEIGHT: 143.5 LBS | BODY MASS INDEX: 21.75 KG/M2

## 2023-07-26 DIAGNOSIS — H81.90 VESTIBULOPATHY, UNSPECIFIED LATERALITY: Primary | ICD-10-CM

## 2023-07-26 DIAGNOSIS — G44.59 OTHER COMPLICATED HEADACHE SYNDROME: ICD-10-CM

## 2023-07-26 PROCEDURE — 1160F PR REVIEW ALL MEDS BY PRESCRIBER/CLIN PHARMACIST DOCUMENTED: ICD-10-PCS | Mod: HCNC,CPTII,S$GLB, | Performed by: PSYCHIATRY & NEUROLOGY

## 2023-07-26 PROCEDURE — 3288F PR FALLS RISK ASSESSMENT DOCUMENTED: ICD-10-PCS | Mod: HCNC,CPTII,S$GLB, | Performed by: PSYCHIATRY & NEUROLOGY

## 2023-07-26 PROCEDURE — 3078F DIAST BP <80 MM HG: CPT | Mod: HCNC,CPTII,S$GLB, | Performed by: PSYCHIATRY & NEUROLOGY

## 2023-07-26 PROCEDURE — 1157F ADVNC CARE PLAN IN RCRD: CPT | Mod: HCNC,CPTII,S$GLB, | Performed by: PSYCHIATRY & NEUROLOGY

## 2023-07-26 PROCEDURE — 3075F PR MOST RECENT SYSTOLIC BLOOD PRESS GE 130-139MM HG: ICD-10-PCS | Mod: HCNC,CPTII,S$GLB, | Performed by: PSYCHIATRY & NEUROLOGY

## 2023-07-26 PROCEDURE — 1126F PR PAIN SEVERITY QUANTIFIED, NO PAIN PRESENT: ICD-10-PCS | Mod: HCNC,CPTII,S$GLB, | Performed by: PSYCHIATRY & NEUROLOGY

## 2023-07-26 PROCEDURE — 1160F RVW MEDS BY RX/DR IN RCRD: CPT | Mod: HCNC,CPTII,S$GLB, | Performed by: PSYCHIATRY & NEUROLOGY

## 2023-07-26 PROCEDURE — 1126F AMNT PAIN NOTED NONE PRSNT: CPT | Mod: HCNC,CPTII,S$GLB, | Performed by: PSYCHIATRY & NEUROLOGY

## 2023-07-26 PROCEDURE — 1101F PR PT FALLS ASSESS DOC 0-1 FALLS W/OUT INJ PAST YR: ICD-10-PCS | Mod: HCNC,CPTII,S$GLB, | Performed by: PSYCHIATRY & NEUROLOGY

## 2023-07-26 PROCEDURE — 3078F PR MOST RECENT DIASTOLIC BLOOD PRESSURE < 80 MM HG: ICD-10-PCS | Mod: HCNC,CPTII,S$GLB, | Performed by: PSYCHIATRY & NEUROLOGY

## 2023-07-26 PROCEDURE — 1157F PR ADVANCE CARE PLAN OR EQUIV PRESENT IN MEDICAL RECORD: ICD-10-PCS | Mod: HCNC,CPTII,S$GLB, | Performed by: PSYCHIATRY & NEUROLOGY

## 2023-07-26 PROCEDURE — 99214 OFFICE O/P EST MOD 30 MIN: CPT | Mod: HCNC,S$GLB,, | Performed by: PSYCHIATRY & NEUROLOGY

## 2023-07-26 PROCEDURE — 99214 PR OFFICE/OUTPT VISIT, EST, LEVL IV, 30-39 MIN: ICD-10-PCS | Mod: HCNC,S$GLB,, | Performed by: PSYCHIATRY & NEUROLOGY

## 2023-07-26 PROCEDURE — 1159F MED LIST DOCD IN RCRD: CPT | Mod: HCNC,CPTII,S$GLB, | Performed by: PSYCHIATRY & NEUROLOGY

## 2023-07-26 PROCEDURE — 1159F PR MEDICATION LIST DOCUMENTED IN MEDICAL RECORD: ICD-10-PCS | Mod: HCNC,CPTII,S$GLB, | Performed by: PSYCHIATRY & NEUROLOGY

## 2023-07-26 PROCEDURE — 1101F PT FALLS ASSESS-DOCD LE1/YR: CPT | Mod: HCNC,CPTII,S$GLB, | Performed by: PSYCHIATRY & NEUROLOGY

## 2023-07-26 PROCEDURE — 99999 PR PBB SHADOW E&M-EST. PATIENT-LVL IV: CPT | Mod: PBBFAC,HCNC,, | Performed by: PSYCHIATRY & NEUROLOGY

## 2023-07-26 PROCEDURE — 3288F FALL RISK ASSESSMENT DOCD: CPT | Mod: HCNC,CPTII,S$GLB, | Performed by: PSYCHIATRY & NEUROLOGY

## 2023-07-26 PROCEDURE — 3075F SYST BP GE 130 - 139MM HG: CPT | Mod: HCNC,CPTII,S$GLB, | Performed by: PSYCHIATRY & NEUROLOGY

## 2023-07-26 PROCEDURE — 99999 PR PBB SHADOW E&M-EST. PATIENT-LVL IV: ICD-10-PCS | Mod: PBBFAC,HCNC,, | Performed by: PSYCHIATRY & NEUROLOGY

## 2023-07-26 NOTE — PROGRESS NOTES
[facility] NEUROLOGY  [facility], [place]    Date: 7/26/23  Patient Name: Madan Bell   MRN: [id number]   PCP: Sheila Mcgee  Referring Provider: No ref. provider found    Chief Complaint:  follow up for Unusual episodes of lightheadedness  Subjective:     07/26/23:  Patient presents daughter for routine follow-up regarding vestibulopathy.  Started very gentle titration of memantine after previous encounter but was ultimately unable to tolerate the medication and it was discontinued.  During use of the medicine, the patient had mild increases in level of dizziness which were this heartening to her.  Presents today to discuss other possible treatment modalities.    Overall, no new neurological complaints.  No focal or lateralizing sensory or motor deficits.  No major health events since previous encounter.     ====================================================  06/16/23 HPI:   Ms. Madan Bell is a 83 y.o. RH female with past medical history as below including atrial fibrillation (chronic anticoagulation, Xarelto), ventricular arrhythmia presenting for evaluation of unusual episodes lightheadedness.    The patient notes that starting around 2016, she started to have sudden onset episodes where she felt quite lightheaded without visual changes, chest pain, shortness of breath, palpitations, spinning sensations.  Usually very brief, always less than 2 or 3 minutes.  Associated with a slight pressure around the head and near dizziness, fogginess.    Never has experienced loss of consciousness with the events.  No focal or lateralizing sensory or motor deficits associated with the episodes.  No rhythmic movements or uncontrolled movements of the arms or legs.  No episodes of associated confusion, bowel/bladder incontinence.  Experienced a gradual increase in frequency over the years and now experiences these episodes multiple times per day.  She is had extensive  workup from her cardiologist with no identified underlying etiology.    No history of migraines.  No history of vertigo but did complete vestibular rehab with limited results.  Separately, the patient is able to identify that she has some orthostatic dizziness after sudden changes in body positioning or bending over and suddenly standing up.  She is clearly able to identify these are separate sensations.    In an abundance of caution, the patient has stopped driving as she felt these episodes could potentially put her or the people around her risk.  Ambulates without the use of cane or walker.  Notes chronic significant knee pain; right knee replacement history, left knee on its way. 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  Also some residual blood artifact suggestive of prior subarachnoid hemorrhage; patient has no history of trauma and was unaware these previous bleeds.    Denies being symptomatic at the time of today's encounter.    ====================================================    CURRENT MEDS:  Current Outpatient Medications   Medication Sig Dispense Refill    alendronate (FOSAMAX) 70 MG tablet Take 1 tablet (70 mg total) by mouth every 7 days. 12 tablet 3    ascorbic acid, vitamin C, (VITAMIN C) 1000 MG tablet Take 1,000 mg by mouth once daily. Take the last dose before surgery 6/6      atorvastatin (LIPITOR) 40 MG tablet Take 1 tablet (40 mg total) by mouth once daily. 90 tablet 3    bisoprolol-hydrochlorothiazide 2.5-6.25 mg (ZIAC) 2.5-6.25 mg Tab TAKE 1/2 TABLET EVERY DAY 45 tablet 3    calcium citrate (CALCITRATE) 200 mg (950 mg) tablet Take 1 tablet by mouth once daily. Take the last dose before surgery 6/6      cholecalciferol, vitamin D3, (VITAMIN D3) 50 mcg (2,000 unit) Cap Take by mouth every morning. Take the last dose before surgery 6/6      cranberry extract 200 mg Cap Take 200 mg by mouth once daily.  "30 capsule 12    cyanocobalamin (VITAMIN B-12) 1000 MCG tablet Take 100 mcg by mouth every evening. Take the last dose before surgery 6/6      D-MANNOSE ORAL Take 4 tablets by mouth every morning. Take the last dose before surgery 6/6      ELIQUIS 5 mg Tab TAKE 1 TABLET TWICE DAILY 180 tablet 3    estradioL (ESTRACE) 0.01 % (0.1 mg/gram) vaginal cream APPLY A PEA SIZED AMOUNT DAILY FOR TWO WEEKS THEN THREE TIMES A WEEK.   90 day supply 42.5 g 4    furosemide (LASIX) 20 MG tablet One tablet by mouth daily and take an extra tablet 4 days a week  or  as directed. 135 tablet 3    ketoconazole (NIZORAL) 2 % cream AAA qd- bid prn flare 60 g 3    Lactobac no.30-Bifidobact no.4 (ULTIMATE NICKOLAS PROBIOTIC) 30 billion cell CpDR Take 1 tablet by mouth every morning. Hold 7 days prior to surgery      levothyroxine (SYNTHROID) 88 MCG tablet TAKE 1 TABLET (88 MCG TOTAL) BY MOUTH BEFORE BREAKFAST. 90 tablet 2    mirtazapine (REMERON) 7.5 MG Tab Half to one tablet prior to flying 5 tablet 0    omega-3 fatty acids-vitamin E 1,000 mg Cap Take 1 capsule by mouth every evening. Take the last dose before surgery 6/6      pantoprazole (PROTONIX) 40 MG tablet Take 1 tablet (40 mg total) by mouth once daily. 90 tablet 3    VITAMIN B COMPLEX ORAL Take 1 tablet by mouth every evening. Take the last dose before surgery 6/6      vitamin E 400 UNIT capsule Take 400 Units by mouth once daily. Take the last dose before surgery 6/6       No current facility-administered medications for this visit.       ALLERGIES:  Review of patient's allergies indicates:   Allergen Reactions    Fluoride Hives     Other reaction(s): Hives    Fluoride preparations Hives    Amoxicillin-pot clavulanate Diarrhea    Nitrofurantoin monohyd/m-cryst Nausea Only and Other (See Comments)          Objective:     Vitals:    07/26/23 1353   BP: 133/72   BP Location: Right arm   Patient Position: Sitting   Pulse: 60   Weight: 65.1 kg (143 lb 8.3 oz)   Height: 5' 8" (1.727 m) "     General: female in NAD, alert and awake, Aox3, well groomed. ?    ? ?     Neurological Examination.    Mental status: AA&O x3; Affect/mood is euthymic/congruent; no aphasia      Cranial Nerves: II-XII grossly intact though the patient does display oculomotor sensitivity with nonsustained nystagmus      Muscle Function:  Full and symmetric use of bilateral upper and lower extremities against gravity       Gait:  The patient remains quite cautious.  Adequate stride length and arm swing. Very cautious turns but no extra steps.  No shuffling.    Other:   05/12/2023 MRI brain without contrast:  FINDINGS:  Intracranial compartment:  Ventricles appear stable in size compared to the prior exam.  No evidence for hydrocephalus.  No evidence of acute extra-axial blood or fluid collections.  Diffuse susceptibility abnormality within multiple folia of the vermis and bilateral cerebellar hemispheres along the superior aspect of the cerebellum which may represent hemosiderin staining from prior bleed.  Multiple susceptibility abnormality within the bilateral anterior temporal lobes as well as additional foci within the left anterior frontal lobe.  No evidence of mass effect or midline shift.  No evidence of acute infarct.  Scattered T2 FLAIR signal abnormalities within the is supratentorial white matter reflective chronic microvascular ischemic disease.  Normal vascular flow voids are preserved.  Skull/extracranial contents (limited evaluation): Bone marrow signal intensity is normal.  Impression:  No evidence of acute hemorrhage or infarct.  Interval development of multifocal foci of susceptibility abnormality within the bilateral temporal lobes and left frontal lobe which may represent sequela of prior trauma, remote hypertensive microhemorrhages, or early cerebral amyloid.  Interval hemosiderin staining along multiple folia of the cerebellum suggestive of prior subarachnoid hemorrhage   Generalized cerebral volume loss and  "chronic microvascular ischemic disease."    05/12/2023 MRA neck without contrast:  FINDINGS:  The aortic arch and great vessel origins are normal in appearance. The common carotid and internal carotid arteries are widely patent and show no evidence of hemodynamically significant stenosis, vessel occlusion, or dissection.  The vertebral arteries are widely patent and show no evidence of high grade stenosis, occlusion, or dissection.  Impression:  Normal magnetic resonance angiogram of the extracranial carotid vasculature.  No hemodynamically significant luminal stenosis or occlusion appreciated involving the internal carotid or vertebral arteries."    Assessment:   Madan Bell is a 83 y.o. female presenting for follow-up regarding unusual short lasting episodes of lightheadedness with pressure in the head and slight cognitive fog.  This along with her physical exam lend to the possibility of vestibulopathy.  We had an extensive conversation regarding state of neuronal hyperexcitability, chronic altered habituation, and the variety of unusual symptoms sometimes manifested as result of this clinical scenario once again.  Given her recent failure of memantine, the patient would like to take additional time to think through the discussed treatment options reviewed today.  We discussed low-dose duloxetine versus neuromodulation therapies like cefaly.  We specifically discussed the fact that cefaly is a device designed for treatment of migraine but the same underlying hyperexcitability and habituation issues seen in that patient population could translate into effectiveness in this situation given limited remaining treatment options.    Plan:     1. Vestibulopathy, unspecified laterality        2. Other complicated headache syndrome          - consider cefaly for neuromodulation in an effort to alter the patient's poor habituation and suspected state of neuronal hyperexcitability   - consider duloxetine 20 " mg daily in the future if patient is amenable  - follow-up with our clinic in the future as needed    I spent a total of 30 minutes on the day of the visit. This includes face to face time and non-face to face time preparing to see the patient (eg, review of tests), obtaining and/or reviewing separately obtained history, documenting clinical information in the electronic or other health record, independently interpreting results and communicating results to the patient/family/caregiver, or care coordinator.    A dictation device was used to produce this document. Use of such devices sometimes results in grammatical errors or replacement of words that sound similarly.    Narendra Roe, DO

## 2023-07-27 DIAGNOSIS — N39.0 RECURRENT UTI: ICD-10-CM

## 2023-07-27 DIAGNOSIS — N95.2 ATROPHIC VAGINITIS: ICD-10-CM

## 2023-07-27 NOTE — TELEPHONE ENCOUNTER
----- Message from Yuridiajudy Thakur sent at 7/27/2023 10:57 AM CDT -----  Regarding: Refill  Contact: pt [phone]  Rx Refill/Request    Is this a Refill or New Rx:  refill    Rx Name and Strength:  estradioL (ESTRACE) 0.01 % (0.1 mg/gram) vaginal cream 42.5 g 4     Preferred Pharmacy with phone number:    [facility] - [place] - [address]  [address] [place] [address]  Phone: [phone] Fax: [phone]       Additional Information: pt is calling to request refill, pt will be out of medication, pharmacy is closed on Sunday will need before then, pt and pharmacy has made several attempts to request refill please call @[phone]

## 2023-07-28 RX ORDER — ESTRADIOL 0.1 MG/G
CREAM VAGINAL
Qty: 42.5 G | Refills: 4 | Status: SHIPPED | OUTPATIENT
Start: 2023-07-28 | End: 2023-07-31 | Stop reason: SDUPTHER

## 2023-07-28 NOTE — TELEPHONE ENCOUNTER
----- Message from Coby Newman sent at 7/28/2023 11:04 AM CDT -----  Regarding: 3rd Attempt:Rx request  Contact: [phone]  Pt called in and is requesting a call back. Pt stated she has called in multiple times in regards to new vaginal cream for UTI. Please call to further discuss. Thanks    [facility] ([place]) - [place] - [address]  [address] [place] [address]  Phone: [phone] Fax: [phone]    Pharmacy is not open on Sunday and pt would like to get script today.

## 2023-07-31 ENCOUNTER — PATIENT MESSAGE (OUTPATIENT)
Dept: INTERNAL MEDICINE | Facility: CLINIC | Age: 83
End: 2023-07-31
Payer: MEDICARE

## 2023-07-31 DIAGNOSIS — N39.0 RECURRENT UTI: ICD-10-CM

## 2023-07-31 DIAGNOSIS — N95.2 ATROPHIC VAGINITIS: ICD-10-CM

## 2023-08-02 DIAGNOSIS — N95.2 ATROPHIC VAGINITIS: ICD-10-CM

## 2023-08-02 DIAGNOSIS — N39.0 RECURRENT UTI: ICD-10-CM

## 2023-08-02 RX ORDER — ESTRADIOL 0.1 MG/G
CREAM VAGINAL
Qty: 42.5 G | Refills: 4 | Status: SHIPPED | OUTPATIENT
Start: 2023-08-02

## 2023-08-02 RX ORDER — ESTRADIOL 0.1 MG/G
CREAM VAGINAL
Qty: 42.5 G | Refills: 4 | Status: SHIPPED | OUTPATIENT
Start: 2023-08-02 | End: 2023-08-02 | Stop reason: SDUPTHER

## 2023-08-09 RX ORDER — ESTRADIOL 0.1 MG/G
CREAM VAGINAL
Qty: 42.5 G | Refills: 4 | OUTPATIENT
Start: 2023-08-09

## 2023-08-15 ENCOUNTER — PATIENT MESSAGE (OUTPATIENT)
Dept: ELECTROPHYSIOLOGY | Facility: CLINIC | Age: 83
End: 2023-08-15
Payer: MEDICARE

## 2023-08-17 ENCOUNTER — TELEPHONE (OUTPATIENT)
Dept: CARDIOLOGY | Facility: CLINIC | Age: 83
End: 2023-08-17
Payer: MEDICARE

## 2023-08-17 DIAGNOSIS — I34.1 MVP (MITRAL VALVE PROLAPSE): Primary | ICD-10-CM

## 2023-08-17 DIAGNOSIS — I10 HTN (HYPERTENSION), BENIGN: ICD-10-CM

## 2023-08-17 DIAGNOSIS — I48.19 PERSISTENT ATRIAL FIBRILLATION: ICD-10-CM

## 2023-08-17 NOTE — TELEPHONE ENCOUNTER
----- Message from Selina Valenzuela sent at 8/17/2023 11:03 AM CDT -----  Regarding: Orders  Pt [phone] calling to have her orders for her echo and labs put in the computer and scheduled before her visit scheduled 9/1/23.    Thanks

## 2023-08-18 ENCOUNTER — PATIENT MESSAGE (OUTPATIENT)
Dept: CARDIOLOGY | Facility: CLINIC | Age: 83
End: 2023-08-18
Payer: MEDICARE

## 2023-08-19 RX ORDER — ALENDRONATE SODIUM 70 MG/1
TABLET ORAL
Qty: 12 TABLET | Refills: 3 | Status: SHIPPED | OUTPATIENT
Start: 2023-08-19

## 2023-08-22 ENCOUNTER — PATIENT MESSAGE (OUTPATIENT)
Dept: CARDIOLOGY | Facility: CLINIC | Age: 83
End: 2023-08-22
Payer: MEDICARE

## 2023-08-25 ENCOUNTER — HOSPITAL ENCOUNTER (OUTPATIENT)
Dept: CARDIOLOGY | Facility: HOSPITAL | Age: 83
Discharge: HOME | End: 2023-08-25
Attending: INTERNAL MEDICINE
Payer: MEDICARE

## 2023-08-25 VITALS
HEIGHT: 68 IN | DIASTOLIC BLOOD PRESSURE: 72 MMHG | HEART RATE: 90 BPM | WEIGHT: 143 LBS | SYSTOLIC BLOOD PRESSURE: 133 MMHG | BODY MASS INDEX: 21.67 KG/M2

## 2023-08-25 DIAGNOSIS — I10 HTN (HYPERTENSION), BENIGN: ICD-10-CM

## 2023-08-25 DIAGNOSIS — I48.19 PERSISTENT ATRIAL FIBRILLATION: ICD-10-CM

## 2023-08-25 DIAGNOSIS — I34.1 MVP (MITRAL VALVE PROLAPSE): ICD-10-CM

## 2023-08-25 LAB
ASCENDING AORTA: 2.99 CM
AV INDEX (PROSTH): 0.85
AV MEAN GRADIENT: 4 MMHG
AV PEAK GRADIENT: 6 MMHG
AV VALVE AREA BY VELOCITY RATIO: 1.97 CM²
AV VALVE AREA: 2.78 CM²
AV VELOCITY RATIO: 0.6
BSA FOR ECHO PROCEDURE: 1.76 M2
CV ECHO LV RWT: 0.27 CM
DOP CALC AO PEAK VEL: 1.21 M/S
DOP CALC AO VTI: 21.24 CM
DOP CALC LVOT AREA: 3.3 CM2
DOP CALC LVOT DIAMETER: 2.04 CM
DOP CALC LVOT PEAK VEL: 0.73 M/S
DOP CALC LVOT STROKE VOLUME: 59.06 CM3
DOP CALCLVOT PEAK VEL VTI: 18.08 CM
E/E' RATIO: 10.35 M/S
ECHO LV POSTERIOR WALL: 0.75 CM (ref 0.6–1.1)
FRACTIONAL SHORTENING: 32 % (ref 28–44)
INTERVENTRICULAR SEPTUM: 0.94 CM (ref 0.6–1.1)
IVRT: 82.78 MSEC
LA MAJOR: 6.28 CM
LA MINOR: 5.56 CM
LA WIDTH: 5.05 CM
LEFT ATRIUM SIZE: 5.49 CM
LEFT ATRIUM VOLUME INDEX MOD: 45.9 ML/M2
LEFT ATRIUM VOLUME INDEX: 78.5 ML/M2
LEFT ATRIUM VOLUME MOD: 81.18 CM3
LEFT ATRIUM VOLUME: 138.99 CM3
LEFT INTERNAL DIMENSION IN SYSTOLE: 3.73 CM (ref 2.1–4)
LEFT VENTRICLE DIASTOLIC VOLUME INDEX: 81.81 ML/M2
LEFT VENTRICLE DIASTOLIC VOLUME: 144.8 ML
LEFT VENTRICLE MASS INDEX: 96 G/M2
LEFT VENTRICLE SYSTOLIC VOLUME INDEX: 33.4 ML/M2
LEFT VENTRICLE SYSTOLIC VOLUME: 59.1 ML
LEFT VENTRICULAR INTERNAL DIMENSION IN DIASTOLE: 5.46 CM (ref 3.5–6)
LEFT VENTRICULAR MASS: 169.29 G
LV LATERAL E/E' RATIO: 9.78 M/S
LV SEPTAL E/E' RATIO: 11 M/S
MV PEAK E VEL: 0.88 M/S
PISA MRMAX VEL: 0.05 M/S
PISA RADIUS: 0.66 CM
PISA TR MAX VEL: 2.1 M/S
PISA TR VN NYQUIST: 0.01 M/S
PULM VEIN S/D RATIO: 0.53
PV PEAK D VEL: 0.4 M/S
PV PEAK S VEL: 0.21 M/S
RA MAJOR: 5.23 CM
RA PRESSURE ESTIMATED: 8 MMHG
RA WIDTH: 4.94 CM
RIGHT VENTRICULAR END-DIASTOLIC DIMENSION: 4.79 CM
RV TB RVSP: 10 MMHG
SINUS: 2.62 CM
STJ: 2.1 CM
TDI LATERAL: 0.09 M/S
TDI SEPTAL: 0.08 M/S
TDI: 0.09 M/S
TR MAX PG: 18 MMHG
TRICUSPID ANNULAR PLANE SYSTOLIC EXCURSION: 2.02 CM
TV REST PULMONARY ARTERY PRESSURE: 26 MMHG
Z-SCORE OF LEFT VENTRICULAR DIMENSION IN END DIASTOLE: 1.1
Z-SCORE OF LEFT VENTRICULAR DIMENSION IN END SYSTOLE: 1.65

## 2023-08-25 PROCEDURE — 93306 ECHO (CUPID ONLY): ICD-10-PCS | Mod: 26,HCNC,, | Performed by: INTERNAL MEDICINE

## 2023-08-25 PROCEDURE — 93306 TTE W/DOPPLER COMPLETE: CPT | Mod: HCNC

## 2023-08-25 PROCEDURE — 93306 TTE W/DOPPLER COMPLETE: CPT | Mod: 26,HCNC,, | Performed by: INTERNAL MEDICINE

## 2023-08-25 NOTE — PROGRESS NOTES
Your results look fine and do not require any change in treatment. Echo report looks very stable ( it is 10:45 Friday nite in [place]!)     Please contact me if you have any additional concerns.    Sincerely,    Sudhir Daly

## 2023-08-26 ENCOUNTER — PATIENT MESSAGE (OUTPATIENT)
Dept: CARDIOLOGY | Facility: CLINIC | Age: 83
End: 2023-08-26
Payer: MEDICARE

## 2023-08-26 NOTE — PROGRESS NOTES
PS -He did grade the LVEF lower than I have in past-will need to review when I return but from the images that I can see on Epic I think LVEF is more closer to 55-60 ( more upper 50s)     Please contact me if you have any additional concerns.    Sincerely,    Sudhir Daly  PS -He did grade the LVEF lower than I have in past-will need to review when I return but from the images that I can see on Epic ( not as easy to see as when one is reading whole echo)I think LVEF is more closer to 55-60 ( more upper 50s)     Please contact me if you have any additional concerns.    Sincerely,    Sudhir Daly

## 2023-08-28 NOTE — PROGRESS NOTES
Subjective:   Patient ID:  Madan Bell is a 83 y.o. female who presents for follow-up of CVD    HPI:  The patient is here for CAD risk factors, VHD, AF, PHTN.  The patient has no chest pain, SOB, TIA, palpitations, syncope or pre-syncope.Patient does not exercise a lot but very active.BPs mostly 110-120ish        Review of Systems   Constitutional: Positive for malaise/fatigue. Negative for chills, decreased appetite, diaphoresis, fever, night sweats, weight gain and weight loss.   HENT:  Negative for congestion, hoarse voice, nosebleeds, sore throat and tinnitus.    Eyes:  Negative for blurred vision, double vision, vision loss in left eye, vision loss in right eye, visual disturbance and visual halos.   Cardiovascular:  Negative for chest pain, claudication, cyanosis, dyspnea on exertion, irregular heartbeat, leg swelling, near-syncope, orthopnea, palpitations, paroxysmal nocturnal dyspnea and syncope.   Respiratory:  Negative for cough, hemoptysis, shortness of breath, sleep disturbances due to breathing, snoring, sputum production and wheezing.    Endocrine: Negative for cold intolerance, heat intolerance, polydipsia, polyphagia and polyuria.   Hematologic/Lymphatic: Negative for adenopathy and bleeding problem. Does not bruise/bleed easily.   Skin:  Negative for color change, dry skin, flushing, itching, nail changes, poor wound healing, rash, skin cancer, suspicious lesions and unusual hair distribution.   Musculoskeletal:  Negative for arthritis, back pain, falls, gout, joint pain, joint swelling, muscle cramps, muscle weakness, myalgias and stiffness.   Gastrointestinal:  Negative for abdominal pain, anorexia, change in bowel habit, constipation, diarrhea, dysphagia, heartburn, hematemesis, hematochezia, melena and vomiting.   Genitourinary:  Negative for decreased libido, dysuria, hematuria, hesitancy and urgency.   Neurological:  Positive for light-headedness and weakness. Negative for  excessive daytime sleepiness, dizziness, focal weakness, headaches, loss of balance, numbness, paresthesias, seizures, sensory change, tremors and vertigo.   Psychiatric/Behavioral:  Negative for altered mental status, depression, hallucinations, memory loss, substance abuse and suicidal ideas. The patient does not have insomnia and is not nervous/anxious.    Allergic/Immunologic: Negative for environmental allergies and hives.       Objective: BP (!) 141/60   Pulse 80   Wt 64.8 kg (142 lb 13.7 oz)   LMP  (LMP Unknown)   SpO2 98%   BMI 21.72 kg/m²      Physical Exam  Constitutional:       Appearance: She is well-developed.   HENT:      Head: Normocephalic.   Eyes:      Pupils: Pupils are equal, round, and reactive to light.   Neck:      Thyroid: No thyromegaly.      Vascular: Normal carotid pulses. No carotid bruit, hepatojugular reflux or JVD.   Cardiovascular:      Rate and Rhythm: Normal rate. Rhythm irregularly irregular.      Pulses: Intact distal pulses.      Heart sounds: Murmur heard.      High-pitched blowing holosystolic murmur is present with a grade of 1/6 at the apex.      No friction rub. No gallop.   Pulmonary:      Effort: Pulmonary effort is normal. No tachypnea or respiratory distress.      Breath sounds: Normal breath sounds. No wheezing or rales.   Chest:      Chest wall: No tenderness.   Abdominal:      General: Bowel sounds are normal. There is no distension.      Palpations: Abdomen is soft. There is no mass.      Tenderness: There is no abdominal tenderness. There is no guarding or rebound.   Musculoskeletal:         General: No tenderness. Normal range of motion.      Cervical back: Normal range of motion.   Lymphadenopathy:      Cervical: No cervical adenopathy.   Skin:     General: Skin is warm.      Findings: No erythema or rash.   Neurological:      Mental Status: She is alert and oriented to person, place, and time.      Cranial Nerves: No cranial nerve deficit.      Coordination:  Coordination normal.   Psychiatric:         Behavior: Behavior normal.         Thought Content: Thought content normal.         Judgment: Judgment normal.     I thought LVEF 55-60 ( probably upper 50s) and low normal RV and IVC closer to 15 and TR mild to mild -moderate not trace    Assessment:     1. MVP (mitral valve prolapse)    2. Nonrheumatic mitral valve regurgitation    3. Paroxysmal ventricular tachycardia    4. Atherosclerosis of aorta    5. HTN (hypertension), benign    6. Persistent atrial fibrillation    7. History of prediabetes    8. RIZWANA (obstructive sleep apnea)    9. Hashimoto's disease    10. Post-operative hypothyroidism    11. Right ventricular dysfunction    12. Tricuspid valve insufficiency, unspecified etiology        Plan:   Discussed diet , achieving and maintaining ideal body weight, and exercise.   We reviewed meds in detail.  Reassured-Discussed goals, options, plan.  Can repeat Holter and chem 7 BNP  Consider slight increase in diuretic    Maidie was seen today for atrial fibrillation.    Diagnoses and all orders for this visit:    MVP (mitral valve prolapse)  -     Basic Metabolic Panel; Future; Expected date: 08/31/2023  -     Echo; Future; Expected date: 04/30/2024    Nonrheumatic mitral valve regurgitation  -     Basic Metabolic Panel; Future; Expected date: 08/31/2023  -     BNP; Future; Expected date: 08/31/2023  -     Echo; Future; Expected date: 04/30/2024    Paroxysmal ventricular tachycardia  -     Holter monitor - 48 hour; Future; Expected date: 08/31/2023  -     Basic Metabolic Panel; Future; Expected date: 08/31/2023  -     Echo; Future; Expected date: 04/30/2024    Atherosclerosis of aorta    HTN (hypertension), benign  -     Basic Metabolic Panel; Future; Expected date: 08/31/2023  -     Echo; Future; Expected date: 04/30/2024    Persistent atrial fibrillation  -     Holter monitor - 48 hour; Future; Expected date: 08/31/2023  -     Basic Metabolic Panel; Future; Expected  date: 08/31/2023  -     BNP; Future; Expected date: 08/31/2023  -     Echo; Future; Expected date: 04/30/2024    History of prediabetes    RIZWANA (obstructive sleep apnea)    Hashimoto's disease    Post-operative hypothyroidism    Right ventricular dysfunction  -     Echo; Future; Expected date: 04/30/2024    Tricuspid valve insufficiency, unspecified etiology            Follow up in about 8 months (around 4/30/2024) for with KELLEY Daly to read; labs today and 48 hour Holter now.

## 2023-08-30 ENCOUNTER — HOSPITAL ENCOUNTER (OUTPATIENT)
Dept: CARDIOLOGY | Facility: CLINIC | Age: 83
Discharge: HOME | End: 2023-08-30
Payer: MEDICARE

## 2023-08-30 ENCOUNTER — LAB VISIT (OUTPATIENT)
Dept: LAB | Facility: HOSPITAL | Age: 83
End: 2023-08-30
Attending: INTERNAL MEDICINE
Payer: MEDICARE

## 2023-08-30 ENCOUNTER — OFFICE VISIT (OUTPATIENT)
Dept: CARDIOLOGY | Facility: CLINIC | Age: 83
End: 2023-08-30
Payer: MEDICARE

## 2023-08-30 VITALS
BODY MASS INDEX: 21.72 KG/M2 | DIASTOLIC BLOOD PRESSURE: 60 MMHG | SYSTOLIC BLOOD PRESSURE: 141 MMHG | HEART RATE: 80 BPM | WEIGHT: 142.88 LBS | OXYGEN SATURATION: 98 %

## 2023-08-30 DIAGNOSIS — E06.3 HASHIMOTO'S DISEASE: ICD-10-CM

## 2023-08-30 DIAGNOSIS — I34.1 MVP (MITRAL VALVE PROLAPSE): Primary | ICD-10-CM

## 2023-08-30 DIAGNOSIS — I07.1 TRICUSPID VALVE INSUFFICIENCY, UNSPECIFIED ETIOLOGY: ICD-10-CM

## 2023-08-30 DIAGNOSIS — E89.0 POST-OPERATIVE HYPOTHYROIDISM: ICD-10-CM

## 2023-08-30 DIAGNOSIS — I34.1 MVP (MITRAL VALVE PROLAPSE): ICD-10-CM

## 2023-08-30 DIAGNOSIS — I51.9 RIGHT VENTRICULAR DYSFUNCTION: ICD-10-CM

## 2023-08-30 DIAGNOSIS — I48.19 PERSISTENT ATRIAL FIBRILLATION: ICD-10-CM

## 2023-08-30 DIAGNOSIS — I70.0 ATHEROSCLEROSIS OF AORTA: ICD-10-CM

## 2023-08-30 DIAGNOSIS — I10 HTN (HYPERTENSION), BENIGN: ICD-10-CM

## 2023-08-30 DIAGNOSIS — I47.29 PAROXYSMAL VENTRICULAR TACHYCARDIA: ICD-10-CM

## 2023-08-30 DIAGNOSIS — Z87.898 HISTORY OF PREDIABETES: ICD-10-CM

## 2023-08-30 DIAGNOSIS — G47.33 OSA (OBSTRUCTIVE SLEEP APNEA): ICD-10-CM

## 2023-08-30 DIAGNOSIS — I34.0 NONRHEUMATIC MITRAL VALVE REGURGITATION: ICD-10-CM

## 2023-08-30 DIAGNOSIS — I49.3 PVC'S (PREMATURE VENTRICULAR CONTRACTIONS): ICD-10-CM

## 2023-08-30 LAB
ANION GAP SERPL CALC-SCNC: 11 MMOL/L (ref 8–16)
BNP SERPL-MCNC: 283 PG/ML (ref 0–99)
BUN SERPL-MCNC: 12 MG/DL (ref 8–23)
CALCIUM SERPL-MCNC: 10.1 MG/DL (ref 8.7–10.5)
CHLORIDE SERPL-SCNC: 102 MMOL/L (ref 95–110)
CO2 SERPL-SCNC: 27 MMOL/L (ref 23–29)
CREAT SERPL-MCNC: 0.8 MG/DL (ref 0.5–1.4)
EST. GFR  (NO RACE VARIABLE): >60 ML/MIN/1.73 M^2
GLUCOSE SERPL-MCNC: 96 MG/DL (ref 70–110)
POTASSIUM SERPL-SCNC: 4.5 MMOL/L (ref 3.5–5.1)
SODIUM SERPL-SCNC: 140 MMOL/L (ref 136–145)

## 2023-08-30 PROCEDURE — 99999 PR PBB SHADOW E&M-EST. PATIENT-LVL V: CPT | Mod: PBBFAC,HCNC,, | Performed by: INTERNAL MEDICINE

## 2023-08-30 PROCEDURE — 1159F MED LIST DOCD IN RCRD: CPT | Mod: HCNC,CPTII,S$GLB, | Performed by: INTERNAL MEDICINE

## 2023-08-30 PROCEDURE — 1101F PR PT FALLS ASSESS DOC 0-1 FALLS W/OUT INJ PAST YR: ICD-10-PCS | Mod: HCNC,CPTII,S$GLB, | Performed by: INTERNAL MEDICINE

## 2023-08-30 PROCEDURE — 93010 ELECTROCARDIOGRAM REPORT: CPT | Mod: HCNC,S$GLB,, | Performed by: INTERNAL MEDICINE

## 2023-08-30 PROCEDURE — 3077F PR MOST RECENT SYSTOLIC BLOOD PRESSURE >= 140 MM HG: ICD-10-PCS | Mod: HCNC,CPTII,S$GLB, | Performed by: INTERNAL MEDICINE

## 2023-08-30 PROCEDURE — 1126F AMNT PAIN NOTED NONE PRSNT: CPT | Mod: HCNC,CPTII,S$GLB, | Performed by: INTERNAL MEDICINE

## 2023-08-30 PROCEDURE — 1101F PT FALLS ASSESS-DOCD LE1/YR: CPT | Mod: HCNC,CPTII,S$GLB, | Performed by: INTERNAL MEDICINE

## 2023-08-30 PROCEDURE — 1126F PR PAIN SEVERITY QUANTIFIED, NO PAIN PRESENT: ICD-10-PCS | Mod: HCNC,CPTII,S$GLB, | Performed by: INTERNAL MEDICINE

## 2023-08-30 PROCEDURE — 93010 EKG 12-LEAD: ICD-10-PCS | Mod: HCNC,S$GLB,, | Performed by: INTERNAL MEDICINE

## 2023-08-30 PROCEDURE — 80048 BASIC METABOLIC PNL TOTAL CA: CPT | Mod: HCNC | Performed by: INTERNAL MEDICINE

## 2023-08-30 PROCEDURE — 99215 PR OFFICE/OUTPT VISIT, EST, LEVL V, 40-54 MIN: ICD-10-PCS | Mod: HCNC,S$GLB,, | Performed by: INTERNAL MEDICINE

## 2023-08-30 PROCEDURE — 83880 ASSAY OF NATRIURETIC PEPTIDE: CPT | Mod: HCNC | Performed by: INTERNAL MEDICINE

## 2023-08-30 PROCEDURE — 3288F PR FALLS RISK ASSESSMENT DOCUMENTED: ICD-10-PCS | Mod: HCNC,CPTII,S$GLB, | Performed by: INTERNAL MEDICINE

## 2023-08-30 PROCEDURE — 36415 COLL VENOUS BLD VENIPUNCTURE: CPT | Mod: HCNC | Performed by: INTERNAL MEDICINE

## 2023-08-30 PROCEDURE — 3288F FALL RISK ASSESSMENT DOCD: CPT | Mod: HCNC,CPTII,S$GLB, | Performed by: INTERNAL MEDICINE

## 2023-08-30 PROCEDURE — 3077F SYST BP >= 140 MM HG: CPT | Mod: HCNC,CPTII,S$GLB, | Performed by: INTERNAL MEDICINE

## 2023-08-30 PROCEDURE — 1157F PR ADVANCE CARE PLAN OR EQUIV PRESENT IN MEDICAL RECORD: ICD-10-PCS | Mod: HCNC,CPTII,S$GLB, | Performed by: INTERNAL MEDICINE

## 2023-08-30 PROCEDURE — 99215 OFFICE O/P EST HI 40 MIN: CPT | Mod: HCNC,S$GLB,, | Performed by: INTERNAL MEDICINE

## 2023-08-30 PROCEDURE — 3078F DIAST BP <80 MM HG: CPT | Mod: HCNC,CPTII,S$GLB, | Performed by: INTERNAL MEDICINE

## 2023-08-30 PROCEDURE — 93005 EKG 12-LEAD: ICD-10-PCS | Mod: HCNC,S$GLB,, | Performed by: INTERNAL MEDICINE

## 2023-08-30 PROCEDURE — 1157F ADVNC CARE PLAN IN RCRD: CPT | Mod: HCNC,CPTII,S$GLB, | Performed by: INTERNAL MEDICINE

## 2023-08-30 PROCEDURE — 3078F PR MOST RECENT DIASTOLIC BLOOD PRESSURE < 80 MM HG: ICD-10-PCS | Mod: HCNC,CPTII,S$GLB, | Performed by: INTERNAL MEDICINE

## 2023-08-30 PROCEDURE — 1159F PR MEDICATION LIST DOCUMENTED IN MEDICAL RECORD: ICD-10-PCS | Mod: HCNC,CPTII,S$GLB, | Performed by: INTERNAL MEDICINE

## 2023-08-30 PROCEDURE — 93005 ELECTROCARDIOGRAM TRACING: CPT | Mod: HCNC,S$GLB,, | Performed by: INTERNAL MEDICINE

## 2023-08-30 PROCEDURE — 99999 PR PBB SHADOW E&M-EST. PATIENT-LVL V: ICD-10-PCS | Mod: PBBFAC,HCNC,, | Performed by: INTERNAL MEDICINE

## 2023-08-30 NOTE — PATIENT INSTRUCTIONS
Discussed diet , achieving and maintaining ideal body weight, and exercise.   We reviewed meds in detail.  Reassured-Discussed goals, options, plan.  Can repeat Holter and chem 7 BNP  Consider slight increase in diuretic

## 2023-09-09 ENCOUNTER — PATIENT MESSAGE (OUTPATIENT)
Dept: CARDIOLOGY | Facility: CLINIC | Age: 83
End: 2023-09-09
Payer: MEDICARE

## 2023-09-09 ENCOUNTER — PATIENT MESSAGE (OUTPATIENT)
Dept: INTERNAL MEDICINE | Facility: CLINIC | Age: 83
End: 2023-09-09
Payer: MEDICARE

## 2023-09-13 ENCOUNTER — HOSPITAL ENCOUNTER (OUTPATIENT)
Dept: CARDIOLOGY | Facility: HOSPITAL | Age: 83
Discharge: HOME | End: 2023-09-13
Attending: INTERNAL MEDICINE
Payer: MEDICARE

## 2023-09-13 DIAGNOSIS — I47.29 PAROXYSMAL VENTRICULAR TACHYCARDIA: ICD-10-CM

## 2023-09-13 DIAGNOSIS — I48.19 PERSISTENT ATRIAL FIBRILLATION: ICD-10-CM

## 2023-09-13 PROCEDURE — 93226 XTRNL ECG REC<48 HR SCAN A/R: CPT | Mod: HCNC

## 2023-09-13 PROCEDURE — 93227 HOLTER MONITOR - 48 HOUR (CUPID ONLY): ICD-10-PCS | Mod: HCNC,,, | Performed by: INTERNAL MEDICINE

## 2023-09-13 PROCEDURE — 93227 XTRNL ECG REC<48 HR R&I: CPT | Mod: HCNC,,, | Performed by: INTERNAL MEDICINE

## 2023-09-14 ENCOUNTER — PATIENT MESSAGE (OUTPATIENT)
Dept: INTERNAL MEDICINE | Facility: CLINIC | Age: 83
End: 2023-09-14
Payer: MEDICARE

## 2023-09-19 LAB
OHS CV EVENT MONITOR DAY: 0
OHS CV HOLTER LENGTH DECIMAL HOURS: 47.98
OHS CV HOLTER LENGTH HOURS: 47
OHS CV HOLTER LENGTH MINUTES: 59

## 2023-09-19 NOTE — PROGRESS NOTES
Your results did not show anything different during dizziness or anything dangerous.However the study did show a very high amount just less than 20% of beats being PVCs -Dr Joey Champagne read it who you know well and will ask him to comment since LVEF is a bit lower ( read as low 50s I thought mid to upper 50s)    Please contact me if you have any additional concerns.    Sincerely,    Sudhir Daly

## 2023-09-22 ENCOUNTER — PATIENT MESSAGE (OUTPATIENT)
Dept: ELECTROPHYSIOLOGY | Facility: CLINIC | Age: 83
End: 2023-09-22
Payer: MEDICARE

## 2023-10-22 ENCOUNTER — PATIENT MESSAGE (OUTPATIENT)
Dept: NEUROLOGY | Facility: CLINIC | Age: 83
End: 2023-10-22
Payer: MEDICARE

## 2023-10-26 ENCOUNTER — OFFICE VISIT (OUTPATIENT)
Dept: URGENT CARE | Facility: CLINIC | Age: 83
End: 2023-10-26
Payer: MEDICARE

## 2023-10-26 VITALS
BODY MASS INDEX: 21.52 KG/M2 | SYSTOLIC BLOOD PRESSURE: 131 MMHG | OXYGEN SATURATION: 97 % | TEMPERATURE: 98 F | HEIGHT: 68 IN | RESPIRATION RATE: 19 BRPM | DIASTOLIC BLOOD PRESSURE: 78 MMHG | HEART RATE: 76 BPM | WEIGHT: 142 LBS

## 2023-10-26 DIAGNOSIS — J02.9 SORE THROAT: Primary | ICD-10-CM

## 2023-10-26 DIAGNOSIS — R09.82 POST-NASAL DRIP: ICD-10-CM

## 2023-10-26 LAB
CTP QC/QA: YES
CTP QC/QA: YES
MOLECULAR STREP A: NEGATIVE
SARS-COV-2 AG RESP QL IA.RAPID: NEGATIVE

## 2023-10-26 PROCEDURE — 87651 POCT STREP A MOLECULAR: ICD-10-PCS | Mod: QW,S$GLB,, | Performed by: FAMILY MEDICINE

## 2023-10-26 PROCEDURE — 87651 STREP A DNA AMP PROBE: CPT | Mod: QW,S$GLB,, | Performed by: FAMILY MEDICINE

## 2023-10-26 PROCEDURE — 87811 SARS-COV-2 COVID19 W/OPTIC: CPT | Mod: QW,S$GLB,, | Performed by: FAMILY MEDICINE

## 2023-10-26 PROCEDURE — 87811 SARS CORONAVIRUS 2 ANTIGEN POCT, MANUAL READ: ICD-10-PCS | Mod: QW,S$GLB,, | Performed by: FAMILY MEDICINE

## 2023-10-26 PROCEDURE — 99203 OFFICE O/P NEW LOW 30 MIN: CPT | Mod: S$GLB,,, | Performed by: FAMILY MEDICINE

## 2023-10-26 PROCEDURE — 99203 PR OFFICE/OUTPT VISIT, NEW, LEVL III, 30-44 MIN: ICD-10-PCS | Mod: S$GLB,,, | Performed by: FAMILY MEDICINE

## 2023-10-26 RX ORDER — FLUTICASONE PROPIONATE 50 MCG
1 SPRAY, SUSPENSION (ML) NASAL DAILY
Qty: 16 G | Refills: 0 | Status: SHIPPED | OUTPATIENT
Start: 2023-10-26

## 2023-10-26 NOTE — PROGRESS NOTES
"Subjective:      Patient ID: Madan Bell is a 83 y.o. female.    Vitals:  height is 5' 8" (1.727 m) and weight is 64.4 kg (142 lb). Her oral temperature is 97.9 °F (36.6 °C). Her blood pressure is 131/78 and her pulse is 76. Her respiration is 19 and oxygen saturation is 97%.     Chief Complaint: Sore Throat (White patches on back of throat x 2 days - Entered by patient)    This is a 83 y.o. female who presents today with a chief complaint of sore throat with white patches that began 3 days ago. Pt states that they have been taking Tylenol and salt water rinses.     Sore Throat   This is a new problem. The current episode started in the past 7 days (3 days ago). The problem has been gradually worsening. Neither side of throat is experiencing more pain than the other. There has been no fever. The pain is at a severity of 5/10. The pain is moderate. Pertinent negatives include no abdominal pain, congestion, coughing, diarrhea, drooling, ear discharge, ear pain, headaches, hoarse voice, plugged ear sensation, neck pain, shortness of breath, stridor, swollen glands, trouble swallowing or vomiting. She has had no exposure to strep or mono. Treatments tried: Tylenol, salt water rinses. The treatment provided no relief.       HENT:  Positive for sore throat. Negative for ear pain, ear discharge, drooling, congestion and trouble swallowing.    Neck: Negative for neck pain.   Respiratory:  Negative for cough, shortness of breath and stridor.    Gastrointestinal:  Negative for abdominal pain, vomiting and diarrhea.   Skin:  Negative for erythema.   Neurological:  Negative for headaches.      Objective:     Physical Exam   Constitutional: She is oriented to person, place, and time. She appears ill. normal  HENT:   Head: Normocephalic and atraumatic.   Ears:   Right Ear: Tympanic membrane, external ear and ear canal normal.   Left Ear: Tympanic membrane, external ear and ear canal normal.   Nose: Rhinorrhea and " congestion present.   Mouth/Throat: Posterior oropharyngeal erythema present. No oropharyngeal exudate.   Eyes: Conjunctivae are normal. Pupils are equal, round, and reactive to light. Extraocular movement intact   Neck: Neck supple.   Cardiovascular: Normal rate, regular rhythm, normal heart sounds and normal pulses.   No murmur heard.  Pulmonary/Chest: Effort normal and breath sounds normal. No respiratory distress.   Abdominal: Normal appearance and bowel sounds are normal. Soft. flat abdomen   Neurological: She is alert, oriented to person, place, and time and at baseline.   Skin: Skin is warm and dry. Capillary refill takes less than 2 seconds. No erythema   Psychiatric: Her behavior is normal. Mood, judgment and thought content normal.   Nursing note and vitals reviewed.    Assessment:     Plan:   1. Sore throat  - SARS Coronavirus 2 Antigen, POCT Manual Read  - POCT Strep A, Molecular  - benzocaine-menthoL 15-3.6 mg Lozg; 1 lozenge by Mucous Membrane route 3 (three) times daily as needed.  Dispense: 18 lozenge; Refill: 0    2. Post-nasal drip  - fluticasone propionate (FLONASE) 50 mcg/actuation nasal spray; 1 spray (50 mcg total) by Each Nostril route once daily.  Dispense: 16 g; Refill: 0   All patient's results were discussed with her prior to her discharge from the clinic

## 2023-10-27 ENCOUNTER — PATIENT MESSAGE (OUTPATIENT)
Dept: CARDIOLOGY | Facility: CLINIC | Age: 83
End: 2023-10-27
Payer: MEDICARE

## 2023-10-31 ENCOUNTER — PATIENT MESSAGE (OUTPATIENT)
Dept: ELECTROPHYSIOLOGY | Facility: CLINIC | Age: 83
End: 2023-10-31
Payer: MEDICARE

## 2023-11-02 DIAGNOSIS — I50.9 CONGESTIVE HEART FAILURE, UNSPECIFIED HF CHRONICITY, UNSPECIFIED HEART FAILURE TYPE: Primary | ICD-10-CM

## 2023-11-10 ENCOUNTER — PATIENT MESSAGE (OUTPATIENT)
Dept: CARDIOLOGY | Facility: CLINIC | Age: 83
End: 2023-11-10
Payer: MEDICARE

## 2023-11-15 ENCOUNTER — IMMUNIZATION (OUTPATIENT)
Dept: INTERNAL MEDICINE | Facility: CLINIC | Age: 83
End: 2023-11-15
Payer: MEDICARE

## 2023-11-15 ENCOUNTER — LAB VISIT (OUTPATIENT)
Dept: LAB | Facility: HOSPITAL | Age: 83
End: 2023-11-15
Attending: INTERNAL MEDICINE
Payer: MEDICARE

## 2023-11-15 DIAGNOSIS — I50.9 CONGESTIVE HEART FAILURE, UNSPECIFIED HF CHRONICITY, UNSPECIFIED HEART FAILURE TYPE: ICD-10-CM

## 2023-11-15 DIAGNOSIS — Z23 NEED FOR VACCINATION: Primary | ICD-10-CM

## 2023-11-15 LAB
ANION GAP SERPL CALC-SCNC: 8 MMOL/L (ref 8–16)
BNP SERPL-MCNC: 111 PG/ML (ref 0–99)
BUN SERPL-MCNC: 11 MG/DL (ref 8–23)
CALCIUM SERPL-MCNC: 9.8 MG/DL (ref 8.7–10.5)
CHLORIDE SERPL-SCNC: 100 MMOL/L (ref 95–110)
CO2 SERPL-SCNC: 29 MMOL/L (ref 23–29)
CREAT SERPL-MCNC: 0.9 MG/DL (ref 0.5–1.4)
EST. GFR  (NO RACE VARIABLE): >60 ML/MIN/1.73 M^2
GLUCOSE SERPL-MCNC: 97 MG/DL (ref 70–110)
POTASSIUM SERPL-SCNC: 3.9 MMOL/L (ref 3.5–5.1)
SODIUM SERPL-SCNC: 137 MMOL/L (ref 136–145)

## 2023-11-15 PROCEDURE — 36415 COLL VENOUS BLD VENIPUNCTURE: CPT | Mod: HCNC | Performed by: INTERNAL MEDICINE

## 2023-11-15 PROCEDURE — 91322 SARSCOV2 VAC 50 MCG/0.5ML IM: CPT | Mod: HCNC,S$GLB,, | Performed by: INTERNAL MEDICINE

## 2023-11-15 PROCEDURE — 80048 BASIC METABOLIC PNL TOTAL CA: CPT | Mod: HCNC | Performed by: INTERNAL MEDICINE

## 2023-11-15 PROCEDURE — 90480 ADMN SARSCOV2 VAC 1/ONLY CMP: CPT | Mod: HCNC,S$GLB,, | Performed by: INTERNAL MEDICINE

## 2023-11-15 PROCEDURE — 91322 COVID-19 VAC, MRNA 2023 (MODERNA)(PF) 50 MCG/0.5 ML IM SUSR (12+): ICD-10-PCS | Mod: HCNC,S$GLB,, | Performed by: INTERNAL MEDICINE

## 2023-11-15 PROCEDURE — 83880 ASSAY OF NATRIURETIC PEPTIDE: CPT | Mod: HCNC | Performed by: INTERNAL MEDICINE

## 2023-11-15 PROCEDURE — 90480 COVID-19 VAC, MRNA 2023 (MODERNA)(PF) 50 MCG/0.5 ML IM SUSR (12+): ICD-10-PCS | Mod: HCNC,S$GLB,, | Performed by: INTERNAL MEDICINE

## 2023-11-15 NOTE — PROGRESS NOTES
Your results look fine and do not require any change in treatment. BNP much better only slightly increased but waiting for chem 7.    Please contact me if you have any additional concerns.    Sincerely,    Sudhir Daly

## 2023-11-16 NOTE — PROGRESS NOTES
Your results look fine and do not require any change in treatment. I would favor maybe 2 more diuretic pills per week to lessen the chance of worsening, but if she is feeling fine and the diuretic makes her weaker , I am also OK leaving well enough alone. In 10 weeks let's repeat chem 7 and BNP regardless.    Please contact me if you have any additional concerns.    Sincerely,    Sudhir Daly

## 2023-12-04 NOTE — TELEPHONE ENCOUNTER
----- Message from Jennifer Ayala sent at 1/24/2022  3:44 PM CST -----  Contact: Madan @[phone]  Pt returning phone call regarding r/s her appt . She states to give her 5 mins before you call     Neuro

## 2023-12-05 ENCOUNTER — OFFICE VISIT (OUTPATIENT)
Dept: ELECTROPHYSIOLOGY | Facility: CLINIC | Age: 83
End: 2023-12-05
Payer: MEDICARE

## 2023-12-05 ENCOUNTER — HOSPITAL ENCOUNTER (OUTPATIENT)
Dept: CARDIOLOGY | Facility: CLINIC | Age: 83
Discharge: HOME | End: 2023-12-05
Payer: MEDICARE

## 2023-12-05 VITALS
HEART RATE: 77 BPM | SYSTOLIC BLOOD PRESSURE: 120 MMHG | WEIGHT: 142.44 LBS | BODY MASS INDEX: 21.59 KG/M2 | DIASTOLIC BLOOD PRESSURE: 72 MMHG | HEIGHT: 68 IN

## 2023-12-05 DIAGNOSIS — I47.29 PAROXYSMAL VENTRICULAR TACHYCARDIA: Primary | ICD-10-CM

## 2023-12-05 DIAGNOSIS — G47.33 OSA (OBSTRUCTIVE SLEEP APNEA): ICD-10-CM

## 2023-12-05 DIAGNOSIS — I49.3 PVC'S (PREMATURE VENTRICULAR CONTRACTIONS): ICD-10-CM

## 2023-12-05 DIAGNOSIS — I48.21 PERMANENT ATRIAL FIBRILLATION: ICD-10-CM

## 2023-12-05 DIAGNOSIS — I34.1 MVP (MITRAL VALVE PROLAPSE): ICD-10-CM

## 2023-12-05 DIAGNOSIS — I48.19 PERSISTENT ATRIAL FIBRILLATION: ICD-10-CM

## 2023-12-05 DIAGNOSIS — I70.0 ATHEROSCLEROSIS OF AORTA: ICD-10-CM

## 2023-12-05 DIAGNOSIS — I10 HTN (HYPERTENSION), BENIGN: ICD-10-CM

## 2023-12-05 PROCEDURE — 93005 RHYTHM STRIP: ICD-10-PCS | Mod: HCNC,S$GLB,, | Performed by: INTERNAL MEDICINE

## 2023-12-05 PROCEDURE — 3078F PR MOST RECENT DIASTOLIC BLOOD PRESSURE < 80 MM HG: ICD-10-PCS | Mod: HCNC,CPTII,S$GLB, | Performed by: INTERNAL MEDICINE

## 2023-12-05 PROCEDURE — 3078F DIAST BP <80 MM HG: CPT | Mod: HCNC,CPTII,S$GLB, | Performed by: INTERNAL MEDICINE

## 2023-12-05 PROCEDURE — 99999 PR PBB SHADOW E&M-EST. PATIENT-LVL IV: CPT | Mod: PBBFAC,HCNC,, | Performed by: INTERNAL MEDICINE

## 2023-12-05 PROCEDURE — 1160F PR REVIEW ALL MEDS BY PRESCRIBER/CLIN PHARMACIST DOCUMENTED: ICD-10-PCS | Mod: HCNC,CPTII,S$GLB, | Performed by: INTERNAL MEDICINE

## 2023-12-05 PROCEDURE — 1101F PR PT FALLS ASSESS DOC 0-1 FALLS W/OUT INJ PAST YR: ICD-10-PCS | Mod: HCNC,CPTII,S$GLB, | Performed by: INTERNAL MEDICINE

## 2023-12-05 PROCEDURE — 99214 OFFICE O/P EST MOD 30 MIN: CPT | Mod: HCNC,S$GLB,, | Performed by: INTERNAL MEDICINE

## 2023-12-05 PROCEDURE — 99999 PR PBB SHADOW E&M-EST. PATIENT-LVL IV: ICD-10-PCS | Mod: PBBFAC,HCNC,, | Performed by: INTERNAL MEDICINE

## 2023-12-05 PROCEDURE — 93010 RHYTHM STRIP: ICD-10-PCS | Mod: HCNC,S$GLB,, | Performed by: INTERNAL MEDICINE

## 2023-12-05 PROCEDURE — 93010 ELECTROCARDIOGRAM REPORT: CPT | Mod: HCNC,S$GLB,, | Performed by: INTERNAL MEDICINE

## 2023-12-05 PROCEDURE — 1126F PR PAIN SEVERITY QUANTIFIED, NO PAIN PRESENT: ICD-10-PCS | Mod: HCNC,CPTII,S$GLB, | Performed by: INTERNAL MEDICINE

## 2023-12-05 PROCEDURE — 1157F PR ADVANCE CARE PLAN OR EQUIV PRESENT IN MEDICAL RECORD: ICD-10-PCS | Mod: HCNC,CPTII,S$GLB, | Performed by: INTERNAL MEDICINE

## 2023-12-05 PROCEDURE — 3074F SYST BP LT 130 MM HG: CPT | Mod: HCNC,CPTII,S$GLB, | Performed by: INTERNAL MEDICINE

## 2023-12-05 PROCEDURE — 1101F PT FALLS ASSESS-DOCD LE1/YR: CPT | Mod: HCNC,CPTII,S$GLB, | Performed by: INTERNAL MEDICINE

## 2023-12-05 PROCEDURE — 93005 ELECTROCARDIOGRAM TRACING: CPT | Mod: HCNC,S$GLB,, | Performed by: INTERNAL MEDICINE

## 2023-12-05 PROCEDURE — 3288F FALL RISK ASSESSMENT DOCD: CPT | Mod: HCNC,CPTII,S$GLB, | Performed by: INTERNAL MEDICINE

## 2023-12-05 PROCEDURE — 1159F MED LIST DOCD IN RCRD: CPT | Mod: HCNC,CPTII,S$GLB, | Performed by: INTERNAL MEDICINE

## 2023-12-05 PROCEDURE — 99214 PR OFFICE/OUTPT VISIT, EST, LEVL IV, 30-39 MIN: ICD-10-PCS | Mod: HCNC,S$GLB,, | Performed by: INTERNAL MEDICINE

## 2023-12-05 PROCEDURE — 1159F PR MEDICATION LIST DOCUMENTED IN MEDICAL RECORD: ICD-10-PCS | Mod: HCNC,CPTII,S$GLB, | Performed by: INTERNAL MEDICINE

## 2023-12-05 PROCEDURE — 1126F AMNT PAIN NOTED NONE PRSNT: CPT | Mod: HCNC,CPTII,S$GLB, | Performed by: INTERNAL MEDICINE

## 2023-12-05 PROCEDURE — 3074F PR MOST RECENT SYSTOLIC BLOOD PRESSURE < 130 MM HG: ICD-10-PCS | Mod: HCNC,CPTII,S$GLB, | Performed by: INTERNAL MEDICINE

## 2023-12-05 PROCEDURE — 1160F RVW MEDS BY RX/DR IN RCRD: CPT | Mod: HCNC,CPTII,S$GLB, | Performed by: INTERNAL MEDICINE

## 2023-12-05 PROCEDURE — 3288F PR FALLS RISK ASSESSMENT DOCUMENTED: ICD-10-PCS | Mod: HCNC,CPTII,S$GLB, | Performed by: INTERNAL MEDICINE

## 2023-12-05 PROCEDURE — 1157F ADVNC CARE PLAN IN RCRD: CPT | Mod: HCNC,CPTII,S$GLB, | Performed by: INTERNAL MEDICINE

## 2023-12-05 RX ORDER — AMIODARONE HYDROCHLORIDE 200 MG/1
TABLET ORAL
Qty: 90 TABLET | Refills: 3 | Status: SHIPPED | OUTPATIENT
Start: 2023-12-05

## 2023-12-05 NOTE — PROGRESS NOTES
Subjective:    Patient ID:  Madan Bell is a 83 y.o. female who presents for follow-up of Frequent PVCs and Atrial Fibrillation    Referring Electrophysiologist: Fernando Disla MD  Primary Cardiologist: Loc Daly MD  Primary Care Physician: Sheila Mcgee MD    HPI  Prior Hx:  I had the pleasure of seeing Mrs. Bell in our electrophysiology in follow-up for her ventricular ectopy. As you are aware she is a pleasant 83 year-old woman, former patient of Dr. Disla, with moderate mitral valve prolapse/mitral sclerosis with preserved LV function, PVCs and nonsustained VT, orthostatic light-headedness, persistent AF and thyroid cancer s/p thyroidectomy. She had been a patient of Dr. Disla for many years. Her PVCs were treated with metoprolol. She developed persistent AF in 2018 and underwent ANAHI guided DCCV. She remained in sinus rhythm without anti-arrhythmic therapy. At her last follow-up up visit with Dr. Disla in July 2020 she was in rate controlled AF. Plan was for rate control. She remained on eliquis and bisoprolol. At our first visit in 2/2021 it was noted she was recently in the ER for brief episode of confusion. CT head and brain MRI were negative. She felt her dizziness was getting worse. She noted having early morning low heart rates with her monitoring for digital HTN clinic (pulse measures in the 30s-40s at times). She was concerned her neurologic symptoms may be related to her bradycardia. Her heart rate was in the 70s when she was in the ER.    A holter monitor from August 2020 noted persistent rate controlled AF with a 6% PVC burden. She was started on Mexitil to attempt to reduce PVC burden some. October 2020 noted persistent rate controlled atrial fibrillation with 6% PVC burden and 2 runs of NSVT (4 and 5 beats). She also developed a tremor related to Mexitil and it was stopped. An echocardiogram from 8/2020 noted preserved LV function with moderate MR, bileaflet  prolapse and severe LA enlargement. We discussed low likelihood of maintaining sinus rhythm at this point with plan to continue rate control.    To investigate her complaints of bradycardia in the morning, she wore a 14 day holter which showed rate controlled AF with 5% multifocal PVCs and no significant bradycardia while awake (had some during sleep hours). Symptomatic button activations corresponded to AF with normal ventricular rates.    ECHO from 4/2021 noted a LVEF of 55% and moderated MR with severe left atrial enlargement, PASP of 45mmHg and enlarged IVC (pressure 15mmHg).    1/2022: Mrs. Bell returned for yearly follow-up. She reported continued episodes of light-headedness that are random. She felt great when she was physically active. She confirmed that when she wore the monitor and she pressed the symptom button she was having her symptoms. She describes that some times when she has sudden head position changes she feels dizzy and describes it like the room is spinning. Recommended ENT evaluation.    2/2023: Mrs. Bell returned for yearly follow-up. Extended holter 9/2022 noted rate controlled AF with 10% PVC burden and 4 episodes of nonsustained VT up to 5 beats in duration. She had some pauses of 3-4 seconds however these appeared to be during night-time hours. She did not activate the symptom button. She continued to have dizziness. She was seen in ENT clinic and they did not feel like she had vertigo. She feels like lying down makes her dizzy and then when suddenly trying to get up makes her dizzy also. She also reports sometimes she gets waves of dizziness while upright. We repeated an extended holter monitor which demonstrated rate controlled AF with 13.6% PVC burden and short runs of nonsustained VT. Symptomatic activations for dizziness corresponded to rate controlled AF and given PVC burden.    6/2023: Mrs. Bell returned for follow-up. Continued to report chronic dizziness and head  pressure/heaviness. Inquiring again if it could be from AF, even if rate controlled. She had at least a 4 month period of sinus rhythm in the fall of 2018. She continued to report symptoms. She was intolerant to AAD therapy. Discussed in setting of significant MR it would likely be very difficult to maintain sinus rhythm even with anti-arrhythmic therapy. Discussed her PVC burden is not in the range to increase her risk of PVC induced CMP. At some point her MR may need to be addressed, surgical or endovascular approach. Her AF is rate controlled. I am still unsure what her dizziness is from (could it be from PVCs?). Have discussed trial again of amiodarone therapy. She preferred to avoid anti-arrhythmic drug therapy.    Interim Hx:  Mrs. Bell returns for follow-up. Dr. Daly ordered a holter which noted 19% PVC burden (some could be aberrancy) along with nonsustained VT episodes. ECHO noted LVEF 50-55%. We discussed starting amiodarone on the phone. She wanted to think about it.    My interpretation of today's in clinic ECG is AF with an average rate of 77 bpm    Review of Systems   Constitutional: Negative for fever and malaise/fatigue.   HENT:  Negative for congestion and sore throat.    Eyes:  Negative for blurred vision and visual disturbance.   Cardiovascular:  Positive for leg swelling. Negative for chest pain, dyspnea on exertion, irregular heartbeat, near-syncope, palpitations and syncope.   Respiratory:  Negative for cough and shortness of breath.    Hematologic/Lymphatic: Negative for bleeding problem. Does not bruise/bleed easily.   Skin: Negative.    Musculoskeletal: Negative.    Gastrointestinal:  Negative for bloating, abdominal pain, hematochezia and melena.   Neurological:  Positive for light-headedness and weakness. Negative for focal weakness.   Psychiatric/Behavioral: Negative.          Objective:    Physical Exam  Vitals reviewed.   Constitutional:       General: She is not in acute  distress.     Appearance: She is well-developed. She is not diaphoretic.   HENT:      Head: Normocephalic and atraumatic.   Eyes:      General:         Right eye: No discharge.         Left eye: No discharge.      Conjunctiva/sclera: Conjunctivae normal.   Cardiovascular:      Rate and Rhythm: Normal rate. Rhythm irregularly irregular.      Heart sounds: No murmur heard.     No friction rub. No gallop.   Pulmonary:      Effort: Pulmonary effort is normal. No respiratory distress.      Breath sounds: Normal breath sounds. No wheezing or rales.   Abdominal:      General: Bowel sounds are normal. There is no distension.      Palpations: Abdomen is soft.      Tenderness: There is no abdominal tenderness.   Musculoskeletal:      Cervical back: Neck supple.   Skin:     General: Skin is warm and dry.   Neurological:      Mental Status: She is alert and oriented to person, place, and time.   Psychiatric:         Behavior: Behavior normal.         Thought Content: Thought content normal.         Judgment: Judgment normal.           Assessment:       1. Paroxysmal ventricular tachycardia    2. MVP (mitral valve prolapse)    3. PVC's (premature ventricular contractions)    4. Atherosclerosis of aorta    5. HTN (hypertension), benign    6. Permanent atrial fibrillation    7. RIZWANA (obstructive sleep apnea)         Plan:     In summary, Mrs. Bell  is a pleasant 83 year-old woman, former patient of Dr. Disla, with moderate mitral valve prolapse/mitral sclerosis with preserved LV function, PVCs and nonsustained VT, orthostatic light-headedness, persistent AF and thyroid cancer s/p thyroidectomy. She had no apparent symptomatic benefit of restoration of sinus rhythm in 2018. Reviewed notes from when she was being treated by Dr. Disla. She had at least a 4 month period of sinus rhythm in the fall of 2018. She continued to report symptoms. She was intolerant to AAD therapy. Discussed in setting of significant MR it would  likely be very difficult to maintain sinus rhythm even with anti-arrhythmic therapy. Discussed her PVC burden is not in the range to increase her risk of PVC induced CMP. At some point her MR may need to be addressed, surgical or endovascular approach. Her AF is rate controlled. I am still unsure what her dizziness is from (could it be from PVCs?). Have discussed trial again of amiodarone therapy. She would like to have it rx but will discuss with her family further. Discussed liver, thyroid, lung, and ocular side effects.     Continue eliquis lifelong as tolerated.    RTC in 6 months with a 24 hour holter just prior    Thank you for allowing me to participate in the care of this patient. Please do not hesitate to call me with any questions or concerns.    Joey Champagne MD, PhD  Cardiac Electrophysiology

## 2023-12-17 ENCOUNTER — PATIENT MESSAGE (OUTPATIENT)
Dept: ADMINISTRATIVE | Facility: OTHER | Age: 83
End: 2023-12-17
Payer: MEDICARE

## 2024-01-11 ENCOUNTER — OFFICE VISIT (OUTPATIENT)
Dept: INTERNAL MEDICINE | Facility: CLINIC | Age: 84
End: 2024-01-11
Payer: MEDICARE

## 2024-01-11 ENCOUNTER — LAB VISIT (OUTPATIENT)
Dept: LAB | Facility: HOSPITAL | Age: 84
End: 2024-01-11
Attending: INTERNAL MEDICINE
Payer: MEDICARE

## 2024-01-11 VITALS
SYSTOLIC BLOOD PRESSURE: 110 MMHG | DIASTOLIC BLOOD PRESSURE: 80 MMHG | HEIGHT: 68 IN | WEIGHT: 142.31 LBS | BODY MASS INDEX: 21.57 KG/M2

## 2024-01-11 DIAGNOSIS — Z01.00 ROUTINE EYE EXAM: ICD-10-CM

## 2024-01-11 DIAGNOSIS — E55.9 MILD VITAMIN D DEFICIENCY: ICD-10-CM

## 2024-01-11 DIAGNOSIS — Z12.31 SCREENING MAMMOGRAM, ENCOUNTER FOR: ICD-10-CM

## 2024-01-11 DIAGNOSIS — M25.511 ACUTE PAIN OF RIGHT SHOULDER: ICD-10-CM

## 2024-01-11 DIAGNOSIS — M81.0 OSTEOPOROSIS, UNSPECIFIED OSTEOPOROSIS TYPE, UNSPECIFIED PATHOLOGICAL FRACTURE PRESENCE: ICD-10-CM

## 2024-01-11 DIAGNOSIS — I10 HTN (HYPERTENSION), BENIGN: ICD-10-CM

## 2024-01-11 DIAGNOSIS — N39.0 URINARY TRACT INFECTION WITHOUT HEMATURIA, SITE UNSPECIFIED: ICD-10-CM

## 2024-01-11 DIAGNOSIS — E06.3 HASHIMOTO'S DISEASE: ICD-10-CM

## 2024-01-11 DIAGNOSIS — R73.9 HYPERGLYCEMIA: ICD-10-CM

## 2024-01-11 DIAGNOSIS — M17.12 ARTHRITIS OF LEFT KNEE: ICD-10-CM

## 2024-01-11 DIAGNOSIS — R53.83 FATIGUE, UNSPECIFIED TYPE: ICD-10-CM

## 2024-01-11 DIAGNOSIS — I10 HTN (HYPERTENSION), BENIGN: Primary | ICD-10-CM

## 2024-01-11 DIAGNOSIS — E53.8 B12 DEFICIENCY: ICD-10-CM

## 2024-01-11 DIAGNOSIS — I48.21 PERMANENT ATRIAL FIBRILLATION: ICD-10-CM

## 2024-01-11 DIAGNOSIS — I70.0 ATHEROSCLEROSIS OF AORTA: ICD-10-CM

## 2024-01-11 DIAGNOSIS — R51.9 NONINTRACTABLE HEADACHE, UNSPECIFIED CHRONICITY PATTERN, UNSPECIFIED HEADACHE TYPE: ICD-10-CM

## 2024-01-11 DIAGNOSIS — E78.5 HYPERLIPIDEMIA, UNSPECIFIED HYPERLIPIDEMIA TYPE: ICD-10-CM

## 2024-01-11 LAB
ALBUMIN/CREAT UR: NORMAL UG/MG (ref 0–30)
BILIRUB UR QL STRIP: NEGATIVE
CLARITY UR REFRACT.AUTO: CLEAR
COLOR UR AUTO: YELLOW
CREAT UR-MCNC: 38 MG/DL (ref 15–325)
GLUCOSE UR QL STRIP: NEGATIVE
HGB UR QL STRIP: NEGATIVE
KETONES UR QL STRIP: NEGATIVE
LEUKOCYTE ESTERASE UR QL STRIP: NEGATIVE
MICROALBUMIN UR DL<=1MG/L-MCNC: <5 UG/ML
NITRITE UR QL STRIP: NEGATIVE
PH UR STRIP: 5 [PH] (ref 5–8)
PROT UR QL STRIP: NEGATIVE
SP GR UR STRIP: 1.01 (ref 1–1.03)
URN SPEC COLLECT METH UR: NORMAL

## 2024-01-11 PROCEDURE — 99999 PR PBB SHADOW E&M-EST. PATIENT-LVL V: CPT | Mod: PBBFAC,HCNC,, | Performed by: INTERNAL MEDICINE

## 2024-01-11 PROCEDURE — 3288F FALL RISK ASSESSMENT DOCD: CPT | Mod: HCNC,CPTII,S$GLB, | Performed by: INTERNAL MEDICINE

## 2024-01-11 PROCEDURE — 3079F DIAST BP 80-89 MM HG: CPT | Mod: HCNC,CPTII,S$GLB, | Performed by: INTERNAL MEDICINE

## 2024-01-11 PROCEDURE — 87086 URINE CULTURE/COLONY COUNT: CPT | Mod: HCNC | Performed by: INTERNAL MEDICINE

## 2024-01-11 PROCEDURE — 3074F SYST BP LT 130 MM HG: CPT | Mod: HCNC,CPTII,S$GLB, | Performed by: INTERNAL MEDICINE

## 2024-01-11 PROCEDURE — 82043 UR ALBUMIN QUANTITATIVE: CPT | Mod: HCNC | Performed by: INTERNAL MEDICINE

## 2024-01-11 PROCEDURE — 1101F PT FALLS ASSESS-DOCD LE1/YR: CPT | Mod: HCNC,CPTII,S$GLB, | Performed by: INTERNAL MEDICINE

## 2024-01-11 PROCEDURE — 81003 URINALYSIS AUTO W/O SCOPE: CPT | Mod: HCNC | Performed by: INTERNAL MEDICINE

## 2024-01-11 PROCEDURE — 1157F ADVNC CARE PLAN IN RCRD: CPT | Mod: HCNC,CPTII,S$GLB, | Performed by: INTERNAL MEDICINE

## 2024-01-11 PROCEDURE — 99214 OFFICE O/P EST MOD 30 MIN: CPT | Mod: HCNC,S$GLB,, | Performed by: INTERNAL MEDICINE

## 2024-01-11 RX ORDER — ATORVASTATIN CALCIUM 40 MG/1
40 TABLET, FILM COATED ORAL DAILY
Qty: 90 TABLET | Refills: 3 | Status: SHIPPED | OUTPATIENT
Start: 2024-01-11

## 2024-01-11 RX ORDER — PANTOPRAZOLE SODIUM 40 MG/1
40 TABLET, DELAYED RELEASE ORAL DAILY
Qty: 90 TABLET | Refills: 3 | Status: SHIPPED | OUTPATIENT
Start: 2024-01-11

## 2024-01-11 RX ORDER — FUROSEMIDE 20 MG/1
TABLET ORAL
Qty: 200 TABLET | Refills: 3 | Status: SHIPPED | OUTPATIENT
Start: 2024-01-11 | End: 2024-04-09 | Stop reason: SDUPTHER

## 2024-01-11 NOTE — PROGRESS NOTES
Subjective:       Patient ID: Madan Bell is a 83 y.o. female.    Chief Complaint: Annual Exam    HPI  Pt is doing ok.  She still has some dizziness.  No CP or SOB. She probably needs her other knee replaced.    Review of Systems   Constitutional:  Negative for activity change and unexpected weight change.   HENT:  Negative for hearing loss, rhinorrhea and trouble swallowing.    Eyes:  Negative for discharge and visual disturbance.   Respiratory:  Negative for chest tightness, shortness of breath (PND or orthopnea) and wheezing.    Cardiovascular:  Negative for chest pain and palpitations.   Gastrointestinal:  Negative for abdominal pain, blood in stool, constipation, diarrhea, nausea and vomiting.   Endocrine: Negative for polydipsia and polyuria.   Genitourinary:  Negative for difficulty urinating, dysuria, hematuria and menstrual problem.   Musculoskeletal:  Positive for arthralgias. Negative for joint swelling and neck pain.   Neurological:  Negative for seizures, syncope, weakness and headaches.   Psychiatric/Behavioral:  Negative for confusion and dysphoric mood.        Objective:      Physical Exam  Constitutional:       General: She is not in acute distress.     Appearance: She is well-developed.   HENT:      Head: Normocephalic.   Eyes:      Pupils: Pupils are equal, round, and reactive to light.   Neck:      Thyroid: No thyromegaly.      Vascular: No JVD.   Cardiovascular:      Rate and Rhythm: Normal rate and regular rhythm.      Heart sounds: Normal heart sounds. No murmur heard.     No friction rub. No gallop.   Pulmonary:      Effort: Pulmonary effort is normal.      Breath sounds: Normal breath sounds. No wheezing or rales.   Abdominal:      General: Bowel sounds are normal. There is no distension.      Palpations: Abdomen is soft. There is no mass.      Tenderness: There is no abdominal tenderness. There is no guarding or rebound.   Musculoskeletal:      Cervical back: Neck supple.    Lymphadenopathy:      Cervical: No cervical adenopathy.   Skin:     General: Skin is warm and dry.   Neurological:      Mental Status: She is alert and oriented to person, place, and time.      Deep Tendon Reflexes: Reflexes are normal and symmetric.   Psychiatric:         Behavior: Behavior normal.         Thought Content: Thought content normal.         Judgment: Judgment normal.         Assessment:       1. HTN (hypertension), benign    2. Permanent atrial fibrillation    3. Hyperlipidemia, unspecified hyperlipidemia type    4. Hyperglycemia    5. Urinary tract infection without hematuria, site unspecified    6. Mild vitamin D deficiency    7. Hashimoto's disease    8. B12 deficiency    9. Fatigue, unspecified type    10. Screening mammogram, encounter for    11. Osteoporosis, unspecified osteoporosis type, unspecified pathological fracture presence    12. Nonintractable headache, unspecified chronicity pattern, unspecified headache type    13. Acute pain of right shoulder    14. Arthritis of left knee    15. Routine eye exam    16. Atherosclerosis of aorta        Plan:   HTN (hypertension), benign  -     CBC Auto Differential; Future; Expected date: 01/11/2024  -     Comprehensive Metabolic Panel; Future; Expected date: 01/11/2024  -     BNP; Future; Expected date: 01/11/2024  -     Microalbumin/Creatinine Ratio, Urine; Future; Expected date: 01/11/2024    Permanent atrial fibrillation  Rate controlled on apixaban  Hyperlipidemia, unspecified hyperlipidemia type  -     Lipid Panel; Future; Expected date: 01/11/2024    Hyperglycemia  -     Hemoglobin A1C; Future; Expected date: 01/11/2024    Urinary tract infection without hematuria, site unspecified  -     Urinalysis; Future; Expected date: 01/11/2024  -     Urine culture; Future; Expected date: 01/11/2024    Mild vitamin D deficiency  -     Vitamin D; Future; Expected date: 01/11/2024    Hashimoto's disease  -     TSH; Future; Expected date: 01/11/2024    B12  deficiency  -     Vitamin B12; Future; Expected date: 02/11/2024  -     Methylmalonic Acid, Serum; Future; Expected date: 01/11/2024    Fatigue, unspecified type  -     Sedimentation rate; Future; Expected date: 01/11/2024  -     C-Reactive Protein; Future; Expected date: 01/11/2024    Screening mammogram, encounter for  -     Mammo Digital Screening Bilat w/ Roberto; Future; Expected date: 07/11/2024    Osteoporosis, unspecified osteoporosis type, unspecified pathological fracture presence  -     DXA Bone Density Axial Skeleton 1 or more sites; Future; Expected date: 01/11/2024    Nonintractable headache, unspecified chronicity pattern, unspecified headache type  -     CT Head Without Contrast; Future; Expected date: 01/11/2024    Acute pain of right shoulder  -     X-Ray Shoulder Trauma 3 view Right; Future; Expected date: 01/11/2024  -     Ambulatory referral/consult to Sports Medicine; Future; Expected date: 01/18/2024    Arthritis of left knee  -     Ambulatory referral/consult to Orthopedics; Future; Expected date: 01/18/2024    Routine eye exam  -     Ambulatory referral/consult to Optometry; Future; Expected date: 01/18/2024    Atherosclerosis of aorta  On statin  Other orders  -     apixaban (ELIQUIS) 5 mg Tab; Take 1 tablet (5 mg total) by mouth 2 (two) times daily.  Dispense: 180 tablet; Refill: 3  -     furosemide (LASIX) 20 MG tablet; One tablet by mouth BID with additional tablet Monday and Thursday  Dispense: 200 tablet; Refill: 3  -     atorvastatin (LIPITOR) 40 MG tablet; Take 1 tablet (40 mg total) by mouth once daily.  Dispense: 90 tablet; Refill: 3  -     pantoprazole (PROTONIX) 40 MG tablet; Take 1 tablet (40 mg total) by mouth once daily.  Dispense: 90 tablet;

## 2024-01-12 LAB — BACTERIA UR CULT: NO GROWTH

## 2024-01-16 ENCOUNTER — HOSPITAL ENCOUNTER (OUTPATIENT)
Dept: RADIOLOGY | Facility: HOSPITAL | Age: 84
Discharge: HOME | End: 2024-01-16
Attending: INTERNAL MEDICINE
Payer: MEDICARE

## 2024-01-16 ENCOUNTER — OFFICE VISIT (OUTPATIENT)
Dept: SPORTS MEDICINE | Facility: CLINIC | Age: 84
End: 2024-01-16
Payer: MEDICARE

## 2024-01-16 VITALS — WEIGHT: 142 LBS | BODY MASS INDEX: 21.03 KG/M2 | HEIGHT: 69 IN

## 2024-01-16 DIAGNOSIS — M25.511 CHRONIC RIGHT SHOULDER PAIN: Primary | ICD-10-CM

## 2024-01-16 DIAGNOSIS — M25.511 ACUTE PAIN OF RIGHT SHOULDER: ICD-10-CM

## 2024-01-16 DIAGNOSIS — M17.12 ARTHRITIS OF LEFT KNEE: ICD-10-CM

## 2024-01-16 DIAGNOSIS — G89.29 CHRONIC RIGHT SHOULDER PAIN: Primary | ICD-10-CM

## 2024-01-16 DIAGNOSIS — R51.9 NONINTRACTABLE HEADACHE, UNSPECIFIED CHRONICITY PATTERN, UNSPECIFIED HEADACHE TYPE: ICD-10-CM

## 2024-01-16 PROCEDURE — 1157F ADVNC CARE PLAN IN RCRD: CPT | Mod: HCNC,CPTII,S$GLB, | Performed by: ORTHOPAEDIC SURGERY

## 2024-01-16 PROCEDURE — 1101F PT FALLS ASSESS-DOCD LE1/YR: CPT | Mod: HCNC,CPTII,S$GLB, | Performed by: ORTHOPAEDIC SURGERY

## 2024-01-16 PROCEDURE — 20610 DRAIN/INJ JOINT/BURSA W/O US: CPT | Mod: HCNC,RT,S$GLB, | Performed by: ORTHOPAEDIC SURGERY

## 2024-01-16 PROCEDURE — 99203 OFFICE O/P NEW LOW 30 MIN: CPT | Mod: HCNC,25,S$GLB, | Performed by: ORTHOPAEDIC SURGERY

## 2024-01-16 PROCEDURE — 73030 X-RAY EXAM OF SHOULDER: CPT | Mod: TC,RT

## 2024-01-16 PROCEDURE — 73030 X-RAY EXAM OF SHOULDER: CPT | Mod: 26,RT,, | Performed by: RADIOLOGY

## 2024-01-16 PROCEDURE — 70450 CT HEAD/BRAIN W/O DYE: CPT | Mod: 26,,, | Performed by: RADIOLOGY

## 2024-01-16 PROCEDURE — 1125F AMNT PAIN NOTED PAIN PRSNT: CPT | Mod: HCNC,CPTII,S$GLB, | Performed by: ORTHOPAEDIC SURGERY

## 2024-01-16 PROCEDURE — 1159F MED LIST DOCD IN RCRD: CPT | Mod: HCNC,CPTII,S$GLB, | Performed by: ORTHOPAEDIC SURGERY

## 2024-01-16 PROCEDURE — 70450 CT HEAD/BRAIN W/O DYE: CPT | Mod: TC

## 2024-01-16 PROCEDURE — 3288F FALL RISK ASSESSMENT DOCD: CPT | Mod: HCNC,CPTII,S$GLB, | Performed by: ORTHOPAEDIC SURGERY

## 2024-01-16 PROCEDURE — 99999 PR PBB SHADOW E&M-EST. PATIENT-LVL IV: CPT | Mod: PBBFAC,HCNC,, | Performed by: ORTHOPAEDIC SURGERY

## 2024-01-16 RX ADMIN — TRIAMCINOLONE ACETONIDE 40 MG: 40 INJECTION, SUSPENSION INTRA-ARTICULAR; INTRAMUSCULAR at 02:01

## 2024-01-16 NOTE — PROGRESS NOTES
CC: RIGHT shoulder pain     83 y.o. Female with a  history of right shoulder atraumatic pain. Patient lives independently and is RHD. She states that over the last 3 months she has experienced increased right shoulder pain. She denies any fall or trauma. She has noticed pain with activity as well as at rest. She has night pain. She has not tried any injections or PT in the past.      She states that the pain is severe and not responding to any conservative care.      She reports that the pain and weakness is worse with overhead activity. It also bothers her at night.    Is affecting ADLs.  Pain is 6/10 at it's worst.      Past Medical History:   Diagnosis Date    A-fib     Anticoagulant long-term use     Anxiety     Arrhythmia     Arthritis     knees    Basal cell carcinoma     Breast cyst     Community acquired pneumonia of right lower lobe of lung 12/11/2018    Deep vein thrombosis     one DVT during  pregnancy    Encephalopathy 2/10/2021    Fibrocystic breast 1980 - ??    Heart murmur     Hypercholesterolemia 6/13/2017    Hyperlipidemia     Hypertension     Hypothyroidism     Mitral valve disorder     Mitral valve prolapse     PVC's (premature ventricular contractions)     Squamous cell carcinoma bx 7-2017    right upper forehead    Thyroid cancer 1/29/2013    Thyroid disease     Vasovagal near syncope 11/12/2012    Vasovagal near syncope 11/12/2012    VTE (venous thromboembolism)     in 1960s, post partum, pt unsure of details     Weakness 12/11/2018       Past Surgical History:   Procedure Laterality Date    BREAST BIOPSY Left     excisional    BREAST BIOPSY Left     core needle biopsy    BREAST CYST ASPIRATION Bilateral 1980's - ??    BREAST CYST EXCISION Right 2008 - ??    CARDIOVERSION N/A 08/13/2018    Procedure: CARDIOVERSION;  Surgeon: Fernando Disla MD;  Location: [facility] CATH LAB;  Service: Cardiology;  Laterality: N/A;  AF,DCCV,ANAHI,MAC,FAS,3 PREP    CATARACT EXTRACTION W/  INTRAOCULAR LENS IMPLANT  Left 04/22/2021    Procedure: EXTRACTION, CATARACT, WITH IOL INSERTION;  Surgeon: Gabby Sandoval MD;  Location: [facility] OR;  Service: Ophthalmology;  Laterality: Left;    CATARACT EXTRACTION W/  INTRAOCULAR LENS IMPLANT Right 05/17/2021    Procedure: EXTRACTION, CATARACT, WITH IOL INSERTION;  Surgeon: Gabby Sandoval MD;  Location: [facility] OR;  Service: Ophthalmology;  Laterality: Right;    HYSTERECTOMY      tahbso    KNEE ARTHROPLASTY Right 06/14/2021    Procedure: ARTHROPLASTY, KNEE:RIGHT:DEPUY-SIGMA;  Surgeon: Tashi Perkins III, MD;  Location: [facility] OR;  Service: Orthopedics;  Laterality: Right;    OOPHORECTOMY  hysterectomy - 2000 - ??    THYROID SURGERY         Family History   Problem Relation Age of Onset    Arrhythmia Son     Mitral valve prolapse Son     Sudden death Son         sudden cardiac death    Heart disease Mother     Hyperlipidemia Maternal Grandmother     No Known Problems Daughter     No Known Problems Son     No Known Problems Son     Breast cancer Maternal Aunt     Melanoma Neg Hx          Current Outpatient Medications:     alendronate (FOSAMAX) 70 MG tablet, TAKE 1 TABLET EVERY 7 DAYS, Disp: 12 tablet, Rfl: 3    amiodarone (PACERONE) 200 MG Tab, Take 1 tablet twice daily for 4 weeks then take 1/2 tablet daily thereafter, Disp: 90 tablet, Rfl: 3    apixaban (ELIQUIS) 5 mg Tab, Take 1 tablet (5 mg total) by mouth 2 (two) times daily., Disp: 180 tablet, Rfl: 3    ascorbic acid, vitamin C, (VITAMIN C) 1000 MG tablet, Take 1,000 mg by mouth once daily. Take the last dose before surgery 6/6, Disp: , Rfl:     atorvastatin (LIPITOR) 40 MG tablet, Take 1 tablet (40 mg total) by mouth once daily., Disp: 90 tablet, Rfl: 3    bisoprolol-hydrochlorothiazide 2.5-6.25 mg (ZIAC) 2.5-6.25 mg Tab, TAKE 1/2 TABLET EVERY DAY, Disp: 45 tablet, Rfl: 0    calcium citrate (CALCITRATE) 200 mg (950 mg) tablet, Take 1 tablet by mouth once daily. Take the last dose before surgery 6/6, Disp: , Rfl:     cholecalciferol,  vitamin D3, (VITAMIN D3) 50 mcg (2,000 unit) Cap, Take by mouth every morning. Take the last dose before surgery 6/6, Disp: , Rfl:     cranberry extract 200 mg Cap, Take 200 mg by mouth once daily., Disp: 30 capsule, Rfl: 12    cyanocobalamin (VITAMIN B-12) 1000 MCG tablet, Take 100 mcg by mouth every evening. Take the last dose before surgery 6/6, Disp: , Rfl:     D-MANNOSE ORAL, Take 4 tablets by mouth every morning. Take the last dose before surgery 6/6, Disp: , Rfl:     estradioL (ESTRACE) 0.01 % (0.1 mg/gram) vaginal cream, APPLY A PEA SIZED AMOUNT DAILY FOR TWO WEEKS THEN THREE TIMES A WEEK. 90 day supply, Disp: 42.5 g, Rfl: 4    furosemide (LASIX) 20 MG tablet, One tablet by mouth BID with additional tablet Monday and Thursday, Disp: 200 tablet, Rfl: 3    ketoconazole (NIZORAL) 2 % cream, AAA qd- bid prn flare, Disp: 60 g, Rfl: 3    Lactobac no.30-Bifidobact no.4 (ULTIMATE NICKOLAS PROBIOTIC) 30 billion cell CpDR, Take 1 tablet by mouth every morning. Hold 7 days prior to surgery, Disp: , Rfl:     levothyroxine (SYNTHROID) 88 MCG tablet, TAKE 1 TABLET (88 MCG TOTAL) BY MOUTH BEFORE BREAKFAST., Disp: 90 tablet, Rfl: 2    omega-3 fatty acids-vitamin E 1,000 mg Cap, Take 1 capsule by mouth every evening. Take the last dose before surgery 6/6, Disp: , Rfl:     pantoprazole (PROTONIX) 40 MG tablet, Take 1 tablet (40 mg total) by mouth once daily., Disp: 90 tablet, Rfl: 3    VITAMIN B COMPLEX ORAL, Take 1 tablet by mouth every evening. Take the last dose before surgery 6/6, Disp: , Rfl:     vitamin E 400 UNIT capsule, Take 400 Units by mouth once daily. Take the last dose before surgery 6/6, Disp: , Rfl:     benzocaine-menthoL 15-3.6 mg Lozg, 1 lozenge by Mucous Membrane route 3 (three) times daily as needed., Disp: 18 lozenge, Rfl: 0    fluticasone propionate (FLONASE) 50 mcg/actuation nasal spray, 1 spray (50 mcg total) by Each Nostril route once daily., Disp: 16 g, Rfl: 0    Review of patient's allergies indicates:  "  Allergen Reactions    Fluoride Hives     Other reaction(s): Hives    Fluoride preparations Hives    Amoxicillin-pot clavulanate Diarrhea    Nitrofurantoin monohyd/m-cryst Nausea Only and Other (See Comments)          REVIEW OF SYSTEMS:  Constitution: Negative. Negative for chills, fever and night sweats.   HENT: Negative for congestion and headaches.    Eyes: Negative for blurred vision, left vision loss and right vision loss.   Cardiovascular: Negative for chest pain and syncope.   Respiratory: Negative for cough and shortness of breath.    Endocrine: Negative for polydipsia, polyphagia and polyuria.   Hematologic/Lymphatic: Negative for bleeding problem. Does not bruise/bleed easily.   Skin: Negative for dry skin, itching and rash.   Musculoskeletal: Negative for falls.  Positive for right shoulder pain and muscle weakness.   Gastrointestinal: Negative for abdominal pain and bowel incontinence.   Genitourinary: Negative for bladder incontinence and nocturia.   Neurological: Negative for disturbances in coordination, loss of balance and seizures.   Psychiatric/Behavioral: Negative for depression. The patient does not have insomnia.    Allergic/Immunologic: Negative for hives and persistent infections.      PHYSICAL EXAMINATION:  Vitals:  Ht 5' 9" (1.753 m)   Wt 64.4 kg (142 lb)   LMP  (LMP Unknown)   BMI 20.97 kg/m²    General: The patient is alert and oriented x 3.  Mood is pleasant.  Observation of ears, eyes and nose reveal no gross abnormalities.  No labored breathing observed.  Gait is coordinated. Patient can toe walk and heel walk without difficulty.      RIGHT SHOULDER / UPPER EXTREMITY EXAM    OBSERVATION:     Swelling  none  Deformity  none   Discoloration  none   Scapular winging none   Scars   none  Atrophy  none    TENDERNESS / CREPITUS (T/C):          T/C      T/C   Clavicle   -/-  SUPRAspinatus    -/-     AC Jt.    -/-  INFRAspinatus  -/-    SC Jt.    -/-  Deltoid    -/-      G. " Tuberosity  -/-  LH BICEP groove  -/-   Acromion:  -/-  Midline Neck   -/-     Scapular Spine -/-  Trapezium   -/-   SMA Scapula  -/-  GH jt. line - post  -/-     Scapulothoracic  -/-         ROM: (* = with pain)  Left shoulder   Right shoulder        AROM (PROM)   AROM (PROM)   FE    160° (160°)     80° (150)     ER at 0°    60°  (65°)    60°  (65°)   ER at 90° ABD  90°  (90°)    90°  (90°)   IR at 90°  ABD   NA  (40°)     NA  (40°)      IR (spine level)   T10     T10    STRENGTH: (* = with pain) Left shoulder   Right shoulder   SCAPTION   5/5    4/5*    IR    5/5    5/5   ER    5/5    4/5*   BICEPS   5/5    5/5   Deltoid    5/5    5/5     SIGNS:  Painful side       NEER   +   OANABELAS  neg    DOWNING   +    SPEEDS  neg     DROP ARM   -   BELLY PRESS neg   Superior escape none    LIFT-OFF  neg   X-Body ADD    neg    MOVING VALGUS neg        STABILITY TESTING    Left shoulder   Right shoulder    Translation     Anterior  up face     up face    Posterior  up face    up face    Sulcus   < 10mm    < 10 mm     Signs   Apprehension   neg      neg       Relocation   no change     no change      Jerk test  neg     neg    EXTREMITY NEURO-VASCULAR EXAM:    Sensation grossly intact to light touch all dermatomal regions.    DTR 2+ Biceps, Triceps, BR and Negative Chepes sign   Grossly intact motor function at Elbow, Wrist and Hand   Distal pulses radial and ulnar 2+, brisk cap refill, symmetric.      NECK:  Painless FROM and spinous processes non-tender. Negative Spurlings sign.      OTHER FINDINGS:  neg scapular dyskinesia    XRAYS:  Xrays including AP, Outlet and Axillary Lateral of shoulder are ordered / images reviewed by me:   No fracture dislocation or other pathology   Acromion type one   Proximal migration of humeral head: Positive   GH arthritis: Moderate glenohumeral arthritis, with moderate AC joint arthritis           ASSESSMENT:   Right shoulder pain:  1. Arthritis of left knee        PLAN:      1. CSI  Right Shoulder  2. Follow up in clinic in 3 months    All questions were answered, patient will contact us for questions or concerns in the interim.

## 2024-01-18 RX ORDER — TRIAMCINOLONE ACETONIDE 40 MG/ML
40 INJECTION, SUSPENSION INTRA-ARTICULAR; INTRAMUSCULAR
Status: DISCONTINUED | OUTPATIENT
Start: 2024-01-16 | End: 2024-01-18 | Stop reason: HOSPADM

## 2024-01-18 NOTE — PROCEDURES
Large Joint Aspiration/Injection: R subacromial bursa    Date/Time: 1/16/2024 2:45 PM    Performed by: Aftab Bello MD  Authorized by: Aftab Bello MD    Consent Done?:  Yes (Verbal)  Indications:  Pain  Site marked: the procedure site was marked    Timeout: prior to procedure the correct patient, procedure, and site was verified    Prep: patient was prepped and draped in usual sterile fashion    Local anesthesia used?: No      Details:  Needle Size:  22 G  Ultrasonic Guidance for needle placement?: No    Approach:  Posterior  Location:  Shoulder  Site:  R subacromial bursa  Medications:  40 mg triamcinolone acetonide 40 mg/mL  Patient tolerance:  Patient tolerated the procedure well with no immediate complications

## 2024-01-31 ENCOUNTER — PATIENT MESSAGE (OUTPATIENT)
Dept: ELECTROPHYSIOLOGY | Facility: CLINIC | Age: 84
End: 2024-01-31
Payer: MEDICARE

## 2024-02-02 ENCOUNTER — PATIENT MESSAGE (OUTPATIENT)
Dept: INTERNAL MEDICINE | Facility: CLINIC | Age: 84
End: 2024-02-02
Payer: MEDICARE

## 2024-02-06 ENCOUNTER — PATIENT MESSAGE (OUTPATIENT)
Dept: ELECTROPHYSIOLOGY | Facility: CLINIC | Age: 84
End: 2024-02-06
Payer: MEDICARE

## 2024-02-06 ENCOUNTER — PATIENT MESSAGE (OUTPATIENT)
Dept: OTHER | Facility: OTHER | Age: 84
End: 2024-02-06
Payer: MEDICARE

## 2024-02-10 ENCOUNTER — PATIENT MESSAGE (OUTPATIENT)
Dept: INTERNAL MEDICINE | Facility: CLINIC | Age: 84
End: 2024-02-10
Payer: MEDICARE

## 2024-02-12 NOTE — TELEPHONE ENCOUNTER
Pt daughter states pt was taken off of Amiodaron, she continues to have heavy heavy head where she cannot lift her head off a pillow without nausea and the room spinning, she would like to know if there is another medication her mother can use safely to ease this for her.

## 2024-02-16 ENCOUNTER — TELEPHONE (OUTPATIENT)
Dept: INTERNAL MEDICINE | Facility: CLINIC | Age: 84
End: 2024-02-16

## 2024-02-16 RX ORDER — MECLIZINE HCL 12.5 MG 12.5 MG/1
TABLET ORAL
Qty: 20 TABLET | Refills: 0 | Status: SHIPPED | OUTPATIENT
Start: 2024-02-16 | End: 2024-04-25

## 2024-02-16 RX ORDER — MECLIZINE HCL 12.5 MG 12.5 MG/1
TABLET ORAL
Qty: 20 TABLET | Refills: 0 | Status: SHIPPED | OUTPATIENT
Start: 2024-02-16 | End: 2024-02-16 | Stop reason: SDUPTHER

## 2024-02-26 RX ORDER — BISOPROLOL FUMARATE AND HYDROCHLOROTHIAZIDE 2.5; 6.25 MG/1; MG/1
TABLET ORAL
Qty: 45 TABLET | Refills: 3 | Status: SHIPPED | OUTPATIENT
Start: 2024-02-26

## 2024-02-26 NOTE — TELEPHONE ENCOUNTER
Refill Decision Note   Madan Bell  is requesting a refill authorization.  Brief Assessment and Rationale for Refill:  Approve     Medication Therapy Plan:        Comments:     Note composed:1:18 PM 02/26/2024

## 2024-02-26 NOTE — TELEPHONE ENCOUNTER
Care Due:                  Date            Visit Type   Department     Provider  --------------------------------------------------------------------------------                                MYCHART                              FOLLOWUP/OF  [facility] INTERNAL  Last Visit: 01-      FICE VISIT   MEDICINE       Sheila Mcgee  Next Visit: None Scheduled  None         None Found                                                            Last  Test          Frequency    Reason                     Performed    Due Date  --------------------------------------------------------------------------------    Mg Level....  12 months..  alendronate..............  Not Found    Overdue    Phosphate...  12 months..  alendronate..............  Not Found    Overdue    [employer] Embedded Care Due Messages. Reference number: [id number].   2/26/2024 11:00:30 AM CST

## 2024-02-27 ENCOUNTER — PATIENT MESSAGE (OUTPATIENT)
Dept: INTERNAL MEDICINE | Facility: CLINIC | Age: 84
End: 2024-02-27
Payer: MEDICARE

## 2024-03-19 ENCOUNTER — PATIENT MESSAGE (OUTPATIENT)
Dept: INTERNAL MEDICINE | Facility: CLINIC | Age: 84
End: 2024-03-19
Payer: MEDICARE

## 2024-03-19 ENCOUNTER — TELEPHONE (OUTPATIENT)
Dept: INTERNAL MEDICINE | Facility: CLINIC | Age: 84
End: 2024-03-19
Payer: MEDICARE

## 2024-03-19 DIAGNOSIS — M54.9 BACK PAIN, UNSPECIFIED BACK LOCATION, UNSPECIFIED BACK PAIN LATERALITY, UNSPECIFIED CHRONICITY: Primary | ICD-10-CM

## 2024-03-22 ENCOUNTER — OFFICE VISIT (OUTPATIENT)
Dept: INTERNAL MEDICINE | Facility: CLINIC | Age: 84
End: 2024-03-22
Payer: MEDICARE

## 2024-03-22 ENCOUNTER — HOSPITAL ENCOUNTER (OUTPATIENT)
Dept: RADIOLOGY | Facility: HOSPITAL | Age: 84
Discharge: HOME | End: 2024-03-22
Attending: INTERNAL MEDICINE
Payer: MEDICARE

## 2024-03-22 VITALS
HEART RATE: 81 BPM | OXYGEN SATURATION: 95 % | BODY MASS INDEX: 21.02 KG/M2 | HEIGHT: 68 IN | WEIGHT: 138.69 LBS | DIASTOLIC BLOOD PRESSURE: 80 MMHG | RESPIRATION RATE: 18 BRPM | SYSTOLIC BLOOD PRESSURE: 108 MMHG

## 2024-03-22 DIAGNOSIS — M54.9 DORSALGIA, UNSPECIFIED: Primary | ICD-10-CM

## 2024-03-22 DIAGNOSIS — M54.9 BACK PAIN, UNSPECIFIED BACK LOCATION, UNSPECIFIED BACK PAIN LATERALITY, UNSPECIFIED CHRONICITY: ICD-10-CM

## 2024-03-22 PROCEDURE — 3288F FALL RISK ASSESSMENT DOCD: CPT | Mod: HCNC,CPTII,S$GLB, | Performed by: INTERNAL MEDICINE

## 2024-03-22 PROCEDURE — 73502 X-RAY EXAM HIP UNI 2-3 VIEWS: CPT | Mod: TC,HCNC,PO,RT

## 2024-03-22 PROCEDURE — 3079F DIAST BP 80-89 MM HG: CPT | Mod: HCNC,CPTII,S$GLB, | Performed by: INTERNAL MEDICINE

## 2024-03-22 PROCEDURE — 99999 PR PBB SHADOW E&M-EST. PATIENT-LVL V: CPT | Mod: PBBFAC,HCNC,, | Performed by: INTERNAL MEDICINE

## 2024-03-22 PROCEDURE — 72100 X-RAY EXAM L-S SPINE 2/3 VWS: CPT | Mod: TC,HCNC,PO

## 2024-03-22 PROCEDURE — 99213 OFFICE O/P EST LOW 20 MIN: CPT | Mod: HCNC,S$GLB,, | Performed by: INTERNAL MEDICINE

## 2024-03-22 PROCEDURE — 73502 X-RAY EXAM HIP UNI 2-3 VIEWS: CPT | Mod: 26,HCNC,RT, | Performed by: RADIOLOGY

## 2024-03-22 PROCEDURE — 1157F ADVNC CARE PLAN IN RCRD: CPT | Mod: HCNC,CPTII,S$GLB, | Performed by: INTERNAL MEDICINE

## 2024-03-22 PROCEDURE — 72100 X-RAY EXAM L-S SPINE 2/3 VWS: CPT | Mod: 26,HCNC,, | Performed by: RADIOLOGY

## 2024-03-22 PROCEDURE — 3074F SYST BP LT 130 MM HG: CPT | Mod: HCNC,CPTII,S$GLB, | Performed by: INTERNAL MEDICINE

## 2024-03-22 PROCEDURE — 1101F PT FALLS ASSESS-DOCD LE1/YR: CPT | Mod: HCNC,CPTII,S$GLB, | Performed by: INTERNAL MEDICINE

## 2024-03-22 PROCEDURE — 1125F AMNT PAIN NOTED PAIN PRSNT: CPT | Mod: HCNC,CPTII,S$GLB, | Performed by: INTERNAL MEDICINE

## 2024-03-22 PROCEDURE — 1159F MED LIST DOCD IN RCRD: CPT | Mod: HCNC,CPTII,S$GLB, | Performed by: INTERNAL MEDICINE

## 2024-03-22 NOTE — PROGRESS NOTES
Subjective:       Patient ID: Madan Bell is a 84 y.o. female.    Chief Complaint: Low-back Pain and Hip Pain (Right hi pain )    Low-back Pain  Pertinent negatives include no abdominal pain, chest pain (arm pain or jaw pain), headaches, nausea or vomiting.   Hip Pain     She has had about one week of low R sided back pain.  No numbness or weakness - no bowel or bladder incontinence.  Pain worsens when she move sin bed or goes to get out of the car.  Films show no fracture but significant anterolisthesis.  She still has significant dizziness.    Review of Systems   Respiratory:  Negative for shortness of breath (PND or orthopnea).    Cardiovascular:  Negative for chest pain (arm pain or jaw pain).   Gastrointestinal:  Negative for abdominal pain, diarrhea, nausea and vomiting.   Genitourinary:  Negative for dysuria.   Musculoskeletal:  Positive for back pain.   Neurological:  Positive for dizziness. Negative for seizures, syncope and headaches.       Objective:      Physical Exam  Constitutional:       General: She is not in acute distress.     Appearance: She is well-developed.   HENT:      Head: Normocephalic.   Eyes:      Pupils: Pupils are equal, round, and reactive to light.   Neck:      Thyroid: No thyromegaly.      Vascular: No JVD.   Cardiovascular:      Rate and Rhythm: Normal rate and regular rhythm.      Heart sounds: Normal heart sounds. No murmur heard.     No friction rub. No gallop.   Pulmonary:      Effort: Pulmonary effort is normal.      Breath sounds: Normal breath sounds. No wheezing or rales.   Abdominal:      General: Bowel sounds are normal. There is no distension.      Palpations: Abdomen is soft. There is no mass.      Tenderness: There is no abdominal tenderness. There is no guarding or rebound.   Musculoskeletal:         General: No swelling.      Cervical back: Neck supple.      Comments: 4/5 strength in RLE, 5/5 strength in LLE, 1+reflexes at knees and trace at ankles  bilaterally, positive SLR on the right   Lymphadenopathy:      Cervical: No cervical adenopathy.   Skin:     General: Skin is warm and dry.   Neurological:      Mental Status: She is alert and oriented to person, place, and time.      Deep Tendon Reflexes: Reflexes are normal and symmetric.   Psychiatric:         Behavior: Behavior normal.         Thought Content: Thought content normal.         Judgment: Judgment normal.         Assessment:       1. Dorsalgia, unspecified        Plan:   Dorsalgia, unspecified  -     MRI Lumbar Spine Without Contrast; Future; Expected date: 03/22/2024  -     Ambulatory referral/consult to [facility]; Future; Expected date: 03/29/2024  -     X-Ray Lumbar Spine Flexion And Extension Only; Future; Expected date: 03/22/2024  -     Urinalysis; Future; Expected date: 03/22/2024  -     Urine culture; Future; Expected date: 03/22/2024

## 2024-03-25 ENCOUNTER — PATIENT MESSAGE (OUTPATIENT)
Dept: ORTHOPEDICS | Facility: CLINIC | Age: 84
End: 2024-03-25
Payer: MEDICARE

## 2024-03-27 ENCOUNTER — PATIENT MESSAGE (OUTPATIENT)
Dept: INTERNAL MEDICINE | Facility: CLINIC | Age: 84
End: 2024-03-27
Payer: MEDICARE

## 2024-04-03 ENCOUNTER — PATIENT MESSAGE (OUTPATIENT)
Dept: CARDIOLOGY | Facility: CLINIC | Age: 84
End: 2024-04-03
Payer: MEDICARE

## 2024-04-04 DIAGNOSIS — I10 HTN (HYPERTENSION), BENIGN: Primary | ICD-10-CM

## 2024-04-05 ENCOUNTER — CLINICAL SUPPORT (OUTPATIENT)
Dept: CARDIOLOGY | Facility: HOSPITAL | Age: 84
End: 2024-04-05
Attending: INTERNAL MEDICINE
Payer: MEDICARE

## 2024-04-05 DIAGNOSIS — I49.3 PVC'S (PREMATURE VENTRICULAR CONTRACTIONS): ICD-10-CM

## 2024-04-05 PROCEDURE — 93226 XTRNL ECG REC<48 HR SCAN A/R: CPT | Mod: HCNC

## 2024-04-05 PROCEDURE — 93227 XTRNL ECG REC<48 HR R&I: CPT | Mod: HCNC,,, | Performed by: INTERNAL MEDICINE

## 2024-04-08 ENCOUNTER — PATIENT MESSAGE (OUTPATIENT)
Dept: CARDIOLOGY | Facility: CLINIC | Age: 84
End: 2024-04-08
Payer: MEDICARE

## 2024-04-09 ENCOUNTER — PATIENT MESSAGE (OUTPATIENT)
Dept: CARDIOLOGY | Facility: CLINIC | Age: 84
End: 2024-04-09
Payer: MEDICARE

## 2024-04-09 DIAGNOSIS — I50.9 CONGESTIVE HEART FAILURE, UNSPECIFIED HF CHRONICITY, UNSPECIFIED HEART FAILURE TYPE: Primary | ICD-10-CM

## 2024-04-09 DIAGNOSIS — R79.89 ELEVATED BRAIN NATRIURETIC PEPTIDE (BNP) LEVEL: ICD-10-CM

## 2024-04-09 DIAGNOSIS — E87.6 HYPOKALEMIA: ICD-10-CM

## 2024-04-09 RX ORDER — POTASSIUM CHLORIDE 750 MG/1
TABLET, EXTENDED RELEASE ORAL
Qty: 119 TABLET | Refills: 3 | Status: SHIPPED | OUTPATIENT
Start: 2024-04-09

## 2024-04-09 RX ORDER — FUROSEMIDE 20 MG/1
TABLET ORAL
Qty: 247 TABLET | Refills: 3 | Status: SHIPPED | OUTPATIENT
Start: 2024-04-09

## 2024-04-09 NOTE — TELEPHONE ENCOUNTER
as per lab results note:    Your results look fine and do not require any change in treatment. Let's take 3 more Furosimide per week and KCL 10 meq 3 per day for 5 days , 2 per day for a week then one daily and chem 7 BNP in 5 weeks  --------------    Spoke w. pt. She is currently taking 16 furosemide 20 mg pills a week (bid and an extra one 2 days a week). She was instructed on now taking 19 pills a week or take 2/day and an extra one 5 days a week. She was updated also on the KCl regimen and told that I will also send her instructions through my [facility]. Labs scheduled for 5/15 and she agreed to date/time of appointment(s).

## 2024-04-10 LAB
OHS CV EVENT MONITOR DAY: 0
OHS CV HOLTER LENGTH DECIMAL HOURS: 24
OHS CV HOLTER LENGTH HOURS: 24
OHS CV HOLTER LENGTH MINUTES: 0
OHS CV HOLTER SINUS AVERAGE HR: 77
OHS CV HOLTER SINUS MAX HR: 141
OHS CV HOLTER SINUS MIN HR: 33

## 2024-04-11 ENCOUNTER — PATIENT MESSAGE (OUTPATIENT)
Dept: ELECTROPHYSIOLOGY | Facility: CLINIC | Age: 84
End: 2024-04-11
Payer: MEDICARE

## 2024-04-12 DIAGNOSIS — M17.12 PRIMARY OSTEOARTHRITIS OF LEFT KNEE: ICD-10-CM

## 2024-04-12 DIAGNOSIS — Z96.651 STATUS POST RIGHT KNEE REPLACEMENT: Primary | ICD-10-CM

## 2024-04-15 ENCOUNTER — HOSPITAL ENCOUNTER (OUTPATIENT)
Dept: RADIOLOGY | Facility: HOSPITAL | Age: 84
Discharge: HOME | End: 2024-04-15
Attending: INTERNAL MEDICINE
Payer: MEDICARE

## 2024-04-15 DIAGNOSIS — M54.9 DORSALGIA, UNSPECIFIED: ICD-10-CM

## 2024-04-15 DIAGNOSIS — R31.29 MICROSCOPIC HEMATURIA: ICD-10-CM

## 2024-04-15 PROCEDURE — 72148 MRI LUMBAR SPINE W/O DYE: CPT | Mod: 26,HCNC,, | Performed by: RADIOLOGY

## 2024-04-15 PROCEDURE — 72120 X-RAY BEND ONLY L-S SPINE: CPT | Mod: 26,HCNC,, | Performed by: INTERNAL MEDICINE

## 2024-04-15 PROCEDURE — 72120 X-RAY BEND ONLY L-S SPINE: CPT | Mod: TC,HCNC

## 2024-04-15 PROCEDURE — 72148 MRI LUMBAR SPINE W/O DYE: CPT | Mod: TC,HCNC

## 2024-04-15 PROCEDURE — 74178 CT ABD&PLV WO CNTR FLWD CNTR: CPT | Mod: 26,HCNC,, | Performed by: RADIOLOGY

## 2024-04-15 PROCEDURE — 74178 CT ABD&PLV WO CNTR FLWD CNTR: CPT | Mod: TC,HCNC

## 2024-04-15 PROCEDURE — 25500020 PHARM REV CODE 255: Mod: HCNC | Performed by: INTERNAL MEDICINE

## 2024-04-15 RX ADMIN — IOHEXOL 75 ML: 350 INJECTION, SOLUTION INTRAVENOUS at 05:04

## 2024-04-25 ENCOUNTER — HOSPITAL ENCOUNTER (OUTPATIENT)
Dept: RADIOLOGY | Facility: HOSPITAL | Age: 84
Discharge: HOME | End: 2024-04-25
Attending: ORTHOPAEDIC SURGERY
Payer: MEDICARE

## 2024-04-25 ENCOUNTER — OFFICE VISIT (OUTPATIENT)
Dept: ORTHOPEDICS | Facility: CLINIC | Age: 84
End: 2024-04-25
Payer: MEDICARE

## 2024-04-25 VITALS — BODY MASS INDEX: 22.48 KG/M2 | HEIGHT: 65 IN | WEIGHT: 134.94 LBS

## 2024-04-25 DIAGNOSIS — Z96.651 STATUS POST RIGHT KNEE REPLACEMENT: ICD-10-CM

## 2024-04-25 DIAGNOSIS — M17.12 PRIMARY OSTEOARTHRITIS OF LEFT KNEE: ICD-10-CM

## 2024-04-25 DIAGNOSIS — M25.511 ACUTE PAIN OF RIGHT SHOULDER: ICD-10-CM

## 2024-04-25 DIAGNOSIS — Z96.651 STATUS POST RIGHT KNEE REPLACEMENT: Primary | ICD-10-CM

## 2024-04-25 PROCEDURE — 99999 PR PBB SHADOW E&M-EST. PATIENT-LVL V: CPT | Mod: PBBFAC,HCNC,, | Performed by: ORTHOPAEDIC SURGERY

## 2024-04-25 PROCEDURE — 3288F FALL RISK ASSESSMENT DOCD: CPT | Mod: HCNC,CPTII,S$GLB, | Performed by: ORTHOPAEDIC SURGERY

## 2024-04-25 PROCEDURE — 1159F MED LIST DOCD IN RCRD: CPT | Mod: HCNC,CPTII,S$GLB, | Performed by: ORTHOPAEDIC SURGERY

## 2024-04-25 PROCEDURE — 1125F AMNT PAIN NOTED PAIN PRSNT: CPT | Mod: HCNC,CPTII,S$GLB, | Performed by: ORTHOPAEDIC SURGERY

## 2024-04-25 PROCEDURE — 1157F ADVNC CARE PLAN IN RCRD: CPT | Mod: HCNC,CPTII,S$GLB, | Performed by: ORTHOPAEDIC SURGERY

## 2024-04-25 PROCEDURE — 73564 X-RAY EXAM KNEE 4 OR MORE: CPT | Mod: 26,50,HCNC, | Performed by: RADIOLOGY

## 2024-04-25 PROCEDURE — 73564 X-RAY EXAM KNEE 4 OR MORE: CPT | Mod: TC,50,HCNC

## 2024-04-25 PROCEDURE — 99213 OFFICE O/P EST LOW 20 MIN: CPT | Mod: HCNC,S$GLB,, | Performed by: ORTHOPAEDIC SURGERY

## 2024-04-25 PROCEDURE — 1101F PT FALLS ASSESS-DOCD LE1/YR: CPT | Mod: HCNC,CPTII,S$GLB, | Performed by: ORTHOPAEDIC SURGERY

## 2024-04-25 NOTE — PROGRESS NOTES
Subjective:     HPI:   Madan Bell is a 84 y.o. female who presents for eval B knees    Got a recall to follow up     Cc: difficulty standing from a chair    R TKA 6/14/21, pre-op flexion contracture and patella yasmin    Last seen 6/14/22: L knee predominantly PF OA + patella yasmin   PRN for CSI and ordered PT 3 visits for HEP    History of Present Illness  The patient is an 84-year-old female who presents for an annual checkup. She is accompanied by an adult male.    The patient underwent a right knee replacement on 06/14/2021 and is experiencing significant arthritis in her left knee. Her last consultation was in 2023, during which she was advised to return in 5 years. She reports minimal knee pain, however, she experiences difficulty rising from a seated position, which she attributes to weakness rather than pain. Her primary focus is on her balance, and she utilizes a walker for balance. She maintains an active lifestyle, walking within a [place] downstairs of her house and in her yard when the weather permits. Her right knee does not inhibit her daily activities, although she maintains an active lifestyle. She performs daily bathroom cleaning and avoids squatting and kneeling. She experienced a severe episode of dizziness following her last dizzy spell, which necessitated the use of a walker. She denies any pain in her left knee.    Supplemental Information  She has arthritis in her right shoulder. She had a cortisone injection in 12/2023 by Dr. Bello.       Objective:   Body mass index is 22.48 kg/m².  Exam:    Physical Exam  The patient rises from a seated position using both arms. She walks with a slow, steady gait without significant limp or antalgic gait. The right knee's total knee incision is well-healed. There is no pain with active or passive range of motion of either hip. Distally, the knee is neurovascularly intact with good strength, sensation, and pulses. The right knee has a  range of motion from 3 to 120 degrees, 7 degrees of valgus alignment. The knee is stable to anterior, posterior varus and valgus stresses with a slight flexion contracture but no extension lag. It is nontender to palpation around the knee. There is no effusion. The left knee has a range of motion from 7 to 125 degrees, 7 degrees valgus alignment. It is nontender to palpation. There is trace effusion, good stability, and a flexion contracture but no extensor lag.        Imaging:    Results  Imaging  X-rays of the left knee show bone-on-bone arthritis behind the kneecap. X-rays of the right knee show no shifting, moving, changing, or displacement.    KNEE L ARTHRITIS     Indication:  Left knee pain  Exam Ordered: Radiographs of the left knee include a standing anteroposterior view, a standing posterioanterior view, a lateral view in full flexion, and a sunrise view  Details of Examination: Exam shows evidence of joint space narrowing, osteophyte formation, and subchondral sclerosis, all consistent with degenerative arthritis of the knee.  No other significant findings are noted.  Impression:  Degenerative Arthritis, Left Knee    L knee Klg4 valgus, bone on bone PF and lat    R TKA no change      Assessment/Plan:       ICD-10-CM ICD-9-CM   1. Status post right knee replacement  Z96.651 V43.65   2. Acute pain of right shoulder  M25.511 719.41   3. Primary osteoarthritis of left knee  M17.12 715.16      Ongoing dizziness eval    R TKA doing well  L knee PF OA and patella yasmin - no sig pain today  Getting up out of chair: debility     Assessment/Plan:     Assessment & Plan  1. Post-operative status following right total knee arthroplasty.  The patient's right knee is demonstrating satisfactory progress. The patient is advised to engage in muscle-strengthening exercises to maintain mobility.    2. Arthritis in the left knee.  The patient is experiencing similar symptoms in her left knee, which were discussed 2 years ago.  Steroid injections can be administered as needed for pain management. However, due to the patient's lack of current pain, a steroid injection will be deferred for the time being. A prescription for physical therapy, which the patient completed 3 visits 2 years ago, will be issued, focusing on mobility, balance, stability, and strengthening of the quadriceps. The patient is instructed to contact the clinic if her left knee becomes painful or limits her mobility.    Follow-up  The patient is advised to follow up as necessary.    The above findings were discussed with patient length. We discussed the risks of conservative versus surgical management knee arthritis. Conservative management consisting of anti-inflammatory medications, glucosamine/chondroitin sulfate, weight loss, physical therapy, activity modification, as well as injections (lubricant versus corticosteroid) was discussed at length. At this point considering the patient's level of activity, pain, and radiographic findings I recommend continued conservative management of the knee arthritis.     R TKA f/u PRN    L knee:   PRN left knee: CSI  then TKA - but no pain today so hold off on CSI    Rx for PT    Orders Placed This Encounter   Procedures    Ambulatory referral/consult to Physical/Occupational Therapy     Standing Status:   Future     Standing Expiration Date:   5/25/2025     Referral Priority:   Routine     Referral Type:   Physical Medicine     Referral Reason:   Specialty Services Required     Number of Visits Requested:   1       This note was generated with the assistance of ambient listening technology. Verbal consent was obtained by the patient and accompanying visitor(s) for the recording of patient appointment to facilitate this note. I attest to having reviewed and edited the generated note for accuracy, though some syntax or spelling errors may persist. Please contact the author of this note for any clarification.            Past Medical  History:   Diagnosis Date    A-fib     Anticoagulant long-term use     Anxiety     Arrhythmia     Arthritis     knees    Basal cell carcinoma     Breast cyst     Community acquired pneumonia of right lower lobe of lung 12/11/2018    Deep vein thrombosis     one DVT during  pregnancy    Encephalopathy 2/10/2021    Fibrocystic breast 1980 - ??    Heart murmur     Hypercholesterolemia 6/13/2017    Hyperlipidemia     Hypertension     Hypothyroidism     Mitral valve disorder     Mitral valve prolapse     PVC's (premature ventricular contractions)     Squamous cell carcinoma bx 7-2017    right upper forehead    Thyroid cancer 1/29/2013    Thyroid disease     Vasovagal near syncope 11/12/2012    Vasovagal near syncope 11/12/2012    VTE (venous thromboembolism)     in 1960s, post partum, pt unsure of details     Weakness 12/11/2018       Past Surgical History:   Procedure Laterality Date    BREAST BIOPSY Left     excisional    BREAST BIOPSY Left     core needle biopsy    BREAST CYST ASPIRATION Bilateral 1980's - ??    BREAST CYST EXCISION Right 2008 - ??    CARDIOVERSION N/A 08/13/2018    Procedure: CARDIOVERSION;  Surgeon: Fernando Disla MD;  Location: [facility] CATH LAB;  Service: Cardiology;  Laterality: N/A;  AF,DCCV,NAAHI,MAC,FAS,3 PREP    CATARACT EXTRACTION W/  INTRAOCULAR LENS IMPLANT Left 04/22/2021    Procedure: EXTRACTION, CATARACT, WITH IOL INSERTION;  Surgeon: Gabby Sandoval MD;  Location: [facility] OR;  Service: Ophthalmology;  Laterality: Left;    CATARACT EXTRACTION W/  INTRAOCULAR LENS IMPLANT Right 05/17/2021    Procedure: EXTRACTION, CATARACT, WITH IOL INSERTION;  Surgeon: Gabby Sandoval MD;  Location: [facility] OR;  Service: Ophthalmology;  Laterality: Right;    HYSTERECTOMY      tahbso    KNEE ARTHROPLASTY Right 06/14/2021    Procedure: ARTHROPLASTY, KNEE:RIGHT:DEPUY-SIGMA;  Surgeon: Tashi Perkins III, MD;  Location: [facility] OR;  Service: Orthopedics;  Laterality: Right;    OOPHORECTOMY  hysterectomy - 2000 - ??     THYROID SURGERY         Family History   Problem Relation Name Age of Onset    Arrhythmia Son      Mitral valve prolapse Son      Sudden death Son          sudden cardiac death    Heart disease Mother      Hyperlipidemia Maternal Grandmother      No Known Problems Daughter      No Known Problems Son      No Known Problems Son      Breast cancer Maternal Aunt      Melanoma Neg Hx         Social History     Socioeconomic History    Marital status:     Number of children: 4   Occupational History     Comment:  for school   Tobacco Use    Smoking status: Never     Passive exposure: Never    Smokeless tobacco: Never   Substance and Sexual Activity    Alcohol use: No    Drug use: No   Social History Narrative    Daughter Barby is nurse- Does not use stairs at home     Social Determinants of Health     Financial Resource Strain: Low Risk  (1/8/2024)    Overall Financial Resource Strain (CARDIA)     Difficulty of Paying Living Expenses: Not very hard   Recent Concern: Financial Resource Strain - Medium Risk (11/13/2023)    Overall Financial Resource Strain (CARDIA)     Difficulty of Paying Living Expenses: Somewhat hard   Food Insecurity: No Food Insecurity (1/8/2024)    Hunger Vital Sign     Worried About Running Out of Food in the Last Year: Never true     Ran Out of Food in the Last Year: Never true   Transportation Needs: No Transportation Needs (1/8/2024)    PRAPARE - Transportation     Lack of Transportation (Medical): No     Lack of Transportation (Non-Medical): No   Physical Activity: Insufficiently Active (1/8/2024)    Exercise Vital Sign     Days of Exercise per Week: 4 days     Minutes of Exercise per Session: 30 min   Stress: No Stress Concern Present (1/8/2024)    [place] [employer] - Occupational Stress Questionnaire     Feeling of Stress : Only a little   Social Connections: Unknown (1/8/2024)    Social Connection and Isolation Panel [NHANES]     Frequency of  Communication with Friends and Family: More than three times a week     Frequency of Social Gatherings with Friends and Family: More than three times a week     Active Member of Clubs or Organizations: Yes     Attends Club or Organization Meetings: More than 4 times per year     Marital Status:    Housing Stability: Low Risk  (1/8/2024)    Housing Stability Vital Sign     Unable to Pay for Housing in the Last Year: No     Number of Places Lived in the Last Year: 1     Unstable Housing in the Last Year: No

## 2024-05-01 ENCOUNTER — OFFICE VISIT (OUTPATIENT)
Dept: ORTHOPEDICS | Facility: CLINIC | Age: 84
End: 2024-05-01
Payer: MEDICARE

## 2024-05-01 ENCOUNTER — TELEPHONE (OUTPATIENT)
Dept: PAIN MEDICINE | Facility: CLINIC | Age: 84
End: 2024-05-01
Payer: MEDICARE

## 2024-05-01 VITALS — BODY MASS INDEX: 22.48 KG/M2 | WEIGHT: 134.94 LBS | HEIGHT: 65 IN

## 2024-05-01 DIAGNOSIS — M54.9 DORSALGIA, UNSPECIFIED: ICD-10-CM

## 2024-05-01 DIAGNOSIS — M43.10 SPONDYLOLISTHESIS, ACQUIRED: Primary | ICD-10-CM

## 2024-05-01 DIAGNOSIS — M54.16 LUMBAR RADICULOPATHY: ICD-10-CM

## 2024-05-01 PROCEDURE — 99214 OFFICE O/P EST MOD 30 MIN: CPT | Mod: HCNC,S$GLB,, | Performed by: ORTHOPAEDIC SURGERY

## 2024-05-01 PROCEDURE — 1159F MED LIST DOCD IN RCRD: CPT | Mod: HCNC,CPTII,S$GLB, | Performed by: ORTHOPAEDIC SURGERY

## 2024-05-01 PROCEDURE — 1125F AMNT PAIN NOTED PAIN PRSNT: CPT | Mod: HCNC,CPTII,S$GLB, | Performed by: ORTHOPAEDIC SURGERY

## 2024-05-01 PROCEDURE — 1101F PT FALLS ASSESS-DOCD LE1/YR: CPT | Mod: HCNC,CPTII,S$GLB, | Performed by: ORTHOPAEDIC SURGERY

## 2024-05-01 PROCEDURE — 3288F FALL RISK ASSESSMENT DOCD: CPT | Mod: HCNC,CPTII,S$GLB, | Performed by: ORTHOPAEDIC SURGERY

## 2024-05-01 PROCEDURE — 1157F ADVNC CARE PLAN IN RCRD: CPT | Mod: HCNC,CPTII,S$GLB, | Performed by: ORTHOPAEDIC SURGERY

## 2024-05-01 PROCEDURE — 99999 PR PBB SHADOW E&M-EST. PATIENT-LVL IV: CPT | Mod: PBBFAC,HCNC,, | Performed by: ORTHOPAEDIC SURGERY

## 2024-05-01 NOTE — TELEPHONE ENCOUNTER
----- Message from Serjio Palafox MD sent at 2024  2:44 PM CDT -----  Regarding: Order for KYMBERLY CERRATO    Patient Name: KYMBERLY CERRATO([id number])  Sex: Female  : 1940      PCP: RAE SHAIKH    Center: [facility] CENTRAL BILLING OFFICE     Types of orders made on 2024: Outpatient Referral, Procedure Request    Order Date:2024  Ordering User:SERJIO PALAFOX [[id number]]  Encounter Provider:Serjio Ge MD [[id number]]  Authorizing Provider: Serjio Palafox MD [[id number]]  Department:[facility]INE [facility][[id number]]    Common Order Information  Procedure -> Transforaminal Injection (Specify level and laterality)     Order Specific Information  Order: Procedure Order to Pain Management [Custom: [id number]]  Order #:          [id number]: 1 FUTURE    Priority: Routine  Class: Clinic Performed      breanae Order Information      Expires on:2025            Expected by:2024                   Associated Diagnoses      M54.9 Dorsalgia, unspecified      M43.10 Spondylolisthesis, acquired      Facility Name: -> [facility]           Priority: Routine  Class: Clinic Performed    Future Order Information      Expires on:2025            Expected by:2024                   Associated Magali  gnoses      M54.9 Dorsalgia, unspecified      M43.10 Spondylolisthesis, acquired      Procedure -> Transforaminal Injection (Specify level and laterality)         Facility Name: -> [facility]

## 2024-05-01 NOTE — PROGRESS NOTES
DATE: 5/1/2024  PATIENT: Madan Bell    Attending Physician: Joey Escamilla M.D.    CHIEF COMPLAINT:  Back pain, right leg pain    HISTORY:  Madan Bell is a 84 y.o. female history of AFib on Eliquis, prior right TKA by Dr. Perkins here for initial evaluation of low back and right leg pain (Back - 4, Leg - 4). The pain has been present for many years but has been worsening over the last year. The patient describes the pain as dull achy.  The pain is worse with ambulation and increased activity and improved by rest.  She ambulates with a walker, but reports this is not due to her back pain but due to dizziness she occasionally gets.  There is no associated numbness and tingling. There is no subjective weakness. Prior treatments have included anti-inflammatories, but no PT, MEREDITH, surgery.    The Patient denies myelopathic symptoms such as handwriting changes or difficulty with buttons/coins/keys. Denies perineal paresthesias, bowel/bladder dysfunction.    PAST MEDICAL/SURGICAL HISTORY:  Past Medical History:   Diagnosis Date    A-fib     Anticoagulant long-term use     Anxiety     Arrhythmia     Arthritis     knees    Basal cell carcinoma     Breast cyst     Community acquired pneumonia of right lower lobe of lung 12/11/2018    Deep vein thrombosis     one DVT during  pregnancy    Encephalopathy 2/10/2021    Fibrocystic breast 1980 - ??    Heart murmur     Hypercholesterolemia 6/13/2017    Hyperlipidemia     Hypertension     Hypothyroidism     Mitral valve disorder     Mitral valve prolapse     PVC's (premature ventricular contractions)     Squamous cell carcinoma bx 7-2017    right upper forehead    Thyroid cancer 1/29/2013    Thyroid disease     Vasovagal near syncope 11/12/2012    Vasovagal near syncope 11/12/2012    VTE (venous thromboembolism)     in 1960s, post partum, pt unsure of details     Weakness 12/11/2018     Past Surgical History:   Procedure Laterality Date    BREAST  BIOPSY Left     excisional    BREAST BIOPSY Left     core needle biopsy    BREAST CYST ASPIRATION Bilateral 1980's - ??    BREAST CYST EXCISION Right 2008 - ??    CARDIOVERSION N/A 08/13/2018    Procedure: CARDIOVERSION;  Surgeon: Fernando Disla MD;  Location: [facility] CATH LAB;  Service: Cardiology;  Laterality: N/A;  AF,DCCV,ANAHI,MAC,FAS,3 PREP    CATARACT EXTRACTION W/  INTRAOCULAR LENS IMPLANT Left 04/22/2021    Procedure: EXTRACTION, CATARACT, WITH IOL INSERTION;  Surgeon: Gabby Sandoval MD;  Location: [facility] OR;  Service: Ophthalmology;  Laterality: Left;    CATARACT EXTRACTION W/  INTRAOCULAR LENS IMPLANT Right 05/17/2021    Procedure: EXTRACTION, CATARACT, WITH IOL INSERTION;  Surgeon: Gabby Sandoval MD;  Location: [facility] OR;  Service: Ophthalmology;  Laterality: Right;    HYSTERECTOMY      tahbso    KNEE ARTHROPLASTY Right 06/14/2021    Procedure: ARTHROPLASTY, KNEE:RIGHT:DEPUY-SIGMA;  Surgeon: Tashi Perkins III, MD;  Location: [facility] OR;  Service: Orthopedics;  Laterality: Right;    OOPHORECTOMY  hysterectomy - 2000 - ??    THYROID SURGERY         Current Medications:   Current Outpatient Medications:     alendronate (FOSAMAX) 70 MG tablet, TAKE 1 TABLET EVERY 7 DAYS, Disp: 12 tablet, Rfl: 3    ascorbic acid, vitamin C, (VITAMIN C) 1000 MG tablet, Take 1,000 mg by mouth once daily. Take the last dose before surgery 6/6, Disp: , Rfl:     atorvastatin (LIPITOR) 40 MG tablet, Take 1 tablet (40 mg total) by mouth once daily., Disp: 90 tablet, Rfl: 3    bisoprolol-hydrochlorothiazide 2.5-6.25 mg (ZIAC) 2.5-6.25 mg Tab, TAKE 1/2 TABLET EVERY DAY, Disp: 45 tablet, Rfl: 3    calcium citrate (CALCITRATE) 200 mg (950 mg) tablet, Take 1 tablet by mouth once daily. Take the last dose before surgery 6/6, Disp: , Rfl:     cholecalciferol, vitamin D3, (VITAMIN D3) 50 mcg (2,000 unit) Cap, Take by mouth every morning. Take the last dose before surgery 6/6, Disp: , Rfl:     cranberry extract 200 mg Cap, Take 200 mg by mouth  once daily., Disp: 30 capsule, Rfl: 12    cyanocobalamin (VITAMIN B-12) 1000 MCG tablet, Take 100 mcg by mouth every evening. Take the last dose before surgery 6/6, Disp: , Rfl:     D-MANNOSE ORAL, Take 4 tablets by mouth every morning. Take the last dose before surgery 6/6, Disp: , Rfl:     ELIQUIS 5 mg Tab, TAKE 1 TABLET TWICE DAILY, Disp: 180 tablet, Rfl: 3    estradioL (ESTRACE) 0.01 % (0.1 mg/gram) vaginal cream, APPLY A PEA SIZED AMOUNT DAILY FOR TWO WEEKS THEN THREE TIMES A WEEK. 90 day supply, Disp: 42.5 g, Rfl: 4    furosemide (LASIX) 20 MG tablet, One tablet by mouth twice a day  with additional tablet 5 days a week (total 19 tablets/ week), Disp: 247 tablet, Rfl: 3    ketoconazole (NIZORAL) 2 % cream, AAA qd- bid prn flare, Disp: 60 g, Rfl: 3    Lactobac no.30-Bifidobact no.4 (ULTIMATE NICKOLAS PROBIOTIC) 30 billion cell CpDR, Take 1 tablet by mouth every morning. Hold 7 days prior to surgery, Disp: , Rfl:     levothyroxine (SYNTHROID) 88 MCG tablet, TAKE 1 TABLET (88 MCG TOTAL) BY MOUTH BEFORE BREAKFAST., Disp: 90 tablet, Rfl: 2    omega-3 fatty acids-vitamin E 1,000 mg Cap, Take 1 capsule by mouth every evening. Take the last dose before surgery 6/6, Disp: , Rfl:     pantoprazole (PROTONIX) 40 MG tablet, Take 1 tablet (40 mg total) by mouth once daily., Disp: 90 tablet, Rfl: 3    potassium chloride (KLOR-CON) 10 MEQ TbSR, Take by mouth 3 tablets a day (30 mEq) for 5 days then only 2 tablets a day (20 mEq) for 7 days then take only one tablet (10 mEq) per day., Disp: 119 tablet, Rfl: 3    predniSONE (DELTASONE) 20 MG tablet, Take 1 tablet (20 mg total) by mouth once daily., Disp: 5 tablet, Rfl: 0    RSVPreF3 antigen-AS01E, PF, (AREXVY, PF,) 120 mcg/0.5 mL SusR vaccine, Inject into the muscle., Disp: 1 each, Rfl: 0    VITAMIN B COMPLEX ORAL, Take 1 tablet by mouth every evening. Take the last dose before surgery 6/6, Disp: , Rfl:     vitamin E 400 UNIT capsule, Take 400 Units by mouth once daily. Take the  last dose before surgery 6/6, Disp: , Rfl:     Social History:   Social History     Socioeconomic History    Marital status:     Number of children: 4   Occupational History     Comment:  for school   Tobacco Use    Smoking status: Never     Passive exposure: Never    Smokeless tobacco: Never   Substance and Sexual Activity    Alcohol use: No    Drug use: No   Social History Narrative    Daughter Barby is nurse- Does not use stairs at home     Social Determinants of Health     Financial Resource Strain: Low Risk  (1/8/2024)    Overall Financial Resource Strain (CARDIA)     Difficulty of Paying Living Expenses: Not very hard   Recent Concern: Financial Resource Strain - Medium Risk (11/13/2023)    Overall Financial Resource Strain (CARDIA)     Difficulty of Paying Living Expenses: Somewhat hard   Food Insecurity: No Food Insecurity (1/8/2024)    Hunger Vital Sign     Worried About Running Out of Food in the Last Year: Never true     Ran Out of Food in the Last Year: Never true   Transportation Needs: No Transportation Needs (1/8/2024)    PRAPARE - Transportation     Lack of Transportation (Medical): No     Lack of Transportation (Non-Medical): No   Physical Activity: Insufficiently Active (1/8/2024)    Exercise Vital Sign     Days of Exercise per Week: 4 days     Minutes of Exercise per Session: 30 min   Stress: No Stress Concern Present (1/8/2024)    [place] [employer] - Occupational Stress Questionnaire     Feeling of Stress : Only a little   Housing Stability: Low Risk  (1/8/2024)    Housing Stability Vital Sign     Unable to Pay for Housing in the Last Year: No     Number of Places Lived in the Last Year: 1     Unstable Housing in the Last Year: No        EXAM:  LMP  (LMP Unknown)     PHYSICAL EXAMINATION:    Gait: Normal station and gait, no difficulty with toe or heel walk.   Skin: Dorsal lumbar skin negative for rashes, lesions, hairy patches and surgical scars. There is  no lumbar tenderness to palpation.  Range of motion: Lumbar range of motion is acceptable.  Spinal Balance: Global saggital and coronal spinal balance acceptable, no significant for scoliosis and kyphosis.  Musculoskeletal: No pain with the range of motion of the bilateral hips. No trochanteric tenderness to palpation.  Vascular: Bilateral lower extremities warm and well perfused, Dorsalis pedis pulses 2+ bilaterally.  Neurological: Normal strength and tone in all major motor groups in the bilateral lower extremities. Normal sensation to light touch in the L2-S1 dermatomes bilaterally.  Deep tendon reflexes symmetric 2+ in the left lower extremities, negative on right history of prior TKA.  Negative Babinski bilaterally. Straight leg raise negative bilaterally.    IMAGING:      Today I personally reviewed AP, Lat and Flex/Ex  upright L-spine that demonstrate a degenerative spondylolisthesis at L4-5    MRI of L-spine reviewed by me showing L4-5 degenerative spondylolisthesis with severe central stenosis at L4-5, mild bilateral foraminal stenosis      There is no height or weight on file to calculate BMI.  Hemoglobin A1C   Date Value Ref Range Status   01/16/2024 5.8 (H) 4.0 - 5.6 % Final     Comment:     ADA Screening Guidelines:  5.7-6.4%  Consistent with prediabetes  >or=6.5%  Consistent with diabetes    High levels of fetal hemoglobin interfere with the HbA1C  assay. Heterozygous hemoglobin variants (HbS, HgC, etc)do  not significantly interfere with this assay.   However, presence of multiple variants may affect accuracy.     02/08/2023 5.9 (H) 4.0 - 5.6 % Final     Comment:     ADA Screening Guidelines:  5.7-6.4%  Consistent with prediabetes  >or=6.5%  Consistent with diabetes    High levels of fetal hemoglobin interfere with the HbA1C  assay. Heterozygous hemoglobin variants (HbS, HgC, etc)do  not significantly interfere with this assay.   However, presence of multiple variants may affect accuracy.      02/24/2022 5.8 (H) 4.0 - 5.6 % Final     Comment:     ADA Screening Guidelines:  5.7-6.4%  Consistent with prediabetes  >or=6.5%  Consistent with diabetes    High levels of fetal hemoglobin interfere with the HbA1C  assay. Heterozygous hemoglobin variants (HbS, HgC, etc)do  not significantly interfere with this assay.   However, presence of multiple variants may affect accuracy.         ASSESSMENT/PLAN:    Diagnoses and all orders for this visit:    Dorsalgia, unspecified  -     Ambulatory referral/consult to [facility]      No follow-ups on file.    Today we discussed at length all of the different treatment options including anti-inflammatories, acetaminophen, rest, ice, heat, physical therapy including strengthening and stretching exercises, home exercises, ROM, aerobic conditioning, aqua therapy, other modalities including ultrasound, massage, and dry needling, epidural steroid injections and finally surgical intervention.  Referral sent to pain management for L4-5 transforaminal MEREDITH

## 2024-05-15 ENCOUNTER — LAB VISIT (OUTPATIENT)
Dept: LAB | Facility: HOSPITAL | Age: 84
End: 2024-05-15
Attending: INTERNAL MEDICINE
Payer: MEDICARE

## 2024-05-15 DIAGNOSIS — I50.9 CONGESTIVE HEART FAILURE, UNSPECIFIED HF CHRONICITY, UNSPECIFIED HEART FAILURE TYPE: ICD-10-CM

## 2024-05-15 DIAGNOSIS — E87.6 HYPOKALEMIA: ICD-10-CM

## 2024-05-15 DIAGNOSIS — R79.89 ELEVATED BRAIN NATRIURETIC PEPTIDE (BNP) LEVEL: ICD-10-CM

## 2024-05-15 LAB
ANION GAP SERPL CALC-SCNC: 9 MMOL/L (ref 8–16)
BNP SERPL-MCNC: 240 PG/ML (ref 0–99)
BUN SERPL-MCNC: 10 MG/DL (ref 8–23)
CALCIUM SERPL-MCNC: 9.5 MG/DL (ref 8.7–10.5)
CHLORIDE SERPL-SCNC: 103 MMOL/L (ref 95–110)
CO2 SERPL-SCNC: 28 MMOL/L (ref 23–29)
CREAT SERPL-MCNC: 0.8 MG/DL (ref 0.5–1.4)
EST. GFR  (NO RACE VARIABLE): >60 ML/MIN/1.73 M^2
GLUCOSE SERPL-MCNC: 100 MG/DL (ref 70–110)
POTASSIUM SERPL-SCNC: 3.4 MMOL/L (ref 3.5–5.1)
SODIUM SERPL-SCNC: 140 MMOL/L (ref 136–145)

## 2024-05-15 PROCEDURE — 36415 COLL VENOUS BLD VENIPUNCTURE: CPT | Mod: HCNC | Performed by: INTERNAL MEDICINE

## 2024-05-15 PROCEDURE — 83880 ASSAY OF NATRIURETIC PEPTIDE: CPT | Mod: HCNC | Performed by: INTERNAL MEDICINE

## 2024-05-15 PROCEDURE — 80048 BASIC METABOLIC PNL TOTAL CA: CPT | Mod: HCNC | Performed by: INTERNAL MEDICINE

## 2024-05-16 NOTE — PROGRESS NOTES
Your results show K is low so need to triple the K for 3 days then double it.BNP stable but high-can we increase the diuretic any -I know there was problem doing so with low BP and weakness?    Please contact me if you have any additional concerns.    Sincerely,    Sudhir Daly

## 2024-05-17 ENCOUNTER — HOSPITAL ENCOUNTER (OUTPATIENT)
Dept: RADIOLOGY | Facility: HOSPITAL | Age: 84
Discharge: HOME | End: 2024-05-17
Attending: INTERNAL MEDICINE
Payer: MEDICARE

## 2024-05-17 ENCOUNTER — OFFICE VISIT (OUTPATIENT)
Dept: INTERNAL MEDICINE | Facility: CLINIC | Age: 84
End: 2024-05-17
Payer: MEDICARE

## 2024-05-17 VITALS
OXYGEN SATURATION: 98 % | WEIGHT: 134.5 LBS | HEIGHT: 64 IN | HEART RATE: 76 BPM | SYSTOLIC BLOOD PRESSURE: 104 MMHG | DIASTOLIC BLOOD PRESSURE: 72 MMHG | BODY MASS INDEX: 22.96 KG/M2

## 2024-05-17 DIAGNOSIS — M79.672 LEFT FOOT PAIN: ICD-10-CM

## 2024-05-17 DIAGNOSIS — R60.9 EDEMA, UNSPECIFIED TYPE: ICD-10-CM

## 2024-05-17 DIAGNOSIS — M79.672 LEFT FOOT PAIN: Primary | ICD-10-CM

## 2024-05-17 PROCEDURE — 3074F SYST BP LT 130 MM HG: CPT | Mod: HCNC,CPTII,S$GLB, | Performed by: INTERNAL MEDICINE

## 2024-05-17 PROCEDURE — G2211 COMPLEX E/M VISIT ADD ON: HCPCS | Mod: HCNC,S$GLB,, | Performed by: INTERNAL MEDICINE

## 2024-05-17 PROCEDURE — 73630 X-RAY EXAM OF FOOT: CPT | Mod: 26,HCNC,LT, | Performed by: RADIOLOGY

## 2024-05-17 PROCEDURE — 1159F MED LIST DOCD IN RCRD: CPT | Mod: HCNC,CPTII,S$GLB, | Performed by: INTERNAL MEDICINE

## 2024-05-17 PROCEDURE — 3078F DIAST BP <80 MM HG: CPT | Mod: HCNC,CPTII,S$GLB, | Performed by: INTERNAL MEDICINE

## 2024-05-17 PROCEDURE — 73610 X-RAY EXAM OF ANKLE: CPT | Mod: 26,HCNC,LT, | Performed by: RADIOLOGY

## 2024-05-17 PROCEDURE — 1101F PT FALLS ASSESS-DOCD LE1/YR: CPT | Mod: HCNC,CPTII,S$GLB, | Performed by: INTERNAL MEDICINE

## 2024-05-17 PROCEDURE — 73630 X-RAY EXAM OF FOOT: CPT | Mod: TC,HCNC,FY,LT

## 2024-05-17 PROCEDURE — 93970 EXTREMITY STUDY: CPT | Mod: TC,HCNC

## 2024-05-17 PROCEDURE — 73610 X-RAY EXAM OF ANKLE: CPT | Mod: TC,HCNC,FY,LT

## 2024-05-17 PROCEDURE — 1125F AMNT PAIN NOTED PAIN PRSNT: CPT | Mod: HCNC,CPTII,S$GLB, | Performed by: INTERNAL MEDICINE

## 2024-05-17 PROCEDURE — 99213 OFFICE O/P EST LOW 20 MIN: CPT | Mod: HCNC,S$GLB,, | Performed by: INTERNAL MEDICINE

## 2024-05-17 PROCEDURE — 1157F ADVNC CARE PLAN IN RCRD: CPT | Mod: HCNC,CPTII,S$GLB, | Performed by: INTERNAL MEDICINE

## 2024-05-17 PROCEDURE — 93970 EXTREMITY STUDY: CPT | Mod: 26,HCNC,, | Performed by: RADIOLOGY

## 2024-05-17 PROCEDURE — 99999 PR PBB SHADOW E&M-EST. PATIENT-LVL V: CPT | Mod: PBBFAC,HCNC,, | Performed by: INTERNAL MEDICINE

## 2024-05-17 PROCEDURE — 3288F FALL RISK ASSESSMENT DOCD: CPT | Mod: HCNC,CPTII,S$GLB, | Performed by: INTERNAL MEDICINE

## 2024-05-17 RX ORDER — CEPHALEXIN 500 MG/1
CAPSULE ORAL
Qty: 21 CAPSULE | Refills: 0 | Status: SHIPPED | OUTPATIENT
Start: 2024-05-17

## 2024-05-17 NOTE — PROGRESS NOTES
Subjective:       Patient ID: Madan Bell is a 84 y.o. female.    Chief Complaint: Follow-up, Foot Swelling (left), and Foot Pain (left)    Follow-up  Pertinent negatives include no abdominal pain, arthralgias, chest pain, headaches, joint swelling, nausea, neck pain, vomiting or weakness.   Foot Pain  Pertinent negatives include no abdominal pain, arthralgias, chest pain, headaches, joint swelling, nausea, neck pain, vomiting or weakness.   Her back is doing better  She moved a light piece of furniture with her son then woke that night with severe L foot pain.  Pain is better today but not resolved.  She has been icing it.  It is mildly swollen. No CP or SOB.  No fever, chills or flu like symptoms.  Review of Systems   Constitutional:  Negative for activity change and unexpected weight change.   HENT:  Negative for hearing loss, rhinorrhea and trouble swallowing.    Eyes:  Negative for discharge and visual disturbance.   Respiratory:  Negative for chest tightness, shortness of breath (PND or orthopnea) and wheezing.    Cardiovascular:  Negative for chest pain and palpitations.   Gastrointestinal:  Negative for abdominal pain, blood in stool, constipation, diarrhea, nausea and vomiting.   Endocrine: Negative for polydipsia and polyuria.   Genitourinary:  Negative for difficulty urinating, dysuria, hematuria and menstrual problem.   Musculoskeletal:  Negative for arthralgias, joint swelling and neck pain.   Neurological:  Negative for seizures, syncope, weakness and headaches.   Psychiatric/Behavioral:  Negative for confusion and dysphoric mood.        Objective:      Physical Exam  Constitutional:       General: She is not in acute distress.     Appearance: She is well-developed.   HENT:      Head: Normocephalic.   Eyes:      Pupils: Pupils are equal, round, and reactive to light.   Neck:      Thyroid: No thyromegaly.      Vascular: No JVD.   Cardiovascular:      Rate and Rhythm: Normal rate and  regular rhythm.      Heart sounds: Normal heart sounds. No murmur heard.     No friction rub. No gallop.   Pulmonary:      Effort: Pulmonary effort is normal.      Breath sounds: Normal breath sounds. No wheezing or rales.   Abdominal:      General: Bowel sounds are normal. There is no distension.      Palpations: Abdomen is soft. There is no mass.      Tenderness: There is no abdominal tenderness. There is no guarding or rebound.   Musculoskeletal:      Cervical back: Neck supple.      Comments: L foot and ankle mildly swollen  foot with mild erythema slight warmth. Minimally tender.   Lymphadenopathy:      Cervical: No cervical adenopathy.   Skin:     General: Skin is warm and dry.   Neurological:      Mental Status: She is alert and oriented to person, place, and time.      Deep Tendon Reflexes: Reflexes are normal and symmetric.   Psychiatric:         Behavior: Behavior normal.         Thought Content: Thought content normal.         Judgment: Judgment normal.         Assessment:       1. Left foot pain    2. Edema, unspecified type        Plan:   Left foot pain  -     X-Ray Foot Complete 3 view Left; Future; Expected date: 05/17/2024  -     X-Ray Ankle Complete Left; Future; Expected date: 05/17/2024  Possible cellulitis vs redness from prior use of ice  Start cephalexin 500mg TID for 7 days  Edema, unspecified type  -      Lower Extremity Veins Bilateral; Future; Expected date: 05/17/2024  -     cephALEXin (KEFLEX) 500 MG capsule; One capsule three times daily  Dispense: 21 capsule; Refill: 0

## 2024-05-19 ENCOUNTER — PATIENT MESSAGE (OUTPATIENT)
Dept: INTERNAL MEDICINE | Facility: CLINIC | Age: 84
End: 2024-05-19
Payer: MEDICARE

## 2024-05-19 NOTE — PROGRESS NOTES
No blood clots are seen - a small cyst in the back of the R knee - nothing worrisome.  No worrisome findings on the symptomatic side.

## 2024-06-03 RX ORDER — LEVOTHYROXINE SODIUM 88 UG/1
88 TABLET ORAL
Qty: 90 TABLET | Refills: 2 | Status: SHIPPED | OUTPATIENT
Start: 2024-06-03

## 2024-06-03 NOTE — TELEPHONE ENCOUNTER
Care Due:                  Date            Visit Type   Department     Provider  --------------------------------------------------------------------------------                                MYCHART                              FOLLOWUP/OF  [facility] INTERNAL  Last Visit: 05-      FICE VISIT   MEDICINE       Sheila Mcgee  Next Visit: None Scheduled  None         None Found                                                            Last  Test          Frequency    Reason                     Performed    Due Date  --------------------------------------------------------------------------------    Mg Level....  12 months..  alendronate..............  Not Found    Overdue    Phosphate...  12 months..  alendronate..............  Not Found    Overdue    [employer] Embedded Care Due Messages. Reference number: [id number].   6/03/2024 2:21:13 PM CDT

## 2024-06-04 NOTE — TELEPHONE ENCOUNTER
Provider Staff:  Action required for this patient    Requires labs      Please see care gap opportunities below in Care Due Message.    Thanks!  [facility]     Appointments      Date Provider   Last Visit   5/17/2024 Sheila Mcgee MD   Next Visit   Visit date not found Sheila Mcgee MD     Refill Decision Note   Madan Bell  is requesting a refill authorization.  Brief Assessment and Rationale for Refill:  Approve     Medication Therapy Plan:         Comments:     Note composed:11:35 PM 06/03/2024

## 2024-06-24 ENCOUNTER — TELEPHONE (OUTPATIENT)
Dept: OPTOMETRY | Facility: CLINIC | Age: 84
End: 2024-06-24
Payer: MEDICARE

## 2024-06-24 NOTE — TELEPHONE ENCOUNTER
----- Message from Brenda Mcgrath, DEON sent at 6/24/2024  4:39 PM CDT -----    ----- Message -----  From: Emy Lynch  Sent: 6/24/2024   1:26 PM CDT  To: Amee Lacey, OD; Brenda Mcgrath, OD    Yearly Exam  ----- Message -----  From: Nehal Massey MA  Sent: 6/24/2024  10:57 AM CDT  To: L[facility] Staff    This is 's pt  ----- Message -----  From: Nidia Alvarado  Sent: 6/22/2024   8:57 AM CDT  To: [facility] Clinical Support    Type:  Sooner Apoointment Request    Caller is requesting a sooner appointment.  Caller declined first available appointment listed below.  Caller will not accept being placed on the waitlist and is requesting a message be sent to doctor.  Name of Caller: Pt  When is the first available appointment?  Symptoms: Dizziness and near-fainting with certain eye movements  Would the patient rather a call back or a response via [facility]? both  Best Call Back Number: [phone]  Additional Information:

## 2024-07-12 ENCOUNTER — TELEPHONE (OUTPATIENT)
Dept: OPTOMETRY | Facility: CLINIC | Age: 84
End: 2024-07-12
Payer: MEDICARE

## 2024-07-15 ENCOUNTER — OFFICE VISIT (OUTPATIENT)
Dept: OPTOMETRY | Facility: CLINIC | Age: 84
End: 2024-07-15
Payer: MEDICARE

## 2024-07-15 DIAGNOSIS — Z96.1 PSEUDOPHAKIA OF BOTH EYES: Primary | ICD-10-CM

## 2024-07-15 PROCEDURE — 1157F ADVNC CARE PLAN IN RCRD: CPT | Mod: CPTII,S$GLB,, | Performed by: OPTOMETRIST

## 2024-07-15 PROCEDURE — 1159F MED LIST DOCD IN RCRD: CPT | Mod: CPTII,S$GLB,, | Performed by: OPTOMETRIST

## 2024-07-15 PROCEDURE — 3288F FALL RISK ASSESSMENT DOCD: CPT | Mod: CPTII,S$GLB,, | Performed by: OPTOMETRIST

## 2024-07-15 PROCEDURE — 1101F PT FALLS ASSESS-DOCD LE1/YR: CPT | Mod: CPTII,S$GLB,, | Performed by: OPTOMETRIST

## 2024-07-15 PROCEDURE — 92014 COMPRE OPH EXAM EST PT 1/>: CPT | Mod: S$GLB,,, | Performed by: OPTOMETRIST

## 2024-07-15 PROCEDURE — 1126F AMNT PAIN NOTED NONE PRSNT: CPT | Mod: CPTII,S$GLB,, | Performed by: OPTOMETRIST

## 2024-07-15 PROCEDURE — 99999 PR PBB SHADOW E&M-EST. PATIENT-LVL III: CPT | Mod: PBBFAC,,, | Performed by: OPTOMETRIST

## 2024-07-15 NOTE — PROGRESS NOTES
HPI    Pt is here today for routine eye exam. Patient denies pain/discomfort.   States that she is often dizzy and that is why she is here. Patient states   that she would like to discuss other options for drops due to cost of   systane.  DLS: 5/2023 Dr. Sandoval  (-)Flashes   (+)Floaters   (+)Diplopia   (+)Headaches: states that she feels heaviness/pressure on her forehead at   times  (-)Itching   (-)Tearing  (-)Burning  (+)Dryness   (-)Photophobia  (-)Glare   (+)Blurred VA  Past Eye Sx: Cataract OU   Eye Meds: Systane once daily OU    Last edited by Brenda Mcgrath, OD on 7/15/2024  3:09 PM.            Assessment /Plan     For exam results, see Encounter Report.    Pseudophakia of both eyes      Monitor; pt educated on condition and visual status.    RTC in 1 year for annual eye exam unless changes noted sooner.

## 2024-07-16 ENCOUNTER — PATIENT MESSAGE (OUTPATIENT)
Dept: ADMINISTRATIVE | Facility: OTHER | Age: 84
End: 2024-07-16
Payer: MEDICARE

## 2024-07-17 ENCOUNTER — CLINICAL SUPPORT (OUTPATIENT)
Dept: REHABILITATION | Facility: HOSPITAL | Age: 84
End: 2024-07-17
Payer: MEDICARE

## 2024-07-17 DIAGNOSIS — Z74.09 DECREASED FUNCTIONAL MOBILITY AND ENDURANCE: Primary | ICD-10-CM

## 2024-07-17 DIAGNOSIS — R26.89 BALANCE PROBLEMS: ICD-10-CM

## 2024-07-17 DIAGNOSIS — M25.562 CHRONIC PAIN OF LEFT KNEE: Chronic | ICD-10-CM

## 2024-07-17 DIAGNOSIS — G89.29 CHRONIC PAIN OF LEFT KNEE: Chronic | ICD-10-CM

## 2024-07-17 PROCEDURE — 97162 PT EVAL MOD COMPLEX 30 MIN: CPT | Mod: HCNC,PO

## 2024-07-17 PROCEDURE — 97112 NEUROMUSCULAR REEDUCATION: CPT | Mod: HCNC,PO

## 2024-07-19 ENCOUNTER — PATIENT MESSAGE (OUTPATIENT)
Dept: ADMINISTRATIVE | Facility: OTHER | Age: 84
End: 2024-07-19
Payer: MEDICARE

## 2024-07-25 ENCOUNTER — CLINICAL SUPPORT (OUTPATIENT)
Dept: REHABILITATION | Facility: HOSPITAL | Age: 84
End: 2024-07-25
Payer: MEDICARE

## 2024-07-25 DIAGNOSIS — Z74.09 DECREASED FUNCTIONAL MOBILITY AND ENDURANCE: ICD-10-CM

## 2024-07-25 DIAGNOSIS — M25.562 CHRONIC PAIN OF LEFT KNEE: Primary | Chronic | ICD-10-CM

## 2024-07-25 DIAGNOSIS — R26.89 BALANCE PROBLEMS: ICD-10-CM

## 2024-07-25 DIAGNOSIS — G89.29 CHRONIC PAIN OF LEFT KNEE: Primary | Chronic | ICD-10-CM

## 2024-07-25 PROCEDURE — 97112 NEUROMUSCULAR REEDUCATION: CPT | Mod: HCNC,PO,CQ

## 2024-07-25 PROCEDURE — 97530 THERAPEUTIC ACTIVITIES: CPT | Mod: HCNC,PO,CQ

## 2024-07-25 NOTE — PROGRESS NOTES
[facility]   Physical Therapy Treatment Note      Name: Madan Cantu [facility]  Clinic Number: [id number]    Therapy Diagnosis:   Encounter Diagnoses   Name Primary?    Chronic pain of left knee Yes    Decreased functional mobility and endurance     Balance problems      Physician: Tashi Perkins III, *    Visit Date: 7/25/2024    Physician Orders: PT Eval and Treat   Medical Diagnosis from Referral:      Z96.651 (ICD-10-CM) - Status post right knee replacement   M17.12 (ICD-10-CM) - Primary osteoarthritis of left knee      Evaluation Date: 7/17/2024  Authorization Period Expiration: 04/25/2025   Plan of Care Expiration: 9/17/2024  Progress Note Due: 9/17/2024  Visit # / Visits authorized: 1/ 15   FOTO: 1/3     Precautions: Standard and Fall      Time In: 4:00 pm  Time Out: 5:00 pm  Total Billable time:  45 minutes    PTA Visit #: 1/5       Subjective     Patient reports: she has been doing her exercises at home and has had no pain or issues with them.  She was compliant with home exercise program.  Response to previous treatment: tolerated well  Functional change: ongoing    Pain: 0/10  Location: n/a    Objective      Objective Measures updated at progress report unless specified.     Treatment     Madan received the treatments listed below:      therapeutic exercises to develop strength, endurance, ROM, and flexibility for 06 minutes including:  Nu step 6'      neuromuscular re-education activities to improve: Balance, Coordination, Kinesthetic, Sense, Proprioception, and Posture for 44 minutes. The following activities were included:        SLR 3x10 ea robinson - cuing for quad NM activation  Bridge 3x10  Supine clam GTB 3x10x5 sec hold  SAQ 2lbs 3x10 ea robinson  LAQ 3x10 ea robinson    Progress to leg press DL 20-40 lbs if able - not performed     Future: balance activities + lateral walking     therapeutic activities to improve functional performance for 10  minutes, including:  Sit to stand  x10 reps from gold chair- increased time for instruction and completion        Patient Education and Home Exercises       Education provided:   - impairments  - importance of quad NM activation for knee function  - importance of posterior chain sharon glute NM activation for sit to stand and stairs    Written Home Exercises Provided: Patient instructed to cont prior HEP. Exercises were reviewed and Madan was able to demonstrate them prior to the end of the session.  Madan demonstrated good  understanding of the education provided. See Electronic Medical Record under Patient Instructions for exercises provided during therapy sessions    Assessment     Ms. Hager presents to treatment with no pain today. She has been compliant with HEP and was able to tolerate progressions today. She tolerated SAQ with 2# bilaterally with some mild discomfort in the LE. No resistance added for LAQ to allow for emphasis on quad contraction and muscle control. She was challenged by sit to stands, completing them with hands on thighs with instruction on proper technique. Increased time required to complete today. Will continue to progress as tolerated.     Madan Is progressing well towards her goals.   Patient prognosis is Fair.     Patient will continue to benefit from skilled outpatient physical therapy to address the deficits listed in the problem list box on initial evaluation, provide pt/family education and to maximize pt's level of independence in the home and community environment.     Patient's spiritual, cultural and educational needs considered and pt agreeable to plan of care and goals.     Anticipated Barriers for therapy: chronicity, age, a-fib, dizziness    GOALS: Short Term Goals:  0-4 weeks  1.Report decreased knee and back pain  < / =  3/10  to increase tolerance for ADLs  2. 30 sec sit to stand >/= 5 reps  3. Increase strength by 1/3 MMT grade in quad and hip extensors  to increase tolerance for ADL and work  activities.  4. Pt to tolerate HEP to improve ROM and independence with ADL's     Long Term Goals: 5-8 weeks  1.30 sec sit to stand >/= 7 reps  2.Patient goal: ADLs with min complaints.  3.Increase strength to >/= 4+/5 in quad and hip extensors  to increase tolerance for ADL and work activities.  4. Pt will report at CJ level (20-40% impaired) on FOTO knee to demonstrate increase in LE function with every day tasks.     Plan     Plan of care Certification: 7/17/2024 to 9/17/2024.    Continue to progress per PT POC    Ewa Snowden, PTA

## 2024-08-01 ENCOUNTER — CLINICAL SUPPORT (OUTPATIENT)
Dept: REHABILITATION | Facility: HOSPITAL | Age: 84
End: 2024-08-01
Payer: MEDICARE

## 2024-08-01 DIAGNOSIS — R26.89 BALANCE PROBLEMS: ICD-10-CM

## 2024-08-01 DIAGNOSIS — G89.29 CHRONIC PAIN OF LEFT KNEE: Primary | Chronic | ICD-10-CM

## 2024-08-01 DIAGNOSIS — Z74.09 DECREASED FUNCTIONAL MOBILITY AND ENDURANCE: ICD-10-CM

## 2024-08-01 DIAGNOSIS — M25.562 CHRONIC PAIN OF LEFT KNEE: Primary | Chronic | ICD-10-CM

## 2024-08-01 PROCEDURE — 97530 THERAPEUTIC ACTIVITIES: CPT | Mod: HCNC,PO

## 2024-08-01 PROCEDURE — 97112 NEUROMUSCULAR REEDUCATION: CPT | Mod: HCNC,PO

## 2024-08-01 NOTE — PROGRESS NOTES
[facility]   Physical Therapy Treatment Note      Name: Madan Cantu [facility]  Clinic Number: [id number]    Therapy Diagnosis:   Encounter Diagnoses   Name Primary?    Chronic pain of left knee Yes    Decreased functional mobility and endurance     Balance problems        Physician: Tashi Perkins III, *    Visit Date: 8/1/2024    Physician Orders: PT Eval and Treat   Medical Diagnosis from Referral:      Z96.651 (ICD-10-CM) - Status post right knee replacement   M17.12 (ICD-10-CM) - Primary osteoarthritis of left knee      Evaluation Date: 7/17/2024  Authorization Period Expiration: 04/25/2025   Plan of Care Expiration: 9/17/2024  Progress Note Due: 9/17/2024  Visit # / Visits authorized: 2/ 15   FOTO: 1/3     Precautions: Standard and Fall      Time In: 1500  Time Out: 1600  Total Billable time:  60 minutes - 25 min one on one (1 TA 1 NM)    PTA Visit #: 1/5     Subjective     Patient reports: Cont to perform HEP diligently at home. Has been practicing sit to stands and is able to perform this much better w/o use of hands on thighs.    She was compliant with home exercise program.  Response to previous treatment: tolerated well  Functional change: ongoing    Pain: 0/10  Location: n/a    Objective      Objective Measures updated at progress report unless specified.     Lower Extremity Strength:  Right LE   Left LE     Quadriceps: 4/5 Quadriceps: 4/5 * pain   Hamstrings: 4/5 Hamstrings: 4-/5   Hip Flexion: 3-/5 Hip Flexion: 3/5   Hip Extension:  3/5 Hip Extension: 3/5   Hip ER:  3+/5 Hip ER:  3+/5       Treatment     Madan received the treatments listed below:      therapeutic exercises to develop strength, endurance, ROM, and flexibility for 08 minutes including:    Nu step 8'    neuromuscular re-education activities to improve: Balance, Coordination, Kinesthetic, Sense, Proprioception, and Posture for 32 minutes. The following activities were included:    Patient education:  -  cue for form and quad NM activation  - cues for pain free ROM     SLR 3x15 ea robinson - cuing for quad NM activation  SAQ 3lbs 3x10 ea robinson - held on L LE secondary to knee discomfort  LAQ 4-5lbs 3x10 ea robinson    Review:  Bridge 3x10  Supine clam GTB 3x10x5 sec hold     NEXT VISIT:   Cont lateral walking  Hurdles  LP  Balance activities      therapeutic activities to improve functional performance for 20  minutes, including:    Lateral walking at table x5 laps  Lateral walking at table + RTB at knees x5 laps  Sit to stand RTB at knee 2x8 reps from gold chair        Patient Education and Home Exercises       Education provided:   - impairments  - importance of quad NM activation for knee function  - importance of posterior chain sharon glute NM activation for sit to stand and stairs    Written Home Exercises Provided: Patient instructed to cont prior HEP. Exercises were reviewed and Madan was able to demonstrate them prior to the end of the session.  Madan demonstrated good  understanding of the education provided. See Electronic Medical Record under Patient Instructions for exercises provided during therapy sessions    Assessment     Patient demonstrating good improvements in function with good HEP consistency. Able to progress challenge to quad NM activation along with progression to functional weightbearing activities.     Cont to progress as able with focus on quad and hip extensor progressions and increasing focus on balance.    At IE:  Patient presents with pain, muscular flexibility deficits sharon of hamstrings, proprioceptive deficits, functional strength deficits, and LE NM activation and strength deficits most pronounced in quads and hip extensors. Impairments contributory to poor eric to functional activities including sit to stand, stairs, and standing.     Madan Is progressing well towards her goals.   Patient prognosis is Fair.     Patient will continue to benefit from skilled outpatient physical therapy to  address the deficits listed in the problem list box on initial evaluation, provide pt/family education and to maximize pt's level of independence in the home and community environment.     Patient's spiritual, cultural and educational needs considered and pt agreeable to plan of care and goals.     Anticipated Barriers for therapy: chronicity, age, a-fib, dizziness    GOALS: Short Term Goals:  0-4 weeks  1.Report decreased knee and back pain  < / =  3/10  to increase tolerance for ADLs  2. 30 sec sit to stand >/= 5 reps  3. Increase strength by 1/3 MMT grade in quad and hip extensors  to increase tolerance for ADL and work activities.  4. Pt to tolerate HEP to improve ROM and independence with ADL's     Long Term Goals: 5-8 weeks  1.30 sec sit to stand >/= 7 reps  2.Patient goal: ADLs with min complaints.  3.Increase strength to >/= 4+/5 in quad and hip extensors  to increase tolerance for ADL and work activities.  4. Pt will report at CJ level (20-40% impaired) on FOTO knee to demonstrate increase in LE function with every day tasks.     Plan     Plan of care Certification: 7/17/2024 to 9/17/2024.    Continue to progress per PT POC    POPEYE SESAY, PT, DPT, OCS

## 2024-08-08 ENCOUNTER — CLINICAL SUPPORT (OUTPATIENT)
Dept: REHABILITATION | Facility: HOSPITAL | Age: 84
End: 2024-08-08
Payer: MEDICARE

## 2024-08-08 DIAGNOSIS — G89.29 CHRONIC PAIN OF LEFT KNEE: Primary | Chronic | ICD-10-CM

## 2024-08-08 DIAGNOSIS — Z74.09 DECREASED FUNCTIONAL MOBILITY AND ENDURANCE: ICD-10-CM

## 2024-08-08 DIAGNOSIS — M25.562 CHRONIC PAIN OF LEFT KNEE: Primary | Chronic | ICD-10-CM

## 2024-08-08 DIAGNOSIS — R26.89 BALANCE PROBLEMS: ICD-10-CM

## 2024-08-08 PROCEDURE — 97530 THERAPEUTIC ACTIVITIES: CPT | Mod: HCNC,PO

## 2024-08-08 PROCEDURE — 97112 NEUROMUSCULAR REEDUCATION: CPT | Mod: HCNC,PO

## 2024-08-15 ENCOUNTER — CLINICAL SUPPORT (OUTPATIENT)
Dept: REHABILITATION | Facility: HOSPITAL | Age: 84
End: 2024-08-15
Payer: MEDICARE

## 2024-08-15 DIAGNOSIS — M25.562 CHRONIC PAIN OF LEFT KNEE: Primary | Chronic | ICD-10-CM

## 2024-08-15 DIAGNOSIS — G89.29 CHRONIC PAIN OF LEFT KNEE: Primary | Chronic | ICD-10-CM

## 2024-08-15 DIAGNOSIS — Z74.09 DECREASED FUNCTIONAL MOBILITY AND ENDURANCE: ICD-10-CM

## 2024-08-15 DIAGNOSIS — R26.89 BALANCE PROBLEMS: ICD-10-CM

## 2024-08-15 PROCEDURE — 97112 NEUROMUSCULAR REEDUCATION: CPT | Mod: HCNC,PO

## 2024-08-15 PROCEDURE — 97110 THERAPEUTIC EXERCISES: CPT | Mod: HCNC,PO

## 2024-08-15 PROCEDURE — 97530 THERAPEUTIC ACTIVITIES: CPT | Mod: HCNC,PO

## 2024-08-15 NOTE — PROGRESS NOTES
[facility]   Physical Therapy Treatment Note      Name: Madan Cantu [facility]  Clinic Number: [id number]    Therapy Diagnosis:   Encounter Diagnoses   Name Primary?    Chronic pain of left knee Yes    Decreased functional mobility and endurance     Balance problems        Physician: Tashi Perkins III, *    Visit Date: 8/15/2024    Physician Orders: PT Eval and Treat   Medical Diagnosis from Referral:      Z96.651 (ICD-10-CM) - Status post right knee replacement   M17.12 (ICD-10-CM) - Primary osteoarthritis of left knee      Evaluation Date: 7/17/2024  Authorization Period Expiration: 04/25/2025   Plan of Care Expiration: 9/17/2024  Progress Note Due: 9/17/2024  Visit # / Visits authorized: 4/ 15   FOTO: 1/3     Precautions: Standard and Fall      Time In: 1500  Time Out: 1600  Total Billable time:  60 minutes - all one on one    PTA Visit #: 1/5     Subjective     Patient reports: No new complaints    FOTO IE - 42%  FOTO 8/15/2024 - 59%  FOTO Goal - 61%    She was compliant with home exercise program.  Response to previous treatment: tolerated well  Functional change: ongoing    Pain: 0/10  Location: n/a    Objective      Objective Measures updated at progress report unless specified.     Lower Extremity Strength:  Right LE   Left LE     Quadriceps: 4/5 Quadriceps: 4/5 * pain   Hamstrings: 4/5 Hamstrings: 4-/5   Hip Flexion: 3-/5 Hip Flexion: 3/5   Hip Extension:  3/5 Hip Extension: 3/5   Hip ER:  3+/5 Hip ER:  3+/5     Functional tests at IE:   30 sec sit to stand: 3 reps - use of UE on thighs    8/15/2025 (MCID 2)    30 sec sit to stand:  - 8 reps with use of UE on thigh  - 9 reps w/o use of UE  - 10 reps w/o use of UE  - 10 reps w/o use of UE    Treatment     Madan received the treatments listed below:      therapeutic exercises to develop strength, endurance, ROM, and flexibility for 10 minutes including:    Nu step 10 min    neuromuscular re-education activities to improve:  Balance, Coordination, Kinesthetic, Sense, Proprioception, and Posture for 35 minutes. The following activities were included:    Patient education:  - cue for form and quad NM activation  - cues for pain free ROM    Bridge 2x15  Bridge adduction 2x15  Bridge YTB abd 2x15    SLR + 3lbs 3x10 ea robinson - cuing for quad NM activation    Review:  SAQ 3lbs 3x10 ea robinson - held on L LE secondary to knee discomfort  LAQ 4-5lbs 3x10 ea robinson  Supine clam GTB 3x10x5 sec hold     NEXT VISIT:   Cont bridges to help with stairs  Lateral walking, Hurdles  Balance activities      therapeutic activities to improve functional performance for 15 minutes, including:    Patient education:  - results  - MCID    30 sec sit to stand:  - 8 reps with use of UE on thigh  - 9 reps w/o use of UE  - 10 reps w/o use of UE  - 10 reps w/o use of US      Held:  Lateral walking at table x5 laps  Lateral walk w/ hurdles 2x5 laps  Lateral walking at table + RTB at knees 2x5 laps  Sit to stand RTB at knee 2x8 reps from gold chair        Patient Education and Home Exercises       Education provided:   - impairments  - importance of quad NM activation for knee function  - importance of posterior chain sharon glute NM activation for sit to stand and stairs    Written Home Exercises Provided: Patient instructed to cont prior HEP. Exercises were reviewed and Madan was able to demonstrate them prior to the end of the session.  Madan demonstrated good  understanding of the education provided. See Electronic Medical Record under Patient Instructions for exercises provided during therapy sessions    Assessment     Patient progressing well with improving function per FOTO and objectively improved functional 30 sec sit to stand scores.    Cont to progress as able with focus on quad and hip extensor progressions and increasing focus on balance.     Cont with possible DC around 9/5 pending cont progress.    At IE:  Patient presents with pain, muscular flexibility deficits  sharon of hamstrings, proprioceptive deficits, functional strength deficits, and LE NM activation and strength deficits most pronounced in quads and hip extensors. Impairments contributory to poor eric to functional activities including sit to stand, stairs, and standing.     Madan Is progressing well towards her goals.   Patient prognosis is Fair.     Patient will continue to benefit from skilled outpatient physical therapy to address the deficits listed in the problem list box on initial evaluation, provide pt/family education and to maximize pt's level of independence in the home and community environment.     Patient's spiritual, cultural and educational needs considered and pt agreeable to plan of care and goals.     Anticipated Barriers for therapy: chronicity, age, a-fib, dizziness    GOALS: Short Term Goals:  0-4 weeks  1.Report decreased knee and back pain  < / =  3/10  to increase tolerance for ADLs  2. 30 sec sit to stand >/= 5 reps  3. Increase strength by 1/3 MMT grade in quad and hip extensors  to increase tolerance for ADL and work activities.  4. Pt to tolerate HEP to improve ROM and independence with ADL's     Long Term Goals: 5-8 weeks  1.30 sec sit to stand >/= 7 reps  2.Patient goal: ADLs with min complaints.  3.Increase strength to >/= 4+/5 in quad and hip extensors  to increase tolerance for ADL and work activities.  4. Pt will report at CJ level (20-40% impaired) on FOTO knee to demonstrate increase in LE function with every day tasks.     Plan     Plan of care Certification: 7/17/2024 to 9/17/2024.    Continue to progress per PT POC    POPEYE SESAY, PT, DPT, OCS

## 2024-08-19 NOTE — TELEPHONE ENCOUNTER
Care Due:                  Date            Visit Type   Department     Provider  --------------------------------------------------------------------------------                                MYCHART                              FOLLOWUP/OF  [facility] INTERNAL  Last Visit: 05-      FICE VISIT   MEDICINE       Sheila Mcgee  Next Visit: None Scheduled  None         None Found                                                            Last  Test          Frequency    Reason                     Performed    Due Date  --------------------------------------------------------------------------------    Mg Level....  12 months..  alendronate..............  Not Found    Overdue    Phosphate...  12 months..  alendronate..............  Not Found    Overdue    [employer] Embedded Care Due Messages. Reference number: [id number].   8/19/2024 11:55:23 AM CDT

## 2024-08-22 ENCOUNTER — CLINICAL SUPPORT (OUTPATIENT)
Dept: REHABILITATION | Facility: HOSPITAL | Age: 84
End: 2024-08-22
Payer: MEDICARE

## 2024-08-22 DIAGNOSIS — M25.562 CHRONIC PAIN OF LEFT KNEE: Primary | Chronic | ICD-10-CM

## 2024-08-22 DIAGNOSIS — G89.29 CHRONIC PAIN OF LEFT KNEE: Primary | Chronic | ICD-10-CM

## 2024-08-22 DIAGNOSIS — R26.89 BALANCE PROBLEMS: ICD-10-CM

## 2024-08-22 DIAGNOSIS — Z74.09 DECREASED FUNCTIONAL MOBILITY AND ENDURANCE: ICD-10-CM

## 2024-08-22 PROCEDURE — 97530 THERAPEUTIC ACTIVITIES: CPT | Mod: HCNC,PO,CQ

## 2024-08-22 PROCEDURE — 97112 NEUROMUSCULAR REEDUCATION: CPT | Mod: HCNC,PO,CQ

## 2024-08-22 NOTE — PROGRESS NOTES
[facility]   Physical Therapy Treatment Note      Name: Madan Cantu [facility]  Clinic Number: [id number]    Therapy Diagnosis:   Encounter Diagnoses   Name Primary?    Chronic pain of left knee Yes    Decreased functional mobility and endurance     Balance problems          Physician: Tashi Perkins III, *    Visit Date: 8/22/2024    Physician Orders: PT Eval and Treat   Medical Diagnosis from Referral:      Z96.651 (ICD-10-CM) - Status post right knee replacement   M17.12 (ICD-10-CM) - Primary osteoarthritis of left knee      Evaluation Date: 7/17/2024  Authorization Period Expiration: 04/25/2025   Plan of Care Expiration: 9/17/2024  Progress Note Due: 9/17/2024  Visit # / Visits authorized: 5/ 15   FOTO: 1/3     Precautions: Standard and Fall      Time In: 3:00 pm  Time Out: 3:52 pm  Total Billable time:  23 minutes one on one    PTA Visit #: 1/5     Subjective     Patient reports: she stayed up late last night and was on her pedal bike so she probably overdid it    FOTO IE - 42%  FOTO 8/15/2024 - 59%  FOTO Goal - 61%    She was compliant with home exercise program.  Response to previous treatment: tolerated well  Functional change: ongoing    Pain: 0/10  Location: n/a    Objective      Objective Measures updated at progress report unless specified.     Treatment     Madan received the treatments listed below:      therapeutic exercises to develop strength, endurance, ROM, and flexibility for 10 minutes including:    Nu step 10 min    neuromuscular re-education activities to improve: Balance, Coordination, Kinesthetic, Sense, Proprioception, and Posture for 22 minutes. The following activities were included:    Patient education:  - cue for form and quad NM activation  - cues for pain free ROM    Bridge 2x15  Bridge adduction 2x15  Bridge YTB abd 2x15    SLR + 3lbs 3x10 ea robinson - cuing for quad NM activation    Held:   SAQ 3lbs 3x10 ea robinson - held on L LE secondary to knee  discomfort  LAQ 4-5lbs 3x10 ea robinson  Supine clam GTB 3x10x5 sec hold        therapeutic activities to improve functional performance for 20 minutes, including:    Lateral walking at table x5 laps  Lateral walk w/ hurdles 2x5 laps  Lateral walking at table + RTB at knees 2x5 laps  Sit to stand RTB at knee 2x8 reps from gold chair        Patient Education and Home Exercises       Education provided:   - impairments  - importance of quad NM activation for knee function  - importance of posterior chain sharon glute NM activation for sit to stand and stairs    Written Home Exercises Provided: Patient instructed to cont prior HEP. Exercises were reviewed and Madan was able to demonstrate them prior to the end of the session.  Madan demonstrated good  understanding of the education provided. See Electronic Medical Record under Patient Instructions for exercises provided during therapy sessions    Assessment     Ms. Patricio continues with good carryover. She required use of single UE support for returning to sitting during first set, but completed without UE support for all additional sets. Added back balance activity today with good tolerance. Will continue to progress balance as tolerated.       Madan Is progressing well towards her goals.   Patient prognosis is Fair.     Patient will continue to benefit from skilled outpatient physical therapy to address the deficits listed in the problem list box on initial evaluation, provide pt/family education and to maximize pt's level of independence in the home and community environment.     Patient's spiritual, cultural and educational needs considered and pt agreeable to plan of care and goals.     Anticipated Barriers for therapy: chronicity, age, a-fib, dizziness    GOALS: Short Term Goals:  0-4 weeks  1.Report decreased knee and back pain  < / =  3/10  to increase tolerance for ADLs  2. 30 sec sit to stand >/= 5 reps  3. Increase strength by 1/3 MMT grade in quad and hip  extensors  to increase tolerance for ADL and work activities.  4. Pt to tolerate HEP to improve ROM and independence with ADL's     Long Term Goals: 5-8 weeks  1.30 sec sit to stand >/= 7 reps  2.Patient goal: ADLs with min complaints.  3.Increase strength to >/= 4+/5 in quad and hip extensors  to increase tolerance for ADL and work activities.  4. Pt will report at CJ level (20-40% impaired) on FOTO knee to demonstrate increase in LE function with every day tasks.     Plan     Plan of care Certification: 7/17/2024 to 9/17/2024.    Continue to progress per PT POC    Ewa Snowden, PTA

## 2024-08-27 ENCOUNTER — OFFICE VISIT (OUTPATIENT)
Dept: SPORTS MEDICINE | Facility: CLINIC | Age: 84
End: 2024-08-27
Payer: MEDICARE

## 2024-08-27 VITALS
SYSTOLIC BLOOD PRESSURE: 131 MMHG | DIASTOLIC BLOOD PRESSURE: 69 MMHG | BODY MASS INDEX: 23.27 KG/M2 | WEIGHT: 135.56 LBS | HEART RATE: 69 BPM

## 2024-08-27 DIAGNOSIS — G89.29 CHRONIC RIGHT SHOULDER PAIN: Primary | ICD-10-CM

## 2024-08-27 DIAGNOSIS — M25.511 CHRONIC RIGHT SHOULDER PAIN: Primary | ICD-10-CM

## 2024-08-27 PROCEDURE — 1159F MED LIST DOCD IN RCRD: CPT | Mod: HCNC,CPTII,S$GLB, | Performed by: ORTHOPAEDIC SURGERY

## 2024-08-27 PROCEDURE — 99999 PR PBB SHADOW E&M-EST. PATIENT-LVL III: CPT | Mod: PBBFAC,HCNC,, | Performed by: ORTHOPAEDIC SURGERY

## 2024-08-27 PROCEDURE — 3075F SYST BP GE 130 - 139MM HG: CPT | Mod: HCNC,CPTII,S$GLB, | Performed by: ORTHOPAEDIC SURGERY

## 2024-08-27 PROCEDURE — 1157F ADVNC CARE PLAN IN RCRD: CPT | Mod: HCNC,CPTII,S$GLB, | Performed by: ORTHOPAEDIC SURGERY

## 2024-08-27 PROCEDURE — 1125F AMNT PAIN NOTED PAIN PRSNT: CPT | Mod: HCNC,CPTII,S$GLB, | Performed by: ORTHOPAEDIC SURGERY

## 2024-08-27 PROCEDURE — 20610 DRAIN/INJ JOINT/BURSA W/O US: CPT | Mod: HCNC,RT,S$GLB, | Performed by: ORTHOPAEDIC SURGERY

## 2024-08-27 PROCEDURE — 1101F PT FALLS ASSESS-DOCD LE1/YR: CPT | Mod: HCNC,CPTII,S$GLB, | Performed by: ORTHOPAEDIC SURGERY

## 2024-08-27 PROCEDURE — 99214 OFFICE O/P EST MOD 30 MIN: CPT | Mod: 25,HCNC,S$GLB, | Performed by: ORTHOPAEDIC SURGERY

## 2024-08-27 PROCEDURE — 3078F DIAST BP <80 MM HG: CPT | Mod: HCNC,CPTII,S$GLB, | Performed by: ORTHOPAEDIC SURGERY

## 2024-08-27 PROCEDURE — 3288F FALL RISK ASSESSMENT DOCD: CPT | Mod: HCNC,CPTII,S$GLB, | Performed by: ORTHOPAEDIC SURGERY

## 2024-08-27 RX ORDER — TRIAMCINOLONE ACETONIDE 40 MG/ML
40 INJECTION, SUSPENSION INTRA-ARTICULAR; INTRAMUSCULAR
Status: DISCONTINUED | OUTPATIENT
Start: 2024-08-27 | End: 2024-08-27 | Stop reason: HOSPADM

## 2024-08-27 RX ADMIN — TRIAMCINOLONE ACETONIDE 40 MG: 40 INJECTION, SUSPENSION INTRA-ARTICULAR; INTRAMUSCULAR at 11:08

## 2024-08-27 NOTE — PROGRESS NOTES
CC: RIGHT shoulder pain    Patient returns to clinic for follow up of right shoulder. She reports moderate relief from the CSI last visit. She is requesting a repeat today.     Initial Hx:    84 y.o. Female with a  history of right shoulder atraumatic pain. Patient lives independently and is RHD. She states that over the last 3 months she has experienced increased right shoulder pain. She denies any fall or trauma. She has noticed pain with activity as well as at rest. She has night pain. She has not tried any injections or PT in the past.      She states that the pain is severe and not responding to any conservative care.      She reports that the pain and weakness is worse with overhead activity. It also bothers her at night.    Is affecting ADLs.  Pain is 6/10 at it's worst.      Past Medical History:   Diagnosis Date    A-fib     Anticoagulant long-term use     Anxiety     Arrhythmia     Arthritis     knees    Basal cell carcinoma     Breast cyst     Community acquired pneumonia of right lower lobe of lung 12/11/2018    Deep vein thrombosis     one DVT during  pregnancy    Encephalopathy 2/10/2021    Fibrocystic breast 1980 - ??    Heart murmur     Hypercholesterolemia 6/13/2017    Hyperlipidemia     Hypertension     Hypothyroidism     Mitral valve disorder     Mitral valve prolapse     PVC's (premature ventricular contractions)     Squamous cell carcinoma bx 7-2017    right upper forehead    Thyroid cancer 1/29/2013    Thyroid disease     Vasovagal near syncope 11/12/2012    Vasovagal near syncope 11/12/2012    VTE (venous thromboembolism)     in 1960s, post partum, pt unsure of details     Weakness 12/11/2018       Past Surgical History:   Procedure Laterality Date    BREAST BIOPSY Left     excisional    BREAST BIOPSY Left     core needle biopsy    BREAST CYST ASPIRATION Bilateral 1980's - ??    BREAST CYST EXCISION Right 2008 - ??    CARDIOVERSION N/A 08/13/2018    Procedure: CARDIOVERSION;  Surgeon: Fernando  ANDREINA Disla MD;  Location: [facility] CATH LAB;  Service: Cardiology;  Laterality: N/A;  AF,DCCV,ANAHI,MAC,FAS,3 PREP    CATARACT EXTRACTION W/  INTRAOCULAR LENS IMPLANT Left 04/22/2021    Procedure: EXTRACTION, CATARACT, WITH IOL INSERTION;  Surgeon: Gabby Sandoval MD;  Location: [facility] OR;  Service: Ophthalmology;  Laterality: Left;    CATARACT EXTRACTION W/  INTRAOCULAR LENS IMPLANT Right 05/17/2021    Procedure: EXTRACTION, CATARACT, WITH IOL INSERTION;  Surgeon: Gabby Sandoval MD;  Location: [facility] OR;  Service: Ophthalmology;  Laterality: Right;    HYSTERECTOMY      tahbso    KNEE ARTHROPLASTY Right 06/14/2021    Procedure: ARTHROPLASTY, KNEE:RIGHT:DEPUY-SIGMA;  Surgeon: Tashi Perkins III, MD;  Location: [facility] OR;  Service: Orthopedics;  Laterality: Right;    OOPHORECTOMY  hysterectomy - 2000 - ??    THYROID SURGERY         Family History   Problem Relation Name Age of Onset    Arrhythmia Son      Mitral valve prolapse Son      Sudden death Son          sudden cardiac death    Heart disease Mother      Hyperlipidemia Maternal Grandmother      No Known Problems Daughter      No Known Problems Son      No Known Problems Son      Breast cancer Maternal Aunt      Melanoma Neg Hx           Current Outpatient Medications:     alendronate (FOSAMAX) 70 MG tablet, TAKE 1 TABLET EVERY 7 DAYS, Disp: 12 tablet, Rfl: 3    ascorbic acid, vitamin C, (VITAMIN C) 1000 MG tablet, Take 1,000 mg by mouth once daily. Take the last dose before surgery 6/6, Disp: , Rfl:     atorvastatin (LIPITOR) 40 MG tablet, Take 1 tablet (40 mg total) by mouth once daily., Disp: 90 tablet, Rfl: 3    bisoprolol-hydrochlorothiazide 2.5-6.25 mg (ZIAC) 2.5-6.25 mg Tab, TAKE 1/2 TABLET EVERY DAY, Disp: 45 tablet, Rfl: 3    calcium citrate (CALCITRATE) 200 mg (950 mg) tablet, Take 1 tablet by mouth once daily. Take the last dose before surgery 6/6, Disp: , Rfl:     cephALEXin (KEFLEX) 500 MG capsule, One capsule three times daily (Patient not taking: Reported  on 8/27/2024), Disp: 21 capsule, Rfl: 0    cholecalciferol, vitamin D3, (VITAMIN D3) 50 mcg (2,000 unit) Cap, Take by mouth every morning. Take the last dose before surgery 6/6, Disp: , Rfl:     cranberry extract 200 mg Cap, Take 200 mg by mouth once daily., Disp: 30 capsule, Rfl: 12    cyanocobalamin (VITAMIN B-12) 1000 MCG tablet, Take 100 mcg by mouth every evening. Take the last dose before surgery 6/6, Disp: , Rfl:     D-MANNOSE ORAL, Take 4 tablets by mouth every morning. Take the last dose before surgery 6/6, Disp: , Rfl:     ELIQUIS 5 mg Tab, TAKE 1 TABLET TWICE DAILY, Disp: 180 tablet, Rfl: 3    estradioL (ESTRACE) 0.01 % (0.1 mg/gram) vaginal cream, APPLY A PEA SIZED AMOUNT DAILY FOR TWO WEEKS THEN THREE TIMES A WEEK. 90 day supply, Disp: 42.5 g, Rfl: 4    furosemide (LASIX) 20 MG tablet, One tablet by mouth twice a day  with additional tablet 5 days a week (total 19 tablets/ week), Disp: 247 tablet, Rfl: 3    ketoconazole (NIZORAL) 2 % cream, AAA qd- bid prn flare, Disp: 60 g, Rfl: 3    Lactobac no.30-Bifidobact no.4 (ULTIMATE NICKOLAS PROBIOTIC) 30 billion cell CpDR, Take 1 tablet by mouth every morning. Hold 7 days prior to surgery, Disp: , Rfl:     levothyroxine (SYNTHROID) 88 MCG tablet, TAKE 1 TABLET BEFORE BREAKFAST, Disp: 90 tablet, Rfl: 2    omega-3 fatty acids-vitamin E 1,000 mg Cap, Take 1 capsule by mouth every evening. Take the last dose before surgery 6/6, Disp: , Rfl:     pantoprazole (PROTONIX) 40 MG tablet, Take 1 tablet (40 mg total) by mouth once daily., Disp: 90 tablet, Rfl: 3    potassium chloride (KLOR-CON) 10 MEQ TbSR, Take by mouth 3 tablets a day (30 mEq) for 5 days then only 2 tablets a day (20 mEq) for 7 days then take only one tablet (10 mEq) per day., Disp: 119 tablet, Rfl: 3    RSVPreF3 antigen-AS01E, PF, (AREXVY, PF,) 120 mcg/0.5 mL SusR vaccine, Inject into the muscle. (Patient not taking: Reported on 8/27/2024), Disp: 1 each, Rfl: 0    VITAMIN B COMPLEX ORAL, Take 1 tablet by  mouth every evening. Take the last dose before surgery 6/6, Disp: , Rfl:     vitamin E 400 UNIT capsule, Take 400 Units by mouth once daily. Take the last dose before surgery 6/6, Disp: , Rfl:     Review of patient's allergies indicates:   Allergen Reactions    Fluoride Hives     Other reaction(s): Hives    Fluoride preparations Hives    Amiodarone analogues Rash    Amoxicillin-pot clavulanate Diarrhea    Nitrofurantoin monohyd/m-cryst Nausea Only and Other (See Comments)          REVIEW OF SYSTEMS:  Constitution: Negative. Negative for chills, fever and night sweats.   HENT: Negative for congestion and headaches.    Eyes: Negative for blurred vision, left vision loss and right vision loss.   Cardiovascular: Negative for chest pain and syncope.   Respiratory: Negative for cough and shortness of breath.    Endocrine: Negative for polydipsia, polyphagia and polyuria.   Hematologic/Lymphatic: Negative for bleeding problem. Does not bruise/bleed easily.   Skin: Negative for dry skin, itching and rash.   Musculoskeletal: Negative for falls.  Positive for right shoulder pain and muscle weakness.   Gastrointestinal: Negative for abdominal pain and bowel incontinence.   Genitourinary: Negative for bladder incontinence and nocturia.   Neurological: Negative for disturbances in coordination, loss of balance and seizures.   Psychiatric/Behavioral: Negative for depression. The patient does not have insomnia.    Allergic/Immunologic: Negative for hives and persistent infections.      PHYSICAL EXAMINATION:  Vitals:  /69   Pulse 69   Wt 61.5 kg (135 lb 9.3 oz)   LMP  (LMP Unknown)   BMI 23.27 kg/m²    General: The patient is alert and oriented x 3.  Mood is pleasant.  Observation of ears, eyes and nose reveal no gross abnormalities.  No labored breathing observed.  Gait is coordinated. Patient can toe walk and heel walk without difficulty.      RIGHT SHOULDER / UPPER EXTREMITY EXAM    OBSERVATION:      Swelling  none  Deformity  none   Discoloration  none   Scapular winging none   Scars   none  Atrophy  none    TENDERNESS / CREPITUS (T/C):          T/C      T/C   Clavicle   -/-  SUPRAspinatus    -/-     AC Jt.    -/-  INFRAspinatus  -/-    SC Jt.    -/-  Deltoid    -/-      G. Tuberosity  -/-  LH BICEP groove  -/-   Acromion:  -/-  Midline Neck   -/-     Scapular Spine -/-  Trapezium   -/-   SMA Scapula  -/-  GH jt. line - post  -/-     Scapulothoracic  -/-         ROM: (* = with pain)  Left shoulder   Right shoulder        AROM (PROM)   AROM (PROM)   FE    160° (160°)     80° (150)     ER at 0°    60°  (65°)    60°  (65°)   ER at 90° ABD  90°  (90°)    90°  (90°)   IR at 90°  ABD   NA  (40°)     NA  (40°)      IR (spine level)   T10     T10    STRENGTH: (* = with pain) Left shoulder   Right shoulder   SCAPTION   5/5    4/5*    IR    5/5    5/5   ER    5/5    4/5*   BICEPS   5/5    5/5   Deltoid    5/5    5/5     SIGNS:  Painful side       NEER   +   OANABELAS  neg    DOWNING   +    SPEEDS  neg     DROP ARM   -   BELLY PRESS neg   Superior escape none    LIFT-OFF  neg   X-Body ADD    neg    MOVING VALGUS neg        STABILITY TESTING    Left shoulder   Right shoulder    Translation     Anterior  up face     up face    Posterior  up face    up face    Sulcus   < 10mm    < 10 mm     Signs   Apprehension   neg      neg       Relocation   no change     no change      Jerk test  neg     neg    EXTREMITY NEURO-VASCULAR EXAM:    Sensation grossly intact to light touch all dermatomal regions.    DTR 2+ Biceps, Triceps, BR and Negative Chepes sign   Grossly intact motor function at Elbow, Wrist and Hand   Distal pulses radial and ulnar 2+, brisk cap refill, symmetric.      NECK:  Painless FROM and spinous processes non-tender. Negative Spurlings sign.      OTHER FINDINGS:  neg scapular dyskinesia    XRAYS:  Xrays including AP, Outlet and Axillary Lateral of shoulder are ordered / images reviewed by me:   No  fracture dislocation or other pathology   Acromion type one   Proximal migration of humeral head: Positive   GH arthritis: Moderate glenohumeral arthritis, with moderate AC joint arthritis           ASSESSMENT:   Right shoulder pain:  1. Chronic right shoulder pain        PLAN:      1. CSI Right Shoulder  2. Follow up in clinic in 3 months    All questions were answered, patient will contact us for questions or concerns in the interim.

## 2024-08-27 NOTE — PROCEDURES
Large Joint Aspiration/Injection: R subacromial bursa    Date/Time: 8/27/2024 11:15 AM    Performed by: Aftab Bello MD  Authorized by: Aftab Bello MD    Consent Done?:  Yes (Verbal)  Indications:  Pain  Site marked: the procedure site was marked    Timeout: prior to procedure the correct patient, procedure, and site was verified    Prep: patient was prepped and draped in usual sterile fashion    Local anesthesia used?: No      Details:  Needle Size:  22 G  Ultrasonic Guidance for needle placement?: No    Approach:  Posterior  Location:  Shoulder  Site:  R subacromial bursa  Medications:  40 mg triamcinolone acetonide 40 mg/mL  Patient tolerance:  Patient tolerated the procedure well with no immediate complications

## 2024-08-29 ENCOUNTER — CLINICAL SUPPORT (OUTPATIENT)
Dept: REHABILITATION | Facility: HOSPITAL | Age: 84
End: 2024-08-29
Payer: MEDICARE

## 2024-08-29 DIAGNOSIS — M25.562 CHRONIC PAIN OF LEFT KNEE: Primary | Chronic | ICD-10-CM

## 2024-08-29 DIAGNOSIS — R26.89 BALANCE PROBLEMS: ICD-10-CM

## 2024-08-29 DIAGNOSIS — G89.29 CHRONIC PAIN OF LEFT KNEE: Primary | Chronic | ICD-10-CM

## 2024-08-29 DIAGNOSIS — Z74.09 DECREASED FUNCTIONAL MOBILITY AND ENDURANCE: ICD-10-CM

## 2024-08-29 PROCEDURE — 97530 THERAPEUTIC ACTIVITIES: CPT | Mod: HCNC,PO,CQ

## 2024-08-29 PROCEDURE — 97112 NEUROMUSCULAR REEDUCATION: CPT | Mod: HCNC,PO,CQ

## 2024-08-29 NOTE — PROGRESS NOTES
[facility]   Physical Therapy Treatment Note      Name: Madan Cantu [facility]  Clinic Number: [id number]    Therapy Diagnosis:   Encounter Diagnoses   Name Primary?    Chronic pain of left knee Yes    Decreased functional mobility and endurance     Balance problems            Physician: Tashi Perkins III, *    Visit Date: 8/29/2024    Physician Orders: PT Eval and Treat   Medical Diagnosis from Referral:      Z96.651 (ICD-10-CM) - Status post right knee replacement   M17.12 (ICD-10-CM) - Primary osteoarthritis of left knee      Evaluation Date: 7/17/2024  Authorization Period Expiration: 04/25/2025   Plan of Care Expiration: 9/17/2024  Progress Note Due: 9/17/2024  Visit # / Visits authorized: 6/ 15   FOTO: 1/3     Precautions: Standard and Fall      Time In: 3:02 pm  Time Out: 3:57 pm  Total Billable time:  55 minutes one on one    PTA Visit #: 1/5     Subjective     Patient reports: she has been a little more dizzy than usual today    FOTO IE - 42%  FOTO 8/15/2024 - 59%  FOTO Goal - 61%    She was compliant with home exercise program.  Response to previous treatment: tolerated well  Functional change: ongoing    Pain: 0/10  Location: n/a    Objective      Objective Measures updated at progress report unless specified.     Treatment     Madan received the treatments listed below:      therapeutic exercises to develop strength, endurance, ROM, and flexibility for 10 minutes including:    Nu step 10 min    neuromuscular re-education activities to improve: Balance, Coordination, Kinesthetic, Sense, Proprioception, and Posture for 30 minutes. The following activities were included:    Patient education:  - cue for form and quad NM activation  - cues for pain free ROM    Bridge 2x15  Bridge adduction 2x15  Bridge YTB abd 2x15    SLR + 3lbs 3x10 ea robinson - cuing for quad NM activation    Held:   SAQ 3lbs 3x10 ea robinson - held on L LE secondary to knee discomfort  LAQ 4-5lbs 3x10 ea  robinson  Supine clam GTB 3x10x5 sec hold        therapeutic activities to improve functional performance for 15 minutes, including:    Sit to stand RTB at knee 2x8 reps from gold chair  Leg press 20# 3x10    Held:  Lateral walking at table x5 laps  Lateral walk w/ hurdles 2x5 laps  Lateral walking at table + RTB at knees 2x5 laps    Patient Education and Home Exercises       Education provided:   - impairments  - importance of quad NM activation for knee function  - importance of posterior chain sharon glute NM activation for sit to stand and stairs    Written Home Exercises Provided: Patient instructed to cont prior HEP. Exercises were reviewed and Madan was able to demonstrate them prior to the end of the session.  Madan demonstrated good  understanding of the education provided. See Electronic Medical Record under Patient Instructions for exercises provided during therapy sessions    Assessment     Ms. Patricio reported increased dizziness upon arrival today. No lateral stepping actiivty due to increased dizziness with this today. She was able to tolerate progression of leg press with no adverse effects. She continues to require cueing throughout session for proper muscle activation. Will continue to progress as tolerated.         Madan Is progressing well towards her goals.   Patient prognosis is Fair.     Patient will continue to benefit from skilled outpatient physical therapy to address the deficits listed in the problem list box on initial evaluation, provide pt/family education and to maximize pt's level of independence in the home and community environment.     Patient's spiritual, cultural and educational needs considered and pt agreeable to plan of care and goals.     Anticipated Barriers for therapy: chronicity, age, a-fib, dizziness    GOALS: Short Term Goals:  0-4 weeks  1.Report decreased knee and back pain  < / =  3/10  to increase tolerance for ADLs  2. 30 sec sit to stand >/= 5 reps  3. Increase strength  by 1/3 MMT grade in quad and hip extensors  to increase tolerance for ADL and work activities.  4. Pt to tolerate HEP to improve ROM and independence with ADL's     Long Term Goals: 5-8 weeks  1.30 sec sit to stand >/= 7 reps  2.Patient goal: ADLs with min complaints.  3.Increase strength to >/= 4+/5 in quad and hip extensors  to increase tolerance for ADL and work activities.  4. Pt will report at CJ level (20-40% impaired) on FOTO knee to demonstrate increase in LE function with every day tasks.     Plan     Plan of care Certification: 7/17/2024 to 9/17/2024.    Continue to progress per PT POC    Ewa Snowden, PTA

## 2024-08-30 RX ORDER — ALENDRONATE SODIUM 70 MG/1
TABLET ORAL
Qty: 12 TABLET | Refills: 3 | Status: SHIPPED | OUTPATIENT
Start: 2024-08-30

## 2024-09-05 ENCOUNTER — CLINICAL SUPPORT (OUTPATIENT)
Dept: REHABILITATION | Facility: HOSPITAL | Age: 84
End: 2024-09-05
Payer: MEDICARE

## 2024-09-05 DIAGNOSIS — R26.89 BALANCE PROBLEMS: ICD-10-CM

## 2024-09-05 DIAGNOSIS — M25.562 CHRONIC PAIN OF LEFT KNEE: Primary | Chronic | ICD-10-CM

## 2024-09-05 DIAGNOSIS — Z74.09 DECREASED FUNCTIONAL MOBILITY AND ENDURANCE: ICD-10-CM

## 2024-09-05 DIAGNOSIS — G89.29 CHRONIC PAIN OF LEFT KNEE: Primary | Chronic | ICD-10-CM

## 2024-09-05 PROCEDURE — 97110 THERAPEUTIC EXERCISES: CPT | Mod: KX,HCNC,PO

## 2024-09-05 PROCEDURE — 97112 NEUROMUSCULAR REEDUCATION: CPT | Mod: KX,HCNC,PO

## 2024-09-05 NOTE — PROGRESS NOTES
[facility]   Physical Therapy Treatment Note      Name: Madan Cantu [facility]  Clinic Number: [id number]    Therapy Diagnosis:   Encounter Diagnoses   Name Primary?    Chronic pain of left knee Yes    Decreased functional mobility and endurance     Balance problems      Physician: Tashi Perkins III, *    Visit Date: 9/5/2024    Physician Orders: PT Eval and Treat   Medical Diagnosis from Referral:      Z96.651 (ICD-10-CM) - Status post right knee replacement   M17.12 (ICD-10-CM) - Primary osteoarthritis of left knee      Evaluation Date: 7/17/2024  Authorization Period Expiration: 04/25/2025   Plan of Care Expiration: 9/17/2024  Progress Note Due: 9/17/2024  Visit # / Visits authorized: 7/ 15   FOTO: 1/3     Precautions: Standard and Fall      Time In: 1505  Time Out: 1555  Total Billable time:  50 minutes - 25 min one on one (1 TE, 1 NM)    PTA Visit #: 1/5     Subjective     Patient reports: continues to have a little more dizziness than usual. Is amenable to PT intervention still.    NEXT VISIT - FOTO + DC    FOTO IE - 42%  FOTO 8/15/2024 - 59%  FOTO Goal - 61%    She was compliant with home exercise program.  Response to previous treatment: tolerated well  Functional change: ongoing    Pain: 0/10  Location: n/a    Objective      Objective Measures updated at progress report unless specified.     Lower Extremity Strength at IE:  Right LE   Left LE     Quadriceps: 4/5 Quadriceps: 4/5 * pain   Hamstrings: 4/5 Hamstrings: 4-/5   Hip Flexion: 3-/5 Hip Flexion: 3/5   Hip Extension:  3/5 Hip Extension: 3/5   Hip ER:  3+/5 Hip ER:  3+/5      Functional tests at IE:   30 sec sit to stand: 3 reps - use of UE on thighs     8/15/2025 (MCID 2)     30 sec sit to stand:  - 8 reps with use of UE on thigh  - 9 reps w/o use of UE  - 10 reps w/o use of UE  - 10 reps w/o use of UE    Treatment     Madan received the treatments listed below:      therapeutic exercises to develop strength,  endurance, ROM, and flexibility for 20 minutes including:    Patient education:  - reaching out to PCP for further assessments of dizziness  - anatomy of L/S conditions as it relates to LBP presentation / interventions to assist this with respect to HEP  - HEP    Nu step 10 min    neuromuscular re-education activities to improve: Balance, Coordination, Kinesthetic, Sense, Proprioception, and Posture for 30 minutes. The following activities were included:    Patient education:  - cue for form and quad + glute NM activation  - cues for pain free ROM    SLR 10x10 sec ea robinson  Bridge adduction 2x10x5 sec hold  Bridge abduction YTB 2x10x5 sec hold    Held:   Bridge 2x15  Bridge adduction 2x15  Bridge YTB abd 2x15  SLR + 3lbs 3x10 ea robinson - cuing for quad NM activation  SAQ 3lbs 3x10 ea robinson - held on L LE secondary to knee discomfort  LAQ 4-5lbs 3x10 ea robinson  Supine clam GTB 3x10x5 sec hold        therapeutic activities to improve functional performance for 00 minutes, including:    Held:  Sit to stand RTB at knee 2x8 reps from gold chair  Leg press 20# 3x10  Lateral walking at table x5 laps  Lateral walk w/ hurdles 2x5 laps  Lateral walking at table + RTB at knees 2x5 laps    Patient Education and Home Exercises       Education provided:   - see above    Written Home Exercises Provided: Patient instructed to cont prior HEP. Exercises were reviewed and Madan was able to demonstrate them prior to the end of the session.  Madan demonstrated good  understanding of the education provided. See Electronic Medical Record under Patient Instructions for exercises provided during therapy sessions    Assessment     Patient cont to have increased dizziness symptoms w/o known MOA. She has had a long history of this ~3 years, but has previously been assessed w/o reported known cause of symptoms. Advised patient in reaching out to PCP for possible further assessment and or management suggestions.    Held on functional activities due to  dizziness again today.     Functionally she has progressed well with PT and has good HEP going forward. Will likely reassess and DC for orthopedic conditions at next visit.    Madan Is progressing well towards her goals.   Patient prognosis is Fair.     Patient will continue to benefit from skilled outpatient physical therapy to address the deficits listed in the problem list box on initial evaluation, provide pt/family education and to maximize pt's level of independence in the home and community environment.     Patient's spiritual, cultural and educational needs considered and pt agreeable to plan of care and goals.     Anticipated Barriers for therapy: chronicity, age, a-fib, dizziness    GOALS: Short Term Goals:  0-4 weeks  1.Report decreased knee and back pain  < / =  3/10  to increase tolerance for ADLs  2. 30 sec sit to stand >/= 5 reps  3. Increase strength by 1/3 MMT grade in quad and hip extensors  to increase tolerance for ADL and work activities.  4. Pt to tolerate HEP to improve ROM and independence with ADL's     Long Term Goals: 5-8 weeks  1.30 sec sit to stand >/= 7 reps  2.Patient goal: ADLs with min complaints.  3.Increase strength to >/= 4+/5 in quad and hip extensors  to increase tolerance for ADL and work activities.  4. Pt will report at CJ level (20-40% impaired) on FOTO knee to demonstrate increase in LE function with every day tasks.     Plan     Plan of care Certification: 7/17/2024 to 9/17/2024.    Continue to progress per PT POC    POPEYE SESAY, PT, DPT, OCS

## 2024-09-22 NOTE — PROGRESS NOTES
[facility]   Physical Therapy Treatment Note      Name: Madan Bell  Clinic Number: [id number]    Therapy Diagnosis:   Encounter Diagnoses   Name Primary?    Chronic pain of left knee Yes    Decreased functional mobility and endurance     Balance problems        Physician: No ref. provider found    Visit Date: 9/23/2024    Physician Orders: PT Eval and Treat   Medical Diagnosis from Referral:      Z96.651 (ICD-10-CM) - Status post right knee replacement   M17.12 (ICD-10-CM) - Primary osteoarthritis of left knee      Evaluation Date: 7/17/2024  Authorization Period Expiration: 04/25/2025   Plan of Care Expiration: 9/17/2024  Progress Note Due: 9/17/2024  Visit # / Visits authorized: 8/ 15   FOTO: 1/3     Precautions: Standard and Fall      Time In: 1515  Time Out: 1540  Total Billable time:  25 minutes - 25 min one on one (1 TE, 1 NM)    PTA Visit #: 1/5     Subjective     Patient reports: cont compliance with HEP. Feels like this still benefits her.       FOTO IE - 42%  FOTO 8/15/2024 - 59%  FOTO 9/23/2024 - 61%  FOTO Goal - 62%    She was compliant with home exercise program.  Response to previous treatment: tolerated well  Functional change: ongoing    Pain: 0/10  Location: n/a    Objective      Objective Measures updated at progress report unless specified.     Lower Extremity Strength at IE:  Right LE   Left LE     Quadriceps: 4/5 Quadriceps: 4/5 * pain   Hamstrings: 4/5 Hamstrings: 4-/5   Hip Flexion: 3-/5 Hip Flexion: 3/5   Hip Extension:  3/5 Hip Extension: 3/5   Hip ER:  3+/5 Hip ER:  3+/5      Functional tests at IE:   30 sec sit to stand: 3 reps - use of UE on thighs     8/15/2024 (MCID 2)     30 sec sit to stand:  - 8 reps with use of UE on thigh  - 9 reps w/o use of UE  - 10 reps w/o use of UE  - 10 reps w/o use of UE    9/23/24    30 sec sit to stand:  - 11 reps w/o use of UE  - 10 reps w/o use of UE      Treatment     Madan received the treatments listed below:       neuromuscular re-education activities to improve: Balance, Coordination, Kinesthetic, Sense, Proprioception, and Posture for 15 minutes. The following activities were included:    Patient education:  - importance of cont quad / glute NM activation with progressions  - cue for form and quad + glute NM activation  - review of HEP    Upright SLR x15 ea robinson  Bridge 15x5 sec  Supine clam BTB 15x5 sec hold     therapeutic activities to improve functional performance for 10 minutes, including:    Patient education:  - results of testing compared to age and gender norms  - importance of cont functional strength / activities for maintenance of current level of function    30 sec sit to stand:  - 11 reps w/o use of UE  - 10 reps w/o use of UE      Patient Education and Home Exercises       Education provided:   - see above    Written Home Exercises Provided: Patient instructed to cont prior HEP. Exercises were reviewed and Madan was able to demonstrate them prior to the end of the session.  Madan demonstrated good  understanding of the education provided. See Electronic Medical Record under Patient Instructions for exercises provided during therapy sessions    Assessment     Patient demonstrates significant increase in function per FOTO scores along with 30 sec sit to stand testings. She demonstrates good compliance and understanding of progressions for quad and glute NM activation along with cont functional strengthening. Secondary to progress patient is DC with HEP going forward.    Madan Is progressing well towards her goals.   Patient prognosis is Fair.     Patient will continue to benefit from skilled outpatient physical therapy to address the deficits listed in the problem list box on initial evaluation, provide pt/family education and to maximize pt's level of independence in the home and community environment.     Patient's spiritual, cultural and educational needs considered and pt agreeable to plan of care  and goals.     Anticipated Barriers for therapy: chronicity, age, a-fib, dizziness    GOALS: Short Term Goals:  0-4 weeks  1.Report decreased knee and back pain  < / =  3/10  to increase tolerance for ADLs - MET  2. 30 sec sit to stand >/= 5 reps - MET  3. Increase strength by 1/3 MMT grade in quad and hip extensors  to increase tolerance for ADL and work activities. - MET  4. Pt to tolerate HEP to improve ROM and independence with ADL's - MET     Long Term Goals: 5-8 weeks  1.30 sec sit to stand >/= 7 reps - MET  2.Patient goal: ADLs with min complaints. - MET  3.Increase strength to >/= 4+/5 in quad and hip extensors  to increase tolerance for ADL and work activities. - MET  4. Pt will report at CJ level (20-40% impaired) on FOTO knee to demonstrate increase in LE function with every day tasks. - MET    Plan     Plan of care Certification: 7/17/2024 to 9/17/2024.    DC with HEP    POPEYE SESAY, PT, DPT, OCS

## 2024-09-23 ENCOUNTER — PATIENT MESSAGE (OUTPATIENT)
Dept: INTERNAL MEDICINE | Facility: CLINIC | Age: 84
End: 2024-09-23
Payer: MEDICARE

## 2024-09-23 ENCOUNTER — CLINICAL SUPPORT (OUTPATIENT)
Dept: REHABILITATION | Facility: HOSPITAL | Age: 84
End: 2024-09-23
Payer: MEDICARE

## 2024-09-23 DIAGNOSIS — R26.89 BALANCE PROBLEMS: ICD-10-CM

## 2024-09-23 DIAGNOSIS — Z74.09 DECREASED FUNCTIONAL MOBILITY AND ENDURANCE: ICD-10-CM

## 2024-09-23 DIAGNOSIS — M25.562 CHRONIC PAIN OF LEFT KNEE: Primary | Chronic | ICD-10-CM

## 2024-09-23 DIAGNOSIS — G89.29 CHRONIC PAIN OF LEFT KNEE: Primary | Chronic | ICD-10-CM

## 2024-09-23 PROCEDURE — 97112 NEUROMUSCULAR REEDUCATION: CPT | Mod: HCNC,PO

## 2024-09-23 PROCEDURE — 97530 THERAPEUTIC ACTIVITIES: CPT | Mod: HCNC,PO

## 2024-09-24 NOTE — PLAN OF CARE
[facility]   Physical Therapy Treatment Note      Name: Madan Bell  Clinic Number: [id number]    Therapy Diagnosis:   Encounter Diagnoses   Name Primary?    Chronic pain of left knee Yes    Decreased functional mobility and endurance     Balance problems        Physician: No ref. provider found    Visit Date: 9/23/2024    Physician Orders: PT Eval and Treat   Medical Diagnosis from Referral:      Z96.651 (ICD-10-CM) - Status post right knee replacement   M17.12 (ICD-10-CM) - Primary osteoarthritis of left knee      Evaluation Date: 7/17/2024  Authorization Period Expiration: 04/25/2025   Plan of Care Expiration: 9/17/2024  Progress Note Due: 9/17/2024  Visit # / Visits authorized: 8/ 15   FOTO: 1/3     Precautions: Standard and Fall      Time In: 1515  Time Out: 1540  Total Billable time:  25 minutes - 25 min one on one (1 TE, 1 NM)    PTA Visit #: 1/5     Subjective     Patient reports: cont compliance with HEP. Feels like this still benefits her.       FOTO IE - 42%  FOTO 8/15/2024 - 59%  FOTO 9/23/2024 - 61%  FOTO Goal - 62%    She was compliant with home exercise program.  Response to previous treatment: tolerated well  Functional change: ongoing    Pain: 0/10  Location: n/a    Objective      Objective Measures updated at progress report unless specified.     Lower Extremity Strength at IE:  Right LE   Left LE     Quadriceps: 4/5 Quadriceps: 4/5 * pain   Hamstrings: 4/5 Hamstrings: 4-/5   Hip Flexion: 3-/5 Hip Flexion: 3/5   Hip Extension:  3/5 Hip Extension: 3/5   Hip ER:  3+/5 Hip ER:  3+/5      Functional tests at IE:   30 sec sit to stand: 3 reps - use of UE on thighs     8/15/2024 (MCID 2)     30 sec sit to stand:  - 8 reps with use of UE on thigh  - 9 reps w/o use of UE  - 10 reps w/o use of UE  - 10 reps w/o use of UE    9/23/24    30 sec sit to stand:  - 11 reps w/o use of UE  - 10 reps w/o use of UE      Treatment     Madan received the treatments listed below:       neuromuscular re-education activities to improve: Balance, Coordination, Kinesthetic, Sense, Proprioception, and Posture for 15 minutes. The following activities were included:    Patient education:  - importance of cont quad / glute NM activation with progressions  - cue for form and quad + glute NM activation  - review of HEP    Upright SLR x15 ea robinson  Bridge 15x5 sec  Supine clam BTB 15x5 sec hold     therapeutic activities to improve functional performance for 10 minutes, including:    Patient education:  - results of testing compared to age and gender norms  - importance of cont functional strength / activities for maintenance of current level of function    30 sec sit to stand:  - 11 reps w/o use of UE  - 10 reps w/o use of UE      Patient Education and Home Exercises       Education provided:   - see above    Written Home Exercises Provided: Patient instructed to cont prior HEP. Exercises were reviewed and Madan was able to demonstrate them prior to the end of the session.  Madan demonstrated good  understanding of the education provided. See Electronic Medical Record under Patient Instructions for exercises provided during therapy sessions    Assessment     Patient demonstrates significant increase in function per FOTO scores along with 30 sec sit to stand testings. She demonstrates good compliance and understanding of progressions for quad and glute NM activation along with cont functional strengthening. Secondary to progress patient is DC with HEP going forward.    Madan Is progressing well towards her goals.   Patient prognosis is Fair.     Patient will continue to benefit from skilled outpatient physical therapy to address the deficits listed in the problem list box on initial evaluation, provide pt/family education and to maximize pt's level of independence in the home and community environment.     Patient's spiritual, cultural and educational needs considered and pt agreeable to plan of care  and goals.     Anticipated Barriers for therapy: chronicity, age, a-fib, dizziness    GOALS: Short Term Goals:  0-4 weeks  1.Report decreased knee and back pain  < / =  3/10  to increase tolerance for ADLs - MET  2. 30 sec sit to stand >/= 5 reps - MET  3. Increase strength by 1/3 MMT grade in quad and hip extensors  to increase tolerance for ADL and work activities. - MET  4. Pt to tolerate HEP to improve ROM and independence with ADL's - MET     Long Term Goals: 5-8 weeks  1.30 sec sit to stand >/= 7 reps - MET  2.Patient goal: ADLs with min complaints. - MET  3.Increase strength to >/= 4+/5 in quad and hip extensors  to increase tolerance for ADL and work activities. - MET  4. Pt will report at CJ level (20-40% impaired) on FOTO knee to demonstrate increase in LE function with every day tasks. - MET    Plan     Plan of care Certification: 7/17/2024 to 9/17/2024.    DC with HEP    POPEYE SESAY, PT, DPT, OCS

## 2024-09-30 ENCOUNTER — OFFICE VISIT (OUTPATIENT)
Dept: DERMATOLOGY | Facility: CLINIC | Age: 84
End: 2024-09-30
Payer: MEDICARE

## 2024-09-30 ENCOUNTER — PATIENT MESSAGE (OUTPATIENT)
Dept: CARDIOLOGY | Facility: CLINIC | Age: 84
End: 2024-09-30
Payer: MEDICARE

## 2024-09-30 DIAGNOSIS — L21.9 SEBORRHEIC DERMATITIS: ICD-10-CM

## 2024-09-30 DIAGNOSIS — L82.1 SK (SEBORRHEIC KERATOSIS): ICD-10-CM

## 2024-09-30 DIAGNOSIS — L57.0 AK (ACTINIC KERATOSIS): Primary | ICD-10-CM

## 2024-09-30 DIAGNOSIS — L81.7 PIGMENTED PURPURIC DERMATOSIS: ICD-10-CM

## 2024-09-30 DIAGNOSIS — I48.11 LONGSTANDING PERSISTENT ATRIAL FIBRILLATION: ICD-10-CM

## 2024-09-30 DIAGNOSIS — L64.9 ANDROGENETIC ALOPECIA: ICD-10-CM

## 2024-09-30 DIAGNOSIS — D48.5 NEOPLASM OF UNCERTAIN BEHAVIOR OF SKIN: ICD-10-CM

## 2024-09-30 DIAGNOSIS — I50.33 CHF (CONGESTIVE HEART FAILURE), NYHA CLASS I, ACUTE ON CHRONIC, DIASTOLIC: Primary | ICD-10-CM

## 2024-09-30 DIAGNOSIS — L82.0 INFLAMED SEBORRHEIC KERATOSIS: ICD-10-CM

## 2024-09-30 PROCEDURE — 1157F ADVNC CARE PLAN IN RCRD: CPT | Mod: HCNC,CPTII,S$GLB, | Performed by: DERMATOLOGY

## 2024-09-30 PROCEDURE — 1126F AMNT PAIN NOTED NONE PRSNT: CPT | Mod: HCNC,CPTII,S$GLB, | Performed by: DERMATOLOGY

## 2024-09-30 PROCEDURE — 99999 PR PBB SHADOW E&M-EST. PATIENT-LVL III: CPT | Mod: PBBFAC,HCNC,, | Performed by: DERMATOLOGY

## 2024-09-30 PROCEDURE — 1101F PT FALLS ASSESS-DOCD LE1/YR: CPT | Mod: HCNC,CPTII,S$GLB, | Performed by: DERMATOLOGY

## 2024-09-30 PROCEDURE — 99213 OFFICE O/P EST LOW 20 MIN: CPT | Mod: 25,HCNC,S$GLB, | Performed by: DERMATOLOGY

## 2024-09-30 PROCEDURE — 11103 TANGNTL BX SKIN EA SEP/ADDL: CPT | Mod: HCNC,S$GLB,, | Performed by: DERMATOLOGY

## 2024-09-30 PROCEDURE — 88305 TISSUE EXAM BY PATHOLOGIST: CPT | Mod: HCNC | Performed by: PATHOLOGY

## 2024-09-30 PROCEDURE — 17000 DESTRUCT PREMALG LESION: CPT | Mod: HCNC,XS,S$GLB, | Performed by: DERMATOLOGY

## 2024-09-30 PROCEDURE — 11102 TANGNTL BX SKIN SINGLE LES: CPT | Mod: HCNC,S$GLB,, | Performed by: DERMATOLOGY

## 2024-09-30 PROCEDURE — 1159F MED LIST DOCD IN RCRD: CPT | Mod: HCNC,CPTII,S$GLB, | Performed by: DERMATOLOGY

## 2024-09-30 PROCEDURE — 1160F RVW MEDS BY RX/DR IN RCRD: CPT | Mod: HCNC,CPTII,S$GLB, | Performed by: DERMATOLOGY

## 2024-09-30 PROCEDURE — 3288F FALL RISK ASSESSMENT DOCD: CPT | Mod: HCNC,CPTII,S$GLB, | Performed by: DERMATOLOGY

## 2024-09-30 NOTE — PATIENT INSTRUCTIONS
Reassurance  This is secondary to increased pressure and inflammation on capillaries forcing fluid into skin   Blood extravasating out of vessels deposits iron which contributes to brown discoloration  Compression hose if on feet regularly, leg elevation, Vit C 500 mg po BID may be beneficial      Rogaine- can use 5% to affected area 1x per day. Must use consistently and if you stop using, the hair it stimulated to grow may fall out over time. Generic acceptable    Rosemary oil 3x per week topically to scalp     Pumpkin seed oil 400 mg daily - orally          Shave Biopsy Wound Care    Your doctor has performed a shave biopsy today.  A band aid and vaseline ointment has been placed over the site.  This should remain in place for NO LONGER THAN 48 hours.  It is fine to remove the bandaid after 24 hours, if the area is no longer bleeding. It is recommended that you keep the area dry (do not wet)) for the first 24 hours.  After 24 hours, wash the area with warm soap and water and apply Vaseline jelly.  Many patients prefer to use Neosporin or Bacitracin ointment.  This is acceptable; however, know that you can develop an allergy to this medication even if you have used it safely for years.  It is important to keep the area moist.  Letting it dry out and get air slows healing time, and will worsen the scar.        If you notice increasing redness, tenderness, pain, or yellow drainage at the biopsy site, please notify your doctor.  These are signs of an infection.    If your biopsy site is bleeding, apply firm pressure for 15 minutes straight.  Repeat for another 15 minutes, if it is still bleeding.   If the surgical site continues to bleed, then please contact your doctor.      For [employer] users:   You will receive your biopsy results in [facility] as soon as they are available. Please be assured that your physician/provider will review your results and will then determine what further treatment, evaluation, or  planning is required. You should be contacted by your physician's/provider's office within 5 business days of receiving your results; If not, please reach out to directly. This is one more way [facility] is putting you first.     [address] [place] [id number]/ [phone] FAX/ [email or web address]

## 2024-09-30 NOTE — PROGRESS NOTES
Subjective:      Patient ID:  Madan Bell is a 84 y.o. female who presents for   Chief Complaint   Patient presents with    Skin Check     TBSE      Pt deferred TBSE , pt c/o itchy  lesion on right upper neck.   C/o itchy scaly rash on left brow  C/o new bump on left eyelid  C/o lesion on chest that was scaly and raised and has resolved  C/o dark color on legs and scaly lesion/rash that has improved        Review of Systems   Skin:  Positive for rash, dry skin, daily sunscreen use and activity-related sunscreen use.   Hematologic/Lymphatic: Bruises/bleeds easily (eliquis).       Objective:   Physical Exam   Constitutional: She appears well-developed and well-nourished. No distress.   Neurological: She is alert and oriented to person, place, and time. She is not disoriented.   Psychiatric: She has a normal mood and affect.   Skin:   Areas Examined (abnormalities noted in diagram):   Head / Face Inspection Performed  Neck Inspection Performed  RLE Inspected  LLE Inspection Performed                 Diagram Legend     Erythematous scaling macule/papule c/w actinic keratosis       Vascular papule c/w angioma      Pigmented verrucoid papule/plaque c/w seborrheic keratosis      Yellow umbilicated papule c/w sebaceous hyperplasia      Irregularly shaped tan macule c/w lentigo     1-2 mm smooth white papules consistent with Milia      Movable subcutaneous cyst with punctum c/w epidermal inclusion cyst      Subcutaneous movable cyst c/w pilar cyst      Firm pink to brown papule c/w dermatofibroma      Pedunculated fleshy papule(s) c/w skin tag(s)      Evenly pigmented macule c/w junctional nevus     Mildly variegated pigmented, slightly irregular-bordered macule c/w mildly atypical nevus      Flesh colored to evenly pigmented papule c/w intradermal nevus       Pink pearly papule/plaque c/w basal cell carcinoma      Erythematous hyperkeratotic cursted plaque c/w SCC      Surgical scar with no sign of skin  cancer recurrence      Open and closed comedones      Inflammatory papules and pustules      Verrucoid papule consistent consistent with wart     Erythematous eczematous patches and plaques     Dystrophic onycholytic nail with subungual debris c/w onychomycosis     Umbilicated papule    Erythematous-base heme-crusted tan verrucoid plaque consistent with inflamed seborrheic keratosis     Erythematous Silvery Scaling Plaque c/w Psoriasis     See annotation            Assessment / Plan:      Pathology Orders:       Normal Orders This Visit    Specimen to Pathology, Dermatology     Comments:    Number of Specimens:->2  ------------------------->-------------------------  Spec 1 Procedure:->Biopsy  Spec 1 Clinical Impression:->r/o bcc  Spec 1 Source:->right upper neck  ------------------------->-------------------------  Spec 2 Procedure:->Biopsy  Spec 2 Clinical Impression:->r/o bcc  Spec 2 Source:->right lower neck    Questions:    Procedure Type: Dermatology and skin neoplasms    Number of Specimens: 2    ------------------------: -------------------------    Spec 1 Procedure: Biopsy    Spec 1 Clinical Impression: r/o bcc    Spec 1 Source: right upper neck    ------------------------: -------------------------    Spec 2 Procedure: Biopsy    Spec 2 Clinical Impression: r/o bcc    Spec 2 Source: right lower neck    Release to patient:           AK (actinic keratosis)  Cryosurgery Procedure Note    Verbal consent from the patient is obtained including, but not limited to, risk of hypopigmentation/hyperpigmentation, scar, recurrence of lesion. The patient is aware of the precancerous quality and need for treatment of these lesions. Liquid nitrogen cryosurgery is applied to the 1 actinic keratoses, as detailed in the physical exam, to produce a freeze injury. The patient is aware that blisters may form and is instructed on wound care with gentle cleansing and use of vaseline ointment to keep moist until healed. The patient  is supplied a handout on cryosurgery and is instructed to call if lesions do not completely resolve.    Seborrheic dermatitis  Hydrocortisone otc cream prn itching/scaling     Pigmented purpuric dermatosis  Reassurance  This is secondary to increased pressure and inflammation on capillaries forcing fluid into skin   Blood extravasating out of vessels deposits iron which contributes to brown discoloration  Compression hose if on feet regularly, leg elevation, Vit C 500 mg po BID may be beneficial    SK (seborrheic keratosis)  Inflamed seborrheic keratosis  These are benign inherited growths without a malignant potential. Reassurance given to patient. No treatment is necessary.     Neoplasm of uncertain behavior of skin x 2   Shave biopsy procedure note:    Shave biopsy performed after verbal consent including risk of infection, scar, recurrence, need for additional treatment of site. Area prepped with alcohol, anesthetized with approximately 1.0cc of 1% lidocaine with epinephrine. Lesional tissue shaved with razor blade. Hemostasis achieved with application of aluminum chloride followed by hyfrecation. No complications. Dressing applied. Wound care explained.  If biopsy positive for malignancy, will treat with Aldara 5% cream qhs x 4 weeks.   -     Specimen to Pathology, Dermatology    Androgenetic alopecia  Rogaine- can use 5% to affected area 1x per day. Must use consistently and if you stop using, the hair it stimulated to grow may fall out over time. Generic acceptable    Rosemary oil 3x per week topically to scalp     Pumpkin seed oil 400 mg daily - orally            Follow up for prn bx report.

## 2024-10-04 LAB
FINAL PATHOLOGIC DIAGNOSIS: NORMAL
GROSS: NORMAL
Lab: NORMAL
MICROSCOPIC EXAM: NORMAL

## 2024-10-06 NOTE — PROGRESS NOTES
Subjective:   Patient ID:  Madan Bell is a 84 y.o. female who presents for follow-up of CVD    HPI:  The patient is here for valvular heart disease/AF/MR.Patient is here for evaluation and treatment of hypertension but a lot of low BP.    The patient has no chest pain, SOB, TIA, palpitations, syncope or pre-syncope.  Patient does not exercise much. Most BPs 110-120/60-70s. Taking 19 Furosimide per week and only one K per day      Review of Systems   Constitutional: Positive for malaise/fatigue. Negative for chills, decreased appetite, diaphoresis, fever, night sweats, weight gain and weight loss.   HENT:  Negative for congestion, hoarse voice, nosebleeds, sore throat and tinnitus.    Eyes:  Negative for blurred vision, double vision, vision loss in left eye, vision loss in right eye, visual disturbance and visual halos.   Cardiovascular:  Negative for chest pain, claudication, cyanosis, dyspnea on exertion, irregular heartbeat, leg swelling, near-syncope, orthopnea, palpitations, paroxysmal nocturnal dyspnea and syncope.   Respiratory:  Negative for cough, hemoptysis, shortness of breath, sleep disturbances due to breathing, snoring, sputum production and wheezing.    Endocrine: Negative for cold intolerance, heat intolerance, polydipsia, polyphagia and polyuria.   Hematologic/Lymphatic: Negative for adenopathy and bleeding problem. Does not bruise/bleed easily.   Skin:  Negative for color change, dry skin, flushing, itching, nail changes, poor wound healing, rash, skin cancer, suspicious lesions and unusual hair distribution.   Musculoskeletal:  Negative for arthritis, back pain, falls, gout, joint pain, joint swelling, muscle cramps, muscle weakness, myalgias and stiffness.   Gastrointestinal:  Negative for abdominal pain, anorexia, change in bowel habit, constipation, diarrhea, dysphagia, heartburn, hematemesis, hematochezia, melena and vomiting.   Genitourinary:  Negative for decreased libido,  "dysuria, hematuria, hesitancy and urgency.   Neurological:  Positive for light-headedness. Negative for excessive daytime sleepiness, dizziness, focal weakness, headaches, loss of balance, numbness, paresthesias, seizures, sensory change, tremors, vertigo and weakness.   Psychiatric/Behavioral:  Negative for altered mental status, depression, hallucinations, memory loss, substance abuse and suicidal ideas. The patient does not have insomnia and is not nervous/anxious.    Allergic/Immunologic: Negative for environmental allergies and hives.       Objective: BP (!) 139/99   Pulse 75   Ht 5' 7" (1.702 m)   Wt 60.2 kg (132 lb 9.7 oz)   LMP  (LMP Unknown)   BMI 20.77 kg/m²      Physical Exam  Constitutional:       Appearance: She is well-developed.   HENT:      Head: Normocephalic.   Eyes:      Pupils: Pupils are equal, round, and reactive to light.   Neck:      Thyroid: No thyromegaly.      Vascular: Normal carotid pulses. No carotid bruit, hepatojugular reflux or JVD.   Cardiovascular:      Rate and Rhythm: Normal rate. Rhythm irregularly irregular.      Pulses: Intact distal pulses.      Heart sounds: Normal heart sounds. No murmur heard.     No friction rub. No gallop.   Pulmonary:      Effort: Pulmonary effort is normal. No tachypnea or respiratory distress.      Breath sounds: Normal breath sounds. No wheezing or rales.   Chest:      Chest wall: No tenderness.   Abdominal:      General: Bowel sounds are normal. There is no distension.      Palpations: Abdomen is soft. There is no mass.      Tenderness: There is no abdominal tenderness. There is no guarding or rebound.   Musculoskeletal:         General: No tenderness. Normal range of motion.      Cervical back: Normal range of motion.   Lymphadenopathy:      Cervical: No cervical adenopathy.   Skin:     General: Skin is warm.      Findings: No erythema or rash.   Neurological:      Mental Status: She is alert and oriented to person, place, and time.      " Cranial Nerves: No cranial nerve deficit.      Coordination: Coordination normal.   Psychiatric:         Behavior: Behavior normal.         Thought Content: Thought content normal.         Judgment: Judgment normal.         Assessment:     1. Nonrheumatic mitral valve regurgitation    2. Permanent atrial fibrillation    3. Paroxysmal ventricular tachycardia    4. MVP (mitral valve prolapse)    5. HTN (hypertension), benign    6. History of prediabetes    7. Hashimoto's disease    8. Decreased strength involving knee joint    9. Decreased functional mobility and endurance    10. Balance problems    11. Atherosclerosis of aorta    12. Anxiety    13. Tricuspid valve insufficiency, unspecified etiology    14. Right ventricular dysfunction        Plan:   Discussed diet , achieving and maintaining ideal body weight, and exercise.   We reviewed meds in detail.  Reassured-Discussed goals, options, plan.  Omega-3 > 800 mg/d combined EPA/DHA.  Goal BP< 130/80.  Goal LDL < 70.      Madan was seen today for mitral valve prolapse and follow-up.    Diagnoses and all orders for this visit:    Nonrheumatic mitral valve regurgitation  -     Basic Metabolic Panel; Standing  -     BNP; Standing  -     Echo; Future    Permanent atrial fibrillation  -     Basic Metabolic Panel; Standing  -     BNP; Standing    Paroxysmal ventricular tachycardia  -     Basic Metabolic Panel; Standing  -     BNP; Standing    MVP (mitral valve prolapse)  -     Echo; Future    HTN (hypertension), benign    History of prediabetes    Hashimoto's disease    Decreased strength involving knee joint    Decreased functional mobility and endurance    Balance problems    Atherosclerosis of aorta    Anxiety    Tricuspid valve insufficiency, unspecified etiology  -     Basic Metabolic Panel; Standing  -     BNP; Standing  -     Echo; Future    Right ventricular dysfunction  -     Basic Metabolic Panel; Standing  -     BNP; Standing  -     Echo;  Future            Follow up for Labs today and 3 months ; CFD Lavie day soon; see Joey Champagne in EP.

## 2024-10-07 ENCOUNTER — OFFICE VISIT (OUTPATIENT)
Dept: CARDIOLOGY | Facility: CLINIC | Age: 84
End: 2024-10-07
Payer: MEDICARE

## 2024-10-07 ENCOUNTER — LAB VISIT (OUTPATIENT)
Dept: LAB | Facility: HOSPITAL | Age: 84
End: 2024-10-07
Attending: INTERNAL MEDICINE
Payer: MEDICARE

## 2024-10-07 ENCOUNTER — PATIENT MESSAGE (OUTPATIENT)
Dept: DERMATOLOGY | Facility: CLINIC | Age: 84
End: 2024-10-07
Payer: MEDICARE

## 2024-10-07 ENCOUNTER — PATIENT MESSAGE (OUTPATIENT)
Dept: CARDIOLOGY | Facility: CLINIC | Age: 84
End: 2024-10-07

## 2024-10-07 VITALS
WEIGHT: 132.63 LBS | BODY MASS INDEX: 20.82 KG/M2 | SYSTOLIC BLOOD PRESSURE: 139 MMHG | DIASTOLIC BLOOD PRESSURE: 99 MMHG | HEART RATE: 75 BPM | HEIGHT: 67 IN

## 2024-10-07 DIAGNOSIS — I70.0 ATHEROSCLEROSIS OF AORTA: ICD-10-CM

## 2024-10-07 DIAGNOSIS — I07.1 TRICUSPID VALVE INSUFFICIENCY, UNSPECIFIED ETIOLOGY: ICD-10-CM

## 2024-10-07 DIAGNOSIS — F41.9 ANXIETY: ICD-10-CM

## 2024-10-07 DIAGNOSIS — I47.29 PAROXYSMAL VENTRICULAR TACHYCARDIA: ICD-10-CM

## 2024-10-07 DIAGNOSIS — R29.898 DECREASED STRENGTH INVOLVING KNEE JOINT: ICD-10-CM

## 2024-10-07 DIAGNOSIS — E06.3 HASHIMOTO'S DISEASE: ICD-10-CM

## 2024-10-07 DIAGNOSIS — I51.9 RIGHT VENTRICULAR DYSFUNCTION: ICD-10-CM

## 2024-10-07 DIAGNOSIS — C44.41 BASAL CELL CARCINOMA (BCC) OF RIGHT SIDE OF NECK: Primary | ICD-10-CM

## 2024-10-07 DIAGNOSIS — Z74.09 DECREASED FUNCTIONAL MOBILITY AND ENDURANCE: ICD-10-CM

## 2024-10-07 DIAGNOSIS — I10 HTN (HYPERTENSION), BENIGN: ICD-10-CM

## 2024-10-07 DIAGNOSIS — R26.89 BALANCE PROBLEMS: ICD-10-CM

## 2024-10-07 DIAGNOSIS — I34.1 MVP (MITRAL VALVE PROLAPSE): ICD-10-CM

## 2024-10-07 DIAGNOSIS — I34.0 NONRHEUMATIC MITRAL VALVE REGURGITATION: Primary | ICD-10-CM

## 2024-10-07 DIAGNOSIS — I48.21 PERMANENT ATRIAL FIBRILLATION: ICD-10-CM

## 2024-10-07 DIAGNOSIS — I34.0 NONRHEUMATIC MITRAL VALVE REGURGITATION: ICD-10-CM

## 2024-10-07 DIAGNOSIS — Z87.898 HISTORY OF PREDIABETES: ICD-10-CM

## 2024-10-07 LAB
ANION GAP SERPL CALC-SCNC: 8 MMOL/L (ref 8–16)
BNP SERPL-MCNC: 253 PG/ML (ref 0–99)
BUN SERPL-MCNC: 12 MG/DL (ref 8–23)
CALCIUM SERPL-MCNC: 9.9 MG/DL (ref 8.7–10.5)
CHLORIDE SERPL-SCNC: 102 MMOL/L (ref 95–110)
CO2 SERPL-SCNC: 28 MMOL/L (ref 23–29)
CREAT SERPL-MCNC: 0.9 MG/DL (ref 0.5–1.4)
EST. GFR  (NO RACE VARIABLE): >60 ML/MIN/1.73 M^2
GLUCOSE SERPL-MCNC: 100 MG/DL (ref 70–110)
POTASSIUM SERPL-SCNC: 4.1 MMOL/L (ref 3.5–5.1)
SODIUM SERPL-SCNC: 138 MMOL/L (ref 136–145)

## 2024-10-07 PROCEDURE — 83880 ASSAY OF NATRIURETIC PEPTIDE: CPT | Mod: HCNC | Performed by: INTERNAL MEDICINE

## 2024-10-07 PROCEDURE — 3075F SYST BP GE 130 - 139MM HG: CPT | Mod: HCNC,CPTII,S$GLB, | Performed by: INTERNAL MEDICINE

## 2024-10-07 PROCEDURE — 1160F RVW MEDS BY RX/DR IN RCRD: CPT | Mod: HCNC,CPTII,S$GLB, | Performed by: INTERNAL MEDICINE

## 2024-10-07 PROCEDURE — 1157F ADVNC CARE PLAN IN RCRD: CPT | Mod: HCNC,CPTII,S$GLB, | Performed by: INTERNAL MEDICINE

## 2024-10-07 PROCEDURE — 3288F FALL RISK ASSESSMENT DOCD: CPT | Mod: HCNC,CPTII,S$GLB, | Performed by: INTERNAL MEDICINE

## 2024-10-07 PROCEDURE — 99215 OFFICE O/P EST HI 40 MIN: CPT | Mod: HCNC,S$GLB,, | Performed by: INTERNAL MEDICINE

## 2024-10-07 PROCEDURE — 3080F DIAST BP >= 90 MM HG: CPT | Mod: HCNC,CPTII,S$GLB, | Performed by: INTERNAL MEDICINE

## 2024-10-07 PROCEDURE — 1101F PT FALLS ASSESS-DOCD LE1/YR: CPT | Mod: HCNC,CPTII,S$GLB, | Performed by: INTERNAL MEDICINE

## 2024-10-07 PROCEDURE — 80048 BASIC METABOLIC PNL TOTAL CA: CPT | Mod: HCNC | Performed by: INTERNAL MEDICINE

## 2024-10-07 PROCEDURE — 1159F MED LIST DOCD IN RCRD: CPT | Mod: HCNC,CPTII,S$GLB, | Performed by: INTERNAL MEDICINE

## 2024-10-07 PROCEDURE — 99999 PR PBB SHADOW E&M-EST. PATIENT-LVL V: CPT | Mod: PBBFAC,HCNC,, | Performed by: INTERNAL MEDICINE

## 2024-10-07 PROCEDURE — 36415 COLL VENOUS BLD VENIPUNCTURE: CPT | Mod: HCNC,PO | Performed by: INTERNAL MEDICINE

## 2024-10-07 RX ORDER — IMIQUIMOD 12.5 MG/.25G
CREAM TOPICAL
Qty: 12 PACKET | Refills: 1 | Status: SHIPPED | OUTPATIENT
Start: 2024-10-07

## 2024-10-07 NOTE — PROGRESS NOTES
Your results show BNP still high , kidneys good and not dehydrated at all , and I am very surprised that K is OK . Let's increase Furosimide from 19 a week to 21 ( 2 in am and one afternoon) and increase the 7 to 2 one day one the next alternate    Please contact me if you have any additional concerns.    Sincerely,    Sudhir Daly

## 2024-10-09 ENCOUNTER — HOSPITAL ENCOUNTER (OUTPATIENT)
Dept: CARDIOLOGY | Facility: HOSPITAL | Age: 84
Discharge: HOME | End: 2024-10-09
Attending: INTERNAL MEDICINE
Payer: MEDICARE

## 2024-10-09 VITALS
WEIGHT: 132 LBS | HEART RATE: 70 BPM | DIASTOLIC BLOOD PRESSURE: 80 MMHG | SYSTOLIC BLOOD PRESSURE: 140 MMHG | BODY MASS INDEX: 20.72 KG/M2 | HEIGHT: 67 IN

## 2024-10-09 DIAGNOSIS — I50.9 CONGESTIVE HEART FAILURE, UNSPECIFIED HF CHRONICITY, UNSPECIFIED HEART FAILURE TYPE: ICD-10-CM

## 2024-10-09 DIAGNOSIS — I34.1 MVP (MITRAL VALVE PROLAPSE): ICD-10-CM

## 2024-10-09 DIAGNOSIS — I51.9 RIGHT VENTRICULAR DYSFUNCTION: ICD-10-CM

## 2024-10-09 DIAGNOSIS — I07.1 TRICUSPID VALVE INSUFFICIENCY, UNSPECIFIED ETIOLOGY: ICD-10-CM

## 2024-10-09 DIAGNOSIS — E87.6 HYPOKALEMIA: ICD-10-CM

## 2024-10-09 DIAGNOSIS — I34.0 NONRHEUMATIC MITRAL VALVE REGURGITATION: ICD-10-CM

## 2024-10-09 DIAGNOSIS — R79.89 ELEVATED BRAIN NATRIURETIC PEPTIDE (BNP) LEVEL: ICD-10-CM

## 2024-10-09 LAB
ASCENDING AORTA: 3.36 CM
AV INDEX (PROSTH): 0.52
AV MEAN GRADIENT: 3.9 MMHG
AV PEAK GRADIENT: 9 MMHG
AV VALVE AREA BY VELOCITY RATIO: 1.5 CM²
AV VALVE AREA: 1.3 CM²
AV VELOCITY RATIO: 0.6
BSA FOR ECHO PROCEDURE: 1.68 M2
CV ECHO LV RWT: 0.37 CM
DOP CALC AO PEAK VEL: 1.5 M/S
DOP CALC AO VTI: 27.9 CM
DOP CALC LVOT AREA: 2.5 CM2
DOP CALC LVOT DIAMETER: 1.8 CM
DOP CALC LVOT PEAK VEL: 0.9 M/S
DOP CALC LVOT STROKE VOLUME: 36.6 CM3
DOP CALC MV VTI: 22.58 CM
DOP CALCLVOT PEAK VEL VTI: 14.4 CM
E WAVE DECELERATION TIME: 217.54 MSEC
ECHO LV POSTERIOR WALL: 0.9 CM (ref 0.6–1.1)
FRACTIONAL SHORTENING: 51 % (ref 28–44)
INTERVENTRICULAR SEPTUM: 1 CM (ref 0.6–1.1)
LA MAJOR: 6.65 CM
LA MINOR: 6.65 CM
LA WIDTH: 4.47 CM
LEFT ATRIUM SIZE: 4.52 CM
LEFT ATRIUM VOLUME INDEX MOD: 69.7 ML/M2
LEFT ATRIUM VOLUME INDEX: 67.6 ML/M2
LEFT ATRIUM VOLUME MOD: 117.86 ML
LEFT ATRIUM VOLUME: 114.21 CM3
LEFT INTERNAL DIMENSION IN SYSTOLE: 2.4 CM (ref 2.1–4)
LEFT VENTRICLE DIASTOLIC VOLUME INDEX: 65.18 ML/M2
LEFT VENTRICLE DIASTOLIC VOLUME: 110.16 ML
LEFT VENTRICLE MASS INDEX: 97.2 G/M2
LEFT VENTRICLE SYSTOLIC VOLUME INDEX: 12.5 ML/M2
LEFT VENTRICLE SYSTOLIC VOLUME: 21.1 ML
LEFT VENTRICULAR INTERNAL DIMENSION IN DIASTOLE: 4.9 CM (ref 3.5–6)
LEFT VENTRICULAR MASS: 164.3 G
MV MEAN GRADIENT: 1 MMHG
MV PEAK GRADIENT: 4 MMHG
MV STENOSIS PRESSURE HALF TIME: 63.09 MS
MV VALVE AREA BY CONTINUITY EQUATION: 1.62 CM2
MV VALVE AREA P 1/2 METHOD: 3.49 CM2
OHS CV RV/LV RATIO: 0.63 CM
PISA MRMAX VEL: 0.05 M/S
PISA TR MAX VEL: 2.85 M/S
PULM VEIN S/D RATIO: 0.6
PV PEAK D VEL: 0.8 M/S
PV PEAK S VEL: 0.48 M/S
RA MAJOR: 5.88 CM
RA PRESSURE ESTIMATED: 3 MMHG
RA WIDTH: 4.46 CM
RIGHT VENTRICLE DIASTOLIC BASEL DIMENSION: 3.1 CM
RV TB RVSP: 6 MMHG
SINUS: 3.55 CM
STJ: 2.74 CM
TDI LATERAL: 0.08 M/S
TDI SEPTAL: 0.08 M/S
TDI: 0.08 M/S
TR MAX PG: 32 MMHG
TRICUSPID ANNULAR PLANE SYSTOLIC EXCURSION: 1.94 CM
TV REST PULMONARY ARTERY PRESSURE: 35 MMHG
Z-SCORE OF LEFT VENTRICULAR DIMENSION IN END DIASTOLE: 0.4
Z-SCORE OF LEFT VENTRICULAR DIMENSION IN END SYSTOLE: -1.54

## 2024-10-09 PROCEDURE — 93306 TTE W/DOPPLER COMPLETE: CPT | Mod: HCNC,PO

## 2024-10-09 PROCEDURE — 93306 TTE W/DOPPLER COMPLETE: CPT | Mod: 26,HCNC,, | Performed by: STUDENT IN AN ORGANIZED HEALTH CARE EDUCATION/TRAINING PROGRAM

## 2024-10-09 RX ORDER — POTASSIUM CHLORIDE 750 MG/1
TABLET, EXTENDED RELEASE ORAL
Qty: 180 TABLET | Refills: 3 | Status: SHIPPED | OUTPATIENT
Start: 2024-10-09

## 2024-10-09 NOTE — PROGRESS NOTES
Your results show that the MR is a bit worse ( I reviewed the images which were read by te nya assigned to [place] today) as at least moderate and possibly moderate -severe but a bit worse than in th past. I would grade the LVEF as mid to upper 50s and a bit better than the report says.Let's repeat KELLEY Daly to read in 8 months . I am fine if she wants to see the valve clinic to consider a repair but I know she is not keen on doing procedures and doubt they would without having more SOB or worse CHF.    Please contact me if you have any additional concerns.    Sincerely,    Sudhir Daly

## 2024-10-16 ENCOUNTER — PATIENT MESSAGE (OUTPATIENT)
Dept: ADMINISTRATIVE | Facility: HOSPITAL | Age: 84
End: 2024-10-16
Payer: MEDICARE

## 2024-10-17 ENCOUNTER — PATIENT OUTREACH (OUTPATIENT)
Dept: INTERNAL MEDICINE | Facility: CLINIC | Age: 84
End: 2024-10-17

## 2024-10-17 ENCOUNTER — PATIENT MESSAGE (OUTPATIENT)
Dept: DERMATOLOGY | Facility: CLINIC | Age: 84
End: 2024-10-17
Payer: MEDICARE

## 2024-10-18 ENCOUNTER — TELEPHONE (OUTPATIENT)
Dept: ELECTROPHYSIOLOGY | Facility: CLINIC | Age: 84
End: 2024-10-18
Payer: MEDICARE

## 2024-10-23 ENCOUNTER — PATIENT MESSAGE (OUTPATIENT)
Dept: ELECTROPHYSIOLOGY | Facility: CLINIC | Age: 84
End: 2024-10-23
Payer: MEDICARE

## 2024-11-06 DIAGNOSIS — I47.29 PAROXYSMAL VENTRICULAR TACHYCARDIA: Primary | ICD-10-CM

## 2024-11-21 ENCOUNTER — OFFICE VISIT (OUTPATIENT)
Dept: ELECTROPHYSIOLOGY | Facility: CLINIC | Age: 84
End: 2024-11-21
Payer: MEDICARE

## 2024-11-21 ENCOUNTER — HOSPITAL ENCOUNTER (OUTPATIENT)
Dept: CARDIOLOGY | Facility: CLINIC | Age: 84
Discharge: HOME | End: 2024-11-21
Payer: MEDICARE

## 2024-11-21 ENCOUNTER — TELEPHONE (OUTPATIENT)
Dept: ELECTROPHYSIOLOGY | Facility: CLINIC | Age: 84
End: 2024-11-21
Payer: MEDICARE

## 2024-11-21 VITALS
SYSTOLIC BLOOD PRESSURE: 124 MMHG | WEIGHT: 132.25 LBS | HEART RATE: 78 BPM | BODY MASS INDEX: 20.76 KG/M2 | HEIGHT: 67 IN | DIASTOLIC BLOOD PRESSURE: 84 MMHG

## 2024-11-21 DIAGNOSIS — G47.33 OSA (OBSTRUCTIVE SLEEP APNEA): ICD-10-CM

## 2024-11-21 DIAGNOSIS — I48.21 PERMANENT ATRIAL FIBRILLATION: ICD-10-CM

## 2024-11-21 DIAGNOSIS — I47.29 PAROXYSMAL VENTRICULAR TACHYCARDIA: ICD-10-CM

## 2024-11-21 DIAGNOSIS — E06.3 HASHIMOTO'S DISEASE: ICD-10-CM

## 2024-11-21 DIAGNOSIS — I49.3 PVC'S (PREMATURE VENTRICULAR CONTRACTIONS): Primary | ICD-10-CM

## 2024-11-21 DIAGNOSIS — R42 DIZZINESS: ICD-10-CM

## 2024-11-21 LAB
OHS QRS DURATION: 116 MS
OHS QTC CALCULATION: 408 MS

## 2024-11-21 PROCEDURE — 1125F AMNT PAIN NOTED PAIN PRSNT: CPT | Mod: HCNC,CPTII,S$GLB, | Performed by: NURSE PRACTITIONER

## 2024-11-21 PROCEDURE — 1159F MED LIST DOCD IN RCRD: CPT | Mod: HCNC,CPTII,S$GLB, | Performed by: NURSE PRACTITIONER

## 2024-11-21 PROCEDURE — 99999 PR PBB SHADOW E&M-EST. PATIENT-LVL IV: CPT | Mod: PBBFAC,HCNC,, | Performed by: NURSE PRACTITIONER

## 2024-11-21 PROCEDURE — 3074F SYST BP LT 130 MM HG: CPT | Mod: HCNC,CPTII,S$GLB, | Performed by: NURSE PRACTITIONER

## 2024-11-21 PROCEDURE — 3079F DIAST BP 80-89 MM HG: CPT | Mod: HCNC,CPTII,S$GLB, | Performed by: NURSE PRACTITIONER

## 2024-11-21 PROCEDURE — 1157F ADVNC CARE PLAN IN RCRD: CPT | Mod: HCNC,CPTII,S$GLB, | Performed by: NURSE PRACTITIONER

## 2024-11-21 PROCEDURE — 93010 ELECTROCARDIOGRAM REPORT: CPT | Mod: HCNC,S$GLB,, | Performed by: INTERNAL MEDICINE

## 2024-11-21 PROCEDURE — 1101F PT FALLS ASSESS-DOCD LE1/YR: CPT | Mod: HCNC,CPTII,S$GLB, | Performed by: NURSE PRACTITIONER

## 2024-11-21 PROCEDURE — 93005 ELECTROCARDIOGRAM TRACING: CPT | Mod: HCNC,S$GLB,, | Performed by: INTERNAL MEDICINE

## 2024-11-21 PROCEDURE — 99214 OFFICE O/P EST MOD 30 MIN: CPT | Mod: HCNC,S$GLB,, | Performed by: NURSE PRACTITIONER

## 2024-11-21 PROCEDURE — 3288F FALL RISK ASSESSMENT DOCD: CPT | Mod: HCNC,CPTII,S$GLB, | Performed by: NURSE PRACTITIONER

## 2024-11-21 NOTE — PROGRESS NOTES
Ms. Bell is a patient of Dr. Champagne and was last seen in clinic 12/5/2023.    Subjective:   Patient ID:  Madan Bell is a 84 y.o. female who presents for follow-up of Frequent PVCs and Atrial Fibrillation.    HPI: Ms. Bell is a 84 y.o. female with a past medical history of moderate mitral valve prolapse/mitral sclerosis with preserved LV function, PVCs and nonsustained VT, orthostatic light-headedness, persistent AF and thyroid cancer s/p thyroidectomy.    Background:  Referring Electrophysiologist: Fernando Disla MD  Primary Cardiologist: Loc Daly MD  Primary Care Physician: Sheila Mcgee MD    I had the pleasure of seeing Mrs. Bell in our electrophysiology in follow-up for her ventricular ectopy. As you are aware she is a pleasant 83 year-old woman, former patient of Dr. Disla, with moderate mitral valve prolapse/mitral sclerosis with preserved LV function, PVCs and nonsustained VT, orthostatic light-headedness, persistent AF and thyroid cancer s/p thyroidectomy. She had been a patient of Dr. Disla for many years. Her PVCs were treated with metoprolol. She developed persistent AF in 2018 and underwent ANAHI guided DCCV. She remained in sinus rhythm without anti-arrhythmic therapy. At her last follow-up up visit with Dr. Disla in July 2020 she was in rate controlled AF. Plan was for rate control. She remained on eliquis and bisoprolol. At our first visit in 2/2021 it was noted she was recently in the ER for brief episode of confusion. CT head and brain MRI were negative. She felt her dizziness was getting worse. She noted having early morning low heart rates with her monitoring for digital HTN clinic (pulse measures in the 30s-40s at times). She was concerned her neurologic symptoms may be related to her bradycardia. Her heart rate was in the 70s when she was in the ER.     A holter monitor from August 2020 noted persistent rate controlled AF with a 6% PVC burden. She  This patient was referred for tympanometric testing by Dr. Cipriano Cabrera due to past ear infections. Tympanometry revealed normal middle ear peak pressure and compliance, bilaterally. The results were reviewed with the patient's parent. Recommendations for follow up will be made pending physician consult. Sincere Ingram.   [facility] Year [facility]iology Extern was started on Mexitil to attempt to reduce PVC burden some. October 2020 noted persistent rate controlled atrial fibrillation with 6% PVC burden and 2 runs of NSVT (4 and 5 beats). She also developed a tremor related to Mexitil and it was stopped. An echocardiogram from 8/2020 noted preserved LV function with moderate MR, bileaflet prolapse and severe LA enlargement. We discussed low likelihood of maintaining sinus rhythm at this point with plan to continue rate control.     To investigate her complaints of bradycardia in the morning, she wore a 14 day holter which showed rate controlled AF with 5% multifocal PVCs and no significant bradycardia while awake (had some during sleep hours). Symptomatic button activations corresponded to AF with normal ventricular rates.     ECHO from 4/2021 noted a LVEF of 55% and moderated MR with severe left atrial enlargement, PASP of 45mmHg and enlarged IVC (pressure 15mmHg).     1/2022: Mrs. Bell returned for yearly follow-up. She reported continued episodes of light-headedness that are random. She felt great when she was physically active. She confirmed that when she wore the monitor and she pressed the symptom button she was having her symptoms. She describes that some times when she has sudden head position changes she feels dizzy and describes it like the room is spinning. Recommended ENT evaluation.     2/2023: Mrs. Bell returned for yearly follow-up. Extended holter 9/2022 noted rate controlled AF with 10% PVC burden and 4 episodes of nonsustained VT up to 5 beats in duration. She had some pauses of 3-4 seconds however these appeared to be during night-time hours. She did not activate the symptom button. She continued to have dizziness. She was seen in ENT clinic and they did not feel like she had vertigo. She feels like lying down makes her dizzy and then when suddenly trying to get up makes her dizzy also. She also reports sometimes she gets waves of dizziness while  upright. We repeated an extended holter monitor which demonstrated rate controlled AF with 13.6% PVC burden and short runs of nonsustained VT. Symptomatic activations for dizziness corresponded to rate controlled AF and given PVC burden.     6/2023: Mrs. Bell returned for follow-up. Continued to report chronic dizziness and head pressure/heaviness. Inquiring again if it could be from AF, even if rate controlled. She had at least a 4 month period of sinus rhythm in the fall of 2018. She continued to report symptoms. She was intolerant to AAD therapy. Discussed in setting of significant MR it would likely be very difficult to maintain sinus rhythm even with anti-arrhythmic therapy. Discussed her PVC burden is not in the range to increase her risk of PVC induced CMP. At some point her MR may need to be addressed, surgical or endovascular approach. Her AF is rate controlled. I am still unsure what her dizziness is from (could it be from PVCs?). Have discussed trial again of amiodarone therapy. She preferred to avoid anti-arrhythmic drug therapy.     12/2023: Mrs. Bell returns for follow-up. Dr. Daly ordered a holter which noted 19% PVC burden (some could be aberrancy) along with nonsustained VT episodes. ECHO noted LVEF 50-55%. We discussed starting amiodarone on the phone. She wanted to think about it.  In clinic ECG is AF with an average rate of 77 bpm  In summary, Mrs. Bell  is a pleasant 83 year-old woman, former patient of Dr. Disla, with moderate mitral valve prolapse/mitral sclerosis with preserved LV function, PVCs and nonsustained VT, orthostatic light-headedness, persistent AF and thyroid cancer s/p thyroidectomy. She had no apparent symptomatic benefit of restoration of sinus rhythm in 2018. Reviewed notes from when she was being treated by Dr. Disla. She had at least a 4 month period of sinus rhythm in the fall of 2018. She continued to report symptoms. She was intolerant to AAD therapy.  Discussed in setting of significant MR it would likely be very difficult to maintain sinus rhythm even with anti-arrhythmic therapy. Discussed her PVC burden is not in the range to increase her risk of PVC induced CMP. At some point her MR may need to be addressed, surgical or endovascular approach. Her AF is rate controlled. I am still unsure what her dizziness is from (could it be from PVCs?). Have discussed trial again of amiodarone therapy. She would like to have it rx but will discuss with her family further. Discussed liver, thyroid, lung, and ocular side effects.   Continue eliquis lifelong as tolerated.  RTC in 6 months with a 24 hour holter just prior.     4/5/2024: 24 hour holter showed Overall, the study was of adequate quality. The tape was adequate (0 days , 24 hours, 0 minutes). There was an episode of dizziness reported. The corresponding rhythm strips revealed the following: the rhythm was atrial fibrillation at 111 bpm. Dr. Champagne reviewed: I reviewed your holter monitor. Looked stable overall. PVC burden was actually lower than last time we checked (12.5% this time, near 20% last time). I did not see any significant slow rates while you were awake. Your dizziness corresponded to a heart rate around 110 bpm.     Update (11/26/2024):  Ms. Bell presents today for follow up of frequent PVCs and Atrial Fibrillation. She is doing well today from an arrhythmia. She denies chest pain with exertion or at rest, palpitations, SOB, MCKINLEY, and syncope, but does endorse chronic paroxysmal dizziness. Her dizziness has been worked up by neurology, ENT, and arrhythmia with Dr. Champagne with no conclusion. Ms. Bell has worn many holter monitors with no symptom-rhythm correlation. On her last 24 hour holter monitor, her PVC burden was lower than the previous monitor. She and her son stated she has also completed vestibular therapy with no resolution of her dizziness.     We discussed the possibility of implanting a  loop recorder for continuous monitoring of symptom-rhythm correlation. Ms. Bell would consider ILR, but at this time is not interested in a procedure. I also offered a repeat holter monitor, which she declined at this time.    Ms. Bell is currently taking eliquis 5 mg BID for stroke prophylaxis and denies significant bleeding episodes. She is currently being treated with bisoprolol-HCTZ for HR control. No AAD. Kidney function is stable, with a creatinine of 0.9 on 10/7/2024.    I have personally reviewed the patient's EKG today, which shows rate controlled AF at 78 bpm. QRS interval is 116 ms. Her last echo 10/9/2024 showed EF 50-55%.    Recent Cardiac Tests:  2D Echo (10/9/2024):    Left Ventricle: The left ventricle is normal in size. Ventricular mass is normal. Normal wall thickness. Normal wall motion. There is low normal systolic function with a visually estimated ejection fraction of 50 - 55%. Unable to assess diastolic function due to atrial fibrillation.    Right Ventricle: Normal right ventricular cavity size. Wall thickness is normal. Systolic function is normal.    Left Atrium: Left atrium is severely dilated.    Right Atrium: Right atrium is severely dilated.    Aortic Valve: The aortic valve is a trileaflet valve. There is moderate aortic valve sclerosis.    Mitral Valve: There is bileaflet sclerosis. There is bileaflet prolapse. There is moderate-severe regurgitation. EROA by PISA using PISA radius of 0.9 cm is 0.3 cm2 which suggests more moderate MR.    Tricuspid Valve: There is mild to moderate regurgitation.    Pulmonary Artery: The estimated pulmonary artery systolic pressure is 35 mmHg.    IVC/SVC: Normal venous pressure at 3 mmHg.    Past Medical History:   Diagnosis Date    A-fib     Anticoagulant long-term use     Anxiety     Arrhythmia     Arthritis     knees    Basal cell carcinoma     Breast cyst     Community acquired pneumonia of right lower lobe of lung 12/11/2018    Deep vein  thrombosis     one DVT during  pregnancy    Encephalopathy 2/10/2021    Fibrocystic breast 1980 - ??    Heart murmur     Hypercholesterolemia 6/13/2017    Hyperlipidemia     Hypertension     Hypothyroidism     Mitral valve disorder     Mitral valve prolapse     PVC's (premature ventricular contractions)     Squamous cell carcinoma bx 7-2017    right upper forehead    Thyroid cancer 1/29/2013    Thyroid disease     Vasovagal near syncope 11/12/2012    Vasovagal near syncope 11/12/2012    VTE (venous thromboembolism)     in 1960s, post partum, pt unsure of details     Weakness 12/11/2018       Past Surgical History:   Procedure Laterality Date    BREAST BIOPSY Left     excisional    BREAST BIOPSY Left     core needle biopsy    BREAST CYST ASPIRATION Bilateral 1980's - ??    BREAST CYST EXCISION Right 2008 - ??    CARDIOVERSION N/A 08/13/2018    Procedure: CARDIOVERSION;  Surgeon: Fernando Disla MD;  Location: [facility] CATH LAB;  Service: Cardiology;  Laterality: N/A;  AF,DCCV,ANAHI,MAC,FAS,3 PREP    CATARACT EXTRACTION W/  INTRAOCULAR LENS IMPLANT Left 04/22/2021    Procedure: EXTRACTION, CATARACT, WITH IOL INSERTION;  Surgeon: Gabby Sandoval MD;  Location: [facility] OR;  Service: Ophthalmology;  Laterality: Left;    CATARACT EXTRACTION W/  INTRAOCULAR LENS IMPLANT Right 05/17/2021    Procedure: EXTRACTION, CATARACT, WITH IOL INSERTION;  Surgeon: Gabby Sandoval MD;  Location: [facility] OR;  Service: Ophthalmology;  Laterality: Right;    HYSTERECTOMY      tahbso    KNEE ARTHROPLASTY Right 06/14/2021    Procedure: ARTHROPLASTY, KNEE:RIGHT:DEPUY-SIGMA;  Surgeon: Tashi Perkins III, MD;  Location: [facility] OR;  Service: Orthopedics;  Laterality: Right;    OOPHORECTOMY  hysterectomy - 2000 - ??    THYROID SURGERY         Current Outpatient Medications   Medication Sig    alendronate (FOSAMAX) 70 MG tablet TAKE 1 TABLET EVERY 7 DAYS    ascorbic acid, vitamin C, (VITAMIN C) 1000 MG tablet Take 1,000 mg by mouth once daily. Take the last  dose before surgery 6/6    atorvastatin (LIPITOR) 40 MG tablet Take 1 tablet (40 mg total) by mouth once daily.    bisoprolol-hydrochlorothiazide 2.5-6.25 mg (ZIAC) 2.5-6.25 mg Tab TAKE 1/2 TABLET EVERY DAY    calcium citrate (CALCITRATE) 200 mg (950 mg) tablet Take 1 tablet by mouth once daily. Take the last dose before surgery 6/6    cholecalciferol, vitamin D3, (VITAMIN D3) 50 mcg (2,000 unit) Cap Take by mouth every morning. Take the last dose before surgery 6/6    cranberry extract 200 mg Cap Take 200 mg by mouth once daily.    cyanocobalamin (VITAMIN B-12) 1000 MCG tablet Take 100 mcg by mouth every evening. Take the last dose before surgery 6/6    D-MANNOSE ORAL Take 4 tablets by mouth every morning. Take the last dose before surgery 6/6    ELIQUIS 5 mg Tab TAKE 1 TABLET TWICE DAILY    estradioL (ESTRACE) 0.01 % (0.1 mg/gram) vaginal cream APPLY A PEA SIZED AMOUNT DAILY FOR TWO WEEKS THEN THREE TIMES A WEEK. 90 day supply    furosemide (LASIX) 20 MG tablet One tablet by mouth twice a day  with additional tablet 5 days a week (total 19 tablets/ week)    Lactobac no.30-Bifidobact no.4 (ULTIMATE NICKOLAS PROBIOTIC) 30 billion cell CpDR Take 1 tablet by mouth every morning. Hold 7 days prior to surgery    levothyroxine (SYNTHROID) 88 MCG tablet TAKE 1 TABLET BEFORE BREAKFAST    omega-3 fatty acids-vitamin E 1,000 mg Cap Take 1 capsule by mouth every evening. Take the last dose before surgery 6/6    pantoprazole (PROTONIX) 40 MG tablet Take 1 tablet (40 mg total) by mouth once daily.    potassium chloride (KLOR-CON) 10 MEQ TbSR Take 2 tablets one day alternating with One the next day  OR  as directed    VITAMIN B COMPLEX ORAL Take 1 tablet by mouth every evening. Take the last dose before surgery 6/6    vitamin E 400 UNIT capsule Take 400 Units by mouth once daily. Take the last dose before surgery 6/6    cephALEXin (KEFLEX) 500 MG capsule One capsule three times daily (Patient not taking: Reported on 8/27/2024)     "imiquimod (ALDARA) 5 % cream Apply small amount to affected area on right upper neck and right lower neck  qhs x 4 weeks; d/c if ulcerated or bleeding (Patient not taking: Reported on 11/21/2024)    ketoconazole (NIZORAL) 2 % cream AAA qd- bid prn flare (Patient not taking: Reported on 11/21/2024)    RSVPreF3 antigen-AS01E, PF, (AREXVY, PF,) 120 mcg/0.5 mL SusR vaccine Inject into the muscle. (Patient not taking: Reported on 8/27/2024)     No current facility-administered medications for this visit.     Review of Systems   Constitutional: Negative for chills, fever and malaise/fatigue.   HENT:  Negative for congestion and nosebleeds.    Eyes:  Negative for blurred vision.   Cardiovascular:  Negative for chest pain, dyspnea on exertion, irregular heartbeat, leg swelling, near-syncope, orthopnea, palpitations, paroxysmal nocturnal dyspnea and syncope.   Respiratory:  Negative for cough, hemoptysis, shortness of breath, sleep disturbances due to breathing and wheezing.    Endocrine: Negative for polyphagia.   Hematologic/Lymphatic: Negative for bleeding problem. Does not bruise/bleed easily.   Gastrointestinal:  Negative for abdominal pain and hematemesis.   Genitourinary:  Negative for hematuria.   Neurological:  Negative for dizziness, focal weakness, headaches, light-headedness, loss of balance and weakness.   Psychiatric/Behavioral:  Negative for altered mental status. The patient is not nervous/anxious.        Objective:     Vitals:    11/21/24 1426   BP: 124/84   Patient Position: Sitting   Pulse: 78   Weight: 60 kg (132 lb 4.4 oz)   Height: 5' 7" (1.702 m)     Physical Exam  Vitals and nursing note reviewed.   Constitutional:       General: She is not in acute distress.     Appearance: Normal appearance. She is well-developed. She is not ill-appearing or diaphoretic.   HENT:      Head: Normocephalic and atraumatic.   Cardiovascular:      Rate and Rhythm: Normal rate and regular rhythm.      Pulses:           " Radial pulses are 2+ on the right side and 2+ on the left side.      Heart sounds: Normal heart sounds, S1 normal and S2 normal.   Pulmonary:      Effort: Pulmonary effort is normal. No accessory muscle usage or respiratory distress.      Breath sounds: Normal breath sounds. No decreased breath sounds, wheezing, rhonchi or rales.   Abdominal:      Tenderness: There is no abdominal tenderness.   Musculoskeletal:         General: No swelling. Normal range of motion.      Cervical back: Normal range of motion and neck supple.   Skin:     General: Skin is warm and dry.      Findings: No bruising or erythema.   Neurological:      Mental Status: She is alert and oriented to person, place, and time. She is not disoriented.      Gait: Gait normal.   Psychiatric:         Mood and Affect: Mood normal.         Speech: Speech normal.         Behavior: Behavior normal.         Thought Content: Thought content normal.         Judgment: Judgment normal.       Lab Results   Component Value Date     10/07/2024    K 4.1 10/07/2024    MG 2.1 12/11/2018    BUN 12 10/07/2024    CREATININE 0.9 10/07/2024    ALT 12 01/16/2024    AST 22 01/16/2024    HGB 13.9 01/16/2024    HCT 42.7 01/16/2024    TSH 0.992 01/16/2024    LDLCALC 67.2 01/16/2024     Assessment:     1. PVC's (premature ventricular contractions)    2. Permanent atrial fibrillation    3. Dizziness    4. Paroxysmal ventricular tachycardia    5. Hashimoto's disease    6. RIZWANA (obstructive sleep apnea)      Plan:   In summary, Ms. Bell is a 84 y.o. female with a past medical history of moderate mitral valve prolapse/mitral sclerosis with preserved LV function, PVCs and nonsustained VT, orthostatic light-headedness, persistent AF and thyroid cancer s/p thyroidectomy who presents today for follow up frequent PVCs and Atrial Fibrillation.    She is doing well today from an arrhythmia. She denies chest pain with exertion or at rest, palpitations, SOB, MCKINLEY, and syncope, but does  endorse chronic paroxysmal dizziness. Her dizziness has been worked up by neurology, ENT, and arrhythmia with Dr. Champagne with no conclusion. Ms. Bell has worn many holter monitors with no symptom-rhythm correlation. On her last 24 hour holter monitor, her PVC burden was lower than the previous monitor. She and her son stated she has also completed vestibular therapy with no resolution of her dizziness.     We discussed the possibility of implanting a loop recorder for continuous monitoring of symptom-rhythm correlation. Ms. Bell would consider ILR, but at this time is not interested in a procedure. I also offered a repeat holter monitor, which she declined at this time.    Ms. Bell is currently taking eliquis 5 mg BID for stroke prophylaxis and denies significant bleeding episodes. She is currently being treated with bisoprolol-HCTZ for HR control. No AAD.     EKG shows rate controlled AF at 78 bpm. QRS interval is 116 ms. Her last echo 10/9/2024 showed EF 50-55%.    PVC  -Continue current medication regimen  -Follow up in about 6 months (around 5/21/2025) sooner as needed    Permanent AF  -Continue current medications regimen  -Follow up in about 6 months (around 5/21/2025) sooner as needed    Dizziness  -Offered ILR, but at this time is not interested in a procedure. I also offered a repeat holter monitor, patient declined.  -Will follow up with PCP    CHOLO Webber, JUAN R-C  Cardiology-Electrophysiology/Arrhythmia NP   [facility]   ------------------------------------------------------------------  *A copy of this note has been sent to Dr. Champagne*

## 2024-11-21 NOTE — TELEPHONE ENCOUNTER
----- Message from ASHUTOSH Fiore sent at 11/21/2024 10:09 AM CST -----  Regarding: FW: EKG    ----- Message -----  From: Selina Valenzuela  Sent: 11/21/2024   9:22 AM CST  To: [facility] Staff  Subject: EKG                                              Pt [phone] would like to know if she can have her EKG done in the room if she arrive about 30 minutes early? She depend on someone to bring her to her appts and she does not want to tie up their entire day. Please call her at the number listed and let her know.    Thanks

## 2024-11-21 NOTE — TELEPHONE ENCOUNTER
Called patient and she asked if the [facility] could be closer. I explained that she would not have to wait until 1 to see LEMUEL Harper. She understood and asked if she could come later that her appointment time because she is dependant upon her son bringing her. I offered her to come at 1:45 for EKG and see LEMUEL Harper at 2 instead. She agreed and was very happy.

## 2024-12-19 VITALS — SYSTOLIC BLOOD PRESSURE: 122 MMHG | DIASTOLIC BLOOD PRESSURE: 66 MMHG

## 2025-01-15 DIAGNOSIS — I50.9 CONGESTIVE HEART FAILURE, UNSPECIFIED HF CHRONICITY, UNSPECIFIED HEART FAILURE TYPE: ICD-10-CM

## 2025-01-15 DIAGNOSIS — R79.89 ELEVATED BRAIN NATRIURETIC PEPTIDE (BNP) LEVEL: ICD-10-CM

## 2025-01-15 DIAGNOSIS — E87.6 HYPOKALEMIA: ICD-10-CM

## 2025-01-16 ENCOUNTER — OFFICE VISIT (OUTPATIENT)
Dept: DERMATOLOGY | Facility: CLINIC | Age: 85
End: 2025-01-16
Payer: MEDICARE

## 2025-01-16 DIAGNOSIS — L64.9 ANDROGENETIC ALOPECIA: ICD-10-CM

## 2025-01-16 DIAGNOSIS — L57.0 AK (ACTINIC KERATOSIS): ICD-10-CM

## 2025-01-16 DIAGNOSIS — C44.41 BASAL CELL CARCINOMA (BCC) OF RIGHT SIDE OF NECK: Primary | ICD-10-CM

## 2025-01-16 DIAGNOSIS — L82.1 SK (SEBORRHEIC KERATOSIS): ICD-10-CM

## 2025-01-16 PROCEDURE — 1126F AMNT PAIN NOTED NONE PRSNT: CPT | Mod: HCNC,CPTII,S$GLB, | Performed by: DERMATOLOGY

## 2025-01-16 PROCEDURE — 1157F ADVNC CARE PLAN IN RCRD: CPT | Mod: HCNC,CPTII,S$GLB, | Performed by: DERMATOLOGY

## 2025-01-16 PROCEDURE — 3288F FALL RISK ASSESSMENT DOCD: CPT | Mod: HCNC,CPTII,S$GLB, | Performed by: DERMATOLOGY

## 2025-01-16 PROCEDURE — 99999 PR PBB SHADOW E&M-EST. PATIENT-LVL IV: CPT | Mod: PBBFAC,HCNC,, | Performed by: DERMATOLOGY

## 2025-01-16 PROCEDURE — 1101F PT FALLS ASSESS-DOCD LE1/YR: CPT | Mod: HCNC,CPTII,S$GLB, | Performed by: DERMATOLOGY

## 2025-01-16 PROCEDURE — 99214 OFFICE O/P EST MOD 30 MIN: CPT | Mod: 25,HCNC,S$GLB, | Performed by: DERMATOLOGY

## 2025-01-16 PROCEDURE — 1160F RVW MEDS BY RX/DR IN RCRD: CPT | Mod: HCNC,CPTII,S$GLB, | Performed by: DERMATOLOGY

## 2025-01-16 PROCEDURE — 17003 DESTRUCT PREMALG LES 2-14: CPT | Mod: HCNC,S$GLB,, | Performed by: DERMATOLOGY

## 2025-01-16 PROCEDURE — 1159F MED LIST DOCD IN RCRD: CPT | Mod: HCNC,CPTII,S$GLB, | Performed by: DERMATOLOGY

## 2025-01-16 PROCEDURE — 17000 DESTRUCT PREMALG LESION: CPT | Mod: HCNC,S$GLB,, | Performed by: DERMATOLOGY

## 2025-01-16 RX ORDER — KETOCONAZOLE 20 MG/ML
SHAMPOO, SUSPENSION TOPICAL
Qty: 120 ML | Refills: 6 | Status: SHIPPED | OUTPATIENT
Start: 2025-01-16

## 2025-01-16 NOTE — PROGRESS NOTES
Subjective:      Patient ID:  Madan Bell is a 84 y.o. female who presents for   Chief Complaint   Patient presents with    Follow-up     S/p aldara      Pt here for aldara/imiquimod follow up for  a BCC x 2  located on the right upper and right lower neck  Pt used aldara/imiquimod for  4 weeks  Pt states their reaction was mild      Also concerned about hairloss. No current tx but is going to stop dyeing her hair        Review of Systems   Skin:  Positive for daily sunscreen use and activity-related sunscreen use. Negative for recent sunburn.   Hematologic/Lymphatic: Bruises/bleeds easily (eliquis).       Objective:   Physical Exam   Constitutional: She appears well-developed and well-nourished. No distress.   Neurological: She is alert and oriented to person, place, and time. She is not disoriented.   Psychiatric: She has a normal mood and affect.   Skin:   Areas Examined (abnormalities noted in diagram):   Head / Face Inspection Performed  Neck Inspection Performed  RUE Inspected  LUE Inspection Performed                 Diagram Legend     Erythematous scaling macule/papule c/w actinic keratosis       Vascular papule c/w angioma      Pigmented verrucoid papule/plaque c/w seborrheic keratosis      Yellow umbilicated papule c/w sebaceous hyperplasia      Irregularly shaped tan macule c/w lentigo     1-2 mm smooth white papules consistent with Milia      Movable subcutaneous cyst with punctum c/w epidermal inclusion cyst      Subcutaneous movable cyst c/w pilar cyst      Firm pink to brown papule c/w dermatofibroma      Pedunculated fleshy papule(s) c/w skin tag(s)      Evenly pigmented macule c/w junctional nevus     Mildly variegated pigmented, slightly irregular-bordered macule c/w mildly atypical nevus      Flesh colored to evenly pigmented papule c/w intradermal nevus       Pink pearly papule/plaque c/w basal cell carcinoma      Erythematous hyperkeratotic cursted plaque c/w SCC      Surgical  scar with no sign of skin cancer recurrence      Open and closed comedones      Inflammatory papules and pustules      Verrucoid papule consistent consistent with wart     Erythematous eczematous patches and plaques     Dystrophic onycholytic nail with subungual debris c/w onychomycosis     Umbilicated papule    Erythematous-base heme-crusted tan verrucoid plaque consistent with inflamed seborrheic keratosis     Erythematous Silvery Scaling Plaque c/w Psoriasis     See annotation      Assessment / Plan:        Basal cell carcinoma (BCC) of right side of neck x 2   S/p aldara with no clinical evidence today of recurrence     AK (actinic keratosis)  Cryosurgery Procedure Note    Verbal consent from the patient is obtained including, but not limited to, risk of hypopigmentation/hyperpigmentation, scar, recurrence of lesion. The patient is aware of the precancerous quality and need for treatment of these lesions. Liquid nitrogen cryosurgery is applied to the 3 actinic keratoses, as detailed in the physical exam, to produce a freeze injury. The patient is aware that blisters may form and is instructed on wound care with gentle cleansing and use of vaseline ointment to keep moist until healed. The patient is supplied a handout on cryosurgery and is instructed to call if lesions do not completely resolve.    SK (seborrheic keratosis)  These are benign inherited growths without a malignant potential. Reassurance given to patient. No treatment is necessary.       Androgenetic alopecia  Rogaine- can use 5% to affected area 1x per day. Must use consistently and if you stop using, the hair it stimulated to grow may fall out over time. Generic acceptable    There are multiple over the counter hair supplements, few of which have proven efficacy in controlled clinical trials. However, anecdotal reports have indicated a benefit for these vitamins.  Handout reviewing active ingredients, allergies, and proper use were provided for  Nutrafol and Viviscal. The patient can elect to take at his/her discretion. If taking biotin, patient should refrain from taking it 1 week before lab work.     Rosemary oil 3x per week topically to scalp     Zenegen shampoo     Pumpkin seed oil 400 mg daily - orally             Follow up in about 6 months (around 7/16/2025) for UBSE.recheck R neck bcc x 2

## 2025-01-16 NOTE — PATIENT INSTRUCTIONS
We would like to see you back in the clinic in 5 months.  You will be able to schedule this appointment by calling or by using your [facility] portal 3 months before this time. Because our schedule fills so quickly, please set a reminder in your phone or on your calendar to schedule 3 months before you are due to come in so that we can see you in a timely fashion.  You should also receive a reminder from us in the mail. This will help us ensure we can continue to provide excellent healthcare for you. Thank you.     Rogaine- can use 5% to affected area 1x per day. Must use consistently and if you stop using, the hair it stimulated to grow may fall out over time. Generic acceptable    There are multiple over the counter hair supplements, few of which have proven efficacy in controlled clinical trials. However, anecdotal reports have indicated a benefit for these vitamins.  Handout reviewing active ingredients, allergies, and proper use were provided for Nutrafol and Viviscal. The patient can elect to take at his/her discretion. If taking biotin, patient should refrain from taking it 1 week before lab work.     Rosemary oil 3x per week topically to scalp     Zenegen shampoo     Pumpkin seed oil 400 mg daily - orally

## 2025-01-17 RX ORDER — FUROSEMIDE 20 MG/1
TABLET ORAL
Qty: 247 TABLET | Refills: 3 | OUTPATIENT
Start: 2025-01-17

## 2025-01-17 RX ORDER — FUROSEMIDE 20 MG/1
TABLET ORAL
Qty: 247 TABLET | Refills: 3 | Status: SHIPPED | OUTPATIENT
Start: 2025-01-17

## 2025-01-22 DIAGNOSIS — Z78.0 MENOPAUSE: ICD-10-CM

## 2025-01-29 DIAGNOSIS — E87.6 HYPOKALEMIA: ICD-10-CM

## 2025-01-29 DIAGNOSIS — R79.89 ELEVATED BRAIN NATRIURETIC PEPTIDE (BNP) LEVEL: ICD-10-CM

## 2025-01-29 DIAGNOSIS — I50.9 CONGESTIVE HEART FAILURE, UNSPECIFIED HF CHRONICITY, UNSPECIFIED HEART FAILURE TYPE: ICD-10-CM

## 2025-01-29 NOTE — TELEPHONE ENCOUNTER
----- Message from Yue sent at 1/29/2025  3:44 PM CST -----  Regarding: medication  [facility] [phone] is calling to request potassium chloride (KLOR-CON) 10 MEQ TbSR be changed to capsule because the pill is too salty and hard to swallow for the patient.  If this change is ok'd by Dr. Daly please call patient and let her know and send a new rx.    Thank you    Lov 10/07/24

## 2025-01-30 DIAGNOSIS — Z00.00 ENCOUNTER FOR MEDICARE ANNUAL WELLNESS EXAM: ICD-10-CM

## 2025-01-30 RX ORDER — POTASSIUM CHLORIDE 750 MG/1
CAPSULE, EXTENDED RELEASE ORAL
Qty: 135 CAPSULE | Refills: 3 | Status: SHIPPED | OUTPATIENT
Start: 2025-01-30

## 2025-02-14 RX ORDER — POTASSIUM CHLORIDE 750 MG/1
CAPSULE, EXTENDED RELEASE ORAL
Qty: 180 CAPSULE | Refills: 0 | Status: SHIPPED | OUTPATIENT
Start: 2025-02-14

## 2025-02-17 NOTE — TELEPHONE ENCOUNTER
Care Due:                  Date            Visit Type   Department     Provider  --------------------------------------------------------------------------------                                MYCHART                              FOLLOWUP/OF  [facility] INTERNAL  Last Visit: 05-      FICE VISIT   [facility]       Sheila Mcgee  Next Visit: None Scheduled  None         None Found                                                            Last  Test          Frequency    Reason                     Performed    Due Date  --------------------------------------------------------------------------------    Office Visit  12 months..  alendronate..............  05- 05-    Health [facility] Embedded Care Due Messages. Reference number: [id number].   2/17/2025 10:46:14 AM CST

## 2025-02-18 NOTE — TELEPHONE ENCOUNTER
Refill Routing Note   Medication(s) are not appropriate for processing by [facility] for the following reason(s):        Required labs outdated    [facility] action(s):  Defer   Requires appointment : Yes             Appointments  past 12m or future 3m with PCP    Date Provider   Last Visit   5/17/2024 Sheila Mcgee MD   Next Visit   Visit date not found Sheila Mcgee MD   ED visits in past 90 days: 0        Note composed:9:48 PM 02/17/2025

## 2025-02-19 ENCOUNTER — PATIENT MESSAGE (OUTPATIENT)
Dept: ADMINISTRATIVE | Facility: OTHER | Age: 85
End: 2025-02-19
Payer: MEDICARE

## 2025-02-20 ENCOUNTER — OFFICE VISIT (OUTPATIENT)
Dept: SPORTS MEDICINE | Facility: CLINIC | Age: 85
End: 2025-02-20
Payer: MEDICARE

## 2025-02-20 VITALS
WEIGHT: 136.69 LBS | HEIGHT: 67 IN | BODY MASS INDEX: 21.45 KG/M2 | DIASTOLIC BLOOD PRESSURE: 81 MMHG | HEART RATE: 54 BPM | SYSTOLIC BLOOD PRESSURE: 134 MMHG

## 2025-02-20 DIAGNOSIS — M25.511 CHRONIC RIGHT SHOULDER PAIN: Primary | ICD-10-CM

## 2025-02-20 DIAGNOSIS — G89.29 CHRONIC RIGHT SHOULDER PAIN: Primary | ICD-10-CM

## 2025-02-20 PROCEDURE — 3079F DIAST BP 80-89 MM HG: CPT | Mod: HCNC,CPTII,S$GLB, | Performed by: ORTHOPAEDIC SURGERY

## 2025-02-20 PROCEDURE — 99999 PR PBB SHADOW E&M-EST. PATIENT-LVL IV: CPT | Mod: PBBFAC,HCNC,, | Performed by: ORTHOPAEDIC SURGERY

## 2025-02-20 PROCEDURE — 1125F AMNT PAIN NOTED PAIN PRSNT: CPT | Mod: HCNC,CPTII,S$GLB, | Performed by: ORTHOPAEDIC SURGERY

## 2025-02-20 PROCEDURE — 1101F PT FALLS ASSESS-DOCD LE1/YR: CPT | Mod: HCNC,CPTII,S$GLB, | Performed by: ORTHOPAEDIC SURGERY

## 2025-02-20 PROCEDURE — 3075F SYST BP GE 130 - 139MM HG: CPT | Mod: HCNC,CPTII,S$GLB, | Performed by: ORTHOPAEDIC SURGERY

## 2025-02-20 PROCEDURE — 3288F FALL RISK ASSESSMENT DOCD: CPT | Mod: HCNC,CPTII,S$GLB, | Performed by: ORTHOPAEDIC SURGERY

## 2025-02-20 PROCEDURE — 99214 OFFICE O/P EST MOD 30 MIN: CPT | Mod: 25,HCNC,S$GLB, | Performed by: ORTHOPAEDIC SURGERY

## 2025-02-20 PROCEDURE — 1157F ADVNC CARE PLAN IN RCRD: CPT | Mod: HCNC,CPTII,S$GLB, | Performed by: ORTHOPAEDIC SURGERY

## 2025-02-20 PROCEDURE — 20610 DRAIN/INJ JOINT/BURSA W/O US: CPT | Mod: HCNC,RT,S$GLB, | Performed by: ORTHOPAEDIC SURGERY

## 2025-02-20 PROCEDURE — 1159F MED LIST DOCD IN RCRD: CPT | Mod: HCNC,CPTII,S$GLB, | Performed by: ORTHOPAEDIC SURGERY

## 2025-02-20 RX ADMIN — TRIAMCINOLONE ACETONIDE 40 MG: 40 INJECTION, SUSPENSION INTRA-ARTICULAR; INTRAMUSCULAR at 02:02

## 2025-02-20 NOTE — PROGRESS NOTES
CC: RIGHT shoulder pain    Patient returns to clinic for follow up of right shoulder. She reports moderate relief from the CSI last visit. She is requesting a repeat today.     Initial Hx:    84 y.o. Female with a  history of right shoulder atraumatic pain. Patient lives independently and is RHD. She states that over the last 3 months she has experienced increased right shoulder pain. She denies any fall or trauma. She has noticed pain with activity as well as at rest. She has night pain. She has not tried any injections or PT in the past.      She states that the pain is severe and not responding to any conservative care.      She reports that the pain and weakness is worse with overhead activity. It also bothers her at night.    Is affecting ADLs.  Pain is 6/10 at it's worst.      Past Medical History:   Diagnosis Date    A-fib     Anticoagulant long-term use     Anxiety     Arrhythmia     Arthritis     knees    Basal cell carcinoma     Breast cyst     Community acquired pneumonia of right lower lobe of lung 12/11/2018    Deep vein thrombosis     one DVT during  pregnancy    Encephalopathy 2/10/2021    Fibrocystic breast 1980 - ??    Heart murmur     Hypercholesterolemia 6/13/2017    Hyperlipidemia     Hypertension     Hypothyroidism     Mitral valve disorder     Mitral valve prolapse     PVC's (premature ventricular contractions)     Squamous cell carcinoma bx 7-2017    right upper forehead    Thyroid cancer 1/29/2013    Thyroid disease     Vasovagal near syncope 11/12/2012    Vasovagal near syncope 11/12/2012    VTE (venous thromboembolism)     in 1960s, post partum, pt unsure of details     Weakness 12/11/2018       Past Surgical History:   Procedure Laterality Date    BREAST BIOPSY Left     excisional    BREAST BIOPSY Left     core needle biopsy    BREAST CYST ASPIRATION Bilateral 1980's - ??    BREAST CYST EXCISION Right 2008 - ??    CARDIOVERSION N/A 08/13/2018    Procedure: CARDIOVERSION;  Surgeon: Fernando  ANDREINA Disla MD;  Location: [facility] CATH LAB;  Service: Cardiology;  Laterality: N/A;  AF,DCCV,ANAHI,MAC,FAS,3 PREP    CATARACT EXTRACTION W/  INTRAOCULAR LENS IMPLANT Left 04/22/2021    Procedure: EXTRACTION, CATARACT, WITH IOL INSERTION;  Surgeon: Gabby Sandoval MD;  Location: [facility] OR;  Service: Ophthalmology;  Laterality: Left;    CATARACT EXTRACTION W/  INTRAOCULAR LENS IMPLANT Right 05/17/2021    Procedure: EXTRACTION, CATARACT, WITH IOL INSERTION;  Surgeon: Gabby Sandoval MD;  Location: [facility] OR;  Service: Ophthalmology;  Laterality: Right;    HYSTERECTOMY      tahbso    KNEE ARTHROPLASTY Right 06/14/2021    Procedure: ARTHROPLASTY, KNEE:RIGHT:DEPUY-SIGMA;  Surgeon: Tashi Perkins III, MD;  Location: [facility] OR;  Service: Orthopedics;  Laterality: Right;    OOPHORECTOMY  hysterectomy - 2000 - ??    THYROID SURGERY         Family History   Problem Relation Name Age of Onset    Arrhythmia Son      Mitral valve prolapse Son      Sudden death Son          sudden cardiac death    Heart disease Mother      Hyperlipidemia Maternal Grandmother      No Known Problems Daughter      No Known Problems Son      No Known Problems Son      Breast cancer Maternal Aunt      Melanoma Neg Hx           Current Outpatient Medications:     alendronate (FOSAMAX) 70 MG tablet, TAKE 1 TABLET EVERY 7 DAYS, Disp: 12 tablet, Rfl: 3    ascorbic acid, vitamin C, (VITAMIN C) 1000 MG tablet, Take 1,000 mg by mouth once daily. Take the last dose before surgery 6/6, Disp: , Rfl:     atorvastatin (LIPITOR) 40 MG tablet, Take 1 tablet (40 mg total) by mouth once daily., Disp: 90 tablet, Rfl: 3    bisoprolol-hydrochlorothiazide 2.5-6.25 mg (ZIAC) 2.5-6.25 mg Tab, TAKE 1/2 TABLET EVERY DAY, Disp: 45 tablet, Rfl: 3    calcium citrate (CALCITRATE) 200 mg (950 mg) tablet, Take 1 tablet by mouth once daily. Take the last dose before surgery 6/6, Disp: , Rfl:     cephALEXin (KEFLEX) 500 MG capsule, One capsule three times daily, Disp: 21 capsule, Rfl: 0     cholecalciferol, vitamin D3, (VITAMIN D3) 50 mcg (2,000 unit) Cap, Take by mouth every morning. Take the last dose before surgery 6/6, Disp: , Rfl:     cranberry extract 200 mg Cap, Take 200 mg by mouth once daily., Disp: 30 capsule, Rfl: 12    cyanocobalamin (VITAMIN B-12) 1000 MCG tablet, Take 100 mcg by mouth every evening. Take the last dose before surgery 6/6, Disp: , Rfl:     D-MANNOSE ORAL, Take 4 tablets by mouth every morning. Take the last dose before surgery 6/6, Disp: , Rfl:     ELIQUIS 5 mg Tab, TAKE 1 TABLET TWICE DAILY, Disp: 180 tablet, Rfl: 3    estradioL (ESTRACE) 0.01 % (0.1 mg/gram) vaginal cream, APPLY A PEA SIZED AMOUNT DAILY FOR TWO WEEKS THEN THREE TIMES A WEEK. 90 day supply, Disp: 42.5 g, Rfl: 4    furosemide (LASIX) 20 MG tablet, One tablet by mouth twice a day  with additional tablet 5 days a week (total 19 tablets/ week), Disp: 247 tablet, Rfl: 3    ketoconazole (NIZORAL) 2 % shampoo, Wash hair with medicated shampoo at least 2x/week - let sit on scalp at least 5 minutes prior to rinsing, Disp: 120 mL, Rfl: 6    Lactobac no.30-Bifidobact no.4 (ULTIMATE NICKOLAS PROBIOTIC) 30 billion cell CpDR, Take 1 tablet by mouth every morning. Hold 7 days prior to surgery, Disp: , Rfl:     levothyroxine (SYNTHROID) 88 MCG tablet, TAKE 1 TABLET BEFORE BREAKFAST, Disp: 90 tablet, Rfl: 2    omega-3 fatty acids-vitamin E 1,000 mg Cap, Take 1 capsule by mouth every evening. Take the last dose before surgery 6/6, Disp: , Rfl:     pantoprazole (PROTONIX) 40 MG tablet, TAKE 1 TABLET ONE TIME DAILY, Disp: 90 tablet, Rfl: 1    potassium chloride (MICRO-K) 10 MEQ CpSR, , Disp: 180 capsule, Rfl: 0    VITAMIN B COMPLEX ORAL, Take 1 tablet by mouth every evening. Take the last dose before surgery 6/6, Disp: , Rfl:     vitamin E 400 UNIT capsule, Take 400 Units by mouth once daily. Take the last dose before surgery 6/6, Disp: , Rfl:     imiquimod (ALDARA) 5 % cream, Apply small amount to affected area on right upper  "neck and right lower neck  qhs x 4 weeks; d/c if ulcerated or bleeding (Patient not taking: Reported on 11/21/2024), Disp: 12 packet, Rfl: 1    ketoconazole (NIZORAL) 2 % cream, AAA qd- bid prn flare (Patient not taking: Reported on 11/21/2024), Disp: 60 g, Rfl: 3    RSVPreF3 antigen-AS01E, PF, (AREXVY, PF,) 120 mcg/0.5 mL SusR vaccine, Inject into the muscle. (Patient not taking: Reported on 8/27/2024), Disp: 1 each, Rfl: 0    Review of patient's allergies indicates:   Allergen Reactions    Fluoride Hives     Other reaction(s): Hives    Fluoride preparations Hives    Amiodarone analogues Rash    Amoxicillin-pot clavulanate Diarrhea    Nitrofurantoin monohyd/m-cryst Nausea Only and Other (See Comments)          REVIEW OF SYSTEMS:  Constitution: Negative. Negative for chills, fever and night sweats.   HENT: Negative for congestion and headaches.    Eyes: Negative for blurred vision, left vision loss and right vision loss.   Cardiovascular: Negative for chest pain and syncope.   Respiratory: Negative for cough and shortness of breath.    Endocrine: Negative for polydipsia, polyphagia and polyuria.   Hematologic/Lymphatic: Negative for bleeding problem. Does not bruise/bleed easily.   Skin: Negative for dry skin, itching and rash.   Musculoskeletal: Negative for falls.  Positive for right shoulder pain and muscle weakness.   Gastrointestinal: Negative for abdominal pain and bowel incontinence.   Genitourinary: Negative for bladder incontinence and nocturia.   Neurological: Negative for disturbances in coordination, loss of balance and seizures.   Psychiatric/Behavioral: Negative for depression. The patient does not have insomnia.    Allergic/Immunologic: Negative for hives and persistent infections.      PHYSICAL EXAMINATION:  Vitals:  /81 (Patient Position: Sitting)   Pulse (!) 54   Ht 5' 7" (1.702 m)   Wt 62 kg (136 lb 11 oz)   LMP  (LMP Unknown)   BMI 21.41 kg/m²    General: The patient is alert and " oriented x 3.  Mood is pleasant.  Observation of ears, eyes and nose reveal no gross abnormalities.  No labored breathing observed.  Gait is coordinated. Patient can toe walk and heel walk without difficulty.      RIGHT SHOULDER / UPPER EXTREMITY EXAM    OBSERVATION:     Swelling  none  Deformity  none   Discoloration  none   Scapular winging none   Scars   none  Atrophy  none    TENDERNESS / CREPITUS (T/C):          T/C      T/C   Clavicle   -/-  SUPRAspinatus    -/-     AC Jt.    -/-  INFRAspinatus  -/-    SC Jt.    -/-  Deltoid    -/-      G. Tuberosity  -/-  LH BICEP groove  -/-   Acromion:  -/-  Midline Neck   -/-     Scapular Spine -/-  Trapezium   -/-   SMA Scapula  -/-  GH jt. line - post  -/-     Scapulothoracic  -/-         ROM: (* = with pain)  Left shoulder   Right shoulder        AROM (PROM)   AROM (PROM)   FE    160° (160°)     80° (150)     ER at 0°    60°  (65°)    60°  (65°)   ER at 90° ABD  90°  (90°)    90°  (90°)   IR at 90°  ABD   NA  (40°)     NA  (40°)      IR (spine level)   T10     T10    STRENGTH: (* = with pain) Left shoulder   Right shoulder   SCAPTION   5/5    4/5*    IR    5/5    5/5   ER    5/5    4/5*   BICEPS   5/5    5/5   Deltoid    5/5    5/5     SIGNS:  Painful side       NEER   +   OANABELAS  neg    DOWNING   +    SPEEDS  neg     DROP ARM   -   BELLY PRESS neg   Superior escape none    LIFT-OFF  neg   X-Body ADD    neg    MOVING VALGUS neg        STABILITY TESTING    Left shoulder   Right shoulder    Translation     Anterior  up face     up face    Posterior  up face    up face    Sulcus   < 10mm    < 10 mm     Signs   Apprehension   neg      neg       Relocation   no change     no change      Jerk test  neg     neg    EXTREMITY NEURO-VASCULAR EXAM:    Sensation grossly intact to light touch all dermatomal regions.    DTR 2+ Biceps, Triceps, BR and Negative Chepes sign   Grossly intact motor function at Elbow, Wrist and Hand   Distal pulses radial and ulnar 2+, brisk  cap refill, symmetric.      NECK:  Painless FROM and spinous processes non-tender. Negative Spurlings sign.      OTHER FINDINGS:  neg scapular dyskinesia    XRAYS:  Xrays including AP, Outlet and Axillary Lateral of shoulder are ordered / images reviewed by me:   No fracture dislocation or other pathology   Acromion type one   Proximal migration of humeral head: Positive   GH arthritis: Moderate glenohumeral arthritis, with moderate AC joint arthritis       Kenalog   Lot #: [id number]  Exp: 09/2026    Sensorcaine  Lot #: [id number]  Exp: 08/24        ASSESSMENT:   Right shoulder pain:  1. Chronic right shoulder pain        PLAN:      1. CSI Right Shoulder done under US guidance  2. Follow up in clinic PRN    All questions were answered, patient will contact us for questions or concerns in the interim.

## 2025-02-21 RX ORDER — LEVOTHYROXINE SODIUM 88 UG/1
88 TABLET ORAL
Qty: 90 TABLET | Refills: 3 | Status: SHIPPED | OUTPATIENT
Start: 2025-02-21

## 2025-02-26 ENCOUNTER — PATIENT MESSAGE (OUTPATIENT)
Dept: UROLOGY | Facility: CLINIC | Age: 85
End: 2025-02-26
Payer: MEDICARE

## 2025-02-26 ENCOUNTER — PATIENT MESSAGE (OUTPATIENT)
Dept: INTERNAL MEDICINE | Facility: CLINIC | Age: 85
End: 2025-02-26
Payer: MEDICARE

## 2025-02-26 DIAGNOSIS — N95.2 ATROPHIC VAGINITIS: ICD-10-CM

## 2025-02-26 DIAGNOSIS — N39.0 RECURRENT UTI: ICD-10-CM

## 2025-02-27 RX ORDER — TRIAMCINOLONE ACETONIDE 40 MG/ML
40 INJECTION, SUSPENSION INTRA-ARTICULAR; INTRAMUSCULAR
Status: DISCONTINUED | OUTPATIENT
Start: 2025-02-20 | End: 2025-02-27 | Stop reason: HOSPADM

## 2025-02-27 NOTE — TELEPHONE ENCOUNTER
No care due was identified.  Health [facility] Embedded Care Due Messages. Reference number: [id number].   2/27/2025 7:59:03 AM CST

## 2025-02-27 NOTE — PROCEDURES
Large Joint Aspiration/Injection: R subacromial bursa    Date/Time: 2/20/2025 2:45 PM    Performed by: Aftab Bello MD  Authorized by: Aftab Bello MD    Consent Done?:  Yes (Verbal)  Indications:  Pain  Site marked: the procedure site was marked    Timeout: prior to procedure the correct patient, procedure, and site was verified    Prep: patient was prepped and draped in usual sterile fashion    Local anesthesia used?: No      Details:  Needle Size:  22 G  Ultrasonic Guidance for needle placement?: No    Approach:  Posterior  Location:  Shoulder  Site:  R subacromial bursa  Medications:  40 mg triamcinolone acetonide 40 mg/mL  Patient tolerance:  Patient tolerated the procedure well with no immediate complications

## 2025-03-05 RX ORDER — ESTRADIOL 0.1 MG/G
CREAM VAGINAL
Qty: 42.5 G | Refills: 4 | Status: SHIPPED | OUTPATIENT
Start: 2025-03-05

## 2025-03-06 NOTE — TELEPHONE ENCOUNTER
Refill Routing Note   Medication(s) are not appropriate for processing by [facility] for the following reason(s):        Required labs outdated    [facility] action(s):  Defer             Appointments  past 12m or future 3m with PCP    Date Provider   Last Visit   5/17/2024 Sheila Mcgee MD   Next Visit   Visit date not found Sheila Mcgee MD   ED visits in past 90 days: 0        Note composed:9:57 AM 03/06/2025

## 2025-03-06 NOTE — TELEPHONE ENCOUNTER
No care due was identified.  Health [facility] Embedded Care Due Messages. Reference number: [id number].   3/06/2025 1:15:55 AM CST

## 2025-03-09 RX ORDER — ATORVASTATIN CALCIUM 40 MG/1
40 TABLET, FILM COATED ORAL
Qty: 90 TABLET | Refills: 0 | Status: SHIPPED | OUTPATIENT
Start: 2025-03-09

## 2025-03-15 NOTE — TELEPHONE ENCOUNTER
Refill Routing Note   Medication(s) are not appropriate for processing by [facility] for the following reason(s):        Outside of protocol    [facility] action(s):  Route               Appointments  past 12m or future 3m with PCP    Date Provider   Last Visit   5/17/2024 Sheila Mcgee MD   Next Visit   Visit date not found Sheila Mcgee MD   ED visits in past 90 days: 0        Note composed:11:25 AM 03/15/2025

## 2025-03-15 NOTE — TELEPHONE ENCOUNTER
No care due was identified.  Health [facility] Embedded Care Due Messages. Reference number: [id number].   3/15/2025 12:39:23 PM CDT

## 2025-03-16 NOTE — TELEPHONE ENCOUNTER
Refill Routing Note   Medication(s) are not appropriate for processing by [facility] for the following reason(s):        Clarification of medication (Rx) details      [facility] action(s):  Defer             Appointments  past 12m or future 3m with PCP    Date Provider   Last Visit   5/17/2024 Sheila Mcgee MD   Next Visit   Visit date not found Sheila Mcgee MD   ED visits in past 90 days: 0        Note composed:4:23 PM 03/16/2025

## 2025-03-19 RX ORDER — BISOPROLOL FUMARATE AND HYDROCHLOROTHIAZIDE 2.5; 6.25 MG/1; MG/1
TABLET ORAL
Qty: 45 TABLET | Refills: 0 | Status: SHIPPED | OUTPATIENT
Start: 2025-03-19

## 2025-03-19 RX ORDER — APIXABAN 5 MG/1
5 TABLET, FILM COATED ORAL 2 TIMES DAILY
Qty: 180 TABLET | Refills: 3 | Status: SHIPPED | OUTPATIENT
Start: 2025-03-19

## 2025-03-24 ENCOUNTER — TELEPHONE (OUTPATIENT)
Dept: CARDIOLOGY | Facility: CLINIC | Age: 85
End: 2025-03-24
Payer: MEDICARE

## 2025-03-24 NOTE — TELEPHONE ENCOUNTER
"----- Message from ASHUTOSH Gómez sent at 3/24/2025 10:16 AM CDT -----  Regarding: FW: kcl/ lasix  Called pt at 1 phone number(s), left voicemail, and sent a  " [facility]" message.  ----- Message -----  From: Sudhir Daly MD  Sent: 3/21/2025   8:51 PM CDT  To: Dalia Pérez RN  Subject: RE: kcl/ lasix                                   Increase to 21/week or 3 per day and just take 2 extra K per week,CJL  ----- Message -----  From: Dalia Pérez RN  Sent: 3/21/2025   5:06 PM CDT  To: Sudhir Daly MD; Dalia Pérez RN  Subject: RE: kcl/ lasix                                   She is currently taking 19 furosemide/ week (2 on sat/ sund, 3 / day the other days) and KCl 20 mEq (10x2) every day.  ----- Message -----  From: Sudhir Daly MD  Sent: 3/21/2025   5:01 PM CDT  To: Dalia Pérez RN  Subject: RE: kcl/ lasix                                   What is she taking now so I can instruct on how to increase?? CJKEYLA  ----- Message -----  From: Dalia Pérez RN  Sent: 3/21/2025   4:50 PM CDT  To: Sudhir Daly MD  Subject: RE: kcl/ lasix                                   I want to clarify the message. As per October, she needs to take 21 doses of furosemide a week, alternating k 10 and 20 every other day (which is less  than what she is taking right now potassium wise).Repeat labs when?Dalia  ----- Message -----  From: Sudhir Daly MD  Sent: 3/21/2025   4:24 PM CDT  To: Dalia Pérez RN  Subject: RE: kcl/ lasix                                   We need to increase from what she is currently taking not supposed to take,CJL  ----- Message -----  From: Dalia Pérez RN  Sent: 3/21/2025   1:01 PM CDT  To: Sudhir Daly MD; Dalia Pérez RN  Subject: kcl/ lasix                                       Found out accidentally that pt is not taking her furosemide/ potassium as she is supposed " to:----------------------------------------------------------------------------------------------------as per lab results:Your results show BNP still high , kidneys good and not dehydrated at all , and I am very surprised that K is OK . Let's increase Furosimide from 19 a week to 21 ( 2 in am and one afternoon) and increase the 7 to 2 one day one the next alternate Please contact me if you have any additional concerns. Sincerely, Sudhir DalyWritten by Sudhir Daly MD on 10/7/2024  5:56 PM CDTSeen by patient Madan Lugochava Bell on 10/12/2024 10:10 AMSeen by proxy Cris Arabella Khan on 10/13/2024  8:39 PM---------------------------------------------------------------------------------------------------------She is currently taking 19 furosemide/ week (2 on sat/ sund, 3 / day the other days)and KCl 20 mEq (10x2) every day.Please advise on how much furosemide/ KCl she should take vs repeating lab work.note:She will need an order for 10 mEq KCl capsules after for [place] requesting her favorite blue capsules generic.  ----- Message -----  From: Guerline Rodgers MA  Sent: 3/20/2025   8:21 PM CDT  To: Dalia Pérez RN  Subject: FW: new rx                                         ----- Message -----  From: Yue Lindo  Sent: 3/20/2025   4:48 PM CDT  To: Addy TROTTER Staff  Subject: new rx                                           potassium chloride (MICRO-K) 10 MEQ [facility] - [facility] [address] Phone: [phone]: [phone] IS REQUESTING THE BLUE GEL TAB.  SHE SAYS IT IS MUCH EASIER TO TAKE.Thank you Lov 10/27/24

## 2025-03-24 NOTE — TELEPHONE ENCOUNTER
Pt was updated on dr Daly's response ,verbalized understanding, and repeated instructions back. Instructions also sent per [facility] as she requested.

## 2025-03-25 DIAGNOSIS — E87.6 HYPOKALEMIA: ICD-10-CM

## 2025-03-25 DIAGNOSIS — I50.9 CONGESTIVE HEART FAILURE, UNSPECIFIED HF CHRONICITY, UNSPECIFIED HEART FAILURE TYPE: ICD-10-CM

## 2025-03-25 DIAGNOSIS — R79.89 ELEVATED BRAIN NATRIURETIC PEPTIDE (BNP) LEVEL: ICD-10-CM

## 2025-03-25 RX ORDER — FUROSEMIDE 20 MG/1
TABLET ORAL
Qty: 270 TABLET | Refills: 3 | Status: SHIPPED | OUTPATIENT
Start: 2025-03-25

## 2025-03-25 RX ORDER — POTASSIUM CHLORIDE 750 MG/1
CAPSULE, EXTENDED RELEASE ORAL
Qty: 270 CAPSULE | Refills: 3 | Status: SHIPPED | OUTPATIENT
Start: 2025-03-25

## 2025-03-26 ENCOUNTER — TELEPHONE (OUTPATIENT)
Dept: INTERNAL MEDICINE | Facility: CLINIC | Age: 85
End: 2025-03-26
Payer: MEDICARE

## 2025-03-26 NOTE — TELEPHONE ENCOUNTER
----- Message from Med Assistant Cunha sent at 3/25/2025  6:43 PM CDT -----  Regarding: FW: Refill    ----- Message -----  From: Corrine Ruby  Sent: 3/25/2025  10:49 AM CDT  To: Addy TROTTER Staff  Subject: Refill                                           [facility] calling to get a refill and correct direction, because the patient asking for capsule instead of pills. Please send refill potassium chloride (MICRO-K) 10 MEQ CpSR @ [facility] ([place] - [address]: [phone]: [phone]     10/7/24

## 2025-04-29 DIAGNOSIS — I50.9 CONGESTIVE HEART FAILURE, UNSPECIFIED HF CHRONICITY, UNSPECIFIED HEART FAILURE TYPE: ICD-10-CM

## 2025-04-29 DIAGNOSIS — R79.89 ELEVATED BRAIN NATRIURETIC PEPTIDE (BNP) LEVEL: ICD-10-CM

## 2025-04-29 DIAGNOSIS — E87.6 HYPOKALEMIA: ICD-10-CM

## 2025-04-29 RX ORDER — FUROSEMIDE 20 MG/1
TABLET ORAL
Qty: 300 TABLET | Refills: 0 | OUTPATIENT
Start: 2025-04-29

## 2025-04-29 NOTE — TELEPHONE ENCOUNTER
Refill Decision Note   Madan Bell  is requesting a refill authorization.  Brief Assessment and Rationale for Refill:  Quick Discontinue     Medication Therapy Plan:  no sig Pharmacy is requesting new scripts for the following medications without required information, (sig/ frequency/qty/etc)      Medication Reconciliation Completed: No     Comments: Pharmacies have been requesting medications for patients without required information, (sig, frequency, qty, etc.). In addition, requests are sent for medication(s) pt. are currently not taking, and medications patients have never taken.    We have spoken to the pharmacies about these request types and advised their teams previously that we are unable to assess these New Script requests and require all details for these requests. This is a known issue and has been reported.     Note composed:11:17 AM 04/29/2025

## 2025-05-02 RX ORDER — PANTOPRAZOLE SODIUM 40 MG/1
40 TABLET, DELAYED RELEASE ORAL
Qty: 90 TABLET | Refills: 0 | Status: SHIPPED | OUTPATIENT
Start: 2025-05-02

## 2025-05-02 NOTE — TELEPHONE ENCOUNTER
Care Due:                  Date            Visit Type   Department     Provider  --------------------------------------------------------------------------------                                MYCHART                              FOLLOWUP/OF  [facility] INTERNAL  Last Visit: 05-      FICE VISIT   [facility]       Sheila Mcgee  Next Visit: None Scheduled  None         None Found                                                            Last  Test          Frequency    Reason                     Performed    Due Date  --------------------------------------------------------------------------------    Office Visit  12 months..  alendronate..............  05- 05-    CBC.........  12 months..  ELIQUIS..................  01-   01-    CMP.........  12 months..  alendronate, atorvastatin  01-   01-    Lipid Panel.  12 months..  atorvastatin.............  01-   01-    Mg Level....  12 months..  alendronate..............  Not Found    Overdue    Phosphate...  12 months..  alendronate..............  Not Found    Overdue    [employer] Embedded Care Due Messages. Reference number: [id number].   5/02/2025 10:44:18 AM CDT

## 2025-05-02 NOTE — TELEPHONE ENCOUNTER
Provider Staff:  Action required for this patient    Requires appointment   Requires labs      Please see care gap opportunities below in Care Due Message.    Thanks!  [facility]     Appointments      Date Provider   Last Visit   5/17/2024 Sheila Mcgee MD   Next Visit   Visit date not found Sheila Mcgee MD     Refill Decision Note   Madan Bell  is requesting a refill authorization.  Brief Assessment and Rationale for Refill:  Approve     Medication Therapy Plan:        Comments:     Note composed:11:04 AM 05/02/2025

## 2025-06-04 ENCOUNTER — PATIENT MESSAGE (OUTPATIENT)
Dept: CARDIOLOGY | Facility: CLINIC | Age: 85
End: 2025-06-04
Payer: MEDICARE

## 2025-06-04 RX ORDER — ATORVASTATIN CALCIUM 40 MG/1
40 TABLET, FILM COATED ORAL
Qty: 90 TABLET | Refills: 0 | Status: SHIPPED | OUTPATIENT
Start: 2025-06-04

## 2025-06-07 ENCOUNTER — PATIENT MESSAGE (OUTPATIENT)
Dept: INTERNAL MEDICINE | Facility: CLINIC | Age: 85
End: 2025-06-07
Payer: MEDICARE

## 2025-06-09 ENCOUNTER — PATIENT MESSAGE (OUTPATIENT)
Dept: INTERNAL MEDICINE | Facility: CLINIC | Age: 85
End: 2025-06-09
Payer: MEDICARE

## 2025-06-10 NOTE — TELEPHONE ENCOUNTER
LOV with Sheila Mcgee MD , 5/17/2024  Nx appt 7/29/25    Asking if you recommend another covid vaccine for pt given her medical history. Per chart review, last vaccine was 10/23/24. Willing to pay out of pocket if not covered by ins and you recommend another vaccine

## 2025-06-12 ENCOUNTER — OFFICE VISIT (OUTPATIENT)
Dept: DERMATOLOGY | Facility: CLINIC | Age: 85
End: 2025-06-12
Payer: MEDICARE

## 2025-06-12 ENCOUNTER — APPOINTMENT (OUTPATIENT)
Dept: RADIOLOGY | Facility: CLINIC | Age: 85
End: 2025-06-12
Attending: INTERNAL MEDICINE
Payer: MEDICARE

## 2025-06-12 DIAGNOSIS — D22.9 NEVUS: ICD-10-CM

## 2025-06-12 DIAGNOSIS — Z85.828 PERSONAL HISTORY OF SKIN CANCER: ICD-10-CM

## 2025-06-12 DIAGNOSIS — L64.9 ANDROGENETIC ALOPECIA: ICD-10-CM

## 2025-06-12 DIAGNOSIS — L82.1 SK (SEBORRHEIC KERATOSIS): ICD-10-CM

## 2025-06-12 DIAGNOSIS — L81.4 LENTIGO: ICD-10-CM

## 2025-06-12 DIAGNOSIS — Z78.0 MENOPAUSE: ICD-10-CM

## 2025-06-12 DIAGNOSIS — L57.0 AK (ACTINIC KERATOSIS): Primary | ICD-10-CM

## 2025-06-12 DIAGNOSIS — D18.01 CHERRY ANGIOMA: ICD-10-CM

## 2025-06-12 PROCEDURE — 99999 PR PBB SHADOW E&M-EST. PATIENT-LVL III: CPT | Mod: PBBFAC,HCNC,, | Performed by: DERMATOLOGY

## 2025-06-12 PROCEDURE — 77080 DXA BONE DENSITY AXIAL: CPT | Mod: TC,HCNC,PO

## 2025-06-12 PROCEDURE — 77080 DXA BONE DENSITY AXIAL: CPT | Mod: 26,HCNC,, | Performed by: INTERNAL MEDICINE

## 2025-06-12 NOTE — PROGRESS NOTES
Subjective:      Patient ID:  Madan Bell is a 85 y.o. female who presents for   Chief Complaint   Patient presents with    Skin Check     ubse    Follow-up     Recheck - R neck      Pt here today for UBSE  Also here for f/u - recheck R neck, BCC x 2 tx with aldara.   Also feels scaly area on left forehead    This is a high risk patient here to check for the development of new lesions.    Daughter present with pt at today's visit    Daughter feels her hairloss is improved as well.       Review of Systems   Skin:  Positive for daily sunscreen use and activity-related sunscreen use. Negative for recent sunburn.   Hematologic/Lymphatic: Bruises/bleeds easily (eliquis).       Objective:   Physical Exam   Constitutional: She appears well-developed and well-nourished. No distress.   Neurological: She is alert and oriented to person, place, and time. She is not disoriented.   Psychiatric: She has a normal mood and affect.   Skin:   Areas Examined (abnormalities noted in diagram):   Scalp / Hair Palpated and Inspected  Head / Face Inspection Performed  Neck Inspection Performed  Chest / Axilla Inspection Performed  Abdomen Inspection Performed  Back Inspection Performed  RUE Inspected  LUE Inspection Performed  Nails and Digits Inspection Performed                 Diagram Legend     Erythematous scaling macule/papule c/w actinic keratosis       Vascular papule c/w angioma      Pigmented verrucoid papule/plaque c/w seborrheic keratosis      Yellow umbilicated papule c/w sebaceous hyperplasia      Irregularly shaped tan macule c/w lentigo     1-2 mm smooth white papules consistent with Milia      Movable subcutaneous cyst with punctum c/w epidermal inclusion cyst      Subcutaneous movable cyst c/w pilar cyst      Firm pink to brown papule c/w dermatofibroma      Pedunculated fleshy papule(s) c/w skin tag(s)      Evenly pigmented macule c/w junctional nevus     Mildly variegated pigmented, slightly  irregular-bordered macule c/w mildly atypical nevus      Flesh colored to evenly pigmented papule c/w intradermal nevus       Pink pearly papule/plaque c/w basal cell carcinoma      Erythematous hyperkeratotic cursted plaque c/w SCC      Surgical scar with no sign of skin cancer recurrence      Open and closed comedones      Inflammatory papules and pustules      Verrucoid papule consistent consistent with wart     Erythematous eczematous patches and plaques     Dystrophic onycholytic nail with subungual debris c/w onychomycosis     Umbilicated papule    Erythematous-base heme-crusted tan verrucoid plaque consistent with inflamed seborrheic keratosis     Erythematous Silvery Scaling Plaque c/w Psoriasis     See annotation      Assessment / Plan:        AK (actinic keratosis)  Cryosurgery Procedure Note    Verbal consent from the patient is obtained including, but not limited to, risk of hypopigmentation/hyperpigmentation, scar, recurrence of lesion. The patient is aware of the precancerous quality and need for treatment of these lesions. Liquid nitrogen cryosurgery is applied to the 2 actinic keratoses, as detailed in the physical exam, to produce a freeze injury. The patient is aware that blisters may form and is instructed on wound care with gentle cleansing and use of vaseline ointment to keep moist until healed. The patient is supplied a handout on cryosurgery and is instructed to call if lesions do not completely resolve.    Androgenetic alopecia  Continue current tx   Rogaine- can use 5% to affected area 1x per day. Must use consistently and if you stop using, the hair it stimulated to grow may fall out over time. Generic acceptable     There are multiple over the counter hair supplements, few of which have proven efficacy in controlled clinical trials. However, anecdotal reports have indicated a benefit for these vitamins.  Handout reviewing active ingredients, allergies, and proper use were provided for  Nutrafol and Viviscal. The patient can elect to take at his/her discretion. If taking biotin, patient should refrain from taking it 1 week before lab work.      Rosemary oil 3x per week topically to scalp      Zenegen shampoo      Pumpkin seed oil 400 mg daily - orally       SK (seborrheic keratosis)  These are benign inherited growths without a malignant potential. Reassurance given to patient. No treatment is necessary.     Lentigo  This is a benign hyperpigmented sun induced lesion. Recommend daily sun protection/avoidance and use of at least SPF 30, broad spectrum sunscreen (OTC drug) will reduce the number of new lesions. Treatment of these benign lesions are considered cosmetic.  The nature of sun-induced photo-aging and skin cancers is discussed.  Sun avoidance, protective clothing, and the use of 30-SPF sunscreens is advised. Observe closely for skin damage/changes, and call if such occurs.      Cherry angioma  These are benign vascular lesions that are inherited.  Treatment is not necessary.    Nevus  Discussed ABCDE's of nevi.  Monitor for new mole or moles that are becoming bigger, darker, irritated, or developing irregular borders. Brochure provided. Instructed patient to observe lesion(s) for changes and follow up in clinic if changes are noted. Patient to monitor skin at home for new or changing lesions.     Personal history of skin cancer  Area(s) of previous NMSC evaluated with no signs of recurrence.    Upper body skin examination performed today including at least 6 points as noted in physical examination. No lesions suspicious for malignancy noted.    Recommend daily sun protection/avoidance and use of at least SPF 30, broad spectrum sunscreen (OTC drug).              Follow up in about 6 months (around 12/12/2025) for UBSE.

## 2025-06-17 ENCOUNTER — RESULTS FOLLOW-UP (OUTPATIENT)
Dept: INTERNAL MEDICINE | Facility: CLINIC | Age: 85
End: 2025-06-17

## 2025-06-20 RX ORDER — BISOPROLOL FUMARATE AND HYDROCHLOROTHIAZIDE 2.5; 6.25 MG/1; MG/1
TABLET ORAL
Qty: 45 TABLET | Refills: 0 | Status: SHIPPED | OUTPATIENT
Start: 2025-06-20

## 2025-06-20 NOTE — TELEPHONE ENCOUNTER
No care due was identified.  [facility] Embedded Care Due Messages. Reference number: [id number].   6/20/2025 10:53:44 AM CDT

## 2025-06-20 NOTE — TELEPHONE ENCOUNTER
Refill Decision Note   Madan Bell  is requesting a refill authorization.  Brief Assessment and Rationale for Refill:  Approve     Medication Therapy Plan:        Comments:     Note composed:4:37 PM 06/20/2025

## 2025-06-23 ENCOUNTER — PATIENT MESSAGE (OUTPATIENT)
Dept: CARDIOLOGY | Facility: CLINIC | Age: 85
End: 2025-06-23
Payer: MEDICARE

## 2025-06-23 DIAGNOSIS — I34.1 MVP (MITRAL VALVE PROLAPSE): ICD-10-CM

## 2025-06-23 DIAGNOSIS — R79.89 ELEVATED BRAIN NATRIURETIC PEPTIDE (BNP) LEVEL: ICD-10-CM

## 2025-06-23 DIAGNOSIS — M81.0 OSTEOPOROSIS, UNSPECIFIED OSTEOPOROSIS TYPE, UNSPECIFIED PATHOLOGICAL FRACTURE PRESENCE: Primary | ICD-10-CM

## 2025-06-23 DIAGNOSIS — I07.1 TRICUSPID VALVE INSUFFICIENCY, UNSPECIFIED ETIOLOGY: ICD-10-CM

## 2025-06-23 DIAGNOSIS — I51.9 RIGHT VENTRICULAR DYSFUNCTION: ICD-10-CM

## 2025-06-23 DIAGNOSIS — I50.9 CONGESTIVE HEART FAILURE, UNSPECIFIED HF CHRONICITY, UNSPECIFIED HEART FAILURE TYPE: Primary | ICD-10-CM

## 2025-06-23 RX ORDER — ALENDRONATE SODIUM 70 MG/1
TABLET ORAL
Qty: 12 TABLET | Refills: 3 | Status: SHIPPED | OUTPATIENT
Start: 2025-06-23

## 2025-06-23 NOTE — TELEPHONE ENCOUNTER
No care due was identified.  [facility] Embedded Care Due Messages. Reference number: [id number].   6/23/2025 11:54:55 AM CDT

## 2025-06-30 ENCOUNTER — PATIENT MESSAGE (OUTPATIENT)
Dept: ADMINISTRATIVE | Facility: OTHER | Age: 85
End: 2025-06-30
Payer: MEDICARE

## 2025-07-01 ENCOUNTER — HOSPITAL ENCOUNTER (OUTPATIENT)
Dept: CARDIOLOGY | Facility: HOSPITAL | Age: 85
Discharge: HOME | End: 2025-07-01
Attending: INTERNAL MEDICINE
Payer: MEDICARE

## 2025-07-01 VITALS
HEIGHT: 67 IN | BODY MASS INDEX: 21.35 KG/M2 | WEIGHT: 136 LBS | SYSTOLIC BLOOD PRESSURE: 130 MMHG | HEART RATE: 70 BPM | DIASTOLIC BLOOD PRESSURE: 70 MMHG

## 2025-07-01 DIAGNOSIS — I51.9 RIGHT VENTRICULAR DYSFUNCTION: ICD-10-CM

## 2025-07-01 DIAGNOSIS — R79.89 ELEVATED BRAIN NATRIURETIC PEPTIDE (BNP) LEVEL: ICD-10-CM

## 2025-07-01 DIAGNOSIS — I34.1 MVP (MITRAL VALVE PROLAPSE): ICD-10-CM

## 2025-07-01 DIAGNOSIS — I07.1 TRICUSPID VALVE INSUFFICIENCY, UNSPECIFIED ETIOLOGY: ICD-10-CM

## 2025-07-01 DIAGNOSIS — I50.9 CONGESTIVE HEART FAILURE, UNSPECIFIED HF CHRONICITY, UNSPECIFIED HEART FAILURE TYPE: ICD-10-CM

## 2025-07-01 PROCEDURE — 93306 TTE W/DOPPLER COMPLETE: CPT | Mod: HCNC,PO

## 2025-07-02 ENCOUNTER — TELEPHONE (OUTPATIENT)
Dept: CARDIOLOGY | Facility: CLINIC | Age: 85
End: 2025-07-02
Payer: MEDICARE

## 2025-07-02 NOTE — TELEPHONE ENCOUNTER
----- Message from Sudhir Daly MD sent at 7/2/2025  1:21 PM CDT -----  Please tell them that I was in clinic yesterday and no one told me that she had an echo till now and I am off . I am back on Monday and will read then , but I may come in before to read a PelLoveIt  or 2 and if I do will read hers too!Chip

## 2025-07-02 NOTE — TELEPHONE ENCOUNTER
Pt updated on dr Daly not reading echo until Monday, maybe sooner but unlikely. She verbalized understanding

## 2025-07-03 ENCOUNTER — RESULTS FOLLOW-UP (OUTPATIENT)
Dept: CARDIOLOGY | Facility: CLINIC | Age: 85
End: 2025-07-03

## 2025-07-03 LAB
AORTIC SIZE INDEX (SOV): 2 CM/M2
AORTIC SIZE INDEX: 1.8 CM/M2
ASCENDING AORTA: 3.1 CM
AV AREA BY CONTINUOUS VTI: 1.6 CM2
AV INDEX (PROSTH): 0.56
AV LVOT MEAN GRADIENT: 1 MMHG
AV LVOT PEAK GRADIENT: 2 MMHG
AV MEAN GRADIENT: 4 MMHG
AV PEAK GRADIENT: 7 MMHG
AV VALVE AREA BY VELOCITY RATIO: 1.7 CM²
AV VALVE AREA: 1.6 CM2
AV VELOCITY RATIO: 0.62
BSA FOR ECHO PROCEDURE: 1.71 M2
CV ECHO LV RWT: 0.44 CM
DOP CALC AO PEAK VEL: 1.3 M/S
DOP CALC AO VTI: 26.5 CM
DOP CALC LVOT AREA: 2.8 CM2
DOP CALC LVOT DIAMETER: 1.9 CM
DOP CALC LVOT PEAK VEL: 0.8 M/S
DOP CALC LVOT STROKE VOLUME: 41.9 CM3
DOP CALC RVOT AREA: 3.53 CM2
DOP CALC RVOT DIAMETER: 2.12 CM
DOP CALCLVOT PEAK VEL VTI: 14.8 CM
E WAVE DECELERATION TIME: 156 MS
E/A RATIO: 2.68
ECHO EF ESTIMATED: 55 %
ECHO LV POSTERIOR WALL: 1 CM (ref 0.6–1.1)
EJECTION FRACTION: 58 %
FRACTIONAL SHORTENING: 28.9 % (ref 28–44)
INTERVENTRICULAR SEPTUM: 1 CM (ref 0.6–1.1)
LA MAJOR: 6.5 CM
LA MINOR: 6.5 CM
LA WIDTH: 5 CM
LEFT ATRIUM SIZE: 4.7 CM
LEFT ATRIUM VOLUME INDEX MOD: 63 ML/M2
LEFT ATRIUM VOLUME INDEX: 75 ML/M2
LEFT ATRIUM VOLUME MOD: 108 ML
LEFT ATRIUM VOLUME: 130 CM3
LEFT INTERNAL DIMENSION IN SYSTOLE: 3.2 CM (ref 2.1–4)
LEFT VENTRICLE DIASTOLIC VOLUME INDEX: 53.49 ML/M2
LEFT VENTRICLE DIASTOLIC VOLUME: 92 ML
LEFT VENTRICLE MASS INDEX: 89.1 G/M2
LEFT VENTRICLE SYSTOLIC VOLUME INDEX: 23.8 ML/M2
LEFT VENTRICLE SYSTOLIC VOLUME: 41 ML
LEFT VENTRICULAR INTERNAL DIMENSION IN DIASTOLE: 4.5 CM (ref 3.5–6)
LEFT VENTRICULAR MASS: 153.3 G
MV MEAN GRADIENT: 1 MMHG
MV PEAK A VEL: 0.28 M/S
MV PEAK E VEL: 0.75 M/S
MV PEAK GRADIENT: 3 MMHG
OHS CV RV/LV RATIO: 0.71 CM
PISA RADIUS: 0.36 CM
PISA TR MAX VEL: 2.5 M/S
PISA VN NYQUIST: 0.52 M/S
PULM VEIN S/D RATIO: 0.46
PV PEAK D VEL: 0.39 M/S
PV PEAK S VEL: 0.18 M/S
RA MAJOR: 6.54 CM
RA PRESSURE ESTIMATED: 8 MMHG
RA WIDTH: 4.52 CM
RIGHT ATRIAL AREA: 24.7 CM2
RIGHT ATRIAL AREA: 26.3 CM2
RIGHT VENTRICLE DIASTOLIC BASEL DIMENSION: 3.2 CM
RV TB RVSP: 11 MMHG
SINUS: 3.5 CM
STJ: 2.8 CM
TRICUSPID ANNULAR PLANE SYSTOLIC EXCURSION: 2 CM
TV PEAK GRADIENT: 24 MMHG
TV REST PULMONARY ARTERY PRESSURE: 33 MMHG
Z-SCORE OF LEFT VENTRICULAR DIMENSION IN END DIASTOLE: -0.58
Z-SCORE OF LEFT VENTRICULAR DIMENSION IN END SYSTOLE: 0.63

## 2025-07-28 ENCOUNTER — TELEPHONE (OUTPATIENT)
Dept: AUDIOLOGY | Facility: CLINIC | Age: 85
End: 2025-07-28
Payer: MEDICARE

## 2025-07-28 NOTE — TELEPHONE ENCOUNTER
Returned patient's call requesting an appointment; she was given the call back number of [phone]

## 2025-07-29 ENCOUNTER — OFFICE VISIT (OUTPATIENT)
Dept: INTERNAL MEDICINE | Facility: CLINIC | Age: 85
End: 2025-07-29
Payer: MEDICARE

## 2025-07-29 VITALS
HEART RATE: 41 BPM | SYSTOLIC BLOOD PRESSURE: 128 MMHG | OXYGEN SATURATION: 95 % | WEIGHT: 136.25 LBS | DIASTOLIC BLOOD PRESSURE: 66 MMHG | HEIGHT: 67 IN | BODY MASS INDEX: 21.38 KG/M2

## 2025-07-29 DIAGNOSIS — I27.20 PULMONARY HYPERTENSION: Primary | ICD-10-CM

## 2025-07-29 DIAGNOSIS — N39.0 URINARY TRACT INFECTION WITHOUT HEMATURIA, SITE UNSPECIFIED: ICD-10-CM

## 2025-07-29 DIAGNOSIS — R73.9 HYPERGLYCEMIA: ICD-10-CM

## 2025-07-29 DIAGNOSIS — I47.20 PAROXYSMAL VENTRICULAR TACHYCARDIA: ICD-10-CM

## 2025-07-29 DIAGNOSIS — E55.9 MILD VITAMIN D DEFICIENCY: ICD-10-CM

## 2025-07-29 DIAGNOSIS — E78.5 HYPERLIPIDEMIA, UNSPECIFIED HYPERLIPIDEMIA TYPE: ICD-10-CM

## 2025-07-29 DIAGNOSIS — I10 HTN (HYPERTENSION), BENIGN: ICD-10-CM

## 2025-07-29 PROCEDURE — 99215 OFFICE O/P EST HI 40 MIN: CPT | Mod: HCNC,S$GLB,, | Performed by: INTERNAL MEDICINE

## 2025-07-29 PROCEDURE — 1125F AMNT PAIN NOTED PAIN PRSNT: CPT | Mod: CPTII,HCNC,S$GLB, | Performed by: INTERNAL MEDICINE

## 2025-07-29 PROCEDURE — 1101F PT FALLS ASSESS-DOCD LE1/YR: CPT | Mod: CPTII,HCNC,S$GLB, | Performed by: INTERNAL MEDICINE

## 2025-07-29 PROCEDURE — 1157F ADVNC CARE PLAN IN RCRD: CPT | Mod: CPTII,HCNC,S$GLB, | Performed by: INTERNAL MEDICINE

## 2025-07-29 PROCEDURE — 99999 PR PBB SHADOW E&M-EST. PATIENT-LVL IV: CPT | Mod: PBBFAC,HCNC,, | Performed by: INTERNAL MEDICINE

## 2025-07-29 PROCEDURE — 3078F DIAST BP <80 MM HG: CPT | Mod: CPTII,HCNC,S$GLB, | Performed by: INTERNAL MEDICINE

## 2025-07-29 PROCEDURE — 3074F SYST BP LT 130 MM HG: CPT | Mod: CPTII,HCNC,S$GLB, | Performed by: INTERNAL MEDICINE

## 2025-07-29 PROCEDURE — 1159F MED LIST DOCD IN RCRD: CPT | Mod: CPTII,HCNC,S$GLB, | Performed by: INTERNAL MEDICINE

## 2025-07-29 PROCEDURE — 3288F FALL RISK ASSESSMENT DOCD: CPT | Mod: CPTII,HCNC,S$GLB, | Performed by: INTERNAL MEDICINE

## 2025-07-29 RX ORDER — MECLIZINE HCL 12.5 MG 12.5 MG/1
TABLET ORAL
Qty: 40 TABLET | Refills: 1 | Status: SHIPPED | OUTPATIENT
Start: 2025-07-29

## 2025-07-31 LAB
OHS QRS DURATION: 112 MS
OHS QTC CALCULATION: 468 MS

## 2025-07-31 NOTE — PROGRESS NOTES
"Subjective:       Patient ID: aMdan Bell is a 85 y.o. female.    Chief Complaint: Annual Exam    HPI She is doing well overall - lives alone - no recent falls.  No CP or SOB. Still feels "lightheadedness" but her head feels somewhat heavy - this has been ongoing for a few years with cardiac and nuerological workups negative. Symptoms are stable no worse.    Review of Systems   Respiratory:  Negative for shortness of breath (PND or orthopnea).    Cardiovascular:  Negative for chest pain (arm pain or jaw pain).   Gastrointestinal:  Negative for abdominal pain, diarrhea, nausea and vomiting.   Genitourinary:  Negative for dysuria.   Neurological:  Negative for seizures, syncope and headaches.       Objective:      Physical Exam  Constitutional:       General: She is not in acute distress.     Appearance: She is well-developed.   HENT:      Head: Normocephalic.   Eyes:      Pupils: Pupils are equal, round, and reactive to light.   Neck:      Thyroid: No thyromegaly.      Vascular: No JVD.   Cardiovascular:      Rate and Rhythm: Normal rate. Rhythm irregular.      Heart sounds: No murmur heard.     No friction rub. No gallop.   Pulmonary:      Effort: Pulmonary effort is normal.      Breath sounds: Normal breath sounds. No wheezing or rales.   Abdominal:      General: Bowel sounds are normal. There is no distension.      Palpations: Abdomen is soft. There is no mass.      Tenderness: There is no abdominal tenderness. There is no guarding or rebound.   Musculoskeletal:      Cervical back: Neck supple.   Lymphadenopathy:      Cervical: No cervical adenopathy.   Skin:     General: Skin is warm and dry.   Neurological:      Mental Status: She is alert and oriented to person, place, and time.      Deep Tendon Reflexes: Reflexes are normal and symmetric.   Psychiatric:         Behavior: Behavior normal.         Thought Content: Thought content normal.         Judgment: Judgment normal.         Assessment:     "   1. Pulmonary hypertension    2. Paroxysmal ventricular tachycardia    3. HTN (hypertension), benign    4. Hyperlipidemia, unspecified hyperlipidemia type    5. Hyperglycemia    6. Mild vitamin D deficiency    7. Urinary tract infection without hematuria, site unspecified        Plan:   Pulmonary hypertension  Stable and monitored  Paroxysmal ventricular tachycardia  Per EP  HTN (hypertension), benign  -     CBC Auto Differential; Future; Expected date: 07/29/2025  -     Comprehensive Metabolic Panel; Future; Expected date: 07/29/2025  -     TSH; Future; Expected date: 07/29/2025  Controlled - continue current meds    Hyperlipidemia, unspecified hyperlipidemia type  -     Lipid Panel; Future; Expected date: 07/29/2025    Hyperglycemia  -     Hemoglobin A1C; Future; Expected date: 07/29/2025    Mild vitamin D deficiency  -     Vitamin D; Future; Expected date: 07/29/2025    Urinary tract infection without hematuria, site unspecified  -     Urinalysis; Future; Expected date: 07/29/2025  -     Urine Culture High Risk; Future; Expected date: 07/29/2025    Lightheadedness  -     meclizine (ANTIVERT) 12.5 mg tablet; Half tablet TID as needed for dizziness  Dispense: 40 tablet; Refill: 1  Try a quarter of a tablet to see if is can be helpful    50 minutes of total time spent on the encounter, which includes face to face time and non-face to face time preparing to see the patient (eg, review of tests), Obtaining and/or reviewing separately obtained history, Documenting clinical information in the electronic or other health record, Independently interpreting results (not separately reported) and communicating results to the patient/family/caregiver, or Care coordination (not separately reported).

## 2025-08-12 ENCOUNTER — PATIENT MESSAGE (OUTPATIENT)
Dept: INTERNAL MEDICINE | Facility: CLINIC | Age: 85
End: 2025-08-12
Payer: MEDICARE

## 2025-08-13 ENCOUNTER — PATIENT MESSAGE (OUTPATIENT)
Dept: INTERNAL MEDICINE | Facility: CLINIC | Age: 85
End: 2025-08-13
Payer: MEDICARE

## 2025-08-13 RX ORDER — ATORVASTATIN CALCIUM 40 MG/1
40 TABLET, FILM COATED ORAL
Qty: 90 TABLET | Refills: 3 | Status: SHIPPED | OUTPATIENT
Start: 2025-08-13

## 2025-08-26 ENCOUNTER — PATIENT MESSAGE (OUTPATIENT)
Dept: INTERNAL MEDICINE | Facility: CLINIC | Age: 85
End: 2025-08-26
Payer: MEDICARE

## (undated) DEVICE — CONTAINER SPECIMEN STRL 4OZ

## (undated) DEVICE — SEE MEDLINE ITEM 152548

## (undated) DEVICE — SEE MEDLINE ITEM 146298

## (undated) DEVICE — TOWEL OR XRAY WHITE 17X26IN

## (undated) DEVICE — GAUZE SPONGE 4X4 12PLY

## (undated) DEVICE — SUT QUILL PDO VIOL CP 45CM 2

## (undated) DEVICE — SEE MEDLINE ITEM 146271

## (undated) DEVICE — SOL BETADINE 5%

## (undated) DEVICE — SUT MCRYL PLUS 4-0 PS2 27IN

## (undated) DEVICE — DRESSING TELFA N ADH 3X8

## (undated) DEVICE — KIT IRR SUCTION HND PIECE

## (undated) DEVICE — ELECTRODE REM PLYHSV RETURN 9

## (undated) DEVICE — BLADE DUAL CUT SAG 35X64X.89MM

## (undated) DEVICE — CASSETTE INFINITI

## (undated) DEVICE — Device

## (undated) DEVICE — BLADE RECIP DS OFST 70X1X12.5

## (undated) DEVICE — SOL IRR NACL .9% 3000ML

## (undated) DEVICE — SUT 1 36IN COATED VICRYL UN

## (undated) DEVICE — SEE MEDLINE ITEM 157144

## (undated) DEVICE — SYR SLIP TIP 1CC

## (undated) DEVICE — GLASSES EYE PROTECTIVE

## (undated) DEVICE — DRAPE INCISE IOBAN 2 23X33IN

## (undated) DEVICE — PUMP COLD THERAPY

## (undated) DEVICE — DRESSING TRANS 4X4 TEGADERM

## (undated) DEVICE — GLOVE BIOGEL SKINSENSE PI 7.5

## (undated) DEVICE — SUT 2/0 36IN COATED VICRYL

## (undated) DEVICE — GLOVE BIOGEL SKINSENSE PI 8.0

## (undated) DEVICE — TAPE SILK 3IN

## (undated) DEVICE — NDL 18GA X1 1/2 REG BEVEL

## (undated) DEVICE — BRUSH SCRUB HIBICLENS 4%

## (undated) DEVICE — UNDERGLOVES BIOGEL PI SIZE 8

## (undated) DEVICE — BLADE SAGITTAL 18 X 1.27 X 90M

## (undated) DEVICE — KIT TOTAL KNEE TKOFG

## (undated) DEVICE — UNDERGLOVES BIOGEL PI SIZE 8.5

## (undated) DEVICE — MARKER SKIN STND TIP BLUE BARR

## (undated) DEVICE — SYS REVOLUTION CEMENT MIXING

## (undated) DEVICE — SPONGE GAUZE 16PLY 4X4

## (undated) DEVICE — SEE MEDLINE ITEM 157131

## (undated) DEVICE — CATH SUCTION 10FR

## (undated) DEVICE — MASK FLYTE HOOD PEEL AWAY

## (undated) DEVICE — DRAPE SURG W/TWL 17 5/8X23

## (undated) DEVICE — ADHESIVE DERMABOND ADVANCED

## (undated) DEVICE — PAD KNEE POLAR XL

## (undated) DEVICE — ALCOHOL 70% ISOP W/GREEN 16OZ

## (undated) DEVICE — DRESSING AQUACEL AG RBBN 2X45

## (undated) DEVICE — SYR 50CC LL